# Patient Record
Sex: FEMALE | Race: WHITE | NOT HISPANIC OR LATINO | Employment: OTHER | URBAN - METROPOLITAN AREA
[De-identification: names, ages, dates, MRNs, and addresses within clinical notes are randomized per-mention and may not be internally consistent; named-entity substitution may affect disease eponyms.]

---

## 2017-01-12 ENCOUNTER — OFFICE VISIT (OUTPATIENT)
Dept: INTERNAL MEDICINE | Facility: CLINIC | Age: 77
End: 2017-01-12

## 2017-01-12 VITALS
BODY MASS INDEX: 32.51 KG/M2 | OXYGEN SATURATION: 96 % | TEMPERATURE: 97.9 F | HEART RATE: 69 BPM | HEIGHT: 61 IN | WEIGHT: 172.2 LBS | DIASTOLIC BLOOD PRESSURE: 78 MMHG | SYSTOLIC BLOOD PRESSURE: 128 MMHG

## 2017-01-12 DIAGNOSIS — R19.7 DIARRHEA, UNSPECIFIED TYPE: ICD-10-CM

## 2017-01-12 DIAGNOSIS — C44.300 SKIN CANCER OF FACE: Primary | ICD-10-CM

## 2017-01-12 DIAGNOSIS — M25.542 ARTHRALGIA OF BOTH HANDS: ICD-10-CM

## 2017-01-12 DIAGNOSIS — R20.8 BURNING SENSATION OF FEET: ICD-10-CM

## 2017-01-12 DIAGNOSIS — M25.541 ARTHRALGIA OF BOTH HANDS: ICD-10-CM

## 2017-01-12 DIAGNOSIS — I25.10 CARDIOVASCULAR DISEASE: ICD-10-CM

## 2017-01-12 DIAGNOSIS — R25.2 CRAMP OF BOTH LOWER EXTREMITIES: ICD-10-CM

## 2017-01-12 DIAGNOSIS — L65.9 HAIR LOSS: ICD-10-CM

## 2017-01-12 DIAGNOSIS — I10 ESSENTIAL HYPERTENSION: ICD-10-CM

## 2017-01-12 DIAGNOSIS — E78.5 HYPERLIPIDEMIA, UNSPECIFIED HYPERLIPIDEMIA TYPE: ICD-10-CM

## 2017-01-12 PROCEDURE — 99214 OFFICE O/P EST MOD 30 MIN: CPT | Performed by: FAMILY MEDICINE

## 2017-01-12 RX ORDER — CEPHALEXIN 500 MG/1
CAPSULE ORAL
Refills: 5 | COMMUNITY
Start: 2017-01-02 | End: 2018-01-03

## 2017-01-12 RX ORDER — PRAVASTATIN SODIUM 40 MG
1 TABLET ORAL DAILY
COMMUNITY
Start: 2016-11-01 | End: 2017-07-12 | Stop reason: SDUPTHER

## 2017-01-12 RX ORDER — METOPROLOL SUCCINATE 25 MG/1
25 TABLET, EXTENDED RELEASE ORAL EVERY MORNING
COMMUNITY
Start: 2016-11-09 | End: 2020-12-10 | Stop reason: SDUPTHER

## 2017-01-12 RX ORDER — CHLORAL HYDRATE 500 MG
1200 CAPSULE ORAL
Status: ON HOLD | COMMUNITY
End: 2018-03-07

## 2017-01-12 RX ORDER — NITROGLYCERIN 400 UG/1
SPRAY ORAL
COMMUNITY
Start: 2016-11-09 | End: 2018-01-03 | Stop reason: DRUGHIGH

## 2017-01-12 RX ORDER — ASPIRIN 81 MG/1
81 TABLET ORAL NIGHTLY
COMMUNITY

## 2017-01-12 RX ORDER — UBIDECARENONE 200 MG
200 CAPSULE ORAL EVERY MORNING
Status: ON HOLD | COMMUNITY
End: 2018-03-07

## 2017-01-12 RX ORDER — ACETAMINOPHEN 500 MG
500 TABLET ORAL EVERY 6 HOURS PRN
Status: ON HOLD | COMMUNITY
End: 2018-03-07

## 2017-01-12 NOTE — MR AVS SNAPSHOT
Chelsi Ferguson   1/12/2017 8:30 AM   Office Visit    Dept Phone:  [phone]   Encounter #:  [id number]    Provider:  Reina MAI MD   Department:  [facility] INTERNAL MEDICINE                Your Full Care Plan              Your Updated Medication List          This list is accurate as of: 1/12/17  9:33 AM.  Always use your most recent med list.                acetaminophen 500 MG tablet   Commonly known as:  TYLENOL       aspirin 81 MG EC tablet       Biotin 5000 MCG capsule       cephalexin 500 MG capsule   Commonly known as:  KEFLEX       Coenzyme Q10 200 MG capsule       estradiol 10 MCG tablet vaginal tablet   Commonly known as:  VAGIFEM   Insert 1 tablet into the vagina 2 (Two) Times a Week.       fish oil 1000 MG capsule capsule       metoprolol succinate XL 25 MG 24 hr tablet   Commonly known as:  TOPROL-XL       MULTIVITAL PO       nitroglycerin 0.4 MG/SPRAY spray   Commonly known as:  NITROLINGUAL       pravastatin 40 MG tablet   Commonly known as:  PRAVACHOL               We Performed the Following     Ambulatory Referral to Dermatology     C-reactive Protein     CBC & Differential     Celiac Disease Panel     Comprehensive Metabolic Panel     Folate     Lipid Panel     Magnesium     Nuclear Antigen Antibody, IFA     Rheumatoid Factor     Sedimentation Rate     T4, Free     TSH     Vitamin B1, Whole Blood     Vitamin B12       You Were Diagnosed With        Codes Comments    Skin cancer of face    -  Primary ICD-10-CM: C44.310  ICD-9-CM: 173.31     Cardiovascular disease     ICD-10-CM: I25.10  ICD-9-CM: 429.2     Hyperlipidemia, unspecified hyperlipidemia type     ICD-10-CM: E78.5  ICD-9-CM: 272.4     Essential hypertension     ICD-10-CM: I10  ICD-9-CM: 401.9     Hair loss     ICD-10-CM: L65.9  ICD-9-CM: 704.00     Arthralgia of both hands     ICD-10-CM: M25.541, M25.542  ICD-9-CM: 719.44     Diarrhea, unspecified type     ICD-10-CM: R19.7  ICD-9-CM: 787.91   "   Cramp of both lower extremities     ICD-10-CM: R25.2  ICD-9-CM: 729.82     Burning sensation of feet     ICD-10-CM: R20.8  ICD-9-CM: 782.0       Instructions     None    Patient Instructions History      Upcoming Appointments     Visit Type Date Time Department    FOLLOW UP 1/12/2017  8:30 AM [address]      MyChart Signup     Our records indicate that you have declined [facility] [employer] signup. If you would like to sign up for [employer], please email [email or web address] or call [phone] to obtain an activation code.             Other Info from Your Visit           Allergies     Aleve [Naproxen]      Celebrex [Celecoxib]      Ibuprofen      Indocin [Indomethacin]      Keflex [Cephalexin]      Lipitor [Atorvastatin]      Prevacid [Lansoprazole]      Prilosec [Omeprazole]      Statins      Zocor [Simvastatin]  Hives      Reason for Visit     Hyperlipidemia 6+ month follow up--discuss biotin      Vital Signs     Blood Pressure Pulse Temperature Height Weight Last Menstrual Period    128/78 69 97.9 °F (36.6 °C) 61\" (154.9 cm) 172 lb 3.2 oz (78.1 kg) (LMP Unknown)    Oxygen Saturation Body Mass Index Smoking Status             96% 32.54 kg/m2 Never Smoker         Problems and Diagnoses Noted     Cardiovascular disease    Diarrhea    High cholesterol or triglycerides    Skin cancer    -  Primary    High blood pressure        Hair loss        Arthralgia of both hands        Cramp of both lower extremities        Burning sensation of feet            "

## 2017-01-12 NOTE — PROGRESS NOTES
"Subjective   Chelsi Ferguson is a 76 y.o. female.     Chief Complaint   Patient presents with   • Hyperlipidemia     6+ month follow up--discuss biotin       Vitals:    01/12/17 0834   BP: 128/78   Pulse: 69   Temp: 97.9 °F (36.6 °C)   SpO2: 96%   Weight: 172 lb 3.2 oz (78.1 kg)   Height: 61\" (154.9 cm)       Hyperlipidemia   This is a chronic problem. The current episode started more than 1 year ago. Lipid results: pending. She has no history of chronic renal disease, diabetes, hypothyroidism, liver disease, obesity or nephrotic syndrome. Factors aggravating her hyperlipidemia include fatty foods. Associated symptoms include myalgias. Pertinent negatives include no chest pain, focal sensory loss, focal weakness, leg pain or shortness of breath. Current antihyperlipidemic treatment includes statins. The current treatment provides significant improvement of lipids. Compliance problems include adherence to diet.  Risk factors for coronary artery disease include dyslipidemia, hypertension, post-menopausal and family history.   Hypertension   This is a chronic problem. The current episode started more than 1 year ago. The problem has been gradually improving since onset. The problem is controlled. Pertinent negatives include no anxiety, blurred vision, chest pain, headaches, malaise/fatigue, neck pain, orthopnea, palpitations, peripheral edema, PND, shortness of breath or sweats. There are no associated agents to hypertension. Risk factors for coronary artery disease include dyslipidemia, family history and sedentary lifestyle. Past treatments include beta blockers. The current treatment provides significant improvement. Compliance problems include diet.  Hypertensive end-organ damage includes CAD/MI. There is no history of angina, kidney disease, CVA, heart failure, left ventricular hypertrophy, PVD or retinopathy. There is no history of chronic renal disease.      Pt's hair is thinning and has started biotin. Pt's " thumbnails are breaking off.   Pt's dentist told her that her teeth are resorbing and there is concern of autoimmune condition on 2 teeth.   Pt has pain in joints and now having wrist pain.  Pt is having leg cramps more frequently.  Pt's feet are burning.  Granddaughter has RA    Pt is a nonsmoker  The following portions of the patient's history were reviewed and updated as appropriate: allergies, current medications, past family history, past medical history, past social history, past surgical history and problem list.    Past Medical History   Diagnosis Date   • Ankle pain    • Chest discomfort    • Edema    • Gallstones    • GERD (gastroesophageal reflux disease)    • Glaucoma    • H/O complete eye exam 06/2013   • H/O mammogram 11/16/2015   • H/O non-ST elevation myocardial infarction (NSTEMI) 01/2005   • Hammer toe    • Heart attack 01/2005   • History of blood transfusion 01/2005   • History of cardiovascular stress test 12/02/2015     normal   • HTN (hypertension)    • Hyperlipidemia    • Kidney infection 1971   • Menopausal symptoms    • Onychia and paronychia of finger    • Osteopenia    • Other elevated white blood cell count    • Pap smear for cervical cancer screening 2010     Leandro   • Pneumonia    • Viral warts      Past Surgical History   Procedure Laterality Date   • Coronary stent placement Left 01/2005     drug eluting stent 1/2005 LAD   • Tonsillectomy  1952   • Bladder repair  2002   • Laparotomy oopherectomy Bilateral 2002   • Bladder repair  2003   • Uterine fibroid surgery  1978     emergency removal of fibroid from birth canal   • Dilatation and curettage  1971   • Replacement total knee bilateral Bilateral    • Frontalis suspension  2010   • Hysterectomy  1978   • Total knee arthroplasty  01/2005   • Other surgical history  2005     rectocele repair   • Bunionectomy Right 2010   • Other surgical history  2010     eyelid surgery   • Dilatation and curettage  1978   • Hammer toe repair Right  11/03/2010   • Colonoscopy  2008, 4/2014     [facility]   • Eye surgery  05/2016       Allergies   Allergen Reactions   • Aleve [Naproxen]    • Celebrex [Celecoxib]    • Ibuprofen    • Indocin [Indomethacin]    • Keflex [Cephalexin]    • Lipitor [Atorvastatin]    • Prevacid [Lansoprazole]    • Prilosec [Omeprazole]    • Statins    • Zocor [Simvastatin] Hives       Social History     Social History   • Marital status:      Spouse name: N/A   • Number of children: N/A   • Years of education: N/A     Occupational History   • Not on file.     Social History Main Topics   • Smoking status: Never Smoker   • Smokeless tobacco: Never Used   • Alcohol use No   • Drug use: No   • Sexual activity: Yes     Birth control/ protection: None     Other Topics Concern   • Not on file     Social History Narrative       Family History   Problem Relation Age of Onset   • Stroke Mother    • Heart failure Father      congestive   • Cancer Father      lip   • Breast cancer Sister      60's   • Breast cancer Daughter 40   • Breast cancer Other      NIECE 60's   • Breast cancer Other      NIECE 60's   • Ovarian cancer Neg Hx          Review of Systems   Constitutional: Negative for malaise/fatigue.   Eyes: Negative for blurred vision.   Respiratory: Negative for shortness of breath.    Cardiovascular: Negative for chest pain, palpitations, orthopnea and PND.   Musculoskeletal: Positive for myalgias. Negative for neck pain.   Neurological: Negative for focal weakness and headaches.       Objective   Physical Exam   Constitutional: She is oriented to person, place, and time. She appears well-developed.   HENT:   Head: Normocephalic.   Right Ear: Tympanic membrane, external ear and ear canal normal.   Left Ear: External ear normal. A foreign body is present.   Ears:    Nose: Nose normal.   Mouth/Throat: Oropharynx is clear and moist.   Eyes: Conjunctivae and EOM are normal. Pupils are equal, round, and reactive to light.   Neck: Trachea  normal and normal range of motion. Neck supple. Carotid bruit is not present. No thyroid mass and no thyromegaly present.   Cardiovascular: Normal rate and regular rhythm.    No murmur heard.  Pulses:       Dorsalis pedis pulses are 2+ on the right side, and 2+ on the left side.        Posterior tibial pulses are 2+ on the right side, and 2+ on the left side.   Pulmonary/Chest: Effort normal and breath sounds normal.   Abdominal: Soft. Bowel sounds are normal. There is no tenderness.   Musculoskeletal: Normal range of motion.        Hands:  Neurological: She is alert and oriented to person, place, and time.   Skin: Skin is warm and dry.   Psychiatric: She has a normal mood and affect. Her behavior is normal.   Nursing note and vitals reviewed.      Assessment/Plan   Chelsi was seen today for hyperlipidemia.    Diagnoses and all orders for this visit:    Skin cancer of face  -     Ambulatory Referral to Dermatology    Cardiovascular disease    Hyperlipidemia, unspecified hyperlipidemia type  -     Comprehensive Metabolic Panel  -     Lipid Panel    Essential hypertension  -     Comprehensive Metabolic Panel  -     CBC & Differential  -     TSH  -     Lipid Panel    Hair loss  -     Rheumatoid Factor  -     Nuclear Antigen Antibody, IFA  -     Celiac Disease Panel  -     Sedimentation Rate  -     C-reactive Protein    Arthralgia of both hands  -     Rheumatoid Factor  -     Celiac Disease Panel  -     Sedimentation Rate  -     C-reactive Protein    Diarrhea, unspecified type  -     Celiac Disease Panel    Cramp of both lower extremities    Burning sensation of feet  -     T4, Free  -     Vitamin B12  -     Folate  -     Magnesium  -     Vitamin B1, Whole Blood        Pt had flu shot and pneumonia vaccine           Current Outpatient Prescriptions:   •  acetaminophen (TYLENOL) 500 MG tablet, Take 500 mg by mouth Every 6 (Six) Hours As Needed for mild pain (1-3)., Disp: , Rfl:   •  aspirin 81 MG EC tablet, Take 81 mg by  mouth Every Night., Disp: , Rfl:   •  Biotin 5000 MCG capsule, Take  by mouth., Disp: , Rfl:   •  Coenzyme Q10 200 MG capsule, Take 200 mg by mouth Daily., Disp: , Rfl:   •  estradiol (VAGIFEM) 10 MCG tablet vaginal tablet, Insert 1 tablet into the vagina 2 (Two) Times a Week., Disp: 24 tablet, Rfl: 1  •  Multiple Vitamins-Minerals (MULTIVITAL PO), Take 1 tablet by mouth Daily., Disp: , Rfl:   •  Omega-3 Fatty Acids (FISH OIL) 1000 MG capsule capsule, Take 1,000 mg by mouth Daily With Breakfast., Disp: , Rfl:   •  cephalexin (KEFLEX) 500 MG capsule, Takes 4 tab before dental appointment, Disp: , Rfl: 5  •  metoprolol succinate XL (TOPROL-XL) 25 MG 24 hr tablet, Take 1 tablet by mouth Daily., Disp: , Rfl:   •  nitroglycerin (NITROLINGUAL) 0.4 MG/SPRAY spray, , Disp: , Rfl:   •  pravastatin (PRAVACHOL) 40 MG tablet, Take 1 tablet by mouth Daily., Disp: , Rfl:     Return in about 6 months (around 7/12/2017), or if symptoms worsen or fail to improve, for Recheck.

## 2017-01-16 LAB
ALBUMIN SERPL-MCNC: 4.2 G/DL (ref 3.2–4.8)
ALBUMIN/GLOB SERPL: 2 G/DL (ref 1.5–2.5)
ALP SERPL-CCNC: 76 U/L (ref 25–100)
ALT SERPL-CCNC: 19 U/L (ref 7–40)
ANA TITR SER IF: NEGATIVE {TITER}
AST SERPL-CCNC: 28 U/L (ref 0–33)
BASOPHILS # BLD AUTO: 0.03 10*3/MM3 (ref 0–0.2)
BASOPHILS NFR BLD AUTO: 0.4 % (ref 0–1)
BILIRUB SERPL-MCNC: 0.5 MG/DL (ref 0.3–1.2)
BUN SERPL-MCNC: 11 MG/DL (ref 9–23)
BUN/CREAT SERPL: 18.3 (ref 7–25)
CALCIUM SERPL-MCNC: 10.4 MG/DL (ref 8.7–10.4)
CHLORIDE SERPL-SCNC: 106 MMOL/L (ref 99–109)
CHOLEST SERPL-MCNC: 191 MG/DL (ref 0–200)
CO2 SERPL-SCNC: 32 MMOL/L (ref 20–31)
CREAT SERPL-MCNC: 0.6 MG/DL (ref 0.6–1.3)
CRP SERPL-MCNC: 2.8 MG/DL (ref 0–10)
EOSINOPHIL # BLD AUTO: 0.14 10*3/MM3 (ref 0.1–0.3)
EOSINOPHIL NFR BLD AUTO: 2 % (ref 0–3)
ERYTHROCYTE [DISTWIDTH] IN BLOOD BY AUTOMATED COUNT: 13.4 % (ref 11.3–14.5)
ERYTHROCYTE [SEDIMENTATION RATE] IN BLOOD BY WESTERGREN METHOD: 10 MM/HR (ref 0–30)
FOLATE SERPL-MCNC: >24 NG/ML (ref 3.2–20)
GLOBULIN SER CALC-MCNC: 2.1 GM/DL
GLUCOSE SERPL-MCNC: 89 MG/DL (ref 70–100)
HCT VFR BLD AUTO: 43.1 % (ref 34.5–44)
HDLC SERPL-MCNC: 48 MG/DL (ref 40–60)
HGB BLD-MCNC: 14 G/DL (ref 11.5–15.5)
IMM GRANULOCYTES # BLD: 0.02 10*3/MM3 (ref 0–0.03)
IMM GRANULOCYTES NFR BLD: 0.3 % (ref 0–0.6)
LDLC SERPL CALC-MCNC: 107 MG/DL (ref 0–100)
LYMPHOCYTES # BLD AUTO: 1.75 10*3/MM3 (ref 0.6–4.8)
LYMPHOCYTES NFR BLD AUTO: 24.6 % (ref 24–44)
MAGNESIUM SERPL-MCNC: 1.8 MG/DL (ref 1.3–2.7)
MCH RBC QN AUTO: 32.3 PG (ref 27–31)
MCHC RBC AUTO-ENTMCNC: 32.5 G/DL (ref 32–36)
MCV RBC AUTO: 99.3 FL (ref 80–99)
MONOCYTES # BLD AUTO: 0.65 10*3/MM3 (ref 0–1)
MONOCYTES NFR BLD AUTO: 9.1 % (ref 0–12)
NEUTROPHILS # BLD AUTO: 4.52 10*3/MM3 (ref 1.5–8.3)
NEUTROPHILS NFR BLD AUTO: 63.6 % (ref 41–71)
PLATELET # BLD AUTO: 294 10*3/MM3 (ref 150–450)
POTASSIUM SERPL-SCNC: 4.8 MMOL/L (ref 3.5–5.5)
PROT SERPL-MCNC: 6.3 G/DL (ref 5.7–8.2)
RBC # BLD AUTO: 4.34 10*6/MM3 (ref 3.89–5.14)
RHEUMATOID FACT SERPL-ACNC: <10 IU/ML (ref 0–13.9)
SODIUM SERPL-SCNC: 146 MMOL/L (ref 132–146)
T4 FREE SERPL-MCNC: 1.29 NG/DL (ref 0.89–1.76)
TRIGL SERPL-MCNC: 179 MG/DL (ref 0–150)
TSH SERPL DL<=0.005 MIU/L-ACNC: 1.81 MIU/ML (ref 0.35–5.35)
VIT B1 BLD-SCNC: 201.5 NMOL/L (ref 66.5–200)
VIT B12 SERPL-MCNC: 521 PG/ML (ref 211–911)
VLDLC SERPL CALC-MCNC: 35.8 MG/DL
WBC # BLD AUTO: 7.11 10*3/MM3 (ref 3.5–10.8)

## 2017-01-18 LAB
ENDOMYSIUM IGA SER QL: NEGATIVE
IGA SERPL-MCNC: 133 MG/DL (ref 64–422)
TTG IGA SER-ACNC: <2 U/ML (ref 0–3)
WRITTEN AUTHORIZATION: NORMAL

## 2017-02-25 ENCOUNTER — APPOINTMENT (OUTPATIENT)
Dept: CT IMAGING | Facility: HOSPITAL | Age: 77
End: 2017-02-25

## 2017-02-25 ENCOUNTER — HOSPITAL ENCOUNTER (EMERGENCY)
Facility: HOSPITAL | Age: 77
Discharge: HOME | End: 2017-02-25
Attending: EMERGENCY MEDICINE | Admitting: EMERGENCY MEDICINE

## 2017-02-25 VITALS
HEIGHT: 61 IN | OXYGEN SATURATION: 96 % | RESPIRATION RATE: 16 BRPM | TEMPERATURE: 97.6 F | DIASTOLIC BLOOD PRESSURE: 81 MMHG | BODY MASS INDEX: 32.1 KG/M2 | SYSTOLIC BLOOD PRESSURE: 155 MMHG | HEART RATE: 62 BPM | WEIGHT: 170 LBS
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
## 2017-02-25 DIAGNOSIS — S02.2XXA CLOSED FRACTURE OF NASAL BONE, INITIAL ENCOUNTER: ICD-10-CM

## 2017-02-25 DIAGNOSIS — S09.90XA MINOR CLOSED HEAD INJURY: ICD-10-CM

## 2017-02-25 PROCEDURE — 70486 CT MAXILLOFACIAL W/O DYE: CPT

## 2017-02-25 PROCEDURE — 70450 CT HEAD/BRAIN W/O DYE: CPT

## 2017-02-25 PROCEDURE — 99284 EMERGENCY DEPT VISIT MOD MDM: CPT

## 2017-02-25 RX ORDER — ACETAMINOPHEN 500 MG
1000 TABLET ORAL ONCE
Status: COMPLETED | OUTPATIENT
Start: 2017-02-25 | End: 2017-02-25

## 2017-02-25 RX ORDER — OXYMETAZOLINE HYDROCHLORIDE 0.05 G/100ML
2 SPRAY NASAL AS NEEDED
Status: DISCONTINUED | OUTPATIENT
Start: 2017-02-25 | End: 2017-02-25 | Stop reason: HOSPADM

## 2017-02-25 RX ORDER — OXYMETAZOLINE HYDROCHLORIDE 0.05 G/100ML
1 SPRAY NASAL ONCE
Status: COMPLETED | OUTPATIENT
Start: 2017-02-25 | End: 2017-02-25

## 2017-02-25 RX ORDER — HYDROCODONE BITARTRATE AND ACETAMINOPHEN 5; 325 MG/1; MG/1
1 TABLET ORAL EVERY 4 HOURS PRN
Qty: 12 TABLET | Refills: 0 | Status: SHIPPED | OUTPATIENT
Start: 2017-02-25 | End: 2017-07-12

## 2017-02-25 RX ADMIN — OXYMETAZOLINE HYDROCHLORIDE 2 SPRAY: 5 SPRAY NASAL at 16:04

## 2017-02-25 RX ADMIN — OXYMETAZOLINE HYDROCHLORIDE 1 SPRAY: 5 SPRAY NASAL at 14:18

## 2017-02-25 RX ADMIN — ACETAMINOPHEN 1000 MG: 500 TABLET, FILM COATED ORAL at 14:17

## 2017-02-28 ENCOUNTER — TRANSCRIBE ORDERS (OUTPATIENT)
Dept: LAB | Facility: HOSPITAL | Age: 77
End: 2017-02-28

## 2017-02-28 ENCOUNTER — LAB (OUTPATIENT)
Dept: LAB | Facility: HOSPITAL | Age: 77
End: 2017-02-28

## 2017-02-28 DIAGNOSIS — E07.0: Primary | ICD-10-CM

## 2017-02-28 DIAGNOSIS — E07.0: ICD-10-CM

## 2017-02-28 PROCEDURE — 36415 COLL VENOUS BLD VENIPUNCTURE: CPT

## 2017-02-28 PROCEDURE — 83970 ASSAY OF PARATHORMONE: CPT | Performed by: OTOLARYNGOLOGY

## 2017-03-01 LAB — PTH-INTACT SERPL-MCNC: 19 PG/ML (ref 15–65)

## 2017-07-12 ENCOUNTER — OFFICE VISIT (OUTPATIENT)
Dept: INTERNAL MEDICINE | Facility: CLINIC | Age: 77
End: 2017-07-12

## 2017-07-12 VITALS
BODY MASS INDEX: 32.36 KG/M2 | OXYGEN SATURATION: 96 % | DIASTOLIC BLOOD PRESSURE: 80 MMHG | HEIGHT: 61 IN | WEIGHT: 171.4 LBS | HEART RATE: 69 BPM | TEMPERATURE: 97.8 F | SYSTOLIC BLOOD PRESSURE: 130 MMHG

## 2017-07-12 DIAGNOSIS — E78.5 HYPERLIPIDEMIA, UNSPECIFIED HYPERLIPIDEMIA TYPE: Primary | ICD-10-CM

## 2017-07-12 LAB
ALBUMIN SERPL-MCNC: 4.4 G/DL (ref 3.2–4.8)
ALBUMIN/GLOB SERPL: 1.9 G/DL (ref 1.5–2.5)
ALP SERPL-CCNC: 69 U/L (ref 25–100)
ALT SERPL-CCNC: 14 U/L (ref 7–40)
AST SERPL-CCNC: 23 U/L (ref 0–33)
BILIRUB SERPL-MCNC: 0.7 MG/DL (ref 0.3–1.2)
BUN SERPL-MCNC: 16 MG/DL (ref 9–23)
BUN/CREAT SERPL: 26.7 (ref 7–25)
CALCIUM SERPL-MCNC: 10.1 MG/DL (ref 8.7–10.4)
CHLORIDE SERPL-SCNC: 104 MMOL/L (ref 99–109)
CHOLEST SERPL-MCNC: 196 MG/DL (ref 0–200)
CO2 SERPL-SCNC: 31 MMOL/L (ref 20–31)
CREAT SERPL-MCNC: 0.6 MG/DL (ref 0.6–1.3)
GLOBULIN SER CALC-MCNC: 2.3 GM/DL
GLUCOSE SERPL-MCNC: 90 MG/DL (ref 70–100)
HDLC SERPL-MCNC: 42 MG/DL (ref 40–60)
LDLC SERPL CALC-MCNC: 113 MG/DL (ref 0–100)
POTASSIUM SERPL-SCNC: 4.3 MMOL/L (ref 3.5–5.5)
PROT SERPL-MCNC: 6.7 G/DL (ref 5.7–8.2)
SODIUM SERPL-SCNC: 141 MMOL/L (ref 132–146)
TRIGL SERPL-MCNC: 206 MG/DL (ref 0–150)
VLDLC SERPL CALC-MCNC: 41.2 MG/DL

## 2017-07-12 PROCEDURE — 99213 OFFICE O/P EST LOW 20 MIN: CPT | Performed by: FAMILY MEDICINE

## 2017-07-12 RX ORDER — ESTRADIOL 10 UG/1
1 INSERT VAGINAL 2 TIMES WEEKLY
Qty: 24 TABLET | Refills: 1 | Status: SHIPPED | OUTPATIENT
Start: 2017-07-13 | End: 2018-03-05

## 2017-07-12 RX ORDER — PRAVASTATIN SODIUM 40 MG
40 TABLET ORAL DAILY
Qty: 90 TABLET | Refills: 1 | Status: SHIPPED | OUTPATIENT
Start: 2017-07-12 | End: 2018-01-18 | Stop reason: HOSPADM

## 2017-07-12 NOTE — PROGRESS NOTES
"Nancy Ferguson is a 76 y.o. female.     Chief Complaint   Patient presents with   • Hyperlipidemia     6 month follow up       Visit Vitals   • /80   • Pulse 69   • Temp 97.8 °F (36.6 °C)   • Ht 61\" (154.9 cm)   • Wt 171 lb 6.4 oz (77.7 kg)   • LMP  (LMP Unknown)   • SpO2 96%   • BMI 32.39 kg/m2       Hyperlipidemia   This is a chronic problem. The current episode started more than 1 year ago. The problem is controlled. Recent lipid tests were reviewed and are normal. She has no history of chronic renal disease, diabetes, hypothyroidism, liver disease, obesity or nephrotic syndrome. There are no known factors aggravating her hyperlipidemia. Pertinent negatives include no chest pain, focal sensory loss, focal weakness, leg pain, myalgias or shortness of breath. Current antihyperlipidemic treatment includes statins. The current treatment provides significant improvement of lipids. There are no compliance problems.  Risk factors for coronary artery disease include dyslipidemia and post-menopausal.      Pt fell at [employer] and and broke her nose. Pt caught her toe on rug. Pt was trying to catch her balance. Pt had surgery on her nose.    Pt has fallen in her yard, getting over balanced leaning forward.   Dizziness resolved with lowering of metoprolol by Cardiology  Chest pain resolved. CAD stable      The following portions of the patient's history were reviewed and updated as appropriate: allergies, current medications, past family history, past medical history, past social history, past surgical history and problem list.     Past Medical History:   Diagnosis Date   • Ankle pain    • Chest discomfort    • Edema    • Gallstones    • GERD (gastroesophageal reflux disease)    • Glaucoma    • H/O complete eye exam 06/2013   • H/O mammogram 11/16/2015   • H/O non-ST elevation myocardial infarction (NSTEMI) 01/2005   • Hammer toe    • Heart attack 01/2005   • History of blood transfusion 01/2005   • History of " cardiovascular stress test 12/02/2015    normal   • HTN (hypertension)    • Hyperlipidemia    • Kidney infection 1971   • Menopausal symptoms    • Onychia and paronychia of finger    • Osteopenia    • Other elevated white blood cell count    • Pap smear for cervical cancer screening 2010    Leandro   • Pneumonia    • Viral warts        Past Surgical History:   Procedure Laterality Date   • BLADDER REPAIR  2002   • BLADDER REPAIR  2003   • BUNIONECTOMY Right 2010   • COLONOSCOPY  2008, 4/2014    Juany   • CORONARY STENT PLACEMENT Left 01/2005    drug eluting stent 1/2005 LAD   • DILATATION AND CURETTAGE  1971   • DILATATION AND CURETTAGE  1978   • EYE SURGERY  05/2016   • FRONTALIS SUSPENSION  2010   • HAMMER TOE REPAIR Right 11/03/2010   • HYSTERECTOMY  1978   • LAPAROTOMY OOPHERECTOMY Bilateral 2002   • NOSE SURGERY  03/2017    repair 3 fractured areas. Dr Dunne   • OTHER SURGICAL HISTORY  2005    rectocele repair   • OTHER SURGICAL HISTORY  2010    eyelid surgery   • REPLACEMENT TOTAL KNEE BILATERAL Bilateral    • TONSILLECTOMY  1952   • TOTAL KNEE ARTHROPLASTY  01/2005   • UTERINE FIBROID SURGERY  1978    emergency removal of fibroid from birth canal       Social History     Social History   • Marital status:      Spouse name: N/A   • Number of children: N/A   • Years of education: N/A     Occupational History   • Not on file.     Social History Main Topics   • Smoking status: Never Smoker   • Smokeless tobacco: Never Used   • Alcohol use No   • Drug use: No   • Sexual activity: Yes     Birth control/ protection: None     Other Topics Concern   • Not on file     Social History Narrative       Family History   Problem Relation Age of Onset   • Stroke Mother    • Heart failure Father      congestive   • Cancer Father      lip   • Breast cancer Sister      60's   • Breast cancer Daughter 40   • Breast cancer Other      NIECE 60's   • Breast cancer Other      NIECE 60's   • Ovarian cancer Neg Hx         Allergies   Allergen Reactions   • Aleve [Naproxen]    • Celebrex [Celecoxib]    • Ibuprofen    • Indocin [Indomethacin]    • Keflex [Cephalexin]    • Lipitor [Atorvastatin]    • Prevacid [Lansoprazole]    • Prilosec [Omeprazole]    • Statins    • Zocor [Simvastatin] Hives         Review of Systems   Constitutional: Negative.  Negative for chills, diaphoresis, fatigue and fever.   HENT: Negative.  Negative for ear pain, nosebleeds, postnasal drip, rhinorrhea, sinus pressure, sneezing and sore throat.    Eyes: Negative.  Negative for redness and itching.   Respiratory: Negative.  Negative for cough, shortness of breath and wheezing.    Cardiovascular: Negative.  Negative for chest pain and palpitations.   Gastrointestinal: Positive for constipation and diarrhea. Negative for abdominal pain, nausea and vomiting.   Endocrine: Negative.  Negative for cold intolerance, heat intolerance, polydipsia, polyphagia and polyuria.   Genitourinary: Negative.  Negative for dysuria, frequency, hematuria and urgency.        Urine leak   Musculoskeletal: Negative.  Negative for arthralgias, back pain, myalgias and neck pain.        Left ankle aches and swells   Skin: Negative.  Negative for color change and rash.        Derm appointment this month   Allergic/Immunologic: Negative.  Negative for environmental allergies.   Neurological: Negative.  Negative for dizziness, focal weakness, syncope, light-headedness and headaches.   Hematological: Negative.  Negative for adenopathy. Does not bruise/bleed easily.   Psychiatric/Behavioral: Negative.  Negative for dysphoric mood. The patient is not nervous/anxious.        Objective   Physical Exam   Constitutional: She is oriented to person, place, and time. She appears well-developed.   HENT:   Head: Normocephalic.   Right Ear: External ear normal.   Left Ear: External ear normal.   Nose: Nose normal.   Eyes: Conjunctivae and EOM are normal. Pupils are equal, round, and reactive to  light.   Neck: Trachea normal and normal range of motion. Neck supple. Carotid bruit is not present. No thyroid mass and no thyromegaly present.   Cardiovascular: Normal rate and regular rhythm.    No murmur heard.  Pulmonary/Chest: Effort normal and breath sounds normal. No respiratory distress. She has no decreased breath sounds. She has no wheezes. She has no rhonchi. She has no rales.   Abdominal: Soft. Bowel sounds are normal. There is no tenderness.   Musculoskeletal: Normal range of motion.   Neurological: She is alert and oriented to person, place, and time.   Skin: Skin is warm and dry.   Psychiatric: She has a normal mood and affect. Her behavior is normal.   Nursing note and vitals reviewed.      Assessment/Plan   Chelsi was seen today for hyperlipidemia.    Diagnoses and all orders for this visit:    Hyperlipidemia, unspecified hyperlipidemia type  -     Comprehensive Metabolic Panel  -     Lipid Panel    Other orders  -     pravastatin (PRAVACHOL) 40 MG tablet; Take 1 tablet by mouth Daily.  -     estradiol (VAGIFEM) 10 MCG tablet vaginal tablet; Insert 1 tablet into the vagina 2 (Two) Times a Week.                   Current Outpatient Prescriptions:   •  acetaminophen (TYLENOL) 500 MG tablet, Take 500 mg by mouth Every 6 (Six) Hours As Needed for mild pain (1-3)., Disp: , Rfl:   •  aspirin 81 MG EC tablet, Take 81 mg by mouth Every Night., Disp: , Rfl:   •  Biotin 5000 MCG capsule, Take  by mouth., Disp: , Rfl:   •  cephalexin (KEFLEX) 500 MG capsule, Takes 4 tab before dental appointment, Disp: , Rfl: 5  •  Coenzyme Q10 200 MG capsule, Take 200 mg by mouth Daily., Disp: , Rfl:   •  [START ON 7/13/2017] estradiol (VAGIFEM) 10 MCG tablet vaginal tablet, Insert 1 tablet into the vagina 2 (Two) Times a Week., Disp: 24 tablet, Rfl: 1  •  metoprolol succinate XL (TOPROL-XL) 25 MG 24 hr tablet, Take 1 tablet by mouth Daily., Disp: , Rfl:   •  Multiple Vitamins-Minerals (MULTIVITAL PO), Take 1 tablet by mouth  Daily., Disp: , Rfl:   •  nitroglycerin (NITROLINGUAL) 0.4 MG/SPRAY spray, , Disp: , Rfl:   •  Omega-3 Fatty Acids (FISH OIL) 1000 MG capsule capsule, Take 1,000 mg by mouth Daily With Breakfast., Disp: , Rfl:   •  pravastatin (PRAVACHOL) 40 MG tablet, Take 1 tablet by mouth Daily., Disp: 90 tablet, Rfl: 1    Return in about 6 months (around 1/12/2018) for Recheck, pt want to do wellness in July.

## 2017-07-12 NOTE — PATIENT INSTRUCTIONS
Fall Prevention in the Home   Falls can cause injuries and can affect people from all age groups. There are many simple things that you can do to make your home safe and to help prevent falls.  WHAT CAN I DO ON THE OUTSIDE OF MY HOME?  · Regularly repair the edges of walkways and driveways and fix any cracks.  · Remove high doorway thresholds.  · Trim any shrubbery on the main path into your home.  · Use bright outdoor lighting.  · Clear walkways of debris and clutter, including tools and rocks.  · Regularly check that handrails are securely fastened and in good repair. Both sides of any steps should have handrails.  · Install guardrails along the edges of any raised decks or porches.  · Have leaves, snow, and ice cleared regularly.  · Use sand or salt on walkways during winter months.  · In the garage, clean up any spills right away, including grease or oil spills.  WHAT CAN I DO IN THE BATHROOM?  · Use night lights.  · Install grab bars by the toilet and in the tub and shower. Do not use towel bars as grab bars.  · Use non-skid mats or decals on the floor of the tub or shower.  · If you need to sit down while you are in the shower, use a plastic, non-slip stool.  · Keep the floor dry. Immediately clean up any water that spills on the floor.  · Remove soap buildup in the tub or shower on a regular basis.  · Attach bath mats securely with double-sided non-slip rug tape.  · Remove throw rugs and other tripping hazards from the floor.  WHAT CAN I DO IN THE BEDROOM?  · Use night lights.  · Make sure that a bedside light is easy to reach.  · Do not use oversized bedding that drapes onto the floor.  · Have a firm chair that has side arms to use for getting dressed.  · Remove throw rugs and other tripping hazards from the floor.  WHAT CAN I DO IN THE KITCHEN?   · Clean up any spills right away.  · Avoid walking on wet floors.  · Place frequently used items in easy-to-reach places.  · If you need to reach for something  above you, use a sturdy step stool that has a grab bar.  · Keep electrical cables out of the way.  · Do not use floor polish or wax that makes floors slippery. If you have to use wax, make sure that it is non-skid floor wax.  · Remove throw rugs and other tripping hazards from the floor.  WHAT CAN I DO IN THE STAIRWAYS?  · Do not leave any items on the stairs.  · Make sure that there are handrails on both sides of the stairs. Fix handrails that are broken or loose. Make sure that handrails are as long as the stairways.  · Check any carpeting to make sure that it is firmly attached to the stairs. Fix any carpet that is loose or worn.  · Avoid having throw rugs at the top or bottom of stairways, or secure the rugs with carpet tape to prevent them from moving.  · Make sure that you have a light switch at the top of the stairs and the bottom of the stairs. If you do not have them, have them installed.  WHAT ARE SOME OTHER FALL PREVENTION TIPS?  · Wear closed-toe shoes that fit well and support your feet. Wear shoes that have rubber soles or low heels.  · When you use a stepladder, make sure that it is completely opened and that the sides are firmly locked. Have someone hold the ladder while you are using it. Do not climb a closed stepladder.  · Add color or contrast paint or tape to grab bars and handrails in your home. Place contrasting color strips on the first and last steps.  · Use mobility aids as needed, such as canes, walkers, scooters, and crutches.  · Turn on lights if it is dark. Replace any light bulbs that burn out.  · Set up furniture so that there are clear paths. Keep the furniture in the same spot.  · Fix any uneven floor surfaces.  · Choose a carpet design that does not hide the edge of steps of a stairway.  · Be aware of any and all pets.  · Review your medicines with your healthcare provider. Some medicines can cause dizziness or changes in blood pressure, which increase your risk of falling.  Talk  with your health care provider about other ways that you can decrease your risk of falls. This may include working with a physical therapist or  to improve your strength, balance, and endurance.     This information is not intended to replace advice given to you by your health care provider. Make sure you discuss any questions you have with your health care provider.     Document Released: 12/08/2003 Document Revised: 04/10/2017 Document Reviewed: 01/22/2016  Elsevier Interactive Patient Education ©2017 [employer]

## 2017-07-13 NOTE — PROGRESS NOTES
Please call the patient regarding her abnormal result.copy to Cardiology, LDL and triglycerides are elevated. Is she following low animal fat, low sugar low alcohol diet? If so, need to increase pravastin in view of previous MI

## 2017-07-20 ENCOUNTER — TELEPHONE (OUTPATIENT)
Dept: INTERNAL MEDICINE | Facility: CLINIC | Age: 77
End: 2017-07-20

## 2017-07-20 NOTE — TELEPHONE ENCOUNTER
----- Message from Reina MAI MD sent at 7/13/2017  7:49 AM EDT -----  Please call the patient regarding her abnormal result.copy to Cardiology, LDL and triglycerides are elevated. Is she following low animal fat, low sugar low alcohol diet? If so, need to increase pravastin in view of previous MI

## 2017-10-05 ENCOUNTER — TRANSCRIBE ORDERS (OUTPATIENT)
Dept: ADMINISTRATIVE | Facility: HOSPITAL | Age: 77
End: 2017-10-05

## 2017-10-05 DIAGNOSIS — Z12.31 VISIT FOR SCREENING MAMMOGRAM: Primary | ICD-10-CM

## 2017-10-18 ENCOUNTER — CLINICAL SUPPORT (OUTPATIENT)
Dept: INTERNAL MEDICINE | Facility: CLINIC | Age: 77
End: 2017-10-18

## 2017-10-18 DIAGNOSIS — Z23 NEED FOR VACCINATION: Primary | ICD-10-CM

## 2017-10-18 PROCEDURE — G0008 ADMIN INFLUENZA VIRUS VAC: HCPCS | Performed by: FAMILY MEDICINE

## 2017-10-18 PROCEDURE — 90662 IIV NO PRSV INCREASED AG IM: CPT | Performed by: FAMILY MEDICINE

## 2017-11-20 ENCOUNTER — HOSPITAL ENCOUNTER (OUTPATIENT)
Dept: MAMMOGRAPHY | Facility: HOSPITAL | Age: 77
Discharge: HOME | End: 2017-11-20
Admitting: FAMILY MEDICINE

## 2017-11-20 ENCOUNTER — APPOINTMENT (OUTPATIENT)
Dept: CT IMAGING | Facility: HOSPITAL | Age: 77
End: 2017-11-20

## 2017-11-20 ENCOUNTER — HOSPITAL ENCOUNTER (EMERGENCY)
Facility: HOSPITAL | Age: 77
Discharge: HOME | End: 2017-11-20
Attending: EMERGENCY MEDICINE | Admitting: EMERGENCY MEDICINE

## 2017-11-20 VITALS
TEMPERATURE: 98.3 F | HEART RATE: 72 BPM | RESPIRATION RATE: 16 BRPM | HEIGHT: 61 IN | OXYGEN SATURATION: 98 % | SYSTOLIC BLOOD PRESSURE: 136 MMHG | DIASTOLIC BLOOD PRESSURE: 88 MMHG | WEIGHT: 169 LBS | BODY MASS INDEX: 31.91 KG/M2

## 2017-11-20 DIAGNOSIS — S09.90XA MINOR HEAD INJURY, INITIAL ENCOUNTER: Primary | ICD-10-CM

## 2017-11-20 DIAGNOSIS — S06.0X0A CONCUSSION WITHOUT LOSS OF CONSCIOUSNESS, INITIAL ENCOUNTER: ICD-10-CM

## 2017-11-20 DIAGNOSIS — Z12.31 VISIT FOR SCREENING MAMMOGRAM: ICD-10-CM

## 2017-11-20 PROCEDURE — 70450 CT HEAD/BRAIN W/O DYE: CPT

## 2017-11-20 PROCEDURE — G0202 SCR MAMMO BI INCL CAD: HCPCS

## 2017-11-20 PROCEDURE — 99283 EMERGENCY DEPT VISIT LOW MDM: CPT

## 2017-11-20 PROCEDURE — 77063 BREAST TOMOSYNTHESIS BI: CPT

## 2017-11-20 PROCEDURE — G0202 SCR MAMMO BI INCL CAD: HCPCS | Performed by: RADIOLOGY

## 2017-11-20 PROCEDURE — 77063 BREAST TOMOSYNTHESIS BI: CPT | Performed by: RADIOLOGY

## 2017-11-20 RX ORDER — ONDANSETRON 4 MG/1
4 TABLET, ORALLY DISINTEGRATING ORAL EVERY 4 HOURS
Qty: 15 TABLET | Refills: 0 | Status: SHIPPED | OUTPATIENT
Start: 2017-11-20 | End: 2018-01-03

## 2017-11-20 RX ORDER — ONDANSETRON 4 MG/1
4 TABLET, ORALLY DISINTEGRATING ORAL ONCE
Status: COMPLETED | OUTPATIENT
Start: 2017-11-20 | End: 2017-11-20

## 2017-11-20 RX ADMIN — ONDANSETRON 4 MG: 4 TABLET, ORALLY DISINTEGRATING ORAL at 17:40

## 2017-11-20 NOTE — ED PROVIDER NOTES
Subjective   HPI Comments: Chelsi Ferguson is a 77 y.o.female who presents to the ED with c/o a head injury. Just PTA, the lens on a light fixture fell and hit her on the top of the head. She developed nausea and a knot to the top of her head. Her  prompted her to be evaluated, so they went to a [facility]. At the [facility] they advised that she present to the ED for further treatment. At the ED she also c/o a mild headache. Her headache is improving. She denies LoC, vomiting or any other acute sx at this time.      Patient is a 77 y.o. female presenting with head injury.   History provided by:  Patient  Head Injury   Location:  L parietal, R parietal and frontal  Time since incident: Shortly PTA.  Mechanism of injury comment:  Light covering fell on her head  Pain details:     Severity:  Mild    Timing:  Constant    Progression:  Improving  Relieved by:  None tried  Worsened by:  Nothing  Ineffective treatments:  None tried  Associated symptoms: nausea    Associated symptoms: no blurred vision, no loss of consciousness, no neck pain and no vomiting        Review of Systems   Eyes: Negative for blurred vision.   Gastrointestinal: Positive for nausea. Negative for vomiting.   Musculoskeletal: Negative for back pain and neck pain.   Neurological: Negative for loss of consciousness.   All other systems reviewed and are negative.      Past Medical History:   Diagnosis Date   • Ankle pain    • Chest discomfort    • Edema    • Gallstones    • GERD (gastroesophageal reflux disease)    • Glaucoma    • H/O complete eye exam 06/2013   • H/O mammogram 11/16/2015   • H/O non-ST elevation myocardial infarction (NSTEMI) 01/2005   • Hammer toe    • Heart attack 01/2005   • History of blood transfusion 01/2005   • History of cardiovascular stress test 12/02/2015    normal   • HTN (hypertension)    • Hyperlipidemia    • Kidney infection 1971   • Menopausal symptoms    • Onychia and paronychia of finger    • Osteopenia    • Other elevated white  blood cell count    • Pap smear for cervical cancer screening 2010    [facility]   • Pneumonia    • Viral warts        Allergies   Allergen Reactions   • Aleve [Naproxen]    • Celebrex [Celecoxib]    • Ibuprofen    • Indocin [Indomethacin]    • Keflex [Cephalexin]    • Lipitor [Atorvastatin]    • Prevacid [Lansoprazole]    • Prilosec [Omeprazole]    • Statins    • Zocor [Simvastatin] Hives       Past Surgical History:   Procedure Laterality Date   • BLADDER REPAIR  2002   • BLADDER REPAIR  2003   • BUNIONECTOMY Right 2010   • COLONOSCOPY  2008, 4/2014    [facility]   • CORONARY STENT PLACEMENT Left 01/2005    drug eluting stent 1/2005 LAD   • DILATATION AND CURETTAGE  1971   • DILATATION AND CURETTAGE  1978   • EYE SURGERY  05/2016   • FRONTALIS SUSPENSION  2010   • HAMMER TOE REPAIR Right 11/03/2010   • HYSTERECTOMY  1978   • LAPAROTOMY OOPHERECTOMY Bilateral 2002   • NOSE SURGERY  03/2017    repair 3 fractured areas. Dr Dunne   • OTHER SURGICAL HISTORY  2005    rectocele repair   • OTHER SURGICAL HISTORY  2010    eyelid surgery   • REPLACEMENT TOTAL KNEE BILATERAL Bilateral    • TONSILLECTOMY  1952   • TOTAL KNEE ARTHROPLASTY  01/2005   • UTERINE FIBROID SURGERY  1978    emergency removal of fibroid from birth canal       Family History   Problem Relation Age of Onset   • Stroke Mother    • Heart failure Father      congestive   • Cancer Father      lip   • Breast cancer Sister      60's   • Breast cancer Daughter 40   • Breast cancer Other      NIECE 60's   • Breast cancer Other      NIECE 60's   • Ovarian cancer Neg Hx        Social History     Social History   • Marital status:      Spouse name: N/A   • Number of children: N/A   • Years of education: N/A     Social History Main Topics   • Smoking status: Never Smoker   • Smokeless tobacco: Never Used   • Alcohol use No   • Drug use: No   • Sexual activity: Yes     Birth control/ protection: None     Other Topics Concern   • None     Social History Narrative  "        Objective   Physical Exam   Constitutional: She is oriented to person, place, and time. She appears well-developed and well-nourished. No distress.   HENT:   Head: Normocephalic.   Mouth/Throat: No oropharyngeal exudate.   Small to moderately contused hematoma in the frontoparietal region. No bleeding. No hemotympanum.    Eyes: Conjunctivae are normal. No scleral icterus.   Neck: Normal range of motion. Neck supple. No JVD present.   Cardiovascular: Normal rate, regular rhythm and normal heart sounds.  Exam reveals no gallop and no friction rub.    No murmur heard.  Pulmonary/Chest: Effort normal and breath sounds normal. No respiratory distress. She has no wheezes. She has no rales.   Abdominal: She exhibits no distension.   Musculoskeletal: Normal range of motion. She exhibits no edema or tenderness.   No C/T/L spine TTP.   Neurological: She is alert and oriented to person, place, and time.   Skin: Skin is warm and dry. She is not diaphoretic.   Psychiatric: She has a normal mood and affect. Her behavior is normal.   Nursing note and vitals reviewed.      Procedures         ED Course  ED Course     No results found for this or any previous visit (from the past 24 hour(s)).  Note: In addition to lab results from this visit, the labs listed above may include labs taken at another facility or during a different encounter within the last 24 hours. Please correlate lab times with ED admission and discharge times for further clarification of the services performed during this visit.    CT Head Without Contrast    (Results Pending)     Vitals:    11/20/17 1622   BP: 142/69   BP Location: Right arm   Patient Position: Sitting   Pulse: 68   Resp: 16   Temp: 98.1 °F (36.7 °C)   TempSrc: Oral   SpO2: 98%   Weight: 169 lb (76.7 kg)   Height: 61\" (154.9 cm)     Medications - No data to display  ECG/EMG Results (last 24 hours)     ** No results found for the last 24 hours. **                    [facility]    Final diagnoses: "   Minor head injury, initial encounter   Concussion without loss of consciousness, initial encounter       Documentation assistance provided by pliy Mclaughlin.  Information recorded by the scribe was done at my direction and has been verified and validated by me.     Arik Mclaughlin  11/20/17 1715       Azeem Huston MD  11/21/17 [id number]

## 2017-11-20 NOTE — DISCHARGE INSTRUCTIONS
Take Zofran as needed for nausea, up to the prescribed amount.    Take Tylenol for pain.    If you develop acute or urgent symptoms, as discussed, return to the emergency room for evaluation.    Please review the medications you are supposed to be taking according to prior physician recommendations. I have not changed your home medications during this visit. If your discharge instructions indicate that I have changed your home medications, this is not the case, and you should disregard. If you have any questions about the medication you should be taking at home, please call your physician.

## 2018-01-03 ENCOUNTER — OFFICE VISIT (OUTPATIENT)
Dept: INTERNAL MEDICINE | Facility: CLINIC | Age: 78
End: 2018-01-03

## 2018-01-03 VITALS
BODY MASS INDEX: 32.55 KG/M2 | HEART RATE: 65 BPM | OXYGEN SATURATION: 97 % | TEMPERATURE: 97 F | SYSTOLIC BLOOD PRESSURE: 124 MMHG | HEIGHT: 60 IN | WEIGHT: 165.8 LBS | DIASTOLIC BLOOD PRESSURE: 70 MMHG

## 2018-01-03 DIAGNOSIS — H61.22 IMPACTED CERUMEN OF LEFT EAR: ICD-10-CM

## 2018-01-03 DIAGNOSIS — E78.5 HYPERLIPIDEMIA, UNSPECIFIED HYPERLIPIDEMIA TYPE: ICD-10-CM

## 2018-01-03 DIAGNOSIS — Z00.00 MEDICARE ANNUAL WELLNESS VISIT, SUBSEQUENT: Primary | ICD-10-CM

## 2018-01-03 DIAGNOSIS — Z78.0 MENOPAUSE: ICD-10-CM

## 2018-01-03 DIAGNOSIS — R82.90 ABNORMAL URINALYSIS: ICD-10-CM

## 2018-01-03 LAB
BILIRUB BLD-MCNC: NEGATIVE MG/DL
CLARITY, POC: CLEAR
COLOR UR: YELLOW
DEVELOPER EXPIRATION DATE: NORMAL
DEVELOPER LOT NUMBER: NORMAL
EXPIRATION DATE: NORMAL
FECAL OCCULT BLOOD SCREEN, POC: NEGATIVE
GLUCOSE UR STRIP-MCNC: NEGATIVE MG/DL
KETONES UR QL: NEGATIVE
LEUKOCYTE EST, POC: ABNORMAL
Lab: NORMAL
NEGATIVE CONTROL: NEGATIVE
NITRITE UR-MCNC: POSITIVE MG/ML
PH UR: 6 [PH] (ref 5–8)
POSITIVE CONTROL: POSITIVE
PROT UR STRIP-MCNC: NEGATIVE MG/DL
RBC # UR STRIP: ABNORMAL /UL
SP GR UR: 1.02 (ref 1–1.03)
UROBILINOGEN UR QL: NORMAL

## 2018-01-03 PROCEDURE — 81003 URINALYSIS AUTO W/O SCOPE: CPT | Performed by: FAMILY MEDICINE

## 2018-01-03 PROCEDURE — 69209 REMOVE IMPACTED EAR WAX UNI: CPT | Performed by: FAMILY MEDICINE

## 2018-01-03 PROCEDURE — 96160 PT-FOCUSED HLTH RISK ASSMT: CPT | Performed by: FAMILY MEDICINE

## 2018-01-03 PROCEDURE — 82270 OCCULT BLOOD FECES: CPT | Performed by: FAMILY MEDICINE

## 2018-01-03 PROCEDURE — G0439 PPPS, SUBSEQ VISIT: HCPCS | Performed by: FAMILY MEDICINE

## 2018-01-03 PROCEDURE — 99397 PER PM REEVAL EST PAT 65+ YR: CPT | Performed by: FAMILY MEDICINE

## 2018-01-03 RX ORDER — NITROGLYCERIN 0.4 MG/1
0.4 TABLET SUBLINGUAL
COMMUNITY
Start: 2017-12-11 | End: 2020-05-20 | Stop reason: SDUPTHER

## 2018-01-03 RX ORDER — CLOTRIMAZOLE 1 %
CREAM (GRAM) TOPICAL EVERY 12 HOURS SCHEDULED
Qty: 40 G | Refills: 0 | Status: SHIPPED | OUTPATIENT
Start: 2018-01-03 | End: 2018-02-07

## 2018-01-03 NOTE — PATIENT INSTRUCTIONS
Medicare Wellness  Personal Prevention Plan of Service     Date of Office Visit:  2018  Encounter Provider:  Reina Yarbrough MD  Place of Service:  [facility] INTERNAL MEDICINE  Patient Name: Chelsi Ferguson YOB: 1940    As part of the Medicare Wellness portion of your visit today, we are providing you with this personalized preventive plan of services (PPPS). This plan is based upon recommendations of the [place] Preventive Services Task Force (USPSTF) and the Advisory Committee on Immunization Practices (ACIP).    This lists the preventive care services that should be considered, and provides dates of when you are due. Items listed as completed are up-to-date and do not require any further intervention.    Health Maintenance   Topic Date Due   • DXA SCAN  2017   • LIPID PANEL  2018   • MEDICARE ANNUAL WELLNESS  2019   • MAMMOGRAM  2019   • TDAP/TD VACCINES (3 - Td) 2022   • INFLUENZA VACCINE  Completed   • PNEUMOCOCCAL VACCINES (65+ LOW/MEDIUM RISK)  Completed   • ZOSTER VACCINE  Completed       Orders Placed This Encounter   Procedures   • DEXA Bone Density Axial     Standing Status:   Future     Standing Expiration Date:   1/3/2019     Order Specific Question:   Reason for Exam:     Answer:   screening for osteoporosis in post menopausal female   • POCT urinalysis dipstick, automated       No Follow-up on file.      Exercising to Stay Healthy  Exercising regularly is important. It has many health benefits, such as:  · Improving your overall fitness, flexibility, and endurance.  · Increasing your bone density.  · Helping with weight control.  · Decreasing your body fat.  · Increasing your muscle strength.  · Reducing stress and tension.  · Improving your overall health.  In order to become healthy and stay healthy, it is recommended that you do moderate-intensity and vigorous-intensity exercise. You can tell that you are exercising at a moderate  intensity if you have a higher heart rate and faster breathing, but you are still able to hold a conversation. You can tell that you are exercising at a vigorous intensity if you are breathing much harder and faster and cannot hold a conversation while exercising.  HOW OFTEN SHOULD I EXERCISE?  Choose an activity that you enjoy and set realistic goals. Your health care provider can help you to make an activity plan that works for you. Exercise regularly as directed by your health care provider. This may include:   · Doing resistance training twice each week, such as:    Push-ups.    Sit-ups.    Lifting weights.    Using resistance bands.  · Doing a given intensity of exercise for a given amount of time. Choose from these options:    150 minutes of moderate-intensity exercise every week.    75 minutes of vigorous-intensity exercise every week.    A mix of moderate-intensity and vigorous-intensity exercise every week.  Children, pregnant women, people who are out of shape, people who are overweight, and older adults may need to consult a health care provider for individual recommendations. If you have any sort of medical condition, be sure to consult your health care provider before starting a new exercise program.   WHAT ARE SOME EXERCISE IDEAS?  Some moderate-intensity exercise ideas include:   · Walking at a rate of 1 mile in 15 minutes.  · Biking.  · Hiking.  · Golfing.  · Dancing.  Some vigorous-intensity exercise ideas include:   · Walking at a rate of at least 4.5 miles per hour.  · Jogging or running at a rate of 5 miles per hour.  · Biking at a rate of at least 10 miles per hour.  · Lap swimming.  · Roller-skating or in-line skating.  · Cross-country skiing.  · Vigorous competitive sports, such as football, basketball, and soccer.  · Jumping rope.  · Aerobic dancing.  WHAT ARE SOME EVERYDAY ACTIVITIES THAT CAN HELP ME TO GET EXERCISE?  · Yard work, such as:    Pushing a .    Raking and bagging  "leaves.  · Washing and waxing your car.  · Pushing a stroller.  · Shoveling snow.  · Gardening.  · Washing windows or floors.  HOW CAN I BE MORE ACTIVE IN MY DAY-TO-DAY ACTIVITIES?  · Use the stairs instead of the elevator.  · Take a walk during your lunch break.  · If you drive, park your car farther away from work or school.  · If you take public transportation, get off one stop early and walk the rest of the way.  · Make all of your phone calls while standing up and walking around.  · Get up, stretch, and walk around every 30 minutes throughout the day.  WHAT GUIDELINES SHOULD I FOLLOW WHILE EXERCISING?  · Do not exercise so much that you hurt yourself, feel dizzy, or get very short of breath.  · Consult your health care provider before starting a new exercise program.  · Wear comfortable clothes and shoes with good support.  · Drink plenty of water while you exercise to prevent dehydration or heat stroke. Body water is lost during exercise and must be replaced.  · Work out until you breathe faster and your heart beats faster.     This information is not intended to replace advice given to you by your health care provider. Make sure you discuss any questions you have with your health care provider.     Document Released: 01/20/2012 Document Revised: 01/08/2016 Document Reviewed: 05/21/2015  [employer] Interactive Patient Education ©2017 [employer]  DASH Eating Plan  DASH stands for \"Dietary Approaches to Stop Hypertension.\" The DASH eating plan is a healthy eating plan that has been shown to reduce high blood pressure (hypertension). Additional health benefits may include reducing the risk of type 2 diabetes mellitus, heart disease, and stroke. The DASH eating plan may also help with weight loss.  WHAT DO I NEED TO KNOW ABOUT THE DASH EATING PLAN?  For the DASH eating plan, you will follow these general guidelines:  · Choose foods with less than 150 milligrams of sodium per serving (as listed on the food " "label).  · Use salt-free seasonings or herbs instead of table salt or sea salt.  · Check with your health care provider or pharmacist before using salt substitutes.  · Eat lower-sodium products. These are often labeled as \"low-sodium\" or \"no salt added.\"  · Eat fresh foods. Avoid eating a lot of canned foods.  · Eat more vegetables, fruits, and low-fat dairy products.  · Choose whole grains. Look for the word \"whole\" as the first word in the ingredient list.  · Choose fish and skinless chicken or turkey more often than red meat. Limit fish, poultry, and meat to 6 oz (170 g) each day.  · Limit sweets, desserts, sugars, and sugary drinks.  · Choose heart-healthy fats.  · Eat more home-cooked food and less restaurant, buffet, and fast food.  · Limit fried foods.  · Do not dickens foods. Cook foods using methods such as baking, boiling, grilling, and broiling instead.  · When eating at a restaurant, ask that your food be prepared with less salt, or no salt if possible.  WHAT FOODS CAN I EAT?  Seek help from a dietitian for individual calorie needs.  Grains  Whole grain or whole wheat bread. Brown rice. Whole grain or whole wheat pasta. Quinoa, bulgur, and whole grain cereals. Low-sodium cereals. Corn or whole wheat flour tortillas. Whole grain cornbread. Whole grain crackers. Low-sodium crackers.  Vegetables  Fresh or frozen vegetables (raw, steamed, roasted, or grilled). Low-sodium or reduced-sodium tomato and vegetable juices. Low-sodium or reduced-sodium tomato sauce and paste. Low-sodium or reduced-sodium canned vegetables.   Fruits  All fresh, canned (in natural juice), or frozen fruits.  Meat and Other Protein Products  Ground beef (85% or leaner), grass-fed beef, or beef trimmed of fat. Skinless chicken or turkey. Ground chicken or turkey. Pork trimmed of fat. All fish and seafood. Eggs. Dried beans, peas, or lentils. Unsalted nuts and seeds. Unsalted canned beans.  Dairy  Low-fat dairy products, such as skim or 1% " milk, 2% or reduced-fat cheeses, low-fat ricotta or cottage cheese, or plain low-fat yogurt. Low-sodium or reduced-sodium cheeses.  Fats and Oils  Tub margarines without trans fats. Light or reduced-fat mayonnaise and salad dressings (reduced sodium). Avocado. Safflower, olive, or canola oils. Natural peanut or almond butter.  Other  Unsalted popcorn and pretzels.  The items listed above may not be a complete list of recommended foods or beverages. Contact your dietitian for more options.  WHAT FOODS ARE NOT RECOMMENDED?  Grains  White bread. White pasta. White rice. Refined cornbread. Bagels and croissants. Crackers that contain trans fat.  Vegetables  Creamed or fried vegetables. Vegetables in a cheese sauce. Regular canned vegetables. Regular canned tomato sauce and paste. Regular tomato and vegetable juices.  Fruits  Canned fruit in light or heavy syrup. Fruit juice.  Meat and Other Protein Products  Fatty cuts of meat. Ribs, chicken wings, nelson, sausage, bologna, salami, chitterlings, fatback, hot dogs, bratwurst, and packaged luncheon meats. Salted nuts and seeds. Canned beans with salt.  Dairy  Whole or 2% milk, cream, half-and-half, and cream cheese. Whole-fat or sweetened yogurt. Full-fat cheeses or blue cheese. Nondairy creamers and whipped toppings. Processed cheese, cheese spreads, or cheese curds.  Condiments  Onion and garlic salt, seasoned salt, table salt, and sea salt. Canned and packaged gravies. Worcestershire sauce. Tartar sauce. Barbecue sauce. Teriyaki sauce. Soy sauce, including reduced sodium. Steak sauce. Fish sauce. Oyster sauce. Cocktail sauce. Horseradish. Ketchup and mustard. Meat flavorings and tenderizers. Bouillon cubes. Hot sauce. Tabasco sauce. Marinades. Taco seasonings. Relishes.  Fats and Oils  Butter, stick margarine, lard, shortening, ghee, and nelson fat. Coconut, palm kernel, or palm oils. Regular salad dressings.  Other  Pickles and olives. Salted popcorn and pretzels.  The  items listed above may not be a complete list of foods and beverages to avoid. Contact your dietitian for more information.  WHERE CAN I FIND MORE INFORMATION?  [employer] [place]: www.nhlbi.nih.gov/health/health-topics/topics/dash/     This information is not intended to replace advice given to you by your health care provider. Make sure you discuss any questions you have with your health care provider.     Document Released: 12/06/2012 Document Revised: 04/10/2017 Document Reviewed: 10/22/2014  [employer] Interactive Patient Education ©2017 [employer]  Advance Directive  Advance directives are the legal documents that allow you to make choices about your health care and medical treatment if you cannot speak for yourself. Advance directives are a way for you to communicate your wishes to family, friends, and health care providers. The specified people can then convey your decisions about end-of-life care to avoid confusion if you should become unable to communicate.  Ideally, the process of discussing and writing advance directives should happen over time rather than making decisions all at once. Advance directives can be modified as your situation changes, and you can change your mind at any time, even after you have signed the advance directives. Each state has its own laws regarding advance directives.  You may want to check with your health care provider, , or state representative about the law in your state. Below are some examples of advance directives.  HEALTH CARE PROXY AND DURABLE POWER OF  FOR HEALTH CARE  A health care proxy is a person (agent) appointed to make medical decisions for you if you cannot. Generally, people choose someone they know well and trust to represent their preferences when they can no longer do so. You should be sure to ask this person for agreement to act as your agent. An agent may have to exercise judgment in the event of a medical decision  for which your wishes are not known.  A durable power of  for health care is a legal document that names your health care proxy. Depending on the laws in your state, after the document is written, it may also need to be:  · Signed.  · Notarized.  · Dated.  · Copied.  · Witnessed.  · Incorporated into your medical record.  You may also want to appoint someone to manage your financial affairs if you cannot. This is called a durable power of  for finances. It is a separate legal document from the durable power of  for health care. You may choose the same person or someone different from your health care proxy to act as your agent in financial matters.  LIVING WILL  A living will is a set of instructions documenting your wishes about medical care when you cannot care for yourself. It is used if you become:  · Terminally ill.  · Incapacitated.  · Unable to communicate.  · Unable to make decisions.  Items to consider in your living will include:  · The use or non-use of life-sustaining equipment, such as dialysis machines and breathing machines (ventilators).  · A do not resuscitate (DNR) order, which is the instruction not to use cardiopulmonary resuscitation (CPR) if breathing or heartbeat stops.  · Tube feeding.  · Withholding of food and fluids.  · Comfort (palliative) care when the goal becomes comfort rather than a cure.  · Organ and tissue donation.  A living will does not give instructions about distribution of your money and property if you should pass away. It is advisable to seek the expert advice of a  in drawing up a will regarding your possessions. Decisions about taxes, beneficiaries, and asset distribution will be legally binding. This process can relieve your family and friends of any burdens surrounding disputes or questions that may come up about the allocation of your assets.  DO NOT RESUSCITATE (DNR)  A do not resuscitate (DNR) order is a request to not have CPR in the  event that your heart stops beating or you stop breathing. Unless given other instructions, a health care provider will try to help any patient whose heart has stopped or who has stopped breathing.   This information is not intended to replace advice given to you by your health care provider. Make sure you discuss any questions you have with your health care provider.  Document Released: 03/26/2009 Document Revised: 04/10/2017 Document Reviewed: 05/07/2014  [employer] Interactive Patient Education © 2017 [employer]  Fall Prevention in the Home   Falls can cause injuries and can affect people from all age groups. There are many simple things that you can do to make your home safe and to help prevent falls.  WHAT CAN I DO ON THE OUTSIDE OF MY HOME?  · Regularly repair the edges of walkways and driveways and fix any cracks.  · Remove high doorway thresholds.  · Trim any shrubbery on the main path into your home.  · Use bright outdoor lighting.  · Clear walkways of debris and clutter, including tools and rocks.  · Regularly check that handrails are securely fastened and in good repair. Both sides of any steps should have handrails.  · Install guardrails along the edges of any raised decks or porches.  · Have leaves, snow, and ice cleared regularly.  · Use sand or salt on walkways during winter months.  · In the garage, clean up any spills right away, including grease or oil spills.  WHAT CAN I DO IN THE BATHROOM?  · Use night lights.  · Install grab bars by the toilet and in the tub and shower. Do not use towel bars as grab bars.  · Use non-skid mats or decals on the floor of the tub or shower.  · If you need to sit down while you are in the shower, use a plastic, non-slip stool.  · Keep the floor dry. Immediately clean up any water that spills on the floor.  · Remove soap buildup in the tub or shower on a regular basis.  · Attach bath mats securely with double-sided non-slip rug tape.  · Remove throw rugs and other  tripping hazards from the floor.  WHAT CAN I DO IN THE BEDROOM?  · Use night lights.  · Make sure that a bedside light is easy to reach.  · Do not use oversized bedding that drapes onto the floor.  · Have a firm chair that has side arms to use for getting dressed.  · Remove throw rugs and other tripping hazards from the floor.  WHAT CAN I DO IN THE KITCHEN?   · Clean up any spills right away.  · Avoid walking on wet floors.  · Place frequently used items in easy-to-reach places.  · If you need to reach for something above you, use a sturdy step stool that has a grab bar.  · Keep electrical cables out of the way.  · Do not use floor polish or wax that makes floors slippery. If you have to use wax, make sure that it is non-skid floor wax.  · Remove throw rugs and other tripping hazards from the floor.  WHAT CAN I DO IN THE STAIRWAYS?  · Do not leave any items on the stairs.  · Make sure that there are handrails on both sides of the stairs. Fix handrails that are broken or loose. Make sure that handrails are as long as the stairways.  · Check any carpeting to make sure that it is firmly attached to the stairs. Fix any carpet that is loose or worn.  · Avoid having throw rugs at the top or bottom of stairways, or secure the rugs with carpet tape to prevent them from moving.  · Make sure that you have a light switch at the top of the stairs and the bottom of the stairs. If you do not have them, have them installed.  WHAT ARE SOME OTHER FALL PREVENTION TIPS?  · Wear closed-toe shoes that fit well and support your feet. Wear shoes that have rubber soles or low heels.  · When you use a stepladder, make sure that it is completely opened and that the sides are firmly locked. Have someone hold the ladder while you are using it. Do not climb a closed stepladder.  · Add color or contrast paint or tape to grab bars and handrails in your home. Place contrasting color strips on the first and last steps.  · Use mobility aids as  needed, such as canes, walkers, scooters, and crutches.  · Turn on lights if it is dark. Replace any light bulbs that burn out.  · Set up furniture so that there are clear paths. Keep the furniture in the same spot.  · Fix any uneven floor surfaces.  · Choose a carpet design that does not hide the edge of steps of a stairway.  · Be aware of any and all pets.  · Review your medicines with your healthcare provider. Some medicines can cause dizziness or changes in blood pressure, which increase your risk of falling.  Talk with your health care provider about other ways that you can decrease your risk of falls. This may include working with a physical therapist or  to improve your strength, balance, and endurance.     This information is not intended to replace advice given to you by your health care provider. Make sure you discuss any questions you have with your health care provider.     Document Released: 12/08/2003 Document Revised: 04/10/2017 Document Reviewed: 01/22/2016  [employer] Interactive Patient Education ©2017 [employer]

## 2018-01-03 NOTE — PROGRESS NOTES
QUICK REFERENCE INFORMATION:  The ABCs of the Annual Wellness Visit    Subsequent Medicare Wellness Visit    HEALTH RISK ASSESSMENT    1940    Recent Hospitalizations:  No hospitalization(s) within the last year..    Went to ER when a light fixture fell on her head about 2 months ago at [facility].    Current Medical Providers:  Patient Care Team:  Reina MAI MD as PCP - General (Family Medicine)  Александр Dunne MD as Consulting Physician (Otolaryngology)  Yung Miles MD as Consulting Physician (Cardiology)  Gabby Rao MD as Consulting Physician (Dermatology)  Tylor Bonner MD as Consulting Physician (Ophthalmology)        Smoking Status:  History   Smoking Status   • Never Smoker   Smokeless Tobacco   • Never Used       Alcohol Consumption:  History   Alcohol Use No       Depression Screen:   PHQ-2/PHQ-9 Depression Screening 1/3/2018   Little interest or pleasure in doing things 0   Feeling down, depressed, or hopeless 0   Total Score 0       Health Habits and Functional and Cognitive Screening:  Functional & Cognitive Status 1/3/2018   Do you have difficulty preparing food and eating? No   Do you have difficulty bathing yourself, getting dressed or grooming yourself? No   Do you have difficulty using the toilet? No   Do you have difficulty moving around from place to place? No   Do you have trouble with steps or getting out of a bed or a chair? Yes   In the past year have you fallen or experienced a near fall? Yes   Current Diet Unhealthy Diet   Dental Exam Up to date   Eye Exam Up to date   Exercise (times per week) 1 times per week   Current Exercise Activities Include Walking   Do you need help using the phone?  No   Are you deaf or do you have serious difficulty hearing?  No   Do you need help with transportation? No   Do you need help shopping? No   Do you need help preparing meals?  No   Do you need help with housework?  No   Do you need help with laundry? No   Do you need  help taking your medications? No   Do you need help managing money? No   Have you felt unusual stress, anger or loneliness in the last month? Yes   Who do you live with? Spouse   If you need help, do you have trouble finding someone available to you? No   Have you been bothered in the last four weeks by sexual problems? No   Do you have difficulty concentrating, remembering or making decisions? No           Does the patient have evidence of cognitive impairment? No    Aspirin use counseling: Taking ASA appropriately as indicated      Recent Lab Results:  CMP:  Lab Results   Component Value Date    GLU 90 07/12/2017    BUN 16 07/12/2017    CREATININE 0.60 07/12/2017    EGFRIFNONA 97 07/12/2017    EGFRIFAFRI 117 07/12/2017    BCR 26.7 (H) 07/12/2017     07/12/2017    K 4.3 07/12/2017    CO2 31.0 07/12/2017    CALCIUM 10.1 07/12/2017    PROTENTOTREF 6.7 07/12/2017    ALBUMIN 4.40 07/12/2017    LABGLOBREF 2.3 07/12/2017    LABIL2 1.9 07/12/2017    BILITOT 0.7 07/12/2017    ALKPHOS 69 07/12/2017    AST 23 07/12/2017    ALT 14 07/12/2017     Lipid Panel:  Lab Results   Component Value Date    TRIG 206 (H) 07/12/2017    HDL 42 07/12/2017    VLDL 41.2 07/12/2017     HbA1c:       Visual Acuity:  No exam data present    Age-appropriate Screening Schedule:  Refer to the list below for future screening recommendations based on patient's age, sex and/or medical conditions. Orders for these recommended tests are listed in the plan section. The patient has been provided with a written plan.    Health Maintenance   Topic Date Due   • DXA SCAN  08/01/2017   • LIPID PANEL  07/12/2018   • MAMMOGRAM  11/20/2019   • TDAP/TD VACCINES (3 - Td) 02/08/2022   • INFLUENZA VACCINE  Completed   • PNEUMOCOCCAL VACCINES (65+ LOW/MEDIUM RISK)  Completed   • ZOSTER VACCINE  Completed        Subjective   History of Present Illness    Chelsi Ferguson is a 77 y.o. female who presents for an Subsequent Wellness Visit.    The following portions of the  patient's history were reviewed and updated as appropriate: allergies, current medications, past family history, past medical history, past social history, past surgical history and problem list.    Past Medical History:   Diagnosis Date   • Ankle pain    • Chest discomfort    • Edema    • Gallstones    • GERD (gastroesophageal reflux disease)    • Glaucoma     pt is currently off of meds and Dr Tanner does not think that she is glaucoma   • H/O complete eye exam 06/2013   • H/O mammogram 11/16/2015   • H/O non-ST elevation myocardial infarction (NSTEMI) 01/2005   • Hammer toe    • Heart attack 01/2005   • History of blood transfusion 01/2005   • History of cardiovascular stress test 12/02/2015    normal   • HTN (hypertension)    • Hyperlipidemia    • Kidney infection 1971   • Menopausal symptoms    • Onychia and paronychia of finger    • Osteopenia    • Other elevated white blood cell count    • Pap smear for cervical cancer screening 2010    Leandro   • Pneumonia    • Viral warts      Past Surgical History:   Procedure Laterality Date   • BLADDER REPAIR  2002   • BLADDER REPAIR  2003   • BUNIONECTOMY Right 2010   • COLONOSCOPY  2008, 4/2014    Juany   • CORONARY STENT PLACEMENT Left 01/2005    drug eluting stent 1/2005 LAD   • DILATATION AND CURETTAGE  1971   • DILATATION AND CURETTAGE  1978   • EYE SURGERY  05/2016   • FRONTALIS SUSPENSION  2010   • HAMMER TOE REPAIR Right 11/03/2010   • HYSTERECTOMY  1978   • LAPAROTOMY OOPHERECTOMY Bilateral 2002   • NOSE SURGERY  03/2017    repair 3 fractured areas. Dr Dunne   • OTHER SURGICAL HISTORY  2005    rectocele repair   • OTHER SURGICAL HISTORY  2010    eyelid surgery   • REPLACEMENT TOTAL KNEE BILATERAL Bilateral    • TONSILLECTOMY  1952   • TOTAL KNEE ARTHROPLASTY  01/2005   • UTERINE FIBROID SURGERY  1978    emergency removal of fibroid from birth canal     Allergies   Allergen Reactions   • Aleve [Naproxen]    • Celebrex [Celecoxib]    • Ibuprofen    • Indocin  [Indomethacin]    • Keflex [Cephalexin]    • Lipitor [Atorvastatin]    • Prevacid [Lansoprazole]    • Prilosec [Omeprazole]    • Statins    • Zocor [Simvastatin] Hives     Family History   Problem Relation Age of Onset   • Stroke Mother    • Heart failure Father      congestive   • Cancer Father      lip   • Breast cancer Sister      60's   • Breast cancer Daughter 40   • Breast cancer Other      NIECE 60's   • Breast cancer Other      NIECE 60's   • Ovarian cancer Neg Hx      Social History     Social History   • Marital status:      Spouse name: N/A   • Number of children: N/A   • Years of education: N/A     Occupational History   • Not on file.     Social History Main Topics   • Smoking status: Never Smoker   • Smokeless tobacco: Never Used   • Alcohol use No   • Drug use: No   • Sexual activity: Yes     Birth control/ protection: None     Other Topics Concern   • Not on file     Social History Narrative         Outpatient Medications Prior to Visit   Medication Sig Dispense Refill   • acetaminophen (TYLENOL) 500 MG tablet Take 500 mg by mouth Every 6 (Six) Hours As Needed for mild pain (1-3).     • aspirin 81 MG EC tablet Take 81 mg by mouth Every Night.     • Biotin 5000 MCG capsule Take  by mouth.     • Coenzyme Q10 200 MG capsule Take 200 mg by mouth Daily.     • estradiol (VAGIFEM) 10 MCG tablet vaginal tablet Insert 1 tablet into the vagina 2 (Two) Times a Week. 24 tablet 1   • metoprolol succinate XL (TOPROL-XL) 25 MG 24 hr tablet Take 1 tablet by mouth Daily.     • Multiple Vitamins-Minerals (MULTIVITAL PO) Take 1 tablet by mouth Daily.     • Omega-3 Fatty Acids (FISH OIL) 1000 MG capsule capsule Take 1,000 mg by mouth Daily With Breakfast.     • pravastatin (PRAVACHOL) 40 MG tablet Take 1 tablet by mouth Daily. 90 tablet 1   • cephalexin (KEFLEX) 500 MG capsule Takes 4 tab before dental appointment  5   • nitroglycerin (NITROLINGUAL) 0.4 MG/SPRAY spray      • ondansetron ODT (ZOFRAN-ODT) 4 MG  disintegrating tablet Take 1 tablet by mouth Every 4 (Four) Hours. 15 tablet 0     No facility-administered medications prior to visit.        Patient Active Problem List   Diagnosis   • Loss of weight   • Hyperlipidemia   • Edema   • Esophageal reflux   • Nausea with vomiting   • Diarrhea   • Incontinence of feces   • Cardiovascular disease   • Gallstone   • Menopausal symptom   • Cramp in limb   • Glaucoma   • Hammer toe   • Dry mouth   • Osteopenia       Advance Care Planning:  has an advance directive - a copy HAS NOT been provided. Have asked the patient to send this to us to add to record.    Identification of Risk Factors:  Risk factors include: weight , unhealthy diet, cardiovascular risk, inactivity, caretaker stress, incontinence, polypharmacy and  legally blind.    Review of Systems   Constitutional: Negative.  Negative for activity change, appetite change, chills, diaphoresis, fatigue, fever and unexpected weight change.   HENT: Negative.  Negative for congestion, dental problem, drooling, ear discharge, ear pain, facial swelling, hearing loss, mouth sores, nosebleeds, postnasal drip, rhinorrhea, sinus pain, sinus pressure, sneezing, sore throat, tinnitus, trouble swallowing and voice change.    Eyes: Negative.  Negative for photophobia, pain, discharge, redness, itching and visual disturbance.   Respiratory: Negative.  Negative for apnea, cough, choking, chest tightness, shortness of breath, wheezing and stridor.    Cardiovascular: Negative.  Negative for chest pain, palpitations and leg swelling.   Gastrointestinal: Negative.  Negative for abdominal distention, abdominal pain, anal bleeding, blood in stool, constipation, diarrhea, nausea, rectal pain and vomiting.        GERD-Tums works the best   Endocrine: Negative.  Negative for cold intolerance, heat intolerance, polydipsia, polyphagia and polyuria.   Genitourinary: Positive for enuresis. Negative for decreased urine volume, difficulty  urinating, dyspareunia, dysuria, flank pain, frequency, genital sores, hematuria, menstrual problem, pelvic pain, urgency, vaginal bleeding, vaginal discharge and vaginal pain.        Wears pad   Musculoskeletal: Positive for arthralgias and myalgias. Negative for back pain, gait problem, joint swelling, neck pain and neck stiffness.        Tylenol helps arthritis especially the left wrist   Skin: Negative.  Negative for color change, pallor, rash and wound.        Pt having regular checks of skin lesions and will have another lesion on scalp removed.    Allergic/Immunologic: Negative.  Negative for environmental allergies, food allergies and immunocompromised state.   Neurological: Positive for tremors. Negative for dizziness, seizures, syncope, facial asymmetry, speech difficulty, weakness, light-headedness, numbness and headaches.        Intention tremor worse left hand and head.    Hematological: Negative.  Negative for adenopathy. Does not bruise/bleed easily.   Psychiatric/Behavioral: Negative.  Negative for agitation, behavioral problems, confusion, decreased concentration, dysphoric mood, hallucinations, self-injury, sleep disturbance and suicidal ideas. The patient is not nervous/anxious and is not hyperactive.        Compared to one year ago, the patient feels her physical health is the same.  Compared to one year ago, the patient feels her mental health is the same.    Objective     Physical Exam   Constitutional: She is oriented to person, place, and time. She appears well-developed and well-nourished. No distress.   HENT:   Head: Normocephalic and atraumatic.   Right Ear: Tympanic membrane, external ear and ear canal normal.   Left Ear: External ear normal.   Ears:    Nose: Nose normal.   Mouth/Throat: Uvula is midline, oropharynx is clear and moist and mucous membranes are normal. Normal dentition. No oropharyngeal exudate, posterior oropharyngeal edema or posterior oropharyngeal erythema.   Eyes:  Conjunctivae and lids are normal. Pupils are equal, round, and reactive to light. Right eye exhibits no chemosis, no discharge, no exudate and no hordeolum. No foreign body present in the right eye. Left eye exhibits no chemosis, no discharge, no exudate and no hordeolum. No foreign body present in the left eye. Right conjunctiva is not injected. Right conjunctiva has no hemorrhage. Left conjunctiva is not injected. Left conjunctiva has no hemorrhage. No scleral icterus. Right eye exhibits normal extraocular motion and no nystagmus. Left eye exhibits normal extraocular motion.   Fundoscopic exam:       The right eye shows red reflex.        The left eye shows red reflex.       Neck: Trachea normal and normal range of motion. Neck supple. Carotid bruit is not present. No tracheal deviation present. No thyroid mass and no thyromegaly present.   Cardiovascular: Normal rate, regular rhythm, normal heart sounds and intact distal pulses.  Exam reveals no gallop and no friction rub.    No murmur heard.  Pulmonary/Chest: Effort normal and breath sounds normal. No respiratory distress. She has no decreased breath sounds. She has no wheezes. She has no rhonchi. She has no rales. Chest wall is not dull to percussion. She exhibits no tenderness. Right breast exhibits no inverted nipple, no mass, no nipple discharge, no skin change and no tenderness. Left breast exhibits no inverted nipple, no mass, no nipple discharge, no skin change and no tenderness.       Abdominal: Soft. Bowel sounds are normal. She exhibits no distension and no mass. There is no hepatosplenomegaly. There is no tenderness. There is no rebound and no guarding.   Genitourinary: Rectal exam shows external hemorrhoid. Rectal exam shows no internal hemorrhoid, no fissure, no mass, no tenderness, anal tone normal and guaiac negative stool. Pelvic exam was performed with patient supine.   Musculoskeletal: Normal range of motion. She exhibits no edema, tenderness  "or deformity.   Lymphadenopathy:        Head (right side): No submandibular adenopathy present.        Head (left side): No submandibular adenopathy present.     She has no cervical adenopathy.        Right cervical: No superficial cervical, no deep cervical and no posterior cervical adenopathy present.       Left cervical: No superficial cervical and no deep cervical adenopathy present.     She has no axillary adenopathy.        Right axillary: No pectoral and no lateral adenopathy present.        Left axillary: No pectoral and no lateral adenopathy present.       Right: No supraclavicular adenopathy present.        Left: No supraclavicular adenopathy present.   Neurological: She is alert and oriented to person, place, and time. She has normal strength and normal reflexes. No cranial nerve deficit or sensory deficit. Coordination normal.   Reflex Scores:       Bicep reflexes are 2+ on the right side and 2+ on the left side.       Brachioradialis reflexes are 2+ on the right side and 2+ on the left side.       Patellar reflexes are 2+ on the right side and 2+ on the left side.  Skin: Skin is warm and dry. No rash noted. She is not diaphoretic. No cyanosis. Nails show no clubbing.        Psychiatric: She has a normal mood and affect. Her speech is normal and behavior is normal. Judgment and thought content normal. Cognition and memory are normal.   Nursing note and vitals reviewed.        Vitals:    01/03/18 0819   BP: 124/70   BP Location: Left arm   Patient Position: Sitting   Cuff Size: Adult   Pulse: 65   Temp: 97 °F (36.1 °C)   SpO2: 97%   Weight: 75.2 kg (165 lb 12.8 oz)   Height: 152.2 cm (59.92\")   PainSc: 3  Comment: rash under breasts   PainLoc: Breast       Body mass index is 32.47 kg/(m^2).  Discussed the patient's BMI with her. BMI is above normal parameters. Follow-up plan includes:  educational material, exercise counseling, nutrition counseling and no follow-up required.    Assessment/Plan   Patient " Self-Management and Personalized Health Advice  The patient has been provided with information about: diet, exercise, fall prevention and designing advance directives and preventive services including:   · Advance directive, Bone densitometry screening, Colorectal cancer screening, fecal occult blood test, Exercise counseling provided, Fall Risk assessment done, Fall Risk plan of care done, Nutrition counseling provided, Urinary Incontinence assessment done.    Visit Diagnoses:    ICD-10-CM ICD-9-CM   1. Medicare annual wellness visit, subsequent Z00.00 V70.0   2. Menopause Z78.0 627.2   3. Hyperlipidemia, unspecified hyperlipidemia type E78.5 272.4   4. Abnormal urinalysis R82.90 791.9   5. Impacted cerumen of left ear H61.22 380.4       Orders Placed This Encounter   Procedures   • Ear Cerumen Removal     This order was created via procedure documentation   • Urine Culture - Urine, Urine, Clean Catch   • DEXA Bone Density Axial     Standing Status:   Future     Standing Expiration Date:   1/3/2019     Order Specific Question:   Reason for Exam:     Answer:   screening for osteoporosis in post menopausal female   • Comprehensive Metabolic Panel   • TSH   • Lipid Panel   • POCT urinalysis dipstick, automated   • POC Occult Blood Stool   • CBC & Differential     Order Specific Question:   Manual Differential     Answer:   No       Outpatient Encounter Prescriptions as of 1/3/2018   Medication Sig Dispense Refill   • acetaminophen (TYLENOL) 500 MG tablet Take 500 mg by mouth Every 6 (Six) Hours As Needed for mild pain (1-3).     • aspirin 81 MG EC tablet Take 81 mg by mouth Every Night.     • Biotin 5000 MCG capsule Take  by mouth.     • Coenzyme Q10 200 MG capsule Take 200 mg by mouth Daily.     • estradiol (VAGIFEM) 10 MCG tablet vaginal tablet Insert 1 tablet into the vagina 2 (Two) Times a Week. 24 tablet 1   • metoprolol succinate XL (TOPROL-XL) 25 MG 24 hr tablet Take 1 tablet by mouth Daily.     • Multiple  Vitamins-Minerals (MULTIVITAL PO) Take 1 tablet by mouth Daily.     • nitroglycerin (NITROSTAT) 0.4 MG SL tablet      • Omega-3 Fatty Acids (FISH OIL) 1000 MG capsule capsule Take 1,000 mg by mouth Daily With Breakfast.     • pravastatin (PRAVACHOL) 40 MG tablet Take 1 tablet by mouth Daily. 90 tablet 1   • clotrimazole (LOTRIMIN) 1 % cream Apply  topically Every 12 (Twelve) Hours. 40 g 0   • [DISCONTINUED] cephalexin (KEFLEX) 500 MG capsule Takes 4 tab before dental appointment  5   • [DISCONTINUED] nitroglycerin (NITROLINGUAL) 0.4 MG/SPRAY spray      • [DISCONTINUED] ondansetron ODT (ZOFRAN-ODT) 4 MG disintegrating tablet Take 1 tablet by mouth Every 4 (Four) Hours. 15 tablet 0     No facility-administered encounter medications on file as of 1/3/2018.        Reviewed use of high risk medication in the elderly: not applicable  Reviewed for potential of harmful drug interactions in the elderly: not applicable    Follow Up:  Return in about 6 months (around 7/3/2018), or if symptoms worsen or fail to improve, for Recheck.   Pt will call if she wants GYN referral to discuss biologic patch for incontinence    An After Visit Summary and PPPS with all of these plans were given to the patient.      Ear Cerumen Removal Instrumentation  Date/Time: 1/3/2018 9:31 AM  Performed by: ALEXANDRA BARFIELD  Authorized by: ALEXANDRA BARFIELD     Anesthesia:  Local Anesthetic: none  Location details: left ear  Procedure type: irrigation  Procedure type comments: and unsuccessful curette    Sedation:  Patient sedated: no  Patient tolerance: Patient tolerated the procedure well with no immediate complications            Recent Results (from the past 168 hour(s))   POCT urinalysis dipstick, automated    Collection Time: 01/03/18  9:08 AM   Result Value Ref Range    Color Yellow Yellow, Straw, Dark Yellow, Berna    Clarity, UA Clear Clear    Glucose, UA Negative Negative, 1000 mg/dL (3+) mg/dL    Bilirubin Negative Negative    Ketones, UA  Negative Negative    Specific Gravity  1.025 1.005 - 1.030    Blood, UA Small (A) Negative    pH, Urine 6.0 5.0 - 8.0    Protein, POC Negative Negative mg/dL    Urobilinogen, UA Normal Normal    Leukocytes Small (1+) (A) Negative    Nitrite, UA Positive (A) Negative   POC Occult Blood Stool    Collection Time: 01/03/18  5:45 PM   Result Value Ref Range    Fecal Occult Blood Negative Negative    Lot Number [id number]     Expiration Date 6-     DEVELOPER LOT NUMBER [id number]     DEVELOPER EXPIRATION DATE 6-     Positive Control Positive Positive    Negative Control Negative Negative

## 2018-01-06 LAB
ALBUMIN SERPL-MCNC: 4.3 G/DL (ref 3.2–4.8)
ALBUMIN/GLOB SERPL: 2 G/DL (ref 1.5–2.5)
ALP SERPL-CCNC: 71 U/L (ref 25–100)
ALT SERPL-CCNC: 14 U/L (ref 7–40)
AST SERPL-CCNC: 20 U/L (ref 0–33)
BACTERIA UR CULT: ABNORMAL
BACTERIA UR CULT: ABNORMAL
BASOPHILS # BLD AUTO: 0.04 10*3/MM3 (ref 0–0.2)
BASOPHILS NFR BLD AUTO: 0.5 % (ref 0–1)
BILIRUB SERPL-MCNC: 0.6 MG/DL (ref 0.3–1.2)
BUN SERPL-MCNC: 15 MG/DL (ref 9–23)
BUN/CREAT SERPL: 21.4 (ref 7–25)
CALCIUM SERPL-MCNC: 10.1 MG/DL (ref 8.7–10.4)
CHLORIDE SERPL-SCNC: 104 MMOL/L (ref 99–109)
CHOLEST SERPL-MCNC: 170 MG/DL (ref 0–200)
CO2 SERPL-SCNC: 29 MMOL/L (ref 20–31)
CREAT SERPL-MCNC: 0.7 MG/DL (ref 0.6–1.3)
EOSINOPHIL # BLD AUTO: 0.22 10*3/MM3 (ref 0–0.3)
EOSINOPHIL NFR BLD AUTO: 2.6 % (ref 0–3)
ERYTHROCYTE [DISTWIDTH] IN BLOOD BY AUTOMATED COUNT: 13.5 % (ref 11.3–14.5)
GLOBULIN SER CALC-MCNC: 2.2 GM/DL
GLUCOSE SERPL-MCNC: 91 MG/DL (ref 70–100)
HCT VFR BLD AUTO: 42.7 % (ref 34.5–44)
HDLC SERPL-MCNC: 43 MG/DL (ref 40–60)
HGB BLD-MCNC: 14 G/DL (ref 11.5–15.5)
IMM GRANULOCYTES # BLD: 0.02 10*3/MM3 (ref 0–0.03)
IMM GRANULOCYTES NFR BLD: 0.2 % (ref 0–0.6)
LDLC SERPL CALC-MCNC: 90 MG/DL (ref 0–100)
LYMPHOCYTES # BLD AUTO: 2 10*3/MM3 (ref 0.6–4.8)
LYMPHOCYTES NFR BLD AUTO: 24 % (ref 24–44)
MCH RBC QN AUTO: 32.1 PG (ref 27–31)
MCHC RBC AUTO-ENTMCNC: 32.8 G/DL (ref 32–36)
MCV RBC AUTO: 97.9 FL (ref 80–99)
MONOCYTES # BLD AUTO: 0.97 10*3/MM3 (ref 0–1)
MONOCYTES NFR BLD AUTO: 11.6 % (ref 0–12)
NEUTROPHILS # BLD AUTO: 5.09 10*3/MM3 (ref 1.5–8.3)
NEUTROPHILS NFR BLD AUTO: 61.1 % (ref 41–71)
OTHER ANTIBIOTIC SUSC ISLT: ABNORMAL
PLATELET # BLD AUTO: 310 10*3/MM3 (ref 150–450)
POTASSIUM SERPL-SCNC: 4.8 MMOL/L (ref 3.5–5.5)
PROT SERPL-MCNC: 6.5 G/DL (ref 5.7–8.2)
RBC # BLD AUTO: 4.36 10*6/MM3 (ref 3.89–5.14)
SODIUM SERPL-SCNC: 141 MMOL/L (ref 132–146)
TRIGL SERPL-MCNC: 187 MG/DL (ref 0–150)
TSH SERPL DL<=0.005 MIU/L-ACNC: 1.73 MIU/ML (ref 0.35–5.35)
VLDLC SERPL CALC-MCNC: 37.4 MG/DL
WBC # BLD AUTO: 8.34 10*3/MM3 (ref 3.5–10.8)

## 2018-01-07 ENCOUNTER — TELEPHONE (OUTPATIENT)
Dept: INTERNAL MEDICINE | Facility: CLINIC | Age: 78
End: 2018-01-07

## 2018-01-08 ENCOUNTER — TELEPHONE (OUTPATIENT)
Dept: INTERNAL MEDICINE | Facility: CLINIC | Age: 78
End: 2018-01-08

## 2018-01-08 NOTE — TELEPHONE ENCOUNTER
----- Message from Reina MAI MD sent at 1/7/2018  3:38 PM EST -----  Please call the patient regarding her abnormal result.pt has UTI with e coli that is sensitive to cipro, bactrim and macrobid and doxycyline. Which pharmacy? Does she have an antibiotic preference

## 2018-01-10 ENCOUNTER — TELEPHONE (OUTPATIENT)
Dept: INTERNAL MEDICINE | Facility: CLINIC | Age: 78
End: 2018-01-10

## 2018-01-10 RX ORDER — NITROFURANTOIN 25; 75 MG/1; MG/1
100 CAPSULE ORAL EVERY 12 HOURS SCHEDULED
Qty: 20 CAPSULE | Refills: 0 | Status: SHIPPED | OUTPATIENT
Start: 2018-01-10 | End: 2018-01-18 | Stop reason: HOSPADM

## 2018-01-12 ENCOUNTER — TELEPHONE (OUTPATIENT)
Dept: INTERNAL MEDICINE | Facility: CLINIC | Age: 78
End: 2018-01-12

## 2018-01-12 NOTE — TELEPHONE ENCOUNTER
Called Mrs Ferguson. She has taken 2 days of antibiotics and is feeling a little better. She reports that she had some chills and a fever, but has not had a thermometer to get a value. She feels it was better last night then it had been before starting the antibiotics. She is still tired. Advised patient to stay well hydrated, keep resting, and give the antibiotics some more time to keep working. Advised her to get a friend to bring her a thermometer to monitor her fever and if she feels worsening of symptoms to seek treatment this weekend.

## 2018-01-15 ENCOUNTER — HOSPITAL ENCOUNTER (INPATIENT)
Facility: HOSPITAL | Age: 78
LOS: 3 days | Discharge: HOME HEALTH | End: 2018-01-18
Attending: EMERGENCY MEDICINE | Admitting: HOSPITALIST

## 2018-01-15 ENCOUNTER — APPOINTMENT (OUTPATIENT)
Dept: GENERAL RADIOLOGY | Facility: HOSPITAL | Age: 78
End: 2018-01-15

## 2018-01-15 ENCOUNTER — APPOINTMENT (OUTPATIENT)
Dept: ULTRASOUND IMAGING | Facility: HOSPITAL | Age: 78
End: 2018-01-15

## 2018-01-15 ENCOUNTER — TELEPHONE (OUTPATIENT)
Dept: INTERNAL MEDICINE | Facility: CLINIC | Age: 78
End: 2018-01-15

## 2018-01-15 ENCOUNTER — APPOINTMENT (OUTPATIENT)
Dept: CT IMAGING | Facility: HOSPITAL | Age: 78
End: 2018-01-15

## 2018-01-15 DIAGNOSIS — R10.84 GENERALIZED ABDOMINAL PAIN: ICD-10-CM

## 2018-01-15 DIAGNOSIS — A41.51 SEPSIS DUE TO ESCHERICHIA COLI (HCC): ICD-10-CM

## 2018-01-15 DIAGNOSIS — Z74.09 IMPAIRED MOBILITY AND ADLS: ICD-10-CM

## 2018-01-15 DIAGNOSIS — N39.0 BACTERIAL UTI: Primary | ICD-10-CM

## 2018-01-15 DIAGNOSIS — Z78.9 IMPAIRED MOBILITY AND ADLS: ICD-10-CM

## 2018-01-15 DIAGNOSIS — A49.9 BACTERIAL UTI: Primary | ICD-10-CM

## 2018-01-15 DIAGNOSIS — A41.9 SEPSIS, DUE TO UNSPECIFIED ORGANISM: ICD-10-CM

## 2018-01-15 DIAGNOSIS — R79.89 ELEVATED LFTS: ICD-10-CM

## 2018-01-15 DIAGNOSIS — Z74.09 IMPAIRED FUNCTIONAL MOBILITY, BALANCE, GAIT, AND ENDURANCE: ICD-10-CM

## 2018-01-15 PROBLEM — R74.8 ELEVATED LIVER ENZYMES: Status: ACTIVE | Noted: 2018-01-15

## 2018-01-15 PROBLEM — E87.6 HYPOKALEMIA: Status: ACTIVE | Noted: 2018-01-15

## 2018-01-15 PROBLEM — K81.9 CHOLECYSTITIS: Status: ACTIVE | Noted: 2018-01-15

## 2018-01-15 LAB
ALBUMIN SERPL-MCNC: 3.9 G/DL (ref 3.2–4.8)
ALBUMIN/GLOB SERPL: 1.4 G/DL (ref 1.5–2.5)
ALP SERPL-CCNC: 287 U/L (ref 25–100)
ALT SERPL W P-5'-P-CCNC: 128 U/L (ref 7–40)
AMYLASE SERPL-CCNC: 20 U/L (ref 30–118)
ANION GAP SERPL CALCULATED.3IONS-SCNC: 10 MMOL/L (ref 3–11)
AST SERPL-CCNC: 124 U/L (ref 0–33)
BACTERIA UR QL AUTO: ABNORMAL /HPF
BASOPHILS # BLD AUTO: 0.01 10*3/MM3 (ref 0–0.2)
BASOPHILS NFR BLD AUTO: 0.1 % (ref 0–1)
BILIRUB SERPL-MCNC: 1.4 MG/DL (ref 0.3–1.2)
BILIRUB UR QL STRIP: ABNORMAL
BNP SERPL-MCNC: 46 PG/ML (ref 0–100)
BUN BLD-MCNC: 12 MG/DL (ref 9–23)
BUN/CREAT SERPL: 24 (ref 7–25)
CA-I SERPL ISE-MCNC: 1.24 MMOL/L (ref 1.12–1.32)
CALCIUM SPEC-SCNC: 9.8 MG/DL (ref 8.7–10.4)
CHLORIDE SERPL-SCNC: 96 MMOL/L (ref 99–109)
CK SERPL-CCNC: 24 U/L (ref 26–174)
CLARITY UR: ABNORMAL
CO2 SERPL-SCNC: 26 MMOL/L (ref 20–31)
COLOR UR: ABNORMAL
CREAT BLD-MCNC: 0.5 MG/DL (ref 0.6–1.3)
CRP SERPL-MCNC: 16.93 MG/DL (ref 0–1)
D-LACTATE SERPL-SCNC: 1.6 MMOL/L (ref 0.5–2)
DEPRECATED RDW RBC AUTO: 46.1 FL (ref 37–54)
EOSINOPHIL # BLD AUTO: 0.23 10*3/MM3 (ref 0–0.3)
EOSINOPHIL NFR BLD AUTO: 1.5 % (ref 0–3)
ERYTHROCYTE [DISTWIDTH] IN BLOOD BY AUTOMATED COUNT: 13.5 % (ref 11.3–14.5)
ERYTHROCYTE [SEDIMENTATION RATE] IN BLOOD: 49 MM/HR (ref 0–30)
FLUAV AG NPH QL: NEGATIVE
FLUAV SUBTYP SPEC NAA+PROBE: NOT DETECTED
FLUBV AG NPH QL IA: NEGATIVE
FLUBV RNA ISLT QL NAA+PROBE: NOT DETECTED
GFR SERPL CREATININE-BSD FRML MDRD: 119 ML/MIN/1.73
GLOBULIN UR ELPH-MCNC: 2.8 GM/DL
GLUCOSE BLD-MCNC: 119 MG/DL (ref 70–100)
GLUCOSE UR STRIP-MCNC: NEGATIVE MG/DL
HCT VFR BLD AUTO: 39.9 % (ref 34.5–44)
HGB BLD-MCNC: 13.7 G/DL (ref 11.5–15.5)
HGB UR QL STRIP.AUTO: ABNORMAL
HOLD SPECIMEN: NORMAL
HOLD SPECIMEN: NORMAL
HYALINE CASTS UR QL AUTO: ABNORMAL /LPF
IMM GRANULOCYTES # BLD: 0.11 10*3/MM3 (ref 0–0.03)
IMM GRANULOCYTES NFR BLD: 0.7 % (ref 0–0.6)
KETONES UR QL STRIP: ABNORMAL
LEUKOCYTE ESTERASE UR QL STRIP.AUTO: ABNORMAL
LIPASE SERPL-CCNC: 29 U/L (ref 6–51)
LYMPHOCYTES # BLD AUTO: 0.52 10*3/MM3 (ref 0.6–4.8)
LYMPHOCYTES NFR BLD AUTO: 3.3 % (ref 24–44)
MAGNESIUM SERPL-MCNC: 1.4 MG/DL (ref 1.3–2.7)
MCH RBC QN AUTO: 31.9 PG (ref 27–31)
MCHC RBC AUTO-ENTMCNC: 34.3 G/DL (ref 32–36)
MCV RBC AUTO: 92.8 FL (ref 80–99)
MONOCYTES # BLD AUTO: 0.64 10*3/MM3 (ref 0–1)
MONOCYTES NFR BLD AUTO: 4 % (ref 0–12)
NEUTROPHILS # BLD AUTO: 14.35 10*3/MM3 (ref 1.5–8.3)
NEUTROPHILS NFR BLD AUTO: 90.4 % (ref 41–71)
NITRITE UR QL STRIP: NEGATIVE
PH UR STRIP.AUTO: 6 [PH] (ref 5–8)
PLATELET # BLD AUTO: 193 10*3/MM3 (ref 150–450)
PMV BLD AUTO: 9.7 FL (ref 6–12)
POTASSIUM BLD-SCNC: 3.1 MMOL/L (ref 3.5–5.5)
PROCALCITONIN SERPL-MCNC: 2.42 NG/ML
PROT SERPL-MCNC: 6.7 G/DL (ref 5.7–8.2)
PROT UR QL STRIP: ABNORMAL
RBC # BLD AUTO: 4.3 10*6/MM3 (ref 3.89–5.14)
RBC # UR: ABNORMAL /HPF
REF LAB TEST METHOD: ABNORMAL
SODIUM BLD-SCNC: 132 MMOL/L (ref 132–146)
SP GR UR STRIP: 1.03 (ref 1–1.03)
SQUAMOUS #/AREA URNS HPF: ABNORMAL /HPF
TROPONIN I SERPL-MCNC: 0 NG/ML (ref 0–0.07)
UROBILINOGEN UR QL STRIP: ABNORMAL
WBC NRBC COR # BLD: 15.86 10*3/MM3 (ref 3.5–10.8)
WBC UR QL AUTO: ABNORMAL /HPF
WHOLE BLOOD HOLD SPECIMEN: NORMAL
WHOLE BLOOD HOLD SPECIMEN: NORMAL

## 2018-01-15 PROCEDURE — 83605 ASSAY OF LACTIC ACID: CPT | Performed by: EMERGENCY MEDICINE

## 2018-01-15 PROCEDURE — 82150 ASSAY OF AMYLASE: CPT | Performed by: NURSE PRACTITIONER

## 2018-01-15 PROCEDURE — 25010000002 ENOXAPARIN PER 10 MG: Performed by: NURSE PRACTITIONER

## 2018-01-15 PROCEDURE — 25010000002 PIPERACILLIN-TAZOBACTAM: Performed by: EMERGENCY MEDICINE

## 2018-01-15 PROCEDURE — 80053 COMPREHEN METABOLIC PANEL: CPT | Performed by: EMERGENCY MEDICINE

## 2018-01-15 PROCEDURE — 71045 X-RAY EXAM CHEST 1 VIEW: CPT

## 2018-01-15 PROCEDURE — 87040 BLOOD CULTURE FOR BACTERIA: CPT | Performed by: EMERGENCY MEDICINE

## 2018-01-15 PROCEDURE — 82550 ASSAY OF CK (CPK): CPT | Performed by: NURSE PRACTITIONER

## 2018-01-15 PROCEDURE — 87502 INFLUENZA DNA AMP PROBE: CPT | Performed by: NURSE PRACTITIONER

## 2018-01-15 PROCEDURE — 76705 ECHO EXAM OF ABDOMEN: CPT

## 2018-01-15 PROCEDURE — 82330 ASSAY OF CALCIUM: CPT | Performed by: NURSE PRACTITIONER

## 2018-01-15 PROCEDURE — 99223 1ST HOSP IP/OBS HIGH 75: CPT | Performed by: HOSPITALIST

## 2018-01-15 PROCEDURE — 84484 ASSAY OF TROPONIN QUANT: CPT

## 2018-01-15 PROCEDURE — 83690 ASSAY OF LIPASE: CPT | Performed by: NURSE PRACTITIONER

## 2018-01-15 PROCEDURE — 85025 COMPLETE CBC W/AUTO DIFF WBC: CPT | Performed by: EMERGENCY MEDICINE

## 2018-01-15 PROCEDURE — 84145 PROCALCITONIN (PCT): CPT | Performed by: EMERGENCY MEDICINE

## 2018-01-15 PROCEDURE — 85652 RBC SED RATE AUTOMATED: CPT | Performed by: NURSE PRACTITIONER

## 2018-01-15 PROCEDURE — 25010000002 MAGNESIUM SULFATE 2 GM/50ML SOLUTION: Performed by: NURSE PRACTITIONER

## 2018-01-15 PROCEDURE — 25010000002 VANCOMYCIN: Performed by: EMERGENCY MEDICINE

## 2018-01-15 PROCEDURE — 80074 ACUTE HEPATITIS PANEL: CPT | Performed by: NURSE PRACTITIONER

## 2018-01-15 PROCEDURE — 86140 C-REACTIVE PROTEIN: CPT | Performed by: NURSE PRACTITIONER

## 2018-01-15 PROCEDURE — 87804 INFLUENZA ASSAY W/OPTIC: CPT | Performed by: EMERGENCY MEDICINE

## 2018-01-15 PROCEDURE — 87086 URINE CULTURE/COLONY COUNT: CPT | Performed by: EMERGENCY MEDICINE

## 2018-01-15 PROCEDURE — 83880 ASSAY OF NATRIURETIC PEPTIDE: CPT | Performed by: NURSE PRACTITIONER

## 2018-01-15 PROCEDURE — 83735 ASSAY OF MAGNESIUM: CPT | Performed by: EMERGENCY MEDICINE

## 2018-01-15 PROCEDURE — 93005 ELECTROCARDIOGRAM TRACING: CPT

## 2018-01-15 PROCEDURE — 74176 CT ABD & PELVIS W/O CONTRAST: CPT

## 2018-01-15 PROCEDURE — 25010000002 ONDANSETRON PER 1 MG: Performed by: EMERGENCY MEDICINE

## 2018-01-15 PROCEDURE — 80307 DRUG TEST PRSMV CHEM ANLYZR: CPT | Performed by: NURSE PRACTITIONER

## 2018-01-15 PROCEDURE — 99284 EMERGENCY DEPT VISIT MOD MDM: CPT

## 2018-01-15 PROCEDURE — 81001 URINALYSIS AUTO W/SCOPE: CPT | Performed by: EMERGENCY MEDICINE

## 2018-01-15 RX ORDER — MAGNESIUM SULFATE HEPTAHYDRATE 40 MG/ML
4 INJECTION, SOLUTION INTRAVENOUS AS NEEDED
Status: DISCONTINUED | OUTPATIENT
Start: 2018-01-15 | End: 2018-01-18 | Stop reason: HOSPADM

## 2018-01-15 RX ORDER — POTASSIUM CHLORIDE 7.45 MG/ML
10 INJECTION INTRAVENOUS
Status: DISCONTINUED | OUTPATIENT
Start: 2018-01-15 | End: 2018-01-18 | Stop reason: HOSPADM

## 2018-01-15 RX ORDER — ONDANSETRON 4 MG/1
4 TABLET, FILM COATED ORAL EVERY 6 HOURS PRN
Status: DISCONTINUED | OUTPATIENT
Start: 2018-01-15 | End: 2018-01-18 | Stop reason: HOSPADM

## 2018-01-15 RX ORDER — L.ACID,PARA/B.BIFIDUM/S.THERM 8B CELL
1 CAPSULE ORAL DAILY
Status: DISCONTINUED | OUTPATIENT
Start: 2018-01-16 | End: 2018-01-18 | Stop reason: HOSPADM

## 2018-01-15 RX ORDER — SIMETHICONE 80 MG
80 TABLET,CHEWABLE ORAL 4 TIMES DAILY PRN
Status: DISCONTINUED | OUTPATIENT
Start: 2018-01-15 | End: 2018-01-18 | Stop reason: HOSPADM

## 2018-01-15 RX ORDER — PROMETHAZINE HYDROCHLORIDE 12.5 MG/1
12.5 SUPPOSITORY RECTAL EVERY 6 HOURS PRN
Status: DISCONTINUED | OUTPATIENT
Start: 2018-01-15 | End: 2018-01-18 | Stop reason: HOSPADM

## 2018-01-15 RX ORDER — MAGNESIUM SULFATE HEPTAHYDRATE 40 MG/ML
2 INJECTION, SOLUTION INTRAVENOUS AS NEEDED
Status: DISCONTINUED | OUTPATIENT
Start: 2018-01-15 | End: 2018-01-18 | Stop reason: HOSPADM

## 2018-01-15 RX ORDER — METOPROLOL SUCCINATE 25 MG/1
25 TABLET, EXTENDED RELEASE ORAL DAILY
Status: DISCONTINUED | OUTPATIENT
Start: 2018-01-16 | End: 2018-01-15

## 2018-01-15 RX ORDER — NYSTATIN 100000 [USP'U]/G
POWDER TOPICAL EVERY 12 HOURS SCHEDULED
Status: DISCONTINUED | OUTPATIENT
Start: 2018-01-15 | End: 2018-01-18 | Stop reason: HOSPADM

## 2018-01-15 RX ORDER — POTASSIUM CHLORIDE 750 MG/1
40 CAPSULE, EXTENDED RELEASE ORAL AS NEEDED
Status: DISCONTINUED | OUTPATIENT
Start: 2018-01-15 | End: 2018-01-18 | Stop reason: HOSPADM

## 2018-01-15 RX ORDER — PROMETHAZINE HYDROCHLORIDE 12.5 MG/1
12.5 TABLET ORAL EVERY 6 HOURS PRN
Status: DISCONTINUED | OUTPATIENT
Start: 2018-01-15 | End: 2018-01-18 | Stop reason: HOSPADM

## 2018-01-15 RX ORDER — ONDANSETRON 2 MG/ML
4 INJECTION INTRAMUSCULAR; INTRAVENOUS ONCE
Status: COMPLETED | OUTPATIENT
Start: 2018-01-15 | End: 2018-01-15

## 2018-01-15 RX ORDER — ACETAMINOPHEN 325 MG/1
650 TABLET ORAL EVERY 4 HOURS PRN
Status: DISCONTINUED | OUTPATIENT
Start: 2018-01-15 | End: 2018-01-18 | Stop reason: HOSPADM

## 2018-01-15 RX ORDER — SODIUM CHLORIDE 0.9 % (FLUSH) 0.9 %
1-10 SYRINGE (ML) INJECTION AS NEEDED
Status: DISCONTINUED | OUTPATIENT
Start: 2018-01-15 | End: 2018-01-18 | Stop reason: HOSPADM

## 2018-01-15 RX ORDER — SODIUM CHLORIDE 0.9 % (FLUSH) 0.9 %
10 SYRINGE (ML) INJECTION AS NEEDED
Status: DISCONTINUED | OUTPATIENT
Start: 2018-01-15 | End: 2018-01-18 | Stop reason: HOSPADM

## 2018-01-15 RX ORDER — MULTIPLE VITAMINS W/ MINERALS TAB 9MG-400MCG
1 TAB ORAL DAILY
Status: DISCONTINUED | OUTPATIENT
Start: 2018-01-16 | End: 2018-01-18 | Stop reason: HOSPADM

## 2018-01-15 RX ORDER — FAMOTIDINE 20 MG/1
20 TABLET, FILM COATED ORAL 2 TIMES DAILY
Status: DISCONTINUED | OUTPATIENT
Start: 2018-01-16 | End: 2018-01-18 | Stop reason: HOSPADM

## 2018-01-15 RX ORDER — ASPIRIN 81 MG/1
81 TABLET ORAL NIGHTLY
Status: DISCONTINUED | OUTPATIENT
Start: 2018-01-15 | End: 2018-01-15

## 2018-01-15 RX ORDER — VANCOMYCIN HYDROCHLORIDE 1 G/200ML
15 INJECTION, SOLUTION INTRAVENOUS EVERY 24 HOURS
Status: DISCONTINUED | OUTPATIENT
Start: 2018-01-16 | End: 2018-01-17

## 2018-01-15 RX ORDER — ESTRADIOL 0.1 MG/G
1 CREAM VAGINAL 2 TIMES WEEKLY
Status: DISCONTINUED | OUTPATIENT
Start: 2018-01-18 | End: 2018-01-18 | Stop reason: HOSPADM

## 2018-01-15 RX ORDER — SODIUM CHLORIDE 9 MG/ML
100 INJECTION, SOLUTION INTRAVENOUS CONTINUOUS
Status: DISCONTINUED | OUTPATIENT
Start: 2018-01-15 | End: 2018-01-17

## 2018-01-15 RX ORDER — FAMOTIDINE 20 MG/1
20 TABLET, FILM COATED ORAL 2 TIMES DAILY PRN
Status: DISCONTINUED | OUTPATIENT
Start: 2018-01-15 | End: 2018-01-15

## 2018-01-15 RX ORDER — POTASSIUM CHLORIDE 1.5 G/1.77G
40 POWDER, FOR SOLUTION ORAL AS NEEDED
Status: DISCONTINUED | OUTPATIENT
Start: 2018-01-15 | End: 2018-01-18 | Stop reason: HOSPADM

## 2018-01-15 RX ORDER — ONDANSETRON 2 MG/ML
4 INJECTION INTRAMUSCULAR; INTRAVENOUS EVERY 6 HOURS PRN
Status: DISCONTINUED | OUTPATIENT
Start: 2018-01-15 | End: 2018-01-18 | Stop reason: HOSPADM

## 2018-01-15 RX ORDER — PROMETHAZINE HYDROCHLORIDE 25 MG/ML
12.5 INJECTION, SOLUTION INTRAMUSCULAR; INTRAVENOUS EVERY 6 HOURS PRN
Status: DISCONTINUED | OUTPATIENT
Start: 2018-01-15 | End: 2018-01-18 | Stop reason: HOSPADM

## 2018-01-15 RX ORDER — ACETAMINOPHEN 650 MG/1
650 SUPPOSITORY RECTAL EVERY 4 HOURS PRN
Status: DISCONTINUED | OUTPATIENT
Start: 2018-01-15 | End: 2018-01-18 | Stop reason: HOSPADM

## 2018-01-15 RX ADMIN — ONDANSETRON 4 MG: 2 INJECTION INTRAMUSCULAR; INTRAVENOUS at 18:48

## 2018-01-15 RX ADMIN — ENOXAPARIN SODIUM 30 MG: 30 INJECTION SUBCUTANEOUS at 22:09

## 2018-01-15 RX ADMIN — ASPIRIN 81 MG: 81 TABLET, COATED ORAL at 22:10

## 2018-01-15 RX ADMIN — SODIUM CHLORIDE 1000 ML: 9 INJECTION, SOLUTION INTRAVENOUS at 18:47

## 2018-01-15 RX ADMIN — SODIUM CHLORIDE 1000 ML: 9 INJECTION, SOLUTION INTRAVENOUS at 23:30

## 2018-01-15 RX ADMIN — MAGNESIUM SULFATE HEPTAHYDRATE 2 G: 40 INJECTION, SOLUTION INTRAVENOUS at 23:50

## 2018-01-15 RX ADMIN — VANCOMYCIN HYDROCHLORIDE 1500 MG: 10 INJECTION, POWDER, LYOPHILIZED, FOR SOLUTION INTRAVENOUS at 21:13

## 2018-01-15 RX ADMIN — POTASSIUM CHLORIDE 40 MEQ: 750 CAPSULE, EXTENDED RELEASE ORAL at 22:06

## 2018-01-15 RX ADMIN — TAZOBACTAM SODIUM AND PIPERACILLIN SODIUM 4.5 G: .5; 4 INJECTION, POWDER, LYOPHILIZED, FOR SOLUTION INTRAVENOUS at 20:41

## 2018-01-15 NOTE — TELEPHONE ENCOUNTER
Patient's  called in regarding his wife's condition. He reports getting a little better day by day, but she is having an intermittent fever. She had been so bad that she was unable to get rest or get out of bed. Her fever is currently at 102. Recommended to patient that they get her in to urgent care for treatment asap as we have nothing open tonight.

## 2018-01-15 NOTE — ED PROVIDER NOTES
Subjective   HPI Comments: Chelsi Ferguson is a 77 y.o.female who presents to the ED with c/o fever. She states that she went to her PCP on 1/8/18 for a yearly physical and had a UA that was remarkable for a UTI. She started Marcobid 5 days ago when she developed fevers, nausea, diarrhea and burning urination. She states she had a fever of 102 onset a 1430 today with chills and nausea. She also c/o decrease appetite, generalized weakness, decreased urine output, difficulty ambulating but denies cough, cold, congestion, rhinorrhea or any other complaints at this time. She reports a h/o recurrent PNA, GERD, HTN, and HTL.       Patient is a 77 y.o. female presenting with fever.   History provided by:  Patient  Fever   Max temp prior to arrival:  102  Temp source:  Unable to specify  Severity:  Moderate  Onset quality:  Gradual  Duration:  5 days  Timing:  Constant  Progression:  Worsening  Chronicity:  New  Relieved by:  Nothing  Worsened by:  Nothing  Ineffective treatments:  None tried  Associated symptoms: chills, congestion, cough, diarrhea, dysuria, nausea and vomiting    Risk factors: recent sickness        Review of Systems   Constitutional: Positive for appetite change, chills and fever.   HENT: Positive for congestion.    Respiratory: Positive for cough.    Gastrointestinal: Positive for diarrhea, nausea and vomiting.   Genitourinary: Positive for decreased urine volume and dysuria.   Neurological: Positive for weakness.   All other systems reviewed and are negative.      Past Medical History:   Diagnosis Date   • Ankle pain    • Chest discomfort    • Cholecystitis 1/15/2018   • Edema    • Elevated liver enzymes 1/15/2018   • Gallstones    • GERD (gastroesophageal reflux disease)    • Glaucoma     pt is currently off of meds and Dr Tanner does not think that she is glaucoma   • H/O complete eye exam 06/2013   • H/O mammogram 11/16/2015   • H/O non-ST elevation myocardial infarction (NSTEMI) 01/2005   • Hammer toe    •  Heart attack 01/2005   • History of blood transfusion 01/2005   • History of cardiovascular stress test 12/02/2015    normal   • HTN (hypertension)    • Hyperlipidemia    • Hypokalemia 1/15/2018   • Kidney infection 1971   • Menopausal symptoms    • Onychia and paronychia of finger    • Osteopenia    • Other elevated white blood cell count    • Pap smear for cervical cancer screening 2010    [facility]   • Pneumonia    • Sepsis 1/15/2018   • UTI (urinary tract infection) 1/15/2018   • Viral warts        Allergies   Allergen Reactions   • Aleve [Naproxen]    • Celebrex [Celecoxib]    • Ibuprofen    • Indocin [Indomethacin]    • Keflex [Cephalexin]      Upset stomach, may have had some blood in stool but cannot remember the reason why she was put on it. Tolerates penicillins without problem.   • Lipitor [Atorvastatin]    • Prevacid [Lansoprazole]    • Prilosec [Omeprazole]    • Statins    • Zocor [Simvastatin] Hives       Past Surgical History:   Procedure Laterality Date   • BLADDER REPAIR  2002   • BLADDER REPAIR  2003   • BUNIONECTOMY Right 2010   • COLONOSCOPY  2008, 4/2014    [facility]   • CORONARY STENT PLACEMENT Left 01/2005    drug eluting stent 1/2005 LAD   • DILATATION AND CURETTAGE  1971   • DILATATION AND CURETTAGE  1978   • EYE SURGERY  05/2016   • FRONTALIS SUSPENSION  2010   • HAMMER TOE REPAIR Right 11/03/2010   • HYSTERECTOMY  1978   • LAPAROTOMY OOPHERECTOMY Bilateral 2002   • NOSE SURGERY  03/2017    repair 3 fractured areas. Dr Dunne   • OTHER SURGICAL HISTORY  2005    rectocele repair   • OTHER SURGICAL HISTORY  2010    eyelid surgery   • REPLACEMENT TOTAL KNEE BILATERAL Bilateral    • TONSILLECTOMY  1952   • TOTAL KNEE ARTHROPLASTY  01/2005   • UTERINE FIBROID SURGERY  1978    emergency removal of fibroid from birth canal       Family History   Problem Relation Age of Onset   • Stroke Mother    • Heart failure Father      congestive   • Cancer Father      lip   • Breast cancer Sister      60's   •  Breast cancer Daughter 40   • Breast cancer Other      NIECE 60's   • Breast cancer Other      NIECE 60's   • Ovarian cancer Neg Hx        Social History     Social History   • Marital status:      Spouse name: N/A   • Number of children: N/A   • Years of education: N/A     Social History Main Topics   • Smoking status: Never Smoker   • Smokeless tobacco: Never Used   • Alcohol use No   • Drug use: No   • Sexual activity: Yes     Birth control/ protection: None     Other Topics Concern   • None     Social History Narrative         Objective   Physical Exam   Constitutional: She is oriented to person, place, and time. She appears well-developed and well-nourished. No distress.   HENT:   Head: Normocephalic and atraumatic.   Right Ear: External ear normal.   Left Ear: External ear normal.   Nose: Nose normal.   Eyes: Conjunctivae are normal. No scleral icterus.   Neck: Normal range of motion. Neck supple.   Cardiovascular: Normal rate, regular rhythm and normal heart sounds.    No murmur heard.  Pulmonary/Chest: Effort normal and breath sounds normal. No respiratory distress.   Abdominal: Soft. Bowel sounds are normal. She exhibits no distension. There is tenderness.   TTP to the LLQ.    Musculoskeletal: Normal range of motion. She exhibits no edema or tenderness.   Neurological: She is alert and oriented to person, place, and time.   Skin: Skin is warm and dry. She is not diaphoretic.   Psychiatric: She has a normal mood and affect. Her behavior is normal.   Nursing note and vitals reviewed.      Procedures         ED Course  ED Course   Comment By Time   I spoke with Mrs. Ferguson and her family about findings.  CT scan is negative other than gallstones.  Her LFTs are newly elevated, we'll obtain ultrasound to further evaluate although it looks like her symptoms are from UTI.  She has artery been on antibiotics for 5 days and is only getting worse.  I will seek admission to the hospital.  I spoke with her about  her allergies and she tells me she does fine with penicillin. Roge Zuñiga MD 01/15 1945     Recent Results (from the past 24 hour(s))   Urinalysis With / Culture If Indicated - Urine, Clean Catch    Collection Time: 01/15/18  5:45 PM   Result Value Ref Range    Color, UA Orange (A) Yellow, Straw    Appearance, UA Cloudy (A) Clear    pH, UA 6.0 5.0 - 8.0    Specific Gravity, UA 1.026 1.001 - 1.030    Glucose, UA Negative Negative    Ketones, UA 15 mg/dL (1+) (A) Negative    Bilirubin, UA Small (1+) (A) Negative    Blood, UA Small (1+) (A) Negative    Protein, UA Trace (A) Negative    Leuk Esterase, UA Moderate (2+) (A) Negative    Nitrite, UA Negative Negative    Urobilinogen, UA 1.0 E.U./dL 0.2 - 1.0 E.U./dL   Urinalysis, Microscopic Only - Urine, Clean Catch    Collection Time: 01/15/18  5:45 PM   Result Value Ref Range    RBC, UA 0-2 None Seen, 0-2 /HPF    WBC, UA 6-12 (A) None Seen /HPF    Bacteria, UA 1+ (A) None Seen, Trace /HPF    Squamous Epithelial Cells, UA 3-6 (A) None Seen, 0-2 /HPF    Hyaline Casts, UA 0-6 0 - 6 /LPF    Methodology Manual Light Microscopy    Comprehensive Metabolic Panel    Collection Time: 01/15/18  5:49 PM   Result Value Ref Range    Glucose 119 (H) 70 - 100 mg/dL    BUN 12 9 - 23 mg/dL    Creatinine 0.50 (L) 0.60 - 1.30 mg/dL    Sodium 132 132 - 146 mmol/L    Potassium 3.1 (L) 3.5 - 5.5 mmol/L    Chloride 96 (L) 99 - 109 mmol/L    CO2 26.0 20.0 - 31.0 mmol/L    Calcium 9.8 8.7 - 10.4 mg/dL    Total Protein 6.7 5.7 - 8.2 g/dL    Albumin 3.90 3.20 - 4.80 g/dL    ALT (SGPT) 128 (H) 7 - 40 U/L    AST (SGOT) 124 (H) 0 - 33 U/L    Alkaline Phosphatase 287 (H) 25 - 100 U/L    Total Bilirubin 1.4 (H) 0.3 - 1.2 mg/dL    eGFR Non African Amer 119 >60 mL/min/1.73    Globulin 2.8 gm/dL    A/G Ratio 1.4 (L) 1.5 - 2.5 g/dL    BUN/Creatinine Ratio 24.0 7.0 - 25.0    Anion Gap 10.0 3.0 - 11.0 mmol/L   Magnesium    Collection Time: 01/15/18  5:49 PM   Result Value Ref Range    Magnesium 1.4 1.3 -  2.7 mg/dL   Light Blue Top    Collection Time: 01/15/18  5:49 PM   Result Value Ref Range    Extra Tube hold for add-on    Green Top (Gel)    Collection Time: 01/15/18  5:49 PM   Result Value Ref Range    Extra Tube Hold for add-ons.    Lavender Top    Collection Time: 01/15/18  5:49 PM   Result Value Ref Range    Extra Tube hold for add-on    Gold Top - SST    Collection Time: 01/15/18  5:49 PM   Result Value Ref Range    Extra Tube Hold for add-ons.    CBC Auto Differential    Collection Time: 01/15/18  5:49 PM   Result Value Ref Range    WBC 15.86 (H) 3.50 - 10.80 10*3/mm3    RBC 4.30 3.89 - 5.14 10*6/mm3    Hemoglobin 13.7 11.5 - 15.5 g/dL    Hematocrit 39.9 34.5 - 44.0 %    MCV 92.8 80.0 - 99.0 fL    MCH 31.9 (H) 27.0 - 31.0 pg    MCHC 34.3 32.0 - 36.0 g/dL    RDW 13.5 11.3 - 14.5 %    RDW-SD 46.1 37.0 - 54.0 fl    MPV 9.7 6.0 - 12.0 fL    Platelets 193 150 - 450 10*3/mm3    Neutrophil % 90.4 (H) 41.0 - 71.0 %    Lymphocyte % 3.3 (L) 24.0 - 44.0 %    Monocyte % 4.0 0.0 - 12.0 %    Eosinophil % 1.5 0.0 - 3.0 %    Basophil % 0.1 0.0 - 1.0 %    Immature Grans % 0.7 (H) 0.0 - 0.6 %    Neutrophils, Absolute 14.35 (H) 1.50 - 8.30 10*3/mm3    Lymphocytes, Absolute 0.52 (L) 0.60 - 4.80 10*3/mm3    Monocytes, Absolute 0.64 0.00 - 1.00 10*3/mm3    Eosinophils, Absolute 0.23 0.00 - 0.30 10*3/mm3    Basophils, Absolute 0.01 0.00 - 0.20 10*3/mm3    Immature Grans, Absolute 0.11 (H) 0.00 - 0.03 10*3/mm3   Lactic Acid, Plasma    Collection Time: 01/15/18  5:49 PM   Result Value Ref Range    Lactate 1.6 0.5 - 2.0 mmol/L   Procalcitonin    Collection Time: 01/15/18  5:49 PM   Result Value Ref Range    Procalcitonin 2.42 ng/mL   BNP    Collection Time: 01/15/18  5:49 PM   Result Value Ref Range    BNP 46.0 0.0 - 100.0 pg/mL   Sedimentation Rate    Collection Time: 01/15/18  5:49 PM   Result Value Ref Range    Sed Rate 49 (H) 0 - 30 mm/hr   C-reactive Protein    Collection Time: 01/15/18  5:49 PM   Result Value Ref Range     "C-Reactive Protein 16.93 (H) 0.00 - 1.00 mg/dL   POC Troponin, Rapid    Collection Time: 01/15/18  6:04 PM   Result Value Ref Range    Troponin I 0.00 0.00 - 0.07 ng/mL   Influenza Antigen, Rapid - Swab, Nasopharynx    Collection Time: 01/15/18  6:50 PM   Result Value Ref Range    Influenza A Ag, EIA Negative Negative    Influenza B Ag, EIA Negative Negative     Note: In addition to lab results from this visit, the labs listed above may include labs taken at another facility or during a different encounter within the last 24 hours. Please correlate lab times with ED admission and discharge times for further clarification of the services performed during this visit.    US Gallbladder   Final Result      1.  Gallbladder sludge, with gallbladder wall thickening, suggesting    cholecystitis.      2.  Borderline dilation of the extrahepatic common bile duct. No intrahepatic    biliary dilation.            THIS DOCUMENT HAS BEEN ELECTRONICALLY SIGNED BY KATYA HURT MD      CT Abdomen Pelvis Without Contrast   Final Result      1. No acute findings.      2. Non-acute findings are described above.      THIS DOCUMENT HAS BEEN ELECTRONICALLY SIGNED BY KATYA HURT MD      XR Chest 1 View    (Results Pending)     Vitals:    01/15/18 2038 01/15/18 2039 01/15/18 2045 01/15/18 2100   BP: 117/63   117/54   BP Location:    Right arm   Patient Position:    Lying   Pulse:  84  82   Resp:    18   Temp:   98.8 °F (37.1 °C) 98.4 °F (36.9 °C)   TempSrc:   Oral Oral   SpO2:  93%     Weight:    74.3 kg (163 lb 11.2 oz)   Height:    152.4 cm (60\")     Medications   sodium chloride 0.9 % flush 10 mL (not administered)   vancomycin 1500 mg/500 mL 0.9% NS IVPB ([facility]) (1,500 mg Intravenous New Bag 1/15/18 2113)   piperacillin-tazobactam (ZOSYN) 4.5 g/100 mL 0.9% NS IVPB (mbp) (not administered)   vancomycin (VANCOCIN) in iso-osmotic dextrose IVPB 1 g (premix) 200 mL (not administered)   Pharmacy to dose vancomycin (not administered)   aspirin " EC tablet 81 mg (not administered)   estradiol (ESTRACE) vaginal cream 1 g (not administered)   metoprolol succinate XL (TOPROL-XL) 24 hr tablet 25 mg (not administered)   multivitamin with minerals 1 tablet (not administered)   nystatin (MYCOSTATIN) powder (not administered)   sodium chloride 0.9 % flush 1-10 mL (not administered)   Pharmacy to Dose enoxaparin (LOVENOX) (not administered)   lactobacillus acidophilus (RISAQUAD) capsule 1 capsule (not administered)   potassium chloride (MICRO-K) CR capsule 40 mEq (not administered)     Or   potassium chloride (KLOR-CON) packet 40 mEq (not administered)     Or   potassium chloride 10 mEq in 100 mL IVPB (not administered)   Magnesium Sulfate 2 gram Bolus, followed by 8 gram infusion (total Mg dose 10 grams)- Mg less than or equal to 1mg/dL (not administered)     Or   Magnesium Sulfate 6 gram Infusion (2 gm x 3) -Mg 1.1 -1.5 mg/dL (not administered)     Or   magnesium sulfate 4 gram infusion- Mg 1.6-1.9 mg/dL (not administered)   melatonin sublingual tablet 5 mg (not administered)   bismuth subsalicylate (PEPTO BISMOL) 262 MG/15ML suspension 30 mL (not administered)   famotidine (PEPCID) tablet 20 mg (not administered)   simethicone (MYLICON) chewable tablet 80 mg (not administered)   ondansetron (ZOFRAN) tablet 4 mg (not administered)     Or   ondansetron (ZOFRAN) injection 4 mg (not administered)   promethazine (PHENERGAN) tablet 12.5 mg (not administered)     Or   promethazine (PHENERGAN) injection 12.5 mg (not administered)     Or   promethazine (PHENERGAN) suppository 12.5 mg (not administered)   acetaminophen (TYLENOL) suppository 650 mg (not administered)     Or   acetaminophen (TYLENOL) tablet 650 mg (not administered)   !vanc trough due prior to dose on 1-17 2100. Hold vanc until labs are back. Rx is following. (not administered)   enoxaparin (LOVENOX) syringe 30 mg (not administered)   sodium chloride 0.9 % bolus 1,000 mL (0 mL Intravenous Stopped 1/15/18  2040)   ondansetron (ZOFRAN) injection 4 mg (4 mg Intravenous Given 1/15/18 1848)   piperacillin-tazobactam (ZOSYN) 4.5 g/100 mL 0.9% NS IVPB (mbp) (0 g Intravenous Stopped 1/15/18 2115)     ECG/EMG Results (last 24 hours)     Procedure Component Value Units Date/Time    ECG 12 Lead [[id number]] Collected:  01/15/18 1746     Updated:  01/15/18 1831                          MDM  Number of Diagnoses or Management Options  Bacterial UTI: established and worsening  Elevated LFTs: new and requires workup  Generalized abdominal pain: new and requires workup  Sepsis, due to unspecified organism: new and requires workup     Amount and/or Complexity of Data Reviewed  Clinical lab tests: ordered and reviewed  Tests in the radiology section of CPT®: ordered and reviewed  Obtain history from someone other than the patient: yes  Discuss the patient with other providers: yes  Independent visualization of images, tracings, or specimens: yes    Patient Progress  Patient progress: improved      Final diagnoses:   Bacterial UTI   Sepsis, due to unspecified organism   Elevated LFTs   Generalized abdominal pain       Documentation assistance provided by pily Lord.  Information recorded by the pily was done at my direction and has been verified and validated by me.     Harinder Lord  01/15/18 1947       Roge Zuñiga MD  01/15/18 2093

## 2018-01-16 LAB
ALBUMIN SERPL-MCNC: 2.9 G/DL (ref 3.2–4.8)
ALBUMIN/GLOB SERPL: 1.6 G/DL (ref 1.5–2.5)
ALP SERPL-CCNC: 199 U/L (ref 25–100)
ALT SERPL W P-5'-P-CCNC: 84 U/L (ref 7–40)
ANION GAP SERPL CALCULATED.3IONS-SCNC: 5 MMOL/L (ref 3–11)
AST SERPL-CCNC: 57 U/L (ref 0–33)
BILIRUB SERPL-MCNC: 0.8 MG/DL (ref 0.3–1.2)
BUN BLD-MCNC: 10 MG/DL (ref 9–23)
BUN/CREAT SERPL: 20 (ref 7–25)
CALCIUM SPEC-SCNC: 7.7 MG/DL (ref 8.7–10.4)
CHLORIDE SERPL-SCNC: 104 MMOL/L (ref 99–109)
CO2 SERPL-SCNC: 26 MMOL/L (ref 20–31)
CREAT BLD-MCNC: 0.5 MG/DL (ref 0.6–1.3)
D-LACTATE SERPL-SCNC: 0.8 MMOL/L (ref 0.5–2)
DEPRECATED RDW RBC AUTO: 48.6 FL (ref 37–54)
ERYTHROCYTE [DISTWIDTH] IN BLOOD BY AUTOMATED COUNT: 14.1 % (ref 11.3–14.5)
ETHANOL BLD-MCNC: <10 MG/DL (ref 0–10)
GFR SERPL CREATININE-BSD FRML MDRD: 119 ML/MIN/1.73
GLOBULIN UR ELPH-MCNC: 1.8 GM/DL
GLUCOSE BLD-MCNC: 86 MG/DL (ref 70–100)
HAV IGM SERPL QL IA: NORMAL
HBA1C MFR BLD: 5.9 % (ref 4.8–5.6)
HBV CORE IGM SERPL QL IA: NORMAL
HBV SURFACE AG SERPL QL IA: NORMAL
HCT VFR BLD AUTO: 32.8 % (ref 34.5–44)
HCV AB SER DONR QL: NORMAL
HGB BLD-MCNC: 11.1 G/DL (ref 11.5–15.5)
MCH RBC QN AUTO: 31.9 PG (ref 27–31)
MCHC RBC AUTO-ENTMCNC: 33.8 G/DL (ref 32–36)
MCV RBC AUTO: 94.3 FL (ref 80–99)
PLATELET # BLD AUTO: 199 10*3/MM3 (ref 150–450)
PMV BLD AUTO: 10.3 FL (ref 6–12)
POTASSIUM BLD-SCNC: 4.4 MMOL/L (ref 3.5–5.5)
PROT SERPL-MCNC: 4.7 G/DL (ref 5.7–8.2)
RBC # BLD AUTO: 3.48 10*6/MM3 (ref 3.89–5.14)
SODIUM BLD-SCNC: 135 MMOL/L (ref 132–146)
TSH SERPL DL<=0.05 MIU/L-ACNC: 0.66 MIU/ML (ref 0.35–5.35)
WBC NRBC COR # BLD: 12.82 10*3/MM3 (ref 3.5–10.8)

## 2018-01-16 PROCEDURE — 97165 OT EVAL LOW COMPLEX 30 MIN: CPT

## 2018-01-16 PROCEDURE — 99232 SBSQ HOSP IP/OBS MODERATE 35: CPT | Performed by: INTERNAL MEDICINE

## 2018-01-16 PROCEDURE — 84443 ASSAY THYROID STIM HORMONE: CPT | Performed by: NURSE PRACTITIONER

## 2018-01-16 PROCEDURE — 25010000002 MAGNESIUM SULFATE 2 GM/50ML SOLUTION: Performed by: NURSE PRACTITIONER

## 2018-01-16 PROCEDURE — 97110 THERAPEUTIC EXERCISES: CPT

## 2018-01-16 PROCEDURE — 25010000002 ENOXAPARIN PER 10 MG: Performed by: INTERNAL MEDICINE

## 2018-01-16 PROCEDURE — 83036 HEMOGLOBIN GLYCOSYLATED A1C: CPT | Performed by: NURSE PRACTITIONER

## 2018-01-16 PROCEDURE — 25010000002 PIPERACILLIN-TAZOBACTAM: Performed by: NURSE PRACTITIONER

## 2018-01-16 PROCEDURE — 97530 THERAPEUTIC ACTIVITIES: CPT

## 2018-01-16 PROCEDURE — 25010000002 VANCOMYCIN PER 500 MG: Performed by: NURSE PRACTITIONER

## 2018-01-16 PROCEDURE — 97161 PT EVAL LOW COMPLEX 20 MIN: CPT

## 2018-01-16 PROCEDURE — 85027 COMPLETE CBC AUTOMATED: CPT | Performed by: NURSE PRACTITIONER

## 2018-01-16 PROCEDURE — 80053 COMPREHEN METABOLIC PANEL: CPT | Performed by: NURSE PRACTITIONER

## 2018-01-16 PROCEDURE — 83605 ASSAY OF LACTIC ACID: CPT | Performed by: NURSE PRACTITIONER

## 2018-01-16 RX ADMIN — TAZOBACTAM SODIUM AND PIPERACILLIN SODIUM 4.5 G: .5; 4 INJECTION, POWDER, LYOPHILIZED, FOR SOLUTION INTRAVENOUS at 19:15

## 2018-01-16 RX ADMIN — MAGNESIUM SULFATE HEPTAHYDRATE 2 G: 40 INJECTION, SOLUTION INTRAVENOUS at 02:06

## 2018-01-16 RX ADMIN — MAGNESIUM SULFATE HEPTAHYDRATE 2 G: 40 INJECTION, SOLUTION INTRAVENOUS at 04:55

## 2018-01-16 RX ADMIN — TAZOBACTAM SODIUM AND PIPERACILLIN SODIUM 4.5 G: .5; 4 INJECTION, POWDER, LYOPHILIZED, FOR SOLUTION INTRAVENOUS at 08:15

## 2018-01-16 RX ADMIN — POTASSIUM CHLORIDE 40 MEQ: 750 CAPSULE, EXTENDED RELEASE ORAL at 05:46

## 2018-01-16 RX ADMIN — VANCOMYCIN HYDROCHLORIDE 1000 MG: 1 INJECTION, SOLUTION INTRAVENOUS at 20:37

## 2018-01-16 RX ADMIN — MULTIPLE VITAMINS W/ MINERALS TAB 1 TABLET: TAB ORAL at 08:14

## 2018-01-16 RX ADMIN — SODIUM CHLORIDE 100 ML/HR: 900 INJECTION INTRAVENOUS at 05:29

## 2018-01-16 RX ADMIN — FAMOTIDINE 20 MG: 20 TABLET, FILM COATED ORAL at 20:37

## 2018-01-16 RX ADMIN — Medication 1 CAPSULE: at 08:14

## 2018-01-16 RX ADMIN — NYSTATIN: 100000 POWDER TOPICAL at 08:14

## 2018-01-16 RX ADMIN — FAMOTIDINE 20 MG: 20 TABLET, FILM COATED ORAL at 08:14

## 2018-01-16 RX ADMIN — ENOXAPARIN SODIUM 40 MG: 40 INJECTION SUBCUTANEOUS at 14:00

## 2018-01-16 RX ADMIN — MAGNESIUM SULFATE HEPTAHYDRATE 2 G: 40 INJECTION, SOLUTION INTRAVENOUS at 01:08

## 2018-01-16 RX ADMIN — SODIUM CHLORIDE 100 ML/HR: 900 INJECTION INTRAVENOUS at 08:14

## 2018-01-16 RX ADMIN — NYSTATIN: 100000 POWDER TOPICAL at 20:38

## 2018-01-16 RX ADMIN — POTASSIUM CHLORIDE 40 MEQ: 750 CAPSULE, EXTENDED RELEASE ORAL at 02:05

## 2018-01-16 NOTE — THERAPY EVALUATION
Acute Care - Physical Therapy Initial Evaluation  [facility]     Patient Name: Chelsi Ferguson  : 1940  MRN: [id number]  Today's Date: 2018   Onset of Illness/Injury or Date of Surgery Date: 01/15/18  Date of Referral to PT: 18  Referring Physician: LINA Uriarte      Admit Date: 1/15/2018     Visit Dx:    ICD-10-CM ICD-9-CM   1. Bacterial UTI N39.0 599.0    A49.9 041.9   2. Sepsis, due to unspecified organism A41.9 038.9     995.91   3. Elevated LFTs R79.89 790.6   4. Generalized abdominal pain R10.84 789.07   5. Impaired mobility and ADLs Z74.09 799.89   6. Impaired functional mobility, balance, gait, and endurance Z74.09 V49.89     Patient Active Problem List   Diagnosis   • Loss of weight   • Hyperlipidemia   • Edema   • GERD (gastroesophageal reflux disease)   • Nausea with vomiting   • Diarrhea   • Incontinence of feces   • HTN (hypertension)   • Cholelithiasis   • Menopausal symptom   • Cramp in limb   • Glaucoma   • Hammer toe   • Dry mouth   • Osteopenia   • Hypokalemia   • Elevated liver enzymes   • UTI (urinary tract infection)   • Sepsis   • Bacterial UTI   • Cholecystitis     Past Medical History:   Diagnosis Date   • Ankle pain    • Chest discomfort    • Cholecystitis 1/15/2018   • Edema    • Elevated liver enzymes 1/15/2018   • Gallstones    • GERD (gastroesophageal reflux disease)    • Glaucoma     pt is currently off of meds and Dr Tanner does not think that she is glaucoma   • H/O complete eye exam 2013   • H/O mammogram 2015   • H/O non-ST elevation myocardial infarction (NSTEMI) 2005   • Hammer toe    • Heart attack 2005   • History of blood transfusion 2005   • History of cardiovascular stress test 2015    normal   • HTN (hypertension)    • Hyperlipidemia    • Hypokalemia 1/15/2018   • Kidney infection 1971   • Menopausal symptoms    • Onychia and paronychia of finger    • Osteopenia    • Other elevated white blood cell count    • Pap smear for cervical  cancer screening 2010    [facility]   • Pneumonia    • Sepsis 1/15/2018   • UTI (urinary tract infection) 1/15/2018   • Viral warts      Past Surgical History:   Procedure Laterality Date   • BLADDER REPAIR  2002   • BLADDER REPAIR  2003   • BUNIONECTOMY Right 2010   • COLONOSCOPY  2008, 4/2014    [facility]   • CORONARY STENT PLACEMENT Left 01/2005    drug eluting stent 1/2005 LAD   • DILATATION AND CURETTAGE  1971   • DILATATION AND CURETTAGE  1978   • EYE SURGERY  05/2016   • FRONTALIS SUSPENSION  2010   • HAMMER TOE REPAIR Right 11/03/2010   • HYSTERECTOMY  1978   • LAPAROTOMY OOPHERECTOMY Bilateral 2002   • NOSE SURGERY  03/2017    repair 3 fractured areas. Dr Dunne   • OTHER SURGICAL HISTORY  2005    rectocele repair   • OTHER SURGICAL HISTORY  2010    eyelid surgery   • REPLACEMENT TOTAL KNEE BILATERAL Bilateral    • TONSILLECTOMY  1952   • TOTAL KNEE ARTHROPLASTY  01/2005   • UTERINE FIBROID SURGERY  1978    emergency removal of fibroid from birth canal          PT ASSESSMENT (last 72 hours)      PT Evaluation       01/16/18 1318 01/16/18 1049    Rehab Evaluation    Document Type  evaluation  -TB    Subjective Information  agree to therapy;complains of;weakness;fatigue  -TB    Patient Effort, Rehab Treatment  good  -TB    Symptoms Noted During/After Treatment  fatigue  -TB    General Information    Patient Profile Review  yes  -TB    Onset of Illness/Injury or Date of Surgery Date  01/15/18  -TB    Referring Physician  LINA Uriarte  -TB    General Observations  Pt rec'vd supine in bed, room air, IV, tele; no family present  -TB    Pertinent History Of Current Problem  Pt to ED with 5 day c/o of fever and evolving symptoms of chills, N/V/D; pt admitted with bacterial UTI  -TB    Precautions/Limitations  fall precautions  -TB    Prior Level of Function  independent:;all household mobility;community mobility;ADL's  -TB    Equipment Currently Used at Home grab bar;raised toilet  -AW grab bar;raised toilet  -TB     Plans/Goals Discussed With  patient;agreed upon  -TB    Risks Reviewed  patient:;LOB;increased discomfort;lines disloged  -TB    Benefits Reviewed  patient:;improve function;increase knowledge;increase strength  -TB    Barriers to Rehab  none identified  -TB    Living Environment    Lives With spouse  -AW spouse  -TB    Living Arrangements house  -AW house  -TB    Home Accessibility  stairs to enter home;stairs within home;bed and bath on same level  -TB    Type of Financial/Environmental Concern none  -AW     Transportation Available car;family or friend will provide  -AW     Living Environment Comment  3 story home; laundry in basement; walk-in shower  -TB    Vital Signs    Pre Systolic BP Rehab  --   RN cleared OT; vitals stable  -TB    O2 Delivery Post Treatment  room air  -TB    Pre Patient Position  Supine  -TB    Intra Patient Position  Standing  -TB    Post Patient Position  Supine  -TB    Pain Assessment    Pain Assessment  No/denies pain  -TB    Vision Assessment/Intervention    Visual Impairment  WFL  -TB    Cognitive Assessment/Intervention    Current Cognitive/Communication Assessment  functional  -TB    Orientation Status  oriented x 4  -TB    Follows Commands/Answers Questions  100% of the time  -TB    Personal Safety  WNL/WFL  -TB    Personal Safety Interventions  fall prevention program maintained  -TB    Short/Long Term Memory  intact short term memory;intact long term memory  -TB    ROM (Range of Motion)    General ROM  no range of motion deficits identified  -TB    MMT (Manual Muscle Testing)    General MMT Assessment  upper extremity strength deficits identified  -TB    General MMT Assessment Detail  B UE 4/5  -TB    Bed Mobility, Assessment/Treatment    Bed Mob, Supine to Sit, [place]  supervision required  -TB    Bed Mob, Sit to Supine, [place]  supervision required  -TB    Transfer Assessment/Treatment    Transfers, Sit-Stand [place]  contact guard assist  -TB    Transfers,  Stand-Sit [place]  contact guard assist  -TB    Toilet Transfer, [place]  contact guard assist  -TB    Toilet Transfer, Assistive Device  elevated toilet seat  -TB    Transfer, Safety Issues  step length decreased;balance decreased during turns  -TB    Transfer, Impairments  --   decreased occupational endurance  -TB    Balance Skills Training    Sitting-Level of Assistance  Close supervision  -TB    Sitting-Balance Support  Feet supported  -TB    Sitting-Balance Activities  --   ADL tasks  -TB    Standing-Level of Assistance  Contact guard  -TB    Static Standing Balance Support  Right upper extremity supported   gait belt  -TB    Standing-Balance Activities  Weight Shift R-L   ADL tasks; trunk ext/posture  -TB    Therapy Exercises    Bilateral Upper Extremity  AROM:;sitting;shoulder extension/flexion;shoulder abduction/adduction;shoulder horizontal abd/add;shoulder ER/IR;elbow flexion/extension;pronation/supination;hand pumps  -TB    Fine Motor Coordination Training    Opposition  Right:;Left:;intact  -TB    Sensory Assessment/Intervention    Light Touch  LUE;RUE  -TB    LUE Light Touch  WNL  -TB    RUE Light Touch  WNL  -TB    Positioning and Restraints    Pre-Treatment Position  in bed  -TB    Post Treatment Position  bed  -TB    In Bed  supine;call light within reach;encouraged to call for assist  -TB      01/16/18 1045 01/15/18 2100    Rehab Evaluation    Document Type evaluation  -JUSTINE     Subjective Information agree to therapy;complains of;weakness;fatigue  -JUSTINE     Patient Effort, Rehab Treatment good  -JUSTINE     Symptoms Noted During/After Treatment fatigue  -JUSTINE     General Information    Patient Profile Review yes  -JUSTINE     Onset of Illness/Injury or Date of Surgery Date 01/15/18  -JUSTINE     Referring Physician MD Garcia  -JUSTINE     Pertinent History Of Current Problem patient admitted with 5 day hx fever she had UTI and CT revealed gallstones. U/S suggestive of cholecystitis  -JUSTINE      Precautions/Limitations fall precautions  -JUSTINE     Prior Level of Function independent:;gait;transfer;bed mobility;ADL's  -JUSTINE     Equipment Currently Used at Home grab bar;raised toilet  -JUSTINE none  -    Plans/Goals Discussed With patient;agreed upon  -JUSTINE     Risks Reviewed patient:;LOB;increased discomfort  -JUSTINE     Benefits Reviewed patient:;increase independence;increase strength;decrease risk of DVT  -JUSTINE     Barriers to Rehab none identified  -JUSTINE     Living Environment    Lives With spouse  -JUSTINE spouse  -    Living Arrangements house  -JUSTINE house  -KH    Home Accessibility stairs to enter home  -JUSTINE no concerns  -KH    Stair Railings at Home  inside, present on left side;inside, present on right side  -    Type of Financial/Environmental Concern  none  -    Transportation Available  car  -    Clinical Impression    Date of Referral to PT 01/16/18  -JUSTINE     PT Diagnosis impaired bed mobility transfer and gait  -JUSTINE     Patient/Family Goals Statement patient to go home with family assist  -JUSTINE     Criteria for Skilled Therapeutic Interventions Met yes;treatment indicated  -JUSTINE     Rehab Potential good, to achieve stated therapy goals  -JUSTINE     Pain Assessment    Pain Assessment No/denies pain  -JUSTINE     Cognitive Assessment/Intervention    Current Cognitive/Communication Assessment functional  -JUSTINE     Orientation Status oriented x 4  -JUSTINE     Follows Commands/Answers Questions 100% of the time  -JUSTINE     Personal Safety WNL/WFL  -JUSTINE     Personal Safety Interventions gait belt;nonskid shoes/slippers when out of bed  -JUSTINE     ROM (Range of Motion)    General ROM no range of motion deficits identified  -JUSTINE     MMT (Manual Muscle Testing)    General MMT Assessment upper extremity strength deficits identified;lower extremity strength deficits identified   generalized weakness 4/5  -JUSTINE     Bed Mobility, Assessment/Treatment    Bed Mob, Supine to Sit, [place] supervision required  -JUSTINE     Bed Mob, Sit to Supine, [place]  supervision required  -JUSTINE     Transfer Assessment/Treatment    Transfers, Sit-Stand [place] contact guard assist  -JUSTINE     Transfers, Stand-Sit [place] contact guard assist  -JUSTINE     Toilet Transfer, [place] contact guard assist  -JUSTINE     Transfer, Safety Issues step length decreased  -JUSTINE     Gait Assessment/Treatment    Gait, [place] Level contact guard assist  -JUSTINE     Gait, Distance (Feet) 200  -JUSTINE     Gait, Gait Pattern Analysis swing-to gait  -JUSTINE     Gait, Gait Deviations step length decreased  -JUSTINE     Gait, Safety Issues step length decreased;balance decreased during turns  -JUSTINE     Motor Skills/Interventions    Additional Documentation Balance Skills Training (Group)  -JUSTINE     Balance Skills Training    Sitting-Level of Assistance Close supervision  -JUSTINE     Sitting-Balance Support Feet supported  -JUSTINE     Standing-Level of Assistance Contact guard  -JUSTINE     Static Standing Balance Support Right upper extremity supported  -JUSTINE     Gait Balance-Level of Assistance Contact guard  -JUSTINE     Gait Balance Support Right upper extremity supported  -JUSTINE     Gait Balance Activities scanning environment R/L  -JUSTINE     Therapy Exercises    Bilateral Lower Extremities AROM:;10 reps;sitting;ankle pumps/circles;hip flexion;LAQ  -JUSTINE     Positioning and Restraints    Pre-Treatment Position in bed  -JUSTINE     Post Treatment Position bed  -JUSTINE     In Bed notified nsg;supine;call light within reach;encouraged to call for assist  -JUSTINE       User Key  (r) = Recorded By, (t) = Taken By, (c) = Cosigned By    Initials Name Provider Type    MEGAN Campa, OT Occupational Therapist    JUSTINE Johnson, PT Physical Therapist    AW Jing Sagastume, RN Registered Nurse     Jazz Su, RN Registered Nurse          Physical Therapy Education     Title: PT OT SLP Therapies (Active)     Topic: Physical Therapy (Active)     Point: Mobility training (Active)    Learning Progress Summary    Learner Readiness Method  Response Comment Documented by Status   Patient Acceptance E NR   01/16/18 1429 Active               Point: Home exercise program (Active)    Learning Progress Summary    Learner Readiness Method Response Comment Documented by Status   Patient Acceptance E NR   01/16/18 1429 Active               Point: Body mechanics (Active)    Learning Progress Summary    Learner Readiness Method Response Comment Documented by Status   Patient Acceptance E NR   01/16/18 1429 Active               Point: Precautions (Active)    Learning Progress Summary    Learner Readiness Method Response Comment Documented by Status   Patient Acceptance E NR   01/16/18 1429 Active                      User Key     Initials Effective Dates Name Provider Type Discipline     06/19/15 -  Hillary Johnson, PT Physical Therapist PT                PT Recommendation and Plan  Anticipated Discharge Disposition: home  PT Frequency: daily, per priority policy  Plan of Care Review  Plan Of Care Reviewed With: patient  Outcome Summary/Follow up Plan: PT eval completed patient is able to ambulate 200 ft with generalized weakness, fatigue and balance loss. Recommend HH at D/C           IP PT Goals       01/16/18 1429          Bed Mobility PT LTG    Bed Mobility PT LTG, Date Established 01/16/18  -JUSTINE      Bed Mobility PT LTG, Time to Achieve 2 wks  -JUSTINE      Bed Mobility PT LTG, Activity Type supine to sit/sit to supine  -JUSTINE      Bed Mobility PT LTG, [place] Level independent  -JUSTINE      Bed Mobility PT LTG, Outcome goal ongoing  -JUSTINE      Transfer Training PT LTG    Transfer Training PT LTG, Date Established 01/16/18  -JUSTINE      Transfer Training PT LTG, Time to Achieve 2 wks  -JUSTINE      Transfer Training PT LTG, Activity Type sit to stand/stand to sit  -JUSTINE      Transfer Training PT LTG, [place] Level independent  -JUSTINE      Transfer Training PT LTG, Outcome goal ongoing  -JUSTINE      Gait Training PT LTG    Gait Training Goal PT LTG, Date Established  01/16/18  -JUSTINE      Gait Training Goal PT LTG, Time to Achieve 2 wks  -JUSTINE      Gait Training Goal PT LTG, [place] Level independent  -JUSTINE      Gait Training Goal PT LTG, Distance to Achieve 400  -JUSTINE      Gait Training Goal PT LTG, Outcome goal ongoing  -JUSTINE      Stair Training PT LTG    Stair Training Goal PT LTG, Date Established 01/16/18  -JUSTINE      Stair Training Goal PT LTG, Time to Achieve 2 wks  -JUSTINE      Stair Training Goal PT LTG, Number of Steps 10  -JUSTINE      Stair Training Goal PT LTG, [place] Level independent  -JUSTINE      Stair Training Goal PT LTG, Outcome goal ongoing  -JUSTINE        User Key  (r) = Recorded By, (t) = Taken By, (c) = Cosigned By    Initials Name Provider Type    JUSTINE Johnson, PT Physical Therapist                Outcome Measures       01/16/18 1049 01/16/18 1045       How much help from another person do you currently need...    Turning from your back to your side while in flat bed without using bedrails?  4  -JUSTINE     Moving from lying on back to sitting on the side of a flat bed without bedrails?  4  -JUSTINE     Moving to and from a bed to a chair (including a wheelchair)?  3  -JUSTINE     Standing up from a chair using your arms (e.g., wheelchair, bedside chair)?  3  -JUSTINE     Climbing 3-5 steps with a railing?  3  -JUSTINE     To walk in hospital room?  3  -JUSTINE     AM-PAC 6 Clicks Score  20  -JUSTINE     How much help from another is currently needed...    Putting on and taking off regular lower body clothing? 3  -TB      Bathing (including washing, rinsing, and drying) 3  -TB      Toileting (which includes using toilet bed pan or urinal) 3  -TB      Putting on and taking off regular upper body clothing 3  -TB      Taking care of personal grooming (such as brushing teeth) 3  -TB      Eating meals 4  -TB      Score 19  -TB      Functional Assessment    Outcome Measure Options AM-PAC 6 Clicks Daily Activity (OT)  -TB AM-PAC 6 Clicks Basic Mobility (PT)  -JUSTINE       User Key  (r) = Recorded By, (t) = Taken  By, (c) = Cosigned By    Initials Name Provider Type    TB Radha Campa, OT Occupational Therapist    JUSTINE Johnson PT Physical Therapist           Time Calculation:         PT Charges       01/16/18 1431          Time Calculation    Start Time 1045  -JUSTINE      PT Received On 01/16/18  -JUSTINE      PT Goal Re-Cert Due Date 01/26/18  -JUSTINE      Time Calculation- PT    Total Timed Code Minutes- PT 15 minute(s)  -JUSTINE        User Key  (r) = Recorded By, (t) = Taken By, (c) = Cosigned By    Initials Name Provider Type    JUSTINE Johnson, PT Physical Therapist          Therapy Charges for Today     Code Description Service Date Service Provider Modifiers Qty    15056422528 HC PT EVAL LOW COMPLEXITY 3 1/16/2018 Hillary Johnson, PT GP 1    16006334047 HC PT THER PROC EA 15 MIN 1/16/2018 Hillary Johnson, PT GP 1          PT G-Codes  Outcome Measure Options: AM-PAC 6 Clicks Daily Activity (OT)      Hillary Johnson PT  1/16/2018

## 2018-01-16 NOTE — CONSULTS
Chelsi Ferguson  1940  [id number]    Date of Consult: 1/16/2018    Admit Date:  1/15/2018      Requesting Provider: No ref. provider found  Evaluating Physician: Maurice Sanz MD    Chief Complaint: fever, dysuria, nausea    Reason for Consultation: UTI, ?cholecystitis    History of present illness:    Patient is a 77 y.o.  Yr old female with history of numerous comorbidities as outlined below including bladder surgery, specifics unclear and has been seen by her PCP approximately January 3, had abnormal urinalysis and oral antibiotic called in, described as Macrobid in H&P.  She did not get the message until she returned from out of town and started it approximately January 10.  She continued to feel bad with nausea/diarrhea and generalized weakness with persistent dysuria and dark urine.  She presented to the emergency room January 15 and has been found to have abnormal urinalysis, abnormal liver function tests and concern for cholecystitis by abdominal imaging.  She was started on empiric vancomycin/Zosyn.    Patient denies vomiting.  She has not had bowel movement since admission.  She is unable to quantify frequency or volume of previous loose stools.  No hematochezia melena or hematemesis.  Currently no abdominal pain.  She denies overt hematuria or pyuria and denies flank pain.  No headache photophobia or neck stiffness.  No shortness of breath cough or hemoptysis.  No skin rash.    No raw or undercooked food.  No unpasteurized milk or milk products.  No animal insect or arthropod exposure.  No tick bites.  No outdoor camping or hunting exposure.  No travel exposure.  No ill exposure.  No history of TB or TB exposure.   Denies a history of MRSA/VRE and no history of C. difficile or ESBL/KPC  organisms.    Past Medical History:   Diagnosis Date   • Ankle pain    • Chest discomfort    • Cholecystitis 1/15/2018   • Edema    • Elevated liver enzymes 1/15/2018   • Gallstones    • GERD (gastroesophageal reflux  disease)    • Glaucoma     pt is currently off of meds and Dr Tanner does not think that she is glaucoma   • H/O complete eye exam 06/2013   • H/O mammogram 11/16/2015   • H/O non-ST elevation myocardial infarction (NSTEMI) 01/2005   • Hammer toe    • Heart attack 01/2005   • History of blood transfusion 01/2005   • History of cardiovascular stress test 12/02/2015    normal   • HTN (hypertension)    • Hyperlipidemia    • Hypokalemia 1/15/2018   • Kidney infection 1971   • Menopausal symptoms    • Onychia and paronychia of finger    • Osteopenia    • Other elevated white blood cell count    • Pap smear for cervical cancer screening 2010    [facility]   • Pneumonia    • Sepsis 1/15/2018   • UTI (urinary tract infection) 1/15/2018   • Viral warts        Past Surgical History:   Procedure Laterality Date   • BLADDER REPAIR  2002   • BLADDER REPAIR  2003   • BUNIONECTOMY Right 2010   • COLONOSCOPY  2008, 4/2014    [facility]   • CORONARY STENT PLACEMENT Left 01/2005    drug eluting stent 1/2005 LAD   • DILATATION AND CURETTAGE  1971   • DILATATION AND CURETTAGE  1978   • EYE SURGERY  05/2016   • FRONTALIS SUSPENSION  2010   • HAMMER TOE REPAIR Right 11/03/2010   • HYSTERECTOMY  1978   • LAPAROTOMY OOPHERECTOMY Bilateral 2002   • NOSE SURGERY  03/2017    repair 3 fractured areas. Dr Dunne   • OTHER SURGICAL HISTORY  2005    rectocele repair   • OTHER SURGICAL HISTORY  2010    eyelid surgery   • REPLACEMENT TOTAL KNEE BILATERAL Bilateral    • TONSILLECTOMY  1952   • TOTAL KNEE ARTHROPLASTY  01/2005   • UTERINE FIBROID SURGERY  1978    emergency removal of fibroid from birth canal       Pediatric History   Patient Guardian Status   • Not on file.     Other Topics Concern   • Not on file     Social History Narrative       family history includes Breast cancer in her other, other, and sister; Breast cancer (age of onset: 40) in her daughter; Cancer in her father; Heart failure in her father; Stroke in her mother. There is no  history of Ovarian cancer.    Allergies   Allergen Reactions   • Aleve [Naproxen]    • Celebrex [Celecoxib]    • Ibuprofen    • Indocin [Indomethacin]    • Keflex [Cephalexin]      Upset stomach, may have had some blood in stool but cannot remember the reason why she was put on it. Tolerates penicillins without problem.   • Lipitor [Atorvastatin]    • Prevacid [Lansoprazole]    • Prilosec [Omeprazole]    • Statins    • Zocor [Simvastatin] Hives       Medication:  Current Facility-Administered Medications   Medication Dose Route Frequency Provider Last Rate Last Dose   • [START ON 1/17/2018] !vanc trough due prior to dose on 1-17 2100. Hold vanc until labs are back. Rx is following.   Does not apply Once Joan Zeng [facility]       • acetaminophen (TYLENOL) suppository 650 mg  650 mg Rectal Q4H PRN LINA Moreno        Or   • acetaminophen (TYLENOL) tablet 650 mg  650 mg Oral Q4H PRN LINA Moreno       • bismuth subsalicylate (PEPTO BISMOL) 262 MG/15ML suspension 30 mL  30 mL Oral Q6H PRN LINA Moreno       • [START ON 1/18/2018] estradiol (ESTRACE) vaginal cream 1 g  1 g Vaginal Once per day on Mon Thu LINA Moreno       • famotidine (PEPCID) tablet 20 mg  20 mg Oral BID LINA Moreno       • lactobacillus acidophilus (RISAQUAD) capsule 1 capsule  1 capsule Oral Daily LINA Moreno       • Magnesium Sulfate 2 gram Bolus, followed by 8 gram infusion (total Mg dose 10 grams)- Mg less than or equal to 1mg/dL  2 g Intravenous PRN LINA Moreno        Or   • Magnesium Sulfate 6 gram Infusion (2 gm x 3) -Mg 1.1 -1.5 mg/dL  2 g Intravenous PRN LINA Moreno 25 mL/hr at 01/16/18 0455 2 g at 01/16/18 0455    Or   • magnesium sulfate 4 gram infusion- Mg 1.6-1.9 mg/dL  4 g Intravenous PRN LINA Moreno       • melatonin sublingual tablet 5 mg  5 mg Sublingual Nightly PRN LINA Moreno       • multivitamin  with minerals 1 tablet  1 tablet Oral Daily LINA Moreno       • nystatin (MYCOSTATIN) powder   Topical Q12H LINA Moreno       • ondansetron (ZOFRAN) tablet 4 mg  4 mg Oral Q6H PRN LINA Moreno        Or   • ondansetron (ZOFRAN) injection 4 mg  4 mg Intravenous Q6H PRN LINA Moreno       • Pharmacy to dose vancomycin   Does not apply Continuous PRN LINA Moreno       • piperacillin-tazobactam (ZOSYN) 4.5 g/100 mL 0.9% NS IVPB (mbp)  4.5 g Intravenous Q8H LINA Moreno   Stopped at 01/16/18 0500   • potassium chloride (MICRO-K) CR capsule 40 mEq  40 mEq Oral PRN LINA Moreno   40 mEq at 01/16/18 0546    Or   • potassium chloride (KLOR-CON) packet 40 mEq  40 mEq Oral PRN LINA Moreno        Or   • potassium chloride 10 mEq in 100 mL IVPB  10 mEq Intravenous Q1H PRN LINA Moreno       • promethazine (PHENERGAN) tablet 12.5 mg  12.5 mg Oral Q6H PRN LINA Moreno        Or   • promethazine (PHENERGAN) injection 12.5 mg  12.5 mg Intravenous Q6H PRN LINA Moreno        Or   • promethazine (PHENERGAN) suppository 12.5 mg  12.5 mg Rectal Q6H PRN LINA Moreno       • simethicone (MYLICON) chewable tablet 80 mg  80 mg Oral 4x Daily PRN LINA Moreno       • sodium chloride 0.9 % flush 1-10 mL  1-10 mL Intravenous PRN LINA Moreno       • sodium chloride 0.9 % flush 10 mL  10 mL Intravenous PRN Roge Zuñiga MD       • sodium chloride 0.9 % infusion  100 mL/hr Intravenous Continuous LINA Moreno 100 mL/hr at 01/16/18 0529 100 mL/hr at 01/16/18 0529   • vancomycin (VANCOCIN) in iso-osmotic dextrose IVPB 1 g (premix) 200 mL  15 mg/kg Intravenous Q24H LINA Moreno           Antibiotics:  Anti-Infectives     Ordered     Dose/Rate Route Frequency Start Stop    01/15/18 2126  vancomycin (VANCOCIN) in iso-osmotic dextrose IVPB 1 g  (premix) 200 mL     Ordering Provider:  LINA Moreno    15 mg/kg × 74.3 kg  over 60 Minutes Intravenous Every 24 Hours 01/16/18 2100 01/26/18 2059    01/15/18 2126  piperacillin-tazobactam (ZOSYN) 4.5 g/100 mL 0.9% NS IVPB (mbp)     Ordering Provider:  LINA Moreno    4.5 g  over 4 Hours Intravenous Every 8 Hours 01/16/18 0200 01/26/18 0159    01/15/18 2126  Pharmacy to dose vancomycin     Comments:  Chelsi Ferguson  1F, [address]  By Dee MILLS   Ordering Provider:  LINA Moreno     Does not apply Continuous PRN 01/15/18 2126 01/25/18 2125    01/15/18 1954  vancomycin 1500 mg/500 mL 0.9% NS IVPB ([facility])     Ordering Provider:  Roge Zuñiga MD    20 mg/kg × 72.6 kg  over 90 Minutes Intravenous Once 01/15/18 1956 01/15/18 2243    01/15/18 1944  piperacillin-tazobactam (ZOSYN) 4.5 g/100 mL 0.9% NS IVPB (mbp)     Ordering Provider:  Roge Zuñiga MD    4.5 g Intravenous Once 01/15/18 1946 01/15/18 2115            Review of Systems    Constitutional-- Appetite diminished and generally fatigued  Heent-- No new vision, hearing or throat complaints.  No epistaxis or oral sores.  Denies odynophagia or dysphagia.  No flashers, floaters or eye pain. No odynophagia or dysphagia. No headache, photophobia or neck stiffness.  CV-- No chest pain, palpitation or syncope  Resp-- No SOB/cough/Hemoptysis  GI-   No hematochezia, melena, or hematemesis. Denies jaundice or chronic liver disease.  -- her mid and incontinence chronically.  She denies hesitancy  Lymph- no swollen lymph nodes in neck/axilla or groin.   Heme- No active bruising or bleeding; no Hx of DVT or PE.  MS-- no swelling or pain in the bones or joints of arms/legs.  No new back pain.  Neuro-- No acute focal weakness or numbness in the arms or legs.  No seizures.    Full 12 point review of systems reviewed and negative otherwise for acute complaints, except for above    Physical Exam:   Vital Signs   /51 (BP Location:  "Left arm, Patient Position: Lying)  Pulse 85  Temp 98.5 °F (36.9 °C) (Oral)   Resp 16  Ht 152.4 cm (60\")  Wt 74.3 kg (163 lb 11.2 oz)  LMP  (LMP Unknown)  SpO2 93%  BMI 31.97 kg/m2    GENERAL: Awake and alert, in no acute distress.  Elderly  HEENT: Normocephalic, atraumatic.  PERRL. EOMI. No conjunctival injection. No icterus. Oropharynx clear without evidence of thrush or exudate. No evidence of peridontal disease.    NECK: Supple without nuchal rigidity. No mass.  LYMPH: No cervical, axillary or inguinal lymphadenopathy.  HEART: RRR; No murmur, rubs, gallops.   LUNGS: Clear to auscultation bilaterally without wheezing, rales, rhonchi. Normal respiratory effort. Nonlabored. No dullness.  ABDOMEN: Soft, mild suprapubic tenderness, nondistended. Positive bowel sounds. No rebound or guarding. NO mass or HSM.  EXT:  No cyanosis, clubbing or edema. No cord.  : Genitalia generally unremarkable.  Without Jones catheter.  MSK: FROM without joint effusions noted arms/legs.    SKIN: Warm and dry without cutaneous eruptions on Inspection/palpation.    NEURO: Oriented to PPT. No focal deficits on motor/sensory exam at arms/legs.  PSYCHIATRIC: Normal insight and judgement. Cooperative with PE    No peripheral stigmata/phenomena of endocarditis    Laboratory Data      Results from last 7 days  Lab Units 01/15/18  1749   WBC 10*3/mm3 15.86*   HEMOGLOBIN g/dL 13.7   HEMATOCRIT % 39.9   PLATELETS 10*3/mm3 193       Results from last 7 days  Lab Units 01/15/18  1749   SODIUM mmol/L 132   POTASSIUM mmol/L 3.1*   CHLORIDE mmol/L 96*   CO2 mmol/L 26.0   BUN mg/dL 12   CREATININE mg/dL 0.50*   GLUCOSE mg/dL 119*   CALCIUM mg/dL 9.8       Results from last 7 days  Lab Units 01/15/18  1749   ALK PHOS U/L 287*   BILIRUBIN mg/dL 1.4*   ALT (SGPT) U/L 128*   AST (SGOT) U/L 124*       Results from last 7 days  Lab Units 01/15/18  1749   SED RATE mm/hr 49*       Results from last 7 days  Lab Units 01/15/18  1749   CRP mg/dL 16.93* "       Estimated Creatinine Clearance: 52.2 mL/min (by C-G formula based on Cr of 0.5).      Microbiology:      Radiology:  Imaging Results (last 72 hours)     Procedure Component Value Units Date/Time    XR Chest 1 View [[id number]] Updated:  01/15/18 1757    CT Abdomen Pelvis Without Contrast [[id number]] Collected:  01/15/18 1834     Updated:  01/15/18 1931    Narrative:       EXAM:  CT Abdomen and Pelvis Without Intravenous Contrast    CLINICAL HISTORY:  77 years old, female; Pain; Abdominal pain; Localized; Left lower quadrant   (llq); Prior surgery; Surgery type: Curtis, bladder mesh, rectocele; Additional   info: Llq pain fever, dysuria    TECHNIQUE:  Axial computed tomography images of the abdomen and pelvis without intravenous   contrast.  All CT scans at this facility use one or more dose reduction   techniques, viz.: automated exposure control; ma/kV adjustment per patient size   (including targeted exams where dose is matched to indication; i.e. head); or   iterative reconstruction technique.  Coronal reformatted images were created and reviewed.    COMPARISON:  No relevant prior studies available.    FINDINGS:  Lower thorax:  Minimal calcification of the mitral valve annulus.  Small-sized   hiatal hernia.    ABDOMEN:  Liver:  Normal.  Gallbladder and bile ducts:  Cholelithiasis, without CT evidence of acute   cholecystitis.  Pancreas:  Normal.  Spleen:  8mm hypoattenuating focus within the inferior aspect of the medial   spleen (series 3, image 26), likely simple cyst.  Adrenals:  Normal.  Kidneys and ureters:  Normal.  Stomach and bowel:  Extensive colonic diverticulosis.  Appendix:  Appendix is normal.    PELVIS:  Bladder:  Normal.  Reproductive:  Normal as visualized.    ABDOMEN and PELVIS:  Intraperitoneal space:  Normal.  No free air.  No significant fluid collection.  Bones/joints:  Degenerative changes of the hips and sacroiliac joints.    Multilevel thoraco-lumbar spine degenerative changes, with  levoscoliosis of the   lumbar spine.  No acute fracture.  No dislocation.  Soft tissues:  Small fat containing umbilical hernia.  Vasculature:  Atherosclerotic disease of the thoracoabdominal aorta.    Phleboliths within the pelvis.  No abdominal aortic aneurysm.  Lymph nodes:  Normal.  Other findings:  Spot calcifications, compatible with prior granulomas disease.      Impression:         1. No acute findings.    2. Non-acute findings are described above.    THIS DOCUMENT HAS BEEN ELECTRONICALLY SIGNED BY KATYA HURT MD    US Gallbladder [[id number]] Collected:  01/15/18 1937     Updated:  01/15/18 2044    Narrative:       EXAM:  US Abdomen Limited, Right Upper Quadrant    CLINICAL HISTORY:  77 years old, female; Sepsis, unspecified organism; Bacterial infection,   unspecified; Urinary tract infection, site not specified; Generalized abdominal   pain; Other specified abnormal findings of blood chemistry; Abnormal lab test;   Elevated liver enzymes; Additional info: Elevated lft's    TECHNIQUE:  Real-time ultrasound of the right upper quadrant with image documentation.    COMPARISON:  CT ABDOMEN PELVIS WO CONTRAST 2018-01-15 18:57    FINDINGS:  Liver:  No intrahepatic biliary dilation.  No intrahepatic bile duct dilation.  Gallbladder:  Gallbladder wall is thickened, measuring 5 mm.  No   pericholecystic fluid.  Small amount of echogenic, non-shadowing material   within the inferior aspect of the gallbladder, likely sludge.  Common bile duct:  Common bile duct measures 6 cm in diameter.  Pancreas:  Normal as visualized.  Right kidney:  Right kidney is normal in appearance and measures 11.7 cm in   length.  No stones.  No hydronephrosis.      Impression:         1.  Gallbladder sludge, with gallbladder wall thickening, suggesting   cholecystitis.    2.  Borderline dilation of the extrahepatic common bile duct. No intrahepatic   biliary dilation.        THIS DOCUMENT HAS BEEN ELECTRONICALLY SIGNED BY KATYA HURT MD             Impression:   --Acute complicated UTI.  Escherichia coli in urine from January 3.  Persistent symptoms despite outpatient oral antibiotics with description of Macrobid in H&P.  Patient cannot recall specific antimicrobial.  Repeat urine culture pending.  CT scan abdomen does not suggest any hydronephrosis or other anatomic urinary disturbance.  She does have prior history bladder surgery per her.  Specifics unclear.  If frequent recurrent UTIs, she would likely benefit from urology consultation for further functional/anatomic workup.    --Acute nausea/abnormal liver function tests, with cholestasis by numbers and abnormal right upper quadrant ultrasound suggesting cholecystitis.  Surgery is following to help define timing/option and threshold for cholecystectomy.    --Acute diarrhea.  Patient reports this has improved and no bowel movement since admission.  Stool studies ordered.    PLAN: Thank you for asking us to see Chelsi Ferguson, I recommend the following:    --IV vancomycin/Zosyn    --I discussed potential risks and benefits of the prescribed antibiotics that include, but are not limited to, solid organ toxicity, neuro/landon/vestibular toxicity, renal toxicity, CDiff, cytopenias, hypersensitivity, etc.. Patient/Family voice understanding and agree to proceed.    --Check/review labs cultures and scans    --Highly complex set of issues with high risk for further serious morbidity and other serious sequela    --Discussed with microbiology    --Partial history per nursing staff    --Surgery following as above       Maurice Sanz MD  1/16/2018

## 2018-01-16 NOTE — CONSULTS
Pharmacy Consult-Vancomycin Dosing  Chelsi Ferguson is a  77 y.o. female receiving vancomycin therapy.     Indication: sepsis  Consulting Provider: Dee MILLS  ID Consult: am 1-16    Goal Trough: 15-20    Current Antimicrobial Therapy  Anti-Infectives       Ordered     Dose/Rate Route Frequency Start Stop      01/15/18 2126  vancomycin (VANCOCIN) in iso-osmotic dextrose IVPB 1 g (premix) 200 mL     Ordering Provider:  LINA Moreno    15 mg/kg × 74.3 kg  over 60 Minutes Intravenous Every 24 Hours 01/16/18 2100 01/26/18 2059    01/15/18 2126  piperacillin-tazobactam (ZOSYN) 4.5 g/100 mL 0.9% NS IVPB (mbp)     Ordering Provider:  LINA Moreno    4.5 g  over 4 Hours Intravenous Every 8 Hours 01/16/18 0200 01/26/18 0159    01/15/18 2126  Pharmacy to dose vancomycin     Comments:  Chelsi Ferguson  1F, [address]  By Dee MILLS   Ordering Provider:  LINA Moreno     Does not apply Continuous PRN 01/15/18 2126 01/25/18 2125    01/15/18 1954  vancomycin 1500 mg/500 mL 0.9% NS IVPB ([facility])     Ordering Provider:  Roge Zuñiga MD    20 mg/kg × 72.6 kg  over 90 Minutes Intravenous Once 01/15/18 1956      01/15/18 1944  piperacillin-tazobactam (ZOSYN) 4.5 g/100 mL 0.9% NS IVPB (mbp)     Ordering Provider:  Roge Zuñiga MD    4.5 g Intravenous Once 01/15/18 1946 01/15/18 2115            Allergies  Allergies as of 01/15/2018 - Azeem as Reviewed 01/15/2018   Allergen Reaction Noted   • Aleve [naproxen]  12/20/2016   • Celebrex [celecoxib]  12/20/2016   • Ibuprofen  12/20/2016   • Indocin [indomethacin]  12/20/2016   • Keflex [cephalexin]  12/20/2016   • Lipitor [atorvastatin]  12/20/2016   • Prevacid [lansoprazole]  12/20/2016   • Prilosec [omeprazole]  12/20/2016   • Statins  12/20/2016   • Zocor [simvastatin] Hives 10/19/2016     Labs    Results from last 7 days     Lab Units 01/15/18  1749   BUN mg/dL 12   CREATININE mg/dL 0.50*     Results from last 7 days     Lab Units  "01/15/18  1749   WBC 10*3/mm3 15.86*   Evaluation of Dosing     Last Dose Received in the ED= 1500 mg at 21:13    Ht - 152.4 cm (60\")  Wt - 74.3 kg (163 lb 11.2 oz)    Estimated Creatinine Clearance: 52.2 mL/min (by C-G formula based on Cr of 0.5).    Intake & Output (last 3 days)         01/13 0701 - 01/14 0700 01/14 0701 - 01/15 0700 01/15 0701 - 01/16 0700      IV Piggyback   1000    Total Intake(mL/kg)   1000 (13.5)    Net     +1000               Microbiology and Radiology  Microbiology Results (last 10 days)       Procedure Component Value - Date/Time      Influenza Antigen, Rapid - Swab, Nasopharynx [[id number]]  (Normal) Collected:  01/15/18 1850    Lab Status:  Final result Specimen:  Swab from Nasopharynx Updated:  01/15/18 1915     Influenza A Ag, EIA Negative     Influenza B Ag, EIA Negative            Assessment/Plan:  Continue vanc at 15 mg/kg (1000 mg) q24h.  Vanc trough due prior to dose on 1-17.  Pharmacy will continue to follow.    Joan Zeng RPH  1/15/2018  21:40    "

## 2018-01-16 NOTE — PROGRESS NOTES
"Adult Nutrition  Assessment/PES    Patient Name:  Chelsi Ferguson  YOB: 1940  MRN: [id number]  Admit Date:  1/15/2018    Assessment Date:  1/16/2018          Reason for Assessment       01/16/18 1534    Reason for Assessment    Reason For Assessment/Visit identified at risk by screening criteria   Based on reported weight and intake status pt meets criteria for nutrition risk. Will defer pt to RD to follow up per protocol.    Identified At Risk By Screening Criteria MST SCORE 2+    Time Spent (min) 20              Nutrition/Diet History       01/16/18 1535    Nutrition/Diet History    Reported/Observed By Patient    Appetite Improved    Other Pt reported losing about 4 lbs over the past week while feeling sick, reported eating less than 5% of typical intake, was nauseated with no vomiting, was unable to eat. Pt reported appetite to have improved greatly while in the hospital.             Anthropometrics       01/16/18 1538    Anthropometrics    Height 152.4 cm (60\")    Weight 74.3 kg (163 lb 11.2 oz)    Ideal Body Weight (IBW)    Ideal Body Weight (IBW), Female 46.26    % Ideal Body Weight 160.85    Usual Body Weight (UBW)    Usual Body Weight 75.8 kg (167 lb)    % Usual Body Weight 98.02    Weight Loss 1.814 kg (4 lb)    Weight Loss Time Frame 1 week    Body Mass Index (BMI)    BMI (kg/m2) 32.04     Problem/Interventions        Nutrition Intervention       01/16/18 1538    Nutrition Intervention    RD/Tech Action Advise alternate selection;Advise available snack;Interview for preference;Menu provided;Encourage intake;Follow Tx progress;Care plan reviewd              Education/Evaluation       01/16/18 1538    Monitor/Evaluation    Monitor Per protocol;PO intake   Defer pt to RD        Electronically signed by:  Alyce Zaidi  01/16/18 3:39 PM  "

## 2018-01-16 NOTE — H&P
"    [facility] Medicine Services  HISTORY AND PHYSICAL    Patient Name: Chelsi Ferguson  : 1940  MRN: [id number]  Primary Care Physician: Reina Yarbrough MD   Cardiology: Dr. Miles      Subjective   Subjective     Chief Complaint:  Fever    HPI:  Chelsi Ferguson is a 77 y.o. female who presented to [facility] ED 1/15/18 evening for fever. Onset 5 days ago 1/10 evening. Gradual. Constant. Moderate. T 102. Reports she saw her PCP 1/3/18 for routine yearly visit, a routine UA was sent out and when pt returned home from vacation she heard the message about UTI and abx at pharmacy and started macrobid 1/10. She felt fine and went out with friends that day, and that night began to feel ill. Associated with chills, decreased PO, weakness/immobility, nausea, diarrhea, reduced UOP, and burning urination. She notes dark urine/discoloration since started the abx (note macrobid).    Mrs. Ferguson notes chronic GI complaints. She states a known hx of cholelithiasis. She states she frequently has indigestion/heartburn/GERD. CT revealed stones, u/s suggests cholecystitis.    Chelsi Ferguson is being admitted to [facility] for further evaluation and treatment of UTI, sepsis, and cholecystitis.  ============  PLEASANT 78 YO F WITH HISTORY OF GERD, INITIALLY SEEN AT [facility] FOR FEVER, TEMP 102 AT [facility], .2 HERE IN THE ED. PT REPORTED THAT SHE HAD A ROUTINE YEARLY PHYSICAL EXAM ON 1/3 AND WAS DIAGNOSED WITH UTI. SHE WAS OUT OF TOWN AND DID NOT START ABX UNTIL 4 DAYS AFTER DIAGNOSIS. AT THAT TIME SHE WAS ASYMPTOMATIC. SHE REPORTS CHRONIC URINARY INCONTINENCE/FREQUENCY, BUT DID NOT HAVE PAIN/BURNING WITH URINATION UNTIL NOW.     IN THE ED, SHE WAS ALSO FOUND TO HAVE ELEVATED LFT, BUT SHE DENIES ANY SYMPTOMS. SHE DOES REPORT THAT SHE HAS \"HEART BURN\" AFTER SHE EATS. SHE HAS HISTORY OF CHOLELITHIASIS PER DAUGHTER. US + SIGNS OF ACUTE CHOLECYSTITIS.       Review of Systems   Constitutional: Positive for activity change, appetite change, " chills, fatigue and fever. Negative for diaphoresis.        T 102.   HENT: Negative for ear pain, sinus pain, sinus pressure and sore throat.         Chronic difficulty taking pills but does okay, no dysphagia complaints.   Respiratory: Positive for cough. Negative for shortness of breath and wheezing.         Frothy sputum clear-white, foamy.   Cardiovascular: Positive for leg swelling. Negative for palpitations.        States frequent chest pain chronically but cardiologist has deemed non-cardiac, saw him last 11/2017, likely GI, not worse than usual today. Chronic BLE edema in ankles. States no recent med changes other than abx, has been on cholesterol meds for years.   Gastrointestinal: Positive for abdominal pain and nausea. Negative for blood in stool, constipation and vomiting.        Acute loose BMs multiple times a day for the last few days. Lower abdominal pain but also some generalized.   Genitourinary: Positive for decreased urine volume and dysuria. Negative for difficulty urinating, flank pain and pelvic pain.        Denies odor to urine. States bright red color since started macrobid. LMP age 38.   Musculoskeletal: Positive for arthralgias.        On tylenol PRN.   Skin: Negative for wound.        States redness/itchy to bilateral under breasts, has only done one dose of the topical lotrimin.   Allergic/Immunologic: Negative for immunocompromised state.   Neurological: Positive for dizziness and weakness. Negative for syncope.        Increasing dizziness and newly using a walker the last few days because of it.   Hematological: Negative for adenopathy.   Psychiatric/Behavioral:        States no chronic confusion or memory loss, but the last few days getting disoriented, and dreams to hallucinations and using things wrong. Loopy.        Otherwise 10-system ROS reviewed and is negative except as mentioned in the HPI.    Personal History     Past Medical History:   Diagnosis Date   • Ankle pain    •  Chest discomfort    • Cholecystitis 1/15/2018   • Edema    • Elevated liver enzymes 1/15/2018   • Gallstones    • GERD (gastroesophageal reflux disease)    • Glaucoma     pt is currently off of meds and Dr Tanner does not think that she is glaucoma   • H/O complete eye exam 06/2013   • H/O mammogram 11/16/2015   • H/O non-ST elevation myocardial infarction (NSTEMI) 01/2005   • Hammer toe    • Heart attack 01/2005   • History of blood transfusion 01/2005   • History of cardiovascular stress test 12/02/2015    normal   • HTN (hypertension)    • Hyperlipidemia    • Hypokalemia 1/15/2018   • Kidney infection 1971   • Menopausal symptoms    • Onychia and paronychia of finger    • Osteopenia    • Other elevated white blood cell count    • Pap smear for cervical cancer screening 2010    [facility]   • Pneumonia    • Sepsis 1/15/2018   • UTI (urinary tract infection) 1/15/2018   • Viral warts        Past Surgical History:   Procedure Laterality Date   • BLADDER REPAIR  2002   • BLADDER REPAIR  2003   • BUNIONECTOMY Right 2010   • COLONOSCOPY  2008, 4/2014    [facility]   • CORONARY STENT PLACEMENT Left 01/2005    drug eluting stent 1/2005 LAD   • DILATATION AND CURETTAGE  1971   • DILATATION AND CURETTAGE  1978   • EYE SURGERY  05/2016   • FRONTALIS SUSPENSION  2010   • HAMMER TOE REPAIR Right 11/03/2010   • HYSTERECTOMY  1978   • LAPAROTOMY OOPHERECTOMY Bilateral 2002   • NOSE SURGERY  03/2017    repair 3 fractured areas. Dr Dunne   • OTHER SURGICAL HISTORY  2005    rectocele repair   • OTHER SURGICAL HISTORY  2010    eyelid surgery   • REPLACEMENT TOTAL KNEE BILATERAL Bilateral    • TONSILLECTOMY  1952   • TOTAL KNEE ARTHROPLASTY  01/2005   • UTERINE FIBROID SURGERY  1978    emergency removal of fibroid from birth canal       Family History: family history includes Breast cancer in her other, other, and sister; Breast cancer (age of onset: 40) in her daughter; Cancer in her father; Heart failure in her father; Stroke in her  mother. There is no history of Ovarian cancer.     Social History:  reports that she has never smoked. She has never used smokeless tobacco. She reports that she does not drink alcohol or use illicit drugs.  Social History     Social History Narrative       Medications:  Prescriptions Prior to Admission   Medication Sig Dispense Refill Last Dose   • acetaminophen (TYLENOL) 500 MG tablet Take 500 mg by mouth Every 6 (Six) Hours As Needed for mild pain (1-3).   Taking   • aspirin 81 MG EC tablet Take 81 mg by mouth Every Night.   Taking   • Biotin 5000 MCG capsule Take  by mouth.   Taking   • clotrimazole (LOTRIMIN) 1 % cream Apply  topically Every 12 (Twelve) Hours. 40 g 0    • Coenzyme Q10 200 MG capsule Take 200 mg by mouth Daily.   Taking   • estradiol (VAGIFEM) 10 MCG tablet vaginal tablet Insert 1 tablet into the vagina 2 (Two) Times a Week. 24 tablet 1 Taking   • metoprolol succinate XL (TOPROL-XL) 25 MG 24 hr tablet Take 1 tablet by mouth Daily.   Taking   • Multiple Vitamins-Minerals (MULTIVITAL PO) Take 1 tablet by mouth Daily.   Taking   • nitrofurantoin, macrocrystal-monohydrate, (MACROBID) 100 MG capsule Take 1 capsule by mouth Every 12 (Twelve) Hours. 20 capsule 0    • nitroglycerin (NITROSTAT) 0.4 MG SL tablet    Taking   • Omega-3 Fatty Acids (FISH OIL) 1000 MG capsule capsule Take 1,000 mg by mouth Daily With Breakfast.   Taking   • pravastatin (PRAVACHOL) 40 MG tablet Take 1 tablet by mouth Daily. 90 tablet 1 Taking       Allergies   Allergen Reactions   • Aleve [Naproxen]    • Celebrex [Celecoxib]    • Ibuprofen    • Indocin [Indomethacin]    • Keflex [Cephalexin]      Upset stomach, may have had some blood in stool but cannot remember the reason why she was put on it. Tolerates penicillins without problem.   • Lipitor [Atorvastatin]    • Prevacid [Lansoprazole]    • Prilosec [Omeprazole]    • Statins    • Zocor [Simvastatin] Hives       Objective   Objective     Vital Signs:   Temp:  [98.4 °F (36.9  °C)-100.2 °F (37.9 °C)] 98.4 °F (36.9 °C)  Heart Rate:  [] 82  Resp:  [18-20] 18  BP: (114-124)/(54-75) 117/54        Physical Exam   Constitutional: She is oriented to person, place, and time. No distress.   Spouse ([place]), daughter in law, and granddaughter are present, attentive, and involved with pt and visit.   HENT:   Head: Normocephalic and atraumatic.   Mouth/Throat: No oropharyngeal exudate.   MM pink/dry.   Eyes: Conjunctivae and EOM are normal. Pupils are equal, round, and reactive to light. Right eye exhibits no discharge. Left eye exhibits no discharge. No scleral icterus.   Neck: No JVD present. No tracheal deviation present.   Cardiovascular: Normal rate, regular rhythm, normal heart sounds and intact distal pulses.    No murmur heard.  Pulmonary/Chest: Effort normal and breath sounds normal. No respiratory distress. She has no wheezes. She has no rales.   Diminished but clear.   Abdominal: Soft. Bowel sounds are decreased. There is no hepatosplenomegaly. There is generalized tenderness and tenderness in the right lower quadrant, epigastric area and left lower quadrant. There is no rigidity, no guarding, no CVA tenderness and negative Patrick's sign.   Genitourinary:   Genitourinary Comments: Bladder non-tender, non-distended.   Musculoskeletal: She exhibits no edema or deformity.   Able to independently reposition in bed.   Neurological: She is alert and oriented to person, place, and time. No cranial nerve deficit. Coordination normal.   Skin: Skin is warm and dry. No rash noted. She is not diaphoretic. No erythema. No pallor.   Psychiatric: She has a normal mood and affect. Her behavior is normal. Judgment and thought content normal.   Calm, relaxed, cooperative, engages, pleasant.   ==============  NAD  MM DRY  RRR  CTAB  ABD SOFT, NT, ND, +BS, NO GUARDING, NEG PATRICK'S SIGN  NO LE EDEMA  NO FACIAL ASYMMETRY, SPEECH CLEAR       Results Reviewed:  I have personally reviewed current lab,  radiology, and data and agree.    Lab Results (last 24 hours)     Procedure Component Value Units Date/Time    Urinalysis With / Culture If Indicated - Urine, Clean Catch [[id number]]  (Abnormal) Collected:  01/15/18 1745    Specimen:  Urine from Urine, Clean Catch Updated:  01/15/18 1823     Color, UA Orange (A)     Appearance, UA Cloudy (A)     pH, UA 6.0     Specific Gravity, UA 1.026     Glucose, UA Negative     Ketones, UA 15 mg/dL (1+) (A)     Bilirubin, UA Small (1+) (A)     Blood, UA Small (1+) (A)     Protein, UA Trace (A)     Leuk Esterase, UA Moderate (2+) (A)     Nitrite, UA Negative     Urobilinogen, UA 1.0 E.U./dL    Urinalysis, Microscopic Only - Urine, Clean Catch [[id number]]  (Abnormal) Collected:  01/15/18 1745    Specimen:  Urine from Urine, Clean Catch Updated:  01/15/18 1823     RBC, UA 0-2 /HPF      WBC, UA 6-12 (A) /HPF      Bacteria, UA 1+ (A) /HPF      Squamous Epithelial Cells, UA 3-6 (A) /HPF      Hyaline Casts, UA 0-6 /LPF      Methodology Manual Light Microscopy    Urine Culture - Urine, Urine, Clean Catch [[id number]] Collected:  01/15/18 1745    Specimen:  Urine from Urine, Clean Catch Updated:  01/15/18 1756    CBC & Differential [[id number]] Collected:  01/15/18 1749    Specimen:  Blood Updated:  01/15/18 1817    Narrative:       The following orders were created for panel order CBC & Differential.  Procedure                               Abnormality         Status                     ---------                               -----------         ------                     CBC Auto Differential[[id number]]        Abnormal            Final result                 Please view results for these tests on the individual orders.    Comprehensive Metabolic Panel [[id number]]  (Abnormal) Collected:  01/15/18 1749    Specimen:  Blood Updated:  01/15/18 1821     Glucose 119 (H) mg/dL      BUN 12 mg/dL      Creatinine 0.50 (L) mg/dL      Sodium 132 mmol/L      Potassium 3.1 (L) mmol/L      Chloride 96  (L) mmol/L      CO2 26.0 mmol/L      Calcium 9.8 mg/dL      Total Protein 6.7 g/dL      Albumin 3.90 g/dL      ALT (SGPT) 128 (H) U/L      AST (SGOT) 124 (H) U/L      Alkaline Phosphatase 287 (H) U/L      Total Bilirubin 1.4 (H) mg/dL      eGFR Non African Amer 119 mL/min/1.73      Globulin 2.8 gm/dL      A/G Ratio 1.4 (L) g/dL      BUN/Creatinine Ratio 24.0     Anion Gap 10.0 mmol/L     Narrative:       [employer] Guidelines    Stage     Description        GFR  1         Normal or High     90+  2         Mild decrease      60-89  3         Moderate decrease  30-59  4         Severe decrease    15-29  5         Kidney failure     <15    Magnesium [[id number]]  (Normal) Collected:  01/15/18 1749    Specimen:  Blood Updated:  01/15/18 1821     Magnesium 1.4 mg/dL     CBC Auto Differential [[id number]]  (Abnormal) Collected:  01/15/18 1749    Specimen:  Blood Updated:  01/15/18 1817     WBC 15.86 (H) 10*3/mm3      RBC 4.30 10*6/mm3      Hemoglobin 13.7 g/dL      Hematocrit 39.9 %      MCV 92.8 fL      MCH 31.9 (H) pg      MCHC 34.3 g/dL      RDW 13.5 %      RDW-SD 46.1 fl      MPV 9.7 fL      Platelets 193 10*3/mm3      Neutrophil % 90.4 (H) %      Lymphocyte % 3.3 (L) %      Monocyte % 4.0 %      Eosinophil % 1.5 %      Basophil % 0.1 %      Immature Grans % 0.7 (H) %      Neutrophils, Absolute 14.35 (H) 10*3/mm3      Lymphocytes, Absolute 0.52 (L) 10*3/mm3      Monocytes, Absolute 0.64 10*3/mm3      Eosinophils, Absolute 0.23 10*3/mm3      Basophils, Absolute 0.01 10*3/mm3      Immature Grans, Absolute 0.11 (H) 10*3/mm3     Lactic Acid, Plasma [[id number]]  (Normal) Collected:  01/15/18 1749    Specimen:  Blood Updated:  01/15/18 1813     Lactate 1.6 mmol/L       Falsely depressed results may occur on samples drawn from patients receiving N-Acetylcysteine (NAC) or Metamizole.       Procalcitonin [[id number]] Collected:  01/15/18 1749    Specimen:  Blood Updated:  01/15/18 1921     Procalcitonin 2.42 ng/mL      Narrative:       As a Marker for Sepsis (Non-Neonates):   1. <0.5 ng/mL represents a low risk of severe sepsis and/or septic shock.  2. >2 ng/mL represents a high risk of severe sepsis and/or septic shock.    As a Marker for Lower Respiratory Tract Infections that require antibiotic therapy:    PCT on Admission     Antibiotic Therapy       6-12 Hrs later  > 0.5                Strongly Recommended             >0.25 - <0.5         Recommended  0.1 - 0.25           Discouraged              Remeasure/reassess PCT  <0.1                 Strongly Discouraged     Remeasure/reassess PCT                     PCT values of < 0.5 ng/mL do not exclude an infection, because localized infections (without systemic signs) may be associated with such low concentrations, or a systemic infection in its initial stages (< 6 hours). Furthermore, increased PCT can occur without infection. PCT concentrations between 0.5 and 2.0 ng/mL should be interpreted taking into account the patient's history. It is recommended to retest PCT within 6-24 hours if any concentrations < 2 ng/mL are obtained.    BNP [[id number]]  (Normal) Collected:  01/15/18 1749    Specimen:  Blood Updated:  01/15/18 2052     BNP 46.0 pg/mL     Sedimentation Rate [[id number]]  (Abnormal) Collected:  01/15/18 1749    Specimen:  Blood Updated:  01/15/18 2017     Sed Rate 49 (H) mm/hr     C-reactive Protein [[id number]]  (Abnormal) Collected:  01/15/18 1749    Specimen:  Blood Updated:  01/15/18 2035     C-Reactive Protein 16.93 (H) mg/dL     CK [[id number]]  (Abnormal) Collected:  01/15/18 1749    Specimen:  Blood Updated:  01/15/18 2146     Creatine Kinase 24 (L) U/L     Lipase [[id number]]  (Normal) Collected:  01/15/18 1749    Specimen:  Blood Updated:  01/15/18 2146     Lipase 29 U/L     Amylase [[id number]]  (Abnormal) Collected:  01/15/18 1749    Specimen:  Blood Updated:  01/15/18 2146     Amylase 20 (L) U/L     POC Troponin, Rapid [[id number]]  (Normal) Collected:   01/15/18 1804    Specimen:  Blood Updated:  01/15/18 1820     Troponin I 0.00 ng/mL       Serial Number: [id number]  [id number]       Influenza Antigen, Rapid - Swab, Nasopharynx [[id number]]  (Normal) Collected:  01/15/18 1850    Specimen:  Swab from Nasopharynx Updated:  01/15/18 1915     Influenza A Ag, EIA Negative     Influenza B Ag, EIA Negative    Blood Culture - Blood, [[id number]] Collected:  01/15/18 1915    Specimen:  Blood from Arm, Left Updated:  01/15/18 2043    Blood Culture - Blood, [[id number]] Collected:  01/15/18 1915    Specimen:  Blood from Arm, Right Updated:  01/15/18 2043    Influenza A & B, RT PCR - Swab, Nasopharynx [[id number]]  (Normal) Collected:  01/15/18 2041    Specimen:  Swab from Nasopharynx Updated:  01/15/18 2223     Influenza A PCR Not Detected     Influenza B PCR Not Detected    Calcium, Ionized [[id number]]  (Normal) Collected:  01/15/18 2236    Specimen:  Blood Updated:  01/15/18 2315     Ionized Calcium 1.24 mmol/L           Brief Urine Lab Results  (Last result in the past 365 days)      Color   Clarity   Blood   Leuk Est   Nitrite   Protein   CREAT   Urine HCG        01/15/18 1745 Orange(A) Cloudy(A) Small (1+)(A) Moderate (2+)(A) Negative Trace(A)             BNP   Date Value Ref Range Status   01/15/2018 46.0 0.0 - 100.0 pg/mL Final     No results found for: PHART  Imaging Results (last 24 hours)     Procedure Component Value Units Date/Time    XR Chest 1 View [[id number]] Updated:  01/15/18 1757    CT Abdomen Pelvis Without Contrast [[id number]] Collected:  01/15/18 1834     Updated:  01/15/18 1931    Narrative:       EXAM:  CT Abdomen and Pelvis Without Intravenous Contrast    CLINICAL HISTORY:  77 years old, female; Pain; Abdominal pain; Localized; Left lower quadrant   (llq); Prior surgery; Surgery type: Curtis, bladder mesh, rectocele; Additional   info: Llq pain fever, dysuria    TECHNIQUE:  Axial computed tomography images of the abdomen and pelvis without intravenous    contrast.  All CT scans at this facility use one or more dose reduction   techniques, viz.: automated exposure control; ma/kV adjustment per patient size   (including targeted exams where dose is matched to indication; i.e. head); or   iterative reconstruction technique.  Coronal reformatted images were created and reviewed.    COMPARISON:  No relevant prior studies available.    FINDINGS:  Lower thorax:  Minimal calcification of the mitral valve annulus.  Small-sized   hiatal hernia.    ABDOMEN:  Liver:  Normal.  Gallbladder and bile ducts:  Cholelithiasis, without CT evidence of acute   cholecystitis.  Pancreas:  Normal.  Spleen:  8mm hypoattenuating focus within the inferior aspect of the medial   spleen (series 3, image 26), likely simple cyst.  Adrenals:  Normal.  Kidneys and ureters:  Normal.  Stomach and bowel:  Extensive colonic diverticulosis.  Appendix:  Appendix is normal.    PELVIS:  Bladder:  Normal.  Reproductive:  Normal as visualized.    ABDOMEN and PELVIS:  Intraperitoneal space:  Normal.  No free air.  No significant fluid collection.  Bones/joints:  Degenerative changes of the hips and sacroiliac joints.    Multilevel thoraco-lumbar spine degenerative changes, with levoscoliosis of the   lumbar spine.  No acute fracture.  No dislocation.  Soft tissues:  Small fat containing umbilical hernia.  Vasculature:  Atherosclerotic disease of the thoracoabdominal aorta.    Phleboliths within the pelvis.  No abdominal aortic aneurysm.  Lymph nodes:  Normal.  Other findings:  Spot calcifications, compatible with prior granulomas disease.      Impression:         1. No acute findings.    2. Non-acute findings are described above.    THIS DOCUMENT HAS BEEN ELECTRONICALLY SIGNED BY KATYA HURT MD     Gallbladder [[id number]] Collected:  01/15/18 1937     Updated:  01/15/18 2044    Narrative:       EXAM:  US Abdomen Limited, Right Upper Quadrant    CLINICAL HISTORY:  77 years old, female; Sepsis, unspecified  organism; Bacterial infection,   unspecified; Urinary tract infection, site not specified; Generalized abdominal   pain; Other specified abnormal findings of blood chemistry; Abnormal lab test;   Elevated liver enzymes; Additional info: Elevated lft's    TECHNIQUE:  Real-time ultrasound of the right upper quadrant with image documentation.    COMPARISON:  CT ABDOMEN PELVIS WO CONTRAST 2018-01-15 18:57    FINDINGS:  Liver:  No intrahepatic biliary dilation.  No intrahepatic bile duct dilation.  Gallbladder:  Gallbladder wall is thickened, measuring 5 mm.  No   pericholecystic fluid.  Small amount of echogenic, non-shadowing material   within the inferior aspect of the gallbladder, likely sludge.  Common bile duct:  Common bile duct measures 6 cm in diameter.  Pancreas:  Normal as visualized.  Right kidney:  Right kidney is normal in appearance and measures 11.7 cm in   length.  No stones.  No hydronephrosis.      Impression:         1.  Gallbladder sludge, with gallbladder wall thickening, suggesting   cholecystitis.    2.  Borderline dilation of the extrahepatic common bile duct. No intrahepatic   biliary dilation.        THIS DOCUMENT HAS BEEN ELECTRONICALLY SIGNED BY KATYA HURT MD             Assessment/Plan   Assessment / Plan     Hospital Problem List     * (Principal)UTI (urinary tract infection)    Hyperlipidemia (Chronic)    GERD (gastroesophageal reflux disease) (Chronic)    HTN (hypertension) (Chronic)    Cholelithiasis (Chronic)    Hypokalemia    Elevated liver enzymes    Sepsis    Bacterial UTI    Cholecystitis            Assessment & Plan:  SEPSIS FROM UTI/ACUTE SUHA  PANSENSITIVE E COLI UTI  ACUTE CHOLECYSTITIS  DEHYDRATION  - ABX AS BELOW  - IVF AND ABX  - SURGERY CONSULTATION IN AM    1. Urinary tract infection / sepsis:   - Keflex allergy was upset stomach, and unclear if had a stool issue before or after it or why she was ever put on the abx, tolerates PCN. Zosyn + vanc for now.  - The UA c C&S  "from outpatient e coli, susceptible, renal function okay too. Today was 5th day of macrobid.  - Consult ID - procalcitonin 1.42, CRP 17, ESR 49, lactic is okay, WBC 15.86 and increased neutrophils, T up to 102, tachycardia improving. Borderline hypoxia 90% RA on exam, will repeat influenza as PCR.    2. Cholecystitis / cholelithiasis / elevated liver enzymes:  - Hx stones and sludge. CT reviewed. U/s today revealing sludge, with gallbladder wall thickening, suggesting cholecystitis. Lipase not up today.  - Consult general surgery.  - She is really tender everywhere but her ABD pain complains is LLQ and not to this area. Bladder non-tender and has UTI, no flank pain or look of pyelo. Will check c diff r/t multiple days of multiple loose-liquid BMs.    3. Gastroesophageal reflux disease:  - Not sure what her PPI \"allergy\" is, will schedule pepcid.  - Should f/u outpatient for the frequent GERD/indigestion if further issue beyond gallbladder.    4. Hypokalemia:  - Replace.    5. Hypertension / hyperlipidemia:  - Issue with other statins but does pravstatin at home, we do not stock and will just leave off, as well as omega 3 while here.  - Will leave off BB for now, don't think she had been taking. BP not low or high for now, monitor.    DVT prophylaxis: Teds/scuds, 1x pharmacological prophylaxis and then hold so surgery can eval for cholecystitis, re-eval and order further after I will just leave off chronic aspirin for now too).    CODE STATUS:  Full Code    Admission Status:  I believe this patient meets INPATIENT status due to the need for care which can only be reasonably provided in an hospital setting such as aggressive/expedited ancillary services and/or consultation services, the necessity for IV medications, close physician monitoring and/or the possible need for procedures.  In such, I feel patient’s risk for adverse outcomes and need for care warrant INPATIENT evaluation and predict the patient’s care " encounter to likely last beyond 2 midnights.    Dee Uriarte, APRN   01/15/18   11:15 PM

## 2018-01-16 NOTE — PROGRESS NOTES
Pharmacy Consult-Vancomycin Dosing  Chelsi Ferguson is a  77 y.o. female receiving vancomycin therapy.     Indication: acute complicated UTI  Consulting Provider: Dee MILLS  ID Consult: yes    Goal Trough: 10-15 mcg/mL    Current Antimicrobial Therapy  Anti-Infectives       Ordered     Dose/Rate Route Frequency Start Stop      01/15/18 2126  vancomycin (VANCOCIN) in iso-osmotic dextrose IVPB 1 g (premix) 200 mL     Ordering Provider:  LINA Moreno    15 mg/kg × 74.3 kg  over 60 Minutes Intravenous Every 24 Hours 01/16/18 2100 01/26/18 2059    01/15/18 2126  piperacillin-tazobactam (ZOSYN) 4.5 g/100 mL 0.9% NS IVPB (mbp)     Ordering Provider:  LINA Moreno    4.5 g  over 4 Hours Intravenous Every 8 Hours 01/16/18 0200 01/26/18 0159    01/15/18 2126  Pharmacy to dose vancomycin     Comments:  Chelsi Ferguson  1F, [address]  By Dee MILLS   Ordering Provider:  LINA Moreno     Does not apply Continuous PRN 01/15/18 2126 01/25/18 2125    01/15/18 1954  vancomycin 1500 mg/500 mL 0.9% NS IVPB ([facility])     Ordering Provider:  Roge Zuñiga MD    20 mg/kg × 72.6 kg  over 90 Minutes Intravenous Once 01/15/18 1956 01/15/18 2243    01/15/18 1944  piperacillin-tazobactam (ZOSYN) 4.5 g/100 mL 0.9% NS IVPB (mbp)     Ordering Provider:  Roge Zuñiga MD    4.5 g Intravenous Once 01/15/18 1946 01/15/18 2115            Allergies  Allergies as of 01/15/2018 - Azeem as Reviewed 01/15/2018   Allergen Reaction Noted   • Aleve [naproxen]  12/20/2016   • Celebrex [celecoxib]  12/20/2016   • Ibuprofen  12/20/2016   • Indocin [indomethacin]  12/20/2016   • Keflex [cephalexin]  12/20/2016   • Lipitor [atorvastatin]  12/20/2016   • Prevacid [lansoprazole]  12/20/2016   • Prilosec [omeprazole]  12/20/2016   • Statins  12/20/2016   • Zocor [simvastatin] Hives 10/19/2016     Labs  Results from last 7 days   Lab Units 01/16/18  0722 01/15/18  1749   BUN mg/dL 10 12   CREATININE mg/dL 0.50* 0.50*  "  Results from last 7 days   Lab Units 01/16/18  0722 01/15/18  1749   WBC 10*3/mm3 12.82* 15.86*   Evaluation of Dosing     Last Dose Received in the ED= 1500 mg at 21:13    Ht - 152.4 cm (60\")  Wt - 74.3 kg (163 lb 11.2 oz)    Estimated Creatinine Clearance: 52.2 mL/min (by C-G formula based on Cr of 0.5).    Intake & Output (last 3 days)         01/13 0701 - 01/14 0700 01/14 0701 - 01/15 0700 01/15 0701 - 01/16 0700 01/16 0701 - 01/17 0700      P.O.    240    I.V. (mL/kg)   824 (11.1)     IV Piggyback   2350     Total Intake(mL/kg)   3174 (42.7) 240 (3.2)    Urine (mL/kg/hr)   500 300 (1.1)    Total Output     500 300    Net     +2674 -60                Microbiology and Radiology  Microbiology Results (last 10 days)       Procedure Component Value - Date/Time      Influenza A & B, RT PCR - Swab, Nasopharynx [[id number]]  (Normal) Collected:  01/15/18 2041    Lab Status:  Final result Specimen:  Swab from Nasopharynx Updated:  01/15/18 2223     Influenza A PCR Not Detected     Influenza B PCR Not Detected    Blood Culture - Blood, [[id number]]  (Normal) Collected:  01/15/18 1915    Lab Status:  Preliminary result Specimen:  Blood from Arm, Left Updated:  01/16/18 0846     Blood Culture No growth at less than 24 hours    Blood Culture - Blood, [[id number]]  (Normal) Collected:  01/15/18 1915    Lab Status:  Preliminary result Specimen:  Blood from Arm, Right Updated:  01/16/18 0846     Blood Culture No growth at less than 24 hours    Influenza Antigen, Rapid - Swab, Nasopharynx [[id number]]  (Normal) Collected:  01/15/18 1850    Lab Status:  Final result Specimen:  Swab from Nasopharynx Updated:  01/15/18 1915     Influenza A Ag, EIA Negative     Influenza B Ag, EIA Negative    Urine Culture - Urine, Urine, Clean Catch [[id number]]  (Normal) Collected:  01/15/18 1745    Lab Status:  Preliminary result Specimen:  Urine from Urine, Clean Catch Updated:  01/16/18 0721     Urine Culture No growth      "       Assessment/Plan:  1. Per ID consult on 1/16, vancomycin given for acute complicated UTI  2. Loading dose of 1500 mg IV x 1 given 1/15 at 2113  3. Scr 0.5, stable. WBC 12.82, trending down. Net I/O +2.7L, discussed with RN today. Patient given fluids and UOP now improving.    4. Given clinical status, continue vancomycin at 1000 mg IV q24h  5. Trough ordered on 1/17 at 2000, 1 hour prior to third dose  6. Pharmacy will continue to monitor renal function, cultures, and clinical status and adjust regimen as necessary.    Thank you,  Claudia Light, PharmD Candidate 2018

## 2018-01-16 NOTE — THERAPY EVALUATION
Acute Care - Occupational Therapy Initial Evaluation  [facility]     Patient Name: Chelsi Ferguson  : 1940  MRN: [id number]  Today's Date: 2018  Onset of Illness/Injury or Date of Surgery Date: 01/15/18  Date of Referral to OT: 01/15/18  Referring Physician: LINA Uriarte    Admit Date: 1/15/2018       ICD-10-CM ICD-9-CM   1. Bacterial UTI N39.0 599.0    A49.9 041.9   2. Sepsis, due to unspecified organism A41.9 038.9     995.91   3. Elevated LFTs R79.89 790.6   4. Generalized abdominal pain R10.84 789.07   5. Impaired mobility and ADLs Z74.09 799.89     Patient Active Problem List   Diagnosis   • Loss of weight   • Hyperlipidemia   • Edema   • GERD (gastroesophageal reflux disease)   • Nausea with vomiting   • Diarrhea   • Incontinence of feces   • HTN (hypertension)   • Cholelithiasis   • Menopausal symptom   • Cramp in limb   • Glaucoma   • Hammer toe   • Dry mouth   • Osteopenia   • Hypokalemia   • Elevated liver enzymes   • UTI (urinary tract infection)   • Sepsis   • Bacterial UTI   • Cholecystitis     Past Medical History:   Diagnosis Date   • Ankle pain    • Chest discomfort    • Cholecystitis 1/15/2018   • Edema    • Elevated liver enzymes 1/15/2018   • Gallstones    • GERD (gastroesophageal reflux disease)    • Glaucoma     pt is currently off of meds and Dr Tanner does not think that she is glaucoma   • H/O complete eye exam 2013   • H/O mammogram 2015   • H/O non-ST elevation myocardial infarction (NSTEMI) 2005   • Hammer toe    • Heart attack 2005   • History of blood transfusion 2005   • History of cardiovascular stress test 2015    normal   • HTN (hypertension)    • Hyperlipidemia    • Hypokalemia 1/15/2018   • Kidney infection 1971   • Menopausal symptoms    • Onychia and paronychia of finger    • Osteopenia    • Other elevated white blood cell count    • Pap smear for cervical cancer screening     [place]   • Pneumonia    • Sepsis 1/15/2018   • UTI (urinary tract  infection) 1/15/2018   • Viral warts      Past Surgical History:   Procedure Laterality Date   • BLADDER REPAIR  2002   • BLADDER REPAIR  2003   • BUNIONECTOMY Right 2010   • COLONOSCOPY  2008, 4/2014    [facility]   • CORONARY STENT PLACEMENT Left 01/2005    drug eluting stent 1/2005 LAD   • DILATATION AND CURETTAGE  1971   • DILATATION AND CURETTAGE  1978   • EYE SURGERY  05/2016   • FRONTALIS SUSPENSION  2010   • HAMMER TOE REPAIR Right 11/03/2010   • HYSTERECTOMY  1978   • LAPAROTOMY OOPHERECTOMY Bilateral 2002   • NOSE SURGERY  03/2017    repair 3 fractured areas. Dr Dunne   • OTHER SURGICAL HISTORY  2005    rectocele repair   • OTHER SURGICAL HISTORY  2010    eyelid surgery   • REPLACEMENT TOTAL KNEE BILATERAL Bilateral    • TONSILLECTOMY  1952   • TOTAL KNEE ARTHROPLASTY  01/2005   • UTERINE FIBROID SURGERY  1978    emergency removal of fibroid from birth canal          OT ASSESSMENT FLOWSHEET (last 72 hours)      OT Evaluation       01/16/18 1049 01/15/18 2100             Rehab Evaluation    Document Type evaluation  -TB        Subjective Information agree to therapy;complains of;weakness;fatigue  -TB        Patient Effort, Rehab Treatment good  -TB        Symptoms Noted During/After Treatment fatigue  -TB        General Information    Patient Profile Review yes  -TB        Onset of Illness/Injury or Date of Surgery Date 01/15/18  -TB        Referring Physician LINA Uriarte  -TB        General Observations Pt rec'vd supine in bed, room air, IV, tele; no family present  -TB        Pertinent History Of Current Problem Pt to ED with 5 day c/o of fever and evolving symptoms of chills, N/V/D; pt admitted with bacterial UTI  -TB        Precautions/Limitations fall precautions  -TB        Prior Level of Function independent:;all household mobility;community mobility;ADL's  -TB        Equipment Currently Used at Home grab bar;raised toilet  -TB none  -KH       Plans/Goals Discussed With patient;agreed upon  -TB         Risks Reviewed patient:;LOB;increased discomfort;lines disloged  -TB        Benefits Reviewed patient:;improve function;increase knowledge;increase strength  -TB        Barriers to Rehab none identified  -TB        Living Environment    Lives With spouse  -TB spouse  -KH       Living Arrangements house  -TB house  -KH       Home Accessibility stairs to enter home;stairs within home;bed and bath on same level  -TB no concerns  -KH       Stair Railings at Home  inside, present on left side;inside, present on right side  -KH       Type of Financial/Environmental Concern  none  -KH       Transportation Available  car  -KH       Living Environment Comment 3 story home; laundry in basement; walk-in shower  -TB        Clinical Impression    Date of Referral to OT 01/15/18  -TB        OT Diagnosis Impaired mobility and ADL  -TB        Patient/Family Goals Statement to regain strength and energy  -TB        Criteria for Skilled Therapeutic Interventions Met yes;treatment indicated  -TB        Rehab Potential good, to achieve stated therapy goals  -TB        Therapy Frequency daily  -TB        Anticipated Equipment Needs At Discharge --   None  -TB        Anticipated Discharge Disposition home with home health  -TB        Functional Level Prior    Ambulation  0-->independent  -KH       Transferring  0-->independent  -KH       Toileting  0-->independent  -KH       Bathing  0-->independent  -KH       Dressing  0-->independent  -KH       Eating  0-->independent  -KH       Communication  0-->understands/communicates without difficulty  -KH       Swallowing  0-->swallows foods/liquids without difficulty  -KH       Prior Functional Level Comment Independent   -TB INDEPENDENT  -KH       Vital Signs    Pre Systolic BP Rehab --   RN cleared OT; vitals stable  -TB        O2 Delivery Post Treatment room air  -TB        Pre Patient Position Supine  -TB        Intra Patient Position Standing  -TB        Post Patient Position Supine   -TB        Pain Assessment    Pain Assessment No/denies pain  -TB        Vision Assessment/Intervention    Visual Impairment WFL  -TB        Cognitive Assessment/Intervention    Current Cognitive/Communication Assessment functional  -TB        Orientation Status oriented x 4  -TB        Follows Commands/Answers Questions 100% of the time  -TB        Personal Safety WNL/WFL  -TB        Personal Safety Interventions fall prevention program maintained  -TB        Short/Long Term Memory intact short term memory;intact long term memory  -TB        ROM (Range of Motion)    General ROM no range of motion deficits identified  -TB        MMT (Manual Muscle Testing)    General MMT Assessment upper extremity strength deficits identified  -TB        General MMT Assessment Detail B UE 4/5  -TB        Bed Mobility, Assessment/Treatment    Bed Mob, Supine to Sit, [place] supervision required  -TB        Bed Mob, Sit to Supine, [place] supervision required  -TB        Transfer Assessment/Treatment    Transfers, Sit-Stand [place] contact guard assist  -TB        Transfers, Stand-Sit [place] contact guard assist  -TB        Toilet Transfer, [place] contact guard assist  -TB        Toilet Transfer, Assistive Device elevated toilet seat  -TB        Transfer, Safety Issues step length decreased;balance decreased during turns  -TB        Transfer, Impairments --   decreased occupational endurance  -TB        Functional Mobility    Functional Mobility- Ind. Level contact guard assist  -TB        Functional Mobility- Device --   gait belt and HHA  -TB        Functional Mobility- Safety Issues step length decreased;balance decreased during turns  -TB        Functional Mobility- Comment LOB with self correction x1  -TB        Upper Body Dressing Assessment/Training    UB Dressing Assess/Train, Clothing Type donning:;hospital gown  -TB        UB Dressing Assess/Train, Position sitting  -TB        UB Dressing  Assess/Train, [place] set up required  -TB        UB Dressing Assess/Train, Comment assist for lines  -TB        Toileting Assessment/Training    Toileting Assess/Train, Assistive Device raised toilet seat;grab bars  -TB        Toileting Assess/Train, Position sitting;standing  -TB        Toileting Assess/Train, Indepen Level contact guard assist  -TB        Toileting Assess/Train, Impairments strength decreased  -TB        Toileting Assess/Train, Comment assist to steady during clothing mgmt  -TB        Grooming Assessment/Training    Grooming Assess/Train, Position standing  -TB        Grooming Assess/Train, Comment to wash hands  -TB        Balance Skills Training    Sitting-Level of Assistance Close supervision  -TB        Sitting-Balance Support Feet supported  -TB        Sitting-Balance Activities --   ADL tasks  -TB        Standing-Level of Assistance Contact guard  -TB        Static Standing Balance Support Right upper extremity supported   gait belt  -TB        Standing-Balance Activities Weight Shift R-L   ADL tasks; trunk ext/posture  -TB        Therapy Exercises    Bilateral Upper Extremity AROM:;sitting;shoulder extension/flexion;shoulder abduction/adduction;shoulder horizontal abd/add;shoulder ER/IR;elbow flexion/extension;pronation/supination;hand pumps  -TB        Fine Motor Coordination Training    Opposition Right:;Left:;intact  -TB        Sensory Assessment/Intervention    Light Touch LUE;RUE  -TB        LUE Light Touch WNL  -TB        RUE Light Touch WNL  -TB        Positioning and Restraints    Pre-Treatment Position in bed  -TB        Post Treatment Position bed  -TB        In Bed supine;call light within reach;encouraged to call for assist  -TB          User Key  (r) = Recorded By, (t) = Taken By, (c) = Cosigned By    Initials Name Effective Dates    TB Radha Campa, OT 06/22/15 -      Jazz Su, RN 02/06/17 -            Occupational Therapy Education     Title: PT OT  SLP Therapies (Done)     Topic: Occupational Therapy (Done)     Point: ADL training (Done)    Description: Instruct learner(s) on proper safety adaptation and remediation techniques during self care or transfers.   Instruct in proper use of assistive devices.    Learning Progress Summary    Learner Readiness Method Response Comment Documented by Status   Patient Acceptance E,D VU,NR Education initiated for benefits of OOB activity/therapy, role of OT and HEP  01/16/18 1148 Done               Point: Home exercise program (Done)    Description: Instruct learner(s) on appropriate technique for monitoring, assisting and/or progressing therapeutic exercises/activities.    Learning Progress Summary    Learner Readiness Method Response Comment Documented by Status   Patient Acceptance E,D VU,NR Education initiated for benefits of OOB activity/therapy, role of OT and HEP  01/16/18 1148 Done                      User Key     Initials Effective Dates Name Provider Type Discipline     06/22/15 -  Radha Campa, OT Occupational Therapist OT                  OT Recommendation and Plan  Anticipated Equipment Needs At Discharge:  (None)  Anticipated Discharge Disposition: home with home health  Therapy Frequency: daily  Plan of Care Review  Plan Of Care Reviewed With: patient  Outcome Summary/Follow up Plan: OT IE completed. Pt presents with generalized weakness and decreased occupational endurance limiting ADL participation. SBA for bed mobility; CGA for all other mobility. OT to follow. Recommend HH at d/c for best outcome.          OT Goals       01/16/18 1149          Transfer Training OT LTG    Transfer Training OT LTG, Time to Achieve by discharge  -TB      Transfer Training OT LTG, Activity Type all transfers  -TB      Transfer Training OT LTG, [place] Level supervision required  -TB      Patient Education OT LTG    Patient Education OT LTG, Time to Achieve by discharge  -TB      Patient Education OT LTG,  Education Type written program;HEP;home safety  -TB      Patient Education OT LTG, Education Understanding demonstrates adequately;verbalizes understanding  -TB      Grooming OT LTG    Grooming Goal OT LTG, Time to Achieve by discharge  -TB      Grooming Goal OT LTG, [place] Level supervision required  -TB      Grooming Goal OT LTG, Position standing  -TB      Grooming Goal OT LTG, Additional Goal Pt able to stand and complete morning grooming routine  -TB      Functional Mobility OT LTG    Functional Mobility Goal OT LTG, Time to Achieve by discharge  -TB      Functional Mobility Goal OT LTG, [place] supervision  -TB      Functional Mobility Goal OT LTG, Distance to Achieve to the bathroom  -TB      Functional Mobility Goal OT LTG, Additional Goal AD TBD - did not use AD prior  -TB        User Key  (r) = Recorded By, (t) = Taken By, (c) = Cosigned By    Initials Name Provider Type    TB Radha Campa OT Occupational Therapist                Outcome Measures       01/16/18 1049          How much help from another is currently needed...    Putting on and taking off regular lower body clothing? 3  -TB      Bathing (including washing, rinsing, and drying) 3  -TB      Toileting (which includes using toilet bed pan or urinal) 3  -TB      Putting on and taking off regular upper body clothing 3  -TB      Taking care of personal grooming (such as brushing teeth) 3  -TB      Eating meals 4  -TB      Score 19  -TB      Functional Assessment    Outcome Measure Options AM-PAC 6 Clicks Daily Activity (OT)  -TB        User Key  (r) = Recorded By, (t) = Taken By, (c) = Cosigned By    Initials Name Provider Type    TB Radha Campa OT Occupational Therapist          Time Calculation:   OT Start Time: 1049    Therapy Charges for Today     Code Description Service Date Service Provider Modifiers Qty    43352163294  OT EVAL LOW COMPLEXITY 2 1/16/2018 Radha Campa OT GO 1    [id number]   OT THERAPEUTIC ACT EA 15 MIN 1/16/2018 Radha Campa OT GO 1               Radha Campa OT  1/16/2018

## 2018-01-16 NOTE — CONSULTS
Patient Name:  Chelsi Ferguson  YOB: 1940  [id number]       Patient Care Team:  Reina MAI MD as PCP - General (Family Medicine)  Александр Dunne MD as Consulting Physician (Otolaryngology)  Yung Miles MD as Consulting Physician (Cardiology)  Gabby Rao MD as Consulting Physician (Dermatology)  Tylor Bonner MD as Consulting Physician (Ophthalmology)      General Surgery Consult Note     Date of Consultation: 01/16/18    Consulting Physician - Rosalia Garcia,     Reason for Consult - Fever, gallstones    Subjective     I have been asked to see  Chelsi Ferguson , a 77 y.o. female in consultation for fever and gallstones.  Ms. Ferguson reports essentially a five-day history of fever chills and malaise.  She states that she was seen by her primary care physician last week and subsequent urine culture was positive for urinary tract infection.  She was started on by mouth antibiotics last Wednesday, but reports decreasing energy, malaise, fevers and chills.  She reports some poor by mouth intake denies specific abdominal pain.  She also reports some looser bowel movements.  She reports some associated dysuria and mild hematuria as well.  She had some nausea but no vomiting when no abdominal pain by report.  She presented the ER with these complaints and subsequent evaluation was concerning for a urinary tract infection.  She is admitted to the hospital and started on IV antibiotics.  Imaging at that time revealed evidence of gallstones which are chronic.  Ultrasound was concerning for potential cholecystitis versus gallbladder wall thickening.  For her part, the patient denies really abdominal pain during these events.  We've been asked to see her for possible surgical management.    She has a known history of gallstones and in fact has seen a surgeon before for possible elective removal.  Her symptoms primarily consist of reflux associated symptoms.      Allergy:   Allergies    Allergen Reactions   • Aleve [Naproxen]    • Celebrex [Celecoxib]    • Ibuprofen    • Indocin [Indomethacin]    • Keflex [Cephalexin]      Upset stomach, may have had some blood in stool but cannot remember the reason why she was put on it. Tolerates penicillins without problem.   • Lipitor [Atorvastatin]    • Prevacid [Lansoprazole]    • Prilosec [Omeprazole]    • Statins    • Zocor [Simvastatin] Hives       Medications:    [START ON 1/17/2018] Pharmacy Consult  Does not apply Once   [START ON 1/18/2018] estradiol 1 g Vaginal Once per day on Mon Thu   famotidine 20 mg Oral BID   lactobacillus acidophilus 1 capsule Oral Daily   multivitamin with minerals 1 tablet Oral Daily   nystatin  Topical Q12H   piperacillin-tazobactam 4.5 g Intravenous Q8H   vancomycin 15 mg/kg Intravenous Q24H       Pharmacy to dose vancomycin     sodium chloride 100 mL/hr Last Rate: 100 mL/hr (01/16/18 0594)     No current facility-administered medications on file prior to encounter.      Current Outpatient Prescriptions on File Prior to Encounter   Medication Sig   • acetaminophen (TYLENOL) 500 MG tablet Take 500 mg by mouth Every 6 (Six) Hours As Needed for mild pain (1-3).   • aspirin 81 MG EC tablet Take 81 mg by mouth Every Night.   • Biotin 5000 MCG capsule Take  by mouth.   • clotrimazole (LOTRIMIN) 1 % cream Apply  topically Every 12 (Twelve) Hours.   • Coenzyme Q10 200 MG capsule Take 200 mg by mouth Daily.   • estradiol (VAGIFEM) 10 MCG tablet vaginal tablet Insert 1 tablet into the vagina 2 (Two) Times a Week.   • metoprolol succinate XL (TOPROL-XL) 25 MG 24 hr tablet Take 1 tablet by mouth Daily.   • Multiple Vitamins-Minerals (MULTIVITAL PO) Take 1 tablet by mouth Daily.   • nitrofurantoin, macrocrystal-monohydrate, (MACROBID) 100 MG capsule Take 1 capsule by mouth Every 12 (Twelve) Hours.   • nitroglycerin (NITROSTAT) 0.4 MG SL tablet    • Omega-3 Fatty Acids (FISH OIL) 1000 MG capsule capsule Take 1,000 mg by mouth Daily  "With Breakfast.   • pravastatin (PRAVACHOL) 40 MG tablet Take 1 tablet by mouth Daily.       PMHx:   Patient Active Problem List   Diagnosis   • Loss of weight   • Hyperlipidemia   • Edema   • GERD (gastroesophageal reflux disease)   • Nausea with vomiting   • Diarrhea   • Incontinence of feces   • HTN (hypertension)   • Cholelithiasis   • Menopausal symptom   • Cramp in limb   • Glaucoma   • Hammer toe   • Dry mouth   • Osteopenia   • Hypokalemia   • Elevated liver enzymes   • UTI (urinary tract infection)   • Sepsis   • Bacterial UTI   • Cholecystitis       Past Surgical History:  1. Tonsillectomy  2. Rectocele repair  3. Cystocele repair  4. YANN  5. Bunionectomy  6. Oopherectomy  7. B TKR    Family History: Noncontributory     Social History: Pt lives in [place] .    Tobacco use: None     EtOH use : None    Illicit drug use: None      Review of Systems        Constitutional: (+) fevers, chills and malaise   Eyes: Denies visual changes    Cardiovascular: Denies chest pain, palpitations   Pulmonary: Denies cough or shortness of breath   Abdominal/ GI: See HPI    Genitourinary: (+) dysuria and hematuria   Musculoskeletal: (+) Myalgias   Psychiatric: No recent mood changes   Neurologic: No paresthesias or loss of function          Objective     Physical Exam:      Vital Signs  /47 (BP Location: Left arm, Patient Position: Lying)  Pulse 65  Temp 98.6 °F (37 °C) (Oral)   Resp 18  Ht 152.4 cm (60\")  Wt 74.3 kg (163 lb 11.2 oz)  LMP  (LMP Unknown)  SpO2 93%  BMI 31.97 kg/m2    Intake/Output Summary (Last 24 hours) at 01/16/18 0715  Last data filed at 01/16/18 0500   Gross per 24 hour   Intake             3174 ml   Output              500 ml   Net             2674 ml         Physical Exam:    Head: Normocephalic, atraumatic.   Eyes: Pupils equal, round, react to light and accomodation.   Mouth: Oral mucosa without lesions,   Neck: No masses, lymphadenopathy or carotid bruits bilaterally   CV: Rhythm  and " rate regular , no  murmurs, rubs or gallops  Lungs: Clear  to auscultation bilaterally   Abdomen: Bowel sounds positive  , soft, nontender  Groin : No obvious hernias bilaterally   Extremities:  No cyanosis, clubbing or edema bilaterally   Lymphatics: No abnormal lymphadenopathy appreciated   Neurologic: No gross deficits       Results Review: I have personally reviewed all of the lab and imaging results available at this time.  CT scan of the abdomen and pelvis was personally reviewed by me as well as the final dictated and edited report.  This demonstrates a gallbladder with some gallstones without clear gallbladder wall thickening or cholecystic fluid or stranding around the gallbladder.  The remainder the GI tract is unremarkable.    Ultrasound was personally reviewed by me as well.  There is some questionable minimal gallbladder wall thickening with gallstones seen.  There is no pericholecystic fluid, borderline common bile duct dilatation, likely age related.       Assessment/Plan     Assessment and Plan:     Cholelithiasis (Chronic) - I suspect she does not have acute cholecystitis but rather just has chronic cholelithiasis.  I suspect her LFT elevations likely due to dehydration and her urinary tract infection, which is likely the  here.  The minimal common bile duct dilatation is most likely related to age alone.  I've discussed the possibility of cholecystectomy during this admission, but the patient wishes to wait until she is recovered from her urinary tract infection.  I think this is certainly reasonable and we'll plan a follow-up with her in approximately 4 weeks after discharge to use gradual an elective cholecystectomy in the future.  Obviously if she worsens during this admission we could perform earlier cholecystectomy, however I do not think this is warranted.  I'll continue to follow this patient with you.       Hospital Problem List     * (Principal)UTI (urinary tract infection) - likely  the cause of her current symptoms and I agree with antibiotic choice.      Hyperlipidemia (Chronic)        GERD (gastroesophageal reflux disease) (Chronic)    HTN (hypertension) (Chronic)            Hypokalemia    Elevated liver enzymes    Sepsis    Bacterial UTI                      I discussed the patients findings and my recommendations with the patient and/or family, as well as the primary team     Harinder Mason MD  01/16/18  7:23 AM        Please note that portions of this note were completed with a voice recognition program. Efforts were made to edit the dictations, but occasionally words are mistranscribed.

## 2018-01-16 NOTE — PLAN OF CARE
Problem: Patient Care Overview (Adult)  Goal: Plan of Care Review  Outcome: Ongoing (interventions implemented as appropriate)   01/16/18 1429   Coping/Psychosocial Response Interventions   Plan Of Care Reviewed With patient   Outcome Evaluation   Outcome Summary/Follow up Plan PT eval completed patient is able to ambulate 200 ft with generalized weakness, fatigue and balance loss. Recommend HH at D/C        Problem: Inpatient Physical Therapy  Goal: Bed Mobility Goal LTG- PT  Outcome: Ongoing (interventions implemented as appropriate)   01/16/18 1429   Bed Mobility PT LTG   Bed Mobility PT LTG, Date Established 01/16/18   Bed Mobility PT LTG, Time to Achieve 2 wks   Bed Mobility PT LTG, Activity Type supine to sit/sit to supine   Bed Mobility PT LTG, [place] Level independent   Bed Mobility PT LTG, Outcome goal ongoing     Goal: Transfer Training Goal 1 LTG- PT  Outcome: Ongoing (interventions implemented as appropriate)   01/16/18 1429   Transfer Training PT LTG   Transfer Training PT LTG, Date Established 01/16/18   Transfer Training PT LTG, Time to Achieve 2 wks   Transfer Training PT LTG, Activity Type sit to stand/stand to sit   Transfer Training PT LTG, [place] Level independent   Transfer Training PT LTG, Outcome goal ongoing     Goal: Gait Training Goal LTG- PT  Outcome: Ongoing (interventions implemented as appropriate)   01/16/18 1429   Gait Training PT LTG   Gait Training Goal PT LTG, Date Established 01/16/18   Gait Training Goal PT LTG, Time to Achieve 2 wks   Gait Training Goal PT LTG, [place] Level independent   Gait Training Goal PT LTG, Distance to Achieve 400   Gait Training Goal PT LTG, Outcome goal ongoing     Goal: Stair Training Goal LTG- PT  Outcome: Ongoing (interventions implemented as appropriate)   01/16/18 1429   Stair Training PT LTG   Stair Training Goal PT LTG, Date Established 01/16/18   Stair Training Goal PT LTG, Time to Achieve 2 wks   Stair Training Goal PT LTG,  Number of Steps 10   Stair Training Goal PT LTG, [place] Level independent   Stair Training Goal PT LTG, Outcome goal ongoing

## 2018-01-16 NOTE — PLAN OF CARE
Problem: Fall Risk (Adult)  Goal: Identify Related Risk Factors and Signs and Symptoms  Outcome: Ongoing (interventions implemented as appropriate)   01/15/18 4108   Fall Risk   Fall Risk: Related Risk Factors gait/mobility problems   Fall Risk: Signs and Symptoms presence of risk factors

## 2018-01-16 NOTE — PLAN OF CARE
Problem: Patient Care Overview (Adult)  Goal: Discharge Needs Assessment   01/15/18 3501   Discharge Needs Assessment   Concerns To Be Addressed no discharge needs identified   Readmission Within The Last 30 Days no previous admission in last 30 days   Current Health   Anticipated Changes Related to Illness none

## 2018-01-16 NOTE — PLAN OF CARE
Problem: Patient Care Overview (Adult)  Goal: Plan of Care Review  Outcome: Ongoing (interventions implemented as appropriate)   01/16/18 1149   Coping/Psychosocial Response Interventions   Plan Of Care Reviewed With patient   Outcome Evaluation   Outcome Summary/Follow up Plan OT IE completed. Pt presents with generalized weakness and decreased occupational endurance limiting ADL participation. SBA for bed mobility; CGA for all other mobility. OT to follow. Recommend HH at d/c for best outcome.       Problem: Inpatient Occupational Therapy  Goal: Transfer Training Goal 1 LTG- OT  Outcome: Ongoing (interventions implemented as appropriate)   01/16/18 1149   Transfer Training OT LTG   Transfer Training OT LTG, Time to Achieve by discharge   Transfer Training OT LTG, Activity Type all transfers   Transfer Training OT LTG, [place] Level supervision required     Goal: Patient Education Goal LTG- OT  Outcome: Ongoing (interventions implemented as appropriate)   01/16/18 1149   Patient Education OT LTG   Patient Education OT LTG, Time to Achieve by discharge   Patient Education OT LTG, Education Type written program;HEP;home safety   Patient Education OT LTG, Education Understanding demonstrates adequately;verbalizes understanding     Goal: Grooming Goal LTG- OT  Outcome: Ongoing (interventions implemented as appropriate)   01/16/18 1149   Grooming OT LTG   Grooming Goal OT LTG, Time to Achieve by discharge   Grooming Goal OT LTG, [place] Level supervision required   Grooming Goal OT LTG, Position standing   Grooming Goal OT LTG, Additional Goal Pt able to stand and complete morning grooming routine     Goal: Functional Mobility Goal LTG- OT  Outcome: Ongoing (interventions implemented as appropriate)   01/16/18 1149   Functional Mobility OT LTG   Functional Mobility Goal OT LTG, Time to Achieve by discharge   Functional Mobility Goal OT LTG, [place] Level supervision   Functional Mobility Goal OT LTG,  Distance to Achieve to the bathroom   Functional Mobility Goal OT LTG, Additional Goal AD TBD - did not use AD prior

## 2018-01-16 NOTE — PROGRESS NOTES
Discharge Planning Assessment  [facility]     Patient Name: Chelsi Ferguson  MRN: [id number]  Today's Date: 1/16/2018    Admit Date: 1/15/2018          Discharge Needs Assessment       01/16/18 1318    Living Environment    Lives With spouse    Living Arrangements house    Type of Financial/Environmental Concern none    Transportation Available car;family or friend will provide    Living Environment    Provides Primary Care For no one    Primary Care Provided By spouse/significant other    Quality Of Family Relationships supportive    Discharge Needs Assessment    Concerns To Be Addressed no discharge needs identified;denies needs/concerns at this time    Readmission Within The Last 30 Days no previous admission in last 30 days    Anticipated Changes Related to Illness none    Equipment Currently Used at Home grab bar;raised toilet    Equipment Needed After Discharge none    Discharge Disposition home or self-care            Discharge Plan       01/16/18 1319    Case Management/Social Work Plan    Plan Home at discharge     Patient/Family In Agreement With Plan yes    Additional Comments Patient lives in [facility] with her spouse - She is independent with her ADL's and has no current needs for DME or home health - her goal is to return home when medically ready - [facility] -  [phone]         Discharge Placement     No information found                Demographic Summary       01/16/18 1318    Referral Information    Admission Type inpatient    Arrived From admitted as an inpatient    Referral Source admission list    Reason For Consult discharge planning    Record Reviewed history and physical;medical record    Contact Information    Permission Granted to Share Information With     Primary Care Physician Information    Name Reina Yarbrough             Functional Status       01/16/18 1318    Functional Status Current    Current Functional Level Comment Please see nursing notes     Functional Status  Prior    Ambulation 0-->independent    Transferring 0-->independent    Toileting 0-->independent    Bathing 0-->independent    Dressing 0-->independent    Eating 0-->independent    Communication 0-->understands/communicates without difficulty    Swallowing 0-->swallows foods/liquids without difficulty    IADL    Medications independent    Meal Preparation independent    Housekeeping independent    Laundry independent    Shopping independent    Oral Care independent    Activity Tolerance    Current Activity Limitations none    Usual Activity Tolerance good    Current Activity Tolerance moderate    Cognitive/Perceptual/Developmental    Current Mental Status/Cognitive Functioning no deficits noted    Employment/Financial    Employment/Finance Comments [employer] Medicare replacement             Psychosocial     None            Abuse/Neglect     None            Legal     None            Substance Abuse     None            Patient Forms     None          Jing Sagastume RN

## 2018-01-16 NOTE — PLAN OF CARE
Problem: Patient Care Overview (Adult)  Goal: Plan of Care Review  Outcome: Ongoing (interventions implemented as appropriate)   01/15/18 3445   Coping/Psychosocial Response Interventions   Plan Of Care Reviewed With patient   Patient Care Overview   Progress progress toward functional goals as expected

## 2018-01-16 NOTE — PROGRESS NOTES
[facility] Medicine Services  PROGRESS NOTE    Patient Name: Chelsi Ferguson  : 1940  MRN: [id number]    Date of Admission: 1/15/2018  Length of Stay: 1  Primary Care Physician: Reina Yarbrough MD    Subjective   Subjective     CC:  F/u abdominal pain, UTI    HPI:  Patient feels much better this morning, tolerating solid intake. Still having some mild dysuria, otherwise no flank pain, nausea, vomiting.    Review of Systems  Gen- No fevers, chills  CV- No chest pain, palpitations  Resp- No cough, dyspnea  GI- No N/V/D, abd pain    Otherwise ROS is negative except as mentioned in the HPI.    Objective   Objective     Vital Signs:   Temp:  [97.7 °F (36.5 °C)-100.2 °F (37.9 °C)] 98.5 °F (36.9 °C)  Heart Rate:  [] 85  Resp:  [16-20] 16  BP: (111-124)/(47-75) 113/51        Physical Exam:  Constitutional: No acute distress, awake, alert  HENT: NCAT, mucous membranes moist  Respiratory: Clear to auscultation bilaterally, respiratory effort normal   Cardiovascular: RRR, no murmurs, rubs, or gallops, palpable pedal pulses bilaterally  Gastrointestinal: Positive bowel sounds, soft, nontender, nondistended  Musculoskeletal: No bilateral ankle edema  Psychiatric: Appropriate affect, cooperative  Neurologic: Oriented x 3, strength symmetric in all extremities, Cranial Nerves grossly intact to confrontation, speech clear  Skin: No rashes    Results Reviewed:  I have personally reviewed current lab, radiology, and data and agree.      Results from last 7 days  Lab Units 18  0722 01/15/18  1749   WBC 10*3/mm3 12.82* 15.86*   HEMOGLOBIN g/dL 11.1* 13.7   HEMATOCRIT % 32.8* 39.9   PLATELETS 10*3/mm3 199 193       Results from last 7 days  Lab Units 18  0722 01/15/18  1749   SODIUM mmol/L 135 132   POTASSIUM mmol/L 4.4 3.1*   CHLORIDE mmol/L 104 96*   CO2 mmol/L 26.0 26.0   BUN mg/dL 10 12   CREATININE mg/dL 0.50* 0.50*   GLUCOSE mg/dL 86 119*   CALCIUM mg/dL 7.7* 9.8   ALT (SGPT) U/L  84* 128*   AST (SGOT) U/L 57* 124*     BNP   Date Value Ref Range Status   01/15/2018 46.0 0.0 - 100.0 pg/mL Final     No results found for: PHART    Microbiology Results Abnormal     Procedure Component Value - Date/Time    Blood Culture - Blood, [[id number]]  (Normal) Collected:  01/15/18 1915    Lab Status:  Preliminary result Specimen:  Blood from Arm, Left Updated:  01/16/18 0846     Blood Culture No growth at less than 24 hours    Blood Culture - Blood, [[id number]]  (Normal) Collected:  01/15/18 1915    Lab Status:  Preliminary result Specimen:  Blood from Arm, Right Updated:  01/16/18 0846     Blood Culture No growth at less than 24 hours    Urine Culture - Urine, Urine, Clean Catch [[id number]]  (Normal) Collected:  01/15/18 1745    Lab Status:  Preliminary result Specimen:  Urine from Urine, Clean Catch Updated:  01/16/18 0721     Urine Culture No growth    Influenza A & B, RT PCR - Swab, Nasopharynx [[id number]]  (Normal) Collected:  01/15/18 2041    Lab Status:  Final result Specimen:  Swab from Nasopharynx Updated:  01/15/18 2223     Influenza A PCR Not Detected     Influenza B PCR Not Detected    Influenza Antigen, Rapid - Swab, Nasopharynx [[id number]]  (Normal) Collected:  01/15/18 1850    Lab Status:  Final result Specimen:  Swab from Nasopharynx Updated:  01/15/18 1915     Influenza A Ag, EIA Negative     Influenza B Ag, EIA Negative          Imaging Results (last 24 hours)     Procedure Component Value Units Date/Time    CT Abdomen Pelvis Without Contrast [[id number]] Collected:  01/15/18 1834     Updated:  01/15/18 1931    Narrative:       EXAM:  CT Abdomen and Pelvis Without Intravenous Contrast    CLINICAL HISTORY:  77 years old, female; Pain; Abdominal pain; Localized; Left lower quadrant   (llq); Prior surgery; Surgery type: Curtis, bladder mesh, rectocele; Additional   info: Llq pain fever, dysuria    TECHNIQUE:  Axial computed tomography images of the abdomen and pelvis without intravenous    contrast.  All CT scans at this facility use one or more dose reduction   techniques, viz.: automated exposure control; ma/kV adjustment per patient size   (including targeted exams where dose is matched to indication; i.e. head); or   iterative reconstruction technique.  Coronal reformatted images were created and reviewed.    COMPARISON:  No relevant prior studies available.    FINDINGS:  Lower thorax:  Minimal calcification of the mitral valve annulus.  Small-sized   hiatal hernia.    ABDOMEN:  Liver:  Normal.  Gallbladder and bile ducts:  Cholelithiasis, without CT evidence of acute   cholecystitis.  Pancreas:  Normal.  Spleen:  8mm hypoattenuating focus within the inferior aspect of the medial   spleen (series 3, image 26), likely simple cyst.  Adrenals:  Normal.  Kidneys and ureters:  Normal.  Stomach and bowel:  Extensive colonic diverticulosis.  Appendix:  Appendix is normal.    PELVIS:  Bladder:  Normal.  Reproductive:  Normal as visualized.    ABDOMEN and PELVIS:  Intraperitoneal space:  Normal.  No free air.  No significant fluid collection.  Bones/joints:  Degenerative changes of the hips and sacroiliac joints.    Multilevel thoraco-lumbar spine degenerative changes, with levoscoliosis of the   lumbar spine.  No acute fracture.  No dislocation.  Soft tissues:  Small fat containing umbilical hernia.  Vasculature:  Atherosclerotic disease of the thoracoabdominal aorta.    Phleboliths within the pelvis.  No abdominal aortic aneurysm.  Lymph nodes:  Normal.  Other findings:  Spot calcifications, compatible with prior granulomas disease.      Impression:         1. No acute findings.    2. Non-acute findings are described above.    THIS DOCUMENT HAS BEEN ELECTRONICALLY SIGNED BY KATYA HURT MD     Gallbladder [[id number]] Collected:  01/15/18 1937     Updated:  01/15/18 2044    Narrative:       EXAM:  US Abdomen Limited, Right Upper Quadrant    CLINICAL HISTORY:  77 years old, female; Sepsis, unspecified  organism; Bacterial infection,   unspecified; Urinary tract infection, site not specified; Generalized abdominal   pain; Other specified abnormal findings of blood chemistry; Abnormal lab test;   Elevated liver enzymes; Additional info: Elevated lft's    TECHNIQUE:  Real-time ultrasound of the right upper quadrant with image documentation.    COMPARISON:  CT ABDOMEN PELVIS WO CONTRAST 2018-01-15 18:57    FINDINGS:  Liver:  No intrahepatic biliary dilation.  No intrahepatic bile duct dilation.  Gallbladder:  Gallbladder wall is thickened, measuring 5 mm.  No   pericholecystic fluid.  Small amount of echogenic, non-shadowing material   within the inferior aspect of the gallbladder, likely sludge.  Common bile duct:  Common bile duct measures 6 cm in diameter.  Pancreas:  Normal as visualized.  Right kidney:  Right kidney is normal in appearance and measures 11.7 cm in   length.  No stones.  No hydronephrosis.      Impression:         1.  Gallbladder sludge, with gallbladder wall thickening, suggesting   cholecystitis.    2.  Borderline dilation of the extrahepatic common bile duct. No intrahepatic   biliary dilation.        THIS DOCUMENT HAS BEEN ELECTRONICALLY SIGNED BY KATYA HURT MD    XR Chest 1 View [[id number]] Collected:  01/16/18 0843     Updated:  01/16/18 0844    Narrative:       EXAMINATION: XR CHEST 1 VW- 01/15/2018     INDICATION: Weak/Dizzy/AMS triage protocol      COMPARISON: NONE     FINDINGS: Portable chest reveals cardiac silhouette to borderline  enlarged. Degenerative changes seen within the spine. Pulmonary  vascularity is within normal limits. No pleural effusion or  pneumothorax. Vascular calcifications seen within the thoracic aorta.             Impression:       No acute cardiopulmonary disease. Chronic changes seen  within the lung fields bilaterally.     D:  01/15/2018  E:  01/16/2018                   I have reviewed the medications.    Assessment/Plan   Assessment / Plan     Hospital  Problem List     * (Principal)UTI (urinary tract infection)    Hyperlipidemia (Chronic)    GERD (gastroesophageal reflux disease) (Chronic)    HTN (hypertension) (Chronic)    Cholelithiasis (Chronic)    Hypokalemia    Elevated liver enzymes    Sepsis    Bacterial UTI    Cholecystitis             Brief Hospital Course to date:  Chelsi Ferguson is a 77 y.o. female with hx of GERD, HTN who presented with fever, abdominal pain, increasing weakness and UTI.      Assessment & Plan:  1. Urinary tract infection / sepsis:   - Cx positive for E.coli  - WBC improved, afebrile  - continue Vanc/Zosyn per ID     2. Cholelithiasis w/elevated liver enzymes:  - Hx stones and sludge.  - d/w Dr. Mason, fell this is likely chronic in nature and not acute cholecystitis, patient will likely need to have gallbladder removed, but not planning to do it this acute setting    3. Gastroesophageal reflux disease:  - pepcid     4. Hypokalemia:  - Replace.     5. Hypertension / hyperlipidemia:  - Issue with other statins but does pravstatin at home, we do not stock   - normotensive, add back home BP meds as able     DVT prophylaxis: lovenox     CODE STATUS:  Full Code    Disposition: I expect the patient to be discharged 1-2 days    Rosalia Garcia DO  01/16/18  11:17 AM

## 2018-01-17 LAB
ALBUMIN SERPL-MCNC: 3.2 G/DL (ref 3.2–4.8)
ALBUMIN/GLOB SERPL: 1.3 G/DL (ref 1.5–2.5)
ALP SERPL-CCNC: 183 U/L (ref 25–100)
ALT SERPL W P-5'-P-CCNC: 66 U/L (ref 7–40)
ANION GAP SERPL CALCULATED.3IONS-SCNC: 7 MMOL/L (ref 3–11)
AST SERPL-CCNC: 35 U/L (ref 0–33)
BACTERIA SPEC AEROBE CULT: NORMAL
BACTERIA SPEC AEROBE CULT: NORMAL
BASOPHILS # BLD AUTO: 0.01 10*3/MM3 (ref 0–0.2)
BASOPHILS NFR BLD AUTO: 0.1 % (ref 0–1)
BILIRUB SERPL-MCNC: 0.7 MG/DL (ref 0.3–1.2)
BUN BLD-MCNC: 6 MG/DL (ref 9–23)
BUN/CREAT SERPL: 12 (ref 7–25)
CALCIUM SPEC-SCNC: 8.8 MG/DL (ref 8.7–10.4)
CHLORIDE SERPL-SCNC: 106 MMOL/L (ref 99–109)
CO2 SERPL-SCNC: 24 MMOL/L (ref 20–31)
CREAT BLD-MCNC: 0.5 MG/DL (ref 0.6–1.3)
DEPRECATED RDW RBC AUTO: 50.4 FL (ref 37–54)
EOSINOPHIL # BLD AUTO: 0.43 10*3/MM3 (ref 0–0.3)
EOSINOPHIL NFR BLD AUTO: 5.3 % (ref 0–3)
ERYTHROCYTE [DISTWIDTH] IN BLOOD BY AUTOMATED COUNT: 14.4 % (ref 11.3–14.5)
GFR SERPL CREATININE-BSD FRML MDRD: 119 ML/MIN/1.73
GLOBULIN UR ELPH-MCNC: 2.4 GM/DL
GLUCOSE BLD-MCNC: 90 MG/DL (ref 70–100)
HCT VFR BLD AUTO: 34.9 % (ref 34.5–44)
HGB BLD-MCNC: 11.5 G/DL (ref 11.5–15.5)
IMM GRANULOCYTES # BLD: 0.04 10*3/MM3 (ref 0–0.03)
IMM GRANULOCYTES NFR BLD: 0.5 % (ref 0–0.6)
LYMPHOCYTES # BLD AUTO: 1.81 10*3/MM3 (ref 0.6–4.8)
LYMPHOCYTES NFR BLD AUTO: 22.1 % (ref 24–44)
MCH RBC QN AUTO: 31.3 PG (ref 27–31)
MCHC RBC AUTO-ENTMCNC: 33 G/DL (ref 32–36)
MCV RBC AUTO: 95.1 FL (ref 80–99)
MONOCYTES # BLD AUTO: 0.91 10*3/MM3 (ref 0–1)
MONOCYTES NFR BLD AUTO: 11.1 % (ref 0–12)
NEUTROPHILS # BLD AUTO: 4.99 10*3/MM3 (ref 1.5–8.3)
NEUTROPHILS NFR BLD AUTO: 60.9 % (ref 41–71)
PLATELET # BLD AUTO: 250 10*3/MM3 (ref 150–450)
PMV BLD AUTO: 10.1 FL (ref 6–12)
POTASSIUM BLD-SCNC: 4.4 MMOL/L (ref 3.5–5.5)
PROT SERPL-MCNC: 5.6 G/DL (ref 5.7–8.2)
RBC # BLD AUTO: 3.67 10*6/MM3 (ref 3.89–5.14)
SODIUM BLD-SCNC: 137 MMOL/L (ref 132–146)
WBC NRBC COR # BLD: 8.19 10*3/MM3 (ref 3.5–10.8)

## 2018-01-17 PROCEDURE — 97110 THERAPEUTIC EXERCISES: CPT

## 2018-01-17 PROCEDURE — 85025 COMPLETE CBC W/AUTO DIFF WBC: CPT | Performed by: INTERNAL MEDICINE

## 2018-01-17 PROCEDURE — 97116 GAIT TRAINING THERAPY: CPT

## 2018-01-17 PROCEDURE — 25010000002 PIPERACILLIN-TAZOBACTAM: Performed by: NURSE PRACTITIONER

## 2018-01-17 PROCEDURE — 99232 SBSQ HOSP IP/OBS MODERATE 35: CPT | Performed by: NURSE PRACTITIONER

## 2018-01-17 PROCEDURE — 25010000002 ENOXAPARIN PER 10 MG: Performed by: INTERNAL MEDICINE

## 2018-01-17 PROCEDURE — 80053 COMPREHEN METABOLIC PANEL: CPT | Performed by: INTERNAL MEDICINE

## 2018-01-17 RX ADMIN — TAZOBACTAM SODIUM AND PIPERACILLIN SODIUM 4.5 G: .5; 4 INJECTION, POWDER, LYOPHILIZED, FOR SOLUTION INTRAVENOUS at 18:12

## 2018-01-17 RX ADMIN — NYSTATIN: 100000 POWDER TOPICAL at 20:15

## 2018-01-17 RX ADMIN — Medication 1 CAPSULE: at 08:04

## 2018-01-17 RX ADMIN — NYSTATIN: 100000 POWDER TOPICAL at 08:03

## 2018-01-17 RX ADMIN — TAZOBACTAM SODIUM AND PIPERACILLIN SODIUM 4.5 G: .5; 4 INJECTION, POWDER, LYOPHILIZED, FOR SOLUTION INTRAVENOUS at 02:30

## 2018-01-17 RX ADMIN — FAMOTIDINE 20 MG: 20 TABLET, FILM COATED ORAL at 20:15

## 2018-01-17 RX ADMIN — FAMOTIDINE 20 MG: 20 TABLET, FILM COATED ORAL at 08:03

## 2018-01-17 RX ADMIN — TAZOBACTAM SODIUM AND PIPERACILLIN SODIUM 4.5 G: .5; 4 INJECTION, POWDER, LYOPHILIZED, FOR SOLUTION INTRAVENOUS at 10:59

## 2018-01-17 RX ADMIN — MULTIPLE VITAMINS W/ MINERALS TAB 1 TABLET: TAB ORAL at 08:03

## 2018-01-17 RX ADMIN — ENOXAPARIN SODIUM 40 MG: 40 INJECTION SUBCUTANEOUS at 14:02

## 2018-01-17 RX ADMIN — ACETAMINOPHEN 650 MG: 325 TABLET, FILM COATED ORAL at 20:14

## 2018-01-17 NOTE — PROGRESS NOTES
[facility] Progress Note        Antibiotics:  Anti-Infectives     Ordered     Dose/Rate Route Frequency Start Stop    01/15/18 2126  piperacillin-tazobactam (ZOSYN) 4.5 g/100 mL 0.9% NS IVPB (mbp)     Ordering Provider:  LINA Moreno    4.5 g  over 4 Hours Intravenous Every 8 Hours 01/16/18 0200 01/26/18 0159    01/15/18 2126  Pharmacy to dose vancomycin     Comments:  Chelsi Ferguson  2F, [address]   Ordering Provider:  LINA Moreno     Does not apply Continuous PRN 01/15/18 2126 01/25/18 2125    01/15/18 1954  vancomycin 1500 mg/500 mL 0.9% NS IVPB ([facility])     Ordering Provider:  Roge Zuñiga MD    20 mg/kg × 72.6 kg  over 90 Minutes Intravenous Once 01/15/18 1956 01/15/18 2243    01/15/18 1944  piperacillin-tazobactam (ZOSYN) 4.5 g/100 mL 0.9% NS IVPB (mbp)     Ordering Provider:  Roge Zuñiga MD    4.5 g Intravenous Once 01/15/18 1946 01/15/18 2115          CC: UTI, cholecystitis     HPI:  Patient is a 77 y.o.  Yr old female with history of numerous comorbidities as outlined below including bladder surgery, specifics unclear and has been seen by her PCP approximately January 3, had abnormal urinalysis and oral antibiotic called in, described as Macrobid in H&P.  She did not get the message until she returned from out of town and started it approximately January 10.  She continued to feel bad with nausea/diarrhea and generalized weakness with persistent dysuria and dark urine.  She presented to the emergency room January 15 and has been found to have abnormal urinalysis, abnormal liver function tests and concern for cholecystitis by abdominal imaging.  She was started on empiric vancomycin/Zosyn.     1/17/18 feeling generally better;  Patient denies vomiting.  No diarrhea today.  No hematochezia melena or hematemesis.  Currently no abdominal pain.  She denies overt hematuria or pyuria and denies flank pain.  No headache photophobia or neck stiffness.  No shortness of  "breath cough or hemoptysis.  No skin rash.    ROS:      1/17/18 No f/c/s. No n/v/d. No rash. No new ADR to Abx.     PE:   /63 (BP Location: Left arm, Patient Position: Lying)  Pulse 70  Temp 97 °F (36.1 °C) (Oral)   Resp 16  Ht 152.4 cm (60\")  Wt 74.3 kg (163 lb 11.2 oz)  LMP  (LMP Unknown)  SpO2 93%  BMI 31.97 kg/m2    GENERAL: Awake and alert, in no acute distress.   HEENT:  No conjunctival injection. No icterus. Oropharynx clear without evidence of thrush or exudate.     HEART: RRR; No murmur, rubs, gallops.   LUNGS: Clear to auscultation bilaterally without wheezing, rales, rhonchi. Normal respiratory effort. Nonlabored. No dullness.  ABDOMEN: Soft, mild tenderness, nondistended. Positive bowel sounds. No rebound or guarding. NO mass or HSM.  EXT:  No cyanosis, clubbing or edema. No cord.  : Genitalia generally unremarkable.  Without Jones catheter.  SKIN: Warm and dry without cutaneous eruptions on Inspection/palpation.      Laboratory Data      Results from last 7 days  Lab Units 01/17/18  0642 01/16/18  0722 01/15/18  1749   WBC 10*3/mm3 8.19 12.82* 15.86*   HEMOGLOBIN g/dL 11.5 11.1* 13.7   HEMATOCRIT % 34.9 32.8* 39.9   PLATELETS 10*3/mm3 250 199 193       Results from last 7 days  Lab Units 01/17/18  0642   SODIUM mmol/L 137   POTASSIUM mmol/L 4.4   CHLORIDE mmol/L 106   CO2 mmol/L 24.0   BUN mg/dL 6*   CREATININE mg/dL 0.50*   GLUCOSE mg/dL 90   CALCIUM mg/dL 8.8       Results from last 7 days  Lab Units 01/17/18  0642   ALK PHOS U/L 183*   BILIRUBIN mg/dL 0.7   ALT (SGPT) U/L 66*   AST (SGOT) U/L 35*       Results from last 7 days  Lab Units 01/15/18  1749   SED RATE mm/hr 49*       Results from last 7 days  Lab Units 01/15/18  1749   CRP mg/dL 16.93*       Estimated Creatinine Clearance: 52.2 mL/min (by C-G formula based on Cr of 0.5).      Microbiology:      Radiology:  Imaging Results (last 72 hours)     Procedure Component Value Units Date/Time    CT Abdomen Pelvis Without Contrast " [[id number]] Collected:  01/15/18 1834     Updated:  01/15/18 1931    Narrative:       EXAM:  CT Abdomen and Pelvis Without Intravenous Contrast    CLINICAL HISTORY:  77 years old, female; Pain; Abdominal pain; Localized; Left lower quadrant   (llq); Prior surgery; Surgery type: Curtis, bladder mesh, rectocele; Additional   info: Llq pain fever, dysuria    TECHNIQUE:  Axial computed tomography images of the abdomen and pelvis without intravenous   contrast.  All CT scans at this facility use one or more dose reduction   techniques, viz.: automated exposure control; ma/kV adjustment per patient size   (including targeted exams where dose is matched to indication; i.e. head); or   iterative reconstruction technique.  Coronal reformatted images were created and reviewed.    COMPARISON:  No relevant prior studies available.    FINDINGS:  Lower thorax:  Minimal calcification of the mitral valve annulus.  Small-sized   hiatal hernia.    ABDOMEN:  Liver:  Normal.  Gallbladder and bile ducts:  Cholelithiasis, without CT evidence of acute   cholecystitis.  Pancreas:  Normal.  Spleen:  8mm hypoattenuating focus within the inferior aspect of the medial   spleen (series 3, image 26), likely simple cyst.  Adrenals:  Normal.  Kidneys and ureters:  Normal.  Stomach and bowel:  Extensive colonic diverticulosis.  Appendix:  Appendix is normal.    PELVIS:  Bladder:  Normal.  Reproductive:  Normal as visualized.    ABDOMEN and PELVIS:  Intraperitoneal space:  Normal.  No free air.  No significant fluid collection.  Bones/joints:  Degenerative changes of the hips and sacroiliac joints.    Multilevel thoraco-lumbar spine degenerative changes, with levoscoliosis of the   lumbar spine.  No acute fracture.  No dislocation.  Soft tissues:  Small fat containing umbilical hernia.  Vasculature:  Atherosclerotic disease of the thoracoabdominal aorta.    Phleboliths within the pelvis.  No abdominal aortic aneurysm.  Lymph nodes:  Normal.  Other  findings:  Spot calcifications, compatible with prior granulomas disease.      Impression:         1. No acute findings.    2. Non-acute findings are described above.    THIS DOCUMENT HAS BEEN ELECTRONICALLY SIGNED BY KATYA HURT MD    US Gallbladder [[id number]] Collected:  01/15/18 1937     Updated:  01/15/18 2044    Narrative:       EXAM:  US Abdomen Limited, Right Upper Quadrant    CLINICAL HISTORY:  77 years old, female; Sepsis, unspecified organism; Bacterial infection,   unspecified; Urinary tract infection, site not specified; Generalized abdominal   pain; Other specified abnormal findings of blood chemistry; Abnormal lab test;   Elevated liver enzymes; Additional info: Elevated lft's    TECHNIQUE:  Real-time ultrasound of the right upper quadrant with image documentation.    COMPARISON:  CT ABDOMEN PELVIS WO CONTRAST 2018-01-15 18:57    FINDINGS:  Liver:  No intrahepatic biliary dilation.  No intrahepatic bile duct dilation.  Gallbladder:  Gallbladder wall is thickened, measuring 5 mm.  No   pericholecystic fluid.  Small amount of echogenic, non-shadowing material   within the inferior aspect of the gallbladder, likely sludge.  Common bile duct:  Common bile duct measures 6 cm in diameter.  Pancreas:  Normal as visualized.  Right kidney:  Right kidney is normal in appearance and measures 11.7 cm in   length.  No stones.  No hydronephrosis.      Impression:         1.  Gallbladder sludge, with gallbladder wall thickening, suggesting   cholecystitis.    2.  Borderline dilation of the extrahepatic common bile duct. No intrahepatic   biliary dilation.        THIS DOCUMENT HAS BEEN ELECTRONICALLY SIGNED BY KATYA HURT MD    XR Chest 1 View [[id number]] Collected:  01/16/18 0843     Updated:  01/16/18 0844    Narrative:       EXAMINATION: XR CHEST 1 VW- 01/15/2018     INDICATION: Weak/Dizzy/AMS triage protocol      COMPARISON: NONE     FINDINGS: Portable chest reveals cardiac silhouette to borderline  enlarged.  Degenerative changes seen within the spine. Pulmonary  vascularity is within normal limits. No pleural effusion or  pneumothorax. Vascular calcifications seen within the thoracic aorta.             Impression:       No acute cardiopulmonary disease. Chronic changes seen  within the lung fields bilaterally.     D:  01/15/2018  E:  01/16/2018                  Impression:   --Acute complicated UTI.  Escherichia coli in urine from January 3.  Persistent symptoms despite outpatient oral antibiotics with description of Macrobid in H&P.  Patient cannot recall specific antimicrobial.  Repeat urine culture pending.  CT scan abdomen does not suggest any hydronephrosis or other anatomic urinary disturbance.  She does have prior history bladder surgery per her.  Specifics unclear.  If frequent recurrent UTIs, she would likely benefit from urology consultation for further functional/anatomic workup.     --Acute nausea/abnormal liver function tests, with cholestasis by numbers and abnormal right upper quadrant ultrasound suggesting cholecystitis.  Surgery is following to help define timing/option and threshold for cholecystectomy.     --Acute diarrhea.  Patient reports this has improved and no bowel movement since admission.  Stool studies ordered.    PLAN:   --IV Zosyn;  Vancomycin stopped as no MDR GPC in culture;  You can consider add back if clinical decline after stopping it     --I discussed potential risks and benefits of the prescribed antibiotics that include, but are not limited to, solid organ toxicity, neuro/landon/vestibular toxicity, renal toxicity, CDiff, cytopenias, hypersensitivity, etc.. Patient/Family voice understanding and agree to proceed.     --Check/review labs cultures and scans     --Highly complex set of issues with high risk for further serious morbidity and other serious sequela     --Discussed with microbiology     --Partial history per nursing staff     --Surgery following as above    Maurice REYNA  MD Yvon  1/17/2018

## 2018-01-17 NOTE — PROGRESS NOTES
"Patient Name:  Chelsi Ferguson  YOB: 1940  [id number]    Surgery Progress Note    Date of visit: 1/17/2018    Subjective   Subjective: Feeling much better. Tolerating PO, no pain, malaise improving.         Objective     Objective:     /63 (BP Location: Left arm, Patient Position: Lying)  Pulse 70  Temp 97 °F (36.1 °C) (Oral)   Resp 16  Ht 152.4 cm (60\")  Wt 74.3 kg (163 lb 11.2 oz)  LMP  (LMP Unknown)  SpO2 93%  BMI 31.97 kg/m2    Intake/Output Summary (Last 24 hours) at 01/17/18 0753  Last data filed at 01/16/18 1100   Gross per 24 hour   Intake              240 ml   Output              300 ml   Net              -60 ml       CV:  Rhythm  regular and rate regular   L:  Clear  to auscultation bilaterally   Abd:  Bowel sounds positive , soft, nontender  Ext:  No cyanosis, clubbing, edema    Labs that are back at this time have been reviewed.        Assessment/Plan     Assessment/ Plan:     Cholelithiasis (Chronic) - Doing well, no plans for operation on this admission. OK to go home from my standpoint once OK with Hospitalists, RTC with me in 4 weeks to discuss elective CCY.       Hospital Problem List     * (Principal)UTI (urinary tract infection)    Hyperlipidemia (Chronic)        GERD (gastroesophageal reflux disease) (Chronic)    HTN (hypertension) (Chronic)            Hypokalemia    Elevated liver enzymes    Sepsis    Bacterial UTI    Cholecystitis              Harinder Mason MD  1/17/2018  7:53 AM      "

## 2018-01-17 NOTE — PLAN OF CARE
Problem: Patient Care Overview (Adult)  Goal: Plan of Care Review  Outcome: Ongoing (interventions implemented as appropriate)   01/17/18 1027   Outcome Evaluation   Outcome Summary/Follow up Plan Pt ambulated 250 ft with r wx and stand by assist, no balance loss. Recommend supplemental mobility with floor staff       Problem: Inpatient Physical Therapy  Goal: Bed Mobility Goal LTG- PT  Outcome: Ongoing (interventions implemented as appropriate)   01/17/18 1027   Bed Mobility PT LTG   Bed Mobility PT LTG, Outcome goal ongoing     Goal: Transfer Training Goal 1 LTG- PT  Outcome: Ongoing (interventions implemented as appropriate)   01/17/18 1027   Transfer Training PT LTG   Transfer Training PT LTG, Outcome goal ongoing     Goal: Gait Training Goal LTG- PT  Outcome: Ongoing (interventions implemented as appropriate)   01/17/18 1027   Gait Training PT LTG   Gait Training Goal PT LTG, Outcome goal ongoing     Goal: Stair Training Goal LTG- PT  Outcome: Ongoing (interventions implemented as appropriate)   01/17/18 1027   Stair Training PT LTG   Stair Training Goal PT LTG, Outcome goal ongoing

## 2018-01-17 NOTE — PROGRESS NOTES
[facility] Medicine Services  PROGRESS NOTE    Patient Name: Chelsi Ferguson  : 1940  MRN: [id number]    Date of Admission: 1/15/2018  Length of Stay: 2  Primary Care Physician: Reina Yarbrough MD    Subjective   Subjective     CC:  F/u abdominal pain, UTI    HPI:  Feeling better. No further vomiting and eating well. Denies pain/dysuria. No fever or chills. Still weak and unable to take walk yesterday afternoon. Discussed HH vs SNF rehab and patient states would be better to do HH at discharge    Review of Systems  Gen- No fevers, chills  CV- No chest pain, palpitations  Resp- No cough, dyspnea  GI- No N/V/D, abd pain    Otherwise ROS is negative except as mentioned in the HPI.    Objective   Objective     Vital Signs:   Temp:  [97 °F (36.1 °C)-98.4 °F (36.9 °C)] 97 °F (36.1 °C)  Heart Rate:  [68-72] 70  Resp:  [16-18] 16  BP: (118-130)/(56-82) 130/63        Physical Exam:  Constitutional: No acute distress, awake, alert  HENT: NCAT, mucous membranes moist  Respiratory: Clear to auscultation bilaterally, respiratory effort normal   Cardiovascular: RRR, no murmurs, rubs, or gallops, palpable pedal pulses bilaterally  Gastrointestinal: Positive bowel sounds, soft, nontender, nondistended  Musculoskeletal: No bilateral ankle edema  Psychiatric: Appropriate affect, cooperative  Neurologic: Oriented x 3, strength symmetric in all extremities, Cranial Nerves grossly intact to confrontation, speech clear  Skin: No rashes    Results Reviewed:  I have personally reviewed current lab, radiology, and data and agree.      Results from last 7 days  Lab Units 18  0642 18  0722 01/15/18  1749   WBC 10*3/mm3 8.19 12.82* 15.86*   HEMOGLOBIN g/dL 11.5 11.1* 13.7   HEMATOCRIT % 34.9 32.8* 39.9   PLATELETS 10*3/mm3 250 199 193       Results from last 7 days  Lab Units 18  0642 18  0722 01/15/18  1749   SODIUM mmol/L 137 135 132   POTASSIUM mmol/L 4.4 4.4 3.1*   CHLORIDE mmol/L 106  104 96*   CO2 mmol/L 24.0 26.0 26.0   BUN mg/dL 6* 10 12   CREATININE mg/dL 0.50* 0.50* 0.50*   GLUCOSE mg/dL 90 86 119*   CALCIUM mg/dL 8.8 7.7* 9.8   ALT (SGPT) U/L 66* 84* 128*   AST (SGOT) U/L 35* 57* 124*     BNP   Date Value Ref Range Status   01/15/2018 46.0 0.0 - 100.0 pg/mL Final     No results found for: PHART    Microbiology Results Abnormal     Procedure Component Value - Date/Time    Urine Culture - Urine, Urine, Clean Catch [[id number]] Collected:  01/15/18 1745    Lab Status:  Final result Specimen:  Urine from Urine, Clean Catch Updated:  01/17/18 0900     Urine Culture --      20,000-30,000 CFU/mL Normal Urogenital Jonna    Blood Culture - Blood, [[id number]]  (Normal) Collected:  01/15/18 1915    Lab Status:  Preliminary result Specimen:  Blood from Arm, Left Updated:  01/16/18 2046     Blood Culture No growth at 24 hours    Blood Culture - Blood, [[id number]]  (Normal) Collected:  01/15/18 1915    Lab Status:  Preliminary result Specimen:  Blood from Arm, Right Updated:  01/16/18 2046     Blood Culture No growth at 24 hours    Influenza A & B, RT PCR - Swab, Nasopharynx [[id number]]  (Normal) Collected:  01/15/18 2041    Lab Status:  Final result Specimen:  Swab from Nasopharynx Updated:  01/15/18 2223     Influenza A PCR Not Detected     Influenza B PCR Not Detected    Influenza Antigen, Rapid - Swab, Nasopharynx [[id number]]  (Normal) Collected:  01/15/18 1850    Lab Status:  Final result Specimen:  Swab from Nasopharynx Updated:  01/15/18 1915     Influenza A Ag, EIA Negative     Influenza B Ag, EIA Negative          Imaging Results (last 24 hours)     Procedure Component Value Units Date/Time    XR Chest 1 View [[id number]] Collected:  01/16/18 0843     Updated:  01/17/18 0917    Narrative:       EXAMINATION: XR CHEST 1 VW- 01/15/2018     INDICATION: Weak/Dizzy/AMS triage protocol      COMPARISON: NONE     FINDINGS: Portable chest reveals cardiac silhouette to borderline  enlarged. Degenerative  changes seen within the spine. Pulmonary  vascularity is within normal limits. No pleural effusion or  pneumothorax. Vascular calcifications seen within the thoracic aorta.             Impression:       No acute cardiopulmonary disease. Chronic changes seen  within the lung fields bilaterally.     D:  01/15/2018  E:  01/16/2018     This report was finalized on 1/17/2018 9:15 AM by Dr. Asuncion Young MD.                I have reviewed the medications.    Assessment/Plan   Assessment / Plan     Hospital Problem List     * (Principal)UTI (urinary tract infection)    Hyperlipidemia (Chronic)    GERD (gastroesophageal reflux disease) (Chronic)    HTN (hypertension) (Chronic)    Cholelithiasis (Chronic)    Hypokalemia    Elevated liver enzymes    Sepsis    Bacterial UTI    Cholecystitis             Brief Hospital Course to date:  Chelsi Ferguson is a 77 y.o. female with hx of GERD, HTN who presented with fever, abdominal pain, increasing weakness and UTI.      Assessment & Plan:  1. Urinary tract infection / sepsis:   - Cx positive for E.coli, repeat NGT  - WBC improved, afebrile  - continue Zosyn per ID- Vanc dc'd     2. Cholelithiasis w/elevated liver enzymes:  - Hx stones and sludge.  - d/w Dr. Mason, fell this is likely chronic in nature and not acute cholecystitis, patient will likely need to have gallbladder removed, but not planning to do it this acute setting  - follow up with Dr. Mason in 4 weeks to scheduled lap CCY    3. Gastroesophageal reflux disease:  - pepcid     4. Hypokalemia:  - Replace.     5. Hypertension / hyperlipidemia:  - Issue with other statins but does pravstatin at home, we do not stock   - normotensive, add back home BP meds as able     DVT prophylaxis: lovenox     CODE STATUS:  Full Code    Disposition: I expect the patient to be discharged home tomorrow with     Sabra Rowe, APRN  01/17/18  9:19 AM

## 2018-01-17 NOTE — PROGRESS NOTES
Nutrition Services    Patient Name:  Chelsi Ferguson  YOB: 1940  MRN: [id number]  Admit Date:  1/15/2018    Reviewed pt anthropometrics and intake.  Pt does not meet criteria for malnutrition at this time.  Will follow per protocol.     Electronically signed by:  Ivett Esposito  01/17/18 2:06 PM

## 2018-01-17 NOTE — THERAPY TREATMENT NOTE
Acute Care - Physical Therapy Treatment Note  [facility]     Patient Name: Chelsi Ferguson  : 1940  MRN: [id number]  Today's Date: 2018  Onset of Illness/Injury or Date of Surgery Date: 01/15/18  Date of Referral to PT: 18  Referring Physician: LINA Uriarte    Admit Date: 1/15/2018    Visit Dx:    ICD-10-CM ICD-9-CM   1. Bacterial UTI N39.0 599.0    A49.9 041.9   2. Sepsis, due to unspecified organism A41.9 038.9     995.91   3. Elevated LFTs R79.89 790.6   4. Generalized abdominal pain R10.84 789.07   5. Impaired mobility and ADLs Z74.09 799.89   6. Impaired functional mobility, balance, gait, and endurance Z74.09 V49.89   7. Sepsis due to Escherichia coli A41.51 038.42     995.91     Patient Active Problem List   Diagnosis   • Loss of weight   • Hyperlipidemia   • Edema   • GERD (gastroesophageal reflux disease)   • Nausea with vomiting   • Diarrhea   • Incontinence of feces   • HTN (hypertension)   • Cholelithiasis   • Menopausal symptom   • Cramp in limb   • Glaucoma   • Hammer toe   • Dry mouth   • Osteopenia   • Hypokalemia   • Elevated liver enzymes   • UTI (urinary tract infection)   • Sepsis   • Bacterial UTI   • Cholecystitis               Adult Rehabilitation Note       18 0955          Rehab Assessment/Intervention    Discipline physical therapist  -KM      Document Type therapy note (daily note)  -KM      Subjective Information agree to therapy  -KM      Patient Effort, Rehab Treatment good  -KM      Precautions/Limitations fall precautions  -KM      Recorded by [KM] Gail Koch, PT      Pain Assessment    Pain Assessment No/denies pain  -KM      Recorded by [KM] Gail Koch, PT      Cognitive Assessment/Intervention    Current Cognitive/Communication Assessment functional  -KM      Orientation Status oriented x 4  -KM      Follows Commands/Answers Questions able to follow multi-step instructions;100% of the time  -KM      Personal Safety WNL/WFL  -KM       Personal Safety Interventions fall prevention program maintained  -KM      Recorded by [KM] Gail Koch PT      Bed Mobility, Assessment/Treatment    Bed Mobility, Comment --   uic  -KM      Recorded by [KM] Gail Koch PT      Transfer Assessment/Treatment    Transfers, Sit-Stand [place] stand by assist  -KM      Transfers, Stand-Sit [place] stand by assist  -KM      Transfers, Sit-Stand-Sit, Assist Device rolling walker  -KM      Recorded by [KM] Gail Koch PT      Gait Assessment/Treatment    Gait, [place] Level stand by assist  -KM      Gait, Assistive Device rolling walker  -KM      Gait, Distance (Feet) 250  -KM      Recorded by [KM] Gail Koch PT      Balance Skills Training    Sitting-Level of Assistance Independent  -KM      Sitting-Balance Support Feet supported  -KM      Standing-Level of Assistance Close supervision  -KM      Static Standing Balance Support assistive device  -KM      Gait Balance-Level of Assistance Close supervision  -KM      Gait Balance Support assistive device  -KM      Gait Balance Activities scanning environment R/L  -KM      Recorded by [KM] Gail Koch PT      Therapy Exercises    Bilateral Lower Extremities AROM:;10 reps;sitting;ankle pumps/circles;hip flexion;LAQ  -KM      Recorded by [KM] Gail Koch PT      Positioning and Restraints    Pre-Treatment Position sitting in chair/recliner  -KM      Post Treatment Position chair  -KM      In Bed sitting;call light within reach;encouraged to call for assist;exit alarm on  -KM      Recorded by [KM] Gail Koch PT        User Key  (r) = Recorded By, (t) = Taken By, (c) = Cosigned By    Initials Name Effective Dates    LUCINDA Koch PT 06/19/15 -                 IP PT Goals       01/17/18 1027 01/16/18 1429       Bed Mobility PT LTG    Bed Mobility PT LTG, Date Established  01/16/18  -JUSTINE     Bed Mobility PT LTG, Time to Achieve  2  wks  -JUSTINE     Bed Mobility PT LTG, Activity Type  supine to sit/sit to supine  -JUSTINE     Bed Mobility PT LTG, [place] Level  independent  -JUSTINE     Bed Mobility PT LTG, Outcome goal ongoing  -KM goal ongoing  -JUSTINE     Transfer Training PT LTG    Transfer Training PT LTG, Date Established  01/16/18  -JUSTINE     Transfer Training PT LTG, Time to Achieve  2 wks  -JUSTINE     Transfer Training PT LTG, Activity Type  sit to stand/stand to sit  -JUSTINE     Transfer Training PT LTG, [place] Level  independent  -JUSTINE     Transfer Training PT LTG, Outcome goal ongoing  -KM goal ongoing  -JUSTINE     Gait Training PT LTG    Gait Training Goal PT LTG, Date Established  01/16/18  -JUSTINE     Gait Training Goal PT LTG, Time to Achieve  2 wks  -JSUTINE     Gait Training Goal PT LTG, [place] Level  independent  -JUSTINE     Gait Training Goal PT LTG, Distance to Achieve  400  -JUSTINE     Gait Training Goal PT LTG, Outcome goal ongoing  -KM goal ongoing  -JUSTINE     Stair Training PT LTG    Stair Training Goal PT LTG, Date Established  01/16/18  -JUSTINE     Stair Training Goal PT LTG, Time to Achieve  2 wks  -JUSTINE     Stair Training Goal PT LTG, Number of Steps  10  -JUSTINE     Stair Training Goal PT LTG, [place] Level  independent  -JUSTINE     Stair Training Goal PT LTG, Outcome goal ongoing  -KM goal ongoing  -JUSTINE       User Key  (r) = Recorded By, (t) = Taken By, (c) = Cosigned By    Initials Name Provider Type    JUSTINE Johnson, PT Physical Therapist    LUCINDA Koch, PT Physical Therapist          Physical Therapy Education     Title: PT OT SLP Therapies (Done)     Topic: Physical Therapy (Done)     Point: Mobility training (Done)    Learning Progress Summary    Learner Readiness Method Response Comment Documented by Status   Patient Acceptance E VU  KM 01/17/18 1029 Done    Acceptance E NR  JUSTINE 01/16/18 1425 Active               Point: Home exercise program (Done)    Learning Progress Summary    Learner Readiness Method Response Comment Documented by  Status   Patient Acceptance E [facility] 01/17/18 1029 Done    Acceptance E NR  JUSTINE 01/16/18 1429 Active               Point: Body mechanics (Done)    Learning Progress Summary    Learner Readiness Method Response Comment Documented by Status   Patient Acceptance E [facility] 01/17/18 1029 Done    Acceptance E NR  JUSTINE 01/16/18 1429 Active               Point: Precautions (Done)    Learning Progress Summary    Learner Readiness Method Response Comment Documented by Status   Patient Acceptance E [facility] 01/17/18 1029 Done    Acceptance E NR  JUSTINE 01/16/18 1429 Active                      User Key     Initials Effective Dates Name Provider Type Discipline     06/19/15 -  Hillary Johnson, PT Physical Therapist PT     06/19/15 -  Gail Koch, PT Physical Therapist PT                    PT Recommendation and Plan  Anticipated Discharge Disposition: home  PT Frequency: daily, per priority policy  Plan of Care Review  Outcome Summary/Follow up Plan: Pt ambulated 250 ft with r wx and stand by assist, no balance loss. Recommend supplemental mobility with floor staff          Outcome Measures       01/17/18 0955 01/16/18 1049 01/16/18 1045    How much help from another person do you currently need...    Turning from your back to your side while in flat bed without using bedrails? 4  -KM  4  -JUSTINE    Moving from lying on back to sitting on the side of a flat bed without bedrails? 4  -KM  4  -JUSTINE    Moving to and from a bed to a chair (including a wheelchair)? 3  -KM  3  -JUSTINE    Standing up from a chair using your arms (e.g., wheelchair, bedside chair)? 4  -KM  3  -JUSTINE    Climbing 3-5 steps with a railing? 3  -KM  3  -JUSTINE    To walk in hospital room? 3  -KM  3  -JUSTINE    AM-PAC 6 Clicks Score 21  -KM  20  -JUSTINE    How much help from another is currently needed...    Putting on and taking off regular lower body clothing?  3  -TB     Bathing (including washing, rinsing, and drying)  3  -TB     Toileting (which includes using toilet bed  pan or urinal)  3  -TB     Putting on and taking off regular upper body clothing  3  -TB     Taking care of personal grooming (such as brushing teeth)  3  -TB     Eating meals  4  -TB     Score  19  -TB     Functional Assessment    Outcome Measure Options AM-PAC 6 Clicks Basic Mobility (PT)  -KM AM-PAC 6 Clicks Daily Activity (OT)  -TB AM-PAC 6 Clicks Basic Mobility (PT)  -JUSTINE      User Key  (r) = Recorded By, (t) = Taken By, (c) = Cosigned By    Initials Name Provider Type    TB Radha Campa, OT Occupational Therapist    JUSTINE Hillary Johnson, PT Physical Therapist    KM Gail Koch, PT Physical Therapist           Time Calculation:         PT Charges       01/17/18 1026          Time Calculation    Start Time 0955  -KM      PT Received On 01/17/18  -KM      PT Goal Re-Cert Due Date 01/26/18  -KM      Time Calculation- PT    Total Timed Code Minutes- PT 24 minute(s)  -KM        User Key  (r) = Recorded By, (t) = Taken By, (c) = Cosigned By    Initials Name Provider Type    LUCINDA Koch, PT Physical Therapist          Therapy Charges for Today     Code Description Service Date Service Provider Modifiers Qty    63408840820 HC GAIT TRAINING EA 15 MIN 1/17/2018 Gail Koch, PT GP 1    35779768476 HC PT THER PROC EA 15 MIN 1/17/2018 Gail Koch, PT GP 1          PT G-Codes  Outcome Measure Options: AM-PAC 6 Clicks Basic Mobility (PT)    Gail Koch, PT  1/17/2018

## 2018-01-17 NOTE — PLAN OF CARE
Problem: Patient Care Overview (Adult)  Goal: Plan of Care Review  Outcome: Ongoing (interventions implemented as appropriate)   01/17/18 0455   Coping/Psychosocial Response Interventions   Plan Of Care Reviewed With patient   Patient Care Overview   Progress progress toward functional goals as expected       Problem: Fall Risk (Adult)  Goal: Identify Related Risk Factors and Signs and Symptoms  Outcome: Ongoing (interventions implemented as appropriate)   01/17/18 0455   Fall Risk   Fall Risk: Related Risk Factors bladder function altered;gait/mobility problems;confusion/agitation   Fall Risk: Signs and Symptoms presence of risk factors       Problem: Older Inpatient, Acutely Ill (Adult)  Goal: Signs and Symptoms of Listed Potential Problems Will be Absent or Manageable (Older Inpatient, Acutely Ill)  Outcome: Ongoing (interventions implemented as appropriate)   01/17/18 0455   Older Adult Inpatient, Acutely Ill   Problems Assessed (Acutely Ill Older Adult) all   Problems Present (Acutely Ill Older Adult) situational response

## 2018-01-18 VITALS
HEIGHT: 60 IN | HEART RATE: 68 BPM | RESPIRATION RATE: 18 BRPM | DIASTOLIC BLOOD PRESSURE: 62 MMHG | BODY MASS INDEX: 32.14 KG/M2 | TEMPERATURE: 98.3 F | OXYGEN SATURATION: 93 % | SYSTOLIC BLOOD PRESSURE: 138 MMHG | WEIGHT: 163.7 LBS

## 2018-01-18 PROBLEM — E87.6 HYPOKALEMIA: Status: RESOLVED | Noted: 2018-01-15 | Resolved: 2018-01-18

## 2018-01-18 PROBLEM — A41.9 SEPSIS: Status: RESOLVED | Noted: 2018-01-15 | Resolved: 2018-01-18

## 2018-01-18 PROCEDURE — 99239 HOSP IP/OBS DSCHRG MGMT >30: CPT | Performed by: INTERNAL MEDICINE

## 2018-01-18 PROCEDURE — 25010000002 PIPERACILLIN-TAZOBACTAM: Performed by: NURSE PRACTITIONER

## 2018-01-18 RX ORDER — AMOXICILLIN AND CLAVULANATE POTASSIUM 875; 125 MG/1; MG/1
1 TABLET, FILM COATED ORAL EVERY 12 HOURS SCHEDULED
Qty: 14 TABLET | Refills: 0 | Status: SHIPPED | OUTPATIENT
Start: 2018-01-18 | End: 2018-01-25

## 2018-01-18 RX ADMIN — TAZOBACTAM SODIUM AND PIPERACILLIN SODIUM 4.5 G: .5; 4 INJECTION, POWDER, LYOPHILIZED, FOR SOLUTION INTRAVENOUS at 11:07

## 2018-01-18 RX ADMIN — TAZOBACTAM SODIUM AND PIPERACILLIN SODIUM 4.5 G: .5; 4 INJECTION, POWDER, LYOPHILIZED, FOR SOLUTION INTRAVENOUS at 01:08

## 2018-01-18 RX ADMIN — Medication 1 CAPSULE: at 08:53

## 2018-01-18 RX ADMIN — MULTIPLE VITAMINS W/ MINERALS TAB 1 TABLET: TAB ORAL at 08:53

## 2018-01-18 RX ADMIN — FAMOTIDINE 20 MG: 20 TABLET, FILM COATED ORAL at 08:53

## 2018-01-18 RX ADMIN — ACETAMINOPHEN 650 MG: 325 TABLET, FILM COATED ORAL at 05:25

## 2018-01-18 RX ADMIN — NYSTATIN: 100000 POWDER TOPICAL at 08:54

## 2018-01-18 NOTE — PROGRESS NOTES
[facility] Progress Note        Antibiotics:  Anti-Infectives     Ordered     Dose/Rate Route Frequency Start Stop    01/15/18 2126  piperacillin-tazobactam (ZOSYN) 4.5 g/100 mL 0.9% NS IVPB ([facility]p)     Ordering Provider:  LINA Moreno    4.5 g  over 4 Hours Intravenous Every 8 Hours 01/16/18 0200 01/26/18 0159    01/15/18 1954  vancomycin 1500 mg/500 mL 0.9% NS IVPB ([facility])     Ordering Provider:  Roge Zuñiga MD    20 mg/kg × 72.6 kg  over 90 Minutes Intravenous Once 01/15/18 1956 01/15/18 2243    01/15/18 1944  piperacillin-tazobactam (ZOSYN) 4.5 g/100 mL 0.9% NS IVPB ([facility]p)     Ordering Provider:  Roge Zuñiga MD    4.5 g Intravenous Once 01/15/18 1946 01/15/18 2115          CC: UTI, cholecystitis     HPI:  Patient is a 77 y.o.  Yr old female with history of numerous comorbidities as outlined below including bladder surgery, specifics unclear and has been seen by her PCP approximately January 3, had abnormal urinalysis and oral antibiotic called in, described as Macrobid in H&P.  She did not get the message until she returned from out of town and started it approximately January 10.  She continued to feel bad with nausea/diarrhea and generalized weakness with persistent dysuria and dark urine.  She presented to the emergency room January 15 and has been found to have abnormal urinalysis, abnormal liver function tests and concern for cholecystitis by abdominal imaging.  She was started on empiric vancomycin/Zosyn.     1/18/18 feeling generally better;  Patient denies vomiting.  No diarrhea today.  No hematochezia melena or hematemesis.  Currently no abdominal pain.  She denies overt hematuria or pyuria and denies flank pain.  No headache photophobia or neck stiffness.  No shortness of breath cough or hemoptysis.  No skin rash.    ROS:      1/18/18 No f/c/s. No n/v/d. No rash. No new ADR to Abx.     PE:   /74 (BP Location: Right arm, Patient Position: Sitting)  Pulse 67  Temp 98.2 °F (36.8 °C)  "(Oral)   Resp 18  Ht 152.4 cm (60\")  Wt 74.3 kg (163 lb 11.2 oz)  LMP  (LMP Unknown)  SpO2 93%  BMI 31.97 kg/m2    GENERAL: Awake and alert, in no acute distress.   HEENT:  No conjunctival injection. No icterus. Oropharynx clear without evidence of thrush or exudate.     HEART: RRR; No murmur, rubs, gallops.   LUNGS: Clear to auscultation bilaterally without wheezing, rales, rhonchi. Normal respiratory effort. Nonlabored. No dullness.  ABDOMEN: Soft, mild tenderness, nondistended. Positive bowel sounds. No rebound or guarding. NO mass or HSM.  EXT:  No cyanosis, clubbing or edema. No cord.  : Genitalia generally unremarkable.  Without Jones catheter.  SKIN: Warm and dry without cutaneous eruptions on Inspection/palpation.      Laboratory Data      Results from last 7 days  Lab Units 01/17/18  0642 01/16/18  0722 01/15/18  1749   WBC 10*3/mm3 8.19 12.82* 15.86*   HEMOGLOBIN g/dL 11.5 11.1* 13.7   HEMATOCRIT % 34.9 32.8* 39.9   PLATELETS 10*3/mm3 250 199 193       Results from last 7 days  Lab Units 01/17/18  0642   SODIUM mmol/L 137   POTASSIUM mmol/L 4.4   CHLORIDE mmol/L 106   CO2 mmol/L 24.0   BUN mg/dL 6*   CREATININE mg/dL 0.50*   GLUCOSE mg/dL 90   CALCIUM mg/dL 8.8       Results from last 7 days  Lab Units 01/17/18  0642   ALK PHOS U/L 183*   BILIRUBIN mg/dL 0.7   ALT (SGPT) U/L 66*   AST (SGOT) U/L 35*       Results from last 7 days  Lab Units 01/15/18  1749   SED RATE mm/hr 49*       Results from last 7 days  Lab Units 01/15/18  1749   CRP mg/dL 16.93*       Estimated Creatinine Clearance: 52.2 mL/min (by C-G formula based on Cr of 0.5).      Microbiology:      Radiology:  Imaging Results (last 72 hours)     Procedure Component Value Units Date/Time    CT Abdomen Pelvis Without Contrast [[id number]] Collected:  01/15/18 1834     Updated:  01/15/18 1931    Narrative:       EXAM:  CT Abdomen and Pelvis Without Intravenous Contrast    CLINICAL HISTORY:  77 years old, female; Pain; Abdominal pain; " Localized; Left lower quadrant   (llq); Prior surgery; Surgery type: Curtis, bladder mesh, rectocele; Additional   info: Llq pain fever, dysuria    TECHNIQUE:  Axial computed tomography images of the abdomen and pelvis without intravenous   contrast.  All CT scans at this facility use one or more dose reduction   techniques, viz.: automated exposure control; ma/kV adjustment per patient size   (including targeted exams where dose is matched to indication; i.e. head); or   iterative reconstruction technique.  Coronal reformatted images were created and reviewed.    COMPARISON:  No relevant prior studies available.    FINDINGS:  Lower thorax:  Minimal calcification of the mitral valve annulus.  Small-sized   hiatal hernia.    ABDOMEN:  Liver:  Normal.  Gallbladder and bile ducts:  Cholelithiasis, without CT evidence of acute   cholecystitis.  Pancreas:  Normal.  Spleen:  8mm hypoattenuating focus within the inferior aspect of the medial   spleen (series 3, image 26), likely simple cyst.  Adrenals:  Normal.  Kidneys and ureters:  Normal.  Stomach and bowel:  Extensive colonic diverticulosis.  Appendix:  Appendix is normal.    PELVIS:  Bladder:  Normal.  Reproductive:  Normal as visualized.    ABDOMEN and PELVIS:  Intraperitoneal space:  Normal.  No free air.  No significant fluid collection.  Bones/joints:  Degenerative changes of the hips and sacroiliac joints.    Multilevel thoraco-lumbar spine degenerative changes, with levoscoliosis of the   lumbar spine.  No acute fracture.  No dislocation.  Soft tissues:  Small fat containing umbilical hernia.  Vasculature:  Atherosclerotic disease of the thoracoabdominal aorta.    Phleboliths within the pelvis.  No abdominal aortic aneurysm.  Lymph nodes:  Normal.  Other findings:  Spot calcifications, compatible with prior granulomas disease.      Impression:         1. No acute findings.    2. Non-acute findings are described above.    THIS DOCUMENT HAS BEEN ELECTRONICALLY SIGNED  BY KATYA HURT MD    US Gallbladder [[id number]] Collected:  01/15/18 1937     Updated:  01/15/18 2044    Narrative:       EXAM:  US Abdomen Limited, Right Upper Quadrant    CLINICAL HISTORY:  77 years old, female; Sepsis, unspecified organism; Bacterial infection,   unspecified; Urinary tract infection, site not specified; Generalized abdominal   pain; Other specified abnormal findings of blood chemistry; Abnormal lab test;   Elevated liver enzymes; Additional info: Elevated lft's    TECHNIQUE:  Real-time ultrasound of the right upper quadrant with image documentation.    COMPARISON:  CT ABDOMEN PELVIS WO CONTRAST 2018-01-15 18:57    FINDINGS:  Liver:  No intrahepatic biliary dilation.  No intrahepatic bile duct dilation.  Gallbladder:  Gallbladder wall is thickened, measuring 5 mm.  No   pericholecystic fluid.  Small amount of echogenic, non-shadowing material   within the inferior aspect of the gallbladder, likely sludge.  Common bile duct:  Common bile duct measures 6 cm in diameter.  Pancreas:  Normal as visualized.  Right kidney:  Right kidney is normal in appearance and measures 11.7 cm in   length.  No stones.  No hydronephrosis.      Impression:         1.  Gallbladder sludge, with gallbladder wall thickening, suggesting   cholecystitis.    2.  Borderline dilation of the extrahepatic common bile duct. No intrahepatic   biliary dilation.        THIS DOCUMENT HAS BEEN ELECTRONICALLY SIGNED BY KATYA HURT MD    XR Chest 1 View [[id number]] Collected:  01/16/18 0843     Updated:  01/16/18 0844    Narrative:       EXAMINATION: XR CHEST 1 VW- 01/15/2018     INDICATION: Weak/Dizzy/AMS triage protocol      COMPARISON: NONE     FINDINGS: Portable chest reveals cardiac silhouette to borderline  enlarged. Degenerative changes seen within the spine. Pulmonary  vascularity is within normal limits. No pleural effusion or  pneumothorax. Vascular calcifications seen within the thoracic aorta.             Impression:        No acute cardiopulmonary disease. Chronic changes seen  within the lung fields bilaterally.     D:  01/15/2018  E:  01/16/2018                  Impression:   --Acute complicated UTI.  Escherichia coli in urine from January 3.  Persistent symptoms despite outpatient oral antibiotics with description of Macrobid in H&P.  Patient cannot recall specific antimicrobial.  Repeat urine culture pending.  CT scan abdomen does not suggest any hydronephrosis or other anatomic urinary disturbance.  She does have prior history bladder surgery per her.  Specifics unclear.  If frequent recurrent UTIs, she would likely benefit from urology consultation for further functional/anatomic workup.     --Acute nausea/abnormal liver function tests, with cholestasis by numbers and abnormal right upper quadrant ultrasound suggesting cholecystitis.  Surgery is following to help define timing/option and threshold for cholecystectomy.     --Acute diarrhea.  Patient reports this has improved and no bowel movement since admission.  Stool studies ordered.    PLAN:   --IV Zosyn here likely with transition to oral Augmentin at discharge     --I discussed potential risks and benefits of the prescribed antibiotics that include, but are not limited to, solid organ toxicity, neuro/landon/vestibular toxicity, renal toxicity, CDiff, cytopenias, hypersensitivity, etc.. Patient/Family voice understanding and agree to proceed.     --Check/review labs cultures and scans     --Highly complex set of issues with high risk for further serious morbidity and other serious sequela     --Discussed with microbiology     --Partial history per nursing staff     --Surgery following as above    --Discussed with Dr. Thorne; likely discharge with close follow-up in our office, January 19    Maurice Sanz MD  1/18/2018

## 2018-01-18 NOTE — PROGRESS NOTES
"Patient Name:  Chelsi Ferguson  YOB: 1940  [id number]    Surgery Progress Note    Date of visit: 1/18/2018    Subjective   Subjective: Feeling better. Wants to go home.         Objective     Objective:     /53 (BP Location: Left arm, Patient Position: Lying)  Pulse 64  Temp 97.8 °F (36.6 °C) (Oral)   Resp 18  Ht 152.4 cm (60\")  Wt 74.3 kg (163 lb 11.2 oz)  LMP  (LMP Unknown)  SpO2 93%  BMI 31.97 kg/m2    Intake/Output Summary (Last 24 hours) at 01/18/18 0740  Last data filed at 01/17/18 1812   Gross per 24 hour   Intake              440 ml   Output                0 ml   Net              440 ml       CV:  Rhythm  regular and rate regular   L:  Clear  to auscultation bilaterally   Abd:  Bowel sounds positive , soft, nontender  Ext:  No cyanosis, clubbing, edema    Labs that are back at this time have been reviewed.        Assessment/Plan     Assessment/ Plan:    Cholelithiasis (Chronic)- OK to go home from my standpoint. RTC in 2 weeks to discuss elective Lap CCY.     Hospital Problem List     * (Principal)UTI (urinary tract infection)    Hyperlipidemia (Chronic)        GERD (gastroesophageal reflux disease) (Chronic)    HTN (hypertension) (Chronic)            Hypokalemia    Elevated liver enzymes    Sepsis    Bacterial UTI                  Harinder Mason MD  1/18/2018  7:40 AM      "

## 2018-01-18 NOTE — DISCHARGE SUMMARY
[facility] Medicine Services  DISCHARGE SUMMARY    Patient Name: Chelsi Ferguson  : 1940  MRN: [id number]    Date of Admission: 1/15/2018  Date of Discharge: 2018  Primary Care Physician: Reina Yarbrough MD    Consults     Date and Time Order Name Status Description    1/15/2018 2132 Inpatient Consult to General Surgery Completed     1/15/2018 2127 Inpatient Consult to Infectious Diseases Completed         Hospital Course     Presenting Problem:   Bacterial UTI [N39.0, A49.9]    Active Hospital Problems (** Indicates Principal Problem)    Diagnosis Date Noted   • **UTI (urinary tract infection) [N39.0] 01/15/2018   • Elevated liver enzymes [R74.8] 01/15/2018   • Bacterial UTI [N39.0, A49.9] 01/15/2018   • Cholecystitis [K81.9] 01/15/2018   • Cholelithiasis [K80.20] 2016   • GERD (gastroesophageal reflux disease) [K21.9] 2016   • Hyperlipidemia [E78.5] 2016   • HTN (hypertension) [I10] 2016      Resolved Hospital Problems    Diagnosis Date Noted Date Resolved   • Hypokalemia [E87.6] 01/15/2018 2018   • Sepsis [A41.9] 01/15/2018 2018          Hospital Course:  Chelsi Ferguson is a 77 y.o. female who presented to [facility] ED 1/15/18 evening for fever. Onset 5 days ago 1/10 evening. Gradual. Constant. Moderate. T 102. Reports she saw her PCP 1/3/18 for routine yearly visit, a routine UA was sent out and when pt returned home from vacation she heard the message about UTI and abx at pharmacy and started macrobid 1/10. She felt fine and went out with friends that day, and that night began to feel ill. Associated with chills, decreased PO, weakness/immobility, nausea, diarrhea, reduced UOP, and burning urination. She notes dark urine/discoloration since started the abx (note macrobid).     Mrs. Ferguson notes chronic GI complaints. She states a known hx of cholelithiasis. She states she frequently has indigestion/heartburn/GERD. CT revealed stones, u/s suggests  cholecystitis.     Chelsi Ferguson is being admitted to [facility] for further evaluation and treatment of UTI, sepsis, and cholecystitis.    Cholecystitis  - patient evaluated by General Surgery, who felt patient has chronic cholelithiasis rather than cholecystitis, with LFT elevations due to volume depletion. However, elective CCY may still be an option. The patient will follow up in surgery clinic in 2 weeks to further discuss once treatment for UTI is complete.    Acute complicated UTI, E coli  - will complete course of antibiotics with augmentin for an additional seven days as an outpatient  - probiotics daily recommended    Elevated LFTs  - mild but persistent elevation. Likely due to underlying illnesses above. Will hold statin at time of discharge, resumption per PCP.    Discharge disposition: home with home health. Patient says her daughter in law can help as well.         Day of Discharge     HPI:     Feels stronger today, but not back to baseline. Has been up and walking to the bathroom unassisted. Feels as if she can manage at home.    Review of Systems   Constitutional: Positive for fatigue.   Respiratory: Negative.    Cardiovascular: Negative.    Psychiatric/Behavioral: Negative.          Otherwise ROS is negative except as mentioned in the HPI.    Vital Signs:   Temp:  [97.8 °F (36.6 °C)-98.2 °F (36.8 °C)] 98.2 °F (36.8 °C)  Heart Rate:  [64-75] 67  Resp:  [16-18] 18  BP: (119-135)/(53-74) 135/74     Physical Exam:  Constitutional -no acute distress, non toxic, up in chair  HEENT-NCAT, mucous membranes moist  CV-RRR, S1 S2 normal, no m/r/g  Resp-CTAB, no wheezes, rhonchi or rales  Abd-soft, non-tender, non-distended, normo active bowel sounds  Ext-No lower extremity cyanosis, clubbing or edema bilaterally  Neuro-alert, speech clear, moves all extremities   Psych-normal affect   Skin- No rash on exposed UE or LE bilaterally      Pertinent  and/or Most Recent Results       Results from last 7 days  Lab Units  01/17/18  0642 01/16/18  0722 01/15/18  1749   WBC 10*3/mm3 8.19 12.82* 15.86*   HEMOGLOBIN g/dL 11.5 11.1* 13.7   HEMATOCRIT % 34.9 32.8* 39.9   PLATELETS 10*3/mm3 250 199 193   SODIUM mmol/L 137 135 132   POTASSIUM mmol/L 4.4 4.4 3.1*   CHLORIDE mmol/L 106 104 96*   CO2 mmol/L 24.0 26.0 26.0   BUN mg/dL 6* 10 12   CREATININE mg/dL 0.50* 0.50* 0.50*   GLUCOSE mg/dL 90 86 119*   CALCIUM mg/dL 8.8 7.7* 9.8       Results from last 7 days  Lab Units 01/17/18  0642 01/16/18  0722 01/15/18  1749   BILIRUBIN mg/dL 0.7 0.8 1.4*   ALK PHOS U/L 183* 199* 287*   ALT (SGPT) U/L 66* 84* 128*   AST (SGOT) U/L 35* 57* 124*           Invalid input(s): TG, LDLREALC    Results from last 7 days  Lab Units 01/16/18  0722 01/15/18  1749   TSH mIU/mL 0.656  --    HEMOGLOBIN A1C % 5.90*  --    BNP pg/mL  --  46.0     Brief Urine Lab Results  (Last result in the past 365 days)      Color   Clarity   Blood   Leuk Est   Nitrite   Protein   CREAT   Urine HCG        01/15/18 1745 Orange(A) Cloudy(A) Small (1+)(A) Moderate (2+)(A) Negative Trace(A)               Microbiology Results Abnormal     Procedure Component Value - Date/Time    Blood Culture - Blood, [[id number]]  (Normal) Collected:  01/15/18 1915    Lab Status:  Preliminary result Specimen:  Blood from Arm, Left Updated:  01/17/18 2046     Blood Culture No growth at 2 days    Blood Culture - Blood, [[id number]]  (Normal) Collected:  01/15/18 1915    Lab Status:  Preliminary result Specimen:  Blood from Arm, Right Updated:  01/17/18 2046     Blood Culture No growth at 2 days    Urine Culture - Urine, Urine, Clean Catch [[id number]] Collected:  01/15/18 1745    Lab Status:  Final result Specimen:  Urine from Urine, Clean Catch Updated:  01/17/18 0900     Urine Culture --      20,000-30,000 CFU/mL Normal Urogenital Jonna    Influenza A & B, RT PCR - Swab, Nasopharynx [[id number]]  (Normal) Collected:  01/15/18 2041    Lab Status:  Final result Specimen:  Swab from Nasopharynx Updated:   01/15/18 2223     Influenza A PCR Not Detected     Influenza B PCR Not Detected    Influenza Antigen, Rapid - Swab, Nasopharynx [[id number]]  (Normal) Collected:  01/15/18 1850    Lab Status:  Final result Specimen:  Swab from Nasopharynx Updated:  01/15/18 1915     Influenza A Ag, EIA Negative     Influenza B Ag, EIA Negative          Imaging Results (all)     Procedure Component Value Units Date/Time    CT Abdomen Pelvis Without Contrast [[id number]] Collected:  01/15/18 1834     Updated:  01/15/18 1931    Narrative:       EXAM:  CT Abdomen and Pelvis Without Intravenous Contrast    CLINICAL HISTORY:  77 years old, female; Pain; Abdominal pain; Localized; Left lower quadrant   (llq); Prior surgery; Surgery type: Curtis, bladder mesh, rectocele; Additional   info: Llq pain fever, dysuria    TECHNIQUE:  Axial computed tomography images of the abdomen and pelvis without intravenous   contrast.  All CT scans at this facility use one or more dose reduction   techniques, viz.: automated exposure control; ma/kV adjustment per patient size   (including targeted exams where dose is matched to indication; i.e. head); or   iterative reconstruction technique.  Coronal reformatted images were created and reviewed.    COMPARISON:  No relevant prior studies available.    FINDINGS:  Lower thorax:  Minimal calcification of the mitral valve annulus.  Small-sized   hiatal hernia.    ABDOMEN:  Liver:  Normal.  Gallbladder and bile ducts:  Cholelithiasis, without CT evidence of acute   cholecystitis.  Pancreas:  Normal.  Spleen:  8mm hypoattenuating focus within the inferior aspect of the medial   spleen (series 3, image 26), likely simple cyst.  Adrenals:  Normal.  Kidneys and ureters:  Normal.  Stomach and bowel:  Extensive colonic diverticulosis.  Appendix:  Appendix is normal.    PELVIS:  Bladder:  Normal.  Reproductive:  Normal as visualized.    ABDOMEN and PELVIS:  Intraperitoneal space:  Normal.  No free air.  No significant fluid  collection.  Bones/joints:  Degenerative changes of the hips and sacroiliac joints.    Multilevel thoraco-lumbar spine degenerative changes, with levoscoliosis of the   lumbar spine.  No acute fracture.  No dislocation.  Soft tissues:  Small fat containing umbilical hernia.  Vasculature:  Atherosclerotic disease of the thoracoabdominal aorta.    Phleboliths within the pelvis.  No abdominal aortic aneurysm.  Lymph nodes:  Normal.  Other findings:  Spot calcifications, compatible with prior granulomas disease.      Impression:         1. No acute findings.    2. Non-acute findings are described above.    THIS DOCUMENT HAS BEEN ELECTRONICALLY SIGNED BY KATYA HURT MD    US Gallbladder [[id number]] Collected:  01/15/18 1937     Updated:  01/15/18 2044    Narrative:       EXAM:  US Abdomen Limited, Right Upper Quadrant    CLINICAL HISTORY:  77 years old, female; Sepsis, unspecified organism; Bacterial infection,   unspecified; Urinary tract infection, site not specified; Generalized abdominal   pain; Other specified abnormal findings of blood chemistry; Abnormal lab test;   Elevated liver enzymes; Additional info: Elevated lft's    TECHNIQUE:  Real-time ultrasound of the right upper quadrant with image documentation.    COMPARISON:  CT ABDOMEN PELVIS WO CONTRAST 2018-01-15 18:57    FINDINGS:  Liver:  No intrahepatic biliary dilation.  No intrahepatic bile duct dilation.  Gallbladder:  Gallbladder wall is thickened, measuring 5 mm.  No   pericholecystic fluid.  Small amount of echogenic, non-shadowing material   within the inferior aspect of the gallbladder, likely sludge.  Common bile duct:  Common bile duct measures 6 cm in diameter.  Pancreas:  Normal as visualized.  Right kidney:  Right kidney is normal in appearance and measures 11.7 cm in   length.  No stones.  No hydronephrosis.      Impression:         1.  Gallbladder sludge, with gallbladder wall thickening, suggesting   cholecystitis.    2.  Borderline dilation  of the extrahepatic common bile duct. No intrahepatic   biliary dilation.        THIS DOCUMENT HAS BEEN ELECTRONICALLY SIGNED BY KATYA HURT MD    XR Chest 1 View [[id number]] Collected:  01/16/18 0843     Updated:  01/17/18 0917    Narrative:       EXAMINATION: XR CHEST 1 VW- 01/15/2018     INDICATION: Weak/Dizzy/AMS triage protocol      COMPARISON: NONE     FINDINGS: Portable chest reveals cardiac silhouette to borderline  enlarged. Degenerative changes seen within the spine. Pulmonary  vascularity is within normal limits. No pleural effusion or  pneumothorax. Vascular calcifications seen within the thoracic aorta.             Impression:       No acute cardiopulmonary disease. Chronic changes seen  within the lung fields bilaterally.     D:  01/15/2018  E:  01/16/2018     This report was finalized on 1/17/2018 9:15 AM by Dr. Asuncion Young MD.                   Order Current Status    Blood Culture - Blood, Preliminary result    Blood Culture - Blood, Preliminary result        Discharge Details      Chelsi Ferguson   Home Medication Instructions CARLY:[id number]    Printed on:01/18/18 1237   Medication Information                      acetaminophen (TYLENOL) 500 MG tablet  Take 500 mg by mouth Every 6 (Six) Hours As Needed for mild pain (1-3).             amoxicillin-clavulanate (AUGMENTIN) 875-125 MG per tablet  Take 1 tablet by mouth Every 12 (Twelve) Hours for 7 days.             aspirin 81 MG EC tablet  Take 81 mg by mouth Every Night.             Biotin 5000 MCG capsule  Take  by mouth.             clotrimazole (LOTRIMIN) 1 % cream  Apply  topically Every 12 (Twelve) Hours.             Coenzyme Q10 200 MG capsule  Take 200 mg by mouth Daily.             estradiol (VAGIFEM) 10 MCG tablet vaginal tablet  Insert 1 tablet into the vagina 2 (Two) Times a Week.             metoprolol succinate XL (TOPROL-XL) 25 MG 24 hr tablet  Take 1 tablet by mouth Daily.             Multiple Vitamins-Minerals (MULTIVITAL  PO)  Take 1 tablet by mouth Daily.             nitroglycerin (NITROSTAT) 0.4 MG SL tablet               Omega-3 Fatty Acids (FISH OIL) 1000 MG capsule capsule  Take 1,000 mg by mouth Daily With Breakfast.                   Discharge Disposition:  Home or Self Care    Discharge Diet:      Discharge Activity:       Special Instructions:      Future Appointments  Date Time Provider Department Center   2/9/2018 9:00 AM [facility] DEXA 1 BH [facility] DX 60 [facility]   7/11/2018 9:30 AM Alexandra MAI MD [facility] None       Additional Instructions for the Follow-ups that You Need to Schedule     Discharge Follow-up with PCP    As directed    Follow Up Details:  follow up PCP Zenon within one week           Discharge Follow-up with Specialty: Dr. Mason; 2 Weeks    As directed    Specialty:  Dr. Mason    Follow Up:  2 Weeks           Discharge Follow-up with Specialty: followup General Surgery Dr Mason in 2 weeks    As directed    Specialty:  followup General Surgery Dr Mason in 2 weeks           Referral to Home Health    As directed    Face to Face Visit Date:  1/17/2018    Follow-up Provider for Plan of Care?:  I treated the patient in an acute care facility and will not continue treatment after discharge.    Follow-up Provider:  ALEXANDRA BARFIELD [[id number]]    Reason/Clinical Findings:  weakness/ recurrent UTI/ n/v    Describe mobility limitations that make leaving home difficult:  weakness from n/v dehydration/ abdominal pain    Nursing/Therapeutic Services Requested:  Physical Therapy Occupational Therapy    PT orders:  Therapeutic exercise Strengthening Transfer training Home safety assessment    Occupational orders:  Activities of daily living Energy conservation Strengthening Home safety assessment    Frequency:  1 Week 1                     Time Spent on Discharge:  35 minutes    Arvin Thorne MD  01/18/18  12:37 PM

## 2018-01-18 NOTE — PROGRESS NOTES
Continued Stay Note  RUBEN Farmer     Patient Name: Chelsi Ferguson  MRN: [id number]  Today's Date: 1/18/2018    Admit Date: 1/15/2018          Discharge Plan       01/18/18 1132    Case Management/Social Work Plan    Plan Home at discharge    Patient/Family In Agreement With Plan yes    Additional Comments Patient was able to ambulate 250ft with PT and they do not have any added recommendations for home PT - Patient's goal remains to return home when medically ready - CM following -  [phone]               Discharge Codes     None            Jing Sagastume RN

## 2018-01-18 NOTE — PLAN OF CARE
Problem: Patient Care Overview (Adult)  Goal: Plan of Care Review  Outcome: Ongoing (interventions implemented as appropriate)   01/18/18 0883   Coping/Psychosocial Response Interventions   Plan Of Care Reviewed With patient   Patient Care Overview   Progress progress toward functional goals as expected   Outcome Evaluation   Outcome Summary/Follow up Plan Pt has slept well throughout the night. No c/o pain. Hopeful for discharge tomorrow.

## 2018-01-19 ENCOUNTER — TRANSITIONAL CARE MANAGEMENT TELEPHONE ENCOUNTER (OUTPATIENT)
Dept: INTERNAL MEDICINE | Facility: CLINIC | Age: 78
End: 2018-01-19

## 2018-01-19 ENCOUNTER — TRANSCRIBE ORDERS (OUTPATIENT)
Dept: LAB | Facility: HOSPITAL | Age: 78
End: 2018-01-19

## 2018-01-19 ENCOUNTER — APPOINTMENT (OUTPATIENT)
Dept: LAB | Facility: HOSPITAL | Age: 78
End: 2018-01-19

## 2018-01-19 ENCOUNTER — LAB REQUISITION (OUTPATIENT)
Dept: LAB | Facility: HOSPITAL | Age: 78
End: 2018-01-19

## 2018-01-19 DIAGNOSIS — Z00.00 ROUTINE GENERAL MEDICAL EXAMINATION AT A HEALTH CARE FACILITY: ICD-10-CM

## 2018-01-19 LAB
ALBUMIN SERPL-MCNC: 3.9 G/DL (ref 3.2–4.8)
ALBUMIN/GLOB SERPL: 1.7 G/DL (ref 1.5–2.5)
ALP SERPL-CCNC: 176 U/L (ref 25–100)
ALT SERPL W P-5'-P-CCNC: 56 U/L (ref 7–40)
ANION GAP SERPL CALCULATED.3IONS-SCNC: 8 MMOL/L (ref 3–11)
AST SERPL-CCNC: 36 U/L (ref 0–33)
BASOPHILS # BLD AUTO: 0.02 10*3/MM3 (ref 0–0.2)
BASOPHILS NFR BLD AUTO: 0.2 % (ref 0–1)
BILIRUB SERPL-MCNC: 0.6 MG/DL (ref 0.3–1.2)
BUN BLD-MCNC: 9 MG/DL (ref 9–23)
BUN/CREAT SERPL: 15 (ref 7–25)
CALCIUM SPEC-SCNC: 10 MG/DL (ref 8.7–10.4)
CHLORIDE SERPL-SCNC: 101 MMOL/L (ref 99–109)
CO2 SERPL-SCNC: 31 MMOL/L (ref 20–31)
CREAT BLD-MCNC: 0.6 MG/DL (ref 0.6–1.3)
DEPRECATED RDW RBC AUTO: 50.7 FL (ref 37–54)
EOSINOPHIL # BLD AUTO: 0.25 10*3/MM3 (ref 0–0.3)
EOSINOPHIL NFR BLD AUTO: 2.6 % (ref 0–3)
ERYTHROCYTE [DISTWIDTH] IN BLOOD BY AUTOMATED COUNT: 14.4 % (ref 11.3–14.5)
GFR SERPL CREATININE-BSD FRML MDRD: 97 ML/MIN/1.73
GLOBULIN UR ELPH-MCNC: 2.3 GM/DL
GLUCOSE BLD-MCNC: 110 MG/DL (ref 70–100)
HCT VFR BLD AUTO: 39.4 % (ref 34.5–44)
HGB BLD-MCNC: 12.9 G/DL (ref 11.5–15.5)
IMM GRANULOCYTES # BLD: 0.09 10*3/MM3 (ref 0–0.03)
IMM GRANULOCYTES NFR BLD: 0.9 % (ref 0–0.6)
LYMPHOCYTES # BLD AUTO: 2.28 10*3/MM3 (ref 0.6–4.8)
LYMPHOCYTES NFR BLD AUTO: 23.8 % (ref 24–44)
MCH RBC QN AUTO: 31.5 PG (ref 27–31)
MCHC RBC AUTO-ENTMCNC: 32.7 G/DL (ref 32–36)
MCV RBC AUTO: 96.3 FL (ref 80–99)
MONOCYTES # BLD AUTO: 1.22 10*3/MM3 (ref 0–1)
MONOCYTES NFR BLD AUTO: 12.8 % (ref 0–12)
NEUTROPHILS # BLD AUTO: 5.7 10*3/MM3 (ref 1.5–8.3)
NEUTROPHILS NFR BLD AUTO: 59.7 % (ref 41–71)
PLATELET # BLD AUTO: 396 10*3/MM3 (ref 150–450)
PMV BLD AUTO: 9.7 FL (ref 6–12)
POTASSIUM BLD-SCNC: 4.7 MMOL/L (ref 3.5–5.5)
PROT SERPL-MCNC: 6.2 G/DL (ref 5.7–8.2)
RBC # BLD AUTO: 4.09 10*6/MM3 (ref 3.89–5.14)
SODIUM BLD-SCNC: 140 MMOL/L (ref 132–146)
WBC NRBC COR # BLD: 9.56 10*3/MM3 (ref 3.5–10.8)

## 2018-01-19 PROCEDURE — 85025 COMPLETE CBC W/AUTO DIFF WBC: CPT | Performed by: INTERNAL MEDICINE

## 2018-01-19 PROCEDURE — 36415 COLL VENOUS BLD VENIPUNCTURE: CPT | Performed by: INTERNAL MEDICINE

## 2018-01-19 PROCEDURE — 80053 COMPREHEN METABOLIC PANEL: CPT | Performed by: INTERNAL MEDICINE

## 2018-01-20 LAB
BACTERIA SPEC AEROBE CULT: NORMAL
BACTERIA SPEC AEROBE CULT: NORMAL

## 2018-01-22 ENCOUNTER — TELEPHONE (OUTPATIENT)
Dept: INTERNAL MEDICINE | Facility: CLINIC | Age: 78
End: 2018-01-22

## 2018-01-25 ENCOUNTER — OFFICE VISIT (OUTPATIENT)
Dept: INTERNAL MEDICINE | Facility: CLINIC | Age: 78
End: 2018-01-25

## 2018-01-25 VITALS
TEMPERATURE: 97.7 F | WEIGHT: 161.6 LBS | OXYGEN SATURATION: 97 % | SYSTOLIC BLOOD PRESSURE: 122 MMHG | DIASTOLIC BLOOD PRESSURE: 68 MMHG | HEART RATE: 67 BPM | BODY MASS INDEX: 31.56 KG/M2

## 2018-01-25 DIAGNOSIS — Z86.19 H/O SEPSIS: ICD-10-CM

## 2018-01-25 DIAGNOSIS — K80.20 GALLSTONES: Primary | ICD-10-CM

## 2018-01-25 DIAGNOSIS — N39.0 BACTERIAL UTI: ICD-10-CM

## 2018-01-25 DIAGNOSIS — A49.9 BACTERIAL UTI: ICD-10-CM

## 2018-01-25 LAB
BILIRUB BLD-MCNC: NEGATIVE MG/DL
CLARITY, POC: CLEAR
COLOR UR: ABNORMAL
GLUCOSE UR STRIP-MCNC: NEGATIVE MG/DL
KETONES UR QL: NEGATIVE
LEUKOCYTE EST, POC: NEGATIVE
NITRITE UR-MCNC: NEGATIVE MG/ML
PH UR: 6 [PH] (ref 5–8)
PROT UR STRIP-MCNC: NEGATIVE MG/DL
RBC # UR STRIP: NEGATIVE /UL
SP GR UR: 1.02 (ref 1–1.03)
UROBILINOGEN UR QL: NORMAL

## 2018-01-25 PROCEDURE — 81003 URINALYSIS AUTO W/O SCOPE: CPT | Performed by: FAMILY MEDICINE

## 2018-01-25 PROCEDURE — 99496 TRANSJ CARE MGMT HIGH F2F 7D: CPT | Performed by: FAMILY MEDICINE

## 2018-01-27 LAB
ALBUMIN SERPL-MCNC: 4 G/DL (ref 3.2–4.8)
ALBUMIN/GLOB SERPL: 1.7 G/DL (ref 1.5–2.5)
ALP SERPL-CCNC: 120 U/L (ref 25–100)
ALT SERPL-CCNC: 19 U/L (ref 7–40)
AST SERPL-CCNC: 24 U/L (ref 0–33)
BACTERIA UR CULT: NO GROWTH
BACTERIA UR CULT: NORMAL
BASOPHILS # BLD AUTO: 0.04 10*3/MM3 (ref 0–0.2)
BASOPHILS NFR BLD AUTO: 0.5 % (ref 0–1)
BILIRUB SERPL-MCNC: 0.5 MG/DL (ref 0.3–1.2)
BUN SERPL-MCNC: 11 MG/DL (ref 9–23)
BUN/CREAT SERPL: 18.3 (ref 7–25)
CALCIUM SERPL-MCNC: 9.9 MG/DL (ref 8.7–10.4)
CHLORIDE SERPL-SCNC: 103 MMOL/L (ref 99–109)
CO2 SERPL-SCNC: 28 MMOL/L (ref 20–31)
CREAT SERPL-MCNC: 0.6 MG/DL (ref 0.6–1.3)
EOSINOPHIL # BLD AUTO: 0.13 10*3/MM3 (ref 0–0.3)
EOSINOPHIL NFR BLD AUTO: 1.8 % (ref 0–3)
ERYTHROCYTE [DISTWIDTH] IN BLOOD BY AUTOMATED COUNT: 14.3 % (ref 11.3–14.5)
GFR SERPLBLD CREATININE-BSD FMLA CKD-EPI: 117 ML/MIN/1.73
GFR SERPLBLD CREATININE-BSD FMLA CKD-EPI: 97 ML/MIN/1.73
GLOBULIN SER CALC-MCNC: 2.3 GM/DL
GLUCOSE SERPL-MCNC: 87 MG/DL (ref 70–100)
HCT VFR BLD AUTO: 41.8 % (ref 34.5–44)
HGB BLD-MCNC: 13.4 G/DL (ref 11.5–15.5)
IMM GRANULOCYTES # BLD: 0.01 10*3/MM3 (ref 0–0.03)
IMM GRANULOCYTES NFR BLD: 0.1 % (ref 0–0.6)
LYMPHOCYTES # BLD AUTO: 2.15 10*3/MM3 (ref 0.6–4.8)
LYMPHOCYTES NFR BLD AUTO: 29.4 % (ref 24–44)
MCH RBC QN AUTO: 31.6 PG (ref 27–31)
MCHC RBC AUTO-ENTMCNC: 32.1 G/DL (ref 32–36)
MCV RBC AUTO: 98.6 FL (ref 80–99)
MONOCYTES # BLD AUTO: 0.77 10*3/MM3 (ref 0–1)
MONOCYTES NFR BLD AUTO: 10.5 % (ref 0–12)
NEUTROPHILS # BLD AUTO: 4.22 10*3/MM3 (ref 1.5–8.3)
NEUTROPHILS NFR BLD AUTO: 57.7 % (ref 41–71)
NRBC BLD AUTO-RTO: 0 /100 WBC (ref 0–0)
PLATELET # BLD AUTO: 462 10*3/MM3 (ref 150–450)
POTASSIUM SERPL-SCNC: 4.8 MMOL/L (ref 3.5–5.5)
PROT SERPL-MCNC: 6.3 G/DL (ref 5.7–8.2)
RBC # BLD AUTO: 4.24 10*6/MM3 (ref 3.89–5.14)
SODIUM SERPL-SCNC: 139 MMOL/L (ref 132–146)
WBC # BLD AUTO: 7.32 10*3/MM3 (ref 3.5–10.8)

## 2018-02-07 ENCOUNTER — OFFICE VISIT (OUTPATIENT)
Dept: INTERNAL MEDICINE | Facility: CLINIC | Age: 78
End: 2018-02-07

## 2018-02-07 VITALS
OXYGEN SATURATION: 97 % | DIASTOLIC BLOOD PRESSURE: 66 MMHG | SYSTOLIC BLOOD PRESSURE: 110 MMHG | HEART RATE: 68 BPM | BODY MASS INDEX: 31.64 KG/M2 | TEMPERATURE: 97.1 F | WEIGHT: 162 LBS

## 2018-02-07 DIAGNOSIS — R82.90 ABNORMAL URINE: Primary | ICD-10-CM

## 2018-02-07 DIAGNOSIS — N30.00 ACUTE CYSTITIS WITHOUT HEMATURIA: ICD-10-CM

## 2018-02-07 LAB
BILIRUB BLD-MCNC: NEGATIVE MG/DL
CLARITY, POC: CLEAR
COLOR UR: ABNORMAL
GLUCOSE UR STRIP-MCNC: NEGATIVE MG/DL
KETONES UR QL: NEGATIVE
LEUKOCYTE EST, POC: ABNORMAL
NITRITE UR-MCNC: POSITIVE MG/ML
PH UR: 7 [PH] (ref 5–8)
PROT UR STRIP-MCNC: NEGATIVE MG/DL
RBC # UR STRIP: NEGATIVE /UL
SP GR UR: 1.01 (ref 1–1.03)
UROBILINOGEN UR QL: NORMAL

## 2018-02-07 PROCEDURE — 81003 URINALYSIS AUTO W/O SCOPE: CPT | Performed by: FAMILY MEDICINE

## 2018-02-07 PROCEDURE — 99213 OFFICE O/P EST LOW 20 MIN: CPT | Performed by: FAMILY MEDICINE

## 2018-02-07 RX ORDER — SULFAMETHOXAZOLE AND TRIMETHOPRIM 800; 160 MG/1; MG/1
1 TABLET ORAL 2 TIMES DAILY
Qty: 20 TABLET | Refills: 0 | Status: SHIPPED | OUTPATIENT
Start: 2018-02-07 | End: 2018-02-21

## 2018-02-07 NOTE — PROGRESS NOTES
Subjective   Chelsi Ferguson is a 77 y.o. female.     Chief Complaint   Patient presents with   • Abdominal Pain     some pain when urinating   • ear wax concern     pt would like to make sure she isn't going to build up wax again       Visit Vitals   • /66   • Pulse 68   • Temp 97.1 °F (36.2 °C)   • Wt 73.5 kg (162 lb)   • LMP  (LMP Unknown)   • SpO2 97%   • BMI 31.64 kg/m2       Abdominal Pain   This is a new problem. The current episode started yesterday. The onset quality is undetermined. The problem occurs intermittently. The problem has been waxing and waning. The pain is located in the suprapubic region. The pain is at a severity of 3/10. The quality of the pain is aching. The abdominal pain radiates to the LLQ. Associated symptoms include dysuria and frequency. Pertinent negatives include no anorexia, arthralgias, belching, constipation, diarrhea, fever, flatus, headaches, hematochezia, hematuria, melena, myalgias, nausea, vomiting or weight loss. Nothing aggravates the pain. The pain is relieved by nothing. She has tried nothing for the symptoms. The treatment provided no relief. Her past medical history is significant for gallstones. There is no history of abdominal surgery, colon cancer, Crohn's disease, GERD, irritable bowel syndrome, pancreatitis, PUD or ulcerative colitis.      Pt has seen Dr Mason who agrees that the GB needs to come out soon. Pt is scheduled for March 7th.   Pt is feeling better and stronger since going to PT.  Pt is no longer using a walker.   The following portions of the patient's history were reviewed and updated as appropriate: allergies, current medications, past family history, past medical history, past social history, past surgical history and problem list.    Past Medical History:   Diagnosis Date   • Ankle pain    • Chest discomfort    • Cholecystitis 1/15/2018   • Edema    • Elevated liver enzymes 1/15/2018   • Gallstones    • GERD (gastroesophageal reflux disease)    •  Glaucoma     pt is currently off of meds and Dr Tanner does not think that she is glaucoma   • H/O complete eye exam 06/2013   • H/O mammogram 11/16/2015   • H/O non-ST elevation myocardial infarction (NSTEMI) 01/2005   • Hammer toe    • Heart attack 01/2005   • History of blood transfusion 01/2005   • History of cardiovascular stress test 12/02/2015    normal   • HTN (hypertension)    • Hyperlipidemia    • Hypokalemia 1/15/2018   • Kidney infection 1971   • Menopausal symptoms    • Onychia and paronychia of finger    • Osteopenia    • Other elevated white blood cell count    • Pap smear for cervical cancer screening 2010    [facility]   • Pneumonia    • Sepsis 1/15/2018   • UTI (urinary tract infection) 1/15/2018   • Viral warts      Past Surgical History:   Procedure Laterality Date   • BLADDER REPAIR  2002   • BLADDER REPAIR  2003   • BUNIONECTOMY Right 2010   • COLONOSCOPY  2008, 4/2014    [facility]   • CORONARY STENT PLACEMENT Left 01/2005    drug eluting stent 1/2005 LAD   • DILATATION AND CURETTAGE  1971   • DILATATION AND CURETTAGE  1978   • EYE SURGERY  05/2016   • FRONTALIS SUSPENSION  2010   • HAMMER TOE REPAIR Right 11/03/2010   • HYSTERECTOMY  1978   • LAPAROTOMY OOPHERECTOMY Bilateral 2002   • NOSE SURGERY  03/2017    repair 3 fractured areas. Dr Dunne   • OTHER SURGICAL HISTORY  2005    rectocele repair   • OTHER SURGICAL HISTORY  2010    eyelid surgery   • REPLACEMENT TOTAL KNEE BILATERAL Bilateral    • TONSILLECTOMY  1952   • TOTAL KNEE ARTHROPLASTY  01/2005   • UTERINE FIBROID SURGERY  1978    emergency removal of fibroid from birth canal     Allergies   Allergen Reactions   • Aleve [Naproxen] Hives   • Indocin [Indomethacin] Hallucinations   • Celebrex [Celecoxib] Diarrhea   • Ibuprofen GI Intolerance   • Keflex [Cephalexin]      Upset stomach, may have had some blood in stool but cannot remember the reason why she was put on it. Tolerates penicillins without problem.   • Lipitor [Atorvastatin] Other  (See Comments)     Patient does not recall   • Prevacid [Lansoprazole] Nausea And Vomiting   • Prilosec [Omeprazole] Nausea And Vomiting   • Statins Hives   • Zocor [Simvastatin] Hives       Family History   Problem Relation Age of Onset   • Stroke Mother    • Heart failure Father      congestive   • Cancer Father      lip   • Breast cancer Sister      60's   • Breast cancer Daughter 40   • Breast cancer Other      NIECE 60's   • Breast cancer Other      NIECE 60's   • Ovarian cancer Neg Hx        Social History     Social History   • Marital status:      Spouse name: N/A   • Number of children: N/A   • Years of education: N/A     Occupational History   • Not on file.     Social History Main Topics   • Smoking status: Never Smoker   • Smokeless tobacco: Never Used   • Alcohol use No   • Drug use: No   • Sexual activity: Yes     Birth control/ protection: None     Other Topics Concern   • Not on file     Social History Narrative       Review of Systems   Constitutional: Negative.  Negative for chills, diaphoresis, fatigue, fever and weight loss.   HENT: Positive for hearing loss. Negative for ear pain, nosebleeds, postnasal drip, rhinorrhea, sinus pressure, sneezing and sore throat.    Eyes: Negative.  Negative for redness and itching.   Respiratory: Negative.  Negative for cough, shortness of breath and wheezing.    Cardiovascular: Negative.  Negative for chest pain and palpitations.   Gastrointestinal: Positive for abdominal pain. Negative for anorexia, constipation, diarrhea, flatus, hematochezia, melena, nausea and vomiting.   Endocrine: Negative.  Negative for cold intolerance and heat intolerance.   Genitourinary: Positive for dysuria, frequency and urgency. Negative for hematuria.   Musculoskeletal: Negative.  Negative for arthralgias, back pain, myalgias and neck pain.   Skin: Negative.  Negative for color change and rash.   Allergic/Immunologic: Negative.  Negative for environmental allergies.    Neurological: Negative.  Negative for dizziness, syncope, light-headedness and headaches.   Hematological: Negative.  Negative for adenopathy. Does not bruise/bleed easily.   Psychiatric/Behavioral: Negative.  Negative for dysphoric mood. The patient is not nervous/anxious.        Objective   Physical Exam   Constitutional: She is oriented to person, place, and time. She appears well-developed.   HENT:   Head: Normocephalic.   Right Ear: Tympanic membrane, external ear and ear canal normal.   Left Ear: Tympanic membrane, external ear and ear canal normal.   Nose: Nose normal.   Mouth/Throat: Uvula is midline, oropharynx is clear and moist and mucous membranes are normal. No oropharyngeal exudate or posterior oropharyngeal edema.   Eyes: Conjunctivae and EOM are normal. Pupils are equal, round, and reactive to light.   Neck: Normal range of motion. Neck supple.   Cardiovascular: Normal rate and regular rhythm.    No murmur heard.  Pulmonary/Chest: Effort normal and breath sounds normal. She has no decreased breath sounds. She has no wheezes. She has no rhonchi. She has no rales.   Abdominal: Soft. Bowel sounds are normal. There is no hepatosplenomegaly. There is tenderness in the left lower quadrant. There is no rigidity, no rebound, no guarding and no CVA tenderness.       Musculoskeletal: Normal range of motion.   Neurological: She is alert and oriented to person, place, and time.   Skin: Skin is warm and dry.   Psychiatric: She has a normal mood and affect. Her behavior is normal.   Nursing note and vitals reviewed.  pt appears much stronger than visit 2 weeks ago.      Assessment/Plan   Chelsi was seen today for abdominal pain and ear wax concern.    Diagnoses and all orders for this visit:    Abnormal urine  -     POCT urinalysis dipstick, automated  -     Urine Culture - Urine, Urine, Clean Catch    Acute cystitis without hematuria    Other orders  -     sulfamethoxazole-trimethoprim (BACTRIM DS,SEPTRA DS)  800-160 MG per tablet; Take 1 tablet by mouth 2 (Two) Times a Day.    ok to drive short distances if weather ok    If pain is worse in pelvic/LLQ -or not improving in a few days, need to be seen.           Current Outpatient Prescriptions:   •  acetaminophen (TYLENOL) 500 MG tablet, Take 500 mg by mouth Every 6 (Six) Hours As Needed for mild pain (1-3)., Disp: , Rfl:   •  aspirin 81 MG EC tablet, Take 81 mg by mouth Every Night., Disp: , Rfl:   •  Biotin 5000 MCG capsule, Take  by mouth., Disp: , Rfl:   •  Coenzyme Q10 200 MG capsule, Take 200 mg by mouth Daily., Disp: , Rfl:   •  estradiol (VAGIFEM) 10 MCG tablet vaginal tablet, Insert 1 tablet into the vagina 2 (Two) Times a Week., Disp: 24 tablet, Rfl: 1  •  Famotidine (PEPCID PO), Take 1 tablet by mouth Daily., Disp: , Rfl:   •  metoprolol succinate XL (TOPROL-XL) 25 MG 24 hr tablet, Take 1 tablet by mouth Daily., Disp: , Rfl:   •  Multiple Vitamins-Minerals (MULTIVITAL PO), Take 1 tablet by mouth Daily., Disp: , Rfl:   •  nitroglycerin (NITROSTAT) 0.4 MG SL tablet, , Disp: , Rfl:   •  Omega-3 Fatty Acids (FISH OIL) 1000 MG capsule capsule, Take 1,000 mg by mouth Daily With Breakfast., Disp: , Rfl:   •  Probiotic Product (PROBIOTIC DAILY PO), Take  by mouth., Disp: , Rfl:   •  sulfamethoxazole-trimethoprim (BACTRIM DS,SEPTRA DS) 800-160 MG per tablet, Take 1 tablet by mouth 2 (Two) Times a Day., Disp: 20 tablet, Rfl: 0    Return in about 2 weeks (around 2/21/2018) for Recheck.    Recent Results (from the past 504 hour(s))   Comprehensive Metabolic Panel    Collection Time: 01/19/18 10:13 AM   Result Value Ref Range    Glucose 110 (H) 70 - 100 mg/dL    BUN 9 9 - 23 mg/dL    Creatinine 0.60 0.60 - 1.30 mg/dL    Sodium 140 132 - 146 mmol/L    Potassium 4.7 3.5 - 5.5 mmol/L    Chloride 101 99 - 109 mmol/L    CO2 31.0 20.0 - 31.0 mmol/L    Calcium 10.0 8.7 - 10.4 mg/dL    Total Protein 6.2 5.7 - 8.2 g/dL    Albumin 3.90 3.20 - 4.80 g/dL    ALT (SGPT) 56 (H) 7 - 40  U/L    AST (SGOT) 36 (H) 0 - 33 U/L    Alkaline Phosphatase 176 (H) 25 - 100 U/L    Total Bilirubin 0.6 0.3 - 1.2 mg/dL    eGFR Non African Amer 97 >60 mL/min/1.73    Globulin 2.3 gm/dL    A/G Ratio 1.7 1.5 - 2.5 g/dL    BUN/Creatinine Ratio 15.0 7.0 - 25.0    Anion Gap 8.0 3.0 - 11.0 mmol/L   CBC Auto Differential    Collection Time: 01/19/18 10:13 AM   Result Value Ref Range    WBC 9.56 3.50 - 10.80 10*3/mm3    RBC 4.09 3.89 - 5.14 10*6/mm3    Hemoglobin 12.9 11.5 - 15.5 g/dL    Hematocrit 39.4 34.5 - 44.0 %    MCV 96.3 80.0 - 99.0 fL    MCH 31.5 (H) 27.0 - 31.0 pg    MCHC 32.7 32.0 - 36.0 g/dL    RDW 14.4 11.3 - 14.5 %    RDW-SD 50.7 37.0 - 54.0 fl    MPV 9.7 6.0 - 12.0 fL    Platelets 396 150 - 450 10*3/mm3    Neutrophil % 59.7 41.0 - 71.0 %    Lymphocyte % 23.8 (L) 24.0 - 44.0 %    Monocyte % 12.8 (H) 0.0 - 12.0 %    Eosinophil % 2.6 0.0 - 3.0 %    Basophil % 0.2 0.0 - 1.0 %    Immature Grans % 0.9 (H) 0.0 - 0.6 %    Neutrophils, Absolute 5.70 1.50 - 8.30 10*3/mm3    Lymphocytes, Absolute 2.28 0.60 - 4.80 10*3/mm3    Monocytes, Absolute 1.22 (H) 0.00 - 1.00 10*3/mm3    Eosinophils, Absolute 0.25 0.00 - 0.30 10*3/mm3    Basophils, Absolute 0.02 0.00 - 0.20 10*3/mm3    Immature Grans, Absolute 0.09 (H) 0.00 - 0.03 10*3/mm3   CBC & Differential    Collection Time: 01/25/18  9:50 AM   Result Value Ref Range    WBC 7.32 3.50 - 10.80 10*3/mm3    RBC 4.24 3.89 - 5.14 10*6/mm3    Hemoglobin 13.4 11.5 - 15.5 g/dL    Hematocrit 41.8 34.5 - 44.0 %    MCV 98.6 80.0 - 99.0 fL    MCH 31.6 (H) 27.0 - 31.0 pg    MCHC 32.1 32.0 - 36.0 g/dL    RDW 14.3 11.3 - 14.5 %    Platelets 462 (H) 150 - 450 10*3/mm3    Neutrophil Rel % 57.7 41.0 - 71.0 %    Lymphocyte Rel % 29.4 24.0 - 44.0 %    Monocyte Rel % 10.5 0.0 - 12.0 %    Eosinophil Rel % 1.8 0.0 - 3.0 %    Basophil Rel % 0.5 0.0 - 1.0 %    Neutrophils Absolute 4.22 1.50 - 8.30 10*3/mm3    Lymphocytes Absolute 2.15 0.60 - 4.80 10*3/mm3    Monocytes Absolute 0.77 0.00 - 1.00  10*3/mm3    Eosinophils Absolute 0.13 0.00 - 0.30 10*3/mm3    Basophils Absolute 0.04 0.00 - 0.20 10*3/mm3    Immature Granulocyte Rel % 0.1 0.0 - 0.6 %    Immature Grans Absolute 0.01 0.00 - 0.03 10*3/mm3    nRBC 0.0 0.0 - 0.0 /100 WBC   Comprehensive Metabolic Panel    Collection Time: 01/25/18  9:50 AM   Result Value Ref Range    Glucose 87 70 - 100 mg/dL    BUN 11 9 - 23 mg/dL    Creatinine 0.60 0.60 - 1.30 mg/dL    eGFR Non African Am 97 >60 mL/min/1.73    eGFR African Am 117 >60 mL/min/1.73    BUN/Creatinine Ratio 18.3 7.0 - 25.0    Sodium 139 132 - 146 mmol/L    Potassium 4.8 3.5 - 5.5 mmol/L    Chloride 103 99 - 109 mmol/L    Total CO2 28.0 20.0 - 31.0 mmol/L    Calcium 9.9 8.7 - 10.4 mg/dL    Total Protein 6.3 5.7 - 8.2 g/dL    Albumin 4.00 3.20 - 4.80 g/dL    Globulin 2.3 gm/dL    A/G Ratio 1.7 1.5 - 2.5 g/dL    Total Bilirubin 0.5 0.3 - 1.2 mg/dL    Alkaline Phosphatase 120 (H) 25 - 100 U/L    AST (SGOT) 24 0 - 33 U/L    ALT (SGPT) 19 7 - 40 U/L   Urine Culture - Urine, Urine, Clean Catch    Collection Time: 01/25/18  9:50 AM   Result Value Ref Range    Urine Culture Final report     Result 1 No growth    POC Urinalysis Dipstick, Automated    Collection Time: 01/25/18 10:50 AM   Result Value Ref Range    Color Orange (A) Yellow, Straw, Dark Yellow, Berna    Clarity, UA Clear Clear    Glucose, UA Negative Negative, 1000 mg/dL (3+) mg/dL    Bilirubin Negative Negative    Ketones, UA Negative Negative    Specific Gravity  1.025 1.005 - 1.030    Blood, UA Negative Negative    pH, Urine 6.0 5.0 - 8.0    Protein, POC Negative Negative mg/dL    Urobilinogen, UA Normal Normal    Leukocytes Negative Negative    Nitrite, UA Negative Negative   POCT urinalysis dipstick, automated    Collection Time: 02/07/18  9:12 AM   Result Value Ref Range    Color Straw Yellow, Straw, Dark Yellow, Berna    Clarity, UA Clear Clear    Glucose, UA Negative Negative, 1000 mg/dL (3+) mg/dL    Bilirubin Negative Negative    Ketones, UA  Negative Negative    Specific Gravity  1.015 1.005 - 1.030    Blood, UA Negative Negative    pH, Urine 7.0 5.0 - 8.0    Protein, POC Negative Negative mg/dL    Urobilinogen, UA Normal Normal    Leukocytes Small (1+) (A) Negative    Nitrite, UA Positive (A) Negative

## 2018-02-09 ENCOUNTER — HOSPITAL ENCOUNTER (OUTPATIENT)
Dept: BONE DENSITY | Facility: HOSPITAL | Age: 78
Discharge: HOME | End: 2018-02-09
Admitting: FAMILY MEDICINE

## 2018-02-09 ENCOUNTER — TELEPHONE (OUTPATIENT)
Dept: INTERNAL MEDICINE | Facility: CLINIC | Age: 78
End: 2018-02-09

## 2018-02-09 DIAGNOSIS — Z78.0 MENOPAUSE: ICD-10-CM

## 2018-02-09 LAB
BACTERIA UR CULT: ABNORMAL
BACTERIA UR CULT: ABNORMAL
OTHER ANTIBIOTIC SUSC ISLT: ABNORMAL

## 2018-02-09 PROCEDURE — 77080 DXA BONE DENSITY AXIAL: CPT

## 2018-02-09 NOTE — TELEPHONE ENCOUNTER
Pt notified that she has E coli uti and the bactrim should work. Pt will follow up in 2 weeks or sooner as needed

## 2018-02-19 ENCOUNTER — OUTSIDE FACILITY SERVICE (OUTPATIENT)
Dept: HOSPITALIST | Facility: HOSPITAL | Age: 78
End: 2018-02-19

## 2018-02-19 PROCEDURE — G0180 MD CERTIFICATION HHA PATIENT: HCPCS | Performed by: INTERNAL MEDICINE

## 2018-02-21 ENCOUNTER — OFFICE VISIT (OUTPATIENT)
Dept: INTERNAL MEDICINE | Facility: CLINIC | Age: 78
End: 2018-02-21

## 2018-02-21 VITALS
BODY MASS INDEX: 31.4 KG/M2 | HEART RATE: 63 BPM | TEMPERATURE: 97.6 F | DIASTOLIC BLOOD PRESSURE: 62 MMHG | SYSTOLIC BLOOD PRESSURE: 110 MMHG | OXYGEN SATURATION: 98 % | WEIGHT: 160.8 LBS

## 2018-02-21 DIAGNOSIS — R53.83 OTHER FATIGUE: Primary | ICD-10-CM

## 2018-02-21 DIAGNOSIS — J02.9 PHARYNGITIS, UNSPECIFIED ETIOLOGY: ICD-10-CM

## 2018-02-21 DIAGNOSIS — R10.9 FLANK PAIN: ICD-10-CM

## 2018-02-21 LAB
BILIRUB BLD-MCNC: NEGATIVE MG/DL
CLARITY, POC: CLEAR
COLOR UR: YELLOW
EXPIRATION DATE: NORMAL
GLUCOSE UR STRIP-MCNC: NEGATIVE MG/DL
INTERNAL CONTROL: NORMAL
KETONES UR QL: NEGATIVE
LEUKOCYTE EST, POC: ABNORMAL
Lab: NORMAL
NITRITE UR-MCNC: NEGATIVE MG/ML
PH UR: 6 [PH] (ref 5–8)
PROT UR STRIP-MCNC: NEGATIVE MG/DL
RBC # UR STRIP: NEGATIVE /UL
S PYO AG THROAT QL: NEGATIVE
SP GR UR: 1.01 (ref 1–1.03)
UROBILINOGEN UR QL: NORMAL

## 2018-02-21 PROCEDURE — 81003 URINALYSIS AUTO W/O SCOPE: CPT | Performed by: FAMILY MEDICINE

## 2018-02-21 PROCEDURE — 87880 STREP A ASSAY W/OPTIC: CPT | Performed by: FAMILY MEDICINE

## 2018-02-21 PROCEDURE — 99214 OFFICE O/P EST MOD 30 MIN: CPT | Performed by: FAMILY MEDICINE

## 2018-02-21 NOTE — PROGRESS NOTES
Subjective   Chelsi Ferguson is a 77 y.o. female.     Chief Complaint   Patient presents with   • Fatigue     2 week follow up; pt still very tired       Visit Vitals   • /62   • Pulse 63   • Temp 97.6 °F (36.4 °C)   • Wt 72.9 kg (160 lb 12.8 oz)   • LMP  (LMP Unknown)   • SpO2 98%   • BMI 31.4 kg/m2       Fatigue   This is a new problem. The current episode started more than 1 month ago. The problem occurs constantly. The problem has been unchanged. Associated symptoms include abdominal pain, arthralgias, fatigue, myalgias, a sore throat and urinary symptoms. Pertinent negatives include no anorexia, chest pain, chills, congestion, coughing, diaphoresis, fever, headaches, joint swelling, nausea, neck pain, numbness, rash, swollen glands, vertigo, visual change, vomiting or weakness. Nothing aggravates the symptoms. She has tried nothing for the symptoms. The treatment provided no relief.   URI    This is a new problem. Associated symptoms include abdominal pain, joint pain, joint swelling, sneezing and a sore throat. Pertinent negatives include no chest pain, congestion, coughing, diarrhea, dysuria, ear pain, headaches, nausea, neck pain, plugged ear sensation, rash, rhinorrhea, sinus pain, swollen glands, vomiting or wheezing. She has tried nothing for the symptoms. The treatment provided no relief.   Flank Pain   This is a recurrent problem. The current episode started yesterday. The problem has been waxing and waning since onset. Pain location: left CVA area. The pain does not radiate. The pain is at a severity of 7/10. The pain is moderate. The pain is worse during the day. The symptoms are aggravated by standing. Associated symptoms include abdominal pain and weight loss. Pertinent negatives include no bladder incontinence, bowel incontinence, chest pain, dysuria, fever, headaches, leg pain, numbness, paresis, paresthesias, pelvic pain, perianal numbness, tingling or weakness. Risk factors include  menopause. She has tried nothing for the symptoms. The treatment provided no relief.      Pt started driving last week. Pt went to a store yesterday at [employer]. Pt was exhausted after that trip but felt better on the way back and had another stop and went home.  Pt is doing her home exercises.   Pt finished her antibiotics 4 days ago.    The following portions of the patient's history were reviewed and updated as appropriate: allergies, current medications, past family history, past medical history, past social history, past surgical history and problem list.    Past Medical History:   Diagnosis Date   • Ankle pain    • Chest discomfort    • Cholecystitis 1/15/2018   • Edema    • Elevated liver enzymes 1/15/2018   • Gallstones    • GERD (gastroesophageal reflux disease)    • Glaucoma     pt is currently off of meds and Dr Tanner does not think that she is glaucoma   • H/O complete eye exam 06/2013   • H/O mammogram 11/16/2015   • H/O non-ST elevation myocardial infarction (NSTEMI) 01/2005   • Hammer toe    • Heart attack 01/2005   • History of blood transfusion 01/2005   • History of cardiovascular stress test 12/02/2015    normal   • HTN (hypertension)    • Hyperlipidemia    • Hypokalemia 1/15/2018   • Kidney infection 1971   • Menopausal symptoms    • Onychia and paronychia of finger    • Osteopenia    • Other elevated white blood cell count    • Pap smear for cervical cancer screening 2010    [facility]   • Pneumonia    • Sepsis 1/15/2018   • UTI (urinary tract infection) 1/15/2018   • Viral warts        Past Surgical History:   Procedure Laterality Date   • BLADDER REPAIR  2002   • BLADDER REPAIR  2003   • BUNIONECTOMY Right 2010   • COLONOSCOPY  2008, 4/2014    [facility]   • CORONARY STENT PLACEMENT Left 01/2005    drug eluting stent 1/2005 LAD   • DILATATION AND CURETTAGE  1971   • DILATATION AND CURETTAGE  1978   • EYE SURGERY  05/2016   • FRONTALIS SUSPENSION  2010   • HAMMER TOE REPAIR Right 11/03/2010   •  HYSTERECTOMY  1978   • LAPAROTOMY OOPHERECTOMY Bilateral 2002   • NOSE SURGERY  03/2017    repair 3 fractured areas. Dr Dunne   • OTHER SURGICAL HISTORY  2005    rectocele repair   • OTHER SURGICAL HISTORY  2010    eyelid surgery   • REPLACEMENT TOTAL KNEE BILATERAL Bilateral    • TONSILLECTOMY  1952   • TOTAL KNEE ARTHROPLASTY  01/2005   • UTERINE FIBROID SURGERY  1978    emergency removal of fibroid from birth canal       Allergies   Allergen Reactions   • Aleve [Naproxen] Hives   • Indocin [Indomethacin] Hallucinations   • Celebrex [Celecoxib] Diarrhea   • Ibuprofen GI Intolerance   • Keflex [Cephalexin]      Upset stomach, may have had some blood in stool but cannot remember the reason why she was put on it. Tolerates penicillins without problem.   • Lipitor [Atorvastatin] Other (See Comments)     Patient does not recall   • Prevacid [Lansoprazole] Nausea And Vomiting   • Prilosec [Omeprazole] Nausea And Vomiting   • Statins Hives   • Zocor [Simvastatin] Hives       Family History   Problem Relation Age of Onset   • Stroke Mother    • Heart failure Father      congestive   • Cancer Father      lip   • Breast cancer Sister      60's   • Breast cancer Daughter 40   • Breast cancer Other      NIECE 60's   • Breast cancer Other      NIECE 60's   • Ovarian cancer Neg Hx      Social History     Social History   • Marital status:      Spouse name: N/A   • Number of children: N/A   • Years of education: N/A     Occupational History   • Not on file.     Social History Main Topics   • Smoking status: Never Smoker   • Smokeless tobacco: Never Used   • Alcohol use No   • Drug use: No   • Sexual activity: Yes     Birth control/ protection: None     Other Topics Concern   • Not on file     Social History Narrative       Review of Systems   Constitutional: Positive for fatigue and weight loss. Negative for chills, diaphoresis and fever.   HENT: Positive for sneezing and sore throat. Negative for congestion, ear pain,  mouth sores, nosebleeds, postnasal drip, rhinorrhea, sinus pain and sinus pressure.    Eyes: Negative.  Negative for redness and itching.   Respiratory: Positive for shortness of breath. Negative for cough and wheezing.    Cardiovascular: Negative.  Negative for chest pain, palpitations and leg swelling.   Gastrointestinal: Positive for abdominal pain. Negative for anorexia, bowel incontinence, constipation, diarrhea, nausea and vomiting.        Intermittent LLQ pain   Endocrine: Negative.  Negative for cold intolerance and heat intolerance.   Genitourinary: Positive for flank pain and frequency. Negative for bladder incontinence, dysuria, hematuria, pelvic pain and urgency.   Musculoskeletal: Positive for arthralgias, back pain, joint pain and myalgias. Negative for joint swelling and neck pain.        Left CVA area   Skin: Negative.  Negative for color change and rash.   Allergic/Immunologic: Negative.  Negative for environmental allergies.   Neurological: Negative.  Negative for dizziness, vertigo, tingling, syncope, weakness, light-headedness, numbness, headaches and paresthesias.   Hematological: Negative.  Negative for adenopathy. Does not bruise/bleed easily.   Psychiatric/Behavioral: Negative.  Negative for dysphoric mood. The patient is not nervous/anxious.        Objective   Physical Exam   Constitutional: She is oriented to person, place, and time. She appears well-developed.   HENT:   Head: Normocephalic.   Right Ear: Tympanic membrane, external ear and ear canal normal.   Left Ear: Tympanic membrane, external ear and ear canal normal.   Nose: Nose normal.   Mouth/Throat: Uvula is midline and oropharynx is clear and moist. No oropharyngeal exudate, posterior oropharyngeal edema or posterior oropharyngeal erythema.   Eyes: Conjunctivae and EOM are normal. Pupils are equal, round, and reactive to light.   Neck: Trachea normal and normal range of motion. Neck supple. Carotid bruit is not present. No thyroid  mass and no thyromegaly present.   Cardiovascular: Normal rate and regular rhythm.    No murmur heard.  Pulmonary/Chest: Effort normal and breath sounds normal. No respiratory distress. She has no decreased breath sounds. She has no wheezes. She has no rhonchi. She has no rales.   Abdominal: Soft. Bowel sounds are normal. There is tenderness in the left lower quadrant. There is CVA tenderness. There is no rigidity, no rebound and no guarding.       Musculoskeletal: Normal range of motion.   Neurological: She is alert and oriented to person, place, and time.   Skin: Skin is warm and dry.   Psychiatric: She has a normal mood and affect. Her behavior is normal.   Nursing note and vitals reviewed.      Assessment/Plan   Chelsi was seen today for fatigue.    Diagnoses and all orders for this visit:    Other fatigue    Pharyngitis, unspecified etiology  -     POC Rapid Strep A    Flank pain  -     POC Urinalysis Dipstick, Automated  -     Urine Culture - Urine, Urine, Clean Catch                   Current Outpatient Prescriptions:   •  acetaminophen (TYLENOL) 500 MG tablet, Take 500 mg by mouth Every 6 (Six) Hours As Needed for mild pain (1-3)., Disp: , Rfl:   •  aspirin 81 MG EC tablet, Take 81 mg by mouth Every Night., Disp: , Rfl:   •  Biotin 5000 MCG capsule, Take  by mouth., Disp: , Rfl:   •  Coenzyme Q10 200 MG capsule, Take 200 mg by mouth Daily., Disp: , Rfl:   •  estradiol (VAGIFEM) 10 MCG tablet vaginal tablet, Insert 1 tablet into the vagina 2 (Two) Times a Week., Disp: 24 tablet, Rfl: 1  •  Famotidine (PEPCID PO), Take 1 tablet by mouth Daily., Disp: , Rfl:   •  metoprolol succinate XL (TOPROL-XL) 25 MG 24 hr tablet, Take 1 tablet by mouth Daily., Disp: , Rfl:   •  Multiple Vitamins-Minerals (MULTIVITAL PO), Take 1 tablet by mouth Daily., Disp: , Rfl:   •  nitroglycerin (NITROSTAT) 0.4 MG SL tablet, , Disp: , Rfl:   •  Omega-3 Fatty Acids (FISH OIL) 1000 MG capsule capsule, Take 1,000 mg by mouth Daily With  Breakfast., Disp: , Rfl:   •  Probiotic Product (PROBIOTIC DAILY PO), Take  by mouth., Disp: , Rfl:     Return in about 4 weeks (around 3/21/2018), or if symptoms worsen or fail to improve, for Recheck.    Recent Results (from the past 168 hour(s))   POC Rapid Strep A    Collection Time: 02/21/18  9:14 AM   Result Value Ref Range    Rapid Strep A Screen Negative Negative, VALID, INVALID, Not Performed    Internal Control Passed Passed    Lot Number [id number]     Expiration Date 3/31/19    POC Urinalysis Dipstick, Automated    Collection Time: 02/21/18 10:36 AM   Result Value Ref Range    Color Yellow Yellow, Straw, Dark Yellow, Berna    Clarity, UA Clear Clear    Glucose, UA Negative Negative, 1000 mg/dL (3+) mg/dL    Bilirubin Negative Negative    Ketones, UA Negative Negative    Specific Gravity  1.015 1.005 - 1.030    Blood, UA Negative Negative    pH, Urine 6.0 5.0 - 8.0    Protein, POC Negative Negative mg/dL    Urobilinogen, UA Normal Normal    Leukocytes Moderate (2+) (A) Negative    Nitrite, UA Negative Negative

## 2018-02-23 ENCOUNTER — TELEPHONE (OUTPATIENT)
Dept: INTERNAL MEDICINE | Facility: CLINIC | Age: 78
End: 2018-02-23

## 2018-02-23 NOTE — TELEPHONE ENCOUNTER
Mrs Ferguson called. She is not feeling well. She vomitted bile last night and has had some diarrhea. I encouraged her to go to Urgent care to be seen, especially if she vomits anymore. She believes that she is having liver trouble which caused the vomitting. She would like us to call in an antibiotic to [facility] on [address] for her UTI. I told her I dont see results of a culture back yet. We can leave a message on her answering machine if she doesn't .

## 2018-02-23 NOTE — TELEPHONE ENCOUNTER
Spoke with Dr Yarbrough who has advised patient be seen at either Urgent care or ER. Called Mrs Ferguson and advised her to be seen. She will go to urgent care this evening.

## 2018-02-24 ENCOUNTER — HOSPITAL ENCOUNTER (EMERGENCY)
Facility: HOSPITAL | Age: 78
Discharge: HOME | End: 2018-02-24
Attending: EMERGENCY MEDICINE | Admitting: EMERGENCY MEDICINE

## 2018-02-24 ENCOUNTER — APPOINTMENT (OUTPATIENT)
Dept: CT IMAGING | Facility: HOSPITAL | Age: 78
End: 2018-02-24

## 2018-02-24 ENCOUNTER — APPOINTMENT (OUTPATIENT)
Dept: GENERAL RADIOLOGY | Facility: HOSPITAL | Age: 78
End: 2018-02-24

## 2018-02-24 VITALS
BODY MASS INDEX: 31.41 KG/M2 | DIASTOLIC BLOOD PRESSURE: 49 MMHG | HEART RATE: 99 BPM | TEMPERATURE: 99.2 F | SYSTOLIC BLOOD PRESSURE: 107 MMHG | HEIGHT: 60 IN | WEIGHT: 160 LBS | OXYGEN SATURATION: 92 % | RESPIRATION RATE: 18 BRPM

## 2018-02-24 DIAGNOSIS — N39.0 URINARY TRACT INFECTION WITHOUT HEMATURIA, SITE UNSPECIFIED: ICD-10-CM

## 2018-02-24 DIAGNOSIS — K80.20 CALCULUS OF GALLBLADDER WITHOUT CHOLECYSTITIS WITHOUT OBSTRUCTION: ICD-10-CM

## 2018-02-24 DIAGNOSIS — B34.9 ACUTE VIRAL SYNDROME: ICD-10-CM

## 2018-02-24 DIAGNOSIS — R11.2 NAUSEA AND VOMITING, INTRACTABILITY OF VOMITING NOT SPECIFIED, UNSPECIFIED VOMITING TYPE: Primary | ICD-10-CM

## 2018-02-24 LAB
ALBUMIN SERPL-MCNC: 4 G/DL (ref 3.2–4.8)
ALBUMIN/GLOB SERPL: 1.7 G/DL (ref 1.5–2.5)
ALP SERPL-CCNC: 94 U/L (ref 25–100)
ALT SERPL W P-5'-P-CCNC: 24 U/L (ref 7–40)
ANION GAP SERPL CALCULATED.3IONS-SCNC: 5 MMOL/L (ref 3–11)
AST SERPL-CCNC: 51 U/L (ref 0–33)
BACTERIA UR CULT: ABNORMAL
BACTERIA UR CULT: ABNORMAL
BACTERIA UR QL AUTO: ABNORMAL /HPF
BASOPHILS # BLD AUTO: 0 10*3/MM3 (ref 0–0.2)
BASOPHILS NFR BLD AUTO: 0 % (ref 0–1)
BILIRUB SERPL-MCNC: 0.9 MG/DL (ref 0.3–1.2)
BILIRUB UR QL STRIP: NEGATIVE
BUN BLD-MCNC: 13 MG/DL (ref 9–23)
BUN/CREAT SERPL: 21.7 (ref 7–25)
CALCIUM SPEC-SCNC: 9.7 MG/DL (ref 8.7–10.4)
CHLORIDE SERPL-SCNC: 107 MMOL/L (ref 99–109)
CLARITY UR: CLEAR
CO2 SERPL-SCNC: 26 MMOL/L (ref 20–31)
COLOR UR: YELLOW
CREAT BLD-MCNC: 0.6 MG/DL (ref 0.6–1.3)
DEPRECATED RDW RBC AUTO: 49.8 FL (ref 37–54)
EOSINOPHIL # BLD AUTO: 0 10*3/MM3 (ref 0–0.3)
EOSINOPHIL NFR BLD AUTO: 0 % (ref 0–3)
ERYTHROCYTE [DISTWIDTH] IN BLOOD BY AUTOMATED COUNT: 13.9 % (ref 11.3–14.5)
FLUAV AG NPH QL: NEGATIVE
FLUBV AG NPH QL IA: NEGATIVE
GFR SERPL CREATININE-BSD FRML MDRD: 97 ML/MIN/1.73
GLOBULIN UR ELPH-MCNC: 2.4 GM/DL
GLUCOSE BLD-MCNC: 95 MG/DL (ref 70–100)
GLUCOSE UR STRIP-MCNC: NEGATIVE MG/DL
HCT VFR BLD AUTO: 40.7 % (ref 34.5–44)
HGB BLD-MCNC: 13.2 G/DL (ref 11.5–15.5)
HGB UR QL STRIP.AUTO: NEGATIVE
HYALINE CASTS UR QL AUTO: ABNORMAL /LPF
IMM GRANULOCYTES # BLD: 0.03 10*3/MM3 (ref 0–0.03)
IMM GRANULOCYTES NFR BLD: 0.8 % (ref 0–0.6)
KETONES UR QL STRIP: NEGATIVE
LEUKOCYTE ESTERASE UR QL STRIP.AUTO: ABNORMAL
LIPASE SERPL-CCNC: 31 U/L (ref 6–51)
LYMPHOCYTES # BLD AUTO: 0.2 10*3/MM3 (ref 0.6–4.8)
LYMPHOCYTES NFR BLD AUTO: 5 % (ref 24–44)
MCH RBC QN AUTO: 31.4 PG (ref 27–31)
MCHC RBC AUTO-ENTMCNC: 32.4 G/DL (ref 32–36)
MCV RBC AUTO: 96.9 FL (ref 80–99)
MONOCYTES # BLD AUTO: 0.01 10*3/MM3 (ref 0–1)
MONOCYTES NFR BLD AUTO: 0.3 % (ref 0–12)
NEUTROPHILS # BLD AUTO: 3.75 10*3/MM3 (ref 1.5–8.3)
NEUTROPHILS NFR BLD AUTO: 93.9 % (ref 41–71)
NITRITE UR QL STRIP: NEGATIVE
OTHER ANTIBIOTIC SUSC ISLT: ABNORMAL
PH UR STRIP.AUTO: <=5 [PH] (ref 5–8)
PLATELET # BLD AUTO: 172 10*3/MM3 (ref 150–450)
PMV BLD AUTO: 9.6 FL (ref 6–12)
POTASSIUM BLD-SCNC: 3.6 MMOL/L (ref 3.5–5.5)
PROT SERPL-MCNC: 6.4 G/DL (ref 5.7–8.2)
PROT UR QL STRIP: NEGATIVE
RBC # BLD AUTO: 4.2 10*6/MM3 (ref 3.89–5.14)
RBC # UR: ABNORMAL /HPF
REF LAB TEST METHOD: ABNORMAL
SODIUM BLD-SCNC: 138 MMOL/L (ref 132–146)
SP GR UR STRIP: 1.01 (ref 1–1.03)
SQUAMOUS #/AREA URNS HPF: ABNORMAL /HPF
TROPONIN I SERPL-MCNC: 0 NG/ML (ref 0–0.07)
UROBILINOGEN UR QL STRIP: ABNORMAL
WBC NRBC COR # BLD: 3.99 10*3/MM3 (ref 3.5–10.8)
WBC UR QL AUTO: ABNORMAL /HPF

## 2018-02-24 PROCEDURE — 83690 ASSAY OF LIPASE: CPT | Performed by: PHYSICIAN ASSISTANT

## 2018-02-24 PROCEDURE — 84484 ASSAY OF TROPONIN QUANT: CPT

## 2018-02-24 PROCEDURE — 74177 CT ABD & PELVIS W/CONTRAST: CPT

## 2018-02-24 PROCEDURE — 81001 URINALYSIS AUTO W/SCOPE: CPT | Performed by: PHYSICIAN ASSISTANT

## 2018-02-24 PROCEDURE — 25010000002 ONDANSETRON PER 1 MG

## 2018-02-24 PROCEDURE — 93005 ELECTROCARDIOGRAM TRACING: CPT | Performed by: PHYSICIAN ASSISTANT

## 2018-02-24 PROCEDURE — 0 IOPAMIDOL 61 % SOLUTION: Performed by: EMERGENCY MEDICINE

## 2018-02-24 PROCEDURE — 80053 COMPREHEN METABOLIC PANEL: CPT | Performed by: PHYSICIAN ASSISTANT

## 2018-02-24 PROCEDURE — 71046 X-RAY EXAM CHEST 2 VIEWS: CPT

## 2018-02-24 PROCEDURE — 87804 INFLUENZA ASSAY W/OPTIC: CPT | Performed by: PHYSICIAN ASSISTANT

## 2018-02-24 PROCEDURE — 96374 THER/PROPH/DIAG INJ IV PUSH: CPT

## 2018-02-24 PROCEDURE — 85025 COMPLETE CBC W/AUTO DIFF WBC: CPT | Performed by: PHYSICIAN ASSISTANT

## 2018-02-24 PROCEDURE — 99283 EMERGENCY DEPT VISIT LOW MDM: CPT

## 2018-02-24 RX ORDER — ONDANSETRON 2 MG/ML
4 INJECTION INTRAMUSCULAR; INTRAVENOUS ONCE
Status: COMPLETED | OUTPATIENT
Start: 2018-02-24 | End: 2018-02-24

## 2018-02-24 RX ORDER — ONDANSETRON 4 MG/1
4 TABLET, FILM COATED ORAL ONCE
Status: DISCONTINUED | OUTPATIENT
Start: 2018-02-24 | End: 2018-02-24

## 2018-02-24 RX ORDER — ACETAMINOPHEN 500 MG
1000 TABLET ORAL ONCE
Status: COMPLETED | OUTPATIENT
Start: 2018-02-24 | End: 2018-02-24

## 2018-02-24 RX ORDER — ONDANSETRON 2 MG/ML
INJECTION INTRAMUSCULAR; INTRAVENOUS
Status: COMPLETED
Start: 2018-02-24 | End: 2018-02-24

## 2018-02-24 RX ADMIN — ONDANSETRON HYDROCHLORIDE 4 MG: 2 INJECTION, SOLUTION INTRAMUSCULAR; INTRAVENOUS at 05:10

## 2018-02-24 RX ADMIN — IOPAMIDOL 99 ML: 612 INJECTION, SOLUTION INTRAVENOUS at 04:20

## 2018-02-24 RX ADMIN — ACETAMINOPHEN 1000 MG: 500 TABLET ORAL at 01:41

## 2018-02-24 RX ADMIN — ONDANSETRON 4 MG: 2 INJECTION INTRAMUSCULAR; INTRAVENOUS at 05:10

## 2018-02-24 NOTE — ED PROVIDER NOTES
Subjective   HPI Comments: Chelsi Ferguson is a 77 y.o.female who presents to the ED with c/o flu symptoms. Yesterday morning the patient woke up around 0400 and had an episode of diarrhea and vomiting. Throughout the day she developed chills and diffuse myalgias. After continued symptoms she presented to a [facility] and was diagnosed with a UTI. She was prescribed Macrobid and Zofran. Her symptoms have gradually worsened, and she has taken Tylenol to treat her fever. She is unsure of the last time that she took Tylenol. History of recent UTI. She denies shortness of breath, chest pain or any other acute symptoms at this time.      Patient is a 77 y.o. female presenting with flu symptoms.   History provided by:  Patient  Flu Symptoms   Presenting symptoms: diarrhea, fever, myalgias, nausea and vomiting    Presenting symptoms: no shortness of breath    Onset quality:  Gradual  Duration:  1 day  Progression:  Worsening  Chronicity:  New  Relieved by:  Nothing  Worsened by:  Nothing  Ineffective treatments: Tylenol, 1x day of Macrobid.  Associated symptoms: chills      Hospitalized mid January for UTI sepsis and evaluation of cholecystitis.  She has been treated with multiple antibiotics, was seen at urgent care yesterday for nausea and vomiting, no chills fever or myalgias yesterday, though symptoms have started today.    She was prescribed Macrobid and Zofran, but states has not taken today due to nausea and vomiting.    She has a known history of cholelithiasis, is scheduled for cholecystectomy with Dr. Mason March 7.    Review of Systems   Constitutional: Positive for chills and fever. Negative for diaphoresis.   Respiratory: Negative for shortness of breath.    Cardiovascular: Negative for chest pain.   Gastrointestinal: Positive for diarrhea, nausea and vomiting. Negative for abdominal pain.   Musculoskeletal: Positive for myalgias.   All other systems reviewed and are negative.      Past Medical History:   Diagnosis  Date   • Ankle pain    • Chest discomfort    • Cholecystitis 1/15/2018   • Edema    • Elevated liver enzymes 1/15/2018   • Gallstones    • GERD (gastroesophageal reflux disease)    • Glaucoma     pt is currently off of meds and Dr Tanner does not think that she is glaucoma   • H/O complete eye exam 06/2013   • H/O mammogram 11/16/2015   • H/O non-ST elevation myocardial infarction (NSTEMI) 01/2005   • Hammer toe    • Heart attack 01/2005   • History of blood transfusion 01/2005   • History of cardiovascular stress test 12/02/2015    normal   • HTN (hypertension)    • Hyperlipidemia    • Hypokalemia 1/15/2018   • Kidney infection 1971   • Menopausal symptoms    • Onychia and paronychia of finger    • Osteopenia    • Other elevated white blood cell count    • Pap smear for cervical cancer screening 2010    [facility]   • Pneumonia    • Sepsis 1/15/2018   • UTI (urinary tract infection) 1/15/2018   • Viral warts        Allergies   Allergen Reactions   • Aleve [Naproxen] Hives   • Indocin [Indomethacin] Hallucinations   • Celebrex [Celecoxib] Diarrhea   • Ibuprofen GI Intolerance   • Keflex [Cephalexin]      Upset stomach, may have had some blood in stool but cannot remember the reason why she was put on it. Tolerates penicillins without problem.   • Lipitor [Atorvastatin] Other (See Comments)     Patient does not recall   • Prevacid [Lansoprazole] Nausea And Vomiting   • Prilosec [Omeprazole] Nausea And Vomiting   • Statins Hives   • Zocor [Simvastatin] Hives       Past Surgical History:   Procedure Laterality Date   • BLADDER REPAIR  2002   • BLADDER REPAIR  2003   • BUNIONECTOMY Right 2010   • COLONOSCOPY  2008, 4/2014    [facility]   • CORONARY STENT PLACEMENT Left 01/2005    drug eluting stent 1/2005 LAD   • DILATATION AND CURETTAGE  1971   • DILATATION AND CURETTAGE  1978   • EYE SURGERY  05/2016   • FRONTALIS SUSPENSION  2010   • HAMMER TOE REPAIR Right 11/03/2010   • HYSTERECTOMY  1978   • LAPAROTOMY OOPHERECTOMY  Bilateral 2002   • NOSE SURGERY  03/2017    repair 3 fractured areas. Dr Dunne   • OTHER SURGICAL HISTORY  2005    rectocele repair   • OTHER SURGICAL HISTORY  2010    eyelid surgery   • REPLACEMENT TOTAL KNEE BILATERAL Bilateral    • TONSILLECTOMY  1952   • TOTAL KNEE ARTHROPLASTY  01/2005   • UTERINE FIBROID SURGERY  1978    emergency removal of fibroid from birth canal       Family History   Problem Relation Age of Onset   • Stroke Mother    • Heart failure Father      congestive   • Cancer Father      lip   • Breast cancer Sister      60's   • Breast cancer Daughter 40   • Breast cancer Other      NIECE 60's   • Breast cancer Other      NIECE 60's   • Ovarian cancer Neg Hx        Social History     Social History   • Marital status:      Spouse name: N/A   • Number of children: N/A   • Years of education: N/A     Social History Main Topics   • Smoking status: Never Smoker   • Smokeless tobacco: Never Used   • Alcohol use No   • Drug use: No   • Sexual activity: Yes     Birth control/ protection: None     Other Topics Concern   • None     Social History Narrative         Objective   Physical Exam   Constitutional: She is oriented to person, place, and time. She appears well-developed and well-nourished. No distress.   HENT:   Head: Normocephalic and atraumatic.   Mouth/Throat: No oropharyngeal exudate.   Eyes: Conjunctivae are normal. No scleral icterus.   Neck: Normal range of motion. Neck supple. No JVD present.   Cardiovascular: Normal rate, regular rhythm and normal heart sounds.  Exam reveals no gallop and no friction rub.    No murmur heard.  Pulmonary/Chest: Effort normal and breath sounds normal. No respiratory distress. She has no wheezes. She has no rales.   Abdominal: Soft. Bowel sounds are normal. She exhibits no distension. There is tenderness. There is no rebound and no guarding.   Mild suprapubic and lower abdominal TTP. No CVAT.   Musculoskeletal: Normal range of motion. She exhibits no  edema.   Neurological: She is alert and oriented to person, place, and time.   Skin: Skin is warm and dry. She is not diaphoretic.   Psychiatric: She has a normal mood and affect. Her behavior is normal.   Nursing note and vitals reviewed.      Procedures         ED Course  ED Course   Comment By Time   IMPRESSION:    Findings suspicious for bronchitis without definite evidence of pneumonia.    Recommend clinical correlation for active pulmonary infection. Rubén Jain PA-C 02/24 030   Patient remained stable throughout course of stay in the ER, labs reviewed.  Reviewed with Dr. Dodge.  She remained nauseous complaining of chest and back pain, myalgias radiating down into her hips.  Serial troponins and EKGs are negative for acute changes.  Influenza screens negative.  She has 6-12 white blood cells per high-power field with 3-6 epithelial cells.  She is scheduled for a urological procedure on March 7 due to chronic UTI which she is since early January.  Will discharge to home with close observation symptomatic care and recheck on Monday, either with primary care provider or here.  Continue Macrobid as previously prescribed.  Alternate Tylenol and ibuprofen every 3 hours to control body aches and fever.  Advised to return to emergency department immediately if any change or worsening of symptoms.  The patient and family verbalized understanding and are agreeable to plan. Rubén Jain PA-C 02/24 3842   IMPRESSION:    Hydropic distention of the gallbladder with cholelithiasis. Recommend   correlation with liver function tests and sonography may be considered for   further evaluation if clinical suspicion for cholecystitis Rubén Jain PA-C 02/24 0505     Recent Results (from the past 24 hour(s))   POC Troponin, Rapid    Collection Time: 02/24/18  1:37 AM   Result Value Ref Range    Troponin I 0.00 0.00 - 0.07 ng/mL   Comprehensive Metabolic Panel    Collection Time: 02/24/18  1:39 AM   Result  Value Ref Range    Glucose 95 70 - 100 mg/dL    BUN 13 9 - 23 mg/dL    Creatinine 0.60 0.60 - 1.30 mg/dL    Sodium 138 132 - 146 mmol/L    Potassium 3.6 3.5 - 5.5 mmol/L    Chloride 107 99 - 109 mmol/L    CO2 26.0 20.0 - 31.0 mmol/L    Calcium 9.7 8.7 - 10.4 mg/dL    Total Protein 6.4 5.7 - 8.2 g/dL    Albumin 4.00 3.20 - 4.80 g/dL    ALT (SGPT) 24 7 - 40 U/L    AST (SGOT) 51 (H) 0 - 33 U/L    Alkaline Phosphatase 94 25 - 100 U/L    Total Bilirubin 0.9 0.3 - 1.2 mg/dL    eGFR Non African Amer 97 >60 mL/min/1.73    Globulin 2.4 gm/dL    A/G Ratio 1.7 1.5 - 2.5 g/dL    BUN/Creatinine Ratio 21.7 7.0 - 25.0    Anion Gap 5.0 3.0 - 11.0 mmol/L   Lipase    Collection Time: 02/24/18  1:39 AM   Result Value Ref Range    Lipase 31 6 - 51 U/L   CBC Auto Differential    Collection Time: 02/24/18  1:39 AM   Result Value Ref Range    WBC 3.99 3.50 - 10.80 10*3/mm3    RBC 4.20 3.89 - 5.14 10*6/mm3    Hemoglobin 13.2 11.5 - 15.5 g/dL    Hematocrit 40.7 34.5 - 44.0 %    MCV 96.9 80.0 - 99.0 fL    MCH 31.4 (H) 27.0 - 31.0 pg    MCHC 32.4 32.0 - 36.0 g/dL    RDW 13.9 11.3 - 14.5 %    RDW-SD 49.8 37.0 - 54.0 fl    MPV 9.6 6.0 - 12.0 fL    Platelets 172 150 - 450 10*3/mm3    Neutrophil % 93.9 (H) 41.0 - 71.0 %    Lymphocyte % 5.0 (L) 24.0 - 44.0 %    Monocyte % 0.3 0.0 - 12.0 %    Eosinophil % 0.0 0.0 - 3.0 %    Basophil % 0.0 0.0 - 1.0 %    Immature Grans % 0.8 (H) 0.0 - 0.6 %    Neutrophils, Absolute 3.75 1.50 - 8.30 10*3/mm3    Lymphocytes, Absolute 0.20 (L) 0.60 - 4.80 10*3/mm3    Monocytes, Absolute 0.01 0.00 - 1.00 10*3/mm3    Eosinophils, Absolute 0.00 0.00 - 0.30 10*3/mm3    Basophils, Absolute 0.00 0.00 - 0.20 10*3/mm3    Immature Grans, Absolute 0.03 0.00 - 0.03 10*3/mm3   Influenza Antigen, Rapid - Swab, Nasopharynx    Collection Time: 02/24/18  1:41 AM   Result Value Ref Range    Influenza A Ag, EIA Negative Negative    Influenza B Ag, EIA Negative Negative   Urinalysis With / Microscopic If Indicated - Urine, Clean Catch     Collection Time: 02/24/18  2:29 AM   Result Value Ref Range    Color, UA Yellow Yellow, Straw    Appearance, UA Clear Clear    pH, UA <=5.0 5.0 - 8.0    Specific Gravity, UA 1.009 1.001 - 1.030    Glucose, UA Negative Negative    Ketones, UA Negative Negative    Bilirubin, UA Negative Negative    Blood, UA Negative Negative    Protein, UA Negative Negative    Leuk Esterase, UA Moderate (2+) (A) Negative    Nitrite, UA Negative Negative    Urobilinogen, UA 0.2 E.U./dL 0.2 - 1.0 E.U./dL   Urinalysis, Microscopic Only - Urine, Clean Catch    Collection Time: 02/24/18  2:29 AM   Result Value Ref Range    RBC, UA 0-2 None Seen, 0-2 /HPF    WBC, UA 6-12 (A) None Seen, 0-2 /HPF    Bacteria, UA None Seen None Seen, Trace /HPF    Squamous Epithelial Cells, UA 3-6 (A) None Seen, 0-2 /HPF    Hyaline Casts, UA None Seen 0 - 6 /LPF    Methodology Automated Microscopy      Note: In addition to lab results from this visit, the labs listed above may include labs taken at another facility or during a different encounter within the last 24 hours. Please correlate lab times with ED admission and discharge times for further clarification of the services performed during this visit.    CT Abdomen Pelvis With Contrast   ED Interpretation     Hydropic distention of the gallbladder with cholelithiasis. Recommend    correlation with liver function tests and sonography may be considered for    further evaluation if clinical suspicion for cholecystitis.         Numerous non-emergent findings as described without any acute abdominopelvic    abnormality.          THIS DOCUMENT HAS BEEN ELECTRONICALLY SIGNED BY DEEJAY FIELD MD      Final Result     Hydropic distention of the gallbladder with cholelithiasis. Recommend    correlation with liver function tests and sonography may be considered for    further evaluation if clinical suspicion for cholecystitis.         Numerous non-emergent findings as described without any acute abdominopelvic   "  abnormality.          THIS DOCUMENT HAS BEEN ELECTRONICALLY SIGNED BY DEEJAY FIELD MD      XR Chest 2 View   ED Interpretation     Findings suspicious for bronchitis without definite evidence of pneumonia.     Recommend clinical correlation for active pulmonary infection.         THIS DOCUMENT HAS BEEN ELECTRONICALLY SIGNED BY DEEJAY FIELD MD      Final Result     Findings suspicious for bronchitis without definite evidence of pneumonia.     Recommend clinical correlation for active pulmonary infection.         THIS DOCUMENT HAS BEEN ELECTRONICALLY SIGNED BY DEEJAY FIELD MD      NM HIDA scan with pharmacological intervention    (Results Pending)     Vitals:    02/24/18 0032 02/24/18 0041 02/24/18 0516   BP: 109/56  107/49   BP Location: Left arm     Patient Position: Sitting     Pulse: 103  99   Resp: 18  18   Temp: 98.2 °F (36.8 °C) 99.8 °F (37.7 °C) 99.2 °F (37.3 °C)   TempSrc: Oral Oral    SpO2: 96%  92%   Weight: 72.6 kg (160 lb)     Height: 152.4 cm (60\")       Medications   acetaminophen (TYLENOL) tablet 1,000 mg (1,000 mg Oral Given 2/24/18 0141)   iopamidol (ISOVUE-300) 61 % injection 100 mL (99 mL Intravenous Given 2/24/18 0420)   ondansetron (ZOFRAN) injection 4 mg (4 mg Intravenous Given 2/24/18 0510)     ECG/EMG Results (last 24 hours)     ** No results found for the last 24 hours. **                    MDM    Final diagnoses:   Nausea and vomiting, intractability of vomiting not specified, unspecified vomiting type   Acute viral syndrome   Urinary tract infection without hematuria, site unspecified   Calculus of gallbladder without cholecystitis without obstruction       Documentation assistance provided by pily Mclaughlin.  Information recorded by the pily was done at my direction and has been verified and validated by me.     Arik Mclaughlin  02/24/18 0053       Rubén Jain PA-C  02/24/18 0420       Rubén Jain PA-C  02/24/18 0500       Rubén Jain, " GUY  02/24/18 0514       Rubén Jain PA-C  02/24/18 0523

## 2018-02-24 NOTE — DISCHARGE INSTRUCTIONS
Alternate Tylenol and Advil every 3 hours to control fever and body aches.  Continue Macrobid and Zofran as previously prescribed.   Follow-up with Dr. Yarbrough for recheck Monday.  Call Dr. Mason's office Monday morning to advise of continuing nausea and vomiting.  Return to emergency department immediately in the interim if any change or worsening of symptoms.

## 2018-02-26 ENCOUNTER — TELEPHONE (OUTPATIENT)
Dept: INTERNAL MEDICINE | Facility: CLINIC | Age: 78
End: 2018-02-26

## 2018-02-26 NOTE — TELEPHONE ENCOUNTER
Patient went to UC - then patient had a return of fever and chills. She has had intermittent fever. ED informed her that the antibiotic should be continued and should help her UTI. Patient had not contacted surgeon's office yet. Advised her to keep surgeon informed as it may affect care.

## 2018-02-27 ENCOUNTER — OFFICE VISIT (OUTPATIENT)
Dept: INTERNAL MEDICINE | Facility: CLINIC | Age: 78
End: 2018-02-27

## 2018-02-27 VITALS
WEIGHT: 161 LBS | TEMPERATURE: 98.9 F | OXYGEN SATURATION: 99 % | SYSTOLIC BLOOD PRESSURE: 108 MMHG | DIASTOLIC BLOOD PRESSURE: 60 MMHG | BODY MASS INDEX: 31.61 KG/M2 | HEIGHT: 60 IN | HEART RATE: 79 BPM

## 2018-02-27 DIAGNOSIS — N39.0 URINARY TRACT INFECTION WITHOUT HEMATURIA, SITE UNSPECIFIED: Primary | ICD-10-CM

## 2018-02-27 PROCEDURE — 99213 OFFICE O/P EST LOW 20 MIN: CPT | Performed by: NURSE PRACTITIONER

## 2018-02-27 RX ORDER — CIPROFLOXACIN 500 MG/1
500 TABLET, FILM COATED ORAL 2 TIMES DAILY
Qty: 10 TABLET | Refills: 0 | Status: SHIPPED | OUTPATIENT
Start: 2018-02-27 | End: 2018-03-05

## 2018-02-27 NOTE — PROGRESS NOTES
Subjective   Chelsi Ferguson is a 77 y.o. female.   Chief Complaint   Patient presents with   • Fever     usually gets fever about 5:00.   4-5 days   • Neck Pain     neck pain worse since Friday.         Fever    This is a new problem. The current episode started 1 to 4 weeks ago. The problem occurs daily. The problem has been gradually improving. The maximum temperature noted was 101 to 101.9 F. Associated symptoms include abdominal pain. Pertinent negatives include no chest pain, congestion, coughing, diarrhea, ear pain, headaches, muscle aches, nausea, rash, sleepiness, sore throat, urinary pain, vomiting or wheezing. She has tried acetaminophen for the symptoms.   Risk factors: recent sickness    Risk factors: no contaminated food, no contaminated water, no immunosuppression, no occupational exposure, no recent travel and no sick contacts    Neck Pain    This is a recurrent problem. The current episode started 1 to 4 weeks ago. The problem occurs intermittently. The problem has been waxing and waning. The pain is associated with nothing. The pain is present in the midline and occipital region. The quality of the pain is described as aching. The pain is mild. The pain is same all the time. Associated symptoms include a fever. Pertinent negatives include no chest pain, headaches, pain with swallowing, syncope, trouble swallowing, visual change or weakness. She has tried acetaminophen for the symptoms. The treatment provided mild relief.      She has been seen multiple times in the last 2 months by UC, ER,  and Dr. Yarbrough for nausea, vomiting, fatigue . She has known gallbladder disease and is set to have cholecystectomy on 3/7/18.  She also was noted to have a enterococcus faecalis  UTI on 2/21 and is currently being treated with macrobid but does not feel as though she is improving. . She had urinary urgency and frequency but describes this as her baseline. She denies dysuria. No further nausea/vomiting.      The  following portions of the patient's history were reviewed and updated as appropriate: allergies, current medications, past family history, past medical history, past social history, past surgical history and problem list.    Review of Systems   Constitutional: Positive for fever.   HENT: Negative for congestion, ear pain, sore throat and trouble swallowing.    Respiratory: Negative for cough and wheezing.    Cardiovascular: Negative for chest pain and syncope.   Gastrointestinal: Positive for abdominal pain. Negative for diarrhea, nausea and vomiting.   Genitourinary: Negative for dysuria.   Musculoskeletal: Positive for neck pain.   Skin: Negative for rash.   Neurological: Negative for weakness and headaches.       Objective   Physical Exam   Constitutional: She is oriented to person, place, and time. She appears well-developed.   HENT:   Head: Normocephalic.   Right Ear: External ear normal.   Left Ear: External ear normal.   Nose: Nose normal.   Mouth/Throat: Oropharynx is clear and moist.   Eyes: Conjunctivae and EOM are normal. Pupils are equal, round, and reactive to light.   Neck: Trachea normal, normal range of motion and full passive range of motion without pain. Neck supple. Muscular tenderness present. No spinous process tenderness present. Carotid bruit is not present. No rigidity. No edema, no erythema and normal range of motion present. No Brudzinski's sign and no Kernig's sign noted. No thyroid mass present.   Cardiovascular: Normal rate and regular rhythm.    No murmur heard.  Pulmonary/Chest: Effort normal and breath sounds normal. No respiratory distress.   Abdominal: Soft. Normal appearance and bowel sounds are normal. She exhibits no abdominal bruit. There is generalized tenderness (mild). There is no rigidity, no rebound and no CVA tenderness.   Musculoskeletal: Normal range of motion.   Neurological: She is alert and oriented to person, place, and time.   Skin: Skin is warm and dry.    Psychiatric: She has a normal mood and affect. Her behavior is normal.   Nursing note and vitals reviewed.    Vitals:    02/27/18 1557   BP: 108/60   Pulse: 79   Temp: 98.9 °F (37.2 °C)   SpO2: 99%         Assessment/Plan      Chelsi was seen today for fever and neck pain.    Diagnoses and all orders for this visit:    Urinary tract infection without hematuria, site unspecified  -     ciprofloxacin (CIPRO) 500 MG tablet; Take 1 tablet by mouth 2 (Two) Times a Day.        Hospital labs and recent notes reviewed.   Stop macrobid  Start cipro  May continue to use tylenol PRN  Return for Next scheduled follow up.  Plan of care discussed with pt. They verbalized understanding and agreement.

## 2018-02-27 NOTE — TELEPHONE ENCOUNTER
Notified patient. She states that she is feeling terrible. She has pain in her neck that is bringing her to tears. She has run a fever since Saturday as well. She can still move her neck, but it hurts to twist. She does not want to go back to the urgent care or ER. Made an appt for this afternoon with Shell Carranza.

## 2018-03-02 ENCOUNTER — OFFICE VISIT (OUTPATIENT)
Dept: INTERNAL MEDICINE | Facility: CLINIC | Age: 78
End: 2018-03-02

## 2018-03-02 VITALS
HEART RATE: 73 BPM | DIASTOLIC BLOOD PRESSURE: 64 MMHG | BODY MASS INDEX: 31.44 KG/M2 | TEMPERATURE: 98.3 F | OXYGEN SATURATION: 98 % | WEIGHT: 161 LBS | SYSTOLIC BLOOD PRESSURE: 116 MMHG

## 2018-03-02 DIAGNOSIS — N30.00 ACUTE CYSTITIS WITHOUT HEMATURIA: Primary | ICD-10-CM

## 2018-03-02 LAB
BILIRUB BLD-MCNC: NEGATIVE MG/DL
CLARITY, POC: CLEAR
COLOR UR: YELLOW
GLUCOSE UR STRIP-MCNC: NEGATIVE MG/DL
KETONES UR QL: NEGATIVE
LEUKOCYTE EST, POC: NEGATIVE
NITRITE UR-MCNC: NEGATIVE MG/ML
PH UR: 6 [PH] (ref 5–8)
PROT UR STRIP-MCNC: NEGATIVE MG/DL
RBC # UR STRIP: NEGATIVE /UL
SP GR UR: 1.01 (ref 1–1.03)
UROBILINOGEN UR QL: NORMAL

## 2018-03-02 PROCEDURE — 99213 OFFICE O/P EST LOW 20 MIN: CPT | Performed by: FAMILY MEDICINE

## 2018-03-02 PROCEDURE — 81003 URINALYSIS AUTO W/O SCOPE: CPT | Performed by: FAMILY MEDICINE

## 2018-03-02 NOTE — PROGRESS NOTES
Subjective   Chelsi Ferguson is a 77 y.o. female.     Chief Complaint   Patient presents with   • Urinary Tract Infection     pt is starting to feel better       Visit Vitals   • /64   • Pulse 73   • Temp 98.3 °F (36.8 °C)   • Wt 73 kg (161 lb)   • LMP  (LMP Unknown)   • SpO2 98%   • BMI 31.44 kg/m2       Cystitis   This is a recurrent problem. The problem has been resolved. Associated symptoms include anorexia, a change in bowel habit, chills, a fever, myalgias and nausea. Pertinent negatives include no abdominal pain, arthralgias, chest pain, congestion, coughing, diaphoresis, fatigue, headaches, joint swelling, neck pain, numbness, rash, sore throat, swollen glands, urinary symptoms, vertigo or vomiting. Associated symptoms comments: Low grade fever during day.  Pt has been taking tylenol on regular basis this past week. . Nothing aggravates the symptoms. Treatments tried: antibiotic. The treatment provided significant relief.      Pt has surgery with GB removal Wednesday 3/7/18 and Dr Mason asked her to recheck for UTI.  Pt has not had BM for past week.   Pt was in ER 2/24/18, EKG was NL.  Pt is currently on Cipro    The following portions of the patient's history were reviewed and updated as appropriate: allergies, current medications, past family history, past medical history, past social history, past surgical history and problem list.    Past Medical History:   Diagnosis Date   • Ankle pain    • Chest discomfort    • Cholecystitis 1/15/2018   • Edema    • Elevated liver enzymes 1/15/2018   • Gallstones    • GERD (gastroesophageal reflux disease)    • Glaucoma     pt is currently off of meds and Dr Tanner does not think that she is glaucoma   • H/O complete eye exam 06/2013   • H/O mammogram 11/16/2015   • H/O non-ST elevation myocardial infarction (NSTEMI) 01/2005   • Hammer toe    • Heart attack 01/2005   • History of blood transfusion 01/2005   • History of cardiovascular stress test 12/02/2015    normal    • HTN (hypertension)    • Hyperlipidemia    • Hypokalemia 1/15/2018   • Kidney infection 1971   • Menopausal symptoms    • Onychia and paronychia of finger    • Osteopenia    • Other elevated white blood cell count    • Pap smear for cervical cancer screening 2010    [facility]   • Pneumonia    • Sepsis 1/15/2018   • UTI (urinary tract infection) 1/15/2018   • Viral warts        Past Surgical History:   Procedure Laterality Date   • BLADDER REPAIR  2002   • BLADDER REPAIR  2003   • BUNIONECTOMY Right 2010   • COLONOSCOPY  2008, 4/2014    [facility]   • CORONARY STENT PLACEMENT Left 01/2005    drug eluting stent 1/2005 LAD   • DILATATION AND CURETTAGE  1971   • DILATATION AND CURETTAGE  1978   • EYE SURGERY  05/2016   • FRONTALIS SUSPENSION  2010   • HAMMER TOE REPAIR Right 11/03/2010   • HYSTERECTOMY  1978   • LAPAROTOMY OOPHERECTOMY Bilateral 2002   • NOSE SURGERY  03/2017    repair 3 fractured areas. Dr Dunne   • OTHER SURGICAL HISTORY  2005    rectocele repair   • OTHER SURGICAL HISTORY  2010    eyelid surgery   • REPLACEMENT TOTAL KNEE BILATERAL Bilateral    • TONSILLECTOMY  1952   • TOTAL KNEE ARTHROPLASTY  01/2005   • UTERINE FIBROID SURGERY  1978    emergency removal of fibroid from birth canal       Allergies   Allergen Reactions   • Aleve [Naproxen] Hives   • Indocin [Indomethacin] Hallucinations   • Celebrex [Celecoxib] Diarrhea   • Ibuprofen GI Intolerance   • Keflex [Cephalexin]      Upset stomach, may have had some blood in stool but cannot remember the reason why she was put on it. Tolerates penicillins without problem.   • Lipitor [Atorvastatin] Other (See Comments)     Patient does not recall   • Prevacid [Lansoprazole] Nausea And Vomiting   • Prilosec [Omeprazole] Nausea And Vomiting   • Statins Hives   • Zocor [Simvastatin] Hives       Family History   Problem Relation Age of Onset   • Stroke Mother    • Heart failure Father      congestive   • Cancer Father      lip   • Breast cancer Sister       60's   • Breast cancer Daughter 40   • Breast cancer Other      NIECE 60's   • Breast cancer Other      NIECE 60's   • Ovarian cancer Neg Hx      Social History     Social History   • Marital status:      Spouse name: N/A   • Number of children: N/A   • Years of education: N/A     Occupational History   • Not on file.     Social History Main Topics   • Smoking status: Never Smoker   • Smokeless tobacco: Never Used   • Alcohol use No   • Drug use: No   • Sexual activity: Yes     Birth control/ protection: None     Other Topics Concern   • Not on file     Social History Narrative       Review of Systems   Constitutional: Positive for chills and fever. Negative for diaphoresis and fatigue.   HENT: Negative.  Negative for congestion, ear pain, nosebleeds, postnasal drip, rhinorrhea, sinus pressure, sneezing and sore throat.    Eyes: Negative.  Negative for redness and itching.   Respiratory: Negative.  Negative for cough, shortness of breath and wheezing.    Cardiovascular: Negative.  Negative for chest pain and palpitations.   Gastrointestinal: Positive for anorexia, change in bowel habit, constipation and nausea. Negative for abdominal pain, diarrhea and vomiting.   Endocrine: Negative.  Negative for cold intolerance and heat intolerance.   Genitourinary: Positive for frequency and urgency. Negative for dysuria and hematuria.   Musculoskeletal: Positive for myalgias. Negative for arthralgias, back pain, joint swelling and neck pain.        Neck pain is better   Skin: Negative.  Negative for color change and rash.   Allergic/Immunologic: Negative.  Negative for environmental allergies.   Neurological: Negative.  Negative for dizziness, vertigo, syncope, light-headedness, numbness and headaches.   Hematological: Negative.  Negative for adenopathy. Does not bruise/bleed easily.   Psychiatric/Behavioral: Negative.  Negative for dysphoric mood. The patient is not nervous/anxious.        Objective   Physical Exam    Constitutional: She is oriented to person, place, and time. She appears well-developed.   HENT:   Head: Normocephalic.   Right Ear: External ear normal.   Left Ear: External ear normal.   Nose: Nose normal.   Mouth/Throat: Oropharynx is clear and moist.   Eyes: Conjunctivae and EOM are normal. Pupils are equal, round, and reactive to light.   Neck: Normal range of motion. Neck supple.   Cardiovascular: Normal rate and regular rhythm.    No murmur heard.  Pulmonary/Chest: Effort normal and breath sounds normal. No respiratory distress. She has no decreased breath sounds. She has no wheezes. She has no rhonchi. She has no rales.   Abdominal: Soft. Bowel sounds are normal. There is tenderness in the suprapubic area. There is CVA tenderness.   Musculoskeletal: Normal range of motion.   Neurological: She is alert and oriented to person, place, and time.   Skin: Skin is warm and dry.   Psychiatric: She has a normal mood and affect. Her behavior is normal.   Nursing note and vitals reviewed.      Assessment/Plan   Chelsi was seen today for urinary tract infection.    Diagnoses and all orders for this visit:    Acute cystitis without hematuria  -     POC Urinalysis Dipstick, Automated  -     Urine Culture - Urine, Urine, Clean Catch        Try holding tylenol           Current Outpatient Prescriptions:   •  acetaminophen (TYLENOL) 500 MG tablet, Take 500 mg by mouth Every 6 (Six) Hours As Needed for mild pain (1-3)., Disp: , Rfl:   •  aspirin 81 MG EC tablet, Take 81 mg by mouth Every Night., Disp: , Rfl:   •  Biotin 5000 MCG capsule, Take  by mouth., Disp: , Rfl:   •  ciprofloxacin (CIPRO) 500 MG tablet, Take 1 tablet by mouth 2 (Two) Times a Day., Disp: 10 tablet, Rfl: 0  •  Coenzyme Q10 200 MG capsule, Take 200 mg by mouth Daily., Disp: , Rfl:   •  estradiol (VAGIFEM) 10 MCG tablet vaginal tablet, Insert 1 tablet into the vagina 2 (Two) Times a Week., Disp: 24 tablet, Rfl: 1  •  Famotidine (PEPCID PO), Take 1 tablet by  mouth Daily., Disp: , Rfl:   •  metoprolol succinate XL (TOPROL-XL) 25 MG 24 hr tablet, Take 1 tablet by mouth Daily., Disp: , Rfl:   •  Multiple Vitamins-Minerals (MULTIVITAL PO), Take 1 tablet by mouth Daily., Disp: , Rfl:   •  nitroglycerin (NITROSTAT) 0.4 MG SL tablet, , Disp: , Rfl:   •  Omega-3 Fatty Acids (FISH OIL) 1000 MG capsule capsule, Take 1,000 mg by mouth Daily With Breakfast., Disp: , Rfl:   •  Probiotic Product (PROBIOTIC DAILY PO), Take  by mouth., Disp: , Rfl:     Return if symptoms worsen or fail to improve, for Recheck post GB surgery.    Recent Results (from the past 168 hour(s))   POC Troponin, Rapid    Collection Time: 02/24/18  1:37 AM   Result Value Ref Range    Troponin I 0.00 0.00 - 0.07 ng/mL   Comprehensive Metabolic Panel    Collection Time: 02/24/18  1:39 AM   Result Value Ref Range    Glucose 95 70 - 100 mg/dL    BUN 13 9 - 23 mg/dL    Creatinine 0.60 0.60 - 1.30 mg/dL    Sodium 138 132 - 146 mmol/L    Potassium 3.6 3.5 - 5.5 mmol/L    Chloride 107 99 - 109 mmol/L    CO2 26.0 20.0 - 31.0 mmol/L    Calcium 9.7 8.7 - 10.4 mg/dL    Total Protein 6.4 5.7 - 8.2 g/dL    Albumin 4.00 3.20 - 4.80 g/dL    ALT (SGPT) 24 7 - 40 U/L    AST (SGOT) 51 (H) 0 - 33 U/L    Alkaline Phosphatase 94 25 - 100 U/L    Total Bilirubin 0.9 0.3 - 1.2 mg/dL    eGFR Non African Amer 97 >60 mL/min/1.73    Globulin 2.4 gm/dL    A/G Ratio 1.7 1.5 - 2.5 g/dL    BUN/Creatinine Ratio 21.7 7.0 - 25.0    Anion Gap 5.0 3.0 - 11.0 mmol/L   Lipase    Collection Time: 02/24/18  1:39 AM   Result Value Ref Range    Lipase 31 6 - 51 U/L   CBC Auto Differential    Collection Time: 02/24/18  1:39 AM   Result Value Ref Range    WBC 3.99 3.50 - 10.80 10*3/mm3    RBC 4.20 3.89 - 5.14 10*6/mm3    Hemoglobin 13.2 11.5 - 15.5 g/dL    Hematocrit 40.7 34.5 - 44.0 %    MCV 96.9 80.0 - 99.0 fL    MCH 31.4 (H) 27.0 - 31.0 pg    MCHC 32.4 32.0 - 36.0 g/dL    RDW 13.9 11.3 - 14.5 %    RDW-SD 49.8 37.0 - 54.0 fl    MPV 9.6 6.0 - 12.0 fL     Platelets 172 150 - 450 10*3/mm3    Neutrophil % 93.9 (H) 41.0 - 71.0 %    Lymphocyte % 5.0 (L) 24.0 - 44.0 %    Monocyte % 0.3 0.0 - 12.0 %    Eosinophil % 0.0 0.0 - 3.0 %    Basophil % 0.0 0.0 - 1.0 %    Immature Grans % 0.8 (H) 0.0 - 0.6 %    Neutrophils, Absolute 3.75 1.50 - 8.30 10*3/mm3    Lymphocytes, Absolute 0.20 (L) 0.60 - 4.80 10*3/mm3    Monocytes, Absolute 0.01 0.00 - 1.00 10*3/mm3    Eosinophils, Absolute 0.00 0.00 - 0.30 10*3/mm3    Basophils, Absolute 0.00 0.00 - 0.20 10*3/mm3    Immature Grans, Absolute 0.03 0.00 - 0.03 10*3/mm3   Influenza Antigen, Rapid - Swab, Nasopharynx    Collection Time: 02/24/18  1:41 AM   Result Value Ref Range    Influenza A Ag, EIA Negative Negative    Influenza B Ag, EIA Negative Negative   Urinalysis With / Microscopic If Indicated - Urine, Clean Catch    Collection Time: 02/24/18  2:29 AM   Result Value Ref Range    Color, UA Yellow Yellow, Straw    Appearance, UA Clear Clear    pH, UA <=5.0 5.0 - 8.0    Specific Gravity, UA 1.009 1.001 - 1.030    Glucose, UA Negative Negative    Ketones, UA Negative Negative    Bilirubin, UA Negative Negative    Blood, UA Negative Negative    Protein, UA Negative Negative    Leuk Esterase, UA Moderate (2+) (A) Negative    Nitrite, UA Negative Negative    Urobilinogen, UA 0.2 E.U./dL 0.2 - 1.0 E.U./dL   Urinalysis, Microscopic Only - Urine, Clean Catch    Collection Time: 02/24/18  2:29 AM   Result Value Ref Range    RBC, UA 0-2 None Seen, 0-2 /HPF    WBC, UA 6-12 (A) None Seen, 0-2 /HPF    Bacteria, UA None Seen None Seen, Trace /HPF    Squamous Epithelial Cells, UA 3-6 (A) None Seen, 0-2 /HPF    Hyaline Casts, UA None Seen 0 - 6 /LPF    Methodology Automated Microscopy    POC Urinalysis Dipstick, Automated    Collection Time: 03/02/18  9:48 AM   Result Value Ref Range    Color Yellow Yellow, Straw, Dark Yellow, Berna    Clarity, UA Clear Clear    Glucose, UA Negative Negative, 1000 mg/dL (3+) mg/dL    Bilirubin Negative Negative     Ketones, UA Negative Negative    Specific Gravity  1.010 1.005 - 1.030    Blood, UA Negative Negative    pH, Urine 6.0 5.0 - 8.0    Protein, POC Negative Negative mg/dL    Urobilinogen, UA Normal Normal    Leukocytes Negative Negative    Nitrite, UA Negative Negative

## 2018-03-04 LAB
BACTERIA UR CULT: NORMAL
BACTERIA UR CULT: NORMAL

## 2018-03-05 ENCOUNTER — APPOINTMENT (OUTPATIENT)
Dept: PREADMISSION TESTING | Facility: HOSPITAL | Age: 78
End: 2018-03-05

## 2018-03-05 VITALS — BODY MASS INDEX: 30.34 KG/M2 | HEIGHT: 61 IN | WEIGHT: 160.72 LBS

## 2018-03-05 LAB
ALBUMIN SERPL-MCNC: 3.8 G/DL (ref 3.2–4.8)
ALBUMIN/GLOB SERPL: 1.6 G/DL (ref 1.5–2.5)
ALP SERPL-CCNC: 101 U/L (ref 25–100)
ALT SERPL W P-5'-P-CCNC: 27 U/L (ref 7–40)
ANION GAP SERPL CALCULATED.3IONS-SCNC: 6 MMOL/L (ref 3–11)
AST SERPL-CCNC: 22 U/L (ref 0–33)
BILIRUB SERPL-MCNC: 0.6 MG/DL (ref 0.3–1.2)
BUN BLD-MCNC: 6 MG/DL (ref 9–23)
BUN/CREAT SERPL: 12 (ref 7–25)
CALCIUM SPEC-SCNC: 9.3 MG/DL (ref 8.7–10.4)
CHLORIDE SERPL-SCNC: 107 MMOL/L (ref 99–109)
CO2 SERPL-SCNC: 29 MMOL/L (ref 20–31)
CREAT BLD-MCNC: 0.5 MG/DL (ref 0.6–1.3)
DEPRECATED RDW RBC AUTO: 50.5 FL (ref 37–54)
ERYTHROCYTE [DISTWIDTH] IN BLOOD BY AUTOMATED COUNT: 14.3 % (ref 11.3–14.5)
GFR SERPL CREATININE-BSD FRML MDRD: 119 ML/MIN/1.73
GLOBULIN UR ELPH-MCNC: 2.4 GM/DL
GLUCOSE BLD-MCNC: 101 MG/DL (ref 70–100)
HCT VFR BLD AUTO: 39.4 % (ref 34.5–44)
HGB BLD-MCNC: 12.8 G/DL (ref 11.5–15.5)
MCH RBC QN AUTO: 31.4 PG (ref 27–31)
MCHC RBC AUTO-ENTMCNC: 32.5 G/DL (ref 32–36)
MCV RBC AUTO: 96.6 FL (ref 80–99)
PLATELET # BLD AUTO: 432 10*3/MM3 (ref 150–450)
PMV BLD AUTO: 8.8 FL (ref 6–12)
POTASSIUM BLD-SCNC: 4.5 MMOL/L (ref 3.5–5.5)
PROT SERPL-MCNC: 6.2 G/DL (ref 5.7–8.2)
RBC # BLD AUTO: 4.08 10*6/MM3 (ref 3.89–5.14)
SODIUM BLD-SCNC: 142 MMOL/L (ref 132–146)
WBC NRBC COR # BLD: 6.37 10*3/MM3 (ref 3.5–10.8)

## 2018-03-05 PROCEDURE — 80053 COMPREHEN METABOLIC PANEL: CPT | Performed by: SURGERY

## 2018-03-05 PROCEDURE — 36415 COLL VENOUS BLD VENIPUNCTURE: CPT

## 2018-03-05 PROCEDURE — 85027 COMPLETE CBC AUTOMATED: CPT | Performed by: SURGERY

## 2018-03-05 RX ORDER — MELATONIN
1000 EVERY MORNING
Status: ON HOLD | COMMUNITY
End: 2018-03-07

## 2018-03-05 RX ORDER — ESTRADIOL 10 UG/1
1 INSERT VAGINAL 2 TIMES WEEKLY
Status: ON HOLD | COMMUNITY
End: 2018-03-07

## 2018-03-05 NOTE — PAT
Patient to apply Chlorhexadine wipes  to surgical area (as instructed) the night before procedure and the AM of procedure. Wipes provided.    MEASURED FOR TEDS/SCDS IN PAT    CALF MEASUREMENT      16in  LENGTH MEASUREMENT   17in    EKG in chart from 02/2018.    Eduardo, in Dr. Mason's office, notified of pt's UTI history and hospitalization in 01/2018.  Informed that UA in chart from 03/02/2018 and pt. Finished Cipro on 03/04/2018.

## 2018-03-06 ENCOUNTER — ANESTHESIA EVENT (OUTPATIENT)
Dept: PERIOP | Facility: HOSPITAL | Age: 78
End: 2018-03-06

## 2018-03-07 ENCOUNTER — ANESTHESIA (OUTPATIENT)
Dept: PERIOP | Facility: HOSPITAL | Age: 78
End: 2018-03-07

## 2018-03-07 ENCOUNTER — HOSPITAL ENCOUNTER (OUTPATIENT)
Facility: HOSPITAL | Age: 78
Discharge: HOME | End: 2018-03-08
Attending: SURGERY | Admitting: SURGERY

## 2018-03-07 DIAGNOSIS — K80.20 CHOLELITHIASIS: ICD-10-CM

## 2018-03-07 PROCEDURE — 25010000002 FENTANYL CITRATE (PF) 100 MCG/2ML SOLUTION: Performed by: NURSE ANESTHETIST, CERTIFIED REGISTERED

## 2018-03-07 PROCEDURE — A9270 NON-COVERED ITEM OR SERVICE: HCPCS | Performed by: SURGERY

## 2018-03-07 PROCEDURE — 63710000001 LACTOBACILLUS ACIDOPHILUS CAPSULE: Performed by: SURGERY

## 2018-03-07 PROCEDURE — 94799 UNLISTED PULMONARY SVC/PX: CPT

## 2018-03-07 PROCEDURE — 25010000003 CEFAZOLIN IN DEXTROSE 2-4 GM/100ML-% SOLUTION: Performed by: SURGERY

## 2018-03-07 PROCEDURE — 63710000001 ASPIRIN 81 MG TABLET DELAYED-RELEASE: Performed by: SURGERY

## 2018-03-07 PROCEDURE — 63710000001 DOCUSATE SODIUM 100 MG CAPSULE: Performed by: SURGERY

## 2018-03-07 PROCEDURE — 25010000002 MORPHINE SULFATE (PF) 2 MG/ML SOLUTION: Performed by: SURGERY

## 2018-03-07 PROCEDURE — 25010000002 PROPOFOL 10 MG/ML EMULSION: Performed by: NURSE ANESTHETIST, CERTIFIED REGISTERED

## 2018-03-07 PROCEDURE — 25010000002 DEXAMETHASONE PER 1 MG: Performed by: NURSE ANESTHETIST, CERTIFIED REGISTERED

## 2018-03-07 PROCEDURE — 25010000002 HYDRALAZINE PER 20 MG: Performed by: NURSE ANESTHETIST, CERTIFIED REGISTERED

## 2018-03-07 PROCEDURE — 63710000001 ACETAMINOPHEN 500 MG TABLET: Performed by: SURGERY

## 2018-03-07 PROCEDURE — G0378 HOSPITAL OBSERVATION PER HR: HCPCS

## 2018-03-07 PROCEDURE — 63710000001 OXYCODONE-ACETAMINOPHEN 5-325 MG TABLET: Performed by: SURGERY

## 2018-03-07 PROCEDURE — 0 IOPAMIDOL 61 % SOLUTION: Performed by: SURGERY

## 2018-03-07 PROCEDURE — 88304 TISSUE EXAM BY PATHOLOGIST: CPT | Performed by: SURGERY

## 2018-03-07 PROCEDURE — 25010000002 ONDANSETRON PER 1 MG: Performed by: NURSE ANESTHETIST, CERTIFIED REGISTERED

## 2018-03-07 PROCEDURE — 25010000002 NEOSTIGMINE PER 0.5 MG: Performed by: NURSE ANESTHETIST, CERTIFIED REGISTERED

## 2018-03-07 PROCEDURE — 25010000002 HYDROMORPHONE PER 4 MG: Performed by: NURSE ANESTHETIST, CERTIFIED REGISTERED

## 2018-03-07 RX ORDER — METOPROLOL SUCCINATE 25 MG/1
25 TABLET, EXTENDED RELEASE ORAL DAILY
Status: DISCONTINUED | OUTPATIENT
Start: 2018-03-08 | End: 2018-03-08 | Stop reason: HOSPADM

## 2018-03-07 RX ORDER — HEPARIN SODIUM 5000 [USP'U]/ML
5000 INJECTION, SOLUTION INTRAVENOUS; SUBCUTANEOUS EVERY 8 HOURS SCHEDULED
Status: DISCONTINUED | OUTPATIENT
Start: 2018-03-08 | End: 2018-03-08 | Stop reason: HOSPADM

## 2018-03-07 RX ORDER — PROMETHAZINE HYDROCHLORIDE 25 MG/1
25 SUPPOSITORY RECTAL ONCE AS NEEDED
Status: DISCONTINUED | OUTPATIENT
Start: 2018-03-07 | End: 2018-03-07 | Stop reason: HOSPADM

## 2018-03-07 RX ORDER — LIDOCAINE HYDROCHLORIDE 10 MG/ML
0.5 INJECTION, SOLUTION EPIDURAL; INFILTRATION; INTRACAUDAL; PERINEURAL ONCE AS NEEDED
Status: COMPLETED | OUTPATIENT
Start: 2018-03-07 | End: 2018-03-07

## 2018-03-07 RX ORDER — GLYCOPYRROLATE 0.2 MG/ML
INJECTION INTRAMUSCULAR; INTRAVENOUS AS NEEDED
Status: DISCONTINUED | OUTPATIENT
Start: 2018-03-07 | End: 2018-03-07 | Stop reason: SURG

## 2018-03-07 RX ORDER — NITROGLYCERIN 0.4 MG/1
0.4 TABLET SUBLINGUAL
Status: DISCONTINUED | OUTPATIENT
Start: 2018-03-07 | End: 2018-03-08 | Stop reason: HOSPADM

## 2018-03-07 RX ORDER — SODIUM CHLORIDE 0.9 % (FLUSH) 0.9 %
1-10 SYRINGE (ML) INJECTION AS NEEDED
Status: DISCONTINUED | OUTPATIENT
Start: 2018-03-07 | End: 2018-03-07 | Stop reason: HOSPADM

## 2018-03-07 RX ORDER — DEXAMETHASONE SODIUM PHOSPHATE 4 MG/ML
INJECTION, SOLUTION INTRA-ARTICULAR; INTRALESIONAL; INTRAMUSCULAR; INTRAVENOUS; SOFT TISSUE AS NEEDED
Status: DISCONTINUED | OUTPATIENT
Start: 2018-03-07 | End: 2018-03-07 | Stop reason: SURG

## 2018-03-07 RX ORDER — HYDRALAZINE HYDROCHLORIDE 20 MG/ML
INJECTION INTRAMUSCULAR; INTRAVENOUS AS NEEDED
Status: DISCONTINUED | OUTPATIENT
Start: 2018-03-07 | End: 2018-03-07 | Stop reason: SURG

## 2018-03-07 RX ORDER — ASPIRIN 81 MG/1
81 TABLET ORAL NIGHTLY
Status: DISCONTINUED | OUTPATIENT
Start: 2018-03-07 | End: 2018-03-08 | Stop reason: HOSPADM

## 2018-03-07 RX ORDER — ONDANSETRON 2 MG/ML
INJECTION INTRAMUSCULAR; INTRAVENOUS AS NEEDED
Status: DISCONTINUED | OUTPATIENT
Start: 2018-03-07 | End: 2018-03-07 | Stop reason: SURG

## 2018-03-07 RX ORDER — SODIUM CHLORIDE, SODIUM LACTATE, POTASSIUM CHLORIDE, CALCIUM CHLORIDE 600; 310; 30; 20 MG/100ML; MG/100ML; MG/100ML; MG/100ML
9 INJECTION, SOLUTION INTRAVENOUS CONTINUOUS
Status: DISCONTINUED | OUTPATIENT
Start: 2018-03-07 | End: 2018-03-07

## 2018-03-07 RX ORDER — DOCUSATE SODIUM 100 MG/1
100 CAPSULE, LIQUID FILLED ORAL 2 TIMES DAILY
Status: DISCONTINUED | OUTPATIENT
Start: 2018-03-07 | End: 2018-03-08 | Stop reason: HOSPADM

## 2018-03-07 RX ORDER — FENTANYL CITRATE 50 UG/ML
INJECTION, SOLUTION INTRAMUSCULAR; INTRAVENOUS AS NEEDED
Status: DISCONTINUED | OUTPATIENT
Start: 2018-03-07 | End: 2018-03-07 | Stop reason: SURG

## 2018-03-07 RX ORDER — MORPHINE SULFATE 2 MG/ML
2 INJECTION, SOLUTION INTRAMUSCULAR; INTRAVENOUS
Status: DISCONTINUED | OUTPATIENT
Start: 2018-03-07 | End: 2018-03-08 | Stop reason: HOSPADM

## 2018-03-07 RX ORDER — PROMETHAZINE HYDROCHLORIDE 25 MG/1
25 TABLET ORAL ONCE AS NEEDED
Status: DISCONTINUED | OUTPATIENT
Start: 2018-03-07 | End: 2018-03-07 | Stop reason: HOSPADM

## 2018-03-07 RX ORDER — PROPOFOL 10 MG/ML
VIAL (ML) INTRAVENOUS AS NEEDED
Status: DISCONTINUED | OUTPATIENT
Start: 2018-03-07 | End: 2018-03-07 | Stop reason: SURG

## 2018-03-07 RX ORDER — ONDANSETRON 4 MG/1
4 TABLET, FILM COATED ORAL EVERY 6 HOURS PRN
Status: DISCONTINUED | OUTPATIENT
Start: 2018-03-07 | End: 2018-03-08 | Stop reason: HOSPADM

## 2018-03-07 RX ORDER — FAMOTIDINE 20 MG/1
20 TABLET, FILM COATED ORAL ONCE
Status: COMPLETED | OUTPATIENT
Start: 2018-03-07 | End: 2018-03-07

## 2018-03-07 RX ORDER — LABETALOL HYDROCHLORIDE 5 MG/ML
5 INJECTION, SOLUTION INTRAVENOUS
Status: DISCONTINUED | OUTPATIENT
Start: 2018-03-07 | End: 2018-03-07 | Stop reason: HOSPADM

## 2018-03-07 RX ORDER — FENTANYL CITRATE 50 UG/ML
50 INJECTION, SOLUTION INTRAMUSCULAR; INTRAVENOUS
Status: DISCONTINUED | OUTPATIENT
Start: 2018-03-07 | End: 2018-03-07 | Stop reason: HOSPADM

## 2018-03-07 RX ORDER — SODIUM CHLORIDE, SODIUM LACTATE, POTASSIUM CHLORIDE, CALCIUM CHLORIDE 600; 310; 30; 20 MG/100ML; MG/100ML; MG/100ML; MG/100ML
75 INJECTION, SOLUTION INTRAVENOUS CONTINUOUS
Status: DISCONTINUED | OUTPATIENT
Start: 2018-03-07 | End: 2018-03-08

## 2018-03-07 RX ORDER — HYDROMORPHONE HYDROCHLORIDE 1 MG/ML
0.5 INJECTION, SOLUTION INTRAMUSCULAR; INTRAVENOUS; SUBCUTANEOUS
Status: DISCONTINUED | OUTPATIENT
Start: 2018-03-07 | End: 2018-03-07 | Stop reason: HOSPADM

## 2018-03-07 RX ORDER — MEPERIDINE HYDROCHLORIDE 25 MG/ML
12.5 INJECTION INTRAMUSCULAR; INTRAVENOUS; SUBCUTANEOUS
Status: DISCONTINUED | OUTPATIENT
Start: 2018-03-07 | End: 2018-03-07 | Stop reason: HOSPADM

## 2018-03-07 RX ORDER — NALOXONE HCL 0.4 MG/ML
0.4 VIAL (ML) INJECTION
Status: DISCONTINUED | OUTPATIENT
Start: 2018-03-07 | End: 2018-03-08 | Stop reason: HOSPADM

## 2018-03-07 RX ORDER — BUPIVACAINE HYDROCHLORIDE 2.5 MG/ML
INJECTION, SOLUTION EPIDURAL; INFILTRATION; INTRACAUDAL AS NEEDED
Status: DISCONTINUED | OUTPATIENT
Start: 2018-03-07 | End: 2018-03-07 | Stop reason: HOSPADM

## 2018-03-07 RX ORDER — PROMETHAZINE HYDROCHLORIDE 25 MG/ML
6.25 INJECTION, SOLUTION INTRAMUSCULAR; INTRAVENOUS ONCE AS NEEDED
Status: DISCONTINUED | OUTPATIENT
Start: 2018-03-07 | End: 2018-03-07 | Stop reason: HOSPADM

## 2018-03-07 RX ORDER — L.ACID,PARA/B.BIFIDUM/S.THERM 8B CELL
1 CAPSULE ORAL DAILY
Status: DISCONTINUED | OUTPATIENT
Start: 2018-03-07 | End: 2018-03-08 | Stop reason: HOSPADM

## 2018-03-07 RX ORDER — CEFAZOLIN SODIUM 2 G/100ML
2 INJECTION, SOLUTION INTRAVENOUS ONCE
Status: COMPLETED | OUTPATIENT
Start: 2018-03-07 | End: 2018-03-07

## 2018-03-07 RX ORDER — LIDOCAINE HYDROCHLORIDE 10 MG/ML
INJECTION, SOLUTION INFILTRATION; PERINEURAL AS NEEDED
Status: DISCONTINUED | OUTPATIENT
Start: 2018-03-07 | End: 2018-03-07 | Stop reason: SURG

## 2018-03-07 RX ORDER — SODIUM CHLORIDE 9 MG/ML
INJECTION, SOLUTION INTRAVENOUS AS NEEDED
Status: DISCONTINUED | OUTPATIENT
Start: 2018-03-07 | End: 2018-03-07 | Stop reason: HOSPADM

## 2018-03-07 RX ORDER — ACETAMINOPHEN 500 MG
500 TABLET ORAL EVERY 6 HOURS
Status: DISCONTINUED | OUTPATIENT
Start: 2018-03-07 | End: 2018-03-08 | Stop reason: HOSPADM

## 2018-03-07 RX ORDER — IPRATROPIUM BROMIDE AND ALBUTEROL SULFATE 2.5; .5 MG/3ML; MG/3ML
3 SOLUTION RESPIRATORY (INHALATION) ONCE AS NEEDED
Status: DISCONTINUED | OUTPATIENT
Start: 2018-03-07 | End: 2018-03-07 | Stop reason: HOSPADM

## 2018-03-07 RX ORDER — ONDANSETRON 2 MG/ML
4 INJECTION INTRAMUSCULAR; INTRAVENOUS EVERY 6 HOURS PRN
Status: DISCONTINUED | OUTPATIENT
Start: 2018-03-07 | End: 2018-03-08 | Stop reason: HOSPADM

## 2018-03-07 RX ORDER — ATRACURIUM BESYLATE 10 MG/ML
INJECTION, SOLUTION INTRAVENOUS AS NEEDED
Status: DISCONTINUED | OUTPATIENT
Start: 2018-03-07 | End: 2018-03-07 | Stop reason: SURG

## 2018-03-07 RX ORDER — OXYCODONE HYDROCHLORIDE AND ACETAMINOPHEN 5; 325 MG/1; MG/1
2 TABLET ORAL EVERY 4 HOURS PRN
Status: DISCONTINUED | OUTPATIENT
Start: 2018-03-07 | End: 2018-03-08 | Stop reason: HOSPADM

## 2018-03-07 RX ADMIN — FENTANYL CITRATE 50 MCG: 50 INJECTION, SOLUTION INTRAMUSCULAR; INTRAVENOUS at 13:45

## 2018-03-07 RX ADMIN — HYDROMORPHONE HYDROCHLORIDE 0.5 MG: 10 INJECTION INTRAMUSCULAR; INTRAVENOUS; SUBCUTANEOUS at 14:19

## 2018-03-07 RX ADMIN — FAMOTIDINE 20 MG: 20 TABLET, FILM COATED ORAL at 11:20

## 2018-03-07 RX ADMIN — HYDRALAZINE HYDROCHLORIDE 2.5 MG: 20 INJECTION INTRAMUSCULAR; INTRAVENOUS at 13:02

## 2018-03-07 RX ADMIN — SODIUM CHLORIDE, POTASSIUM CHLORIDE, SODIUM LACTATE AND CALCIUM CHLORIDE 75 ML/HR: 600; 310; 30; 20 INJECTION, SOLUTION INTRAVENOUS at 15:35

## 2018-03-07 RX ADMIN — ATRACURIUM BESYLATE 35 MG: 10 INJECTION, SOLUTION INTRAVENOUS at 12:29

## 2018-03-07 RX ADMIN — OXYCODONE AND ACETAMINOPHEN 2 TABLET: 5; 325 TABLET ORAL at 20:18

## 2018-03-07 RX ADMIN — Medication 3 MG: at 13:41

## 2018-03-07 RX ADMIN — LIDOCAINE HYDROCHLORIDE 50 MG: 10 INJECTION, SOLUTION INFILTRATION; PERINEURAL at 12:29

## 2018-03-07 RX ADMIN — CEFAZOLIN SODIUM 2 G: 2 INJECTION, SOLUTION INTRAVENOUS at 12:25

## 2018-03-07 RX ADMIN — DOCUSATE SODIUM 100 MG: 100 CAPSULE, LIQUID FILLED ORAL at 20:18

## 2018-03-07 RX ADMIN — ASPIRIN 81 MG: 81 TABLET, COATED ORAL at 20:18

## 2018-03-07 RX ADMIN — GLYCOPYRROLATE 0.4 MG: 0.2 INJECTION, SOLUTION INTRAMUSCULAR; INTRAVENOUS at 13:41

## 2018-03-07 RX ADMIN — MORPHINE SULFATE 2 MG: 2 INJECTION, SOLUTION INTRAMUSCULAR; INTRAVENOUS at 16:20

## 2018-03-07 RX ADMIN — SODIUM CHLORIDE, POTASSIUM CHLORIDE, SODIUM LACTATE AND CALCIUM CHLORIDE 9 ML/HR: 600; 310; 30; 20 INJECTION, SOLUTION INTRAVENOUS at 11:16

## 2018-03-07 RX ADMIN — Medication 1 CAPSULE: at 16:19

## 2018-03-07 RX ADMIN — PROPOFOL 100 MG: 10 INJECTION, EMULSION INTRAVENOUS at 12:29

## 2018-03-07 RX ADMIN — DEXAMETHASONE SODIUM PHOSPHATE 8 MG: 4 INJECTION, SOLUTION INTRAMUSCULAR; INTRAVENOUS at 12:33

## 2018-03-07 RX ADMIN — FENTANYL CITRATE 50 MCG: 50 INJECTION, SOLUTION INTRAMUSCULAR; INTRAVENOUS at 12:29

## 2018-03-07 RX ADMIN — FENTANYL CITRATE 50 MCG: 50 INJECTION INTRAMUSCULAR; INTRAVENOUS at 14:38

## 2018-03-07 RX ADMIN — ONDANSETRON 4 MG: 2 INJECTION INTRAMUSCULAR; INTRAVENOUS at 13:32

## 2018-03-07 RX ADMIN — LIDOCAINE HYDROCHLORIDE 0.5 ML: 10 INJECTION, SOLUTION INFILTRATION; PERINEURAL at 11:16

## 2018-03-07 RX ADMIN — FENTANYL CITRATE 50 MCG: 50 INJECTION, SOLUTION INTRAMUSCULAR; INTRAVENOUS at 12:54

## 2018-03-07 RX ADMIN — ACETAMINOPHEN 500 MG: 500 TABLET ORAL at 16:19

## 2018-03-07 RX ADMIN — HYDROMORPHONE HYDROCHLORIDE 0.5 MG: 10 INJECTION INTRAMUSCULAR; INTRAVENOUS; SUBCUTANEOUS at 14:10

## 2018-03-07 NOTE — BRIEF OP NOTE
CHOLECYSTECTOMY LAPAROSCOPIC INTRAOPERATIVE CHOLANGIOGRAM  Progress Note    Chelsi Ferguson  3/7/2018    Pre-op Diagnosis:   Symptomatic cholelithiasis       Post-Op Diagnosis Codes:   Same    Procedure/CPT® Codes:      Procedure(s):  CHOLECYSTECTOMY LAPAROSCOPIC INTRAOPERATIVE CHOLANGIOGRAM    Surgeon(s):  MD Rodolfo Garcia MD    Anesthesia: General    Staff:   Circulator: Regina Hitchcock RN  Radiology Technologist: Claudia Mao, RT  Scrub Person: Joe Edmond  Nursing Assistant: Sindy Gauthier    Estimated Blood Loss: minimal    Urine Voided: * No values recorded between 3/7/2018 12:25 PM and 3/7/2018  1:43 PM *    Specimens:                  ID Type Source Tests Collected by Time Destination   A : gallbladder Tissue Gallbladder TISSUE PATHOLOGY EXAM Harinder Mason MD 3/7/2018 1307          Drains:           Findings: IOC (-)    Complications: None      Harinder Mason MD     Date: 3/7/2018  Time: 1:43 PM

## 2018-03-07 NOTE — PLAN OF CARE
Problem: Patient Care Overview (Adult)  Goal: Plan of Care Review  Outcome: Ongoing (interventions implemented as appropriate)   03/07/18 1555   Coping/Psychosocial Response Interventions   Plan Of Care Reviewed With patient   Patient Care Overview   Progress improving       Problem: Perioperative Period (Adult)  Goal: Signs and Symptoms of Listed Potential Problems Will be Absent or Manageable (Perioperative Period)  Outcome: Ongoing (interventions implemented as appropriate)   03/07/18 1555   Perioperative Period   Problems Assessed (Perioperative Period) all   Problems Present (Perioperative Period) pain

## 2018-03-07 NOTE — ANESTHESIA PROCEDURE NOTES
Airway  Urgency: elective    Airway not difficult    General Information and Staff    Patient location during procedure: OR  CRNA: JHON RUELAS    Indications and Patient Condition  Indications for airway management: airway protection    Preoxygenated: yes  MILS not maintained throughout  Mask difficulty assessment: 2 - vent by mask + OA or adjuvant +/- NMBA    Final Airway Details  Final airway type: endotracheal airway      Successful airway: ETT  Cuffed: yes   Successful intubation technique: direct laryngoscopy  Endotracheal tube insertion site: oral  Blade: Mira  Blade size: #3  ETT size: 7.0 mm  Cormack-Lehane Classification: grade III - view of epiglottis only  Placement verified by: chest auscultation and capnometry   Cuff volume (mL): 6  Measured from: lips  ETT to lips (cm): 20  Number of attempts at approach: 1    Additional Comments  Patient history, labs, physical exam, anesthetic plan reviewed with MDA and patient in preop.  Pt transported to room via RN. All ASA monitors applied, preoxygenated x 3 min/ ETO2 of >85, IV patent, smooth induction, atraumatic intubation with Mac 3 (grade III view, small mouth opening) and eschmann, + ETCO2, negative epigastric sounds, breath sound equal bilaterally with symmetric chest rise and fall, tube secured, all VSS, cspine neutrality maintained throughout induction, intubation and postitioning, all ppp, arms <90.

## 2018-03-07 NOTE — ANESTHESIA PREPROCEDURE EVALUATION
Anesthesia Evaluation                  Airway   Mallampati: II  Dental      Pulmonary    Cardiovascular     (+) hypertension, past MI ,       Neuro/Psych  GI/Hepatic/Renal/Endo      Musculoskeletal     Abdominal    Substance History      OB/GYN          Other                        Anesthesia Plan    ASA 3     general     intravenous induction   Anesthetic plan and risks discussed with patient.    Plan discussed with CRNA.

## 2018-03-07 NOTE — H&P
Pre-Op H&P  Chelsi Ferguson  [id number]  1940    Chief complaint: Gallstones    HPI:    Patient is a 77 y.o.female presents with gallstones and here today for laparoscopic cholecystectomy, intraop cholagniogram     Review of Systems:  General ROS: negative for chills, fever or skin lesions;  No changes since last office visit  Cardiovascular ROS: no chest pain or dyspnea on exertion.  +cardiac clearance  Respiratory ROS: no cough, shortness of breath, or wheezing  :  Since discharge 1/18/18 for E. Coli UTI treated by Dr. Sanz with IV Zosyn/oral Augmentin therapy, she reports approximately 3-4 UTI's treated by PCP with oral antibiotics.  No current dysuria/frequency.  U/A 3/2/18 negative.  Per Dr. Sanz's 1/18/18 note would benefit from urology consult with recurrent UTI's.  Discussed with pt/family.    Allergies:   Allergies   Allergen Reactions   • Aleve [Naproxen] Hives   • Indocin [Indomethacin] Hallucinations   • Lipitor [Atorvastatin] Hives   • Statins Hives   • Zocor [Simvastatin] Hives   • Celebrex [Celecoxib] Diarrhea   • Keflex [Cephalexin] GI Intolerance     Upset stomach, may have had some blood in stool but cannot remember the reason why she was put on it. Tolerates penicillins without problem.   • Ibuprofen GI Intolerance     Heartburn.   • Prevacid [Lansoprazole] Nausea And Vomiting   • Prilosec [Omeprazole] Nausea And Vomiting       Home Meds:    No current facility-administered medications on file prior to encounter.      Current Outpatient Prescriptions on File Prior to Encounter   Medication Sig Dispense Refill   • acetaminophen (TYLENOL) 500 MG tablet Take 500 mg by mouth Every 6 (Six) Hours As Needed for mild pain (1-3).     • aspirin 81 MG EC tablet Take 81 mg by mouth Every Night.     • Biotin 5000 MCG capsule Take 1 tablet by mouth Every Morning.     • Coenzyme Q10 200 MG capsule Take 200 mg by mouth Every Morning.     • Famotidine (PEPCID PO) Take 1 tablet by mouth Daily.     •  metoprolol succinate XL (TOPROL-XL) 25 MG 24 hr tablet Take 25 mg by mouth Every Morning.     • Multiple Vitamins-Minerals (MULTIVITAL PO) Take 1 tablet by mouth Daily.     • Omega-3 Fatty Acids (FISH OIL) 1000 MG capsule capsule Take 1,200 mg by mouth Daily With Breakfast.     • Probiotic Product (PROBIOTIC DAILY PO) Take 1 tablet by mouth Daily.     • nitroglycerin (NITROSTAT) 0.4 MG SL tablet Place 0.4 mg under the tongue Every 5 (Five) Minutes As Needed.         PMH:   Past Medical History:   Diagnosis Date   • Ankle pain    • Arthritis    • Cancer     Skin   • Chest discomfort    • Cholecystitis 1/15/2018   • Coronary artery disease    • Edema    • Elevated liver enzymes 1/15/2018   • Gallstones    • GERD (gastroesophageal reflux disease)    • Glaucoma     pt is currently off of meds and Dr Tanner does not think that she is glaucoma   • H/O complete eye exam 06/2013   • H/O mammogram 11/16/2015   • H/O non-ST elevation myocardial infarction (NSTEMI) 01/2005   • Hammer toe    • Heart attack 01/2005   • History of blood transfusion 01/2005   • History of cardiovascular stress test 12/02/2015    normal   • HTN (hypertension)    • Hyperlipidemia    • Hypokalemia 1/15/2018   • Kidney infection 1971   • Menopausal symptoms    • Onychia and paronychia of finger    • Osteopenia    • Other elevated white blood cell count    • Pap smear for cervical cancer screening 2010 [facility]   • Pneumonia    • Sepsis 1/15/2018   • UTI (urinary tract infection) 1/15/2018   • Viral warts    • Wears glasses      PSH:    Past Surgical History:   Procedure Laterality Date   • BLADDER REPAIR  2002   • BLADDER REPAIR  2003   • BUNIONECTOMY Right 2010   • COLONOSCOPY  2008, 4/2014    [facility]   • CORONARY STENT PLACEMENT Left 01/2005    drug eluting stent 1/2005 LAD   • DILATATION AND CURETTAGE  1971   • DILATATION AND CURETTAGE  1978   • EYE SURGERY Bilateral 05/2016    Cataract   • FRONTALIS SUSPENSION  2010   • HAMMER TOE REPAIR Right  "11/03/2010   • HYSTERECTOMY  1978   • LAPAROTOMY OOPHERECTOMY Bilateral 2002   • NOSE SURGERY  03/2017    repair 3 fractured areas. Dr Dunne   • OTHER SURGICAL HISTORY  2005    rectocele repair   • OTHER SURGICAL HISTORY  2010    eyelid surgery   • REPLACEMENT TOTAL KNEE BILATERAL Bilateral    • SKIN BIOPSY     • TONSILLECTOMY  1952   • TOTAL KNEE ARTHROPLASTY  01/2005   • UTERINE FIBROID SURGERY  1978    emergency removal of fibroid from birth canal       Immunization History:  Influenza: yes 2017  Pneumococcal: yes < 5 years  Tetanus: unknown    Social History:   Tobacco:   History   Smoking Status   • Never Smoker   Smokeless Tobacco   • Never Used      Alcohol:     History   Alcohol Use No       Vitals:           /68 (BP Location: Right arm, Patient Position: Lying)  Pulse 69  Temp 98.3 °F (36.8 °C) (Temporal Artery )   Resp 18  Ht 152.4 cm (60\")  Wt 71.7 kg (158 lb)  LMP  (LMP Unknown)  SpO2 96%  BMI 30.86 kg/m2    Physical Exam:  General Appearance:    Alert, cooperative, no distress, appears stated age   Head:    Normocephalic, without obvious abnormality, atraumatic   Lungs:     Clear to auscultation bilaterally, respirations unlabored    Heart:   Regular rate and rhythm, S1 and S2 normal, no murmur, rub    or gallop    Abdomen:    Soft, non-tender.  +bowel sounds   Breast Exam:    deferred   Genitalia:    deferred   Extremities:   Extremities normal, atraumatic, no cyanosis or edema   Skin:   Skin color, texture, turgor normal, no rashes or lesions   Neurologic:   Grossly intact   Results Review  I reviewed the patient's new clinical results.    Cancer Staging (if applicable)  Cancer Patient: __ yes _x_no __unknown; If yes, clinical stage T:__ N:__M:__, stage group or __N/A    Impression/Plan:  Cholelithiasis - Laparoscopic cholecystectomy, intraop cholagniogram     Bessy Biggs, APRN   3/7/2018   12:06 PM  "

## 2018-03-07 NOTE — PROGRESS NOTES
"Patient Name:  Chelsi Ferguson  YOB: 1940  [id number]    Surgery Post - Operative Note    Date of visit: 3/7/2018    Subjective   Subjective: Sore, but otherwise stable       Objective     Objective:    /61  Pulse 82  Temp 98.3 °F (36.8 °C)  Resp 16  Ht 152.4 cm (60\")  Wt 71.7 kg (158 lb)  LMP  (LMP Unknown)  SpO2 96%  BMI 30.86 kg/m2    CV:  Rate  regular and rhythm  regular  L:  Clear  to auscultation bilaterally   ABD:  Soft, appropriately tender. Dressings  clean, dry and intact   EXT:  No cyanosis, clubbing or edema         Assessment/Plan     Assessment/ Plan: Doing well after Lap CCY. Continue Pulmonary toilet    Hospital Problem List     Cholelithiasis (Chronic)              Harinder Mason MD  3/7/2018  3:22 PM      "

## 2018-03-07 NOTE — OP NOTE
Operative Report    Patient Name:  Chelsi Ferguson  YOB: 1940  [id number]  3/7/2018      PREOPERATIVE DIAGNOSIS: Symptomatic cholelithiasis      POSTOPERATIVE DIAGNOSIS: Same        PROCEDURE PERFORMED:     Laparoscopic cholecystectomy with intra-operative cholangiography        SURGEON: Harinder Mason MD      ASSISTANT: Sukhdev Hernandez MD       SPECIMENS: Gallbladder and contents        ANESTHESIA: General.        FINDINGS:     1) Gallbladder in standard positioning     2) Intra-operative cholangiogram demonstrated excellent filling of the cystic, common, right and left hepatic ducts with good flow of contrast into the duodenum without retained stone or filling defect        INDICATIONS:      The patient is a 77 y.o. female with a history of abdominal pain, concerning for symptomatic cholelithiasis. Pre-operative imaging including CT scan confirmed the diagnosis. The risks and benefits of Laparoscopic cholecystectomy with cholangiography were discussed with the patient and their family and they agree to proceed.        DESCRIPTION OF PROCEDURE:     After obtaining informed consent, the patient was taken to the operating room and placed in the supine position. After appropriate DVT and antibiotic prophylaxis, general anesthesia was induced. The abdomen was prepped and draped in standard sterile fashion, and after infiltrating the skin with local anesthetic, a transverse 12mm skin incision was made inferior to the umbilicus . Blunt dissection was carried down to the base of the umbilicus, which was grasped with a Kocher clamp and elevated anteriorly. A vertical midline incision was made in the fascia, and blunt dissection was carried down into the peritoneal cavity. A stay suture of 0 Vicryl was then placed in figure-of-eight fashion around the defect, and a blunt trocar advanced without difficulty into the abdominal cavity.  The abdomen was insufflated with carbon dioxide gas to a pressure of 15 mm  "Hg, and a laparoscope advanced through the trocar and the abdominal contents were inspected. There was no evidence of bowel, bladder, or visceral entry with entrance of the trocar . At this point, after infiltrating the skin with local anesthetic, a standard laparoscopic cholecystectomy trocar placement schema was followed.     The gallbladder was grasped and elevated superiorly. Using meticulous blunt dissection , the cystic duct and cystic artery were bluntly dissected free of other structures and clearly identified using the \"Critical View\" technique. The cystic artery was then clipped twice proximally and once distally, and divided between the clips.     The cystic duct was then clipped at its junction with the infundibulum of the gallbladder, and transected across 50% of its circumference. A cholangiogram catheter was then placed within the duct, and on-table cholangiography under fluoroscopy was obtained. There was excellent filling of the cystic, common, right and left hepatic ducts with good flow of contrast into the duodenum without retained stone or filling defect  . The cholangiogram catheter was then removed, and the cystic duct was ligated using a 2-0 silk suture tied laparoscopically,  reinforced with hemoclips, and divided.     The gallbladder was then dissected free of the gallbladder fossa using a combination of electrocautery and blunt dissection . There was a small posterior branch of the artery that was clipped and divided . The gallbladder was then placed in an Endocatch bag, and removed from the inferiormost trocar site. It was inspected on the back table, correlated with intra-operative findings, and passed off as specimen.     The right upper quadrant was then inspected. The cystic duct and cystic artery stumps were intact without bleeding or biliary leak. The right upper quadrant was irrigated with saline until clear. The abdomen was deflated and reinsufflated to make sure pneumoperitoneum " was not tamponading any bleeding and there was none.  The abdomen was again irrigated with saline until clear, and all trocars removed under direct and laparoscopic visualization. The fascia at the inferiormost incision  was closed using the previously placed 0 Vicryl suture. The wounds were irrigated with normal saline, and closed in each area using absorbable subcuticular suture. The incisions were dressed in standard sterile fashion and covered with dry dressings. The patient recovered from anesthesia, was extubated in the operating room, and transferred to the PACU in stable condition.  All sponge and needle counts were correct times two at the completion of the procedure. There were no immediate complications.       Harinder Mason MD  3/7/2018  1:44 PM

## 2018-03-07 NOTE — ANESTHESIA POSTPROCEDURE EVALUATION
Patient: Chelsi Ferguson    Procedure Summary     Date Anesthesia Start Anesthesia Stop Room / Location    03/07/18 1225  [facility] / [facility]       Procedure Diagnosis Surgeon Provider    CHOLECYSTECTOMY LAPAROSCOPIC INTRAOPERATIVE CHOLANGIOGRAM (N/A Abdomen) No diagnosis on file. MD Lionel Garcia MD          Anesthesia Type: general  Last vitals  /64   Temp 98.4   Pulse 91   Resp 15   SpO2 96     Post Anesthesia Care and Evaluation    Patient location during evaluation: PACU  Patient participation: complete - patient participated  Level of consciousness: awake and alert  Pain score: 0  Pain management: adequate  Airway patency: patent  Anesthetic complications: No anesthetic complications  PONV Status: none  Cardiovascular status: hemodynamically stable and acceptable  Respiratory status: nonlabored ventilation, acceptable and nasal cannula  Hydration status: acceptable

## 2018-03-08 VITALS
SYSTOLIC BLOOD PRESSURE: 123 MMHG | OXYGEN SATURATION: 96 % | TEMPERATURE: 97.9 F | HEART RATE: 66 BPM | BODY MASS INDEX: 32.47 KG/M2 | RESPIRATION RATE: 16 BRPM | HEIGHT: 60 IN | WEIGHT: 165.38 LBS | DIASTOLIC BLOOD PRESSURE: 52 MMHG

## 2018-03-08 LAB
ALBUMIN SERPL-MCNC: 3.5 G/DL (ref 3.2–4.8)
ALBUMIN/GLOB SERPL: 1.5 G/DL (ref 1.5–2.5)
ALP SERPL-CCNC: 76 U/L (ref 25–100)
ALT SERPL W P-5'-P-CCNC: 21 U/L (ref 7–40)
ANION GAP SERPL CALCULATED.3IONS-SCNC: 6 MMOL/L (ref 3–11)
AST SERPL-CCNC: 38 U/L (ref 0–33)
BASOPHILS # BLD AUTO: 0.01 10*3/MM3 (ref 0–0.2)
BASOPHILS NFR BLD AUTO: 0 % (ref 0–1)
BILIRUB SERPL-MCNC: 0.8 MG/DL (ref 0.3–1.2)
BUN BLD-MCNC: 9 MG/DL (ref 9–23)
BUN/CREAT SERPL: 18 (ref 7–25)
CALCIUM SPEC-SCNC: 9.1 MG/DL (ref 8.7–10.4)
CHLORIDE SERPL-SCNC: 102 MMOL/L (ref 99–109)
CO2 SERPL-SCNC: 28 MMOL/L (ref 20–31)
CREAT BLD-MCNC: 0.5 MG/DL (ref 0.6–1.3)
DEPRECATED RDW RBC AUTO: 53 FL (ref 37–54)
EOSINOPHIL # BLD AUTO: 0 10*3/MM3 (ref 0–0.3)
EOSINOPHIL NFR BLD AUTO: 0 % (ref 0–3)
ERYTHROCYTE [DISTWIDTH] IN BLOOD BY AUTOMATED COUNT: 14.9 % (ref 11.3–14.5)
GFR SERPL CREATININE-BSD FRML MDRD: 119 ML/MIN/1.73
GLOBULIN UR ELPH-MCNC: 2.3 GM/DL
GLUCOSE BLD-MCNC: 121 MG/DL (ref 70–100)
HCT VFR BLD AUTO: 38.2 % (ref 34.5–44)
HGB BLD-MCNC: 12.2 G/DL (ref 11.5–15.5)
IMM GRANULOCYTES # BLD: 0.12 10*3/MM3 (ref 0–0.03)
IMM GRANULOCYTES NFR BLD: 0.5 % (ref 0–0.6)
LYMPHOCYTES # BLD AUTO: 1.78 10*3/MM3 (ref 0.6–4.8)
LYMPHOCYTES NFR BLD AUTO: 7.2 % (ref 24–44)
MCH RBC QN AUTO: 31.1 PG (ref 27–31)
MCHC RBC AUTO-ENTMCNC: 31.9 G/DL (ref 32–36)
MCV RBC AUTO: 97.4 FL (ref 80–99)
MONOCYTES # BLD AUTO: 1.35 10*3/MM3 (ref 0–1)
MONOCYTES NFR BLD AUTO: 5.4 % (ref 0–12)
NEUTROPHILS # BLD AUTO: 21.58 10*3/MM3 (ref 1.5–8.3)
NEUTROPHILS NFR BLD AUTO: 86.9 % (ref 41–71)
PLATELET # BLD AUTO: 449 10*3/MM3 (ref 150–450)
PMV BLD AUTO: 9.1 FL (ref 6–12)
POTASSIUM BLD-SCNC: 4.4 MMOL/L (ref 3.5–5.5)
PROT SERPL-MCNC: 5.8 G/DL (ref 5.7–8.2)
RBC # BLD AUTO: 3.92 10*6/MM3 (ref 3.89–5.14)
SODIUM BLD-SCNC: 136 MMOL/L (ref 132–146)
WBC NRBC COR # BLD: 24.84 10*3/MM3 (ref 3.5–10.8)

## 2018-03-08 PROCEDURE — A9270 NON-COVERED ITEM OR SERVICE: HCPCS | Performed by: SURGERY

## 2018-03-08 PROCEDURE — 63710000001 LACTOBACILLUS ACIDOPHILUS CAPSULE: Performed by: SURGERY

## 2018-03-08 PROCEDURE — 94799 UNLISTED PULMONARY SVC/PX: CPT

## 2018-03-08 PROCEDURE — 63710000001 DOCUSATE SODIUM 100 MG CAPSULE: Performed by: SURGERY

## 2018-03-08 PROCEDURE — 63710000001 OXYCODONE-ACETAMINOPHEN 5-325 MG TABLET: Performed by: SURGERY

## 2018-03-08 PROCEDURE — 63710000001 METOPROLOL SUCCINATE XL 25 MG TABLET SUSTAINED-RELEASE 24 HOUR: Performed by: SURGERY

## 2018-03-08 PROCEDURE — 85025 COMPLETE CBC W/AUTO DIFF WBC: CPT | Performed by: SURGERY

## 2018-03-08 PROCEDURE — G0378 HOSPITAL OBSERVATION PER HR: HCPCS

## 2018-03-08 PROCEDURE — 80053 COMPREHEN METABOLIC PANEL: CPT | Performed by: SURGERY

## 2018-03-08 PROCEDURE — 25010000002 HEPARIN (PORCINE) PER 1000 UNITS: Performed by: SURGERY

## 2018-03-08 RX ORDER — PSEUDOEPHEDRINE HCL 30 MG
1 TABLET ORAL 2 TIMES DAILY
Qty: 20 CAPSULE | Refills: 0 | Status: SHIPPED | OUTPATIENT
Start: 2018-03-08 | End: 2019-01-09

## 2018-03-08 RX ORDER — OXYCODONE HYDROCHLORIDE AND ACETAMINOPHEN 5; 325 MG/1; MG/1
2 TABLET ORAL EVERY 4 HOURS PRN
Qty: 31 TABLET | Refills: 0 | Status: SHIPPED | OUTPATIENT
Start: 2018-03-08 | End: 2018-03-17

## 2018-03-08 RX ADMIN — OXYCODONE AND ACETAMINOPHEN 2 TABLET: 5; 325 TABLET ORAL at 05:03

## 2018-03-08 RX ADMIN — OXYCODONE AND ACETAMINOPHEN 2 TABLET: 5; 325 TABLET ORAL at 11:02

## 2018-03-08 RX ADMIN — DOCUSATE SODIUM 100 MG: 100 CAPSULE, LIQUID FILLED ORAL at 08:31

## 2018-03-08 RX ADMIN — METOPROLOL SUCCINATE 25 MG: 25 TABLET, EXTENDED RELEASE ORAL at 08:31

## 2018-03-08 RX ADMIN — SODIUM CHLORIDE, POTASSIUM CHLORIDE, SODIUM LACTATE AND CALCIUM CHLORIDE 75 ML/HR: 600; 310; 30; 20 INJECTION, SOLUTION INTRAVENOUS at 04:45

## 2018-03-08 RX ADMIN — HEPARIN SODIUM 5000 UNITS: 5000 INJECTION, SOLUTION INTRAVENOUS; SUBCUTANEOUS at 05:03

## 2018-03-08 RX ADMIN — Medication 1 CAPSULE: at 08:31

## 2018-03-08 NOTE — PROGRESS NOTES
Discharge Planning Assessment  [facility]     Patient Name: Chelsi Ferguson  MRN: [id number]  Today's Date: 3/8/2018    Admit Date: 3/7/2018          Discharge Needs Assessment       03/08/18 1201    Living Environment    Lives With spouse    Living Arrangements house    Home Accessibility stairs to enter home;bed and bath on same level    Number of Stairs to Enter Home 2    Number of Stairs Within Home 12    Stair Railings at Home inside, present on right side    Type of Financial/Environmental Concern none    Transportation Available car;family or friend will provide    Living Environment    Provides Primary Care For no one    Primary Care Provided By spouse/significant other    Quality Of Family Relationships supportive;involved    Able to Return to Prior Living Arrangements yes    Discharge Needs Assessment    Concerns To Be Addressed no discharge needs identified;denies needs/concerns at this time    Readmission Within The Last 30 Days no previous admission in last 30 days    Anticipated Changes Related to Illness none    Equipment Currently Used at Home grab bar;raised toilet;walker, rolling    Equipment Needed After Discharge none    Discharge Disposition home or self-care            Discharge Plan       03/08/18 1202    Case Management/Social Work Plan    Plan Home at discharge    Patient/Family In Agreement With Plan yes    Additional Comments Spoke with patient at bedside. She lives with her spouse in [facility]. She has a walker at home that she uses as needed. She remains mostly independent with her ADL's and denies any need for DME or home health and has no concerns regarding discharge. Goal is home with spouse when medically ready - [facility] -  [phone]         Discharge Placement     No information found        Expected Discharge Date and Time     Expected Discharge Date Expected Discharge Time    Mar 8, 2018               Demographic Summary       03/08/18 8588    Referral Information     Admission Type observation    Arrived From still a patient    Referral Source admission list    Reason For Consult discharge planning    Record Reviewed history and physical;medical record    Contact Information    Permission Granted to Share Information With     Primary Care Physician Information    Name Reina Yarbrough            Functional Status       03/08/18 1200    Functional Status Current    Current Functional Level Comment Please see nursing notes     Functional Status Prior    Ambulation 0-->independent    Transferring 0-->independent    Toileting 0-->independent    Bathing 0-->independent    Dressing 0-->independent    Eating 0-->independent    Communication 0-->understands/communicates without difficulty    Swallowing 0-->swallows foods/liquids without difficulty    IADL    Medications independent    Meal Preparation independent    Housekeeping assistive person    Laundry assistive person    Shopping assistive person    Oral Care independent    Activity Tolerance    Current Activity Limitations none    Usual Activity Tolerance good    Current Activity Tolerance moderate    Cognitive/Perceptual/Developmental    Current Mental Status/Cognitive Functioning no deficits noted    Employment/Financial    Employment/Finance Comments [employer] medicare Replacement             Psychosocial     None            Abuse/Neglect     None            Legal     None            Substance Abuse     None            Patient Forms     None          Jnig Sagastume RN

## 2018-03-08 NOTE — PROGRESS NOTES
"Patient Name:  Chelsi Ferguson  YOB: 1940  [id number]    Surgery Progress Note    Date of visit: 3/8/2018    Subjective   Subjective: Feels better this morning. Pain control excellent.          Objective     Objective:     /52 (BP Location: Left arm, Patient Position: Lying)  Pulse 66  Temp 97.9 °F (36.6 °C) (Oral)   Resp 16  Ht 152.4 cm (60\")  Wt 75 kg (165 lb 6.4 oz)  LMP  (LMP Unknown)  SpO2 96%  BMI 32.3 kg/m2    Intake/Output Summary (Last 24 hours) at 03/08/18 0732  Last data filed at 03/08/18 0600   Gross per 24 hour   Intake             1998 ml   Output              865 ml   Net             1133 ml       CV:  Rhythm  regular and rate regular   L:  Clear  to auscultation bilaterally   Abd:  Bowel sounds positive , soft, dressings c/d/i  Ext:  No cyanosis, clubbing, edema    Labs that are back at this time have been reviewed. WBC 24K, likely reactive. LFT's normal.       Assessment/Plan     Assessment/ Plan:    Hospital Problem List     Cholelithiasis (Chronic) - Doing well after Lap CCY. Likely D/C home later today. Ambulate, HLIV.              Harinder Mason MD  3/8/2018  7:32 AM    Now feeling better. Wants to go home.    D/C home. RTC 4 weeks. No lifting > 30 lbs until RTC. May remove dressings in 24 hours, may shower at that time.    Harinder Mason MD  11:15 AM    "

## 2018-03-08 NOTE — CONSULTS
Adult Nutrition  Assessment/PES    Patient Name:  Chelsi Ferguson  YOB: 1940  MRN: [id number]  Admit Date:  3/7/2018    Assessment Date:  3/8/2018    Comments:            Reason for Assessment       03/08/18 1225    Reason for Assessment    Reason For Assessment/Visit identified at risk by screening criteria;nurse/nurse practitioner consult    Identified At Risk By Screening Criteria MST SCORE 2+;unintentional loss of 10 lbs or more in the past 2 mos    Time Spent (min) 30    Gastrointestinal GERD/Reflux    Other diagnosis Gallstones, s/p lap cholecystectomy with intra-op cholangiography. PMHx: CAD, edema, HTN, PNA ,UTI              Nutrition/Diet History       03/08/18 1227    Nutrition/Diet History    Reported/Observed By Patient    Appetite Improved   Pt states appetite PTA was decreased x2 months due to recurrent UTI's and antibiotic treatment.     Reported GI Symptoms GI Distress;Reflux    Other Pt states her GERD is a persistent problem for her, she has recently changed medications to Protonix and a probiotic per her MD.             Anthropometrics       03/08/18 1230    Anthropometrics    Weight 75 kg (165 lb 6 oz)    RD Documented Weight on Admission 75 kg (165 lb 6 oz)   Bed weight on admission.    Usual Body Weight (UBW)    Usual Body Weight 77.1 kg (170 lb)   Reported by pt.     % Usual Body Weight 97.28    Weight Loss 2.268 kg (5 lb)    % Weight Loss  3 %    Weight Loss Time Frame 2 months            Labs/Tests/Procedures/Meds       03/08/18 1231    Labs/Tests/Procedures/Meds    Labs/Tests Review Reviewed                Nutrition Prescription Ordered       03/08/18 1231    Nutrition Prescription PO    Current PO Diet Regular            Evaluation of Received Nutrient/Fluid Intake       03/08/18 1231    PO Evaluation    Number of Days PO Intake Evaluated Insufficient Data            Problem/Interventions:        Problem 1       03/08/18 1231    Nutrition Diagnoses Problem 1    Problem 1  Inadequate Intake/Infusion    Inadequate Intake Type Oral    Etiology (related to) Other (comment)   clinical condition    Signs/Symptoms (evidenced by) Report of Mnimal PO Intake                    Intervention Goal       03/08/18 1232    Intervention Goal    General Nutrition support treatment    PO Tolerate PO;Establish PO            Nutrition Intervention       03/08/18 1232    Nutrition Intervention    RD/Tech Action Encourage intake;Advise alternate selection;Follow Tx progress;Care plan reviewd;Interview for preference              Education/Evaluation       03/08/18 1232    Monitor/Evaluation    Monitor Per protocol;PO intake        Electronically signed by:  Ivett Esposito  03/08/18 12:32 PM

## 2018-03-09 ENCOUNTER — TRANSITIONAL CARE MANAGEMENT TELEPHONE ENCOUNTER (OUTPATIENT)
Dept: INTERNAL MEDICINE | Facility: CLINIC | Age: 78
End: 2018-03-09

## 2018-03-09 LAB
CYTO UR: NORMAL
LAB AP CASE REPORT: NORMAL
LAB AP CLINICAL INFORMATION: NORMAL
Lab: NORMAL
PATH REPORT.FINAL DX SPEC: NORMAL
PATH REPORT.GROSS SPEC: NORMAL

## 2018-03-12 NOTE — OUTREACH NOTE
LINDA call completed.  Please refer to TCM call flowsheet for call documentation.    *Please see pt msg in routing comments.*

## 2018-03-21 ENCOUNTER — OFFICE VISIT (OUTPATIENT)
Dept: INTERNAL MEDICINE | Facility: CLINIC | Age: 78
End: 2018-03-21

## 2018-03-21 VITALS
SYSTOLIC BLOOD PRESSURE: 120 MMHG | HEART RATE: 64 BPM | OXYGEN SATURATION: 97 % | WEIGHT: 155.8 LBS | BODY MASS INDEX: 30.43 KG/M2 | DIASTOLIC BLOOD PRESSURE: 62 MMHG | TEMPERATURE: 96.7 F

## 2018-03-21 DIAGNOSIS — Z09 HOSPITAL DISCHARGE FOLLOW-UP: ICD-10-CM

## 2018-03-21 DIAGNOSIS — D72.829 LEUKOCYTOSIS, UNSPECIFIED TYPE: ICD-10-CM

## 2018-03-21 DIAGNOSIS — E78.5 HYPERLIPIDEMIA, UNSPECIFIED HYPERLIPIDEMIA TYPE: Primary | Chronic | ICD-10-CM

## 2018-03-21 LAB
ALBUMIN SERPL-MCNC: 3.9 G/DL (ref 3.2–4.8)
ALBUMIN/GLOB SERPL: 1.7 G/DL (ref 1.5–2.5)
ALP SERPL-CCNC: 89 U/L (ref 25–100)
ALT SERPL-CCNC: 11 U/L (ref 7–40)
AST SERPL-CCNC: 20 U/L (ref 0–33)
BASOPHILS # BLD AUTO: 0.03 10*3/MM3 (ref 0–0.2)
BASOPHILS NFR BLD AUTO: 0.4 % (ref 0–1)
BILIRUB SERPL-MCNC: 0.4 MG/DL (ref 0.3–1.2)
BUN SERPL-MCNC: 11 MG/DL (ref 9–23)
BUN/CREAT SERPL: 18.3 (ref 7–25)
CALCIUM SERPL-MCNC: 9.7 MG/DL (ref 8.7–10.4)
CHLORIDE SERPL-SCNC: 104 MMOL/L (ref 99–109)
CHOLEST SERPL-MCNC: 207 MG/DL (ref 0–200)
CO2 SERPL-SCNC: 30 MMOL/L (ref 20–31)
CREAT SERPL-MCNC: 0.6 MG/DL (ref 0.6–1.3)
EOSINOPHIL # BLD AUTO: 0.24 10*3/MM3 (ref 0–0.3)
EOSINOPHIL NFR BLD AUTO: 3.5 % (ref 0–3)
ERYTHROCYTE [DISTWIDTH] IN BLOOD BY AUTOMATED COUNT: 14.6 % (ref 11.3–14.5)
GFR SERPLBLD CREATININE-BSD FMLA CKD-EPI: 117 ML/MIN/1.73
GFR SERPLBLD CREATININE-BSD FMLA CKD-EPI: 97 ML/MIN/1.73
GLOBULIN SER CALC-MCNC: 2.3 GM/DL
GLUCOSE SERPL-MCNC: 94 MG/DL (ref 70–100)
HCT VFR BLD AUTO: 39.9 % (ref 34.5–44)
HDLC SERPL-MCNC: 41 MG/DL (ref 40–60)
HGB BLD-MCNC: 12.9 G/DL (ref 11.5–15.5)
IMM GRANULOCYTES # BLD: 0.02 10*3/MM3 (ref 0–0.03)
IMM GRANULOCYTES NFR BLD: 0.3 % (ref 0–0.6)
LDLC SERPL CALC-MCNC: 122 MG/DL (ref 0–100)
LYMPHOCYTES # BLD AUTO: 2.16 10*3/MM3 (ref 0.6–4.8)
LYMPHOCYTES NFR BLD AUTO: 31.6 % (ref 24–44)
MCH RBC QN AUTO: 31.5 PG (ref 27–31)
MCHC RBC AUTO-ENTMCNC: 32.3 G/DL (ref 32–36)
MCV RBC AUTO: 97.3 FL (ref 80–99)
MONOCYTES # BLD AUTO: 0.68 10*3/MM3 (ref 0–1)
MONOCYTES NFR BLD AUTO: 10 % (ref 0–12)
NEUTROPHILS # BLD AUTO: 3.7 10*3/MM3 (ref 1.5–8.3)
NEUTROPHILS NFR BLD AUTO: 54.2 % (ref 41–71)
PLATELET # BLD AUTO: 439 10*3/MM3 (ref 150–450)
POTASSIUM SERPL-SCNC: 4.4 MMOL/L (ref 3.5–5.5)
PROT SERPL-MCNC: 6.2 G/DL (ref 5.7–8.2)
RBC # BLD AUTO: 4.1 10*6/MM3 (ref 3.89–5.14)
SODIUM SERPL-SCNC: 139 MMOL/L (ref 132–146)
TRIGL SERPL-MCNC: 219 MG/DL (ref 0–150)
VLDLC SERPL CALC-MCNC: 43.8 MG/DL
WBC # BLD AUTO: 6.83 10*3/MM3 (ref 3.5–10.8)

## 2018-03-21 PROCEDURE — 99214 OFFICE O/P EST MOD 30 MIN: CPT | Performed by: FAMILY MEDICINE

## 2018-03-21 NOTE — PROGRESS NOTES
Transitional Care Follow Up Visit  Subjective     Chelsi WATT Marty is a 77 y.o. female who presents for a transitional care management visit.    Within 48 business hours after discharge our office contacted her via telephone to coordinate her care and needs.      I reviewed and discussed the details of that call along with the discharge summary, hospital problems, inpatient lab results, inpatient diagnostic studies, and consultation reports with Chelsi.     Current outpatient and discharge medications have been reconciled for the patient.    Date of TCM Phone Call 1/19/2018 3/12/2018   [facility]   Date of Admission 1/15/2018 3/7/2018   Date of Discharge 1/18/2018 3/8/2018   Discharge Disposition Home or Self Care Home or Self Care     Risk for Readmission (LACE) Score: 5    History of Present Illness   Course During Hospital Stay:  Pt was admitted for a laproscopic GB removal on 3/7/17 and was discharged the next day.  Pt tool percocet for only a few days as she had memory problems, and woke up one night in the shower and did not know how she got there.  Pt stopped the percocet.     Pt is only taking tylenol.  Pt is off the pravastatin, but is not sure why. The medication was stopped this past admission.   Pt has been doing well. Pt was kept overnight because of age.  Pt was able to go to [place] with Granddaughter driving to see great granddaughter's 1st birthday.   Pt started to drive yesterday.      Pt saw Dr Alonzo U[facility] yesterday who did a urinalysis that pt reports was ok.    Pt has lost weight and is not very hungry.   Pt had elevated white count post surgery.  The following portions of the patient's history were reviewed and updated as appropriate: allergies, current medications, past family history, past medical history, past social history, past surgical history and problem list.     Past Medical History:   Diagnosis Date   • Ankle pain    • Arthritis    • Cancer      Skin   • Chest discomfort    • Cholecystitis 1/15/2018   • Coronary artery disease    • Edema    • Elevated liver enzymes 1/15/2018   • Gallstones    • GERD (gastroesophageal reflux disease)    • Glaucoma     pt is currently off of meds and Dr Tanner does not think that she is glaucoma   • H/O complete eye exam 06/2013   • H/O mammogram 11/16/2015   • H/O non-ST elevation myocardial infarction (NSTEMI) 01/2005   • Hammer toe    • Heart attack 01/2005   • History of blood transfusion 01/2005   • History of cardiovascular stress test 12/02/2015    normal   • HTN (hypertension)    • Hyperlipidemia    • Hypokalemia 1/15/2018   • Kidney infection 1971   • Menopausal symptoms    • Onychia and paronychia of finger    • Osteopenia    • Other elevated white blood cell count    • Pap smear for cervical cancer screening 2010 [facility]   • Pneumonia    • Sepsis 1/15/2018   • UTI (urinary tract infection) 1/15/2018   • Viral warts    • Wears glasses      Past Surgical History:   Procedure Laterality Date   • BLADDER REPAIR  2002   • BLADDER REPAIR  2003   • BUNIONECTOMY Right 2010   • CHOLECYSTECTOMY WITH INTRAOPERATIVE CHOLANGIOGRAM N/A 3/7/2018    Procedure: CHOLECYSTECTOMY LAPAROSCOPIC INTRAOPERATIVE CHOLANGIOGRAM;  Surgeon: Harinder Mason MD;  Location: [facility];  Service:    • COLONOSCOPY  2008, 4/2014    [facility]   • CORONARY STENT PLACEMENT Left 01/2005    drug eluting stent 1/2005 LAD   • DILATATION AND CURETTAGE  1971   • DILATATION AND CURETTAGE  1978   • EYE SURGERY Bilateral 05/2016    Cataract   • FRONTALIS SUSPENSION  2010   • HAMMER TOE REPAIR Right 11/03/2010   • HYSTERECTOMY  1978   • LAPAROTOMY OOPHERECTOMY Bilateral 2002   • NOSE SURGERY  03/2017    repair 3 fractured areas. Dr Dunne   • OTHER SURGICAL HISTORY  2005    rectocele repair   • OTHER SURGICAL HISTORY  2010    eyelid surgery   • REPLACEMENT TOTAL KNEE BILATERAL Bilateral    • SKIN BIOPSY     • TONSILLECTOMY  1952   • TOTAL KNEE ARTHROPLASTY   01/2005   • UTERINE FIBROID SURGERY  1978    emergency removal of fibroid from birth canal     Allergies   Allergen Reactions   • Aleve [Naproxen] Hives   • Indocin [Indomethacin] Hallucinations   • Lipitor [Atorvastatin] Hives   • Statins Hives   • Zocor [Simvastatin] Hives   • Celebrex [Celecoxib] Diarrhea   • Keflex [Cephalexin] GI Intolerance     Upset stomach, may have had some blood in stool but cannot remember the reason why she was put on it. Tolerates penicillins without problem.   • Ibuprofen GI Intolerance     Heartburn.   • Prevacid [Lansoprazole] Nausea And Vomiting   • Prilosec [Omeprazole] Nausea And Vomiting     Family History   Problem Relation Age of Onset   • Stroke Mother    • Heart failure Father      congestive   • Cancer Father      lip   • Breast cancer Sister      60's   • Breast cancer Daughter 40   • Breast cancer Other      NIECE 60's   • Breast cancer Other      NIECE 60's   • Ovarian cancer Neg Hx        Social History     Social History   • Marital status:      Spouse name: N/A   • Number of children: N/A   • Years of education: N/A     Occupational History   • Not on file.     Social History Main Topics   • Smoking status: Never Smoker   • Smokeless tobacco: Never Used   • Alcohol use No   • Drug use: No   • Sexual activity: Yes     Birth control/ protection: None     Other Topics Concern   • Not on file     Social History Narrative   • No narrative on file         Review of Systems   Constitutional: Positive for activity change, appetite change and unexpected weight change. Negative for chills, diaphoresis, fatigue and fever.        Weight loss   HENT: Positive for sore throat (that she attributes to the ET tube). Negative for ear pain, nosebleeds, postnasal drip, rhinorrhea, sinus pressure and sneezing.    Eyes: Negative.  Negative for redness and itching.   Respiratory: Negative.  Negative for cough, shortness of breath and wheezing.    Cardiovascular: Negative.  Negative  for chest pain and palpitations.   Gastrointestinal: Positive for abdominal pain and diarrhea (better now, is just soft). Negative for constipation, nausea and vomiting.        Epigastric pain, just sore.   Endocrine: Negative.  Negative for cold intolerance and heat intolerance.   Genitourinary: Positive for frequency and urgency. Negative for dysuria and hematuria.   Musculoskeletal: Positive for arthralgias. Negative for back pain and neck pain.   Skin: Negative.  Negative for color change and rash.   Allergic/Immunologic: Negative.  Negative for environmental allergies.   Neurological: Positive for tremors (level is now back to normal). Negative for dizziness, syncope, light-headedness and headaches.   Hematological: Negative.  Negative for adenopathy. Does not bruise/bleed easily.   Psychiatric/Behavioral: Negative.  Negative for dysphoric mood. The patient is not nervous/anxious.        Objective   Physical Exam   Constitutional: She is oriented to person, place, and time. She appears well-developed.   HENT:   Head: Normocephalic.   Right Ear: External ear normal.   Left Ear: External ear normal.   Nose: Nose normal.   Mouth/Throat: Oropharynx is clear and moist.   Eyes: Conjunctivae and EOM are normal. Pupils are equal, round, and reactive to light.   Neck: Normal range of motion. Neck supple. Carotid bruit is not present. No thyroid mass and no thyromegaly present.   Cardiovascular: Normal rate and regular rhythm.    No murmur heard.  Pulmonary/Chest: Effort normal and breath sounds normal. No respiratory distress. She has no decreased breath sounds. She has no wheezes. She has no rhonchi. She has no rales. She exhibits no tenderness.   Abdominal: Soft. Bowel sounds are normal. She exhibits no mass. There is no tenderness. There is no rebound and no guarding.   Musculoskeletal: Normal range of motion.   Neurological: She is alert and oriented to person, place, and time.   Skin: Skin is warm and dry.         Psychiatric: She has a normal mood and affect. Her behavior is normal.   Nursing note and vitals reviewed.       Vitals:    03/21/18 0923   BP: 120/62   Pulse: 64   Temp: 96.7 °F (35.9 °C)   SpO2: 97%   Weight: 70.7 kg (155 lb 12.8 oz)       Assessment/Plan   Chelsi was seen today for hospital follow up.    Diagnoses and all orders for this visit:    Hyperlipidemia, unspecified hyperlipidemia type  -     Comprehensive Metabolic Panel  -     Lipid Panel    Leukocytosis, unspecified type  -     CBC & Differential    Hospital discharge follow-up        If liver enzymes ok, then will restart pravastatin 40mg per day  If pt is doing very well can change the 8 wk f/u to lab only and recheck in July           Current Outpatient Prescriptions:   •  aspirin 81 MG EC tablet, Take 81 mg by mouth Every Night., Disp: , Rfl:   •  docusate sodium 100 MG capsule, Take 1 capsule by mouth 2 (Two) Times a Day., Disp: 20 capsule, Rfl: 0  •  metoprolol succinate XL (TOPROL-XL) 25 MG 24 hr tablet, Take 25 mg by mouth Every Morning., Disp: , Rfl:   •  nitroglycerin (NITROSTAT) 0.4 MG SL tablet, Place 0.4 mg under the tongue Every 5 (Five) Minutes As Needed., Disp: , Rfl:   •  Probiotic Product (PROBIOTIC DAILY PO), Take 1 tablet by mouth Daily., Disp: , Rfl:      Return in about 8 weeks (around 5/16/2018), or if symptoms worsen or fail to improve, for Recheck.

## 2018-03-23 ENCOUNTER — TELEPHONE (OUTPATIENT)
Dept: INTERNAL MEDICINE | Facility: CLINIC | Age: 78
End: 2018-03-23

## 2018-03-23 DIAGNOSIS — R74.8 ELEVATED LIVER ENZYMES: Primary | ICD-10-CM

## 2018-03-23 DIAGNOSIS — E78.5 HYPERLIPIDEMIA, UNSPECIFIED HYPERLIPIDEMIA TYPE: Chronic | ICD-10-CM

## 2018-03-23 NOTE — TELEPHONE ENCOUNTER
----- Message from Reina MAI MD sent at 3/22/2018  5:20 PM EDT -----  Please call the patient regarding her abnormal result. LDL cholesterol is high, at 122 and should be less than 70. If following the low animal fat diet, we should add statins, which statin has she had in past?

## 2018-03-23 NOTE — TELEPHONE ENCOUNTER
Restart pravastatin 40 mg and check lipids and liver enzymes 4-6 weeks orders in computer.  Call if any problems

## 2018-03-23 NOTE — TELEPHONE ENCOUNTER
Mrs Ferguson called back for her results. She does have plenty of prevastatin at home that she can restart if needed. She had been off because her liver enzymes were bad.

## 2018-03-27 NOTE — TELEPHONE ENCOUNTER
She states Gaston had called her yesterday and was not sure why.  She had already gotten her lab results.

## 2018-04-04 ENCOUNTER — TELEPHONE (OUTPATIENT)
Dept: INTERNAL MEDICINE | Facility: CLINIC | Age: 78
End: 2018-04-04

## 2018-04-04 NOTE — TELEPHONE ENCOUNTER
----- Message from eRina MAI MD sent at 3/22/2018  5:20 PM EDT -----  Please call the patient regarding her abnormal result. LDL cholesterol is high, at 122 and should be less than 70. If following the low animal fat diet, we should add statins, which statin has she had in past?

## 2018-04-11 ENCOUNTER — OFFICE VISIT (OUTPATIENT)
Dept: INTERNAL MEDICINE | Facility: CLINIC | Age: 78
End: 2018-04-11

## 2018-04-11 VITALS
HEART RATE: 67 BPM | HEIGHT: 60 IN | TEMPERATURE: 97.7 F | DIASTOLIC BLOOD PRESSURE: 60 MMHG | SYSTOLIC BLOOD PRESSURE: 122 MMHG | BODY MASS INDEX: 30.15 KG/M2 | WEIGHT: 153.6 LBS | OXYGEN SATURATION: 98 %

## 2018-04-11 DIAGNOSIS — R39.9 UTI SYMPTOMS: Primary | ICD-10-CM

## 2018-04-11 DIAGNOSIS — R82.90 ABNORMAL URINALYSIS: ICD-10-CM

## 2018-04-11 LAB
BILIRUB BLD-MCNC: NEGATIVE MG/DL
CLARITY, POC: ABNORMAL
COLOR UR: YELLOW
GLUCOSE UR STRIP-MCNC: NEGATIVE MG/DL
KETONES UR QL: NEGATIVE
LEUKOCYTE EST, POC: ABNORMAL
NITRITE UR-MCNC: NEGATIVE MG/ML
PH UR: 7.5 [PH] (ref 5–8)
PROT UR STRIP-MCNC: NEGATIVE MG/DL
RBC # UR STRIP: ABNORMAL /UL
SP GR UR: 1.01 (ref 1–1.03)
UROBILINOGEN UR QL: NORMAL

## 2018-04-11 PROCEDURE — 99213 OFFICE O/P EST LOW 20 MIN: CPT | Performed by: NURSE PRACTITIONER

## 2018-04-11 PROCEDURE — 81003 URINALYSIS AUTO W/O SCOPE: CPT | Performed by: NURSE PRACTITIONER

## 2018-04-11 RX ORDER — NITROFURANTOIN 25; 75 MG/1; MG/1
100 CAPSULE ORAL 2 TIMES DAILY
Qty: 10 CAPSULE | Refills: 0 | Status: SHIPPED | OUTPATIENT
Start: 2018-04-11 | End: 2018-04-16 | Stop reason: SDUPTHER

## 2018-04-11 NOTE — PROGRESS NOTES
Subjective   Chelsi Ferguson is a 77 y.o. female.     Urinary Tract Infection    This is a new problem. The current episode started yesterday. The problem occurs every urination. The problem has been unchanged. The quality of the pain is described as burning (sharp lower abd pain ). The pain is mild. There has been no fever. Associated symptoms include frequency and urgency. Pertinent negatives include no chills, discharge, flank pain, hematuria, hesitancy, nausea, possible pregnancy, sweats or vomiting. She has tried nothing for the symptoms. The treatment provided no relief. Her past medical history is significant for recurrent UTIs. There is no history of catheterization or a single kidney.   Urinary Frequency    This is a new problem. The current episode started yesterday. The problem occurs every urination. The problem has been unchanged. Associated symptoms include frequency and urgency. Pertinent negatives include no chills, discharge, flank pain, hematuria, hesitancy, nausea, possible pregnancy, sweats or vomiting. Her past medical history is significant for recurrent UTIs. There is no history of catheterization or a single kidney.        The following portions of the patient's history were reviewed and updated as appropriate: allergies, current medications, past family history, past medical history, past social history, past surgical history and problem list.    Review of Systems   Constitutional: Negative for chills and fever.   Gastrointestinal: Positive for abdominal pain. Negative for nausea and vomiting.   Genitourinary: Positive for dysuria, frequency and urgency. Negative for difficulty urinating, flank pain, hematuria and hesitancy.   Neurological: Negative for dizziness and weakness.   Psychiatric/Behavioral: Negative for confusion.       Objective   Physical Exam   Constitutional: She is oriented to person, place, and time. She appears well-developed and well-nourished.   Cardiovascular: Normal rate  and regular rhythm.    Pulmonary/Chest: Effort normal. No respiratory distress.   Abdominal: Soft. Bowel sounds are normal. She exhibits no distension and no mass. There is no tenderness. There is no rigidity, no rebound, no guarding and no CVA tenderness. No hernia.   Neurological: She is alert and oriented to person, place, and time.   Skin: Skin is warm and dry.   Psychiatric: She has a normal mood and affect. Her behavior is normal.   Nursing note and vitals reviewed.      Assessment/Plan      Chelsi was seen today for urinary tract infection, urinary frequency and urinary urgency.    Diagnoses and all orders for this visit:    UTI symptoms  -     POC Urinalysis Dipstick, Automated  -     nitrofurantoin, macrocrystal-monohydrate, (MACROBID) 100 MG capsule; Take 1 capsule by mouth 2 (Two) Times a Day.    Abnormal urinalysis  -     Urine Culture - Urine, Urine, Clean Catch      Brief Urine Lab Results  (Last result in the past 365 days)      Color   Clarity   Blood   Leuk Est   Nitrite   Protein   CREAT   Urine HCG        04/11/18 1239 Yellow Cloudy(A) Trace  Comment:  10 Aurelio/uL(A) Large (3+)  Comment:  500 Natalie/uL(A) Negative Negative           Culture sent  Increase fluids  Macrobid as directed   Return if symptoms worsen or fail to improve.  Plan of care discussed with pt. They verbalized understanding and agreement.

## 2018-04-14 LAB
BACTERIA UR CULT: ABNORMAL
OTHER ANTIBIOTIC SUSC ISLT: ABNORMAL

## 2018-04-16 ENCOUNTER — TELEPHONE (OUTPATIENT)
Dept: INTERNAL MEDICINE | Facility: CLINIC | Age: 78
End: 2018-04-16

## 2018-04-16 DIAGNOSIS — R39.9 UTI SYMPTOMS: ICD-10-CM

## 2018-04-16 RX ORDER — NITROFURANTOIN 25; 75 MG/1; MG/1
100 CAPSULE ORAL 2 TIMES DAILY
Qty: 10 CAPSULE | Refills: 0 | Status: SHIPPED | OUTPATIENT
Start: 2018-04-16 | End: 2018-04-17 | Stop reason: ALTCHOICE

## 2018-04-16 NOTE — TELEPHONE ENCOUNTER
Patients  request refill on her last antibiotic that we gave her for her UTI. They think it was called Macrobid ???? Not sure   (0) swallows foods/liquids without difficulty

## 2018-04-16 NOTE — TELEPHONE ENCOUNTER
----- Message from LINA Dutton sent at 4/14/2018  6:52 PM EDT -----  Please let pt know  That urine culture was positive. The macrobid I prescribed covers both bacteria that grew.

## 2018-04-17 ENCOUNTER — LAB REQUISITION (OUTPATIENT)
Dept: LAB | Facility: HOSPITAL | Age: 78
End: 2018-04-17

## 2018-04-17 ENCOUNTER — OFFICE VISIT (OUTPATIENT)
Dept: INTERNAL MEDICINE | Facility: CLINIC | Age: 78
End: 2018-04-17

## 2018-04-17 VITALS
TEMPERATURE: 97.5 F | DIASTOLIC BLOOD PRESSURE: 68 MMHG | SYSTOLIC BLOOD PRESSURE: 124 MMHG | HEART RATE: 91 BPM | OXYGEN SATURATION: 99 %

## 2018-04-17 DIAGNOSIS — R82.90 ABNORMAL URINALYSIS: ICD-10-CM

## 2018-04-17 DIAGNOSIS — R50.81 FEVER IN OTHER DISEASES: ICD-10-CM

## 2018-04-17 DIAGNOSIS — R30.0 DYSURIA: ICD-10-CM

## 2018-04-17 DIAGNOSIS — M62.838 MUSCLE SPASMS OF NECK: ICD-10-CM

## 2018-04-17 DIAGNOSIS — N39.0 ACUTE UTI: Primary | ICD-10-CM

## 2018-04-17 DIAGNOSIS — Z00.00 ROUTINE GENERAL MEDICAL EXAMINATION AT A HEALTH CARE FACILITY: ICD-10-CM

## 2018-04-17 LAB
ANION GAP SERPL CALCULATED.3IONS-SCNC: 11 MMOL/L (ref 3–11)
BASOPHILS # BLD AUTO: 0.03 10*3/MM3 (ref 0–0.2)
BASOPHILS NFR BLD AUTO: 0.1 % (ref 0–1)
BILIRUB BLD-MCNC: NEGATIVE MG/DL
BUN BLD-MCNC: 13 MG/DL (ref 9–23)
BUN/CREAT SERPL: 26 (ref 7–25)
CALCIUM SPEC-SCNC: 9.8 MG/DL (ref 8.7–10.4)
CHLORIDE SERPL-SCNC: 94 MMOL/L (ref 99–109)
CLARITY, POC: CLEAR
CO2 SERPL-SCNC: 29 MMOL/L (ref 20–31)
COLOR UR: ABNORMAL
CREAT BLD-MCNC: 0.5 MG/DL (ref 0.6–1.3)
DEPRECATED RDW RBC AUTO: 52.1 FL (ref 37–54)
EOSINOPHIL # BLD AUTO: 0.5 10*3/MM3 (ref 0–0.3)
EOSINOPHIL NFR BLD AUTO: 2.5 % (ref 0–3)
ERYTHROCYTE [DISTWIDTH] IN BLOOD BY AUTOMATED COUNT: 15 % (ref 11.3–14.5)
GFR SERPL CREATININE-BSD FRML MDRD: 119 ML/MIN/1.73
GLUCOSE BLD-MCNC: 100 MG/DL (ref 70–100)
GLUCOSE UR STRIP-MCNC: NEGATIVE MG/DL
HCT VFR BLD AUTO: 41.8 % (ref 34.5–44)
HGB BLD-MCNC: 14 G/DL (ref 11.5–15.5)
IMM GRANULOCYTES # BLD: 0.12 10*3/MM3 (ref 0–0.03)
IMM GRANULOCYTES NFR BLD: 0.6 % (ref 0–0.6)
KETONES UR QL: NEGATIVE
LEUKOCYTE EST, POC: ABNORMAL
LYMPHOCYTES # BLD AUTO: 1.41 10*3/MM3 (ref 0.6–4.8)
LYMPHOCYTES NFR BLD AUTO: 6.9 % (ref 24–44)
MCH RBC QN AUTO: 31.7 PG (ref 27–31)
MCHC RBC AUTO-ENTMCNC: 33.5 G/DL (ref 32–36)
MCV RBC AUTO: 94.6 FL (ref 80–99)
MONOCYTES # BLD AUTO: 0.65 10*3/MM3 (ref 0–1)
MONOCYTES NFR BLD AUTO: 3.2 % (ref 0–12)
NEUTROPHILS # BLD AUTO: 17.73 10*3/MM3 (ref 1.5–8.3)
NEUTROPHILS NFR BLD AUTO: 87.3 % (ref 41–71)
NITRITE UR-MCNC: NEGATIVE MG/ML
PH UR: 6 [PH] (ref 5–8)
PLATELET # BLD AUTO: 356 10*3/MM3 (ref 150–450)
PMV BLD AUTO: 10.3 FL (ref 6–12)
POTASSIUM BLD-SCNC: 3.8 MMOL/L (ref 3.5–5.5)
PROT UR STRIP-MCNC: ABNORMAL MG/DL
RBC # BLD AUTO: 4.42 10*6/MM3 (ref 3.89–5.14)
RBC # UR STRIP: NEGATIVE /UL
SODIUM BLD-SCNC: 134 MMOL/L (ref 132–146)
SP GR UR: 1.02 (ref 1–1.03)
UROBILINOGEN UR QL: NORMAL
WBC NRBC COR # BLD: 20.32 10*3/MM3 (ref 3.5–10.8)

## 2018-04-17 PROCEDURE — 99213 OFFICE O/P EST LOW 20 MIN: CPT | Performed by: NURSE PRACTITIONER

## 2018-04-17 PROCEDURE — 87040 BLOOD CULTURE FOR BACTERIA: CPT | Performed by: NURSE PRACTITIONER

## 2018-04-17 PROCEDURE — 81003 URINALYSIS AUTO W/O SCOPE: CPT | Performed by: NURSE PRACTITIONER

## 2018-04-17 PROCEDURE — 80048 BASIC METABOLIC PNL TOTAL CA: CPT | Performed by: NURSE PRACTITIONER

## 2018-04-17 PROCEDURE — 85025 COMPLETE CBC W/AUTO DIFF WBC: CPT | Performed by: NURSE PRACTITIONER

## 2018-04-17 RX ORDER — TIZANIDINE 2 MG/1
2 TABLET ORAL EVERY 8 HOURS PRN
Qty: 30 TABLET | Refills: 0 | Status: SHIPPED | OUTPATIENT
Start: 2018-04-17 | End: 2018-04-25

## 2018-04-17 RX ORDER — CIPROFLOXACIN 500 MG/1
500 TABLET, FILM COATED ORAL 2 TIMES DAILY
Qty: 14 TABLET | Refills: 0 | Status: SHIPPED | OUTPATIENT
Start: 2018-04-17 | End: 2018-05-24

## 2018-04-17 NOTE — PROGRESS NOTES
Subjective   Chelsi Ferguson is a 77 y.o. female.     Urinary Tract Infection    This is a recurrent problem. The current episode started 1 to 4 weeks ago. The problem occurs every urination. The problem has been unchanged. The quality of the pain is described as burning. The pain is mild. The maximum temperature recorded prior to her arrival was 100 - 100.9 F. The fever has been present for 1 - 2 days. She is not sexually active. There is no history of pyelonephritis. Associated symptoms include chills and hematuria. Pertinent negatives include no discharge, flank pain, frequency, hesitancy, nausea, possible pregnancy, sweats, urgency or vomiting. She has tried antibiotics (macrobid) for the symptoms. The treatment provided mild relief. Her past medical history is significant for recurrent UTIs.   Neck Pain    This is a new problem. The current episode started in the past 7 days. The problem occurs constantly. The problem has been unchanged. The pain is associated with a sleep position. The pain is present in the occipital region. The quality of the pain is described as aching. The pain is mild. The symptoms are aggravated by twisting and bending. The pain is same all the time. Stiffness is present all day and at night. Associated symptoms include a fever. Pertinent negatives include no chest pain, headaches, leg pain, numbness, pain with swallowing, paresis, photophobia, syncope, tingling, trouble swallowing, visual change, weakness or weight loss. She has tried acetaminophen for the symptoms. The treatment provided moderate relief.      Was last seen on 4/11/18 and urine culture was positive for Enterobacter cloacae complex and Enterococcus faecalis, both of which were susceptible to macrobid. She completed a 5 day course of the macrobid. She called our office yesterday and Dr. Yarbrough sent in another round of the macrobid. She has taken 1 dose of this.     Has an appt with urology , Dr. Alonzo this Friday. Last saw  "him on 3/20/18 for recurrent UTI's. He has recommended that she have a catheterization/dilation with next suspected UTI but she has refused unless she is \"put to sleep.\"    The following portions of the patient's history were reviewed and updated as appropriate: allergies, current medications, past family history, past medical history, past social history, past surgical history and problem list.    Review of Systems   Constitutional: Positive for chills and fever. Negative for appetite change, diaphoresis and weight loss.   HENT: Negative for trouble swallowing.    Eyes: Negative for photophobia.   Cardiovascular: Negative for chest pain and syncope.   Gastrointestinal: Negative for abdominal pain, nausea and vomiting.   Genitourinary: Positive for dysuria and hematuria. Negative for difficulty urinating, flank pain, frequency, hesitancy and urgency.   Musculoskeletal: Positive for arthralgias, neck pain and neck stiffness.   Neurological: Negative for dizziness, tingling, weakness, numbness and headaches.   Psychiatric/Behavioral: Negative for confusion.       Objective   Physical Exam   Constitutional: She is oriented to person, place, and time. She appears well-developed and well-nourished.  Non-toxic appearance. She appears ill. No distress.   HENT:   Head: Normocephalic and atraumatic.   Neck: Muscular tenderness present. No spinous process tenderness present. No no neck rigidity. Decreased range of motion present. No edema and no erythema present. No Brudzinski's sign noted.   Cardiovascular: Normal rate and regular rhythm.    Pulmonary/Chest: Effort normal. No respiratory distress.   Abdominal: Soft. Bowel sounds are normal. She exhibits no distension and no mass. There is no tenderness. There is no rigidity, no rebound, no guarding and no CVA tenderness. No hernia.   Neurological: She is alert and oriented to person, place, and time.   Skin: Skin is warm and dry.   Psychiatric: She has a normal mood and " affect. Her behavior is normal.   Nursing note and vitals reviewed.      Assessment/Plan      Chelsi was seen today for urinary tract infection.    Diagnoses and all orders for this visit:    Acute UTI    Abnormal urinalysis  -     Urine Culture - Urine, Urine, Clean Catch  -     ciprofloxacin (CIPRO) 500 MG tablet; Take 1 tablet by mouth 2 (Two) Times a Day.    Fever in other diseases  -     Urine Culture - Urine, Urine, Clean Catch  -     ciprofloxacin (CIPRO) 500 MG tablet; Take 1 tablet by mouth 2 (Two) Times a Day.  -     CBC & Differential  -     Basic Metabolic Panel  -     Blood Culture - Blood,  -     Blood Culture - Blood,    Dysuria  -     POCT urinalysis dipstick, automated  -     Urine Culture - Urine, Urine, Clean Catch  -     ciprofloxacin (CIPRO) 500 MG tablet; Take 1 tablet by mouth 2 (Two) Times a Day.  -     CBC & Differential  -     Basic Metabolic Panel  -     Blood Culture - Blood,  -     Blood Culture - Blood,    Muscle spasms of neck  -     tiZANidine (ZANAFLEX) 2 MG tablet; Take 1 tablet by mouth Every 8 (Eight) Hours As Needed for Muscle Spasms.      Brief Urine Lab Results  (Last result in the past 365 days)      Color   Clarity   Blood   Leuk Est   Nitrite   Protein   CREAT   Urine HCG        04/17/18 1308 Dark Yellow Clear Negative Moderate (2+)  Comment:  125 Natalie/uL(A) Negative --  Comment:  0.15 g/L(A)             Urine culture sent  Stop macrobid  Start cipro  Labs sent  Tizanidine PRN, low dose- AE discussed  Tylenol OTC as needed-max dose discussed  To ER with worsening of sx's  Return in about 1 week (around 4/24/2018) for with Dr. Yarbrough if available.   FU with Dr. Alonzo in 3 days  Plan of care discussed with pt. They verbalized understanding and agreement.

## 2018-04-19 LAB
BACTERIA UR CULT: NORMAL
BACTERIA UR CULT: NORMAL

## 2018-04-22 LAB
BACTERIA SPEC AEROBE CULT: NORMAL
BACTERIA SPEC AEROBE CULT: NORMAL

## 2018-04-23 ENCOUNTER — TELEPHONE (OUTPATIENT)
Dept: INTERNAL MEDICINE | Facility: CLINIC | Age: 78
End: 2018-04-23

## 2018-04-23 NOTE — TELEPHONE ENCOUNTER
----- Message from LINA Dutton sent at 4/19/2018  9:07 AM EDT -----  Please let her know the urine culture was ok. No abnormal bacteria grew, but she was already on antibiotics at that time so have her complete the cipro and see urology as planned.

## 2018-04-23 NOTE — TELEPHONE ENCOUNTER
----- Message from LINA Dutton sent at 4/19/2018  8:12 AM EDT -----  Please let her know that her labs okay.  They do demonstrate an elevation in the white blood cell which is consistent with infection.  There is no growth on the blood cultures at this time.  Is she feeling a little better? and please make sure she does follow up with urology as planned tomorrow

## 2018-04-24 LAB
BASOPHILS # BLD AUTO: 0.03 10E3/MM3 (ref 0–0.2)
BASOPHILS NFR BLD AUTO: 0.1 % (ref 0–1)
BUN SERPL-MCNC: 13 MG/DL (ref 9–23)
BUN/CREAT SERPL: 26 (ref 7–25)
CALCIUM SERPL-MCNC: 9.8 MG/DL (ref 8.7–10.4)
CHLORIDE SERPL-SCNC: 94 MMOL/L (ref 99–109)
CO2 SERPL-SCNC: 29 MMOL/L (ref 20–31)
CREAT SERPL-MCNC: 0.5 MG/DL (ref 0.6–1.3)
EOSINOPHIL # BLD AUTO: 0.5 10E3/MM3 (ref 0.1–0.3)
EOSINOPHIL NFR BLD AUTO: 2.5 % (ref 0–3)
ERYTHROCYTE [DISTWIDTH] IN BLOOD BY AUTOMATED COUNT: 15 % (ref 11.3–14.5)
GFR SERPLBLD CREATININE-BSD FMLA CKD-EPI: 119 ML/MIN/1.732
GLUCOSE SERPL-MCNC: 100 MG/DL (ref 70–100)
HCT VFR BLD AUTO: 41.8 % (ref 34.5–44)
HGB BLD-MCNC: 14 G/DL (ref 11.5–15.5)
IMM GRANULOCYTES # BLD: 0.12 10E3/MM3 (ref 0–0.03)
IMM GRANULOCYTES NFR BLD: 0.6 % (ref 0–0.6)
LYMPHOCYTES # BLD AUTO: 1.41 10E3/MM3 (ref 0.6–4.8)
LYMPHOCYTES NFR BLD AUTO: 6.9 % (ref 24–44)
MCH RBC QN AUTO: 31.7 PG (ref 27–31)
MCHC RBC AUTO-ENTMCNC: 33.5 G/DL (ref 32–36)
MCV RBC AUTO: 94.6 FL (ref 80–99)
MONOCYTES # BLD AUTO: 0.65 10E3/MM3 (ref 0–1)
MONOCYTES NFR BLD AUTO: 3.2 % (ref 0–12)
NEUTROPHILS # BLD AUTO: 17.73 10E3/MM3 (ref 1.5–8.3)
NEUTROPHILS NFR BLD AUTO: 87.3 % (ref 41–71)
PLATELET # BLD AUTO: 356 10E3/MM3 (ref 150–450)
POTASSIUM SERPL-SCNC: 3.8 MMOL/L (ref 3.5–5.5)
RBC # BLD AUTO: 4.42 10E6/MM3 (ref 3.89–5.14)
SODIUM SERPL-SCNC: 134 MMOL/L (ref 132–146)
WBC # BLD AUTO: 20.32 10E3/MM3 (ref 3.5–10.8)

## 2018-04-24 NOTE — TELEPHONE ENCOUNTER
MRS. BLUM CALLED BACK AND READ HER INFORMATION YOU HAD CALLED HER ABOUT. PATIENT ALSO INQUIRED ON THE PRE-AUTHORIZATION FOR HER MUSCLE RELAXER.

## 2018-04-25 DIAGNOSIS — M62.838 MUSCLE SPASMS OF NECK: Primary | ICD-10-CM

## 2018-04-25 RX ORDER — METHOCARBAMOL 500 MG/1
500 TABLET, FILM COATED ORAL 3 TIMES DAILY PRN
Qty: 15 TABLET | Refills: 0 | Status: SHIPPED | OUTPATIENT
Start: 2018-04-25 | End: 2018-05-18 | Stop reason: SDUPTHER

## 2018-04-25 NOTE — TELEPHONE ENCOUNTER
I sent in methocarbamol for her to use for a few days. Advise caution due to potential side effects of drowsiness/dizziness/etc/...

## 2018-04-25 NOTE — TELEPHONE ENCOUNTER
I have faxed recent results to Dr. Alonzo's office. She said that she is seeing him again this Friday. Still feeling very tried. She said if she can not get answers from Dr. Alonzo she was going to go back to see Infectious Disease.     Tizanidine is not covered by her Insurance. Is there another muscle relaxer you could send in for her?

## 2018-05-08 RX ORDER — ESTRADIOL 10 UG/1
TABLET VAGINAL
Qty: 24 TABLET | Refills: 1 | Status: SHIPPED | OUTPATIENT
Start: 2018-05-08 | End: 2019-01-11 | Stop reason: SDUPTHER

## 2018-05-18 ENCOUNTER — TELEPHONE (OUTPATIENT)
Dept: INTERNAL MEDICINE | Facility: CLINIC | Age: 78
End: 2018-05-18

## 2018-05-18 DIAGNOSIS — M62.838 MUSCLE SPASMS OF NECK: ICD-10-CM

## 2018-05-18 RX ORDER — METHOCARBAMOL 500 MG/1
500 TABLET, FILM COATED ORAL 3 TIMES DAILY PRN
Qty: 15 TABLET | Refills: 0 | Status: SHIPPED | OUTPATIENT
Start: 2018-05-18 | End: 2018-06-01 | Stop reason: SDUPTHER

## 2018-05-24 ENCOUNTER — OFFICE VISIT (OUTPATIENT)
Dept: INTERNAL MEDICINE | Facility: CLINIC | Age: 78
End: 2018-05-24

## 2018-05-24 VITALS
HEIGHT: 60 IN | WEIGHT: 148 LBS | BODY MASS INDEX: 29.06 KG/M2 | SYSTOLIC BLOOD PRESSURE: 138 MMHG | DIASTOLIC BLOOD PRESSURE: 70 MMHG | HEART RATE: 63 BPM | TEMPERATURE: 97.8 F | OXYGEN SATURATION: 98 %

## 2018-05-24 DIAGNOSIS — R74.8 ELEVATED LIVER ENZYMES: ICD-10-CM

## 2018-05-24 DIAGNOSIS — M54.2 NECK PAIN: ICD-10-CM

## 2018-05-24 DIAGNOSIS — E78.5 HYPERLIPIDEMIA, UNSPECIFIED HYPERLIPIDEMIA TYPE: Primary | Chronic | ICD-10-CM

## 2018-05-24 DIAGNOSIS — D72.829 LEUKOCYTOSIS, UNSPECIFIED TYPE: ICD-10-CM

## 2018-05-24 PROCEDURE — 99214 OFFICE O/P EST MOD 30 MIN: CPT | Performed by: FAMILY MEDICINE

## 2018-05-24 RX ORDER — PRAVASTATIN SODIUM 40 MG
TABLET ORAL
COMMUNITY
End: 2018-07-11 | Stop reason: SDUPTHER

## 2018-05-24 NOTE — PROGRESS NOTES
"Subjective   Chelsi Ferguson is a 77 y.o. female.     Chief Complaint   Patient presents with   • 8 week follow up     hyperlipidemia       Visit Vitals  /70   Pulse 63   Temp 97.8 °F (36.6 °C) (Temporal Artery )   Ht 152.4 cm (60\")   Wt 67.1 kg (148 lb)   LMP  (LMP Unknown) Comment: Last Mammogram 2017   SpO2 98%   BMI 28.90 kg/m²         Neck Pain    The current episode started more than 1 month ago. The problem occurs constantly. The problem has been gradually improving. The pain is associated with an unknown (hx of fixture falling on head) factor. The pain is present in the right side and left side. The pain is at a severity of 3/10. The pain is mild. The symptoms are aggravated by twisting. The pain is same all the time. Associated symptoms include headaches and weight loss (recent illness and had no appetite). Pertinent negatives include no chest pain, fever, leg pain, numbness, pain with swallowing, paresis, photophobia, syncope, tingling, trouble swallowing, visual change or weakness. She has tried muscle relaxants for the symptoms. The treatment provided moderate relief.   Hyperlipidemia   This is a chronic problem. The current episode started more than 1 year ago. Condition status: pending. She has no history of chronic renal disease, diabetes, hypothyroidism, liver disease, obesity or nephrotic syndrome. Associated symptoms include myalgias (neck). Pertinent negatives include no chest pain, focal sensory loss, focal weakness, leg pain or shortness of breath. Current antihyperlipidemic treatment includes statins. Improvement on treatment: pending. Compliance problems include adherence to exercise.  Risk factors for coronary artery disease include post-menopausal, dyslipidemia, hypertension and family history.        Pt has neck pain that she has been using muscle relaxer. Neck is better.   The following portions of the patient's history were reviewed and updated as appropriate: allergies, current " medications, past family history, past medical history, past social history, past surgical history and problem list.    Past Medical History:   Diagnosis Date   • Ankle pain    • Arthritis    • Cancer     Skin   • Chest discomfort    • Cholecystitis 1/15/2018   • Coronary artery disease    • Edema    • Elevated liver enzymes 1/15/2018   • Gallstones    • GERD (gastroesophageal reflux disease)    • Glaucoma     pt is currently off of meds and Dr Ciro does not think that she is glaucoma   • H/O complete eye exam 06/2013   • H/O mammogram 11/16/2015   • H/O non-ST elevation myocardial infarction (NSTEMI) 01/2005   • Hammer toe    • Heart attack 01/2005   • History of blood transfusion 01/2005   • History of cardiovascular stress test 12/02/2015    normal   • HTN (hypertension)    • Hyperlipidemia    • Hypokalemia 1/15/2018   • Kidney infection 1971   • Menopausal symptoms    • Onychia and paronychia of finger    • Osteopenia    • Other elevated white blood cell count    • Pap smear for cervical cancer screening 2010    [facility]   • Pneumonia    • Sepsis 1/15/2018   • UTI (urinary tract infection) 1/15/2018   • Viral warts    • Wears glasses       Past Surgical History:   Procedure Laterality Date   • BLADDER REPAIR  2002   • BLADDER REPAIR  2003   • BUNIONECTOMY Right 2010   • CHOLECYSTECTOMY WITH INTRAOPERATIVE CHOLANGIOGRAM N/A 3/7/2018    Procedure: CHOLECYSTECTOMY LAPAROSCOPIC INTRAOPERATIVE CHOLANGIOGRAM;  Surgeon: Harinder Mason MD;  [facility];  Service:    • COLONOSCOPY  2008, 4/2014    [facility]   • CORONARY STENT PLACEMENT Left 01/2005    drug eluting stent 1/2005 LAD   • DILATATION AND CURETTAGE  1971   • DILATATION AND CURETTAGE  1978   • EYE SURGERY Bilateral 05/2016    Cataract   • FRONTALIS SUSPENSION  2010   • HAMMER TOE REPAIR Right 11/03/2010   • HYSTERECTOMY  1978   • LAPAROTOMY OOPHERECTOMY Bilateral 2002   • NOSE SURGERY  03/2017    repair 3 fractured areas. Dr Dunne   • OTHER SURGICAL  HISTORY  2005    rectocele repair   • OTHER SURGICAL HISTORY  2010    eyelid surgery   • REPLACEMENT TOTAL KNEE BILATERAL Bilateral    • SKIN BIOPSY     • TONSILLECTOMY  1952   • TOTAL KNEE ARTHROPLASTY  01/2005   • UTERINE FIBROID SURGERY  1978    emergency removal of fibroid from birth canal      Family History   Problem Relation Age of Onset   • Stroke Mother    • Heart failure Father         congestive   • Cancer Father         lip   • Breast cancer Sister         60's   • Breast cancer Daughter 40   • Breast cancer Other         NIECE 60's   • Breast cancer Other         NIECE 60's   • Ovarian cancer Neg Hx       Social History     Social History   • Marital status:      Spouse name: N/A   • Number of children: N/A   • Years of education: N/A     Occupational History   • Not on file.     Social History Main Topics   • Smoking status: Never Smoker   • Smokeless tobacco: Never Used   • Alcohol use No   • Drug use: No   • Sexual activity: Yes     Birth control/ protection: None     Other Topics Concern   • Not on file     Social History Narrative   • No narrative on file        Review of Systems   Constitutional: Positive for weight loss (recent illness and had no appetite). Negative for chills, diaphoresis, fatigue and fever.   HENT: Negative for ear pain, nosebleeds, postnasal drip, rhinorrhea, sinus pressure, sneezing, sore throat and trouble swallowing.    Eyes: Negative.  Negative for photophobia, redness and itching.   Respiratory: Negative.  Negative for cough, shortness of breath and wheezing.    Cardiovascular: Negative.  Negative for chest pain, palpitations and syncope.   Gastrointestinal: Negative.  Negative for abdominal pain, constipation, diarrhea, nausea and vomiting.   Endocrine: Negative.  Negative for cold intolerance and heat intolerance.   Genitourinary: Negative.  Negative for dysuria, frequency, hematuria and urgency.   Musculoskeletal: Positive for myalgias (neck), neck pain and neck  stiffness. Negative for arthralgias and back pain.   Skin: Negative.  Negative for color change and rash.   Allergic/Immunologic: Negative.  Negative for environmental allergies.   Neurological: Positive for headaches. Negative for dizziness, tingling, focal weakness, syncope, weakness, light-headedness and numbness.   Hematological: Negative.  Negative for adenopathy. Does not bruise/bleed easily.   Psychiatric/Behavioral: Negative.  Negative for dysphoric mood. The patient is not nervous/anxious.        Objective   Physical Exam   Constitutional: She is oriented to person, place, and time. She appears well-developed.   HENT:   Head: Normocephalic.   Right Ear: External ear normal.   Left Ear: External ear normal.   Nose: Nose normal.   Eyes: Conjunctivae, EOM and lids are normal. Pupils are equal, round, and reactive to light.   Neck: Trachea normal. Neck supple. Muscular tenderness present. No spinous process tenderness present. Carotid bruit is not present. Decreased range of motion present. No thyroid mass and no thyromegaly present.   Cardiovascular: Normal rate and regular rhythm.    No murmur heard.  Pulmonary/Chest: Effort normal and breath sounds normal. No respiratory distress. She has no decreased breath sounds. She has no wheezes. She has no rhonchi. She has no rales. She exhibits no tenderness.   Abdominal: Soft. Bowel sounds are normal. There is no tenderness.   Neurological: She is alert and oriented to person, place, and time.   Skin: Skin is warm and dry.   Psychiatric: She has a normal mood and affect. Her behavior is normal.   Nursing note and vitals reviewed.      Assessment/Plan   Chelsi was seen today for 8 week follow up.    Diagnoses and all orders for this visit:    Hyperlipidemia, unspecified hyperlipidemia type  -     Lipid Panel  -     Hepatic Function Panel    Neck pain  -     Ambulatory Referral to Physical Therapy Evaluate and treat    Elevated liver enzymes  -     Hepatic Function  Panel                   Current Outpatient Prescriptions:   •  aspirin 81 MG EC tablet, Take 81 mg by mouth Every Night., Disp: , Rfl:   •  Cholecalciferol (VITAMIN D3 PO), Vitamin D3  Daily, Disp: , Rfl:   •  Co-Enzyme Q-10 100 MG capsule, Co Q-10  QD, Disp: , Rfl:   •  docusate sodium 100 MG capsule, Take 1 capsule by mouth 2 (Two) Times a Day., Disp: 20 capsule, Rfl: 0  •  methocarbamol (ROBAXIN) 500 MG tablet, Take 1 tablet by mouth 3 (Three) Times a Day As Needed for Muscle Spasms. Use cautiously, may cause drowsiness, Disp: 15 tablet, Rfl: 0  •  metoprolol succinate XL (TOPROL-XL) 25 MG 24 hr tablet, Take 25 mg by mouth Every Morning., Disp: , Rfl:   •  nitroglycerin (NITROSTAT) 0.4 MG SL tablet, Place 0.4 mg under the tongue Every 5 (Five) Minutes As Needed., Disp: , Rfl:   •  Omega-3 Fatty Acids (FISH OIL PO), Fish Oil 300 mg-1,000 mg capsule,delayed release  Daily, Disp: , Rfl:   •  pravastatin (PRAVACHOL) 40 MG tablet, pravastatin 40 mg tablet  Take one tablet each day at bedtime., Disp: , Rfl:   •  Probiotic Product (PROBIOTIC DAILY PO), Take 1 tablet by mouth Daily., Disp: , Rfl:   •  YUVAFEM 10 MCG tablet vaginal tablet, INSERT 1 TABLET INTO THE VAGINA 2 (TWO) TIMES A WEEK., Disp: 24 tablet, Rfl: 1    Return in about 3 months (around 8/24/2018), or if symptoms worsen or fail to improve, for Recheck.

## 2018-05-25 LAB
ALBUMIN SERPL-MCNC: 4.2 G/DL (ref 3.2–4.8)
ALP SERPL-CCNC: 75 U/L (ref 25–100)
ALT SERPL-CCNC: 14 U/L (ref 7–40)
AST SERPL-CCNC: 22 U/L (ref 0–33)
BASOPHILS # BLD AUTO: 0.03 10*3/MM3 (ref 0–0.2)
BASOPHILS NFR BLD AUTO: 0.5 % (ref 0–1)
BILIRUB DIRECT SERPL-MCNC: 0.2 MG/DL (ref 0–0.2)
BILIRUB SERPL-MCNC: 0.7 MG/DL (ref 0.3–1.2)
CHOLEST SERPL-MCNC: 168 MG/DL (ref 0–200)
EOSINOPHIL # BLD AUTO: 0.1 10*3/MM3 (ref 0–0.3)
EOSINOPHIL NFR BLD AUTO: 1.7 % (ref 0–3)
ERYTHROCYTE [DISTWIDTH] IN BLOOD BY AUTOMATED COUNT: 14.8 % (ref 11.3–14.5)
HCT VFR BLD AUTO: 42 % (ref 34.5–44)
HDLC SERPL-MCNC: 44 MG/DL (ref 40–60)
HGB BLD-MCNC: 13.5 G/DL (ref 11.5–15.5)
IMM GRANULOCYTES # BLD: 0.01 10*3/MM3 (ref 0–0.03)
IMM GRANULOCYTES NFR BLD: 0.2 % (ref 0–0.6)
LDLC SERPL CALC-MCNC: 97 MG/DL (ref 0–100)
LYMPHOCYTES # BLD AUTO: 2.23 10*3/MM3 (ref 0.6–4.8)
LYMPHOCYTES NFR BLD AUTO: 37.4 % (ref 24–44)
MCH RBC QN AUTO: 31.3 PG (ref 27–31)
MCHC RBC AUTO-ENTMCNC: 32.1 G/DL (ref 32–36)
MCV RBC AUTO: 97.2 FL (ref 80–99)
MONOCYTES # BLD AUTO: 0.56 10*3/MM3 (ref 0–1)
MONOCYTES NFR BLD AUTO: 9.4 % (ref 0–12)
NEUTROPHILS # BLD AUTO: 3.03 10*3/MM3 (ref 1.5–8.3)
NEUTROPHILS NFR BLD AUTO: 50.8 % (ref 41–71)
PLATELET # BLD AUTO: 289 10*3/MM3 (ref 150–450)
PROT SERPL-MCNC: 6.3 G/DL (ref 5.7–8.2)
RBC # BLD AUTO: 4.32 10*6/MM3 (ref 3.89–5.14)
TRIGL SERPL-MCNC: 136 MG/DL (ref 0–150)
VLDLC SERPL CALC-MCNC: 27.2 MG/DL
WBC # BLD AUTO: 5.96 10*3/MM3 (ref 3.5–10.8)

## 2018-06-01 DIAGNOSIS — M62.838 MUSCLE SPASMS OF NECK: ICD-10-CM

## 2018-06-01 RX ORDER — METHOCARBAMOL 500 MG/1
500 TABLET, FILM COATED ORAL 3 TIMES DAILY PRN
Qty: 15 TABLET | Refills: 0 | Status: SHIPPED | OUTPATIENT
Start: 2018-06-01 | End: 2018-07-11

## 2018-07-11 ENCOUNTER — OFFICE VISIT (OUTPATIENT)
Dept: INTERNAL MEDICINE | Facility: CLINIC | Age: 78
End: 2018-07-11

## 2018-07-11 VITALS
SYSTOLIC BLOOD PRESSURE: 128 MMHG | DIASTOLIC BLOOD PRESSURE: 72 MMHG | TEMPERATURE: 97.3 F | BODY MASS INDEX: 29.29 KG/M2 | HEART RATE: 64 BPM | OXYGEN SATURATION: 97 % | WEIGHT: 149.2 LBS | HEIGHT: 60 IN

## 2018-07-11 DIAGNOSIS — G50.0 TRIGEMINAL NEURALGIA OF LEFT SIDE OF FACE: ICD-10-CM

## 2018-07-11 DIAGNOSIS — E78.00 PURE HYPERCHOLESTEROLEMIA: Primary | Chronic | ICD-10-CM

## 2018-07-11 PROCEDURE — 99214 OFFICE O/P EST MOD 30 MIN: CPT | Performed by: FAMILY MEDICINE

## 2018-07-11 RX ORDER — PRAVASTATIN SODIUM 40 MG
40 TABLET ORAL NIGHTLY
Qty: 90 TABLET | Refills: 1 | Status: SHIPPED | OUTPATIENT
Start: 2018-07-11 | End: 2018-07-12 | Stop reason: DRUGHIGH

## 2018-07-11 NOTE — PATIENT INSTRUCTIONS
Trigeminal Neuralgia  Trigeminal neuralgia is a nerve disorder that causes attacks of severe facial pain. The attacks last from a few seconds to several minutes. They can happen for days, weeks, or months and then go away for months or years. Trigeminal neuralgia is also called tic douloureux.  What are the causes?  This condition is caused by damage to a nerve in the face that is called the trigeminal nerve. An attack can be triggered by:  · Talking.  · Chewing.  · Putting on makeup.  · Washing your face.  · Shaving your face.  · Brushing your teeth.  · Touching your face.    What increases the risk?  This condition is more likely to develop in:  · Women.  · People who are 50 years of age or older.    What are the signs or symptoms?  The main symptom of this condition is pain in the jaw, lips, eyes, nose, scalp, forehead, and face. The pain may be intense, stabbing, electric, or shock-like.  How is this diagnosed?  This condition is diagnosed with a physical exam. A CT scan or MRI may be done to rule out other conditions that can cause facial pain.  How is this treated?  This condition may be treated with:  · Avoiding the things that trigger your attacks.  · Pain medicine.  · Surgery. This may be done in severe cases if other medical treatment does not provide relief.    Follow these instructions at home:  · Take over-the-counter and prescription medicines only as told by your health care provider.  · If you wish to get pregnant, talk with your health care provider before you start trying to get pregnant.  · Avoid the things that trigger your attacks. It may help to:  ? Chew on the unaffected side of your mouth.  ? Avoid touching your face.  ? Avoid blasts of hot or cold air.  Contact a health care provider if:  · Your pain medicine is not helping.  · You develop new, unexplained symptoms, such as:  ? Double vision.  ? Facial weakness.  ? Changes in hearing or balance.  · You become pregnant.  Get help right away  if:  · Your pain is unbearable, and your pain medicine does not help.  This information is not intended to replace advice given to you by your health care provider. Make sure you discuss any questions you have with your health care provider.  Document Released: 12/15/2001 Document Revised: 08/20/2017 Document Reviewed: 04/11/2016  [email or web address] Interactive Patient Education © 2018 [employer]

## 2018-07-11 NOTE — PROGRESS NOTES
"Subjective   Chelsi Ferguson is a 77 y.o. female.     Chief Complaint   Patient presents with   • Follow-up   • Hyperlipidemia       Visit Vitals  /72   Pulse 64   Temp 97.3 °F (36.3 °C) (Temporal Artery )   Ht 152.4 cm (60\")   Wt 67.7 kg (149 lb 3.2 oz)   LMP  (LMP Unknown) Comment: Last Mammogram 2017   SpO2 97%   BMI 29.14 kg/m²         Hyperlipidemia   This is a chronic problem. The current episode started more than 1 year ago. The problem is controlled. Recent lipid tests were reviewed and are normal. She has no history of chronic renal disease, diabetes, hypothyroidism, liver disease, obesity or nephrotic syndrome. Associated symptoms include leg pain and myalgias. Pertinent negatives include no chest pain, focal sensory loss, focal weakness or shortness of breath. (No change in muscle aches since starting pravastatin.) Current antihyperlipidemic treatment includes statins. Improvement on treatment: pending. There are no compliance problems.  Risk factors for coronary artery disease include dyslipidemia, family history and post-menopausal.   Earache    There is pain in the left ear. This is a chronic problem. The current episode started more than 1 month ago (2-3 months). Episode frequency: intermittently. The problem has been unchanged. There has been no fever. The pain is at a severity of 8/10. Associated symptoms include headaches (x 2 months with neck pain), neck pain and rhinorrhea. Pertinent negatives include no abdominal pain, coughing, diarrhea, ear discharge, hearing loss, rash, sore throat or vomiting. She has tried nothing for the symptoms. The treatment provided no relief. There is no history of a chronic ear infection, hearing loss or a tympanostomy tube.      Neck and head better with PT.  Pt has appt with Dr Mason to remove bumps from head.   Pt denies any sepsis.   Pt has been having lancing ear pain that is very occasional.     The following portions of the patient's history were reviewed " and updated as appropriate: allergies, current medications, past family history, past medical history, past social history, past surgical history and problem list.    Past Medical History:   Diagnosis Date   • Ankle pain    • Arthritis    • Cancer (CMS/HCC)     Skin   • Chest discomfort    • Cholecystitis 1/15/2018   • Coronary artery disease    • Edema    • Elevated liver enzymes 1/15/2018   • Gallstones    • GERD (gastroesophageal reflux disease)    • Glaucoma     pt is currently off of meds and Dr Tanner does not think that she is glaucoma   • H/O complete eye exam 06/2013   • H/O mammogram 11/16/2015   • H/O non-ST elevation myocardial infarction (NSTEMI) 01/2005   • Hammer toe    • Heart attack 01/2005   • History of blood transfusion 01/2005   • History of cardiovascular stress test 12/02/2015    normal   • HTN (hypertension)    • Hyperlipidemia    • Hypokalemia 1/15/2018   • Kidney infection 1971   • Menopausal symptoms    • Onychia and paronychia of finger    • Osteopenia    • Other elevated white blood cell count    • Pap smear for cervical cancer screening 2010    [facility]   • Pneumonia    • Sepsis (CMS/HCC) 1/15/2018   • UTI (urinary tract infection) 1/15/2018   • Viral warts    • Wears glasses       Past Surgical History:   Procedure Laterality Date   • BLADDER REPAIR  2002   • BLADDER REPAIR  2003   • BUNIONECTOMY Right 2010   • CHOLECYSTECTOMY WITH INTRAOPERATIVE CHOLANGIOGRAM N/A 3/7/2018    Procedure: CHOLECYSTECTOMY LAPAROSCOPIC INTRAOPERATIVE CHOLANGIOGRAM;  Surgeon: Harinder Mason MD;  Location: [facility];  Service:    • COLONOSCOPY  2008, 4/2014    [facility]   • CORONARY STENT PLACEMENT Left 01/2005    drug eluting stent 1/2005 LAD   • DILATATION AND CURETTAGE  1971   • DILATATION AND CURETTAGE  1978   • EYE SURGERY Bilateral 05/2016    Cataract   • FRONTALIS SUSPENSION  2010   • HAMMER TOE REPAIR Right 11/03/2010   • HYSTERECTOMY  1978   • LAPAROTOMY OOPHERECTOMY Bilateral 2002   • NOSE SURGERY   03/2017    repair 3 fractured areas. Dr Dunne   • OTHER SURGICAL HISTORY  2005    rectocele repair   • OTHER SURGICAL HISTORY  2010    eyelid surgery   • REPLACEMENT TOTAL KNEE BILATERAL Bilateral    • SKIN BIOPSY     • TONSILLECTOMY  1952   • TOTAL KNEE ARTHROPLASTY  01/2005   • UTERINE FIBROID SURGERY  1978    emergency removal of fibroid from birth canal      Family History   Problem Relation Age of Onset   • Stroke Mother    • Heart failure Father         congestive   • Cancer Father         lip   • Breast cancer Sister         60's   • Breast cancer Daughter 40   • Breast cancer Other         NIECE 60's   • Breast cancer Other         NIECE 60's   • Ovarian cancer Neg Hx       Social History     Social History   • Marital status:      Spouse name: N/A   • Number of children: N/A   • Years of education: N/A     Occupational History   • Not on file.     Social History Main Topics   • Smoking status: Never Smoker   • Smokeless tobacco: Never Used   • Alcohol use No   • Drug use: No   • Sexual activity: Yes     Birth control/ protection: None     Other Topics Concern   • Not on file     Social History Narrative   • No narrative on file        Review of Systems   Constitutional: Negative.  Negative for chills, diaphoresis, fatigue and fever.   HENT: Positive for ear pain (left) and rhinorrhea. Negative for ear discharge, hearing loss, nosebleeds, postnasal drip, sinus pressure, sneezing and sore throat.    Eyes: Negative.  Negative for redness and itching.   Respiratory: Negative.  Negative for cough, shortness of breath and wheezing.    Cardiovascular: Negative.  Negative for chest pain and palpitations.   Gastrointestinal: Negative.  Negative for abdominal pain, constipation, diarrhea, nausea and vomiting.   Endocrine: Negative.  Negative for cold intolerance and heat intolerance.   Genitourinary: Positive for frequency. Negative for dysuria, hematuria and urgency.   Musculoskeletal: Positive for  myalgias, neck pain and neck stiffness. Negative for arthralgias and back pain.   Skin: Negative.  Negative for color change and rash.        Bumps on scalp   Allergic/Immunologic: Positive for environmental allergies.   Neurological: Positive for headaches (x 2 months with neck pain). Negative for dizziness, focal weakness, syncope and light-headedness.   Hematological: Negative.  Negative for adenopathy. Does not bruise/bleed easily.   Psychiatric/Behavioral: Negative.  Negative for dysphoric mood. The patient is not nervous/anxious.        Objective   Physical Exam   Constitutional: She is oriented to person, place, and time. She appears well-developed.       HENT:   Head: Normocephalic.   Right Ear: External ear normal.   Left Ear: External ear normal.   Nose: Nose normal.   Eyes: Conjunctivae, EOM and lids are normal. Pupils are equal, round, and reactive to light.   Neck: Trachea normal and normal range of motion. Neck supple. Carotid bruit is not present. No thyroid mass and no thyromegaly present.   Cardiovascular: Normal rate and regular rhythm.    No murmur heard.  Pulmonary/Chest: Effort normal and breath sounds normal. No respiratory distress. She has no decreased breath sounds. She has no wheezes. She has no rhonchi. She has no rales. She exhibits no tenderness.   Abdominal: Soft. Bowel sounds are normal. There is no tenderness.   Musculoskeletal: Normal range of motion.   Neurological: She is alert and oriented to person, place, and time. She displays tremor (fine head tremor). No cranial nerve deficit.   Skin: Skin is warm and dry.   Psychiatric: She has a normal mood and affect. Her behavior is normal.   Nursing note and vitals reviewed.      Assessment/Plan   Chelsi was seen today for follow-up and hyperlipidemia.    Diagnoses and all orders for this visit:    Pure hypercholesterolemia  -     pravastatin (PRAVACHOL) 40 MG tablet; Take 1 tablet by mouth Every Night.  -     Comprehensive Metabolic  Panel  -     Lipid Panel    Trigeminal neuralgia of left side of face  -     Ambulatory Referral to Neurology        Pt decline medication since pain improving on PT  Handout on trigeminal neuralgia           Current Outpatient Prescriptions:   •  aspirin 81 MG EC tablet, Take 81 mg by mouth Every Night., Disp: , Rfl:   •  Cholecalciferol (VITAMIN D3 PO), Vitamin D3  Daily, Disp: , Rfl:   •  Co-Enzyme Q-10 100 MG capsule, Co Q-10  QD, Disp: , Rfl:   •  docusate sodium 100 MG capsule, Take 1 capsule by mouth 2 (Two) Times a Day., Disp: 20 capsule, Rfl: 0  •  metoprolol succinate XL (TOPROL-XL) 25 MG 24 hr tablet, Take 25 mg by mouth Every Morning., Disp: , Rfl:   •  nitroglycerin (NITROSTAT) 0.4 MG SL tablet, Place 0.4 mg under the tongue Every 5 (Five) Minutes As Needed., Disp: , Rfl:   •  Omega-3 Fatty Acids (FISH OIL PO), Fish Oil 300 mg-1,000 mg capsule,delayed release  Daily, Disp: , Rfl:   •  pravastatin (PRAVACHOL) 40 MG tablet, Take 1 tablet by mouth Every Night., Disp: 90 tablet, Rfl: 1  •  Probiotic Product (PROBIOTIC DAILY PO), Take 1 tablet by mouth Daily., Disp: , Rfl:   •  YUVAFEM 10 MCG tablet vaginal tablet, INSERT 1 TABLET INTO THE VAGINA 2 (TWO) TIMES A WEEK., Disp: 24 tablet, Rfl: 1    Return in about 3 months (around 10/11/2018).

## 2018-07-12 ENCOUNTER — TELEPHONE (OUTPATIENT)
Dept: INTERNAL MEDICINE | Facility: CLINIC | Age: 78
End: 2018-07-12

## 2018-07-12 LAB
ALBUMIN SERPL-MCNC: 4.2 G/DL (ref 3.2–4.8)
ALBUMIN/GLOB SERPL: 2 G/DL (ref 1.5–2.5)
ALP SERPL-CCNC: 80 U/L (ref 25–100)
ALT SERPL-CCNC: 12 U/L (ref 7–40)
AST SERPL-CCNC: 21 U/L (ref 0–33)
BILIRUB SERPL-MCNC: 0.5 MG/DL (ref 0.3–1.2)
BUN SERPL-MCNC: 13 MG/DL (ref 9–23)
BUN/CREAT SERPL: 20.6 (ref 7–25)
CALCIUM SERPL-MCNC: 9.5 MG/DL (ref 8.7–10.4)
CHLORIDE SERPL-SCNC: 103 MMOL/L (ref 99–109)
CHOLEST SERPL-MCNC: 163 MG/DL (ref 0–200)
CO2 SERPL-SCNC: 27 MMOL/L (ref 20–31)
CREAT SERPL-MCNC: 0.63 MG/DL (ref 0.6–1.3)
GLOBULIN SER CALC-MCNC: 2.1 GM/DL
GLUCOSE SERPL-MCNC: 86 MG/DL (ref 70–100)
HDLC SERPL-MCNC: 48 MG/DL (ref 40–60)
LDLC SERPL CALC-MCNC: 91 MG/DL (ref 0–100)
POTASSIUM SERPL-SCNC: 4.7 MMOL/L (ref 3.5–5.5)
PROT SERPL-MCNC: 6.3 G/DL (ref 5.7–8.2)
SODIUM SERPL-SCNC: 140 MMOL/L (ref 132–146)
TRIGL SERPL-MCNC: 121 MG/DL (ref 0–150)
VLDLC SERPL CALC-MCNC: 24.2 MG/DL

## 2018-07-12 RX ORDER — PRAVASTATIN SODIUM 80 MG/1
80 TABLET ORAL DAILY
Qty: 90 TABLET | Refills: 1 | Status: SHIPPED | OUTPATIENT
Start: 2018-07-12 | End: 2019-07-10

## 2018-07-12 NOTE — TELEPHONE ENCOUNTER
----- Message from Reina MAI MD sent at 7/12/2018 10:42 AM EDT -----  Please call the patient regarding her abnormal result. LDL is improved, but with hx of MI need the LDL to be lower if possible. Please increase pravastatin to 80 mg per day-new scrip sent, but she can take 2 of current meds nightly

## 2018-08-14 ENCOUNTER — APPOINTMENT (OUTPATIENT)
Dept: PREADMISSION TESTING | Facility: HOSPITAL | Age: 78
End: 2018-08-14

## 2018-08-14 LAB
ANION GAP SERPL CALCULATED.3IONS-SCNC: 6 MMOL/L (ref 3–11)
BUN BLD-MCNC: 12 MG/DL (ref 9–23)
BUN/CREAT SERPL: 19 (ref 7–25)
CALCIUM SPEC-SCNC: 9.6 MG/DL (ref 8.7–10.4)
CHLORIDE SERPL-SCNC: 107 MMOL/L (ref 99–109)
CO2 SERPL-SCNC: 29 MMOL/L (ref 20–31)
CREAT BLD-MCNC: 0.63 MG/DL (ref 0.6–1.3)
DEPRECATED RDW RBC AUTO: 47.8 FL (ref 37–54)
ERYTHROCYTE [DISTWIDTH] IN BLOOD BY AUTOMATED COUNT: 13.4 % (ref 11.3–14.5)
GFR SERPL CREATININE-BSD FRML MDRD: 91 ML/MIN/1.73
GLUCOSE BLD-MCNC: 89 MG/DL (ref 70–100)
HCT VFR BLD AUTO: 41.3 % (ref 34.5–44)
HGB BLD-MCNC: 13.4 G/DL (ref 11.5–15.5)
MCH RBC QN AUTO: 31.8 PG (ref 27–31)
MCHC RBC AUTO-ENTMCNC: 32.4 G/DL (ref 32–36)
MCV RBC AUTO: 97.9 FL (ref 80–99)
PLATELET # BLD AUTO: 244 10*3/MM3 (ref 150–450)
PMV BLD AUTO: 8.9 FL (ref 6–12)
POTASSIUM BLD-SCNC: 4.7 MMOL/L (ref 3.5–5.5)
RBC # BLD AUTO: 4.22 10*6/MM3 (ref 3.89–5.14)
SODIUM BLD-SCNC: 142 MMOL/L (ref 132–146)
WBC NRBC COR # BLD: 6.58 10*3/MM3 (ref 3.5–10.8)

## 2018-08-14 PROCEDURE — 85027 COMPLETE CBC AUTOMATED: CPT | Performed by: SURGERY

## 2018-08-14 PROCEDURE — 93010 ELECTROCARDIOGRAM REPORT: CPT | Performed by: INTERNAL MEDICINE

## 2018-08-14 PROCEDURE — 93005 ELECTROCARDIOGRAM TRACING: CPT

## 2018-08-14 PROCEDURE — 36415 COLL VENOUS BLD VENIPUNCTURE: CPT

## 2018-08-14 PROCEDURE — 80048 BASIC METABOLIC PNL TOTAL CA: CPT | Performed by: SURGERY

## 2018-08-14 NOTE — PAT
The following information and instructions were given:    NPO after MN except sips of water with routine prescribed medication (except blood thinner, diabetes, or weight reducing medication) unless otherwise instructed by your physician.  Do not eat, drink, smoke or chew gum after MN the night before surgery. This also includes no mints.    EXCEPTION: Orthopedic [facility]AS patients Patient instructed to drink 20 ounces (or until full) of Gatorade or 20 ounces of G2 (if diabetic) and complete 3 hours before your surgery start time. (NO RED Gatorade or G2)    Patient verbalized understanding.      DO NOT shave for two days before your procedure.  Do not wear makeup.      DO NOT wear fingernail polish (gel/regular) and/or acrylic/artificial nails on the day of surgery.   If a patient had recent manicure and would rather not remove polish or artificial nails, then the minimum requirement is that the polish/artificial nails must be removed from the middle finger on each hand.      If patient was having surgery on an upper extremity, then the patient was instructed that fingernail polish/artificial fingernails must be removed for surgery.  NO EXCEPTIONS.      If patient was having surgery on a lower extremity, then the patient was instructed that toenail polish on both extremities must be removed for surgery.  NO EXCEPTIONS.    Remove all jewelry (advised to go to jeweler if unable to remove).  Jewelry especially rings can no longer be taped for surgery.    Leave anything you consider valuable at home.      Bring the following with you (if applicable)   -picture ID and insurance cards   -Co-pay/deductible required by insurance   -Medications in the original bottles (not a list) including all over-the-counter meds     Education booklet, brochure, and/or given to patient.    Patient must have a  for transportation home after procedure.  It must be an   adult that will take responsibility for care for 24 hours after  surgery.    ekg cleared per juanjose

## 2018-08-20 ENCOUNTER — LAB REQUISITION (OUTPATIENT)
Dept: LAB | Facility: HOSPITAL | Age: 78
End: 2018-08-20

## 2018-08-20 DIAGNOSIS — L72.11 PILAR CYST: ICD-10-CM

## 2018-08-20 PROCEDURE — 88304 TISSUE EXAM BY PATHOLOGIST: CPT | Performed by: SURGERY

## 2018-08-22 LAB
CYTO UR: NORMAL
LAB AP CASE REPORT: NORMAL
LAB AP CLINICAL INFORMATION: NORMAL
PATH REPORT.FINAL DX SPEC: NORMAL
PATH REPORT.GROSS SPEC: NORMAL

## 2018-10-04 LAB — BACTERIA BLD CULT: NORMAL

## 2018-10-10 ENCOUNTER — TRANSCRIBE ORDERS (OUTPATIENT)
Dept: ADMINISTRATIVE | Facility: HOSPITAL | Age: 78
End: 2018-10-10

## 2018-10-10 DIAGNOSIS — Z12.31 VISIT FOR SCREENING MAMMOGRAM: Primary | ICD-10-CM

## 2018-10-18 ENCOUNTER — LAB REQUISITION (OUTPATIENT)
Dept: INTERNAL MEDICINE | Facility: CLINIC | Age: 78
End: 2018-10-18

## 2018-10-18 ENCOUNTER — OFFICE VISIT (OUTPATIENT)
Dept: INTERNAL MEDICINE | Facility: CLINIC | Age: 78
End: 2018-10-18

## 2018-10-18 VITALS
DIASTOLIC BLOOD PRESSURE: 64 MMHG | HEIGHT: 60 IN | SYSTOLIC BLOOD PRESSURE: 128 MMHG | WEIGHT: 150 LBS | OXYGEN SATURATION: 98 % | HEART RATE: 60 BPM | BODY MASS INDEX: 29.45 KG/M2 | TEMPERATURE: 98.6 F

## 2018-10-18 DIAGNOSIS — E78.00 PURE HYPERCHOLESTEROLEMIA: Primary | Chronic | ICD-10-CM

## 2018-10-18 DIAGNOSIS — Z00.00 ROUTINE GENERAL MEDICAL EXAMINATION AT A HEALTH CARE FACILITY: ICD-10-CM

## 2018-10-18 DIAGNOSIS — E78.00 PURE HYPERCHOLESTEROLEMIA: ICD-10-CM

## 2018-10-18 DIAGNOSIS — Z23 NEED FOR INFLUENZA VACCINATION: ICD-10-CM

## 2018-10-18 LAB
ALBUMIN SERPL-MCNC: 4.41 G/DL (ref 3.2–4.8)
ALBUMIN/GLOB SERPL: 2.5 G/DL (ref 1.5–2.5)
ALP SERPL-CCNC: 72 U/L (ref 25–100)
ALT SERPL W P-5'-P-CCNC: 14 U/L (ref 7–40)
ANION GAP SERPL CALCULATED.3IONS-SCNC: 4 MMOL/L (ref 3–11)
ARTICHOKE IGE QN: 100 MG/DL (ref 0–130)
AST SERPL-CCNC: 22 U/L (ref 0–33)
BILIRUB SERPL-MCNC: 0.7 MG/DL (ref 0.3–1.2)
BUN BLD-MCNC: 13 MG/DL (ref 9–23)
BUN/CREAT SERPL: 22 (ref 7–25)
CALCIUM SPEC-SCNC: 9.8 MG/DL (ref 8.7–10.4)
CHLORIDE SERPL-SCNC: 104 MMOL/L (ref 99–109)
CHOLEST SERPL-MCNC: 168 MG/DL (ref 0–200)
CO2 SERPL-SCNC: 32 MMOL/L (ref 20–31)
CREAT BLD-MCNC: 0.59 MG/DL (ref 0.6–1.3)
GFR SERPL CREATININE-BSD FRML MDRD: 98 ML/MIN/1.73
GLOBULIN UR ELPH-MCNC: 1.8 GM/DL
GLUCOSE BLD-MCNC: 85 MG/DL (ref 70–100)
HDLC SERPL-MCNC: 48 MG/DL (ref 40–60)
POTASSIUM BLD-SCNC: 4.7 MMOL/L (ref 3.5–5.5)
PROT SERPL-MCNC: 6.2 G/DL (ref 5.7–8.2)
SODIUM BLD-SCNC: 140 MMOL/L (ref 132–146)
TRIGL SERPL-MCNC: 172 MG/DL (ref 0–150)

## 2018-10-18 PROCEDURE — G0008 ADMIN INFLUENZA VIRUS VAC: HCPCS | Performed by: FAMILY MEDICINE

## 2018-10-18 PROCEDURE — 80061 LIPID PANEL: CPT | Performed by: FAMILY MEDICINE

## 2018-10-18 PROCEDURE — 90662 IIV NO PRSV INCREASED AG IM: CPT | Performed by: FAMILY MEDICINE

## 2018-10-18 PROCEDURE — 80053 COMPREHEN METABOLIC PANEL: CPT | Performed by: FAMILY MEDICINE

## 2018-10-18 PROCEDURE — 99213 OFFICE O/P EST LOW 20 MIN: CPT | Performed by: FAMILY MEDICINE

## 2018-10-18 RX ORDER — LATANOPROST 50 UG/ML
SOLUTION/ DROPS OPHTHALMIC
COMMUNITY
Start: 2018-08-27 | End: 2019-01-09

## 2018-10-18 NOTE — PROGRESS NOTES
"Subjective   Chelsi Ferguson is a 78 y.o. female.     Chief Complaint   Patient presents with   • Hyperlipidemia     she has not increased the pravastatin, she believes it was giving her bad leg cramps   • Immunizations     would like a flu shot and discuss hep A       Visit Vitals  /64 (BP Location: Left arm)   Pulse 60   Temp 98.6 °F (37 °C) (Temporal Artery )   Ht 152.4 cm (60\")   Wt 68 kg (150 lb)   LMP  (LMP Unknown) Comment: Last Mammogram 2017   SpO2 98%   BMI 29.29 kg/m²         Hyperlipidemia   This is a chronic problem. The current episode started more than 1 year ago. The problem is uncontrolled. Recent lipid tests were reviewed and are normal. She has no history of chronic renal disease, diabetes, hypothyroidism, liver disease or nephrotic syndrome. Factors aggravating her hyperlipidemia include thiazides. Pertinent negatives include no chest pain, focal sensory loss, focal weakness, leg pain, myalgias or shortness of breath. Current antihyperlipidemic treatment includes statins. The current treatment provides significant improvement of lipids. There are no compliance problems.  Risk factors for coronary artery disease include dyslipidemia, post-menopausal and family history.        The following portions of the patient's history were reviewed and updated as appropriate: allergies, current medications, past family history, past medical history, past social history, past surgical history and problem list.    Past Medical History:   Diagnosis Date   • Ankle pain    • Arthritis    • Cancer (CMS/HCC)     Skin   • Chest discomfort    • Cholecystitis 1/15/2018   • Coronary artery disease    • Edema    • Elevated liver enzymes 1/15/2018   • Gallstones    • GERD (gastroesophageal reflux disease)    • Glaucoma     pt is currently off of meds and Dr Tanner does not think that she is glaucoma   • H/O complete eye exam 06/2013   • H/O mammogram 11/16/2015   • H/O non-ST elevation myocardial infarction (NSTEMI) " 01/2005   • Hammer toe    • Heart attack (CMS/HCC) 01/2005   • History of blood transfusion 01/2005   • History of cardiovascular stress test 12/02/2015    normal   • HTN (hypertension)    • Hyperlipidemia    • Hypokalemia 1/15/2018   • Kidney infection 1971   • Menopausal symptoms    • Onychia and paronychia of finger    • Osteopenia    • Other elevated white blood cell count    • Pap smear for cervical cancer screening 2010    [facility]   • Pneumonia    • Sepsis (CMS/HCC) 1/15/2018   • UTI (urinary tract infection) 1/15/2018   • Viral warts    • Wears glasses       Past Surgical History:   Procedure Laterality Date   • BLADDER REPAIR  2002   • BLADDER REPAIR  2003   • BUNIONECTOMY Right 2010   • CHOLECYSTECTOMY WITH INTRAOPERATIVE CHOLANGIOGRAM N/A 3/7/2018    Procedure: CHOLECYSTECTOMY LAPAROSCOPIC INTRAOPERATIVE CHOLANGIOGRAM;  Surgeon: Harinder Mason MD;  Location: [facility];  Service:    • COLONOSCOPY  2008, 4/2014    [facility]   • CORONARY STENT PLACEMENT Left 01/2005    drug eluting stent 1/2005 LAD   • DILATATION AND CURETTAGE  1971   • DILATATION AND CURETTAGE  1978   • EYE CAPSULOTOMY WITH LASER Bilateral 2018   • EYE SURGERY Bilateral 05/2016    Cataract   • FRONTALIS SUSPENSION  2010   • HAMMER TOE REPAIR Right 11/03/2010   • HEAD & NECK SKIN LESION EXCISIONAL BIOPSY  08/20/2018    benign scalp cyst   • HYSTERECTOMY  1978   • LAPAROTOMY OOPHERECTOMY Bilateral 2002   • NOSE SURGERY  03/2017    repair 3 fractured areas. Dr Dunne   • OTHER SURGICAL HISTORY  2005    rectocele repair   • OTHER SURGICAL HISTORY  2010    eyelid surgery   • REPLACEMENT TOTAL KNEE BILATERAL Bilateral    • SKIN BIOPSY     • TONSILLECTOMY  1952   • TOTAL KNEE ARTHROPLASTY  01/2005   • UTERINE FIBROID SURGERY  1978    emergency removal of fibroid from birth canal      Family History   Problem Relation Age of Onset   • Stroke Mother    • Heart failure Father         congestive   • Cancer Father         lip   • Breast cancer Sister          60's   • Breast cancer Daughter 40   • Breast cancer Other         NIECE 60's   • Breast cancer Other         NIECE 60's   • Ovarian cancer Neg Hx       Social History     Social History   • Marital status:      Spouse name: N/A   • Number of children: N/A   • Years of education: N/A     Occupational History   • Not on file.     Social History Main Topics   • Smoking status: Never Smoker   • Smokeless tobacco: Never Used   • Alcohol use No   • Drug use: No   • Sexual activity: Yes     Birth control/ protection: None     Other Topics Concern   • Not on file     Social History Narrative   • No narrative on file        Review of Systems   Constitutional: Negative.  Negative for chills, diaphoresis, fatigue and fever.   HENT: Negative.  Negative for ear pain, nosebleeds, postnasal drip, rhinorrhea, sinus pressure, sneezing and sore throat.    Eyes: Negative.  Negative for redness and itching.   Respiratory: Negative.  Negative for cough, shortness of breath and wheezing.    Cardiovascular: Negative.  Negative for chest pain and palpitations.   Gastrointestinal: Negative.  Negative for abdominal pain, constipation, diarrhea, nausea and vomiting.   Endocrine: Negative.  Negative for cold intolerance and heat intolerance.   Genitourinary: Positive for frequency and urgency. Negative for dysuria and hematuria.   Musculoskeletal: Negative.  Negative for arthralgias, back pain, myalgias and neck pain.   Skin: Negative.  Negative for color change and rash.   Allergic/Immunologic: Negative.  Negative for environmental allergies.   Neurological: Negative.  Negative for dizziness, focal weakness, syncope, light-headedness and headaches.   Hematological: Negative.  Negative for adenopathy. Does not bruise/bleed easily.   Psychiatric/Behavioral: Negative.  Negative for dysphoric mood. The patient is not nervous/anxious.        Objective   Physical Exam   Constitutional: She is oriented to person, place, and time. She  appears well-developed.   HENT:   Head: Normocephalic.   Right Ear: External ear normal.   Left Ear: External ear normal.   Nose: Nose normal.   Eyes: Pupils are equal, round, and reactive to light. Conjunctivae, EOM and lids are normal.   Neck: Trachea normal and normal range of motion. Neck supple. Carotid bruit is not present. No thyroid mass and no thyromegaly present.   Cardiovascular: Normal rate and regular rhythm.    No murmur heard.  Pulmonary/Chest: Effort normal and breath sounds normal. No respiratory distress. She has no decreased breath sounds. She has no wheezes. She has no rhonchi. She has no rales. She exhibits no tenderness.   Abdominal: Soft. Bowel sounds are normal. There is no tenderness.   Musculoskeletal: Normal range of motion.   Neurological: She is alert and oriented to person, place, and time.   Skin: Skin is warm and dry.   Psychiatric: She has a normal mood and affect. Her behavior is normal.   Nursing note and vitals reviewed.      Assessment/Plan   Chelsi was seen today for hyperlipidemia and immunizations.    Diagnoses and all orders for this visit:    Pure hypercholesterolemia  -     Comprehensive Metabolic Panel  -     Lipid Panel    Need for influenza vaccination  -     Flu Vaccine High Dose PF 65YR+ ([id number])        Discussed Shingrix vaccine with pt,  that is currently available at the pharmacy.   Pt did not increase pravastatin because muscle pain at high doses. Pt is taking 40 mg dose.   Pt will discuss the dosage with Dr Miles           Current Outpatient Prescriptions:   •  aspirin 81 MG EC tablet, Take 81 mg by mouth Every Night., Disp: , Rfl:   •  Cholecalciferol (VITAMIN D3 PO), Vitamin D3  Daily, Disp: , Rfl:   •  Co-Enzyme Q-10 100 MG capsule, Co Q-10  QD, Disp: , Rfl:   •  docusate sodium 100 MG capsule, Take 1 capsule by mouth 2 (Two) Times a Day., Disp: 20 capsule, Rfl: 0  •  latanoprost (XALATAN) 0.005 % ophthalmic solution, , Disp: , Rfl:   •  metoprolol succinate  XL (TOPROL-XL) 25 MG 24 hr tablet, Take 25 mg by mouth Every Morning., Disp: , Rfl:   •  nitroglycerin (NITROSTAT) 0.4 MG SL tablet, Place 0.4 mg under the tongue Every 5 (Five) Minutes As Needed., Disp: , Rfl:   •  Omega-3 Fatty Acids (FISH OIL PO), Fish Oil 300 mg-1,000 mg capsule,delayed release  Daily, Disp: , Rfl:   •  pravastatin (PRAVACHOL) 80 MG tablet, Take 1 tablet by mouth Daily., Disp: 90 tablet, Rfl: 1  •  Probiotic Product (PROBIOTIC DAILY PO), Take 1 tablet by mouth Daily., Disp: , Rfl:   •  YUVAFEM 10 MCG tablet vaginal tablet, INSERT 1 TABLET INTO THE VAGINA 2 (TWO) TIMES A WEEK., Disp: 24 tablet, Rfl: 1    Return in about 3 months (around 1/18/2019), or if symptoms worsen or fail to improve, for Recheck, Medicare Wellness.

## 2018-11-28 ENCOUNTER — HOSPITAL ENCOUNTER (OUTPATIENT)
Dept: MAMMOGRAPHY | Facility: HOSPITAL | Age: 78
Discharge: HOME | End: 2018-11-28
Admitting: FAMILY MEDICINE

## 2018-11-28 DIAGNOSIS — Z12.31 VISIT FOR SCREENING MAMMOGRAM: ICD-10-CM

## 2018-11-28 PROCEDURE — 77063 BREAST TOMOSYNTHESIS BI: CPT

## 2018-11-28 PROCEDURE — 77067 SCR MAMMO BI INCL CAD: CPT

## 2018-11-28 PROCEDURE — 77063 BREAST TOMOSYNTHESIS BI: CPT | Performed by: RADIOLOGY

## 2018-11-28 PROCEDURE — 77067 SCR MAMMO BI INCL CAD: CPT | Performed by: RADIOLOGY

## 2018-11-29 ENCOUNTER — OFFICE VISIT (OUTPATIENT)
Dept: NEUROLOGY | Facility: CLINIC | Age: 78
End: 2018-11-29

## 2018-11-29 VITALS
WEIGHT: 150 LBS | HEART RATE: 66 BPM | DIASTOLIC BLOOD PRESSURE: 74 MMHG | HEIGHT: 60 IN | BODY MASS INDEX: 29.45 KG/M2 | SYSTOLIC BLOOD PRESSURE: 126 MMHG | OXYGEN SATURATION: 98 %

## 2018-11-29 DIAGNOSIS — M54.2 NECK PAIN: Primary | ICD-10-CM

## 2018-11-29 PROCEDURE — 99204 OFFICE O/P NEW MOD 45 MIN: CPT | Performed by: PSYCHIATRY & NEUROLOGY

## 2018-11-29 NOTE — PROGRESS NOTES
Subjective:    CC: Chelsi Ferguson is seen today in consultation at the request of Reina MAI MD for Trigemenal Neuralgia       HPI:  78-year-old female with a history of hypertension, hyperlipidemia, arthritis, CAD, GERD presents with neck pain.  As per patient she was bedridden and frequently hospitalized for several UTIs and cholecystitis between January to June of this year.  Then in May of this year she started having a severe neck spasm on the left as well as pain that radiated from the neck to the side of her face and up into her scalp.  She underwent physical therapy for 4-6 weeks at the time that helped bring down the pain from 8 to about 3/10 in severity.  She was also found to have 2 cysts on her scalp and underwent removal for those which helped relieve her headaches.  Currently she has mild neck pain on the left that worsens while turning her neck to the right (like when she is backing her car up ) but denies having any facial pain or headaches.  She does have mild scalp numbness in the area where the cysts were removed.  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  She was asked to undergo surgery but refused.    The following portions of the patient's history were reviewed today and updated as of 11/29/2018  : allergies, current medications, past family history, past medical history, past social history, past surgical history and problem list  These document will be scanned to patient's chart.      Current Outpatient Medications:   •  aspirin 81 MG EC tablet, Take 81 mg by mouth Every Night., Disp: , Rfl:   •  Cholecalciferol (VITAMIN D3 PO), Vitamin D3  Daily, Disp: , Rfl:   •  Co-Enzyme Q-10 100 MG capsule, Co Q-10  QD, Disp: , Rfl:   •  docusate sodium 100 MG capsule, Take 1 capsule by mouth 2 (Two) Times a Day., Disp:  20 capsule, Rfl: 0  •  latanoprost (XALATAN) 0.005 % ophthalmic solution, , Disp: , Rfl:   •  metoprolol succinate XL (TOPROL-XL) 25 MG 24 hr tablet, Take 25 mg by mouth Every Morning., Disp: , Rfl:   •  Mirabegron ER (MYRBETRIQ) 50 MG tablet sustained-release 24 hour 24 hr tablet, Myrbetriq 50 mg tablet,extended release  Take 1 tablet every day by oral route for 28 days., Disp: , Rfl:   •  nitroglycerin (NITROSTAT) 0.4 MG SL tablet, Place 0.4 mg under the tongue Every 5 (Five) Minutes As Needed., Disp: , Rfl:   •  Omega-3 Fatty Acids (FISH OIL PO), Fish Oil 300 mg-1,000 mg capsule,delayed release  Daily, Disp: , Rfl:   •  pravastatin (PRAVACHOL) 80 MG tablet, Take 1 tablet by mouth Daily., Disp: 90 tablet, Rfl: 1  •  Probiotic Product (PROBIOTIC DAILY PO), Take 1 tablet by mouth Daily., Disp: , Rfl:   •  YUVAFEM 10 MCG tablet vaginal tablet, INSERT 1 TABLET INTO THE VAGINA 2 (TWO) TIMES A WEEK., Disp: 24 tablet, Rfl: 1   Past Medical History:   Diagnosis Date   • Ankle pain    • Arthritis    • Cancer (CMS/HCC)     Skin   • Chest discomfort    • Cholecystitis 1/15/2018   • Coronary artery disease    • Edema    • Elevated liver enzymes 1/15/2018   • Gallstones    • GERD (gastroesophageal reflux disease)    • Glaucoma     pt is currently off of meds and Dr Tanner does not think that she is glaucoma   • H/O complete eye exam 06/2013   • H/O mammogram 11/16/2015   • H/O non-ST elevation myocardial infarction (NSTEMI) 01/2005   • Hammer toe    • Heart attack (CMS/HCC) 01/2005   • History of blood transfusion 01/2005   • History of cardiovascular stress test 12/02/2015    normal   • HTN (hypertension)    • Hyperlipidemia    • Hypokalemia 1/15/2018   • Kidney infection 1971   • Menopausal symptoms    • Onychia and paronychia of finger    • Osteopenia    • Other elevated white blood cell count    • Pap smear for cervical cancer screening 2010    [facility]   • Pneumonia    • Sepsis (CMS/HCC) 1/15/2018   • UTI (urinary tract  infection) 1/15/2018   • Viral warts    • Wears glasses       Past Surgical History:   Procedure Laterality Date   • BLADDER REPAIR  2002   • BLADDER REPAIR  2003   • BUNIONECTOMY Right 2010   • COLONOSCOPY  2008, 4/2014    [facility]   • CORONARY STENT PLACEMENT Left 01/2005    drug eluting stent 1/2005 LAD   • DILATATION AND CURETTAGE  1971   • DILATATION AND CURETTAGE  1978   • EYE CAPSULOTOMY WITH LASER Bilateral 2018   • EYE SURGERY Bilateral 05/2016    Cataract   • FRONTALIS SUSPENSION  2010   • HAMMER TOE REPAIR Right 11/03/2010   • HEAD & NECK SKIN LESION EXCISIONAL BIOPSY  08/20/2018    benign scalp cyst   • HYSTERECTOMY  1978   • LAPAROTOMY OOPHERECTOMY Bilateral 2002   • NOSE SURGERY  03/2017    repair 3 fractured areas. Dr Dunne   • OTHER SURGICAL HISTORY  2005    rectocele repair   • OTHER SURGICAL HISTORY  2010    eyelid surgery   • REPLACEMENT TOTAL KNEE BILATERAL Bilateral    • SKIN BIOPSY     • TONSILLECTOMY  1952   • TOTAL KNEE ARTHROPLASTY  01/2005   • UTERINE FIBROID SURGERY  1978    emergency removal of fibroid from birth canal      Family History   Problem Relation Age of Onset   • Stroke Mother    • Heart failure Father         congestive   • Cancer Father         lip   • Breast cancer Sister         60's   • Breast cancer Daughter 40   • Breast cancer Other         NIECE 60's   • Breast cancer Other         NIECE 60's   • Ovarian cancer Neg Hx       Social History     Socioeconomic History   • Marital status:      Spouse name: Not on file   • Number of children: Not on file   • Years of education: Not on file   • Highest education level: Not on file   Social Needs   • Financial resource strain: Not on file   • Food insecurity - worry: Not on file   • Food insecurity - inability: Not on file   • Transportation needs - medical: Not on file   • Transportation needs - non-medical: Not on file   Occupational History   • Not on file   Tobacco Use   • Smoking status: Never Smoker   •  "Smokeless tobacco: Never Used   Substance and Sexual Activity   • Alcohol use: No   • Drug use: No   • Sexual activity: Yes     Birth control/protection: None   Other Topics Concern   • Not on file   Social History Narrative   • Not on file     Review of Systems   Endocrine: Positive for heat intolerance.   Musculoskeletal: Positive for arthralgias, back pain, joint swelling, neck pain and neck stiffness.   Neurological: Positive for tremors, numbness and headache.   All other systems reviewed and are negative.      Objective:    /74 (BP Location: Right arm, Patient Position: Sitting, Cuff Size: Adult)   Pulse 66   Ht 152.4 cm (60\")   Wt 68 kg (150 lb)   LMP  (LMP Unknown) Comment: Last Mammogram 2017  SpO2 98%   BMI 29.29 kg/m²     Neurology Exam:    General apperance: NAD.     Mental status: Alert, awake and oriented to time place and person.    Recent and Remote memory: Intact.    Attention span and Concentration: Normal.     Language and Speech: Intact- No dysarthria.    Fluency, Naming , Repitition and Comprehension:  Intact    Cranial Nerves:   CN II: Visual fields are full. Intact. Fundi - Normal, No papillederma, Pupils - COLE  CN III, IV and VI: Extraocular movements are intact. Normal saccades.   CN V: Facial sensation is intact.   CN VII: Muscles of facial expression reveal no asymmetry. Intact.   CN VIII: Hearing is intact. Whispered voice intact.   CN IX and X: Palate elevates symmetrically. Intact  CN XI: Shoulder shrug is intact.   CN XII: Tongue is midline without evidence of atrophy or fasciculation.     Ophthalmoscopic exam of optic disc-normal    Motor:-Head tremor noted, Reduced neck mobility to the right.  Patient has a neck spasm in her left paracervical muscles  Right UE muscle strength 5/5. Normal tone.     Left UE muscle strength 5/5. Normal tone.      Right LE muscle strength5/5. Normal tone.     Left LE muscle strength 5/5. Normal tone.      Sensory: Normal light touch, " vibration and pinprick sensation bilaterally.    DTRs: 2+ bilaterally in upper and lower extremities.    Babinski: Negative bilaterally.    Co-ordination: Normal finger-to-nose, heel to shin B/L.    Rhomberg: Negative.    Gait: Normal.    Cardiovascular: Regular rate and rhythm without murmur, gallop or rub.    Assessment and Plan:  1. Neck pain  Patient currently denies any facial or scalp pain/headaches.  The episodes that she had before could also be cervical neuralgia rather than trigeminal neuralgia.  I wanted to prescribe her a short course of muscle relaxants which she feels that would make her sleepy hence I have asked her to do hot fermentation to the affected area and then apply an over-the-counter analgesic such as Bengue.  I will also get a CT C-spine as she is very claustrophobic and does not want to get an MRI.  If I do see diffuse degenerative changes then I may refer her for physical therapy    - CT Cervical Spine Without Contrast; Future       Return in about 3 months (around 2/28/2019).     I spent over 45 minutes with the patient face to face out of which over 50% (25 minutes) was spent in management, instructions and education regarding her neck pain .     Marianela Flores MD

## 2018-12-06 ENCOUNTER — HOSPITAL ENCOUNTER (OUTPATIENT)
Dept: CT IMAGING | Facility: HOSPITAL | Age: 78
Discharge: HOME | End: 2018-12-06
Attending: PSYCHIATRY & NEUROLOGY | Admitting: PSYCHIATRY & NEUROLOGY

## 2018-12-06 DIAGNOSIS — M54.2 NECK PAIN: ICD-10-CM

## 2018-12-06 PROCEDURE — 72125 CT NECK SPINE W/O DYE: CPT

## 2018-12-07 DIAGNOSIS — M54.2 NECK PAIN: Primary | ICD-10-CM

## 2018-12-10 ENCOUNTER — TELEPHONE (OUTPATIENT)
Dept: NEUROLOGY | Facility: CLINIC | Age: 78
End: 2018-12-10

## 2018-12-10 NOTE — TELEPHONE ENCOUNTER
----- Message from Marianela Flores MD sent at 12/7/2018  1:45 PM EST -----  Can you please let the patient notes that her CT of the cervical spine shows diffuse degenerative changes.  I have ordered a physical therapy referral as it will help with her neck pain

## 2018-12-10 NOTE — TELEPHONE ENCOUNTER
Advised patient of results she advised that she wished to go to [facility] for PT.  Advised patient I will forward referral to them

## 2019-01-09 ENCOUNTER — OFFICE VISIT (OUTPATIENT)
Dept: INTERNAL MEDICINE | Facility: CLINIC | Age: 79
End: 2019-01-09

## 2019-01-09 VITALS
TEMPERATURE: 98 F | DIASTOLIC BLOOD PRESSURE: 60 MMHG | WEIGHT: 151.2 LBS | OXYGEN SATURATION: 100 % | HEIGHT: 60 IN | HEART RATE: 69 BPM | BODY MASS INDEX: 29.68 KG/M2 | SYSTOLIC BLOOD PRESSURE: 122 MMHG

## 2019-01-09 DIAGNOSIS — Z12.11 SCREENING FOR COLON CANCER: ICD-10-CM

## 2019-01-09 DIAGNOSIS — Z00.00 MEDICARE ANNUAL WELLNESS VISIT, SUBSEQUENT: Primary | ICD-10-CM

## 2019-01-09 PROCEDURE — G0439 PPPS, SUBSEQ VISIT: HCPCS | Performed by: FAMILY MEDICINE

## 2019-01-09 PROCEDURE — 96160 PT-FOCUSED HLTH RISK ASSMT: CPT | Performed by: FAMILY MEDICINE

## 2019-01-09 PROCEDURE — 99397 PER PM REEVAL EST PAT 65+ YR: CPT | Performed by: FAMILY MEDICINE

## 2019-01-09 RX ORDER — UBIDECARENONE 100 MG
100 CAPSULE ORAL DAILY
COMMUNITY

## 2019-01-09 RX ORDER — LATANOPROST 50 UG/ML
1 SOLUTION/ DROPS OPHTHALMIC NIGHTLY
COMMUNITY

## 2019-01-09 NOTE — PROGRESS NOTES
QUICK REFERENCE INFORMATION:  The ABCs of the Annual Wellness Visit    Subsequent Medicare Wellness Visit    HEALTH RISK ASSESSMENT    1940    Recent Hospitalizations:  Recently treated at the following:  [facility].    In January and March 2018    Current Medical Providers:  Patient Care Team:  Reina Yarbrough MD as PCP - General (Family Medicine)  Александр Dunne MD as Consulting Physician (Otolaryngology)  Yung Miles MD as Consulting Physician (Cardiology)  Gabby Rao MD as Consulting Physician (Dermatology)  Tylor Bonner MD as Consulting Physician (Ophthalmology)  Harinder Mason MD as Consulting Physician (General Surgery)  Han Alonzo MD as Consulting Physician (Urology)  Marianela Flores MD as Consulting Physician (Neurology)        Smoking Status:  Social History     Tobacco Use   Smoking Status Never Smoker   Smokeless Tobacco Never Used       Alcohol Consumption:  Social History     Substance and Sexual Activity   Alcohol Use No       Depression Screen:   PHQ-2/PHQ-9 Depression Screening 1/9/2019   Little interest or pleasure in doing things 0   Feeling down, depressed, or hopeless 0   Total Score 0       Health Habits and Functional and Cognitive Screening:  Functional & Cognitive Status 1/9/2019   Do you have difficulty preparing food and eating? No   Do you have difficulty bathing yourself, getting dressed or grooming yourself? No   Do you have difficulty using the toilet? No   Do you have difficulty moving around from place to place? No   Do you have trouble with steps or getting out of a bed or a chair? No   In the past year have you fallen or experienced a near fall? Yes   Current Diet Well Balanced Diet   Dental Exam Up to date   Eye Exam Up to date   Exercise (times per week) 2 times per week   Current Exercise Activities Include Walking   Do you need help using the phone?  No   Are you deaf or do you have serious difficulty hearing?   No   Do you need help with transportation? No   Do you need help shopping? No   Do you need help preparing meals?  No   Do you need help with housework?  No   Do you need help with laundry? No   Do you need help taking your medications? No   Do you need help managing money? No   Do you ever drive or ride in a car without wearing a seat belt? No   Have you felt unusual stress, anger or loneliness in the last month? No   Who do you live with? Spouse   If you need help, do you have trouble finding someone available to you? No   Have you been bothered in the last four weeks by sexual problems? No   Do you have difficulty concentrating, remembering or making decisions? -           Does the patient have evidence of cognitive impairment? No    Aspirin use counseling: Taking ASA appropriately as indicated      Recent Lab Results:  CMP:  Lab Results   Component Value Date    GLU 86 07/11/2018    BUN 13 10/18/2018    CREATININE 0.59 (L) 10/18/2018    EGFRIFNONA 98 10/18/2018    EGFRIFAFRI 111 07/11/2018    BCR 22.0 10/18/2018     10/18/2018    K 4.7 10/18/2018    CO2 32.0 (H) 10/18/2018    CALCIUM 9.8 10/18/2018    PROTENTOTREF 6.3 07/11/2018    ALBUMIN 4.41 10/18/2018    LABGLOBREF 2.1 07/11/2018    LABIL2 2.0 07/11/2018    BILITOT 0.7 10/18/2018    ALKPHOS 72 10/18/2018    AST 22 10/18/2018    ALT 14 10/18/2018     Lipid Panel:  Lab Results   Component Value Date    CHOL 168 10/18/2018    TRIG 172 (H) 10/18/2018    HDL 48 10/18/2018    VLDL 24.2 07/11/2018     HbA1c:  Lab Results   Component Value Date    HGBA1C 5.90 (H) 01/16/2018       Visual Acuity:  No exam data present    Age-appropriate Screening Schedule:  Refer to the list below for future screening recommendations based on patient's age, sex and/or medical conditions. Orders for these recommended tests are listed in the plan section. The patient has been provided with a written plan.    Health Maintenance   Topic Date Due   • ZOSTER VACCINE (2 of 3)  06/26/2007   • LIPID PANEL  10/18/2019   • DXA SCAN  02/09/2020   • MAMMOGRAM  11/28/2020   • TDAP/TD VACCINES (3 - Td) 02/08/2022   • INFLUENZA VACCINE  Completed   • PNEUMOCOCCAL VACCINES (65+ LOW/MEDIUM RISK)  Completed        Subjective   History of Present Illness    Chelsi Ferguson is a 78 y.o. female who presents for an Subsequent Wellness Visit.    The following portions of the patient's history were reviewed and updated as appropriate: allergies, current medications, past family history, past medical history, past social history, past surgical history and problem list.    Past Medical History:   Diagnosis Date   • Ankle pain    • Arthritis    • Cancer (CMS/HCC)     Skin   • Chest discomfort    • Cholecystitis 1/15/2018   • Coronary artery disease    • Edema    • Elevated liver enzymes 1/15/2018   • Gallstones    • GERD (gastroesophageal reflux disease)    • Glaucoma     pt is currently off of meds and Dr Tanner does not think that she is glaucoma   • H/O complete eye exam 06/2013   • H/O mammogram 11/16/2015   • H/O non-ST elevation myocardial infarction (NSTEMI) 01/2005   • Hammer toe    • Heart attack (CMS/HCC) 01/2005   • History of blood transfusion 01/2005   • History of cardiovascular stress test 12/02/2015    normal   • HTN (hypertension)    • Hyperlipidemia    • Hypokalemia 1/15/2018   • Kidney infection 1971   • Menopausal symptoms    • Onychia and paronychia of finger    • Osteopenia    • Other elevated white blood cell count    • Pap smear for cervical cancer screening 2010 [facility]   • Pneumonia    • Sepsis (CMS/HCC) 1/15/2018   • UTI (urinary tract infection) 1/15/2018   • Viral warts    • Wears glasses      Past Surgical History:   Procedure Laterality Date   • BLADDER REPAIR  2002   • BLADDER REPAIR  2003   • BUNIONECTOMY Right 2010   • CHOLECYSTECTOMY WITH INTRAOPERATIVE CHOLANGIOGRAM N/A 3/7/2018    Procedure: CHOLECYSTECTOMY LAPAROSCOPIC INTRAOPERATIVE CHOLANGIOGRAM;  Surgeon: Harinder Mason,  MD;  Location: [facility] OR;  Service:    • COLONOSCOPY  2008, 4/2014    [facility]   • CORONARY STENT PLACEMENT Left 01/2005    drug eluting stent 1/2005 LAD   • DILATATION AND CURETTAGE  1971   • DILATATION AND CURETTAGE  1978   • EYE CAPSULOTOMY WITH LASER Bilateral 2018   • EYE SURGERY Bilateral 05/2016    Cataract   • FRONTALIS SUSPENSION  2010   • HAMMER TOE REPAIR Right 11/03/2010   • HEAD & NECK SKIN LESION EXCISIONAL BIOPSY  08/20/2018    benign scalp cyst   • HYSTERECTOMY  1978   • LAPAROTOMY OOPHERECTOMY Bilateral 2002   • NOSE SURGERY  03/2017    repair 3 fractured areas. Dr Dunne   • OTHER SURGICAL HISTORY  2005    rectocele repair   • OTHER SURGICAL HISTORY  2010    eyelid surgery   • REPLACEMENT TOTAL KNEE BILATERAL Bilateral    • SKIN BIOPSY     • TONSILLECTOMY  1952   • TOTAL KNEE ARTHROPLASTY  01/2005   • UTERINE FIBROID SURGERY  1978    emergency removal of fibroid from birth canal     Allergies   Allergen Reactions   • Aleve [Naproxen] Hives   • Indocin [Indomethacin] Hallucinations   • Lipitor [Atorvastatin] Hives   • Statins Hives   • Zocor [Simvastatin] Hives   • Celebrex [Celecoxib] Diarrhea   • Keflex [Cephalexin] GI Intolerance     Upset stomach, may have had some blood in stool but cannot remember the reason why she was put on it. Tolerates penicillins without problem.   • Ibuprofen GI Intolerance     Heartburn.   • Prevacid [Lansoprazole] Nausea And Vomiting   • Prilosec [Omeprazole] Nausea And Vomiting     Family History   Problem Relation Age of Onset   • Stroke Mother    • Heart failure Father         congestive   • Cancer Father         lip   • Breast cancer Sister         60's   • Breast cancer Daughter 40   • Breast cancer Other         NIECE 60's   • Breast cancer Other         NIECE 60's   • Ovarian cancer Neg Hx      Social History     Socioeconomic History   • Marital status:      Spouse name: Not on file   • Number of children: Not on file   • Years of education: Not on  file   • Highest education level: Not on file   Social Needs   • Financial resource strain: Not on file   • Food insecurity - worry: Not on file   • Food insecurity - inability: Not on file   • Transportation needs - medical: Not on file   • Transportation needs - non-medical: Not on file   Occupational History   • Not on file   Tobacco Use   • Smoking status: Never Smoker   • Smokeless tobacco: Never Used   Substance and Sexual Activity   • Alcohol use: No   • Drug use: No   • Sexual activity: Yes     Birth control/protection: None   Other Topics Concern   • Not on file   Social History Narrative   • Not on file       Outpatient Medications Prior to Visit   Medication Sig Dispense Refill   • aspirin 81 MG EC tablet Take 81 mg by mouth Every Night.     • Cholecalciferol (VITAMIN D3 PO) Vitamin D3   Daily     • coenzyme Q10 100 MG capsule Take 100 mg by mouth Daily.     • latanoprost (XALATAN) 0.005 % ophthalmic solution 1 drop Every Night.     • metoprolol succinate XL (TOPROL-XL) 25 MG 24 hr tablet Take 25 mg by mouth Every Morning.     • Mirabegron ER (MYRBETRIQ) 50 MG tablet sustained-release 24 hour 24 hr tablet Myrbetriq 50 mg tablet,extended release   Take 1 tablet every day by oral route for 28 days.     • nitroglycerin (NITROSTAT) 0.4 MG SL tablet Place 0.4 mg under the tongue Every 5 (Five) Minutes As Needed.     • Omega-3 Fatty Acids (FISH OIL PO) Fish Oil 300 mg-1,000 mg capsule,delayed release   Daily     • pravastatin (PRAVACHOL) 80 MG tablet Take 1 tablet by mouth Daily. 90 tablet 1   • Probiotic Product (PROBIOTIC DAILY PO) Take 1 tablet by mouth Daily.     • YUVAFEM 10 MCG tablet vaginal tablet INSERT 1 TABLET INTO THE VAGINA 2 (TWO) TIMES A WEEK. 24 tablet 1   • Co-Enzyme Q-10 100 MG capsule Co Q-10   QD     • docusate sodium 100 MG capsule Take 1 capsule by mouth 2 (Two) Times a Day. 20 capsule 0   • latanoprost (XALATAN) 0.005 % ophthalmic solution        No facility-administered medications prior  to visit.        Patient Active Problem List   Diagnosis   • Loss of weight   • Hyperlipidemia   • Edema   • GERD (gastroesophageal reflux disease)   • Nausea with vomiting   • Diarrhea   • Incontinence of feces   • HTN (hypertension)   • Cholelithiasis   • Menopausal symptom   • Cramp in limb   • Glaucoma   • Hammer toe   • Dry mouth   • Osteopenia   • Elevated liver enzymes   • UTI (urinary tract infection)   • Bacterial UTI   • Cholecystitis   • H/O non-ST elevation myocardial infarction (NSTEMI)   • Neck pain       Advance Care Planning:  has an advance directive - a copy has been provided and is in file    Identification of Risk Factors:  Risk factors include: weight , cardiovascular risk, chronic pain, caretaker stress, incontinence and polypharmacy.    Review of Systems   Constitutional: Negative.  Negative for activity change, appetite change, chills, diaphoresis, fatigue, fever and unexpected weight change.   HENT: Positive for rhinorrhea (recent URI-resolving). Negative for congestion, dental problem, drooling, ear discharge, ear pain, facial swelling, hearing loss, mouth sores, nosebleeds, postnasal drip, sinus pressure, sinus pain, sneezing, sore throat, tinnitus, trouble swallowing and voice change.    Eyes: Negative.  Negative for photophobia, pain, discharge, redness, itching and visual disturbance.   Respiratory: Positive for cough (resolving recent URI). Negative for apnea, choking, chest tightness, shortness of breath, wheezing and stridor.    Cardiovascular: Negative.  Negative for chest pain, palpitations and leg swelling.   Gastrointestinal: Negative.  Negative for abdominal distention, abdominal pain, anal bleeding, blood in stool, constipation, diarrhea, nausea, rectal pain and vomiting.   Endocrine: Negative.  Negative for cold intolerance, heat intolerance, polydipsia, polyphagia and polyuria.   Genitourinary: Positive for frequency. Negative for decreased urine volume, difficulty urinating,  dyspareunia, dysuria, enuresis, flank pain, genital sores, hematuria, menstrual problem, pelvic pain, urgency, vaginal bleeding, vaginal discharge and vaginal pain.        Pt states that her cystocele is coming back per Urology   Musculoskeletal: Positive for arthralgias, neck pain and neck stiffness. Negative for back pain, gait problem, joint swelling and myalgias.   Skin: Negative.  Negative for color change, pallor, rash and wound.   Allergic/Immunologic: Positive for environmental allergies. Negative for food allergies and immunocompromised state.   Neurological: Positive for tremors (head and left hand). Negative for dizziness, seizures, syncope, facial asymmetry, speech difficulty, weakness, light-headedness, numbness and headaches.   Hematological: Negative.  Negative for adenopathy. Does not bruise/bleed easily.   Psychiatric/Behavioral: Negative.  Negative for agitation, behavioral problems, confusion, decreased concentration, dysphoric mood, hallucinations, self-injury, sleep disturbance and suicidal ideas. The patient is not nervous/anxious and is not hyperactive.        Compared to one year ago, the patient feels her physical health is better.  Compared to one year ago, the patient feels her mental health is the same.    Objective     Physical Exam   Constitutional: She is oriented to person, place, and time. She appears well-developed and well-nourished. No distress.   HENT:   Head: Normocephalic and atraumatic.   Right Ear: Tympanic membrane, external ear and ear canal normal.   Left Ear: Tympanic membrane, external ear and ear canal normal.   Ears:    Nose: Nose normal.   Mouth/Throat: Uvula is midline, oropharynx is clear and moist and mucous membranes are normal. Mucous membranes are not pale, not dry and not cyanotic. No oropharyngeal exudate, posterior oropharyngeal edema or posterior oropharyngeal erythema.   Eyes: Conjunctivae, EOM and lids are normal. Pupils are equal, round, and reactive to  light. Right eye exhibits no chemosis, no discharge, no exudate and no hordeolum. No foreign body present in the right eye. Left eye exhibits no chemosis, no discharge, no exudate and no hordeolum. No foreign body present in the left eye. Right conjunctiva is not injected. Right conjunctiva has no hemorrhage. Left conjunctiva is not injected. Left conjunctiva has no hemorrhage. No scleral icterus. Right eye exhibits normal extraocular motion and no nystagmus. Left eye exhibits normal extraocular motion and no nystagmus.   Neck: Trachea normal and normal range of motion. Neck supple. No tracheal deviation present. No thyroid mass and no thyromegaly present.   Cardiovascular: Normal rate, regular rhythm, normal heart sounds and intact distal pulses. Exam reveals no gallop and no friction rub.   No murmur heard.  Pulses:       Dorsalis pedis pulses are 2+ on the right side, and 2+ on the left side.        Posterior tibial pulses are 2+ on the right side, and 2+ on the left side.   Pulmonary/Chest: Effort normal and breath sounds normal. No respiratory distress. She has no decreased breath sounds. She has no wheezes. She has no rhonchi. She has no rales. Chest wall is not dull to percussion. She exhibits no tenderness.   Abdominal: Soft. Bowel sounds are normal. She exhibits no distension and no mass. There is no hepatosplenomegaly. There is no tenderness. There is no rebound and no guarding.   Musculoskeletal: Normal range of motion. She exhibits no edema, tenderness or deformity.   Lymphadenopathy:        Head (right side): No submandibular adenopathy present.        Head (left side): No submandibular adenopathy present.     She has no cervical adenopathy.        Right cervical: No superficial cervical, no deep cervical and no posterior cervical adenopathy present.       Left cervical: No superficial cervical, no deep cervical and no posterior cervical adenopathy present.     She has no axillary adenopathy.         "Right axillary: No pectoral and no lateral adenopathy present.        Left axillary: No pectoral and no lateral adenopathy present.       Right: No inguinal and no supraclavicular adenopathy present.        Left: No inguinal and no supraclavicular adenopathy present.   Neurological: She is alert and oriented to person, place, and time. She has normal strength and normal reflexes. No cranial nerve deficit or sensory deficit. Coordination normal.   Reflex Scores:       Bicep reflexes are 2+ on the right side and 2+ on the left side.       Brachioradialis reflexes are 2+ on the right side and 2+ on the left side.       Patellar reflexes are 2+ on the right side and 2+ on the left side.  Skin: Skin is warm and dry. No rash noted. She is not diaphoretic. No cyanosis. Nails show no clubbing.   Psychiatric: She has a normal mood and affect. Her speech is normal and behavior is normal. Judgment and thought content normal. Cognition and memory are normal.   Nursing note and vitals reviewed.      Vitals:    01/09/19 0906   BP: 122/60   Pulse: 69   Temp: 98 °F (36.7 °C)   SpO2: 100%   Weight: 68.6 kg (151 lb 3.2 oz)   Height: 152.4 cm (60\")   PainSc:   8   PainLoc: Wrist  Comment: bilateral wrist and neck. The pain fluctuates.       Patient's Body mass index is 29.53 kg/m². BMI is above normal parameters. Recommendations include: educational material and exercise counseling.      Assessment/Plan   Patient Self-Management and Personalized Health Advice  The patient has been provided with information about: diet, exercise, fall prevention and designing advance directives and preventive services including:   · Advance directive, Colorectal cancer screening, fecal occult blood test, Exercise counseling provided, Fall Risk assessment done, Fall Risk plan of care done, Urinary Incontinence assessment done, discussed hepatitis A and shingrix.  Pt had UA earlier this week at Urologist.   Visit Diagnoses:    ICD-10-CM ICD-9-CM   1. " Medicare annual wellness visit, subsequent Z00.00 V70.0   2. Screening for colon cancer Z12.11 V76.51       Orders Placed This Encounter   Procedures   • POC Occult Blood X 3, Stool     Standing Status:   Future     Standing Expiration Date:   1/9/2020       Outpatient Encounter Medications as of 1/9/2019   Medication Sig Dispense Refill   • aspirin 81 MG EC tablet Take 81 mg by mouth Every Night.     • Cholecalciferol (VITAMIN D3 PO) Vitamin D3   Daily     • coenzyme Q10 100 MG capsule Take 100 mg by mouth Daily.     • latanoprost (XALATAN) 0.005 % ophthalmic solution 1 drop Every Night.     • metoprolol succinate XL (TOPROL-XL) 25 MG 24 hr tablet Take 25 mg by mouth Every Morning.     • Mirabegron ER (MYRBETRIQ) 50 MG tablet sustained-release 24 hour 24 hr tablet Myrbetriq 50 mg tablet,extended release   Take 1 tablet every day by oral route for 28 days.     • nitroglycerin (NITROSTAT) 0.4 MG SL tablet Place 0.4 mg under the tongue Every 5 (Five) Minutes As Needed.     • Omega-3 Fatty Acids (FISH OIL PO) Fish Oil 300 mg-1,000 mg capsule,delayed release   Daily     • pravastatin (PRAVACHOL) 80 MG tablet Take 1 tablet by mouth Daily. 90 tablet 1   • Probiotic Product (PROBIOTIC DAILY PO) Take 1 tablet by mouth Daily.     • YUVAFEM 10 MCG tablet vaginal tablet INSERT 1 TABLET INTO THE VAGINA 2 (TWO) TIMES A WEEK. 24 tablet 1   • [DISCONTINUED] Co-Enzyme Q-10 100 MG capsule Co Q-10   QD     • [DISCONTINUED] docusate sodium 100 MG capsule Take 1 capsule by mouth 2 (Two) Times a Day. 20 capsule 0   • [DISCONTINUED] latanoprost (XALATAN) 0.005 % ophthalmic solution        No facility-administered encounter medications on file as of 1/9/2019.        Reviewed use of high risk medication in the elderly: not applicable  Reviewed for potential of harmful drug interactions in the elderly: not applicable    Follow Up:  Return in about 6 months (around 7/9/2019), or if symptoms worsen or fail to improve, for Recheck.     An  After Visit Summary and PPPS with all of these plans were given to the patient.

## 2019-01-09 NOTE — PATIENT INSTRUCTIONS
Advance Directive  Advance directives are legal documents that let you make choices ahead of time about your health care and medical treatment in case you become unable to communicate for yourself. Advance directives are a way for you to communicate your wishes to family, friends, and health care providers. This can help convey your decisions about end-of-life care if you become unable to communicate.  Discussing and writing advance directives should happen over time rather than all at once. Advance directives can be changed depending on your situation and what you want, even after you have signed the advance directives.  If you do not have an advance directive, some states assign family decision makers to act on your behalf based on how closely you are related to them. Each state has its own laws regarding advance directives. You may want to check with your health care provider, , or state representative about the laws in your state. There are different types of advance directives, such as:  · Medical power of .  · Living will.  · Do not resuscitate (DNR) or do not attempt resuscitation (DNAR) order.    Health care proxy and medical power of   A health care proxy, also called a health care agent, is a person who is appointed to make medical decisions for you in cases in which you are unable to make the decisions yourself. Generally, people choose someone they know well and trust to represent their preferences. Make sure to ask this person for an agreement to act as your proxy. A proxy may have to exercise judgment in the event of a medical decision for which your wishes are not known.  A medical power of  is a legal document that names your health care proxy. Depending on the laws in your state, after the document is written, it may also need to be:  · Signed.  · Notarized.  · Dated.  · Copied.  · Witnessed.  · Incorporated into your medical record.    You may also want to appoint  someone to manage your financial affairs in a situation in which you are unable to do so. This is called a durable power of  for finances. It is a separate legal document from the durable power of  for health care. You may choose the same person or someone different from your health care proxy to act as your agent in financial matters.  If you do not appoint a proxy, or if there is a concern that the proxy is not acting in your best interests, a court-appointed guardian may be designated to act on your behalf.  Living will  A living will is a set of instructions documenting your wishes about medical care when you cannot express them yourself. Health care providers should keep a copy of your living will in your medical record. You may want to give a copy to family members or friends. To alert caregivers in case of an emergency, you can place a card in your wallet to let them know that you have a living will and where they can find it. A living will is used if you become:  · Terminally ill.  · Incapacitated.  · Unable to communicate or make decisions.    Items to consider in your living will include:  · The use or non-use of life-sustaining equipment, such as dialysis machines and breathing machines (ventilators).  · A DNR or DNAR order, which is the instruction not to use cardiopulmonary resuscitation (CPR) if breathing or heartbeat stops.  · The use or non-use of tube feeding.  · Withholding of food and fluids.  · Comfort (palliative) care when the goal becomes comfort rather than a cure.  · Organ and tissue donation.    A living will does not give instructions for distributing your money and property if you should pass away. It is recommended that you seek the advice of a  when writing a will. Decisions about taxes, beneficiaries, and asset distribution will be legally binding. This process can relieve your family and friends of any concerns surrounding disputes or questions that may come up  about the distribution of your assets.  DNR or DNAR  A DNR or DNAR order is a request not to have CPR in the event that your heart stops beating or you stop breathing. If a DNR or DNAR order has not been made and shared, a health care provider will try to help any patient whose heart has stopped or who has stopped breathing. If you plan to have surgery, talk with your health care provider about how your DNR or DNAR order will be followed if problems occur.  Summary  · Advance directives are the legal documents that allow you to make choices ahead of time about your health care and medical treatment in case you become unable to communicate for yourself.  · The process of discussing and writing advance directives should happen over time. You can change the advance directives, even after you have signed them.  · Advance directives include DNR or DNAR orders, living duke, and designating an agent as your medical power of .  This information is not intended to replace advice given to you by your health care provider. Make sure you discuss any questions you have with your health care provider.  Document Released: 03/26/2009 Document Revised: 11/06/2017 Document Reviewed: 11/06/2017  [employer] Interactive Patient Education © 2017 [employer]  Exercising to Stay Healthy  Exercising regularly is important. It has many health benefits, such as:  · Improving your overall fitness, flexibility, and endurance.  · Increasing your bone density.  · Helping with weight control.  · Decreasing your body fat.  · Increasing your muscle strength.  · Reducing stress and tension.  · Improving your overall health.    In order to become healthy and stay healthy, it is recommended that you do moderate-intensity and vigorous-intensity exercise. You can tell that you are exercising at a moderate intensity if you have a higher heart rate and faster breathing, but you are still able to hold a conversation. You can tell that you are  exercising at a vigorous intensity if you are breathing much harder and faster and cannot hold a conversation while exercising.  How often should I exercise?  Choose an activity that you enjoy and set realistic goals. Your health care provider can help you to make an activity plan that works for you. Exercise regularly as directed by your health care provider. This may include:  · Doing resistance training twice each week, such as:  ? Push-ups.  ? Sit-ups.  ? Lifting weights.  ? Using resistance bands.  · Doing a given intensity of exercise for a given amount of time. Choose from these options:  ? 150 minutes of moderate-intensity exercise every week.  ? 75 minutes of vigorous-intensity exercise every week.  ? A mix of moderate-intensity and vigorous-intensity exercise every week.    Children, pregnant women, people who are out of shape, people who are overweight, and older adults may need to consult a health care provider for individual recommendations. If you have any sort of medical condition, be sure to consult your health care provider before starting a new exercise program.  What are some exercise ideas?  Some moderate-intensity exercise ideas include:  · Walking at a rate of 1 mile in 15 minutes.  · Biking.  · Hiking.  · Golfing.  · Dancing.    Some vigorous-intensity exercise ideas include:  · Walking at a rate of at least 4.5 miles per hour.  · Jogging or running at a rate of 5 miles per hour.  · Biking at a rate of at least 10 miles per hour.  · Lap swimming.  · Roller-skating or in-line skating.  · Cross-country skiing.  · Vigorous competitive sports, such as football, basketball, and soccer.  · Jumping rope.  · Aerobic dancing.    What are some everyday activities that can help me to get exercise?  · Yard work, such as:  ? Pushing a .  ? Raking and bagging leaves.  · Washing and waxing your car.  · Pushing a stroller.  · Shoveling snow.  · Gardening.  · Washing windows or floors.  How can I be  more active in my day-to-day activities?  · Use the stairs instead of the elevator.  · Take a walk during your lunch break.  · If you drive, park your car farther away from work or school.  · If you take public transportation, get off one stop early and walk the rest of the way.  · Make all of your phone calls while standing up and walking around.  · Get up, stretch, and walk around every 30 minutes throughout the day.  What guidelines should I follow while exercising?  · Do not exercise so much that you hurt yourself, feel dizzy, or get very short of breath.  · Consult your health care provider before starting a new exercise program.  · Wear comfortable clothes and shoes with good support.  · Drink plenty of water while you exercise to prevent dehydration or heat stroke. Body water is lost during exercise and must be replaced.  · Work out until you breathe faster and your heart beats faster.  This information is not intended to replace advice given to you by your health care provider. Make sure you discuss any questions you have with your health care provider.  Document Released: 01/20/2012 Document Revised: 05/25/2017 Document Reviewed: 05/21/2015  [employer] Interactive Patient Education © 2018 [employer]  Calorie Counting for Weight Loss  Calories are units of energy. Your body needs a certain amount of calories from food to keep you going throughout the day. When you eat more calories than your body needs, your body stores the extra calories as fat. When you eat fewer calories than your body needs, your body burns fat to get the energy it needs.  Calorie counting means keeping track of how many calories you eat and drink each day. Calorie counting can be helpful if you need to lose weight. If you make sure to eat fewer calories than your body needs, you should lose weight. Ask your health care provider what a healthy weight is for you.  For calorie counting to work, you will need to eat the right number of  calories in a day in order to lose a healthy amount of weight per week. A dietitian can help you determine how many calories you need in a day and will give you suggestions on how to reach your calorie goal.  · A healthy amount of weight to lose per week is usually 1-2 lb (0.5-0.9 kg). This usually means that your daily calorie intake should be reduced by 500-750 calories.  · Eating 1,200 - 1,500 calories per day can help most women lose weight.  · Eating 1,500 - 1,800 calories per day can help most men lose weight.    What do I need to know about calorie counting?  In order to meet your daily calorie goal, you will need to:  · Find out how many calories are in each food you would like to eat. Try to do this before you eat.  · Decide how much of the food you plan to eat.  · Write down what you ate and how many calories it had. Doing this is called keeping a food log.    To successfully lose weight, it is important to balance calorie counting with a healthy lifestyle that includes regular activity. Aim for 150 minutes of moderate exercise (such as walking) or 75 minutes of vigorous exercise (such as running) each week.  Where do I find calorie information?    The number of calories in a food can be found on a Nutrition Facts label. If a food does not have a Nutrition Facts label, try to look up the calories online or ask your dietitian for help.  Remember that calories are listed per serving. If you choose to have more than one serving of a food, you will have to multiply the calories per serving by the amount of servings you plan to eat. For example, the label on a package of bread might say that a serving size is 1 slice and that there are 90 calories in a serving. If you eat 1 slice, you will have eaten 90 calories. If you eat 2 slices, you will have eaten 180 calories.  How do I keep a food log?  Immediately after each meal, record the following information in your food log:  · What you ate. Don't forget to  "include toppings, sauces, and other extras on the food.  · How much you ate. This can be measured in cups, ounces, or number of items.  · How many calories each food and drink had.  · The total number of calories in the meal.    Keep your food log near you, such as in a small notebook in your pocket, or use a mobile sid or website. Some programs will calculate calories for you and show you how many calories you have left for the day to meet your goal.  What are some calorie counting tips?  · Use your calories on foods and drinks that will fill you up and not leave you hungry:  ? Some examples of foods that fill you up are nuts and nut butters, vegetables, lean proteins, and high-fiber foods like whole grains. High-fiber foods are foods with more than 5 g fiber per serving.  ? Drinks such as sodas, specialty coffee drinks, alcohol, and juices have a lot of calories, yet do not fill you up.  · Eat nutritious foods and avoid empty calories. Empty calories are calories you get from foods or beverages that do not have many vitamins or protein, such as candy, sweets, and soda. It is better to have a nutritious high-calorie food (such as an avocado) than a food with few nutrients (such as a bag of chips).  · Know how many calories are in the foods you eat most often. This will help you calculate calorie counts faster.  · Pay attention to calories in drinks. Low-calorie drinks include water and unsweetened drinks.  · Pay attention to nutrition labels for \"low fat\" or \"fat free\" foods. These foods sometimes have the same amount of calories or more calories than the full fat versions. They also often have added sugar, starch, or salt, to make up for flavor that was removed with the fat.  · Find a way of tracking calories that works for you. Get creative. Try different apps or programs if writing down calories does not work for you.  What are some portion control tips?  · Know how many calories are in a serving. This will help " you know how many servings of a certain food you can have.  · Use a measuring cup to measure serving sizes. You could also try weighing out portions on a kitchen scale. With time, you will be able to estimate serving sizes for some foods.  · Take some time to put servings of different foods on your favorite plates, bowls, and cups so you know what a serving looks like.  · Try not to eat straight from a bag or box. Doing this can lead to overeating. Put the amount you would like to eat in a cup or on a plate to make sure you are eating the right portion.  · Use smaller plates, glasses, and bowls to prevent overeating.  · Try not to multitask (for example, watch TV or use your computer) while eating. If it is time to eat, sit down at a table and enjoy your food. This will help you to know when you are full. It will also help you to be aware of what you are eating and how much you are eating.  What are tips for following this plan?  Reading food labels  · Check the calorie count compared to the serving size. The serving size may be smaller than what you are used to eating.  · Check the source of the calories. Make sure the food you are eating is high in vitamins and protein and low in saturated and trans fats.  Shopping  · Read nutrition labels while you shop. This will help you make healthy decisions before you decide to purchase your food.  · Make a grocery list and stick to it.  Cooking  · Try to cook your favorite foods in a healthier way. For example, try baking instead of frying.  · Use low-fat dairy products.  Meal planning  · Use more fruits and vegetables. Half of your plate should be fruits and vegetables.  · Include lean proteins like poultry and fish.  How do I count calories when eating out?  · Ask for smaller portion sizes.  · Consider sharing an entree and sides instead of getting your own entree.  · If you get your own entree, eat only half. Ask for a box at the beginning of your meal and put the rest  of your entree in it so you are not tempted to eat it.  · If calories are listed on the menu, choose the lower calorie options.  · Choose dishes that include vegetables, fruits, whole grains, low-fat dairy products, and lean protein.  · Choose items that are boiled, broiled, grilled, or steamed. Stay away from items that are buttered, battered, fried, or served with cream sauce. Items labeled “crispy” are usually fried, unless stated otherwise.  · Choose water, low-fat milk, unsweetened iced tea, or other drinks without added sugar. If you want an alcoholic beverage, choose a lower calorie option such as a glass of wine or light beer.  · Ask for dressings, sauces, and syrups on the side. These are usually high in calories, so you should limit the amount you eat.  · If you want a salad, choose a garden salad and ask for grilled meats. Avoid extra toppings like nelson, cheese, or fried items. Ask for the dressing on the side, or ask for olive oil and vinegar or lemon to use as dressing.  · Estimate how many servings of a food you are given. For example, a serving of cooked rice is ½ cup or about the size of half a baseball. Knowing serving sizes will help you be aware of how much food you are eating at restaurants. The list below tells you how big or small some common portion sizes are based on everyday objects:  ? 1 oz--4 stacked dice.  ? 3 oz--1 deck of cards.  ? 1 tsp--1 die.  ? 1 Tbsp--½ a ping-pong ball.  ? 2 Tbsp--1 ping-pong ball.  ? ½ cup--½ baseball.  ? 1 cup--1 baseball.  Summary  · Calorie counting means keeping track of how many calories you eat and drink each day. If you eat fewer calories than your body needs, you should lose weight.  · A healthy amount of weight to lose per week is usually 1-2 lb (0.5-0.9 kg). This usually means reducing your daily calorie intake by 500-750 calories.  · The number of calories in a food can be found on a Nutrition Facts label. If a food does not have a Nutrition Facts  label, try to look up the calories online or ask your dietitian for help.  · Use your calories on foods and drinks that will fill you up, and not on foods and drinks that will leave you hungry.  · Use smaller plates, glasses, and bowls to prevent overeating.  This information is not intended to replace advice given to you by your health care provider. Make sure you discuss any questions you have with your health care provider.  Document Released: 12/18/2006 Document Revised: 11/17/2017 Document Reviewed: 11/17/2017  [employer] Interactive Patient Education © 2018 [employer]  Fall Prevention in the Home  Falls can cause injuries and can affect people from all age groups. There are many simple things that you can do to make your home safe and to help prevent falls.  What can I do on the outside of my home?  · Regularly repair the edges of walkways and driveways and fix any cracks.  · Remove high doorway thresholds.  · Trim any shrubbery on the main path into your home.  · Use bright outdoor lighting.  · Clear walkways of debris and clutter, including tools and rocks.  · Regularly check that handrails are securely fastened and in good repair. Both sides of any steps should have handrails.  · Install guardrails along the edges of any raised decks or porches.  · Have leaves, snow, and ice cleared regularly.  · Use sand or salt on walkways during winter months.  · In the garage, clean up any spills right away, including grease or oil spills.  What can I do in the bathroom?  · Use night lights.  · Install grab bars by the toilet and in the tub and shower. Do not use towel bars as grab bars.  · Use non-skid mats or decals on the floor of the tub or shower.  · If you need to sit down while you are in the shower, use a plastic, non-slip stool.  · Keep the floor dry. Immediately clean up any water that spills on the floor.  · Remove soap buildup in the tub or shower on a regular basis.  · Attach bath mats securely with  double-sided non-slip rug tape.  · Remove throw rugs and other tripping hazards from the floor.  What can I do in the bedroom?  · Use night lights.  · Make sure that a bedside light is easy to reach.  · Do not use oversized bedding that drapes onto the floor.  · Have a firm chair that has side arms to use for getting dressed.  · Remove throw rugs and other tripping hazards from the floor.  What can I do in the kitchen?  · Clean up any spills right away.  · Avoid walking on wet floors.  · Place frequently used items in easy-to-reach places.  · If you need to reach for something above you, use a sturdy step stool that has a grab bar.  · Keep electrical cables out of the way.  · Do not use floor polish or wax that makes floors slippery. If you have to use wax, make sure that it is non-skid floor wax.  · Remove throw rugs and other tripping hazards from the floor.  What can I do in the stairways?  · Do not leave any items on the stairs.  · Make sure that there are handrails on both sides of the stairs. Fix handrails that are broken or loose. Make sure that handrails are as long as the stairways.  · Check any carpeting to make sure that it is firmly attached to the stairs. Fix any carpet that is loose or worn.  · Avoid having throw rugs at the top or bottom of stairways, or secure the rugs with carpet tape to prevent them from moving.  · Make sure that you have a light switch at the top of the stairs and the bottom of the stairs. If you do not have them, have them installed.  What are some other fall prevention tips?  · Wear closed-toe shoes that fit well and support your feet. Wear shoes that have rubber soles or low heels.  · When you use a stepladder, make sure that it is completely opened and that the sides are firmly locked. Have someone hold the ladder while you are using it. Do not climb a closed stepladder.  · Add color or contrast paint or tape to grab bars and handrails in your home. Place contrasting color  strips on the first and last steps.  · Use mobility aids as needed, such as canes, walkers, scooters, and crutches.  · Turn on lights if it is dark. Replace any light bulbs that burn out.  · Set up furniture so that there are clear paths. Keep the furniture in the same spot.  · Fix any uneven floor surfaces.  · Choose a carpet design that does not hide the edge of steps of a stairway.  · Be aware of any and all pets.  · Review your medicines with your healthcare provider. Some medicines can cause dizziness or changes in blood pressure, which increase your risk of falling.  Talk with your health care provider about other ways that you can decrease your risk of falls. This may include working with a physical therapist or  to improve your strength, balance, and endurance.  This information is not intended to replace advice given to you by your health care provider. Make sure you discuss any questions you have with your health care provider.  Document Released: 2003 Document Revised: 2017 Document Reviewed: 2016  [employer] Interactive Patient Education © 2018 [employer]    Medicare Wellness  Personal Prevention Plan of Service     Date of Office Visit:  2019  Encounter Provider:  Reina Yarbrough MD  Place of Service:  [facility]  Patient Name: Chelsi Ferguson  :  1940    As part of the Medicare Wellness portion of your visit today, we are providing you with this personalized preventive plan of services (PPPS). This plan is based upon recommendations of the [place] Preventive Services Task Force (USPSTF) and the Advisory Committee on Immunization Practices (ACIP).    This lists the preventive care services that should be considered, and provides dates of when you are due. Items listed as completed are up-to-date and do not require any further intervention.    Health Maintenance   Topic Date Due   • HEPATITIS A VACCINE ADULT (1 of 2)  07/14/1958   • ZOSTER VACCINE (2 of 3) 06/26/2007   • LIPID PANEL  10/18/2019   • MEDICARE ANNUAL WELLNESS  01/09/2020   • DXA SCAN  02/09/2020   • MAMMOGRAM  11/28/2020   • TDAP/TD VACCINES (3 - Td) 02/08/2022   • INFLUENZA VACCINE  Completed   • PNEUMOCOCCAL VACCINES (65+ LOW/MEDIUM RISK)  Completed       Orders Placed This Encounter   Procedures   • POC Occult Blood X 3, Stool     Standing Status:   Future     Standing Expiration Date:   1/9/2020       Return in about 6 months (around 7/9/2019), or if symptoms worsen or fail to improve, for Recheck.

## 2019-01-11 RX ORDER — ESTRADIOL 10 UG/1
INSERT VAGINAL
Qty: 24 TABLET | Refills: 2 | Status: SHIPPED | OUTPATIENT
Start: 2019-01-11 | End: 2019-04-03 | Stop reason: SDUPTHER

## 2019-01-21 ENCOUNTER — TELEPHONE (OUTPATIENT)
Dept: INTERNAL MEDICINE | Facility: CLINIC | Age: 79
End: 2019-01-21

## 2019-01-21 NOTE — TELEPHONE ENCOUNTER
Patient needs her estradiol (VAGIFEM) 10 MCG tablet vaginal tablet pre authorized to a 3 tier medication she can not afford 298.00

## 2019-03-04 ENCOUNTER — LAB (OUTPATIENT)
Dept: INTERNAL MEDICINE | Facility: CLINIC | Age: 79
End: 2019-03-04

## 2019-03-04 DIAGNOSIS — Z12.11 SCREENING FOR COLON CANCER: ICD-10-CM

## 2019-03-04 LAB
EXPIRATION DATE 2: NORMAL
EXPIRATION DATE 3: NORMAL
EXPIRATION DATE: NORMAL
GASTROCULT GAST QL: NEGATIVE
HEMOCCULT SP2 STL QL: NEGATIVE
HEMOCCULT SP3 STL QL: NEGATIVE
Lab: NORMAL

## 2019-03-04 PROCEDURE — 82274 ASSAY TEST FOR BLOOD FECAL: CPT | Performed by: FAMILY MEDICINE

## 2019-03-22 ENCOUNTER — OFFICE VISIT (OUTPATIENT)
Dept: NEUROLOGY | Facility: CLINIC | Age: 79
End: 2019-03-22

## 2019-03-22 VITALS
SYSTOLIC BLOOD PRESSURE: 124 MMHG | WEIGHT: 151 LBS | HEIGHT: 60 IN | DIASTOLIC BLOOD PRESSURE: 74 MMHG | OXYGEN SATURATION: 97 % | HEART RATE: 74 BPM | BODY MASS INDEX: 29.64 KG/M2

## 2019-03-22 DIAGNOSIS — G25.0 ESSENTIAL TREMOR: ICD-10-CM

## 2019-03-22 DIAGNOSIS — M54.2 NECK PAIN: Primary | ICD-10-CM

## 2019-03-22 PROCEDURE — 99214 OFFICE O/P EST MOD 30 MIN: CPT | Performed by: PSYCHIATRY & NEUROLOGY

## 2019-03-22 NOTE — PROGRESS NOTES
Subjective:    CC: Chelsi Ferguson is seen today in consultation  for Neck Pain       HPI:  Current visit-since her last visit she had a CT of her cervical spine that showed diffuse degenerative changes right more than left.  She did go for physical therapy which has helped.  She still gets occasional neck pain and stiffness but does not want to take muscle relaxants.  Today she also complains of a head tremor as well as a tremor in her left hand  that occurs more while doing tasks or holding objects in her hand.  Anxiety also seems to make the tremors worse.  She denies any family history of tremors.    Initial [age]wdwn[id number]-old female with a history of hypertension, hyperlipidemia, arthritis, CAD, GERD presents with neck pain.  As per patient she was bedridden and frequently hospitalized for several UTIs and cholecystitis between January to June of this year.  Then in May of this year she started having a severe neck spasm on the left as well as pain that radiated from the neck to the side of her face and up into her scalp.  She underwent physical therapy for 4-6 weeks at the time that helped bring down the pain from 8 to about 3/10 in severity.  She was also found to have 2 cysts on her scalp and underwent removal for those which helped relieve her headaches.  Currently she has mild neck pain on the left that worsens while turning her neck to the right (like when she is backing her car up ) but denies having any facial pain or headaches.  She does have mild scalp numbness in the area where the cysts were removed.  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  She was asked to undergo surgery but refused.    The following portions of the patient's history were reviewed today and updated as of 03/22/2019  : allergies, current medications, past  family history, past medical history, past social history, past surgical history and problem list  These document will be scanned to patient's chart.      Current Outpatient Medications:   •  aspirin 81 MG EC tablet, Take 81 mg by mouth Every Night., Disp: , Rfl:   •  Cholecalciferol (VITAMIN D3 PO), Vitamin D3  Daily, Disp: , Rfl:   •  coenzyme Q10 100 MG capsule, Take 100 mg by mouth Daily., Disp: , Rfl:   •  estradiol (VAGIFEM) 10 MCG tablet vaginal tablet, INSERT 1 TABLET INTO THE VAGINA 2 (TWO) TIMES A WEEK., Disp: 24 tablet, Rfl: 2  •  latanoprost (XALATAN) 0.005 % ophthalmic solution, 1 drop Every Night., Disp: , Rfl:   •  metoprolol succinate XL (TOPROL-XL) 25 MG 24 hr tablet, Take 25 mg by mouth Every Morning., Disp: , Rfl:   •  Mirabegron ER (MYRBETRIQ) 50 MG tablet sustained-release 24 hour 24 hr tablet, Myrbetriq 50 mg tablet,extended release  Take 1 tablet every day by oral route for 28 days., Disp: , Rfl:   •  nitroglycerin (NITROSTAT) 0.4 MG SL tablet, Place 0.4 mg under the tongue Every 5 (Five) Minutes As Needed., Disp: , Rfl:   •  Omega-3 Fatty Acids (FISH OIL PO), Fish Oil 300 mg-1,000 mg capsule,delayed release  Daily, Disp: , Rfl:   •  pravastatin (PRAVACHOL) 80 MG tablet, Take 1 tablet by mouth Daily., Disp: 90 tablet, Rfl: 1  •  Probiotic Product (PROBIOTIC DAILY PO), Take 1 tablet by mouth Daily., Disp: , Rfl:    Past Medical History:   Diagnosis Date   • Ankle pain    • Arthritis    • Cancer (CMS/HCC)     Skin   • Chest discomfort    • Cholecystitis 1/15/2018   • Coronary artery disease    • Edema    • Elevated liver enzymes 1/15/2018   • Gallstones    • GERD (gastroesophageal reflux disease)    • Glaucoma     pt is currently off of meds and Dr Tanner does not think that she is glaucoma   • H/O complete eye exam 06/2013   • H/O mammogram 11/16/2015   • H/O non-ST elevation myocardial infarction (NSTEMI) 01/2005   • Hammer toe    • Heart attack (CMS/HCC) 01/2005   • History of blood transfusion  01/2005   • History of cardiovascular stress test 12/02/2015    normal   • HTN (hypertension)    • Hyperlipidemia    • Hypokalemia 1/15/2018   • Kidney infection 1971   • Menopausal symptoms    • Onychia and paronychia of finger    • Osteopenia    • Other elevated white blood cell count    • Pap smear for cervical cancer screening 2010    [facility]   • Pneumonia    • Sepsis (CMS/HCC) 1/15/2018   • UTI (urinary tract infection) 1/15/2018   • Viral warts    • Wears glasses       Past Surgical History:   Procedure Laterality Date   • BLADDER REPAIR  2002   • BLADDER REPAIR  2003   • BUNIONECTOMY Right 2010   • CHOLECYSTECTOMY WITH INTRAOPERATIVE CHOLANGIOGRAM N/A 3/7/2018    Procedure: CHOLECYSTECTOMY LAPAROSCOPIC INTRAOPERATIVE CHOLANGIOGRAM;  Surgeon: Harinder Mason MD;  Location: [facility];  Service:    • COLONOSCOPY  2008, 4/2014    [facility]   • CORONARY STENT PLACEMENT Left 01/2005    drug eluting stent 1/2005 LAD   • DILATATION AND CURETTAGE  1971   • DILATATION AND CURETTAGE  1978   • EYE CAPSULOTOMY WITH LASER Bilateral 2018   • EYE SURGERY Bilateral 05/2016    Cataract   • FRONTALIS SUSPENSION  2010   • HAMMER TOE REPAIR Right 11/03/2010   • HEAD & NECK SKIN LESION EXCISIONAL BIOPSY  08/20/2018    benign scalp cyst   • HYSTERECTOMY  1978   • LAPAROTOMY OOPHERECTOMY Bilateral 2002   • NOSE SURGERY  03/2017    repair 3 fractured areas. Dr Dunne   • OTHER SURGICAL HISTORY  2005    rectocele repair   • OTHER SURGICAL HISTORY  2010    eyelid surgery   • REPLACEMENT TOTAL KNEE BILATERAL Bilateral    • SKIN BIOPSY     • TONSILLECTOMY  1952   • TOTAL KNEE ARTHROPLASTY  01/2005   • UTERINE FIBROID SURGERY  1978    emergency removal of fibroid from birth canal      Family History   Problem Relation Age of Onset   • Stroke Mother    • Heart failure Father         congestive   • Cancer Father         lip   • Breast cancer Sister         60's   • Breast cancer Daughter 40   • Breast cancer Other         NIECE 60's   •  "Breast cancer Other         NIECE 60's   • Ovarian cancer Neg Hx       Social History     Socioeconomic History   • Marital status:      Spouse name: Not on file   • Number of children: Not on file   • Years of education: Not on file   • Highest education level: Not on file   Tobacco Use   • Smoking status: Never Smoker   • Smokeless tobacco: Never Used   Substance and Sexual Activity   • Alcohol use: No   • Drug use: No   • Sexual activity: Yes     Birth control/protection: None     Review of Systems   Endocrine: Positive for cold intolerance.   Musculoskeletal: Positive for arthralgias, back pain, gait problem, neck pain and neck stiffness.   Neurological: Positive for tremors and weakness.   All other systems reviewed and are negative.      Objective:    /74 (BP Location: Right arm, Patient Position: Sitting, Cuff Size: Adult)   Pulse 74   Ht 152.4 cm (60\")   Wt 68.5 kg (151 lb)   LMP  (LMP Unknown) Comment: Last Mammogram 2017  SpO2 97%   BMI 29.49 kg/m²     Neurology Exam:    General apperance: NAD.  Reduced cervical range of motion in all directions but improved since last visit    Mental status: Alert, awake and oriented to time place and person.    Recent and Remote memory: Intact.    Attention span and Concentration: Normal.     Language and Speech: Intact- No dysarthria.    Fluency, Naming , Repitition and Comprehension:  Intact    Cranial Nerves:   CN II: Visual fields are full. Intact. Fundi - Normal, No papillederma, Pupils - COLE  CN III, IV and VI: Extraocular movements are intact. Normal saccades.   CN V: Facial sensation is intact.   CN VII: Muscles of facial expression reveal no asymmetry. Intact.   CN VIII: Hearing is intact. Whispered voice intact.   CN IX and X: Palate elevates symmetrically. Intact  CN XI: Shoulder shrug is intact.   CN XII: Tongue is midline without evidence of atrophy or fasciculation.     Ophthalmoscopic exam of optic disc-normal    Motor:-Head tremor " noted as well as a postural and kinetic tremor in the left hand.  She did have a tremor while drawing a Delarosa's wheel, no micrographia noted  Right UE muscle strength 5/5. Normal tone.     Left UE muscle strength 5/5. Normal tone.      Right LE muscle strength5/5. Normal tone.     Left LE muscle strength 5/5. Normal tone.      Sensory: Normal light touch, vibration and pinprick sensation bilaterally.    DTRs: 2+ bilaterally in upper and lower extremities.    Babinski: Negative bilaterally.    Co-ordination: Normal finger-to-nose, heel to shin B/L.    Rhomberg: Negative.    Gait: Normal.    Cardiovascular: Regular rate and rhythm without murmur, gallop or rub.    Assessment and Plan:  1. Neck pain  Patient does have diffuse degenerative changes in her cervical spine.  Is currently undergoing PT.  She does not want to take any muscle relaxants at this time.  Will let me know if she needs a referral for PT again in the future    2. Essential tremor  I discussed starting her on medications however she does not find the tremor too bothersome and does not want to start any medications as of now.       Return in about 3 months (around 6/22/2019).     I spent over 25   minutes with the patient face to face out of which over 50% (12 minutes) was spent in management, instructions and education regarding her neck pain and tremors    Marianela Flores MD

## 2019-03-26 ENCOUNTER — TELEPHONE (OUTPATIENT)
Dept: INTERNAL MEDICINE | Facility: CLINIC | Age: 79
End: 2019-03-26

## 2019-03-26 NOTE — TELEPHONE ENCOUNTER
Patient needs you to call [employer] and have her estradiol (VAGIFEM) 10 MCG tablet vaginal tablet pre-authorized with her insurance. The pharmacy called and asked that we do this.

## 2019-04-03 ENCOUNTER — TELEPHONE (OUTPATIENT)
Dept: INTERNAL MEDICINE | Facility: CLINIC | Age: 79
End: 2019-04-03

## 2019-04-03 RX ORDER — ESTRADIOL 10 UG/1
1 INSERT VAGINAL 2 TIMES WEEKLY
Qty: 8 TABLET | Refills: 2 | Status: SHIPPED | OUTPATIENT
Start: 2019-04-04 | End: 2019-07-10 | Stop reason: SDUPTHER

## 2019-04-03 NOTE — TELEPHONE ENCOUNTER
Patient is wondering if we received paperwork changing her medication from a tier 4.  Shes requesting a call at [phone]

## 2019-04-11 ENCOUNTER — OFFICE VISIT (OUTPATIENT)
Dept: INTERNAL MEDICINE | Facility: CLINIC | Age: 79
End: 2019-04-11

## 2019-04-11 ENCOUNTER — HOSPITAL ENCOUNTER (OUTPATIENT)
Dept: GENERAL RADIOLOGY | Facility: HOSPITAL | Age: 79
Discharge: HOME | End: 2019-04-11
Admitting: FAMILY MEDICINE

## 2019-04-11 VITALS
HEIGHT: 60 IN | BODY MASS INDEX: 30.39 KG/M2 | SYSTOLIC BLOOD PRESSURE: 150 MMHG | TEMPERATURE: 100.2 F | DIASTOLIC BLOOD PRESSURE: 68 MMHG | WEIGHT: 154.8 LBS | OXYGEN SATURATION: 97 % | HEART RATE: 69 BPM

## 2019-04-11 DIAGNOSIS — R05.9 COUGH: ICD-10-CM

## 2019-04-11 DIAGNOSIS — J02.9 SORE THROAT: Primary | ICD-10-CM

## 2019-04-11 DIAGNOSIS — R68.89 FLU-LIKE SYMPTOMS: ICD-10-CM

## 2019-04-11 LAB
EXPIRATION DATE: NORMAL
EXPIRATION DATE: NORMAL
FLUAV AG NPH QL: NEGATIVE
FLUBV AG NPH QL: NEGATIVE
INTERNAL CONTROL: NORMAL
INTERNAL CONTROL: NORMAL
Lab: NORMAL
Lab: NORMAL
S PYO AG THROAT QL: NEGATIVE

## 2019-04-11 PROCEDURE — 87804 INFLUENZA ASSAY W/OPTIC: CPT | Performed by: FAMILY MEDICINE

## 2019-04-11 PROCEDURE — 99213 OFFICE O/P EST LOW 20 MIN: CPT | Performed by: FAMILY MEDICINE

## 2019-04-11 PROCEDURE — 87880 STREP A ASSAY W/OPTIC: CPT | Performed by: FAMILY MEDICINE

## 2019-04-11 PROCEDURE — 71046 X-RAY EXAM CHEST 2 VIEWS: CPT

## 2019-04-11 RX ORDER — DEXTROMETHORPHAN HYDROBROMIDE AND PROMETHAZINE HYDROCHLORIDE 15; 6.25 MG/5ML; MG/5ML
5 SYRUP ORAL 4 TIMES DAILY PRN
Qty: 180 ML | Refills: 0 | Status: SHIPPED | OUTPATIENT
Start: 2019-04-11 | End: 2019-07-03

## 2019-04-11 RX ORDER — AMOXICILLIN AND CLAVULANATE POTASSIUM 875; 125 MG/1; MG/1
1 TABLET, FILM COATED ORAL 2 TIMES DAILY
Qty: 20 TABLET | Refills: 0 | Status: SHIPPED | OUTPATIENT
Start: 2019-04-11 | End: 2019-07-03

## 2019-04-11 NOTE — PROGRESS NOTES
"Subjective   Chelsi Ferguson is a 78 y.o. female.     Chief Complaint   Patient presents with   • Cough     Started tuesday and really hit her on the next day.    • URI   • Generalized Body Aches   • Fever   • Sore Throat       Visit Vitals  /68   Pulse 69   Temp 100.2 °F (37.9 °C)   Ht 152.4 cm (60\")   Wt 70.2 kg (154 lb 12.8 oz)   LMP  (LMP Unknown) Comment: Last Mammogram 2017   SpO2 97%   BMI 30.23 kg/m²         Cough   This is a new problem. The current episode started yesterday (2 days). The problem has been unchanged. The cough is productive of sputum. Associated symptoms include chills, ear pain (itchy and dry on left), a fever, heartburn, myalgias, nasal congestion, postnasal drip, rhinorrhea, a sore throat, shortness of breath, sweats and weight loss. Pertinent negatives include no chest pain, ear congestion, eye redness, headaches, hemoptysis, rash or wheezing. Risk factors for lung disease include travel (pt went [place],  had cold and bronchitis). She has tried OTC cough suppressant for the symptoms. The treatment provided no relief. There is no history of asthma, bronchiectasis, bronchitis, COPD, emphysema, environmental allergies or pneumonia.   URI    This is a new problem. The current episode started yesterday. The problem has been unchanged. The maximum temperature recorded prior to her arrival was 101 - 101.9 F. Associated symptoms include congestion, coughing, diarrhea (3 nights ago), ear pain (itchy and dry on left), rhinorrhea and a sore throat. Pertinent negatives include no abdominal pain, chest pain, dysuria, headaches, joint pain, joint swelling, nausea, neck pain, plugged ear sensation, rash, sinus pain, sneezing, swollen glands, vomiting or wheezing. She has tried acetaminophen for the symptoms. The treatment provided mild relief.   Sore Throat    This is a new problem. The current episode started yesterday (with cough). The problem has been unchanged. Neither side of throat is " experiencing more pain than the other. The maximum temperature recorded prior to her arrival was 101 - 101.9 F. The pain is mild. Associated symptoms include congestion, coughing, diarrhea (3 nights ago), ear pain (itchy and dry on left) and shortness of breath. Pertinent negatives include no abdominal pain, drooling, ear discharge, headaches, hoarse voice, plugged ear sensation, neck pain, stridor, swollen glands, trouble swallowing or vomiting. She has had no exposure to strep or mono. She has tried acetaminophen for the symptoms. The treatment provided mild relief.        Fever at home 101.87 and 101.2.    The following portions of the patient's history were reviewed and updated as appropriate: allergies, current medications, past family history, past medical history, past social history, past surgical history and problem list.    Past Medical History:   Diagnosis Date   • Ankle pain    • Arthritis    • Cancer (CMS/HCC)     Skin   • Chest discomfort    • Cholecystitis 1/15/2018   • Coronary artery disease    • Edema    • Elevated liver enzymes 1/15/2018   • Gallstones    • GERD (gastroesophageal reflux disease)    • Glaucoma     pt is currently off of meds and Dr Tanner does not think that she is glaucoma   • H/O complete eye exam 06/2013   • H/O mammogram 11/16/2015   • H/O non-ST elevation myocardial infarction (NSTEMI) 01/2005   • Hammer toe    • Heart attack (CMS/HCC) 01/2005   • History of blood transfusion 01/2005   • History of cardiovascular stress test 12/02/2015    normal   • HTN (hypertension)    • Hyperlipidemia    • Hypokalemia 1/15/2018   • Kidney infection 1971   • Menopausal symptoms    • Onychia and paronychia of finger    • Osteopenia    • Other elevated white blood cell count    • Pap smear for cervical cancer screening 2010    Leandro   • Pneumonia    • Sepsis (CMS/HCC) 1/15/2018   • UTI (urinary tract infection) 1/15/2018   • Viral warts    • Wears glasses       Past Surgical History:   Procedure  Laterality Date   • BLADDER REPAIR  2002   • BLADDER REPAIR  2003   • BUNIONECTOMY Right 2010   • CHOLECYSTECTOMY WITH INTRAOPERATIVE CHOLANGIOGRAM N/A 3/7/2018    Procedure: CHOLECYSTECTOMY LAPAROSCOPIC INTRAOPERATIVE CHOLANGIOGRAM;  Surgeon: Harinder Mason MD;  Location: [facility];  Service:    • COLONOSCOPY  2008, 4/2014    Juany   • CORONARY STENT PLACEMENT Left 01/2005    drug eluting stent 1/2005 LAD   • DILATATION AND CURETTAGE  1971   • DILATATION AND CURETTAGE  1978   • EYE CAPSULOTOMY WITH LASER Bilateral 2018   • EYE SURGERY Bilateral 05/2016    Cataract   • FRONTALIS SUSPENSION  2010   • HAMMER TOE REPAIR Right 11/03/2010   • HEAD & NECK SKIN LESION EXCISIONAL BIOPSY  08/20/2018    benign scalp cyst   • HYSTERECTOMY  1978   • LAPAROTOMY OOPHERECTOMY Bilateral 2002   • NOSE SURGERY  03/2017    repair 3 fractured areas. Dr Dunne   • OTHER SURGICAL HISTORY  2005    rectocele repair   • OTHER SURGICAL HISTORY  2010    eyelid surgery   • REPLACEMENT TOTAL KNEE BILATERAL Bilateral    • SKIN BIOPSY     • TONSILLECTOMY  1952   • TOTAL KNEE ARTHROPLASTY  01/2005   • UTERINE FIBROID SURGERY  1978    emergency removal of fibroid from birth canal      Family History   Problem Relation Age of Onset   • Stroke Mother    • Heart failure Father         congestive   • Cancer Father         lip   • Breast cancer Sister         60's   • Breast cancer Daughter 40   • Breast cancer Other         NIECE 60's   • Breast cancer Other         NIECE 60's   • Ovarian cancer Neg Hx       Social History     Socioeconomic History   • Marital status:      Spouse name: Not on file   • Number of children: Not on file   • Years of education: Not on file   • Highest education level: Not on file   Tobacco Use   • Smoking status: Never Smoker   • Smokeless tobacco: Never Used   Substance and Sexual Activity   • Alcohol use: No   • Drug use: No   • Sexual activity: Yes     Birth control/protection: None      Allergies    Allergen Reactions   • Aleve [Naproxen] Hives   • Indocin [Indomethacin] Hallucinations   • Lipitor [Atorvastatin] Hives   • Statins Hives   • Zocor [Simvastatin] Hives   • Celebrex [Celecoxib] Diarrhea   • Keflex [Cephalexin] GI Intolerance     Upset stomach, may have had some blood in stool but cannot remember the reason why she was put on it. Tolerates penicillins without problem.   • Ibuprofen GI Intolerance     Heartburn.   • Prevacid [Lansoprazole] Nausea And Vomiting   • Prilosec [Omeprazole] Nausea And Vomiting       Review of Systems   Constitutional: Positive for chills, diaphoresis, fatigue, fever and weight loss.   HENT: Positive for congestion, ear pain (itchy and dry on left), postnasal drip, rhinorrhea, sinus pressure and sore throat. Negative for drooling, ear discharge, hoarse voice, nosebleeds, sinus pain, sneezing and trouble swallowing.    Eyes: Positive for itching. Negative for redness.   Respiratory: Positive for cough and shortness of breath. Negative for hemoptysis, wheezing and stridor.    Cardiovascular: Negative.  Negative for chest pain and palpitations.   Gastrointestinal: Positive for diarrhea (3 nights ago) and heartburn. Negative for abdominal pain, constipation, nausea and vomiting.   Endocrine: Negative.  Negative for cold intolerance and heat intolerance.   Genitourinary: Negative.  Negative for dysuria, frequency, hematuria and urgency.   Musculoskeletal: Positive for myalgias. Negative for arthralgias, back pain, joint pain and neck pain.   Skin: Negative.  Negative for color change and rash.   Allergic/Immunologic: Negative.  Negative for environmental allergies.   Neurological: Positive for tremors (essential tremors). Negative for dizziness, syncope, light-headedness and headaches.   Hematological: Negative.  Negative for adenopathy. Does not bruise/bleed easily.   Psychiatric/Behavioral: Negative.  Negative for dysphoric mood. The patient is not nervous/anxious.         Objective   Physical Exam   Constitutional: She is oriented to person, place, and time. She appears well-developed.   HENT:   Head: Normocephalic.   Right Ear: Tympanic membrane, external ear and ear canal normal.   Left Ear: Tympanic membrane, external ear and ear canal normal.   Nose: Rhinorrhea present. Right sinus exhibits no maxillary sinus tenderness and no frontal sinus tenderness. Left sinus exhibits no maxillary sinus tenderness and no frontal sinus tenderness.   Mouth/Throat: Uvula is midline and mucous membranes are normal. Posterior oropharyngeal erythema present. No oropharyngeal exudate or posterior oropharyngeal edema.   Eyes: Conjunctivae, EOM and lids are normal. Pupils are equal, round, and reactive to light.   Neck: Trachea normal and normal range of motion. Neck supple. Carotid bruit is not present. No thyroid mass and no thyromegaly present.   Cardiovascular: Normal rate and regular rhythm.   No murmur heard.  Pulmonary/Chest: Effort normal. No respiratory distress. She has no decreased breath sounds. She has no wheezes. She has no rhonchi. She has rales (? rales in right base). She exhibits no tenderness.   Abdominal: Soft. Bowel sounds are normal. There is no tenderness.   Musculoskeletal: Normal range of motion.   Neurological: She is alert and oriented to person, place, and time.   Skin: Skin is warm and dry.   Psychiatric: She has a normal mood and affect. Her behavior is normal.   Nursing note and vitals reviewed.      Assessment/Plan   Chelsi was seen today for cough, uri, generalized body aches, fever and sore throat.    Diagnoses and all orders for this visit:    Sore throat  -     POCT rapid strep A    Flu-like symptoms  -     POCT Influenza A/B    Cough  -     XR Chest PA & Lateral  -     promethazine-dextromethorphan (PROMETHAZINE-DM) 6.25-15 MG/5ML syrup; Take 5 mL by mouth 4 (Four) Times a Day As Needed for Cough.    Other orders  -     amoxicillin-clavulanate (AUGMENTIN)   MG per tablet; Take 1 tablet by mouth 2 (Two) Times a Day.      Need to R/O pneumonia    To ER if worse. If she can't keep fluids down to ER           Current Outpatient Medications:   •  amoxicillin-clavulanate (AUGMENTIN) 875-125 MG per tablet, Take 1 tablet by mouth 2 (Two) Times a Day., Disp: 20 tablet, Rfl: 0  •  aspirin 81 MG EC tablet, Take 81 mg by mouth Every Night., Disp: , Rfl:   •  Cholecalciferol (VITAMIN D3 PO), Vitamin D3  Daily, Disp: , Rfl:   •  coenzyme Q10 100 MG capsule, Take 100 mg by mouth Daily., Disp: , Rfl:   •  estradiol (VAGIFEM) 10 MCG tablet vaginal tablet, Insert 1 tablet into the vagina 2 (Two) Times a Week., Disp: 8 tablet, Rfl: 2  •  latanoprost (XALATAN) 0.005 % ophthalmic solution, 1 drop Every Night., Disp: , Rfl:   •  metoprolol succinate XL (TOPROL-XL) 25 MG 24 hr tablet, Take 25 mg by mouth Every Morning., Disp: , Rfl:   •  Mirabegron ER (MYRBETRIQ) 50 MG tablet sustained-release 24 hour 24 hr tablet, Myrbetriq 50 mg tablet,extended release  Take 1 tablet every day by oral route for 28 days., Disp: , Rfl:   •  nitroglycerin (NITROSTAT) 0.4 MG SL tablet, Place 0.4 mg under the tongue Every 5 (Five) Minutes As Needed., Disp: , Rfl:   •  Omega-3 Fatty Acids (FISH OIL PO), Fish Oil 300 mg-1,000 mg capsule,delayed release  Daily, Disp: , Rfl:   •  pravastatin (PRAVACHOL) 80 MG tablet, Take 1 tablet by mouth Daily., Disp: 90 tablet, Rfl: 1  •  Probiotic Product (PROBIOTIC DAILY PO), Take 1 tablet by mouth Daily., Disp: , Rfl:   •  promethazine-dextromethorphan (PROMETHAZINE-DM) 6.25-15 MG/5ML syrup, Take 5 mL by mouth 4 (Four) Times a Day As Needed for Cough., Disp: 180 mL, Rfl: 0    Return in about 5 days (around 4/16/2019), or if symptoms worsen or fail to improve, for Recheck.    Recent Results (from the past 168 hour(s))   POCT rapid strep A    Collection Time: 04/11/19  2:32 PM   Result Value Ref Range    Rapid Strep A Screen Negative Negative, VALID, INVALID, Not Performed     Internal Control Passed Passed    Lot Number [id number]     Expiration Date 6/30/20    POCT Influenza A/B    Collection Time: 04/11/19  2:35 PM   Result Value Ref Range    Rapid Influenza A Ag Negative Negative    Rapid Influenza B Ag Negative Negative    Internal Control Passed Passed    Lot Number [id number]     Expiration Date 9/21/21

## 2019-04-12 ENCOUNTER — TELEPHONE (OUTPATIENT)
Dept: INTERNAL MEDICINE | Facility: CLINIC | Age: 79
End: 2019-04-12

## 2019-04-15 ENCOUNTER — OFFICE VISIT (OUTPATIENT)
Dept: INTERNAL MEDICINE | Facility: CLINIC | Age: 79
End: 2019-04-15

## 2019-04-15 VITALS
TEMPERATURE: 97.9 F | WEIGHT: 150.2 LBS | DIASTOLIC BLOOD PRESSURE: 62 MMHG | HEART RATE: 81 BPM | SYSTOLIC BLOOD PRESSURE: 108 MMHG | HEIGHT: 60 IN | OXYGEN SATURATION: 98 % | BODY MASS INDEX: 29.49 KG/M2

## 2019-04-15 DIAGNOSIS — J40 BRONCHITIS: Primary | ICD-10-CM

## 2019-04-15 PROCEDURE — 99213 OFFICE O/P EST LOW 20 MIN: CPT | Performed by: FAMILY MEDICINE

## 2019-04-15 NOTE — PROGRESS NOTES
"Subjective   Chelsi Ferguson is a 78 y.o. female.     Chief Complaint   Patient presents with   • Cough     f/u   • Bronchitis       Visit Vitals  /62 (Cuff Size: Adult)   Pulse 81   Temp 97.9 °F (36.6 °C)   Ht 152.4 cm (60\")   Wt 68.1 kg (150 lb 3.2 oz)   LMP  (LMP Unknown) Comment: Last Mammogram 2017   SpO2 98%   BMI 29.33 kg/m²         Cough   This is a new problem. The current episode started 1 to 4 weeks ago. The problem has been rapidly improving. The problem occurs every few hours. The cough is non-productive. Associated symptoms include a fever (improved), nasal congestion, postnasal drip, rhinorrhea, a sore throat (with cough), shortness of breath and wheezing (improved). Pertinent negatives include no chest pain, chills, ear congestion, ear pain, eye redness, headaches, heartburn, hemoptysis, myalgias, rash, sweats or weight loss. Nothing aggravates the symptoms. Risk factors for lung disease include animal exposure. She has tried prescription cough suppressant (antibiotic) for the symptoms. Her past medical history is significant for bronchitis and pneumonia. There is no history of environmental allergies.   Bronchitis   Associated symptoms include abdominal pain, coughing, fatigue, a fever (improved) and a sore throat (with cough). Pertinent negatives include no arthralgias, chest pain, chills, diaphoresis, headaches, myalgias, nausea, neck pain, rash or vomiting.      Pt had a good night after her phone call. Pt has been sleeping through the night with cough medicine. Pt had 100* fever Saturday and no fever yesterday or today. Pt has pain left upper quadrant since this weekend, worse this morning.    Pt states pain is worse with cough.     The following portions of the patient's history were reviewed and updated as appropriate: allergies, current medications, past family history, past medical history, past social history, past surgical history and problem list.    Past Medical History:   Diagnosis " Date   • Ankle pain    • Arthritis    • Cancer (CMS/HCC)     Skin   • Chest discomfort    • Cholecystitis 1/15/2018   • Coronary artery disease    • Edema    • Elevated liver enzymes 1/15/2018   • Gallstones    • GERD (gastroesophageal reflux disease)    • Glaucoma     pt is currently off of meds and Dr Tanner does not think that she is glaucoma   • H/O complete eye exam 06/2013   • H/O mammogram 11/16/2015   • H/O non-ST elevation myocardial infarction (NSTEMI) 01/2005   • Hammer toe    • Heart attack (CMS/HCC) 01/2005   • History of blood transfusion 01/2005   • History of cardiovascular stress test 12/02/2015    normal   • HTN (hypertension)    • Hyperlipidemia    • Hypokalemia 1/15/2018   • Kidney infection 1971   • Menopausal symptoms    • Onychia and paronychia of finger    • Osteopenia    • Other elevated white blood cell count    • Pap smear for cervical cancer screening 2010 [facility]   • Pneumonia    • Sepsis (CMS/HCC) 1/15/2018   • UTI (urinary tract infection) 1/15/2018   • Viral warts    • Wears glasses       Past Surgical History:   Procedure Laterality Date   • BLADDER REPAIR  2002   • BLADDER REPAIR  2003   • BUNIONECTOMY Right 2010   • CHOLECYSTECTOMY WITH INTRAOPERATIVE CHOLANGIOGRAM N/A 3/7/2018    Procedure: CHOLECYSTECTOMY LAPAROSCOPIC INTRAOPERATIVE CHOLANGIOGRAM;  Surgeon: Harinder Mason MD;  Location: [facility];  Service:    • COLONOSCOPY  2008, 4/2014    [facility]   • CORONARY STENT PLACEMENT Left 01/2005    drug eluting stent 1/2005 LAD   • DILATATION AND CURETTAGE  1971   • DILATATION AND CURETTAGE  1978   • EYE CAPSULOTOMY WITH LASER Bilateral 2018   • EYE SURGERY Bilateral 05/2016    Cataract   • FRONTALIS SUSPENSION  2010   • HAMMER TOE REPAIR Right 11/03/2010   • HEAD & NECK SKIN LESION EXCISIONAL BIOPSY  08/20/2018    benign scalp cyst   • HYSTERECTOMY  1978   • LAPAROTOMY OOPHERECTOMY Bilateral 2002   • NOSE SURGERY  03/2017    repair 3 fractured areas. Dr Dunne   • OTHER SURGICAL  HISTORY  2005    rectocele repair   • OTHER SURGICAL HISTORY  2010    eyelid surgery   • REPLACEMENT TOTAL KNEE BILATERAL Bilateral    • SKIN BIOPSY     • TONSILLECTOMY  1952   • TOTAL KNEE ARTHROPLASTY  01/2005   • UTERINE FIBROID SURGERY  1978    emergency removal of fibroid from birth canal      Family History   Problem Relation Age of Onset   • Stroke Mother    • Heart failure Father         congestive   • Cancer Father         lip   • Breast cancer Sister         60's   • Breast cancer Daughter 40   • Breast cancer Other         NIECE 60's   • Breast cancer Other         NIECE 60's   • Ovarian cancer Neg Hx       Social History     Socioeconomic History   • Marital status:      Spouse name: Not on file   • Number of children: Not on file   • Years of education: Not on file   • Highest education level: Not on file   Tobacco Use   • Smoking status: Never Smoker   • Smokeless tobacco: Never Used   Substance and Sexual Activity   • Alcohol use: No   • Drug use: No   • Sexual activity: Yes     Birth control/protection: None      Allergies   Allergen Reactions   • Aleve [Naproxen] Hives   • Indocin [Indomethacin] Hallucinations   • Lipitor [Atorvastatin] Hives   • Statins Hives   • Zocor [Simvastatin] Hives   • Celebrex [Celecoxib] Diarrhea   • Keflex [Cephalexin] GI Intolerance     Upset stomach, may have had some blood in stool but cannot remember the reason why she was put on it. Tolerates penicillins without problem.   • Ibuprofen GI Intolerance     Heartburn.   • Prevacid [Lansoprazole] Nausea And Vomiting   • Prilosec [Omeprazole] Nausea And Vomiting       Review of Systems   Constitutional: Positive for fatigue and fever (improved). Negative for chills, diaphoresis and weight loss.   HENT: Positive for postnasal drip, rhinorrhea and sore throat (with cough). Negative for ear pain, nosebleeds, sinus pressure and sneezing.    Eyes: Negative.  Negative for redness and itching.   Respiratory: Positive for  cough, shortness of breath and wheezing (improved). Negative for hemoptysis.    Cardiovascular: Negative.  Negative for chest pain, palpitations and leg swelling.   Gastrointestinal: Positive for abdominal pain. Negative for constipation, diarrhea, heartburn, nausea and vomiting.   Endocrine: Negative.  Negative for cold intolerance and heat intolerance.   Genitourinary: Negative.  Negative for dysuria, frequency, hematuria and urgency.   Musculoskeletal: Negative.  Negative for arthralgias, back pain, myalgias and neck pain.   Skin: Negative.  Negative for color change and rash.   Allergic/Immunologic: Negative.  Negative for environmental allergies.   Neurological: Negative.  Negative for dizziness, syncope, light-headedness and headaches.   Hematological: Negative.  Negative for adenopathy. Does not bruise/bleed easily.   Psychiatric/Behavioral: Negative.  Negative for dysphoric mood. The patient is not nervous/anxious.        Objective   Physical Exam   Constitutional: She is oriented to person, place, and time. She appears well-developed.   HENT:   Head: Normocephalic.   Right Ear: Tympanic membrane, external ear and ear canal normal.   Left Ear: Tympanic membrane, external ear and ear canal normal.   Nose: Rhinorrhea present.   Mouth/Throat: Uvula is midline, oropharynx is clear and moist and mucous membranes are normal. No oropharyngeal exudate, posterior oropharyngeal edema or posterior oropharyngeal erythema.   Eyes: Conjunctivae, EOM and lids are normal. Pupils are equal, round, and reactive to light.   Neck: Trachea normal and normal range of motion. Neck supple. Carotid bruit is not present. No thyroid mass and no thyromegaly present.   Cardiovascular: Normal rate and regular rhythm.   No murmur heard.  Pulmonary/Chest: Effort normal and breath sounds normal. No respiratory distress. She has no decreased breath sounds. She has no wheezes. She has no rhonchi. She has no rales. She exhibits tenderness.        Abdominal: Soft. Bowel sounds are normal. There is no tenderness.   Musculoskeletal: Normal range of motion.   Neurological: She is alert and oriented to person, place, and time.   Skin: Skin is warm and dry.   Psychiatric: She has a normal mood and affect. Her behavior is normal.   Nursing note and vitals reviewed.      Assessment/Plan   Chelsi was seen today for cough and bronchitis.    Diagnoses and all orders for this visit:    Bronchitis      Discussed Shingrix vaccine with pt,  that is currently available at the pharmacy.     Continue the augmentin.   Recheck if not well by the end of week.          Current Outpatient Medications:   •  amoxicillin-clavulanate (AUGMENTIN) 875-125 MG per tablet, Take 1 tablet by mouth 2 (Two) Times a Day., Disp: 20 tablet, Rfl: 0  •  aspirin 81 MG EC tablet, Take 81 mg by mouth Every Night., Disp: , Rfl:   •  Cholecalciferol (VITAMIN D3 PO), Vitamin D3  Daily, Disp: , Rfl:   •  coenzyme Q10 100 MG capsule, Take 100 mg by mouth Daily., Disp: , Rfl:   •  estradiol (VAGIFEM) 10 MCG tablet vaginal tablet, Insert 1 tablet into the vagina 2 (Two) Times a Week., Disp: 8 tablet, Rfl: 2  •  latanoprost (XALATAN) 0.005 % ophthalmic solution, 1 drop Every Night., Disp: , Rfl:   •  metoprolol succinate XL (TOPROL-XL) 25 MG 24 hr tablet, Take 25 mg by mouth Every Morning., Disp: , Rfl:   •  Mirabegron ER (MYRBETRIQ) 50 MG tablet sustained-release 24 hour 24 hr tablet, Myrbetriq 50 mg tablet,extended release  Take 1 tablet every day by oral route for 28 days., Disp: , Rfl:   •  nitroglycerin (NITROSTAT) 0.4 MG SL tablet, Place 0.4 mg under the tongue Every 5 (Five) Minutes As Needed., Disp: , Rfl:   •  Omega-3 Fatty Acids (FISH OIL PO), Fish Oil 300 mg-1,000 mg capsule,delayed release  Daily, Disp: , Rfl:   •  pravastatin (PRAVACHOL) 80 MG tablet, Take 1 tablet by mouth Daily., Disp: 90 tablet, Rfl: 1  •  Probiotic Product (PROBIOTIC DAILY PO), Take 1 tablet by mouth Daily., Disp: , Rfl:    •  promethazine-dextromethorphan (PROMETHAZINE-DM) 6.25-15 MG/5ML syrup, Take 5 mL by mouth 4 (Four) Times a Day As Needed for Cough., Disp: 180 mL, Rfl: 0    Return in about 3 months (around 7/10/2019) for Recheck, Next scheduled follow up.    EXAMINATION: XR CHEST, PA AND LATERAL-04/11/2019:      INDICATION: Cough, fever, question of rales in right base; [id number].      COMPARISON: Chest x-ray 02/24/2018.     FINDINGS: Cardiac size within normal limits. Pulmonary vascularity  within normal limits.  Chronic changes without focal opacification or  consolidation. No pneumothorax or significant effusion. Degenerative  changes of the spine including scoliotic curvature of thoracolumbar  spine.         IMPRESSION:  Chronic changes without acute cardiopulmonary process.     D:  04/11/2019  E:  04/11/2019     This report was finalized on 4/12/2019 4:26 PM by Dr. Yash Soares.

## 2019-07-03 ENCOUNTER — LAB REQUISITION (OUTPATIENT)
Dept: LAB | Facility: HOSPITAL | Age: 79
End: 2019-07-03

## 2019-07-03 ENCOUNTER — OFFICE VISIT (OUTPATIENT)
Dept: NEUROLOGY | Facility: CLINIC | Age: 79
End: 2019-07-03

## 2019-07-03 VITALS
DIASTOLIC BLOOD PRESSURE: 72 MMHG | OXYGEN SATURATION: 98 % | HEART RATE: 76 BPM | SYSTOLIC BLOOD PRESSURE: 118 MMHG | HEIGHT: 60 IN | BODY MASS INDEX: 29.45 KG/M2 | WEIGHT: 150 LBS

## 2019-07-03 DIAGNOSIS — G60.9 IDIOPATHIC PERIPHERAL NEUROPATHY: ICD-10-CM

## 2019-07-03 DIAGNOSIS — G25.0 ESSENTIAL TREMOR: ICD-10-CM

## 2019-07-03 DIAGNOSIS — Z00.00 ROUTINE GENERAL MEDICAL EXAMINATION AT A HEALTH CARE FACILITY: ICD-10-CM

## 2019-07-03 DIAGNOSIS — M54.2 NECK PAIN: Primary | ICD-10-CM

## 2019-07-03 PROCEDURE — 99213 OFFICE O/P EST LOW 20 MIN: CPT | Performed by: PSYCHIATRY & NEUROLOGY

## 2019-07-03 PROCEDURE — 36415 COLL VENOUS BLD VENIPUNCTURE: CPT | Performed by: PSYCHIATRY & NEUROLOGY

## 2019-07-03 NOTE — PROGRESS NOTES
Subjective:    CC: Chelsi Ferguson is seen today   for Neck Pain       HPI:  Current visit- since her last visit her neck pain has completely resolved and she has finished her physical therapy.  The tremors in her head and left hand still persist however they have not become worse and she still does not want to start taking any medications.  Recently she has also started to notice tingling, numbness and occasional sharp pain in the bottoms of both feet.  At times she also feels off balance.  Denies having diabetes or heavy alcohol intake.      Last visit-since her last visit she had a CT of her cervical spine that showed diffuse degenerative changes right more than left.  She did go for physical therapy which has helped.  She still gets occasional neck pain and stiffness but does not want to take muscle relaxants.  Today she also complains of a head tremor as well as a tremor in her left hand  that occurs more while doing tasks or holding objects in her hand.  Anxiety also seems to make the tremors worse.  She denies any family history of tremors.    Initial [id number] female with a history of hypertension, hyperlipidemia, arthritis, CAD, GERD presents with neck pain.  As per patient she was bedridden and frequently hospitalized for several UTIs and cholecystitis between January to June of this year.  Then in May of this year she started having a severe neck spasm on the left as well as pain that radiated from the neck to the side of her face and up into her scalp.  She underwent physical therapy for 4-6 weeks at the time that helped bring down the pain from 8 to about 3/10 in severity.  She was also found to have 2 cysts on her scalp and underwent removal for those which helped relieve her headaches.  Currently she has mild neck pain on the left that worsens while turning her neck to the right (like when she is backing her car up ) but denies having any facial pain or headaches.  She does have mild scalp  numbness in the area where the cysts were removed.  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  She was asked to undergo surgery but refused.    The following portions of the patient's history were reviewed today and updated as of 07/03/2019  : allergies, current medications, past family history, past medical history, past social history, past surgical history and problem list  These document will be scanned to patient's chart.      Current Outpatient Medications:   •  aspirin 81 MG EC tablet, Take 81 mg by mouth Every Night., Disp: , Rfl:   •  Cholecalciferol (VITAMIN D3 PO), Vitamin D3  Daily, Disp: , Rfl:   •  coenzyme Q10 100 MG capsule, Take 100 mg by mouth Daily., Disp: , Rfl:   •  estradiol (VAGIFEM) 10 MCG tablet vaginal tablet, Insert 1 tablet into the vagina 2 (Two) Times a Week., Disp: 8 tablet, Rfl: 2  •  latanoprost (XALATAN) 0.005 % ophthalmic solution, 1 drop Every Night., Disp: , Rfl:   •  metoprolol succinate XL (TOPROL-XL) 25 MG 24 hr tablet, Take 25 mg by mouth Every Morning., Disp: , Rfl:   •  Mirabegron ER (MYRBETRIQ) 50 MG tablet sustained-release 24 hour 24 hr tablet, Myrbetriq 50 mg tablet,extended release  Take 1 tablet every day by oral route for 28 days., Disp: , Rfl:   •  nitroglycerin (NITROSTAT) 0.4 MG SL tablet, Place 0.4 mg under the tongue Every 5 (Five) Minutes As Needed., Disp: , Rfl:   •  Omega-3 Fatty Acids (FISH OIL PO), Fish Oil 300 mg-1,000 mg capsule,delayed release  Daily, Disp: , Rfl:   •  pravastatin (PRAVACHOL) 80 MG tablet, Take 1 tablet by mouth Daily., Disp: 90 tablet, Rfl: 1  •  Probiotic Product (PROBIOTIC DAILY PO), Take 1 tablet by mouth Daily., Disp: , Rfl:    Past Medical History:   Diagnosis Date   • Ankle pain    • Arthritis    • Cancer (CMS/HCC)     Skin   • Chest discomfort    •  Cholecystitis 1/15/2018   • Coronary artery disease    • Edema    • Elevated liver enzymes 1/15/2018   • Gallstones    • GERD (gastroesophageal reflux disease)    • Glaucoma     pt is currently off of meds and Dr Tanner does not think that she is glaucoma   • H/O complete eye exam 06/2013   • H/O mammogram 11/16/2015   • H/O non-ST elevation myocardial infarction (NSTEMI) 01/2005   • Hammer toe    • Heart attack (CMS/HCC) 01/2005   • History of blood transfusion 01/2005   • History of cardiovascular stress test 12/02/2015    normal   • HTN (hypertension)    • Hyperlipidemia    • Hypokalemia 1/15/2018   • Kidney infection 1971   • Menopausal symptoms    • Onychia and paronychia of finger    • Osteopenia    • Other elevated white blood cell count    • Pap smear for cervical cancer screening 2010 [facility]   • Pneumonia    • Sepsis (CMS/HCC) 1/15/2018   • UTI (urinary tract infection) 1/15/2018   • Viral warts    • Wears glasses       Past Surgical History:   Procedure Laterality Date   • BLADDER REPAIR  2002   • BLADDER REPAIR  2003   • BUNIONECTOMY Right 2010   • CHOLECYSTECTOMY WITH INTRAOPERATIVE CHOLANGIOGRAM N/A 3/7/2018    Procedure: CHOLECYSTECTOMY LAPAROSCOPIC INTRAOPERATIVE CHOLANGIOGRAM;  Surgeon: Harinder Mason MD;  Location: [facility];  Service:    • COLONOSCOPY  2008, 4/2014    [facility]   • CORONARY STENT PLACEMENT Left 01/2005    drug eluting stent 1/2005 LAD   • DILATATION AND CURETTAGE  1971   • DILATATION AND CURETTAGE  1978   • EYE CAPSULOTOMY WITH LASER Bilateral 2018   • EYE SURGERY Bilateral 05/2016    Cataract   • FRONTALIS SUSPENSION  2010   • HAMMER TOE REPAIR Right 11/03/2010   • HEAD & NECK SKIN LESION EXCISIONAL BIOPSY  08/20/2018    benign scalp cyst   • HYSTERECTOMY  1978   • LAPAROTOMY OOPHERECTOMY Bilateral 2002   • NOSE SURGERY  03/2017    repair 3 fractured areas. Dr Dunne   • OTHER SURGICAL HISTORY  2005    rectocele repair   • OTHER SURGICAL HISTORY  2010    eyelid surgery   •  "REPLACEMENT TOTAL KNEE BILATERAL Bilateral    • SKIN BIOPSY     • TONSILLECTOMY  1952   • TOTAL KNEE ARTHROPLASTY  01/2005   • UTERINE FIBROID SURGERY  1978    emergency removal of fibroid from birth canal      Family History   Problem Relation Age of Onset   • Stroke Mother    • Heart failure Father         congestive   • Cancer Father         lip   • Breast cancer Sister         60's   • Breast cancer Daughter 40   • Breast cancer Other         NIECE 60's   • Breast cancer Other         NIECE 60's   • Ovarian cancer Neg Hx       Social History     Socioeconomic History   • Marital status:      Spouse name: Not on file   • Number of children: Not on file   • Years of education: Not on file   • Highest education level: Not on file   Tobacco Use   • Smoking status: Never Smoker   • Smokeless tobacco: Never Used   Substance and Sexual Activity   • Alcohol use: No   • Drug use: No   • Sexual activity: Yes     Birth control/protection: None     Review of Systems   Musculoskeletal: Positive for arthralgias and gait problem.   Neurological: Positive for tremors, weakness and numbness (Feet tingling).   All other systems reviewed and are negative.      Objective:    /72   Pulse 76   Ht 152.4 cm (60\")   Wt 68 kg (150 lb)   LMP  (LMP Unknown) Comment: Last Mammogram 2017  SpO2 98%   BMI 29.29 kg/m²     Neurology Exam:    General apperance: NAD.     Mental status: Alert, awake and oriented to time place and person.    Recent and Remote memory: Intact.    Attention span and Concentration: Normal.     Language and Speech: Intact- No dysarthria.    Fluency, Naming , Repitition and Comprehension:  Intact    Cranial Nerves:   CN II: Visual fields are full. Intact. Fundi - Normal, No papillederma, Pupils - COLE  CN III, IV and VI: Extraocular movements are intact. Normal saccades.   CN V: Facial sensation is intact.   CN VII: Muscles of facial expression reveal no asymmetry. Intact.   CN VIII: Hearing is intact. " Whispered voice intact.   CN IX and X: Palate elevates symmetrically. Intact  CN XI: Shoulder shrug is intact.   CN XII: Tongue is midline without evidence of atrophy or fasciculation.     Ophthalmoscopic exam of optic disc-normal    Motor:  Right UE muscle strength 5/5. Normal tone.     Left UE muscle strength 5/5. Normal tone.      Right LE muscle strength5/5. Normal tone.     Left LE muscle strength 5/5. Normal tone.      Sensory: Decreased to pinprick in both feet till above ankles.  Also markedly reduced vibration in lower extremities    DTRs: 2+ bilaterally in upper and lower extremities.    Babinski: Negative bilaterally.    Co-ordination: Normal finger-to-nose, heel to shin B/L.    Rhomberg: Negative.    Gait: Normal.    Cardiovascular: Regular rate and rhythm without murmur, gallop or rub.    Assessment and Plan:  1. Essential tremor  Still does not want to start treatment for the tremors    2. Neck pain  Has completely resolved with PT    3. Idiopathic peripheral neuropathy  I will get a neuropathy panel.  Will defer starting her on medications yet    - WILFREDO by IFA, Reflex 9-biomarkers profile; Future  - Hemoglobin A1c; Future  - Protein Elec + Interp, Serum; Future  - TSH+Free T4; Future  - Vitamin B12 & Folate; Future  - Vitamin B6; Future       Return in about 3 months (around 10/3/2019).       Marianela Flores MD

## 2019-07-07 LAB
ALBUMIN SERPL ELPH-MCNC: 3.8 G/DL (ref 2.9–4.4)
ALBUMIN/GLOB SERPL: 1.4 {RATIO} (ref 0.7–1.7)
ALPHA1 GLOB SERPL ELPH-MCNC: 0.2 G/DL (ref 0–0.4)
ALPHA2 GLOB SERPL ELPH-MCNC: 0.8 G/DL (ref 0.4–1)
ANA TITR SER IF: NEGATIVE {TITER}
B-GLOBULIN SERPL ELPH-MCNC: 1 G/DL (ref 0.7–1.3)
FOLATE SERPL-MCNC: >20 NG/ML (ref 4.78–24.2)
GAMMA GLOB SERPL ELPH-MCNC: 0.7 G/DL (ref 0.4–1.8)
GLOBULIN SER CALC-MCNC: 2.7 G/DL (ref 2.2–3.9)
HBA1C MFR BLD: 5.6 % (ref 4.8–5.6)
LABORATORY COMMENT REPORT: NORMAL
LABORATORY COMMENT REPORT: NORMAL
M PROTEIN SERPL ELPH-MCNC: NORMAL G/DL
PROT PATTERN SERPL ELPH-IMP: NORMAL
PROT SERPL-MCNC: 6.5 G/DL (ref 6–8.5)
T4 FREE SERPL-MCNC: 1.49 NG/DL (ref 0.82–1.77)
TSH SERPL DL<=0.005 MIU/L-ACNC: 1.42 UIU/ML (ref 0.45–4.5)
VIT B12 SERPL-MCNC: 494 PG/ML (ref 211–946)
VIT B6 SERPL-MCNC: 23.9 UG/L (ref 2–32.8)

## 2019-07-10 ENCOUNTER — OFFICE VISIT (OUTPATIENT)
Dept: INTERNAL MEDICINE | Facility: CLINIC | Age: 79
End: 2019-07-10

## 2019-07-10 VITALS
HEART RATE: 61 BPM | TEMPERATURE: 98.4 F | WEIGHT: 154.4 LBS | BODY MASS INDEX: 30.31 KG/M2 | SYSTOLIC BLOOD PRESSURE: 116 MMHG | DIASTOLIC BLOOD PRESSURE: 60 MMHG | HEIGHT: 60 IN | OXYGEN SATURATION: 100 %

## 2019-07-10 DIAGNOSIS — E78.00 PURE HYPERCHOLESTEROLEMIA: Primary | Chronic | ICD-10-CM

## 2019-07-10 DIAGNOSIS — I10 ESSENTIAL HYPERTENSION: Chronic | ICD-10-CM

## 2019-07-10 DIAGNOSIS — Z91.81 AT RISK FOR FALLING: ICD-10-CM

## 2019-07-10 DIAGNOSIS — K21.9 GASTROESOPHAGEAL REFLUX DISEASE, ESOPHAGITIS PRESENCE NOT SPECIFIED: Chronic | ICD-10-CM

## 2019-07-10 DIAGNOSIS — N95.1 MENOPAUSAL SYMPTOM: ICD-10-CM

## 2019-07-10 DIAGNOSIS — G25.0 ESSENTIAL TREMOR: ICD-10-CM

## 2019-07-10 PROCEDURE — 99214 OFFICE O/P EST MOD 30 MIN: CPT | Performed by: FAMILY MEDICINE

## 2019-07-10 RX ORDER — ESTRADIOL 10 UG/1
1 INSERT VAGINAL 2 TIMES WEEKLY
Qty: 8 TABLET | Refills: 2 | Status: SHIPPED | OUTPATIENT
Start: 2019-07-11 | End: 2019-10-30 | Stop reason: SDUPTHER

## 2019-07-10 RX ORDER — PRAVASTATIN SODIUM 80 MG/1
40 TABLET ORAL DAILY
Qty: 90 TABLET | Refills: 1 | Status: SHIPPED | OUTPATIENT
Start: 2019-07-10 | End: 2020-01-07 | Stop reason: DRUGHIGH

## 2019-07-10 RX ORDER — FAMOTIDINE 20 MG/1
20 TABLET, FILM COATED ORAL 2 TIMES DAILY
COMMUNITY
End: 2023-01-13

## 2019-07-10 RX ORDER — FAMOTIDINE 20 MG/1
20 TABLET, FILM COATED ORAL 2 TIMES DAILY
COMMUNITY
End: 2019-10-31

## 2019-07-10 NOTE — PROGRESS NOTES
"Subjective   Chelsi Ferguson is a 78 y.o. female.     Chief Complaint   Patient presents with   • Hyperlipidemia     f/u   • Hypertension   • Heartburn       Visit Vitals  /60 (Patient Position: Sitting, Cuff Size: Adult)   Pulse 61   Temp 98.4 °F (36.9 °C)   Ht 152.4 cm (60\")   Wt 70 kg (154 lb 6.4 oz)   LMP  (LMP Unknown) Comment: Last Mammogram 2017   SpO2 100%   BMI 30.15 kg/m²         Hypertension   This is a chronic problem. The current episode started more than 1 year ago. The problem is unchanged. The problem is controlled. Pertinent negatives include no anxiety, blurred vision, chest pain, headaches, malaise/fatigue, neck pain, orthopnea, palpitations, peripheral edema, PND, shortness of breath or sweats. There are no associated agents to hypertension. Risk factors for coronary artery disease include dyslipidemia, family history and post-menopausal state. Current antihypertension treatment includes beta blockers. The current treatment provides significant improvement. Hypertensive end-organ damage includes CAD/MI. There is no history of angina, kidney disease, CVA, heart failure, left ventricular hypertrophy, PVD or retinopathy. There is no history of chronic renal disease, sleep apnea or a thyroid problem.   Hyperlipidemia   This is a chronic problem. The current episode started more than 1 year ago. The problem is controlled. Recent lipid tests were reviewed and are normal. She has no history of chronic renal disease, diabetes, hypothyroidism, liver disease, obesity or nephrotic syndrome. There are no known factors aggravating her hyperlipidemia. Pertinent negatives include no chest pain, focal sensory loss, focal weakness, leg pain, myalgias or shortness of breath. Current antihyperlipidemic treatment includes statins. The current treatment provides significant improvement of lipids. There are no compliance problems.  Risk factors for coronary artery disease include dyslipidemia, hypertension and " post-menopausal.   Heartburn   She complains of heartburn. She reports no abdominal pain, no belching, no chest pain, no choking, no coughing, no dysphagia, no early satiety, no globus sensation, no hoarse voice, no nausea, no sore throat, no stridor, no tooth decay, no water brash or no wheezing. This is a recurrent problem. The problem occurs rarely. The problem has been waxing and waning. The heartburn is located in the substernum. The heartburn is of moderate intensity. The heartburn does not wake her from sleep. The heartburn does not limit her activity. The heartburn doesn't change with position. The symptoms are aggravated by lying down. Pertinent negatives include no anemia, fatigue, melena, muscle weakness, orthopnea or weight loss. She has tried a histamine-2 antagonist for the symptoms. The treatment provided significant relief. Past invasive treatments do not include gastroplasty, gastroplication or reflux surgery.      Pt has hx of numbness in her feet and Dr Flores checked her sugar. Pt states that she neck pain is much better.     Dr Miles decreased her pravastatin to 40 mg because of muscle pain.  Pt has appt with Dr Lagos next month.     Pt has had problems with tripping and falling. Pt has grab bars in the bathroom.   The following portions of the patient's history were reviewed and updated as appropriate: allergies, current medications, past family history, past medical history, past social history, past surgical history and problem list.    Past Medical History:   Diagnosis Date   • Ankle pain    • Arthritis    • Cancer (CMS/HCC)     Skin   • Chest discomfort    • Cholecystitis 1/15/2018   • Coronary artery disease    • Edema    • Elevated liver enzymes 1/15/2018   • Gallstones    • GERD (gastroesophageal reflux disease)    • Glaucoma     pt is currently off of meds and Dr Tanner does not think that she is glaucoma   • H/O complete eye exam 06/2013   • H/O mammogram 11/16/2015   • H/O non-ST  elevation myocardial infarction (NSTEMI) 01/2005   • Hammer toe    • Heart attack (CMS/HCC) 01/2005   • History of blood transfusion 01/2005   • History of cardiovascular stress test 12/02/2015    normal   • HTN (hypertension)    • Hyperlipidemia    • Hypokalemia 1/15/2018   • Kidney infection 1971   • Menopausal symptoms    • Onychia and paronychia of finger    • Osteopenia    • Other elevated white blood cell count    • Pap smear for cervical cancer screening 2010    [facility]   • Pneumonia    • Sepsis (CMS/HCC) 1/15/2018   • UTI (urinary tract infection) 1/15/2018   • Viral warts    • Wears glasses       Past Surgical History:   Procedure Laterality Date   • BLADDER REPAIR  2002   • BLADDER REPAIR  2003   • BUNIONECTOMY Right 2010   • CHOLECYSTECTOMY WITH INTRAOPERATIVE CHOLANGIOGRAM N/A 3/7/2018    Procedure: CHOLECYSTECTOMY LAPAROSCOPIC INTRAOPERATIVE CHOLANGIOGRAM;  Surgeon: Harinder Mason MD;  Location: [facility];  Service:    • COLONOSCOPY  2008, 4/2014    [facility]   • CORONARY STENT PLACEMENT Left 01/2005    drug eluting stent 1/2005 LAD   • DILATATION AND CURETTAGE  1971   • DILATATION AND CURETTAGE  1978   • EYE CAPSULOTOMY WITH LASER Bilateral 2018   • EYE SURGERY Bilateral 05/2016    Cataract   • FRONTALIS SUSPENSION  2010   • HAMMER TOE REPAIR Right 11/03/2010   • HEAD & NECK SKIN LESION EXCISIONAL BIOPSY  08/20/2018    benign scalp cyst   • HYSTERECTOMY  1978   • LAPAROTOMY OOPHERECTOMY Bilateral 2002   • NOSE SURGERY  03/2017    repair 3 fractured areas. Dr Dunne   • OTHER SURGICAL HISTORY  2005    rectocele repair   • OTHER SURGICAL HISTORY  2010    eyelid surgery   • REPLACEMENT TOTAL KNEE BILATERAL Bilateral    • SKIN BIOPSY     • TONSILLECTOMY  1952   • TOTAL KNEE ARTHROPLASTY  01/2005   • UTERINE FIBROID SURGERY  1978    emergency removal of fibroid from birth canal      Family History   Problem Relation Age of Onset   • Stroke Mother    • Heart failure Father         congestive   • Cancer  Father         lip   • Breast cancer Sister         60's   • Breast cancer Daughter 40   • Breast cancer Other         NIECE 60's   • Breast cancer Other         NIECE 60's   • Ovarian cancer Neg Hx       Social History     Socioeconomic History   • Marital status:      Spouse name: Not on file   • Number of children: Not on file   • Years of education: Not on file   • Highest education level: Not on file   Tobacco Use   • Smoking status: Never Smoker   • Smokeless tobacco: Never Used   Substance and Sexual Activity   • Alcohol use: No   • Drug use: No   • Sexual activity: Yes     Birth control/protection: None      Allergies   Allergen Reactions   • Aleve [Naproxen] Hives   • Indocin [Indomethacin] Hallucinations   • Lipitor [Atorvastatin] Hives   • Statins Hives   • Zocor [Simvastatin] Hives   • Celebrex [Celecoxib] Diarrhea   • Keflex [Cephalexin] GI Intolerance     Upset stomach, may have had some blood in stool but cannot remember the reason why she was put on it. Tolerates penicillins without problem.   • Ibuprofen GI Intolerance     Heartburn.   • Prevacid [Lansoprazole] Nausea And Vomiting   • Prilosec [Omeprazole] Nausea And Vomiting       Review of Systems   Constitutional: Negative.  Negative for chills, diaphoresis, fatigue, fever, malaise/fatigue and weight loss.   HENT: Negative.  Negative for ear pain, hoarse voice, nosebleeds, postnasal drip, rhinorrhea, sinus pressure, sneezing and sore throat.    Eyes: Negative.  Negative for blurred vision, redness and itching.   Respiratory: Negative.  Negative for cough, choking, shortness of breath and wheezing.    Cardiovascular: Negative.  Negative for chest pain, palpitations, orthopnea and PND.   Gastrointestinal: Positive for heartburn. Negative for abdominal pain, constipation, diarrhea, dysphagia, melena, nausea and vomiting.        GERD, improved but still there   Endocrine: Negative.  Negative for cold intolerance and heat intolerance.    Genitourinary: Negative.  Negative for dysuria, frequency, hematuria and urgency.   Musculoskeletal: Positive for arthralgias. Negative for back pain, myalgias, muscle weakness and neck pain.   Skin: Negative.  Negative for color change and rash.   Allergic/Immunologic: Positive for environmental allergies.   Neurological: Positive for tremors. Negative for dizziness, focal weakness, syncope, light-headedness and headaches.   Hematological: Negative.  Negative for adenopathy. Does not bruise/bleed easily.   Psychiatric/Behavioral: Negative.  Negative for dysphoric mood. The patient is not nervous/anxious.        Objective   Physical Exam   Constitutional: She is oriented to person, place, and time. She appears well-developed.   HENT:   Head: Normocephalic.   Right Ear: Tympanic membrane, external ear and ear canal normal.   Left Ear: Tympanic membrane, external ear and ear canal normal.   Nose: Nose normal.   Mouth/Throat: Uvula is midline, oropharynx is clear and moist and mucous membranes are normal. No oropharyngeal exudate, posterior oropharyngeal edema or posterior oropharyngeal erythema.   Eyes: Conjunctivae, EOM and lids are normal. Pupils are equal, round, and reactive to light.   Neck: Trachea normal and normal range of motion. Neck supple. Carotid bruit is not present. No thyroid mass and no thyromegaly present.   Cardiovascular: Normal rate and regular rhythm.   No murmur heard.  Pulmonary/Chest: Effort normal and breath sounds normal. No respiratory distress. She has no decreased breath sounds. She has no wheezes. She has no rhonchi. She has no rales. She exhibits no tenderness.   Abdominal: Soft. Bowel sounds are normal. There is no tenderness.   Musculoskeletal: Normal range of motion.   Neurological: She is alert and oriented to person, place, and time. She displays tremor (fine head tremor).   Skin: Skin is warm and dry.   Psychiatric: She has a normal mood and affect. Her behavior is normal.    Nursing note and vitals reviewed.      Assessment/Plan   Chelsi was seen today for hyperlipidemia, hypertension and heartburn.    Diagnoses and all orders for this visit:    Pure hypercholesterolemia  -     pravastatin (PRAVACHOL) 80 MG tablet; Take 0.5 tablets by mouth Daily.  -     Comprehensive Metabolic Panel  -     Lipid Panel    Essential hypertension  -     Comprehensive Metabolic Panel  -     Lipid Panel    Gastroesophageal reflux disease, esophagitis presence not specified    Essential tremor    Menopausal symptom  -     estradiol (VAGIFEM) 10 MCG tablet vaginal tablet; Insert 1 tablet into the vagina 2 (Two) Times a Week.    At risk for falling        Pt will be seeing Dr Ginger burris to check lipids, CAD and BP           Current Outpatient Medications:   •  aspirin 81 MG EC tablet, Take 81 mg by mouth Every Night., Disp: , Rfl:   •  Cholecalciferol (VITAMIN D3 PO), Vitamin D3  Daily, Disp: , Rfl:   •  coenzyme Q10 100 MG capsule, Take 100 mg by mouth Daily., Disp: , Rfl:   •  [START ON 7/11/2019] estradiol (VAGIFEM) 10 MCG tablet vaginal tablet, Insert 1 tablet into the vagina 2 (Two) Times a Week., Disp: 8 tablet, Rfl: 2  •  famotidine (PEPCID) 20 MG tablet, Take 20 mg by mouth 2 (Two) Times a Day., Disp: , Rfl:   •  famotidine (PEPCID) 20 MG tablet, Take 20 mg by mouth 2 (Two) Times a Day., Disp: , Rfl:   •  latanoprost (XALATAN) 0.005 % ophthalmic solution, 1 drop Every Night., Disp: , Rfl:   •  metoprolol succinate XL (TOPROL-XL) 25 MG 24 hr tablet, Take 25 mg by mouth Every Morning., Disp: , Rfl:   •  Mirabegron ER (MYRBETRIQ) 50 MG tablet sustained-release 24 hour 24 hr tablet, Myrbetriq 50 mg tablet,extended release  Take 1 tablet every day by oral route for 28 days., Disp: , Rfl:   •  nitroglycerin (NITROSTAT) 0.4 MG SL tablet, Place 0.4 mg under the tongue Every 5 (Five) Minutes As Needed., Disp: , Rfl:   •  Omega-3 Fatty Acids (FISH OIL PO), Fish Oil 300 mg-1,000 mg capsule,delayed release  Daily,  Disp: , Rfl:   •  pravastatin (PRAVACHOL) 80 MG tablet, Take 0.5 tablets by mouth Daily., Disp: 90 tablet, Rfl: 1  •  Probiotic Product (PROBIOTIC DAILY PO), Take 1 tablet by mouth Daily., Disp: , Rfl:     Return in about 6 months (around 1/10/2020), or if symptoms worsen or fail to improve, for Recheck.    Recent Results (from the past 336 hour(s))   Vitamin B6    Collection Time: 07/03/19 10:55 AM   Result Value Ref Range    Vitamin B6 23.9 2.0 - 32.8 ug/L   Vitamin B12 & Folate    Collection Time: 07/03/19 10:55 AM   Result Value Ref Range    Vitamin B-12 494 211 - 946 pg/mL    Folate >20.00 4.78 - 24.20 ng/mL   TSH+Free T4    Collection Time: 07/03/19 10:55 AM   Result Value Ref Range    TSH 1.420 0.450 - 4.500 uIU/mL    Free T4 1.49 0.82 - 1.77 ng/dL   Protein Elec + Interp, Serum    Collection Time: 07/03/19 10:55 AM   Result Value Ref Range    Total Protein 6.5 6.0 - 8.5 g/dL    Albumin 3.8 2.9 - 4.4 g/dL    Alpha-1-Globulin 0.2 0.0 - 0.4 g/dL    Alpha-2-Globulin 0.8 0.4 - 1.0 g/dL    Beta Globulin 1.0 0.7 - 1.3 g/dL    Gamma Globulin 0.7 0.4 - 1.8 g/dL    M-Kamar Not Observed Not Observed g/dL    Globulin 2.7 2.2 - 3.9 g/dL    A/G Ratio 1.4 0.7 - 1.7    Please note Comment     SPE Interpretation Comment    Hemoglobin A1c    Collection Time: 07/03/19 10:55 AM   Result Value Ref Range    Hemoglobin A1C 5.60 4.80 - 5.60 %   WILFREDO by IFA, Reflex 9-biomarkers profile    Collection Time: 07/03/19 10:55 AM   Result Value Ref Range    WILFREDO Negative     Please note Comment

## 2019-07-11 LAB
ALBUMIN SERPL-MCNC: 4.3 G/DL (ref 3.5–5.2)
ALBUMIN/GLOB SERPL: 2.2 G/DL
ALP SERPL-CCNC: 84 U/L (ref 39–117)
ALT SERPL-CCNC: 7 U/L (ref 1–33)
AST SERPL-CCNC: 15 U/L (ref 1–32)
BILIRUB SERPL-MCNC: 0.5 MG/DL (ref 0.2–1.2)
BUN SERPL-MCNC: 11 MG/DL (ref 8–23)
BUN/CREAT SERPL: 17.7 (ref 7–25)
CALCIUM SERPL-MCNC: 9.5 MG/DL (ref 8.6–10.5)
CHLORIDE SERPL-SCNC: 104 MMOL/L (ref 98–107)
CHOLEST SERPL-MCNC: 154 MG/DL (ref 0–200)
CO2 SERPL-SCNC: 26.4 MMOL/L (ref 22–29)
CREAT SERPL-MCNC: 0.62 MG/DL (ref 0.57–1)
GLOBULIN SER CALC-MCNC: 2 GM/DL
GLUCOSE SERPL-MCNC: 85 MG/DL (ref 65–99)
HDLC SERPL-MCNC: 50 MG/DL (ref 40–60)
LDLC SERPL CALC-MCNC: 81 MG/DL (ref 0–100)
POTASSIUM SERPL-SCNC: 5.1 MMOL/L (ref 3.5–5.2)
PROT SERPL-MCNC: 6.3 G/DL (ref 6–8.5)
SODIUM SERPL-SCNC: 141 MMOL/L (ref 136–145)
TRIGL SERPL-MCNC: 115 MG/DL (ref 0–150)
VLDLC SERPL CALC-MCNC: 23 MG/DL

## 2019-07-12 ENCOUNTER — TELEPHONE (OUTPATIENT)
Dept: INTERNAL MEDICINE | Facility: CLINIC | Age: 79
End: 2019-07-12

## 2019-10-03 ENCOUNTER — OFFICE VISIT (OUTPATIENT)
Dept: NEUROLOGY | Facility: CLINIC | Age: 79
End: 2019-10-03

## 2019-10-03 VITALS
WEIGHT: 154 LBS | OXYGEN SATURATION: 96 % | HEIGHT: 60 IN | BODY MASS INDEX: 30.23 KG/M2 | DIASTOLIC BLOOD PRESSURE: 64 MMHG | SYSTOLIC BLOOD PRESSURE: 108 MMHG | HEART RATE: 62 BPM

## 2019-10-03 DIAGNOSIS — G25.0 ESSENTIAL TREMOR: Primary | ICD-10-CM

## 2019-10-03 DIAGNOSIS — G60.9 IDIOPATHIC PERIPHERAL NEUROPATHY: ICD-10-CM

## 2019-10-03 PROCEDURE — 99213 OFFICE O/P EST LOW 20 MIN: CPT | Performed by: PSYCHIATRY & NEUROLOGY

## 2019-10-03 NOTE — PROGRESS NOTES
Subjective:    CC: Chelsi Ferguson is seen today  for tremor and neuropathy     HPI:  Current visit- since the last visit patient's head and left hand tremor has remained the same.  She only notices it occasionally when she is at [employer] and sitting still otherwise it does not bother her.  The numbness in her feet has also remained the same.  She denies having any severe pain.  Does have slight balance problems due to the numbness.  Her neuropathy panel was within normal limits.    Last visit-since her last visit her neck pain has completely resolved and she has finished her physical therapy.  The tremors in her head and left hand still persist however they have not become worse and she still does not want to start taking any medications.  Recently she has also started to notice tingling, numbness and occasional sharp pain in the bottoms of both feet.  At times she also feels off balance.  Denies having diabetes or heavy alcohol intake.      Last visit-since her last visit she had a CT of her cervical spine that showed diffuse degenerative changes right more than left.  She did go for physical therapy which has helped.  She still gets occasional neck pain and stiffness but does not want to take muscle relaxants.  Today she also complains of a head tremor as well as a tremor in her left hand  that occurs more while doing tasks or holding objects in her hand.  Anxiety also seems to make the tremors worse.  She denies any family history of tremors.    Initial [id number]-old female with a history of hypertension, hyperlipidemia, arthritis, CAD, GERD presents with neck pain.  As per patient she was bedridden and frequently hospitalized for several UTIs and cholecystitis between January to June of this year.  Then in May of this year she started having a severe neck spasm on the left as well as pain that radiated from the neck to the side of her face and up into her scalp.  She underwent physical therapy for 4-6 weeks at  the time that helped bring down the pain from 8 to about 3/10 in severity.  She was also found to have 2 cysts on her scalp and underwent removal for those which helped relieve her headaches.  Currently she has mild neck pain on the left that worsens while turning her neck to the right (like when she is backing her car up ) but denies having any facial pain or headaches.  She does have mild scalp numbness in the area where the cysts were removed.  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  She was asked to undergo surgery but refused.    The following portions of the patient's history were reviewed today and updated as of 10/03/2019  : allergies, current medications, past family history, past medical history, past social history, past surgical history and problem list  These document will be scanned to patient's chart.      Current Outpatient Medications:   •  aspirin 81 MG EC tablet, Take 81 mg by mouth Every Night., Disp: , Rfl:   •  Cholecalciferol (VITAMIN D3 PO), Vitamin D3  Daily, Disp: , Rfl:   •  coenzyme Q10 100 MG capsule, Take 100 mg by mouth Daily., Disp: , Rfl:   •  estradiol (VAGIFEM) 10 MCG tablet vaginal tablet, Insert 1 tablet into the vagina 2 (Two) Times a Week., Disp: 8 tablet, Rfl: 2  •  famotidine (PEPCID) 20 MG tablet, Take 20 mg by mouth 2 (Two) Times a Day., Disp: , Rfl:   •  famotidine (PEPCID) 20 MG tablet, Take 20 mg by mouth 2 (Two) Times a Day., Disp: , Rfl:   •  latanoprost (XALATAN) 0.005 % ophthalmic solution, 1 drop Every Night., Disp: , Rfl:   •  metoprolol succinate XL (TOPROL-XL) 25 MG 24 hr tablet, Take 25 mg by mouth Every Morning., Disp: , Rfl:   •  nitroglycerin (NITROSTAT) 0.4 MG SL tablet, Place 0.4 mg under the tongue Every 5 (Five) Minutes As Needed., Disp: , Rfl:   •  Omega-3 Fatty Acids (FISH  OIL PO), Fish Oil 300 mg-1,000 mg capsule,delayed release  Daily, Disp: , Rfl:   •  pravastatin (PRAVACHOL) 80 MG tablet, Take 0.5 tablets by mouth Daily., Disp: 90 tablet, Rfl: 1  •  Probiotic Product (PROBIOTIC DAILY PO), Take 1 tablet by mouth Daily., Disp: , Rfl:    Past Medical History:   Diagnosis Date   • Ankle pain    • Arthritis    • Cancer (CMS/HCC)     Skin   • Chest discomfort    • Cholecystitis 1/15/2018   • Coronary artery disease    • Edema    • Elevated liver enzymes 1/15/2018   • Gallstones    • GERD (gastroesophageal reflux disease)    • Glaucoma     pt is currently off of meds and Dr Tanner does not think that she is glaucoma   • H/O complete eye exam 06/2013   • H/O mammogram 11/16/2015   • H/O non-ST elevation myocardial infarction (NSTEMI) 01/2005   • Hammer toe    • Heart attack (CMS/HCC) 01/2005   • History of blood transfusion 01/2005   • History of cardiovascular stress test 12/02/2015    normal   • HTN (hypertension)    • Hyperlipidemia    • Hypokalemia 1/15/2018   • Kidney infection 1971   • Menopausal symptoms    • Onychia and paronychia of finger    • Osteopenia    • Other elevated white blood cell count    • Pap smear for cervical cancer screening 2010 [place]   • Pneumonia    • Sepsis (CMS/HCC) 1/15/2018   • UTI (urinary tract infection) 1/15/2018   • Viral warts    • Wears glasses       Past Surgical History:   Procedure Laterality Date   • BLADDER REPAIR  2002   • BLADDER REPAIR  2003   • BUNIONECTOMY Right 2010   • CHOLECYSTECTOMY WITH INTRAOPERATIVE CHOLANGIOGRAM N/A 3/7/2018    Procedure: CHOLECYSTECTOMY LAPAROSCOPIC INTRAOPERATIVE CHOLANGIOGRAM;  Surgeon: Harinder Mason MD;  Location: [facility];  Service:    • COLONOSCOPY  2008, 4/2014    [facility]   • CORONARY STENT PLACEMENT Left 01/2005    drug eluting stent 1/2005 LAD   • DILATATION AND CURETTAGE  1971   • DILATATION AND CURETTAGE  1978   • EYE CAPSULOTOMY WITH LASER Bilateral 2018   • EYE SURGERY Bilateral 05/2016     "Cataract   • FRONTALIS SUSPENSION  2010   • HAMMER TOE REPAIR Right 11/03/2010   • HEAD & NECK SKIN LESION EXCISIONAL BIOPSY  08/20/2018    benign scalp cyst   • HYSTERECTOMY  1978   • LAPAROTOMY OOPHERECTOMY Bilateral 2002   • NOSE SURGERY  03/2017    repair 3 fractured areas. Dr Dunne   • OTHER SURGICAL HISTORY  2005    rectocele repair   • OTHER SURGICAL HISTORY  2010    eyelid surgery   • REPLACEMENT TOTAL KNEE BILATERAL Bilateral    • SKIN BIOPSY     • TONSILLECTOMY  1952   • TOTAL KNEE ARTHROPLASTY  01/2005   • UTERINE FIBROID SURGERY  1978    emergency removal of fibroid from birth canal      Family History   Problem Relation Age of Onset   • Stroke Mother    • Heart failure Father         congestive   • Cancer Father         lip   • Breast cancer Sister         60's   • Breast cancer Daughter 40   • Breast cancer Other         NIECE 60's   • Breast cancer Other         NIECE 60's   • Ovarian cancer Neg Hx       Social History     Socioeconomic History   • Marital status:      Spouse name: Not on file   • Number of children: Not on file   • Years of education: Not on file   • Highest education level: Not on file   Tobacco Use   • Smoking status: Never Smoker   • Smokeless tobacco: Never Used   Substance and Sexual Activity   • Alcohol use: No   • Drug use: No   • Sexual activity: Yes     Birth control/protection: None     Review of Systems   Neurological: Positive for numbness.   All other systems reviewed and are negative.      Objective:    /64   Pulse 62   Ht 152.4 cm (60\")   Wt 69.9 kg (154 lb)   LMP  (LMP Unknown) Comment: Last Mammogram 2017  SpO2 96%   BMI 30.08 kg/m²     Neurology Exam:    General apperance: NAD.     Mental status: Alert, awake and oriented to time place and person.    Recent and Remote memory: Intact.    Attention span and Concentration: Normal.     Language and Speech: Intact- No dysarthria.    Fluency, Naming , Repitition and Comprehension:  Intact    Cranial " Nerves:   CN II: Visual fields are full. Intact. Fundi - Normal, No papillederma, Pupils - COLE  CN III, IV and VI: Extraocular movements are intact. Normal saccades.   CN V: Facial sensation is intact.   CN VII: Muscles of facial expression reveal no asymmetry. Intact.   CN VIII: Hearing is intact. Whispered voice intact.   CN IX and X: Palate elevates symmetrically. Intact  CN XI: Shoulder shrug is intact.   CN XII: Tongue is midline without evidence of atrophy or fasciculation.     Ophthalmoscopic exam of optic disc-normal    Motor:-Mild head tremor noted.  No hand tremors seen today  Right UE muscle strength 5/5. Normal tone.     Left UE muscle strength 5/5. Normal tone.      Right LE muscle strength5/5. Normal tone.     Left LE muscle strength 5/5. Normal tone.      Sensory: Reduced to pinprick in both feet till above ankles.    DTRs: 2+ bilaterally in upper and lower extremities.    Babinski: Negative bilaterally.    Co-ordination: Normal finger-to-nose, heel to shin B/L.    Rhomberg: Negative.    Gait: Normal.    Cardiovascular: Regular rate and rhythm without murmur, gallop or rub.    Assessment and Plan:  1. Essential tremor  Patient could have an essential tremor versus a dystonic tremor of the head  I discussed various treatment options however patient still does not want to start treatment until the tremor gets worse    2. Idiopathic peripheral neuropathy  Neuropathy panel was normal  Does not want to be on even low doses of medications       Return in about 6 months (around 4/3/2020).         Marianela Flores MD

## 2019-10-09 ENCOUNTER — FLU SHOT (OUTPATIENT)
Dept: INTERNAL MEDICINE | Facility: CLINIC | Age: 79
End: 2019-10-09

## 2019-10-09 DIAGNOSIS — Z23 IMMUNIZATION, PNEUMOCOCCUS AND INFLUENZA: ICD-10-CM

## 2019-10-09 PROCEDURE — 90653 IIV ADJUVANT VACCINE IM: CPT | Performed by: FAMILY MEDICINE

## 2019-10-09 PROCEDURE — G0008 ADMIN INFLUENZA VIRUS VAC: HCPCS | Performed by: FAMILY MEDICINE

## 2019-10-17 ENCOUNTER — TRANSCRIBE ORDERS (OUTPATIENT)
Dept: ADMINISTRATIVE | Facility: HOSPITAL | Age: 79
End: 2019-10-17

## 2019-10-17 DIAGNOSIS — Z12.31 VISIT FOR SCREENING MAMMOGRAM: Primary | ICD-10-CM

## 2019-10-30 DIAGNOSIS — N95.1 MENOPAUSAL SYMPTOM: ICD-10-CM

## 2019-10-31 ENCOUNTER — OFFICE VISIT (OUTPATIENT)
Dept: INTERNAL MEDICINE | Facility: CLINIC | Age: 79
End: 2019-10-31

## 2019-10-31 VITALS
DIASTOLIC BLOOD PRESSURE: 62 MMHG | OXYGEN SATURATION: 98 % | TEMPERATURE: 96.9 F | HEIGHT: 60 IN | SYSTOLIC BLOOD PRESSURE: 138 MMHG | HEART RATE: 62 BPM | WEIGHT: 154.8 LBS | BODY MASS INDEX: 30.39 KG/M2

## 2019-10-31 DIAGNOSIS — R10.32 LEFT LOWER QUADRANT ABDOMINAL PAIN: Primary | ICD-10-CM

## 2019-10-31 PROCEDURE — 99213 OFFICE O/P EST LOW 20 MIN: CPT | Performed by: FAMILY MEDICINE

## 2019-10-31 PROCEDURE — 81003 URINALYSIS AUTO W/O SCOPE: CPT | Performed by: FAMILY MEDICINE

## 2019-10-31 RX ORDER — ESTRADIOL 10 UG/1
TABLET VAGINAL
Qty: 8 TABLET | Refills: 2 | Status: SHIPPED | OUTPATIENT
Start: 2019-10-31 | End: 2020-02-17

## 2019-10-31 RX ORDER — METRONIDAZOLE 500 MG/1
TABLET ORAL
COMMUNITY
Start: 2019-10-28 | End: 2019-11-14

## 2019-10-31 RX ORDER — CIPROFLOXACIN 500 MG/1
TABLET, FILM COATED ORAL
COMMUNITY
Start: 2019-10-28 | End: 2019-11-14

## 2019-10-31 NOTE — PROGRESS NOTES
"Subjective   Chelsi Ferguson is a 79 y.o. female.     Chief Complaint   Patient presents with   • Abdominal Pain     left lower abdomen pain. She was seen by Dr. Alonzo and urine tested which showed no infection, was given antibiotics as a precaustionary, this has been going on since 4 days ago       Visit Vitals  /62 (BP Location: Left arm, Cuff Size: Adult)   Pulse 62   Temp 96.9 °F (36.1 °C)   Ht 152.4 cm (60\")   Wt 70.2 kg (154 lb 12.8 oz)   LMP  (LMP Unknown) Comment: Last Mammogram 2017   SpO2 98%   BMI 30.23 kg/m²         Abdominal Pain   This is a new problem. The current episode started in the past 7 days. The onset quality is undetermined. The problem occurs constantly. The problem has been rapidly improving. The pain is located in the LLQ. Pain scale: pain initially 8 on Sunday and now 4. The quality of the pain is aching and a sensation of fullness. The abdominal pain does not radiate. Associated symptoms include constipation (last BMI Monday), dysuria (few days) and nausea (improving). Pertinent negatives include no anorexia, arthralgias, belching, diarrhea, fever, flatus, frequency, headaches, hematochezia, hematuria, melena, myalgias, vomiting or weight loss. The pain is aggravated by being still, certain positions and palpation (sitting in one position, bending, lifting dog). The pain is relieved by certain positions (supine on stomach). She has tried antibiotics for the symptoms. The treatment provided moderate relief. Her past medical history is significant for abdominal surgery and gallstones (removed). There is no history of colon cancer, Crohn's disease, GERD, irritable bowel syndrome, pancreatitis, PUD or ulcerative colitis.      Pt was seen by Dr Alonzo on Monday who gave her cipro and metronidazole. Pt is better, but pt had LLQ tenderness. Pt states that pain is improving.   Pt has had fever and chills on Sunday. Pt had a 99.5 and 100.5 temp. Pt did not check her temp on Sunday.   Pt reports " that she had a clean urine.     Pt reports that she had diverticulosis on her colonoscopy.   The following portions of the patient's history were reviewed and updated as appropriate: allergies, current medications, past family history, past medical history, past social history, past surgical history and problem list.    Past Medical History:   Diagnosis Date   • Ankle pain    • Arthritis    • Cancer (CMS/HCC)     Skin   • Chest discomfort    • Cholecystitis 1/15/2018   • Coronary artery disease    • Edema    • Elevated liver enzymes 1/15/2018   • Gallstones    • GERD (gastroesophageal reflux disease)    • Glaucoma     pt is currently off of meds and Dr Tanner does not think that she is glaucoma   • H/O complete eye exam 06/2013   • H/O mammogram 11/16/2015   • H/O non-ST elevation myocardial infarction (NSTEMI) 01/2005   • Hammer toe    • Heart attack (CMS/HCC) 01/2005   • History of blood transfusion 01/2005   • History of cardiovascular stress test 12/02/2015    normal   • HTN (hypertension)    • Hyperlipidemia    • Hypokalemia 1/15/2018   • Kidney infection 1971   • Menopausal symptoms    • Onychia and paronychia of finger    • Osteopenia    • Other elevated white blood cell count    • Pap smear for cervical cancer screening 2010 [place]   • Pneumonia    • Sepsis (CMS/HCC) 1/15/2018   • UTI (urinary tract infection) 1/15/2018   • Viral warts    • Wears glasses       Past Surgical History:   Procedure Laterality Date   • BLADDER REPAIR  2002   • BLADDER REPAIR  2003   • BUNIONECTOMY Right 2010   • CHOLECYSTECTOMY WITH INTRAOPERATIVE CHOLANGIOGRAM N/A 3/7/2018    Procedure: CHOLECYSTECTOMY LAPAROSCOPIC INTRAOPERATIVE CHOLANGIOGRAM;  Surgeon: Harinder Mason MD;  Location: [facility];  Service:    • COLONOSCOPY  2008, 4/2014    [facility]   • CORONARY STENT PLACEMENT Left 01/2005    drug eluting stent 1/2005 LAD   • DILATATION AND CURETTAGE  1971   • DILATATION AND CURETTAGE  1978   • EYE CAPSULOTOMY WITH LASER  Bilateral 2018   • EYE SURGERY Bilateral 05/2016    Cataract   • FRONTALIS SUSPENSION  2010   • HAMMER TOE REPAIR Right 11/03/2010   • HEAD & NECK SKIN LESION EXCISIONAL BIOPSY  08/20/2018    benign scalp cyst   • HYSTERECTOMY  1978   • LAPAROTOMY OOPHERECTOMY Bilateral 2002   • NOSE SURGERY  03/2017    repair 3 fractured areas. Dr Dunne   • OTHER SURGICAL HISTORY  2005    rectocele repair   • OTHER SURGICAL HISTORY  2010    eyelid surgery   • REPLACEMENT TOTAL KNEE BILATERAL Bilateral    • SKIN BIOPSY     • TONSILLECTOMY  1952   • TOTAL KNEE ARTHROPLASTY  01/2005   • UTERINE FIBROID SURGERY  1978    emergency removal of fibroid from birth canal      Family History   Problem Relation Age of Onset   • Stroke Mother    • Heart failure Father         congestive   • Cancer Father         lip   • Breast cancer Sister         60's   • Breast cancer Daughter 40   • Breast cancer Other         NIECE 60's   • Breast cancer Other         NIECE 60's   • Ovarian cancer Neg Hx       Social History     Socioeconomic History   • Marital status:      Spouse name: Not on file   • Number of children: Not on file   • Years of education: Not on file   • Highest education level: Not on file   Tobacco Use   • Smoking status: Never Smoker   • Smokeless tobacco: Never Used   Substance and Sexual Activity   • Alcohol use: No   • Drug use: No   • Sexual activity: Yes     Birth control/protection: None      Allergies   Allergen Reactions   • Aleve [Naproxen] Hives   • Indocin [Indomethacin] Hallucinations   • Lipitor [Atorvastatin] Hives   • Statins Hives   • Zocor [Simvastatin] Hives   • Celebrex [Celecoxib] Diarrhea   • Keflex [Cephalexin] GI Intolerance     Upset stomach, may have had some blood in stool but cannot remember the reason why she was put on it. Tolerates penicillins without problem.   • Ibuprofen GI Intolerance     Heartburn.   • Prevacid [Lansoprazole] Nausea And Vomiting   • Prilosec [Omeprazole] Nausea And  Vomiting       Review of Systems   Constitutional: Negative.  Negative for chills, diaphoresis, fatigue, fever and weight loss.   HENT: Negative.  Negative for ear pain, nosebleeds, postnasal drip, rhinorrhea, sinus pressure, sneezing and sore throat.    Eyes: Negative.  Negative for redness and itching.   Respiratory: Negative.  Negative for cough, shortness of breath and wheezing.    Cardiovascular: Negative.  Negative for chest pain and palpitations.   Gastrointestinal: Positive for abdominal pain, constipation (last BMI Monday) and nausea (improving). Negative for anorexia, diarrhea, flatus, hematochezia, melena and vomiting.   Endocrine: Negative.  Negative for cold intolerance and heat intolerance.   Genitourinary: Positive for dysuria (few days). Negative for frequency, hematuria and urgency.   Musculoskeletal: Negative.  Negative for arthralgias, back pain, myalgias and neck pain.   Skin: Negative.  Negative for color change and rash.   Allergic/Immunologic: Negative.  Negative for environmental allergies.   Neurological: Positive for dizziness and light-headedness. Negative for syncope and headaches.   Hematological: Negative.  Negative for adenopathy. Does not bruise/bleed easily.   Psychiatric/Behavioral: Negative.  Negative for dysphoric mood. The patient is not nervous/anxious.        Objective   Physical Exam   Constitutional: She is oriented to person, place, and time. She appears well-developed.   HENT:   Head: Normocephalic.   Right Ear: External ear normal.   Left Ear: External ear normal.   Nose: Nose normal.   Eyes: Conjunctivae, EOM and lids are normal. Pupils are equal, round, and reactive to light.   Neck: Trachea normal and normal range of motion. Neck supple. Carotid bruit is not present. No thyroid mass and no thyromegaly present.   Cardiovascular: Normal rate and regular rhythm.   No murmur heard.  Pulmonary/Chest: Effort normal and breath sounds normal. No respiratory distress. She has no  decreased breath sounds. She has no wheezes. She has no rhonchi. She has no rales. She exhibits no tenderness.   Abdominal: Soft. Bowel sounds are normal. She exhibits no mass. There is tenderness in the left lower quadrant. There is no rigidity, no rebound and no guarding.   Musculoskeletal: Normal range of motion.   Neurological: She is alert and oriented to person, place, and time.   Skin: Skin is warm and dry.   Psychiatric: She has a normal mood and affect. Her behavior is normal.   Nursing note and vitals reviewed.      Assessment/Plan   Chelsi was seen today for abdominal pain.    Diagnoses and all orders for this visit:    Left lower quadrant abdominal pain  -     POCT urinalysis dipstick, automated  -     CBC & Differential  -     Basic Metabolic Panel  -     CT Abdomen Pelvis With & Without Contrast; Future      Discussed Shingrix vaccine with pt,  that is currently available at the pharmacy.     Finish antibiotics.   If getting worse to ER.            Current Outpatient Medications:   •  aspirin 81 MG EC tablet, Take 81 mg by mouth Every Night., Disp: , Rfl:   •  Cholecalciferol (VITAMIN D3 PO), Vitamin D3  Daily, Disp: , Rfl:   •  ciprofloxacin (CIPRO) 500 MG tablet, , Disp: , Rfl:   •  coenzyme Q10 100 MG capsule, Take 100 mg by mouth Daily., Disp: , Rfl:   •  estradiol (VAGIFEM) 10 MCG tablet vaginal tablet, Insert 1 tablet into the vagina 2 (Two) Times a Week., Disp: 8 tablet, Rfl: 2  •  latanoprost (XALATAN) 0.005 % ophthalmic solution, 1 drop Every Night., Disp: , Rfl:   •  metoprolol succinate XL (TOPROL-XL) 25 MG 24 hr tablet, Take 25 mg by mouth Every Morning., Disp: , Rfl:   •  metroNIDAZOLE (FLAGYL) 500 MG tablet, , Disp: , Rfl:   •  nitroglycerin (NITROSTAT) 0.4 MG SL tablet, Place 0.4 mg under the tongue Every 5 (Five) Minutes As Needed., Disp: , Rfl:   •  Omega-3 Fatty Acids (FISH OIL PO), Fish Oil 300 mg-1,000 mg capsule,delayed release  Daily, Disp: , Rfl:   •  pravastatin (PRAVACHOL) 80 MG  tablet, Take 0.5 tablets by mouth Daily., Disp: 90 tablet, Rfl: 1  •  Probiotic Product (PROBIOTIC DAILY PO), Take 1 tablet by mouth Daily., Disp: , Rfl:   •  famotidine (PEPCID) 20 MG tablet, Take 20 mg by mouth 2 (Two) Times a Day., Disp: , Rfl:     Return in about 1 week (around 11/7/2019), or if symptoms worsen or fail to improve, for Recheck.    Recent Results (from the past 168 hour(s))   POCT urinalysis dipstick, automated    Collection Time: 10/31/19 11:55 AM   Result Value Ref Range    Color Yellow Yellow, Straw, Dark Yellow, Berna    Clarity, UA Clear Clear    Specific Gravity  1.010 1.005 - 1.030    pH, Urine 6.0 5.0 - 8.0    Leukocytes Negative Negative    Nitrite, UA Negative Negative    Protein, POC Negative Negative mg/dL    Glucose, UA Negative Negative, 1000 mg/dL (3+) mg/dL    Ketones, UA Negative Negative    Urobilinogen, UA Normal Normal    Bilirubin Negative Negative    Blood, UA Negative Negative

## 2019-11-01 LAB
BASOPHILS # BLD AUTO: 0.03 10*3/MM3 (ref 0–0.2)
BASOPHILS NFR BLD AUTO: 0.4 % (ref 0–1.5)
BUN SERPL-MCNC: 9 MG/DL (ref 8–23)
BUN/CREAT SERPL: 13 (ref 7–25)
CALCIUM SERPL-MCNC: 9.3 MG/DL (ref 8.6–10.5)
CHLORIDE SERPL-SCNC: 105 MMOL/L (ref 98–107)
CO2 SERPL-SCNC: 26.2 MMOL/L (ref 22–29)
CREAT SERPL-MCNC: 0.69 MG/DL (ref 0.57–1)
EOSINOPHIL # BLD AUTO: 0.19 10*3/MM3 (ref 0–0.4)
EOSINOPHIL NFR BLD AUTO: 2.7 % (ref 0.3–6.2)
ERYTHROCYTE [DISTWIDTH] IN BLOOD BY AUTOMATED COUNT: 12.4 % (ref 12.3–15.4)
GLUCOSE SERPL-MCNC: 97 MG/DL (ref 65–99)
HCT VFR BLD AUTO: 38.3 % (ref 34–46.6)
HGB BLD-MCNC: 13.3 G/DL (ref 12–15.9)
IMM GRANULOCYTES # BLD AUTO: 0.02 10*3/MM3 (ref 0–0.05)
IMM GRANULOCYTES NFR BLD AUTO: 0.3 % (ref 0–0.5)
LYMPHOCYTES # BLD AUTO: 2.28 10*3/MM3 (ref 0.7–3.1)
LYMPHOCYTES NFR BLD AUTO: 33 % (ref 19.6–45.3)
MCH RBC QN AUTO: 33.2 PG (ref 26.6–33)
MCHC RBC AUTO-ENTMCNC: 34.7 G/DL (ref 31.5–35.7)
MCV RBC AUTO: 95.5 FL (ref 79–97)
MONOCYTES # BLD AUTO: 0.71 10*3/MM3 (ref 0.1–0.9)
MONOCYTES NFR BLD AUTO: 10.3 % (ref 5–12)
NEUTROPHILS # BLD AUTO: 3.68 10*3/MM3 (ref 1.7–7)
NEUTROPHILS NFR BLD AUTO: 53.3 % (ref 42.7–76)
NRBC BLD AUTO-RTO: 0 /100 WBC (ref 0–0.2)
PLATELET # BLD AUTO: 292 10*3/MM3 (ref 140–450)
POTASSIUM SERPL-SCNC: 5.2 MMOL/L (ref 3.5–5.2)
RBC # BLD AUTO: 4.01 10*6/MM3 (ref 3.77–5.28)
SODIUM SERPL-SCNC: 143 MMOL/L (ref 136–145)
WBC # BLD AUTO: 6.91 10*3/MM3 (ref 3.4–10.8)

## 2019-11-09 NOTE — TELEPHONE ENCOUNTER
Attempted call back today. Left  stating pt to call [facility] w questions and /or concerns regarding visit earlier this week.  CORINNA Trevizo MS BLUM SAYS THAT SHE HAS BEEN IN THE BED FOR 2 DAYS WITH UTI, PAIN IN ABDOMEN, HAVING UNCONTROLLABLE URINATION, AND BOWEL MOVEMENTS, LOW GRADE FEVER, CHILLS, JOINT PAIN.  SHE WOULD LIKE A CALL BACK

## 2019-11-14 ENCOUNTER — OFFICE VISIT (OUTPATIENT)
Dept: INTERNAL MEDICINE | Facility: CLINIC | Age: 79
End: 2019-11-14

## 2019-11-14 VITALS
OXYGEN SATURATION: 98 % | HEIGHT: 60 IN | HEART RATE: 59 BPM | TEMPERATURE: 96.7 F | WEIGHT: 153.8 LBS | DIASTOLIC BLOOD PRESSURE: 60 MMHG | SYSTOLIC BLOOD PRESSURE: 126 MMHG | BODY MASS INDEX: 30.19 KG/M2

## 2019-11-14 DIAGNOSIS — K57.32 DIVERTICULITIS OF LARGE INTESTINE WITHOUT PERFORATION OR ABSCESS WITHOUT BLEEDING: ICD-10-CM

## 2019-11-14 DIAGNOSIS — K57.30 DIVERTICULOSIS OF COLON: Primary | ICD-10-CM

## 2019-11-14 PROCEDURE — 99212 OFFICE O/P EST SF 10 MIN: CPT | Performed by: FAMILY MEDICINE

## 2019-11-14 NOTE — PROGRESS NOTES
"Subjective   Chelsi Ferguson is a 79 y.o. female.     Chief Complaint   Patient presents with   • Abdominal Pain     f/u and CT scan results       Visit Vitals  /60 (BP Location: Left arm, Cuff Size: Adult)   Pulse 59   Temp 96.7 °F (35.9 °C)   Ht 152.4 cm (60\")   Wt 69.8 kg (153 lb 12.8 oz)   LMP  (LMP Unknown) Comment: Last Mammogram 2017   SpO2 98%   BMI 30.04 kg/m²         History of Present Illness   Pt has has LLQ pain that has improved with antibiotic.  Pt had diverticulosis without diverticulitis on CT. Pt was feeling better by the time that she had CT scan.  Pt has had constipation alternating with diarrhea.     Pt was found to have fat containing umbilical hernia. Pt is not having pain in umbilical hernia.     The following portions of the patient's history were reviewed and updated as appropriate: allergies, current medications, past family history, past medical history, past social history, past surgical history and problem list.    Past Medical History:   Diagnosis Date   • Ankle pain    • Arthritis    • Cancer (CMS/HCC)     Skin   • Chest discomfort    • Cholecystitis 1/15/2018   • Coronary artery disease    • Edema    • Elevated liver enzymes 1/15/2018   • Gallstones    • GERD (gastroesophageal reflux disease)    • Glaucoma     pt is currently off of meds and Dr Tanner does not think that she is glaucoma   • H/O complete eye exam 06/2013   • H/O mammogram 11/16/2015   • H/O non-ST elevation myocardial infarction (NSTEMI) 01/2005   • Hammer toe    • Heart attack (CMS/HCC) 01/2005   • History of blood transfusion 01/2005   • History of cardiovascular stress test 12/02/2015    normal   • HTN (hypertension)    • Hyperlipidemia    • Hypokalemia 1/15/2018   • Kidney infection 1971   • Menopausal symptoms    • Onychia and paronychia of finger    • Osteopenia    • Other elevated white blood cell count    • Pap smear for cervical cancer screening 2010    [facility]   • Pneumonia    • Sepsis (CMS/HCC) 1/15/2018   • " UTI (urinary tract infection) 1/15/2018   • Viral warts    • Wears glasses       Past Surgical History:   Procedure Laterality Date   • BLADDER REPAIR  2002   • BLADDER REPAIR  2003   • BUNIONECTOMY Right 2010   • CHOLECYSTECTOMY WITH INTRAOPERATIVE CHOLANGIOGRAM N/A 3/7/2018    Procedure: CHOLECYSTECTOMY LAPAROSCOPIC INTRAOPERATIVE CHOLANGIOGRAM;  Surgeon: Harinder Mason MD;  Location: [facility];  Service:    • COLONOSCOPY  2008, 4/2014    [facility]   • CORONARY STENT PLACEMENT Left 01/2005    drug eluting stent 1/2005 LAD   • DILATATION AND CURETTAGE  1971   • DILATATION AND CURETTAGE  1978   • EYE CAPSULOTOMY WITH LASER Bilateral 2018   • EYE SURGERY Bilateral 05/2016    Cataract   • FRONTALIS SUSPENSION  2010   • HAMMER TOE REPAIR Right 11/03/2010   • HEAD & NECK SKIN LESION EXCISIONAL BIOPSY  08/20/2018    benign scalp cyst   • HYSTERECTOMY  1978   • LAPAROTOMY OOPHERECTOMY Bilateral 2002   • NOSE SURGERY  03/2017    repair 3 fractured areas. Dr Dunne   • OTHER SURGICAL HISTORY  2005    rectocele repair   • OTHER SURGICAL HISTORY  2010    eyelid surgery   • REPLACEMENT TOTAL KNEE BILATERAL Bilateral    • SKIN BIOPSY     • TONSILLECTOMY  1952   • TOTAL KNEE ARTHROPLASTY  01/2005   • UTERINE FIBROID SURGERY  1978    emergency removal of fibroid from birth canal      Family History   Problem Relation Age of Onset   • Stroke Mother    • Heart failure Father         congestive   • Cancer Father         lip   • Breast cancer Sister         60's   • Breast cancer Daughter 40   • Breast cancer Other         NIECE 60's   • Breast cancer Other         NIECE 60's   • Ovarian cancer Neg Hx       Social History     Socioeconomic History   • Marital status:      Spouse name: Not on file   • Number of children: Not on file   • Years of education: Not on file   • Highest education level: Not on file   Tobacco Use   • Smoking status: Never Smoker   • Smokeless tobacco: Never Used   Substance and Sexual Activity   •  Alcohol use: No   • Drug use: No   • Sexual activity: Yes     Birth control/protection: None      Allergies   Allergen Reactions   • Aleve [Naproxen] Hives   • Indocin [Indomethacin] Hallucinations   • Lipitor [Atorvastatin] Hives   • Statins Hives   • Zocor [Simvastatin] Hives   • Celebrex [Celecoxib] Diarrhea   • Keflex [Cephalexin] GI Intolerance     Upset stomach, may have had some blood in stool but cannot remember the reason why she was put on it. Tolerates penicillins without problem.   • Ibuprofen GI Intolerance     Heartburn.   • Prevacid [Lansoprazole] Nausea And Vomiting   • Prilosec [Omeprazole] Nausea And Vomiting       Review of Systems   Constitutional: Negative.  Negative for chills, diaphoresis, fatigue and fever.   HENT: Positive for rhinorrhea and sneezing. Negative for ear pain, nosebleeds, postnasal drip, sinus pressure and sore throat.    Eyes: Negative.  Negative for redness and itching.   Respiratory: Negative.  Negative for cough, shortness of breath and wheezing.    Cardiovascular: Negative.  Negative for chest pain and palpitations.   Gastrointestinal: Positive for abdominal pain (better), constipation and diarrhea. Negative for nausea and vomiting.   Endocrine: Negative.  Negative for cold intolerance and heat intolerance.   Genitourinary: Negative.  Negative for dysuria, frequency, hematuria and urgency.   Musculoskeletal: Positive for arthralgias. Negative for back pain and neck pain.        Left achilles tenderness since on the antibiotics   Skin: Negative.  Negative for color change and rash.   Allergic/Immunologic: Negative.  Negative for environmental allergies.   Neurological: Negative.  Negative for dizziness, syncope, light-headedness and headaches.   Hematological: Negative.  Negative for adenopathy. Does not bruise/bleed easily.   Psychiatric/Behavioral: Negative.  Negative for dysphoric mood. The patient is not nervous/anxious.        Objective   Physical Exam    Constitutional: She is oriented to person, place, and time. She appears well-developed.   HENT:   Head: Normocephalic.   Right Ear: External ear normal.   Left Ear: External ear normal.   Nose: Nose normal.   Eyes: Conjunctivae, EOM and lids are normal. Pupils are equal, round, and reactive to light.   Neck: Trachea normal and normal range of motion. Neck supple. Carotid bruit is not present. No thyroid mass and no thyromegaly present.   Cardiovascular: Normal rate and regular rhythm.   No murmur heard.  Pulmonary/Chest: Effort normal and breath sounds normal. No respiratory distress. She has no decreased breath sounds. She has no wheezes. She has no rhonchi. She has no rales. She exhibits no tenderness.   Abdominal: Soft. Bowel sounds are normal. She exhibits no mass. There is tenderness in the left lower quadrant. There is no rebound and no guarding.   Musculoskeletal: Normal range of motion.   Neurological: She is alert and oriented to person, place, and time.   Skin: Skin is warm and dry.   Psychiatric: She has a normal mood and affect. Her behavior is normal.   Nursing note and vitals reviewed.      Assessment/Plan   Chelsi was seen today for abdominal pain.    Diagnoses and all orders for this visit:    Diverticulosis of colon    Diverticulitis of large intestine without perforation or abscess without bleeding          Improving diverticulitis, f/u if not continuing to improve         Current Outpatient Medications:   •  aspirin 81 MG EC tablet, Take 81 mg by mouth Every Night., Disp: , Rfl:   •  Cholecalciferol (VITAMIN D3 PO), Vitamin D3  Daily, Disp: , Rfl:   •  coenzyme Q10 100 MG capsule, Take 100 mg by mouth Daily., Disp: , Rfl:   •  famotidine (PEPCID) 20 MG tablet, Take 20 mg by mouth 2 (Two) Times a Day., Disp: , Rfl:   •  latanoprost (XALATAN) 0.005 % ophthalmic solution, 1 drop Every Night., Disp: , Rfl:   •  metoprolol succinate XL (TOPROL-XL) 25 MG 24 hr tablet, Take 25 mg by mouth Every Morning.,  Disp: , Rfl:   •  nitroglycerin (NITROSTAT) 0.4 MG SL tablet, Place 0.4 mg under the tongue Every 5 (Five) Minutes As Needed., Disp: , Rfl:   •  Omega-3 Fatty Acids (FISH OIL PO), Fish Oil 300 mg-1,000 mg capsule,delayed release  Daily, Disp: , Rfl:   •  pravastatin (PRAVACHOL) 80 MG tablet, Take 0.5 tablets by mouth Daily., Disp: 90 tablet, Rfl: 1  •  Probiotic Product (PROBIOTIC DAILY PO), Take 1 tablet by mouth Daily., Disp: , Rfl:   •  YUVAFEM 10 MCG tablet vaginal tablet, INSERT 1 TABLET INTO THE VAGINA 2 (TWO) TIMES A WEEK., Disp: 8 tablet, Rfl: 2    Return in about 8 weeks (around 1/7/2020), or if symptoms worsen or fail to improve, for Next scheduled follow up, Recheck.

## 2020-01-03 ENCOUNTER — HOSPITAL ENCOUNTER (OUTPATIENT)
Dept: MAMMOGRAPHY | Facility: HOSPITAL | Age: 80
Discharge: HOME | End: 2020-01-03
Admitting: FAMILY MEDICINE

## 2020-01-03 DIAGNOSIS — Z12.31 VISIT FOR SCREENING MAMMOGRAM: ICD-10-CM

## 2020-01-03 PROCEDURE — 77067 SCR MAMMO BI INCL CAD: CPT

## 2020-01-03 PROCEDURE — 77063 BREAST TOMOSYNTHESIS BI: CPT

## 2020-01-03 PROCEDURE — 77067 SCR MAMMO BI INCL CAD: CPT | Performed by: RADIOLOGY

## 2020-01-03 PROCEDURE — 77063 BREAST TOMOSYNTHESIS BI: CPT | Performed by: RADIOLOGY

## 2020-01-07 ENCOUNTER — OFFICE VISIT (OUTPATIENT)
Dept: INTERNAL MEDICINE | Facility: CLINIC | Age: 80
End: 2020-01-07

## 2020-01-07 VITALS
BODY MASS INDEX: 29.64 KG/M2 | HEIGHT: 60 IN | TEMPERATURE: 97.1 F | HEART RATE: 66 BPM | OXYGEN SATURATION: 98 % | WEIGHT: 151 LBS | SYSTOLIC BLOOD PRESSURE: 128 MMHG | DIASTOLIC BLOOD PRESSURE: 68 MMHG

## 2020-01-07 DIAGNOSIS — I25.2 H/O NON-ST ELEVATION MYOCARDIAL INFARCTION (NSTEMI): ICD-10-CM

## 2020-01-07 DIAGNOSIS — E78.00 PURE HYPERCHOLESTEROLEMIA: Primary | Chronic | ICD-10-CM

## 2020-01-07 DIAGNOSIS — J06.9 VIRAL URI: ICD-10-CM

## 2020-01-07 LAB
ALBUMIN SERPL-MCNC: 4.3 G/DL (ref 3.5–5.2)
ALBUMIN/GLOB SERPL: 1.9 G/DL
ALP SERPL-CCNC: 62 U/L (ref 39–117)
ALT SERPL-CCNC: 9 U/L (ref 1–33)
AST SERPL-CCNC: 19 U/L (ref 1–32)
BILIRUB SERPL-MCNC: 0.5 MG/DL (ref 0.2–1.2)
BUN SERPL-MCNC: 12 MG/DL (ref 8–23)
BUN/CREAT SERPL: 18.2 (ref 7–25)
CALCIUM SERPL-MCNC: 9.6 MG/DL (ref 8.6–10.5)
CHLORIDE SERPL-SCNC: 102 MMOL/L (ref 98–107)
CHOLEST SERPL-MCNC: 166 MG/DL (ref 0–200)
CK SERPL-CCNC: 75 U/L (ref 20–180)
CO2 SERPL-SCNC: 27.6 MMOL/L (ref 22–29)
CREAT SERPL-MCNC: 0.66 MG/DL (ref 0.57–1)
GLOBULIN SER CALC-MCNC: 2.3 GM/DL
GLUCOSE SERPL-MCNC: 88 MG/DL (ref 65–99)
HDLC SERPL-MCNC: 43 MG/DL (ref 40–60)
LDLC SERPL CALC-MCNC: 94 MG/DL (ref 0–100)
POTASSIUM SERPL-SCNC: 5 MMOL/L (ref 3.5–5.2)
PROT SERPL-MCNC: 6.6 G/DL (ref 6–8.5)
SODIUM SERPL-SCNC: 141 MMOL/L (ref 136–145)
TRIGL SERPL-MCNC: 145 MG/DL (ref 0–150)
VLDLC SERPL CALC-MCNC: 29 MG/DL

## 2020-01-07 PROCEDURE — 99214 OFFICE O/P EST MOD 30 MIN: CPT | Performed by: FAMILY MEDICINE

## 2020-01-07 RX ORDER — PRAVASTATIN SODIUM 40 MG
40 TABLET ORAL DAILY
Qty: 90 TABLET | Refills: 1 | Status: SHIPPED | OUTPATIENT
Start: 2020-01-07 | End: 2020-05-20

## 2020-01-07 NOTE — PROGRESS NOTES
"Subjective   Chelsi Ferguson is a 79 y.o. female.     Chief Complaint   Patient presents with   • Diverticulitis     f/u   • Cough   • Sinus Problem   • Nasal Congestion       Visit Vitals  /68 (BP Location: Left arm, Cuff Size: Adult)   Pulse 66   Temp 97.1 °F (36.2 °C)   Ht 152.4 cm (60\")   Wt 68.5 kg (151 lb)   LMP  (LMP Unknown) Comment: Last Mammogram 2017   SpO2 98%   BMI 29.49 kg/m²       Wt Readings from Last 3 Encounters:   01/07/20 68.5 kg (151 lb)   11/14/19 69.8 kg (153 lb 12.8 oz)   10/31/19 70.2 kg (154 lb 12.8 oz)         URI    This is a new problem. The current episode started in the past 7 days. The problem has been rapidly improving. There has been no fever. Associated symptoms include congestion, joint pain, joint swelling, a plugged ear sensation and rhinorrhea. Pertinent negatives include no abdominal pain, chest pain, coughing, diarrhea, dysuria, ear pain, headaches, nausea, neck pain, rash, sinus pain, sneezing, sore throat, swollen glands, vomiting or wheezing. She has tried acetaminophen (zicam) for the symptoms. The treatment provided mild relief.   Hyperlipidemia   This is a chronic problem. The current episode started more than 1 year ago. The problem is controlled. Recent lipid tests were reviewed and are normal. She has no history of chronic renal disease, diabetes, hypothyroidism, liver disease, obesity or nephrotic syndrome. There are no known factors aggravating her hyperlipidemia. Associated symptoms include myalgias. Pertinent negatives include no chest pain, focal sensory loss, focal weakness, leg pain or shortness of breath. Current antihyperlipidemic treatment includes statins. The current treatment provides significant improvement of lipids. Compliance problems include medication side effects.  Risk factors for coronary artery disease include dyslipidemia, post-menopausal and family history.   Coronary Artery Disease   Presents for follow-up visit. Pertinent negatives " include no chest pain, chest pressure, chest tightness, dizziness, leg swelling, muscle weakness, palpitations, shortness of breath or weight gain. Risk factors include hyperlipidemia. Risk factors do not include obesity. The symptoms have been stable. Compliance with diet is variable. Compliance with exercise is good. Compliance with medications is good.      Pt has URI. Pt thinks that she is on the mend.  Pt has coughing spell yesterday. Pt denies fever or chills.   Pt did not want to increase the dose of pravastatin. Pt had leg cramps on 80mg of pravastatin. Pt is currently taking 40 mg.     Pt had diverticulitis in November. Pt has been on antibiotics and has improved.  Pt due for medicare wellness soon.   The following portions of the patient's history were reviewed and updated as appropriate: allergies, current medications, past family history, past medical history, past social history, past surgical history and problem list.    Past Medical History:   Diagnosis Date   • Ankle pain    • Arthritis    • Cancer (CMS/HCC)     Skin   • Chest discomfort    • Cholecystitis 1/15/2018   • Coronary artery disease    • Edema    • Elevated liver enzymes 1/15/2018   • Gallstones    • GERD (gastroesophageal reflux disease)    • Glaucoma     pt is currently off of meds and Dr Tanner does not think that she is glaucoma   • H/O complete eye exam 06/2013   • H/O mammogram 11/16/2015   • H/O non-ST elevation myocardial infarction (NSTEMI) 01/2005   • Hammer toe    • Heart attack (CMS/HCC) 01/2005   • History of blood transfusion 01/2005   • History of cardiovascular stress test 12/02/2015    normal   • HTN (hypertension)    • Hyperlipidemia    • Hypokalemia 1/15/2018   • Kidney infection 1971   • Menopausal symptoms    • Onychia and paronychia of finger    • Osteopenia    • Other elevated white blood cell count    • Pap smear for cervical cancer screening 2010    [facility]   • Pneumonia    • Sepsis (CMS/HCC) 1/15/2018   • UTI (urinary  tract infection) 1/15/2018   • Viral warts    • Wears glasses       Past Surgical History:   Procedure Laterality Date   • BLADDER REPAIR  2002   • BLADDER REPAIR  2003   • BUNIONECTOMY Right 2010   • CHOLECYSTECTOMY WITH INTRAOPERATIVE CHOLANGIOGRAM N/A 3/7/2018    Procedure: CHOLECYSTECTOMY LAPAROSCOPIC INTRAOPERATIVE CHOLANGIOGRAM;  Surgeon: Harinder Mason MD;  Location: [facility];  Service:    • COLONOSCOPY  2008, 4/2014    [facility]   • CORONARY STENT PLACEMENT Left 01/2005    drug eluting stent 1/2005 LAD   • DILATATION AND CURETTAGE  1971   • DILATATION AND CURETTAGE  1978   • EYE CAPSULOTOMY WITH LASER Bilateral 2018   • EYE SURGERY Bilateral 05/2016    Cataract   • FRONTALIS SUSPENSION  2010   • HAMMER TOE REPAIR Right 11/03/2010   • HEAD & NECK SKIN LESION EXCISIONAL BIOPSY  08/20/2018    benign scalp cyst   • HYSTERECTOMY  1978   • LAPAROTOMY OOPHERECTOMY Bilateral 2002   • NOSE SURGERY  03/2017    repair 3 fractured areas. Dr Dunne   • OTHER SURGICAL HISTORY  2005    rectocele repair   • OTHER SURGICAL HISTORY  2010    eyelid surgery   • REPLACEMENT TOTAL KNEE BILATERAL Bilateral    • SKIN BIOPSY     • TONSILLECTOMY  1952   • TOTAL KNEE ARTHROPLASTY  01/2005   • UTERINE FIBROID SURGERY  1978    emergency removal of fibroid from birth canal      Family History   Problem Relation Age of Onset   • Stroke Mother    • Heart failure Father         congestive   • Cancer Father         lip   • Breast cancer Sister         60's   • Breast cancer Daughter 40   • Breast cancer Other         NIECE 60's   • Breast cancer Other         NIECE 60's   • Ovarian cancer Neg Hx       Social History     Socioeconomic History   • Marital status:      Spouse name: Not on file   • Number of children: Not on file   • Years of education: Not on file   • Highest education level: Not on file   Tobacco Use   • Smoking status: Never Smoker   • Smokeless tobacco: Never Used   Substance and Sexual Activity   • Alcohol use:  No   • Drug use: No   • Sexual activity: Yes     Birth control/protection: None      Allergies   Allergen Reactions   • Aleve [Naproxen] Hives   • Indocin [Indomethacin] Hallucinations   • Lipitor [Atorvastatin] Hives   • Statins Hives   • Zocor [Simvastatin] Hives   • Celebrex [Celecoxib] Diarrhea   • Keflex [Cephalexin] GI Intolerance     Upset stomach, may have had some blood in stool but cannot remember the reason why she was put on it. Tolerates penicillins without problem.   • Ibuprofen GI Intolerance     Heartburn.   • Prevacid [Lansoprazole] Nausea And Vomiting   • Prilosec [Omeprazole] Nausea And Vomiting       Review of Systems   Constitutional: Positive for fatigue. Negative for chills, diaphoresis, fever and weight gain.   HENT: Positive for congestion, postnasal drip, rhinorrhea and sinus pressure. Negative for ear pain, nosebleeds, sinus pain, sneezing and sore throat.    Eyes: Negative.  Negative for redness and itching.   Respiratory: Negative.  Negative for cough, chest tightness, shortness of breath and wheezing.    Cardiovascular: Negative.  Negative for chest pain, palpitations and leg swelling.   Gastrointestinal: Negative.  Negative for abdominal pain, constipation, diarrhea, nausea and vomiting.   Endocrine: Negative.  Negative for cold intolerance and heat intolerance.   Genitourinary: Negative.  Negative for dysuria, frequency, hematuria and urgency.   Musculoskeletal: Positive for arthralgias, back pain, gait problem, joint pain and myalgias. Negative for muscle weakness, neck pain and neck stiffness.   Skin: Negative.  Negative for color change and rash.   Allergic/Immunologic: Positive for environmental allergies.   Neurological: Positive for tremors. Negative for dizziness, focal weakness, syncope, light-headedness and headaches.   Hematological: Negative.  Negative for adenopathy. Does not bruise/bleed easily.   Psychiatric/Behavioral: Negative.  Negative for dysphoric mood. The patient  is not nervous/anxious.        Objective   Physical Exam   Constitutional: She is oriented to person, place, and time. She appears well-developed.   HENT:   Head: Normocephalic.   Right Ear: Tympanic membrane, external ear and ear canal normal.   Left Ear: Tympanic membrane, external ear and ear canal normal.   Nose: Rhinorrhea present. Right sinus exhibits no maxillary sinus tenderness and no frontal sinus tenderness. Left sinus exhibits no maxillary sinus tenderness and no frontal sinus tenderness.   Mouth/Throat: Uvula is midline, oropharynx is clear and moist and mucous membranes are normal. No oropharyngeal exudate, posterior oropharyngeal edema or posterior oropharyngeal erythema.   Eyes: Pupils are equal, round, and reactive to light. Conjunctivae, EOM and lids are normal.   Neck: Trachea normal and normal range of motion. Neck supple. Carotid bruit is not present. No thyroid mass and no thyromegaly present.   Cardiovascular: Normal rate and regular rhythm.   No murmur heard.  Pulmonary/Chest: Effort normal and breath sounds normal. No respiratory distress. She has no decreased breath sounds. She has no wheezes. She has no rhonchi. She has no rales. She exhibits no tenderness.   Abdominal: Soft. Bowel sounds are normal. There is no tenderness.   Musculoskeletal: Normal range of motion.   Neurological: She is alert and oriented to person, place, and time.   Skin: Skin is warm and dry.   Psychiatric: She has a normal mood and affect. Her behavior is normal.   Nursing note and vitals reviewed.      Assessment/Plan   Chelsi was seen today for diverticulitis, cough, sinus problem and nasal congestion.    Diagnoses and all orders for this visit:    Pure hypercholesterolemia  -     pravastatin (PRAVACHOL) 40 MG tablet; Take 1 tablet by mouth Daily.  -     Lipid Panel  -     Comprehensive Metabolic Panel  -     CK    H/O non-ST elevation myocardial infarction (NSTEMI)    Viral URI        Discussed Shingrix vaccine with  pt,  that is currently available at the pharmacy.   Symptomatic care for URI           Current Outpatient Medications:   •  aspirin 81 MG EC tablet, Take 81 mg by mouth Every Night., Disp: , Rfl:   •  Cholecalciferol (VITAMIN D3 PO), Vitamin D3  Daily, Disp: , Rfl:   •  coenzyme Q10 100 MG capsule, Take 100 mg by mouth Daily., Disp: , Rfl:   •  famotidine (PEPCID) 20 MG tablet, Take 20 mg by mouth 2 (Two) Times a Day., Disp: , Rfl:   •  latanoprost (XALATAN) 0.005 % ophthalmic solution, 1 drop Every Night., Disp: , Rfl:   •  metoprolol succinate XL (TOPROL-XL) 25 MG 24 hr tablet, Take 25 mg by mouth Every Morning., Disp: , Rfl:   •  nitroglycerin (NITROSTAT) 0.4 MG SL tablet, Place 0.4 mg under the tongue Every 5 (Five) Minutes As Needed., Disp: , Rfl:   •  Omega-3 Fatty Acids (FISH OIL PO), Fish Oil 300 mg-1,000 mg capsule,delayed release  Daily, Disp: , Rfl:   •  Probiotic Product (PROBIOTIC DAILY PO), Take 1 tablet by mouth Daily., Disp: , Rfl:   •  YUVAFEM 10 MCG tablet vaginal tablet, INSERT 1 TABLET INTO THE VAGINA 2 (TWO) TIMES A WEEK., Disp: 8 tablet, Rfl: 2  •  pravastatin (PRAVACHOL) 40 MG tablet, Take 1 tablet by mouth Daily., Disp: 90 tablet, Rfl: 1    Return in about 6 months (around 7/7/2020), or if symptoms worsen or fail to improve, for Recheck.

## 2020-01-09 ENCOUNTER — TELEPHONE (OUTPATIENT)
Dept: INTERNAL MEDICINE | Facility: CLINIC | Age: 80
End: 2020-01-09

## 2020-01-09 NOTE — TELEPHONE ENCOUNTER
Pt was notified of results, states she does take the CoQ10 daily this is on her med list, advised to double the cholesterol to 80mg daily. She states if she starts to have leg cramps she was call to inform us.

## 2020-01-09 NOTE — TELEPHONE ENCOUNTER
----- Message from Reina MAI MD sent at 1/8/2020  8:16 AM EST -----  LDL 94 should be less than 70. Pt can try adding CoQ10 100 mg per day from vitamin section with Pravastatin and increase the Pravastatin to 80 mg.  Please follow a low animal fat diet that is also low in sugar, low in junk food, low in sweet drinks and low in alcohol.  Please increase the amount of fiber in your diet as well as increasing your daily exercise, such as walking.

## 2020-02-05 ENCOUNTER — OFFICE VISIT (OUTPATIENT)
Dept: NEUROLOGY | Facility: CLINIC | Age: 80
End: 2020-02-05

## 2020-02-05 VITALS
WEIGHT: 151 LBS | SYSTOLIC BLOOD PRESSURE: 128 MMHG | HEIGHT: 60 IN | DIASTOLIC BLOOD PRESSURE: 60 MMHG | BODY MASS INDEX: 29.64 KG/M2 | HEART RATE: 65 BPM

## 2020-02-05 DIAGNOSIS — M54.31 BILATERAL SCIATICA: ICD-10-CM

## 2020-02-05 DIAGNOSIS — M54.32 BILATERAL SCIATICA: ICD-10-CM

## 2020-02-05 DIAGNOSIS — R25.2 MUSCLE CRAMPS: Primary | ICD-10-CM

## 2020-02-05 DIAGNOSIS — G60.9 IDIOPATHIC PERIPHERAL NEUROPATHY: ICD-10-CM

## 2020-02-05 DIAGNOSIS — G25.0 ESSENTIAL TREMOR: ICD-10-CM

## 2020-02-05 PROCEDURE — 99214 OFFICE O/P EST MOD 30 MIN: CPT | Performed by: PSYCHIATRY & NEUROLOGY

## 2020-02-05 NOTE — PROGRESS NOTES
Subjective:    CC: Chelsi Ferguson is seen today  for essential tremor       HPI:  Current visit- since the last visit patient feels that her neck stiffness and tremors have improved slightly after she got neck PT.  However today she has a new complaint.  She has recently started to get excruciating muscle cramps in her legs in her feet or groin.  They usually occur after prolonged sitting but may happen in the middle of the night waking her up from sleep.  Patient recently increased her pravastatin to 80 mg daily as she was instructed by the cardiologist to do so for goal LDL of less than 70.  Her LDL was 94.  With regards to her neuropathy her symptoms of tingling and numbness in her feet are stable.  She has occasionally also started to get some burning pain in her legs.  Still does not want to start medications.  Today she is also complaining of low back pain that was initially radiating down her right leg but now goes down both legs.    Last visit-since the last visit patient's head and left hand tremor has remained the same.  She only notices it occasionally when she is at Religious and sitting still otherwise it does not bother her.  The numbness in her feet has also remained the same.  She denies having any severe pain.  Does have slight balance problems due to the numbness.  Her neuropathy panel was within normal limits.    Last visit-since her last visit her neck pain has completely resolved and she has finished her physical therapy.  The tremors in her head and left hand still persist however they have not become worse and she still does not want to start taking any medications.  Recently she has also started to notice tingling, numbness and occasional sharp pain in the bottoms of both feet.  At times she also feels off balance.  Denies having diabetes or heavy alcohol intake.      Last visit-since her last visit she had a CT of her cervical spine that showed diffuse degenerative changes right more than left.   She did go for physical therapy which has helped.  She still gets occasional neck pain and stiffness but does not want to take muscle relaxants.  Today she also complains of a head tremor as well as a tremor in her left hand  that occurs more while doing tasks or holding objects in her hand.  Anxiety also seems to make the tremors worse.  She denies any family history of tremors.    Initial [id number] female with a history of hypertension, hyperlipidemia, arthritis, CAD, GERD presents with neck pain.  As per patient she was bedridden and frequently hospitalized for several UTIs and cholecystitis between January to June of this year.  Then in May of this year she started having a severe neck spasm on the left as well as pain that radiated from the neck to the side of her face and up into her scalp.  She underwent physical therapy for 4-6 weeks at the time that helped bring down the pain from 8 to about 3/10 in severity.  She was also found to have 2 cysts on her scalp and underwent removal for those which helped relieve her headaches.  Currently she has mild neck pain on the left that worsens while turning her neck to the right (like when she is backing her car up ) but denies having any facial pain or headaches.  She does have mild scalp numbness in the area where the cysts were removed.  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  She was asked to undergo surgery but refused.    The following portions of the patient's history were reviewed today and updated as of 02/05/2020  : allergies, current medications, past family history, past medical history, past social history, past surgical history and problem list  These document will be scanned to patient's chart.      Current Outpatient Medications:   •  aspirin 81 MG EC tablet, Take  81 mg by mouth Every Night., Disp: , Rfl:   •  Cholecalciferol (VITAMIN D3 PO), Vitamin D3  Daily, Disp: , Rfl:   •  coenzyme Q10 100 MG capsule, Take 100 mg by mouth Daily., Disp: , Rfl:   •  famotidine (PEPCID) 20 MG tablet, Take 20 mg by mouth 2 (Two) Times a Day., Disp: , Rfl:   •  latanoprost (XALATAN) 0.005 % ophthalmic solution, 1 drop Every Night., Disp: , Rfl:   •  metoprolol succinate XL (TOPROL-XL) 25 MG 24 hr tablet, Take 25 mg by mouth Every Morning., Disp: , Rfl:   •  nitroglycerin (NITROSTAT) 0.4 MG SL tablet, Place 0.4 mg under the tongue Every 5 (Five) Minutes As Needed., Disp: , Rfl:   •  Omega-3 Fatty Acids (FISH OIL PO), Fish Oil 300 mg-1,000 mg capsule,delayed release  Daily, Disp: , Rfl:   •  pravastatin (PRAVACHOL) 40 MG tablet, Take 1 tablet by mouth Daily. (Patient taking differently: Take 80 mg by mouth Daily.), Disp: 90 tablet, Rfl: 1  •  Probiotic Product (PROBIOTIC DAILY PO), Take 1 tablet by mouth Daily., Disp: , Rfl:   •  YUVAFEM 10 MCG tablet vaginal tablet, INSERT 1 TABLET INTO THE VAGINA 2 (TWO) TIMES A WEEK., Disp: 8 tablet, Rfl: 2   Past Medical History:   Diagnosis Date   • Ankle pain    • Arthritis    • Cancer (CMS/HCC)     Skin   • Chest discomfort    • Cholecystitis 1/15/2018   • Coronary artery disease    • Edema    • Elevated liver enzymes 1/15/2018   • Gallstones    • GERD (gastroesophageal reflux disease)    • Glaucoma     pt is currently off of meds and Dr Tanner does not think that she is glaucoma   • H/O complete eye exam 06/2013   • H/O mammogram 11/16/2015   • H/O non-ST elevation myocardial infarction (NSTEMI) 01/2005   • Hammer toe    • Heart attack (CMS/HCC) 01/2005   • History of blood transfusion 01/2005   • History of cardiovascular stress test 12/02/2015    normal   • HTN (hypertension)    • Hyperlipidemia    • Hypokalemia 1/15/2018   • Kidney infection 1971   • Menopausal symptoms    • Onychia and paronychia of finger    • Osteopenia    • Other elevated white  blood cell count    • Pap smear for cervical cancer screening 2010    [facility]   • Pneumonia    • Sepsis (CMS/HCC) 1/15/2018   • UTI (urinary tract infection) 1/15/2018   • Viral warts    • Wears glasses       Past Surgical History:   Procedure Laterality Date   • BLADDER REPAIR  2002   • BLADDER REPAIR  2003   • BUNIONECTOMY Right 2010   • CHOLECYSTECTOMY WITH INTRAOPERATIVE CHOLANGIOGRAM N/A 3/7/2018    Procedure: CHOLECYSTECTOMY LAPAROSCOPIC INTRAOPERATIVE CHOLANGIOGRAM;  Surgeon: Harinder Mason MD;  Location: [facility];  Service:    • COLONOSCOPY  2008, 4/2014    [facility]   • CORONARY STENT PLACEMENT Left 01/2005    drug eluting stent 1/2005 LAD   • DILATATION AND CURETTAGE  1971   • DILATATION AND CURETTAGE  1978   • EYE CAPSULOTOMY WITH LASER Bilateral 2018   • EYE SURGERY Bilateral 05/2016    Cataract   • FRONTALIS SUSPENSION  2010   • HAMMER TOE REPAIR Right 11/03/2010   • HEAD & NECK SKIN LESION EXCISIONAL BIOPSY  08/20/2018    benign scalp cyst   • HYSTERECTOMY  1978   • LAPAROTOMY OOPHERECTOMY Bilateral 2002   • NOSE SURGERY  03/2017    repair 3 fractured areas. Dr Dunne   • OTHER SURGICAL HISTORY  2005    rectocele repair   • OTHER SURGICAL HISTORY  2010    eyelid surgery   • REPLACEMENT TOTAL KNEE BILATERAL Bilateral    • SKIN BIOPSY     • TONSILLECTOMY  1952   • TOTAL KNEE ARTHROPLASTY  01/2005   • UTERINE FIBROID SURGERY  1978    emergency removal of fibroid from birth canal      Family History   Problem Relation Age of Onset   • Stroke Mother    • Heart failure Father         congestive   • Cancer Father         lip   • Breast cancer Sister         60's   • Breast cancer Daughter 40   • Breast cancer Other         NIECE 60's   • Breast cancer Other         NIECE 60's   • Ovarian cancer Neg Hx       Social History     Socioeconomic History   • Marital status:      Spouse name: Not on file   • Number of children: Not on file   • Years of education: Not on file   • Highest education level:  "Not on file   Tobacco Use   • Smoking status: Never Smoker   • Smokeless tobacco: Never Used   Substance and Sexual Activity   • Alcohol use: No   • Drug use: No   • Sexual activity: Yes     Birth control/protection: None     Review of Systems   Musculoskeletal: Positive for back pain, myalgias and neck stiffness.   Neurological: Positive for tremors.       Objective:    /60   Pulse 65   Ht 152.4 cm (60\")   Wt 68.5 kg (151 lb)   LMP  (LMP Unknown) Comment: Last Mammogram 2017  BMI 29.49 kg/m²     Neurology Exam:    General apperance: NAD.     Mental status: Alert, awake and oriented to time place and person.    Recent and Remote memory: Intact.    Attention span and Concentration: Normal.     Language and Speech: Intact- No dysarthria.    Fluency, Naming , Repitition and Comprehension:  Intact    Cranial Nerves:   CN II: Visual fields are full. Intact. Fundi - Normal, No papillederma, Pupils - COLE  CN III, IV and VI: Extraocular movements are intact. Normal saccades.   CN V: Facial sensation is intact.   CN VII: Muscles of facial expression reveal no asymmetry. Intact.   CN VIII: Hearing is intact. Whispered voice intact.   CN IX and X: Palate elevates symmetrically. Intact  CN XI: Shoulder shrug is intact.   CN XII: Tongue is midline without evidence of atrophy or fasciculation.     Ophthalmoscopic exam of optic disc-normal    Motor:-Constant head tremor noted.  Also mild bilateral postural hand tremors     Right UE muscle strength 5/5. Normal tone.     Left UE muscle strength 5/5. Normal tone.      Right LE muscle strength5/5. Normal tone.     Left LE muscle strength 5/5. Normal tone.      Sensory: Normal light touch, vibration and pinprick sensation bilaterally.    DTRs: 2+ bilaterally in upper and lower extremities.    Babinski: Negative bilaterally.    Co-ordination: Normal finger-to-nose, heel to shin B/L.    Rhomberg: Negative.    Gait: Normal.    Cardiovascular: Regular rate and rhythm without " murmur, gallop or rub.    Assessment and Plan:  1. Muscle cramps  I have told her to lower the dose of pravastatin back to 40 mg daily as that may be the cause of her muscle cramps  I have also told her to start taking magnesium supplements    2. Essential tremor  She could have an essential tremor versus a dystonic tremor  Has improved with PT.  Does not want to try any medications or Botox    3. Idiopathic peripheral neuropathy  Again she does not want to start on medications.  Neuropathy panel in the past was normal    4. Bilateral sciatica  Will refer to PT  - Ambulatory Referral to Physical Therapy       Return in about 3 months (around 5/5/2020).         Marianela Flores MD

## 2020-02-16 DIAGNOSIS — N95.1 MENOPAUSAL SYMPTOM: ICD-10-CM

## 2020-02-17 RX ORDER — ESTRADIOL 10 UG/1
TABLET VAGINAL
Qty: 8 TABLET | Refills: 2 | Status: SHIPPED | OUTPATIENT
Start: 2020-02-17 | End: 2020-05-07

## 2020-02-24 ENCOUNTER — TELEPHONE (OUTPATIENT)
Dept: INTERNAL MEDICINE | Facility: CLINIC | Age: 80
End: 2020-02-24

## 2020-02-24 DIAGNOSIS — I25.84 CORONARY ARTERY DISEASE DUE TO CALCIFIED CORONARY LESION: Primary | ICD-10-CM

## 2020-02-24 DIAGNOSIS — I25.2 OLD MI (MYOCARDIAL INFARCTION): ICD-10-CM

## 2020-02-24 DIAGNOSIS — I25.10 CORONARY ARTERY DISEASE DUE TO CALCIFIED CORONARY LESION: Primary | ICD-10-CM

## 2020-02-24 NOTE — TELEPHONE ENCOUNTER
Pt called to requested a referral to the cardiologist, Dr. Oralia Greene, at [facility]. Please advise [phone]

## 2020-02-25 NOTE — TELEPHONE ENCOUNTER
PT CALLED BACK WANTS TO SEE DR NAT COATES HEART ATTACK AND STENT SHE HAD PUT IN 2005 THIS WILL BE A CHECKUP SHE HAS DONE YEARLY

## 2020-03-04 ENCOUNTER — TELEPHONE (OUTPATIENT)
Dept: CARDIOLOGY | Facility: CLINIC | Age: 80
End: 2020-03-04

## 2020-03-04 NOTE — TELEPHONE ENCOUNTER
REFERRED BY ALEXANDRA BARFIELD- [facility]      *PREVIOUS CARDIOLOGIST: PASTOR KAY (SCANED IN MEDIA)   *TESTING: EKG 2018 IN EPIC

## 2020-04-06 ENCOUNTER — TELEPHONE (OUTPATIENT)
Dept: INTERNAL MEDICINE | Facility: CLINIC | Age: 80
End: 2020-04-06

## 2020-04-06 NOTE — TELEPHONE ENCOUNTER
PATIENT IS HAVING TROUBLES WITH BACK SPASMS AND WOULD LIKE TO KNOW IF PCP CAN CALL IN A MUSCLE RELAXER FOR SOME RELIEF, WHEN WALKING OR MOVING AROUND MUSCLES ARE TIGHTENING AND FREQUENT BY THE END OF THE DAY. PT IS USING WALKER AND JUST WANTS RELEIF HAS BEEN GOING ON ABOUT A WEEK        SIRIA CHANG RD  [phone]    PLEASE ADVISE  [phone]

## 2020-04-08 ENCOUNTER — OFFICE VISIT (OUTPATIENT)
Dept: INTERNAL MEDICINE | Facility: CLINIC | Age: 80
End: 2020-04-08

## 2020-04-08 VITALS — HEART RATE: 65 BPM | SYSTOLIC BLOOD PRESSURE: 153 MMHG | DIASTOLIC BLOOD PRESSURE: 73 MMHG

## 2020-04-08 DIAGNOSIS — M62.830 SPASM OF MUSCLE OF LOWER BACK: Primary | ICD-10-CM

## 2020-04-08 PROCEDURE — 99442 PR PHYS/QHP TELEPHONE EVALUATION 11-20 MIN: CPT | Performed by: FAMILY MEDICINE

## 2020-04-08 RX ORDER — TIZANIDINE 4 MG/1
4 TABLET ORAL EVERY 8 HOURS PRN
Qty: 30 TABLET | Refills: 1 | Status: SHIPPED | OUTPATIENT
Start: 2020-04-08 | End: 2020-04-28 | Stop reason: SDUPTHER

## 2020-04-08 NOTE — PROGRESS NOTES
Subjective   Chelsi Ferguson is a 79 y.o. female.     Chief Complaint   Patient presents with   • Spasms     back spasms, having problems moving around, using walker just to get around the house. Around her waist and lower back, pain and frequency several times a day, has been trying hot and cold compress, lying, sitting and/or standing-doesn't make a difference. Using biofreeze       Visit Vitals  /73 Comment: pt reported   Pulse 65 Comment: pt reported   LMP  (LMP Unknown) Comment: Last Mammogram 2017       You have chosen to receive care through a telephone visit today. Do you consent to use a telephone visit for your medical care today? Yes  Back Pain   This is a recurrent problem. The current episode started in the past 7 days (5-6 days). The problem occurs constantly. The problem is unchanged. The pain is present in the lumbar spine. The pain does not radiate (1 month ago when was in PT pt had bilateral sciatica). The pain is at a severity of 3/10 (walking causes pain to 9-10, reminds her of labor pain). The pain is severe. The symptoms are aggravated by position and standing (walking). Associated symptoms include abdominal pain (left side mild, hx of diverticulitis) and weight loss (initially that has resolved). Pertinent negatives include no bladder incontinence, bowel incontinence, chest pain, dysuria, fever, headaches, leg pain, numbness, paresis, paresthesias, pelvic pain, perianal numbness, tingling or weakness. Risk factors include menopause. She has tried bed rest for the symptoms. The treatment provided no relief.        The following portions of the patient's history were reviewed and updated as appropriate: allergies, current medications, past family history, past medical history, past social history, past surgical history and problem list.    Past Medical History:   Diagnosis Date   • Ankle pain    • Arthritis    • Cancer (CMS/HCC)     Skin   • Chest discomfort    • Cholecystitis 1/15/2018   •  Coronary artery disease    • Edema    • Elevated liver enzymes 1/15/2018   • Gallstones    • GERD (gastroesophageal reflux disease)    • Glaucoma     pt is currently off of meds and Dr Tanner does not think that she is glaucoma   • H/O complete eye exam 06/2013   • H/O mammogram 11/16/2015   • H/O non-ST elevation myocardial infarction (NSTEMI) 01/2005   • Hammer toe    • Heart attack (CMS/HCC) 01/2005   • History of blood transfusion 01/2005   • History of cardiovascular stress test 12/02/2015    normal   • HTN (hypertension)    • Hyperlipidemia    • Hypokalemia 1/15/2018   • Kidney infection 1971   • Menopausal symptoms    • Onychia and paronychia of finger    • Osteopenia    • Other elevated white blood cell count    • Pap smear for cervical cancer screening 2010 [facility]   • Pneumonia    • Sepsis (CMS/HCC) 1/15/2018   • UTI (urinary tract infection) 1/15/2018   • Viral warts    • Wears glasses       Past Surgical History:   Procedure Laterality Date   • BLADDER REPAIR  2002   • BLADDER REPAIR  2003   • BUNIONECTOMY Right 2010   • CHOLECYSTECTOMY WITH INTRAOPERATIVE CHOLANGIOGRAM N/A 3/7/2018    Procedure: CHOLECYSTECTOMY LAPAROSCOPIC INTRAOPERATIVE CHOLANGIOGRAM;  Surgeon: Harinder Mason MD;  Location: [facility];  Service:    • COLONOSCOPY  2008, 4/2014    [facility]   • CORONARY STENT PLACEMENT Left 01/2005    drug eluting stent 1/2005 LAD   • DILATATION AND CURETTAGE  1971   • DILATATION AND CURETTAGE  1978   • EYE CAPSULOTOMY WITH LASER Bilateral 2018   • EYE SURGERY Bilateral 05/2016    Cataract   • FRONTALIS SUSPENSION  2010   • HAMMER TOE REPAIR Right 11/03/2010   • HEAD & NECK SKIN LESION EXCISIONAL BIOPSY  08/20/2018    benign scalp cyst   • HYSTERECTOMY  1978   • LAPAROTOMY OOPHERECTOMY Bilateral 2002   • NOSE SURGERY  03/2017    repair 3 fractured areas. Dr Dunne   • OTHER SURGICAL HISTORY  2005    rectocele repair   • OTHER SURGICAL HISTORY  2010    eyelid surgery   • REPLACEMENT TOTAL KNEE  BILATERAL Bilateral    • SKIN BIOPSY     • TONSILLECTOMY  1952   • TOTAL KNEE ARTHROPLASTY  01/2005   • UTERINE FIBROID SURGERY  1978    emergency removal of fibroid from birth canal      Family History   Problem Relation Age of Onset   • Stroke Mother    • Heart failure Father         congestive   • Cancer Father         lip   • Breast cancer Sister         60's   • Breast cancer Daughter 40   • Breast cancer Other         NIECE 60's   • Breast cancer Other         NIECE 60's   • Ovarian cancer Neg Hx       Social History     Socioeconomic History   • Marital status:      Spouse name: Not on file   • Number of children: Not on file   • Years of education: Not on file   • Highest education level: Not on file   Tobacco Use   • Smoking status: Never Smoker   • Smokeless tobacco: Never Used   Substance and Sexual Activity   • Alcohol use: No   • Drug use: No   • Sexual activity: Yes     Birth control/protection: None      Allergies   Allergen Reactions   • Aleve [Naproxen] Hives   • Indocin [Indomethacin] Hallucinations   • Lipitor [Atorvastatin] Hives   • Statins Hives   • Zocor [Simvastatin] Hives   • Celebrex [Celecoxib] Diarrhea   • Keflex [Cephalexin] GI Intolerance     Upset stomach, may have had some blood in stool but cannot remember the reason why she was put on it. Tolerates penicillins without problem.   • Ibuprofen GI Intolerance     Heartburn.   • Prevacid [Lansoprazole] Nausea And Vomiting   • Prilosec [Omeprazole] Nausea And Vomiting       Review of Systems   Constitutional: Positive for weight loss (initially that has resolved). Negative for chills, diaphoresis, fatigue and fever.   HENT: Negative.  Negative for ear pain, nosebleeds, postnasal drip, rhinorrhea, sinus pressure, sneezing and sore throat.    Eyes: Negative.  Negative for redness and itching.   Respiratory: Negative.  Negative for cough, shortness of breath and wheezing.    Cardiovascular: Negative.  Negative for chest pain and  palpitations.   Gastrointestinal: Positive for abdominal pain (left side mild, hx of diverticulitis). Negative for bowel incontinence, constipation, diarrhea, nausea and vomiting.   Endocrine: Negative.  Negative for cold intolerance and heat intolerance.   Genitourinary: Negative.  Negative for bladder incontinence, dysuria, frequency, hematuria, pelvic pain and urgency.   Musculoskeletal: Positive for arthralgias and back pain. Negative for neck pain.   Skin: Negative.  Negative for color change and rash.   Allergic/Immunologic: Negative.  Negative for environmental allergies.   Neurological: Negative.  Negative for dizziness, tingling, syncope, weakness, light-headedness, numbness, headaches and paresthesias.   Hematological: Negative.  Negative for adenopathy. Does not bruise/bleed easily.   Psychiatric/Behavioral: Negative.  Negative for dysphoric mood. The patient is not nervous/anxious.        Objective   Physical Exam    Assessment/Plan   Chelsi was seen today for spasms.    Diagnoses and all orders for this visit:    Spasm of muscle of lower back  -     tiZANidine (ZANAFLEX) 4 MG tablet; Take 1 tablet by mouth Every 8 (Eight) Hours As Needed for Muscle Spasms.        And advised to try a lower dose(1/2 tab) of tizanidine initially and if that does not work then she can use the full 4 mg dose.           Current Outpatient Medications:   •  aspirin 81 MG EC tablet, Take 81 mg by mouth Every Night., Disp: , Rfl:   •  Cholecalciferol (VITAMIN D3 PO), Vitamin D3  Daily, Disp: , Rfl:   •  coenzyme Q10 100 MG capsule, Take 100 mg by mouth Daily., Disp: , Rfl:   •  famotidine (PEPCID) 20 MG tablet, Take 20 mg by mouth 2 (Two) Times a Day., Disp: , Rfl:   •  latanoprost (XALATAN) 0.005 % ophthalmic solution, 1 drop Every Night., Disp: , Rfl:   •  metoprolol succinate XL (TOPROL-XL) 25 MG 24 hr tablet, Take 25 mg by mouth Every Morning., Disp: , Rfl:   •  nitroglycerin (NITROSTAT) 0.4 MG SL tablet, Place 0.4 mg under the  tongue Every 5 (Five) Minutes As Needed., Disp: , Rfl:   •  Omega-3 Fatty Acids (FISH OIL PO), Fish Oil 300 mg-1,000 mg capsule,delayed release  Daily, Disp: , Rfl:   •  pravastatin (PRAVACHOL) 40 MG tablet, Take 1 tablet by mouth Daily. (Patient taking differently: Take 80 mg by mouth Daily.), Disp: 90 tablet, Rfl: 1  •  YUVAFEM 10 MCG tablet vaginal tablet, INSERT 1 TABLET INTO THE VAGINA 2 (TWO) TIMES A WEEK., Disp: 8 tablet, Rfl: 2  •  tiZANidine (ZANAFLEX) 4 MG tablet, Take 1 tablet by mouth Every 8 (Eight) Hours As Needed for Muscle Spasms., Disp: 30 tablet, Rfl: 1    Return if symptoms worsen or fail to improve, for Recheck.     This visit has been rescheduled as a phone visit to comply with patient safety concerns in accordance with CDC recommendations. Total time of discussion was 12 minutes.

## 2020-04-27 DIAGNOSIS — M62.830 SPASM OF MUSCLE OF LOWER BACK: ICD-10-CM

## 2020-04-27 RX ORDER — TIZANIDINE 4 MG/1
4 TABLET ORAL EVERY 8 HOURS PRN
Qty: 30 TABLET | Refills: 1 | Status: CANCELLED | OUTPATIENT
Start: 2020-04-27

## 2020-04-27 RX ORDER — TIZANIDINE 4 MG/1
4 TABLET ORAL EVERY 8 HOURS PRN
Qty: 30 TABLET | Refills: 1 | OUTPATIENT
Start: 2020-04-27

## 2020-04-28 ENCOUNTER — TELEPHONE (OUTPATIENT)
Dept: INTERNAL MEDICINE | Facility: CLINIC | Age: 80
End: 2020-04-28

## 2020-04-28 DIAGNOSIS — M62.830 SPASM OF MUSCLE OF LOWER BACK: ICD-10-CM

## 2020-04-28 RX ORDER — TIZANIDINE 4 MG/1
4 TABLET ORAL EVERY 8 HOURS PRN
Qty: 30 TABLET | Refills: 1 | Status: SHIPPED | OUTPATIENT
Start: 2020-04-28 | End: 2020-12-10

## 2020-05-07 DIAGNOSIS — N95.1 MENOPAUSAL SYMPTOM: ICD-10-CM

## 2020-05-07 RX ORDER — ESTRADIOL 10 UG/1
TABLET VAGINAL
Qty: 8 TABLET | Refills: 2 | Status: SHIPPED | OUTPATIENT
Start: 2020-05-07 | End: 2020-08-31

## 2020-05-20 ENCOUNTER — CONSULT (OUTPATIENT)
Dept: CARDIOLOGY | Facility: CLINIC | Age: 80
End: 2020-05-20

## 2020-05-20 VITALS
WEIGHT: 150 LBS | SYSTOLIC BLOOD PRESSURE: 120 MMHG | HEIGHT: 61 IN | BODY MASS INDEX: 28.32 KG/M2 | DIASTOLIC BLOOD PRESSURE: 80 MMHG | HEART RATE: 66 BPM

## 2020-05-20 DIAGNOSIS — E78.00 PURE HYPERCHOLESTEROLEMIA: Chronic | ICD-10-CM

## 2020-05-20 DIAGNOSIS — I25.10 CORONARY ARTERY DISEASE INVOLVING NATIVE CORONARY ARTERY OF NATIVE HEART WITHOUT ANGINA PECTORIS: ICD-10-CM

## 2020-05-20 DIAGNOSIS — I10 ESSENTIAL HYPERTENSION: Primary | Chronic | ICD-10-CM

## 2020-05-20 PROCEDURE — 93000 ELECTROCARDIOGRAM COMPLETE: CPT | Performed by: NURSE PRACTITIONER

## 2020-05-20 PROCEDURE — 99204 OFFICE O/P NEW MOD 45 MIN: CPT | Performed by: INTERNAL MEDICINE

## 2020-05-20 RX ORDER — ROSUVASTATIN CALCIUM 20 MG/1
20 TABLET, COATED ORAL NIGHTLY
Qty: 30 TABLET | Refills: 11 | Status: SHIPPED | OUTPATIENT
Start: 2020-05-20 | End: 2020-12-10 | Stop reason: SDUPTHER

## 2020-05-20 RX ORDER — NITROGLYCERIN 0.4 MG/1
0.4 TABLET SUBLINGUAL
Qty: 30 TABLET | Refills: 3 | Status: SHIPPED | OUTPATIENT
Start: 2020-05-20 | End: 2022-09-09 | Stop reason: SDUPTHER

## 2020-05-20 NOTE — PROGRESS NOTES
[facility] Cardiology    Subjective:     Encounter Date:05/20/2020    Patient ID: Chelsi Ferguson is a 79 y.o. female.  Referring provider: Reina MAI MD     Reason for consultation:   Chief Complaint   Patient presents with   • Coronary Artery Disease      PROBLEM LIST:  1. Coronary artery disease  a. S/p NSTEMI with TATO (cypher sirolimus eluting stent) to LAD, 1/2005- data deficit.   b. Nuclear stress test 12/2015: no ischemia.   2. Hyperlipidemia  a. FLP 1/2020: , Trig 145, HDL 43, LDL 94.   3. Hypertension  4. GERD  5. Osteoarthritis  6. Essential tremor      Allergies   Allergen Reactions   • Aleve [Naproxen] Hives   • Indocin [Indomethacin] Hallucinations   • Lipitor [Atorvastatin] Hives   • Statins Hives   • Zocor [Simvastatin] Hives   • Celebrex [Celecoxib] Diarrhea   • Keflex [Cephalexin] GI Intolerance     Upset stomach, may have had some blood in stool but cannot remember the reason why she was put on it. Tolerates penicillins without problem.   • Ibuprofen GI Intolerance     Heartburn.   • Prevacid [Lansoprazole] Nausea And Vomiting   • Prilosec [Omeprazole] Nausea And Vomiting         Current Outpatient Medications:   •  aspirin 81 MG EC tablet, Take 81 mg by mouth Every Night., Disp: , Rfl:   •  Cholecalciferol (VITAMIN D3 PO), Vitamin D3  Daily, Disp: , Rfl:   •  coenzyme Q10 100 MG capsule, Take 100 mg by mouth Daily., Disp: , Rfl:   •  famotidine (PEPCID) 20 MG tablet, Take 20 mg by mouth 2 (Two) Times a Day., Disp: , Rfl:   •  latanoprost (XALATAN) 0.005 % ophthalmic solution, 1 drop Every Night., Disp: , Rfl:   •  metoprolol succinate XL (TOPROL-XL) 25 MG 24 hr tablet, Take 25 mg by mouth Every Morning., Disp: , Rfl:   •  nitroglycerin (NITROSTAT) 0.4 MG SL tablet, Place 0.4 mg under the tongue Every 5 (Five) Minutes As Needed., Disp: , Rfl:   •  Omega-3 Fatty Acids (FISH OIL PO), Fish Oil 300 mg-1,000 mg capsule,delayed release  Daily, Disp: , Rfl:   •   pravastatin (PRAVACHOL) 40 MG tablet, Take 1 tablet by mouth Daily. (Patient taking differently: Take 80 mg by mouth Daily.), Disp: 90 tablet, Rfl: 1  •  tiZANidine (ZANAFLEX) 4 MG tablet, Take 1 tablet by mouth Every 8 (Eight) Hours As Needed for Muscle Spasms., Disp: 30 tablet, Rfl: 1  •  YUVAFEM 10 MCG tablet vaginal tablet, INSERT 1 TABLET INTO THE VAGINA 2 (TWO) TIMES A WEEK., Disp: 8 tablet, Rfl: 2    HPI:      Chelsi Ferguson is a 79 y.o. female who is seen in consultation today at the request of Reina MAI MD for CAD, HTN, HLD. She is a former patient of Dr. Brooks at [facility]. She had a MI in 2005 following knee replacement. She felt an impending doom but does not recall having chest pain, SOB with the event. She received a stent to the LAD.  She has not had any stents since then. Her last stress test was 2015 without ischemia. She has a lot of muscle cramps with Pravastatin 80 mg daily and has done better on pravastatin 40 mg daily. However, she still gets muscle cramps and aches. BP is well controlled. She does not routinely exercise.     Cardiac Risk Factors:  Personal h/o CAD  HTN  HLD      Social History     Socioeconomic History   • Marital status:      Spouse name: Not on file   • Number of children: Not on file   • Years of education: Not on file   • Highest education level: Not on file   Tobacco Use   • Smoking status: Never Smoker   • Smokeless tobacco: Never Used   Substance and Sexual Activity   • Alcohol use: No   • Drug use: No   • Sexual activity: Yes     Birth control/protection: None     Family History   Problem Relation Age of Onset   • Stroke Mother    • Heart failure Father         congestive   • Cancer Father         lip   • Breast cancer Sister         60's   • Breast cancer Daughter 40   • Breast cancer Other         NIECE 60's   • Breast cancer Other         NIECE 60's   • Ovarian cancer Neg Hx        Review of Systems:  The following portions of the patient's history  were reviewed and updated as appropriate: allergies, current medications and problem list.    Constitution: Negative for chills, fatigue, fever, and generalized weakness.   Cardiovascular: See HPI  Respiratory: See HPI  HENT: Negative for ear pain, nosebleeds, and tinnitus.  Gastrointestinal: Negative for abdominal pain, constipation, diarrhea, nausea and vomiting.   Genitourinary: No urinary symptoms.   Musculoskeletal: Positive for muscle cramps.  Neurological: Negative for dizziness, headaches, loss of balance, numbness, and symptoms of stroke.  Psychiatric: Negative for depression, anxiety.           Objective:     Vitals:    05/20/20 1316   BP: 120/80   Pulse: 66     GENERAL: This is a well-developed, well-nourished, female who is in no acute distress. Alert and oriented x3. Normal mood and affect.   SKIN: Pink and warm without rash or abnormality noted.   HEENT: Head is normocephalic and atraumatic. Pupils are equal and reactive to light bilaterally. Mucous membranes are pink and moist.   NECK: Supple without lymphadenopathy or thyromegaly. There is no jugular venous distention at 30°.  LUNGS: Clear to auscultation bilaterally without wheezing, rhonchi, or rales noted.   CARDIOVASCULAR: The heart has a regular rate with a normal S1 and S2. There is no murmur, gallop, rub, or click appreciated. The PMI is nondisplaced. Carotid upstrokes are 2+ and symmetrical without bruits.   ABDOMEN: Soft and nondistended with positive bowel sounds x4. The patient denies tenderness of palpitation.   MUSCULOSKELETAL: There are no obvious bony abnormalities. Normal range of tenderness to palpation.   NEUROLOGICAL: Nonfocal.   PERIPHERAL VASCULAR: Femoral pulses are 2+ and symmetrical without bruits. Posterior tibial and dorsalis pedis pulses are 2+ and symmetrical. There is no peripheral edema.         ECG 12 Lead  Date/Time: 5/20/2020 1:50 PM  Performed by: Cindy Madrigal APRN  Authorized by: Cindy Madrigal,  APRN   Comparison: not compared with previous ECG   Previous ECG: no previous ECG available  Rhythm: sinus rhythm  BPM: 66    Clinical impression: abnormal EKG  Comments: Evidence of prior anterior infarct.             Advance Care Planning   ACP discussion was held with the patient during this visit. Patient does not have an advance directive, declines further assistance.          Assessment:       ICD-10-CM ICD-9-CM   1. Essential hypertension I10 401.9   2. Pure hypercholesterolemia E78.00 272.0   3. Coronary artery disease involving native coronary artery of native heart without angina pectoris I25.10 414.01            Plan:     1. Will get nuclear chemical stress test to evaluate for ischemia with h/o silent MI.   2. Will stop pravastatin and try Crestor 20 mg daily. Repeat FLP 3 months from now if she tolerates this.  Goal LDL less than 70 mg/dL  3. Continue ASA, BB for CAD  4. Continue metoprolol for HTN.   5. Patient was counseled to begin aerobic exercise 30 min per day for at least 4 days per week.   6. Follow-up in 6 months, sooner as needed.        Scribed for Oralia Greene MD by LINA Martinez. 5/20/2020  13:49     I Oralia Greene MD personally performed the services described in this documentation as scribed by the above individual in my presence, and it is both accurate and complete.    Oralia Greene MD, [facility]

## 2020-06-01 ENCOUNTER — OFFICE VISIT (OUTPATIENT)
Dept: NEUROLOGY | Facility: CLINIC | Age: 80
End: 2020-06-01

## 2020-06-01 VITALS
DIASTOLIC BLOOD PRESSURE: 76 MMHG | HEIGHT: 61 IN | OXYGEN SATURATION: 98 % | SYSTOLIC BLOOD PRESSURE: 124 MMHG | WEIGHT: 152.4 LBS | BODY MASS INDEX: 28.77 KG/M2 | HEART RATE: 70 BPM

## 2020-06-01 DIAGNOSIS — G25.0 ESSENTIAL TREMOR: Primary | ICD-10-CM

## 2020-06-01 DIAGNOSIS — G60.9 IDIOPATHIC PERIPHERAL NEUROPATHY: ICD-10-CM

## 2020-06-01 PROCEDURE — 99213 OFFICE O/P EST LOW 20 MIN: CPT | Performed by: PSYCHIATRY & NEUROLOGY

## 2020-06-01 NOTE — PROGRESS NOTES
Subjective:    CC: Chelsi Ferguson is seen today   for Spasms       HPI:  Current visit- since her last visit patient feels that her head tremors are about the same and have not really worsened.  Her leg muscle spasms have improved since Dr. Greene switched her from pravastatin to Crestor.  Her sciatica has also improved after she got PT.  With regards to her neuropathy she still has numbness in the bottoms of her feet but denies having any severe pain.  She is a little tearful today due to the recent demise of her .    Last visit-since the last visit patient feels that her neck stiffness and tremors have improved slightly after she got neck PT.  However today she has a new complaint.  She has recently started to get excruciating muscle cramps in her legs in her feet or groin.  They usually occur after prolonged sitting but may happen in the middle of the night waking her up from sleep.  Patient recently increased her pravastatin to 80 mg daily as she was instructed by the cardiologist to do so for goal LDL of less than 70.  Her LDL was 94.  With regards to her neuropathy her symptoms of tingling and numbness in her feet are stable.  She has occasionally also started to get some burning pain in her legs.  Still does not want to start medications.  Today she is also complaining of low back pain that was initially radiating down her right leg but now goes down both legs.    Last visit-since the last visit patient's head and left hand tremor has remained the same.  She only notices it occasionally when she is at [employer] and sitting still otherwise it does not bother her.  The numbness in her feet has also remained the same.  She denies having any severe pain.  Does have slight balance problems due to the numbness.  Her neuropathy panel was within normal limits.    Last visit-since her last visit her neck pain has completely resolved and she has finished her physical therapy.  The tremors in her head and left  hand still persist however they have not become worse and she still does not want to start taking any medications.  Recently she has also started to notice tingling, numbness and occasional sharp pain in the bottoms of both feet.  At times she also feels off balance.  Denies having diabetes or heavy alcohol intake.      Last visit-since her last visit she had a CT of her cervical spine that showed diffuse degenerative changes right more than left.  She did go for physical therapy which has helped.  She still gets occasional neck pain and stiffness but does not want to take muscle relaxants.  Today she also complains of a head tremor as well as a tremor in her left hand  that occurs more while doing tasks or holding objects in her hand.  Anxiety also seems to make the tremors worse.  She denies any family history of tremors.    Initial cxsit-[id number]-wvpg-old female with a history of hypertension, hyperlipidemia, arthritis, CAD, GERD presents with neck pain.  As per patient she was bedridden and frequently hospitalized for several UTIs and cholecystitis between January to June of this year.  Then in May of this year she started having a severe neck spasm on the left as well as pain that radiated from the neck to the side of her face and up into her scalp.  She underwent physical therapy for 4-6 weeks at the time that helped bring down the pain from 8 to about 3/10 in severity.  She was also found to have 2 cysts on her scalp and underwent removal for those which helped relieve her headaches.  Currently she has mild neck pain on the left that worsens while turning her neck to the right (like when she is backing her car up ) but denies having any facial pain or headaches.  She does have mild scalp numbness in the area where the cysts were removed.  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  She was asked to undergo surgery but refused.    The following portions of the patient's history were reviewed today and updated as of 06/01/2020  : allergies, current medications, past family history, past medical history, past social history, past surgical history and problem list  These document will be scanned to patient's chart.      Current Outpatient Medications:   •  aspirin 81 MG EC tablet, Take 81 mg by mouth Every Night., Disp: , Rfl:   •  Cholecalciferol (VITAMIN D3 PO), Vitamin D3  Daily, Disp: , Rfl:   •  coenzyme Q10 100 MG capsule, Take 100 mg by mouth Daily., Disp: , Rfl:   •  famotidine (PEPCID) 20 MG tablet, Take 20 mg by mouth 2 (Two) Times a Day., Disp: , Rfl:   •  latanoprost (XALATAN) 0.005 % ophthalmic solution, 1 drop Every Night., Disp: , Rfl:   •  metoprolol succinate XL (TOPROL-XL) 25 MG 24 hr tablet, Take 25 mg by mouth Every Morning., Disp: , Rfl:   •  nitroglycerin (NITROSTAT) 0.4 MG SL tablet, Place 1 tablet under the tongue Every 5 (Five) Minutes As Needed for Chest Pain., Disp: 30 tablet, Rfl: 3  •  rosuvastatin (CRESTOR) 20 MG tablet, Take 1 tablet by mouth Every Night., Disp: 30 tablet, Rfl: 11  •  tiZANidine (ZANAFLEX) 4 MG tablet, Take 1 tablet by mouth Every 8 (Eight) Hours As Needed for Muscle Spasms., Disp: 30 tablet, Rfl: 1  •  YUVAFEM 10 MCG tablet vaginal tablet, INSERT 1 TABLET INTO THE VAGINA 2 (TWO) TIMES A WEEK., Disp: 8 tablet, Rfl: 2   Past Medical History:   Diagnosis Date   • Ankle pain    • Arthritis    • Cancer (CMS/HCC)     Skin   • Chest discomfort    • Cholecystitis 1/15/2018   • Coronary artery disease    • Edema    • Elevated liver enzymes 1/15/2018   • Gallstones    • GERD (gastroesophageal reflux disease)    • Glaucoma     pt is currently off of meds and Dr Tanner does not think that she is glaucoma   • H/O complete eye exam 06/2013   • H/O mammogram 11/16/2015   • H/O non-ST elevation myocardial infarction  (NSTEMI) 01/2005   • Hammer toe    • Heart attack (CMS/HCC) 01/2005   • History of blood transfusion 01/2005   • History of cardiovascular stress test 12/02/2015    normal   • HTN (hypertension)    • Hyperlipidemia    • Hypokalemia 1/15/2018   • Kidney infection 1971   • Menopausal symptoms    • Onychia and paronychia of finger    • Osteopenia    • Other elevated white blood cell count    • Pap smear for cervical cancer screening 2010    [facility]   • Pneumonia    • Sepsis (CMS/HCC) 1/15/2018   • UTI (urinary tract infection) 1/15/2018   • Viral warts    • Wears glasses       Past Surgical History:   Procedure Laterality Date   • BLADDER REPAIR  2002   • BLADDER REPAIR  2003   • BUNIONECTOMY Right 2010   • CHOLECYSTECTOMY WITH INTRAOPERATIVE CHOLANGIOGRAM N/A 3/7/2018    Procedure: CHOLECYSTECTOMY LAPAROSCOPIC INTRAOPERATIVE CHOLANGIOGRAM;  Surgeon: Harinder Mason MD;  Location: [facility];  Service:    • COLONOSCOPY  2008, 4/2014    [facility]   • CORONARY STENT PLACEMENT Left 01/2005    drug eluting stent 1/2005 LAD   • DILATATION AND CURETTAGE  1971   • DILATATION AND CURETTAGE  1978   • EYE CAPSULOTOMY WITH LASER Bilateral 2018   • EYE SURGERY Bilateral 05/2016    Cataract   • FRONTALIS SUSPENSION  2010   • HAMMER TOE REPAIR Right 11/03/2010   • HEAD & NECK SKIN LESION EXCISIONAL BIOPSY  08/20/2018    benign scalp cyst   • HYSTERECTOMY  1978   • LAPAROTOMY OOPHERECTOMY Bilateral 2002   • NOSE SURGERY  03/2017    repair 3 fractured areas. Dr Dunne   • OTHER SURGICAL HISTORY  2005    rectocele repair   • OTHER SURGICAL HISTORY  2010    eyelid surgery   • REPLACEMENT TOTAL KNEE BILATERAL Bilateral    • SKIN BIOPSY     • TONSILLECTOMY  1952   • TOTAL KNEE ARTHROPLASTY  01/2005   • UTERINE FIBROID SURGERY  1978    emergency removal of fibroid from birth canal      Family History   Problem Relation Age of Onset   • Stroke Mother    • Heart failure Father         congestive   • Cancer Father         lip   • Breast  "cancer Sister         60's   • Breast cancer Daughter 40   • Breast cancer Other         NIECE 60's   • Breast cancer Other         NIECE 60's   • Ovarian cancer Neg Hx       Social History     Socioeconomic History   • Marital status:      Spouse name: Not on file   • Number of children: Not on file   • Years of education: Not on file   • Highest education level: Not on file   Tobacco Use   • Smoking status: Never Smoker   • Smokeless tobacco: Never Used   Substance and Sexual Activity   • Alcohol use: No   • Drug use: No   • Sexual activity: Yes     Birth control/protection: None     Review of Systems   Musculoskeletal: Positive for myalgias (cramps are better since changing to Crestor).   All other systems reviewed and are negative.      Objective:    /76   Pulse 70   Ht 154.9 cm (61\")   Wt 69.1 kg (152 lb 6.4 oz)   LMP  (LMP Unknown) Comment: Last Mammogram 2017  SpO2 98%   BMI 28.80 kg/m²     Neurology Exam:    General apperance: NAD.     Mental status: Alert, awake and oriented to time place and person.    Recent and Remote memory: Intact.    Attention span and Concentration: Normal.     Language and Speech: Intact- No dysarthria.    Fluency, Naming , Repitition and Comprehension:  Intact    Cranial Nerves:   CN II: Visual fields are full. Intact. Fundi - Normal, No papillederma, Pupils - COLE  CN III, IV and VI: Extraocular movements are intact. Normal saccades.   CN V: Facial sensation is intact.   CN VII: Muscles of facial expression reveal no asymmetry. Intact.   CN VIII: Hearing is intact. Whispered voice intact.   CN IX and X: Palate elevates symmetrically. Intact  CN XI: Shoulder shrug is intact.   CN XII: Tongue is midline without evidence of atrophy or fasciculation.     Ophthalmoscopic exam of optic disc-normal    Motor:-Constant head tremor noted.  No postural tremors noted in the hands    Right UE muscle strength 5/5. Normal tone.     Left UE muscle strength 5/5. Normal tone.    "   Right LE muscle strength5/5. Normal tone.     Left LE muscle strength 5/5. Normal tone.      Sensory: Normal light touch, vibration and pinprick sensation bilaterally.    DTRs: 2+ bilaterally in upper and lower extremities.    Babinski: Negative bilaterally.    Co-ordination: Normal finger-to-nose, heel to shin B/L.    Rhomberg: Negative.    Gait: Normal.    Cardiovascular: Regular rate and rhythm without murmur, gallop or rub.    Assessment and Plan:  1. Essential tremor  Patient could have a mild dystonic tremor versus an essential tremor  Once again she does not want to start any medications or be referred for Botox as the tremors have been stable    2. Idiopathic peripheral neuropathy  I discussed starting her on low doses of gabapentin however she wants to wait.  Will let me know if symptoms worsen  Neuropathy panel in the past was normal       Return if symptoms worsen or fail to improve.         Marianela Flores MD

## 2020-06-19 ENCOUNTER — HOSPITAL ENCOUNTER (OUTPATIENT)
Dept: CARDIOLOGY | Facility: HOSPITAL | Age: 80
Discharge: HOME | End: 2020-06-19
Admitting: NURSE PRACTITIONER

## 2020-06-19 VITALS
BODY MASS INDEX: 28.72 KG/M2 | WEIGHT: 152.12 LBS | RESPIRATION RATE: 18 BRPM | SYSTOLIC BLOOD PRESSURE: 110 MMHG | DIASTOLIC BLOOD PRESSURE: 68 MMHG | HEART RATE: 64 BPM | OXYGEN SATURATION: 98 % | HEIGHT: 61 IN

## 2020-06-19 PROCEDURE — 78452 HT MUSCLE IMAGE SPECT MULT: CPT

## 2020-06-19 PROCEDURE — 0 TECHNETIUM SESTAMIBI: Performed by: NURSE PRACTITIONER

## 2020-06-19 PROCEDURE — 93017 CV STRESS TEST TRACING ONLY: CPT

## 2020-06-19 PROCEDURE — 78452 HT MUSCLE IMAGE SPECT MULT: CPT | Performed by: INTERNAL MEDICINE

## 2020-06-19 PROCEDURE — 25010000002 REGADENOSON 0.4 MG/5ML SOLUTION: Performed by: NURSE PRACTITIONER

## 2020-06-19 PROCEDURE — 93018 CV STRESS TEST I&R ONLY: CPT | Performed by: INTERNAL MEDICINE

## 2020-06-19 PROCEDURE — A9500 TC99M SESTAMIBI: HCPCS | Performed by: NURSE PRACTITIONER

## 2020-06-19 RX ORDER — CAFFEINE CITRATE 20 MG/ML
60 SOLUTION INTRAVENOUS ONCE
Status: COMPLETED | OUTPATIENT
Start: 2020-06-19 | End: 2020-06-19

## 2020-06-19 RX ADMIN — CAFFEINE CITRATE 60 MG: 20 INJECTION, SOLUTION INTRAVENOUS at 12:57

## 2020-06-19 RX ADMIN — TECHNETIUM TC 99M SESTAMIBI 1 DOSE: 1 INJECTION INTRAVENOUS at 10:25

## 2020-06-19 RX ADMIN — REGADENOSON 0.4 MG: 0.08 INJECTION, SOLUTION INTRAVENOUS at 12:38

## 2020-06-19 RX ADMIN — TECHNETIUM TC 99M SESTAMIBI 1 DOSE: 1 INJECTION INTRAVENOUS at 12:40

## 2020-06-20 LAB
BH CV STRESS BP STAGE 2: NORMAL
BH CV STRESS BP STAGE 4: NORMAL
BH CV STRESS COMMENTS STAGE 1: NORMAL
BH CV STRESS DOSE REGADENOSON STAGE 1: 0.4
BH CV STRESS DURATION MIN STAGE 1: 1
BH CV STRESS DURATION MIN STAGE 2: 1
BH CV STRESS DURATION MIN STAGE 3: 1
BH CV STRESS DURATION MIN STAGE 4: 1
BH CV STRESS HR STAGE 1: 90
BH CV STRESS HR STAGE 2: 96
BH CV STRESS HR STAGE 3: 96
BH CV STRESS HR STAGE 4: 94
BH CV STRESS O2 STAGE 1: 97
BH CV STRESS O2 STAGE 2: 97
BH CV STRESS O2 STAGE 3: 98
BH CV STRESS O2 STAGE 4: 99
BH CV STRESS PROTOCOL 1: NORMAL
BH CV STRESS RECOVERY BP: NORMAL MMHG
BH CV STRESS RECOVERY HR: 83 BPM
BH CV STRESS RECOVERY O2: 98 %
BH CV STRESS STAGE 1: 1
BH CV STRESS STAGE 2: 2
BH CV STRESS STAGE 3: 3
BH CV STRESS STAGE 4: 4
LV EF NUC BP: 72 %
MAXIMAL PREDICTED HEART RATE: 140 BPM
PERCENT MAX PREDICTED HR: 68.57 %
STRESS BASELINE BP: NORMAL MMHG
STRESS BASELINE HR: 63 BPM
STRESS O2 SAT REST: 98 %
STRESS PERCENT HR: 81 %
STRESS POST O2 SAT PEAK: 99 %
STRESS POST PEAK BP: NORMAL MMHG
STRESS POST PEAK HR: 96 BPM
STRESS TARGET HR: 119 BPM

## 2020-07-08 ENCOUNTER — LAB REQUISITION (OUTPATIENT)
Dept: LAB | Facility: HOSPITAL | Age: 80
End: 2020-07-08

## 2020-07-08 ENCOUNTER — APPOINTMENT (OUTPATIENT)
Dept: LAB | Facility: HOSPITAL | Age: 80
End: 2020-07-08

## 2020-07-08 ENCOUNTER — OFFICE VISIT (OUTPATIENT)
Dept: INTERNAL MEDICINE | Facility: CLINIC | Age: 80
End: 2020-07-08

## 2020-07-08 VITALS
WEIGHT: 153.2 LBS | TEMPERATURE: 96.9 F | SYSTOLIC BLOOD PRESSURE: 138 MMHG | BODY MASS INDEX: 32.16 KG/M2 | HEIGHT: 58 IN | HEART RATE: 68 BPM | OXYGEN SATURATION: 96 % | DIASTOLIC BLOOD PRESSURE: 70 MMHG

## 2020-07-08 DIAGNOSIS — I10 ESSENTIAL HYPERTENSION: Chronic | ICD-10-CM

## 2020-07-08 DIAGNOSIS — I25.10 CORONARY ARTERY DISEASE INVOLVING NATIVE CORONARY ARTERY OF NATIVE HEART WITHOUT ANGINA PECTORIS: ICD-10-CM

## 2020-07-08 DIAGNOSIS — R39.15 URGENCY OF URINATION: ICD-10-CM

## 2020-07-08 DIAGNOSIS — I21.A9 OTHER MYOCARDIAL INFARCTION TYPE (HCC): ICD-10-CM

## 2020-07-08 DIAGNOSIS — Z00.00 MEDICARE ANNUAL WELLNESS VISIT, SUBSEQUENT: Primary | ICD-10-CM

## 2020-07-08 DIAGNOSIS — Z78.0 MENOPAUSE: ICD-10-CM

## 2020-07-08 DIAGNOSIS — Z00.00 ROUTINE GENERAL MEDICAL EXAMINATION AT A HEALTH CARE FACILITY: ICD-10-CM

## 2020-07-08 DIAGNOSIS — H53.2 DIPLOPIA: ICD-10-CM

## 2020-07-08 DIAGNOSIS — G60.9 IDIOPATHIC PERIPHERAL NEUROPATHY: ICD-10-CM

## 2020-07-08 DIAGNOSIS — E78.00 PURE HYPERCHOLESTEROLEMIA: Chronic | ICD-10-CM

## 2020-07-08 PROCEDURE — 99397 PER PM REEVAL EST PAT 65+ YR: CPT | Performed by: FAMILY MEDICINE

## 2020-07-08 PROCEDURE — G0439 PPPS, SUBSEQ VISIT: HCPCS | Performed by: FAMILY MEDICINE

## 2020-07-08 PROCEDURE — 96160 PT-FOCUSED HLTH RISK ASSMT: CPT | Performed by: FAMILY MEDICINE

## 2020-07-08 PROCEDURE — 36415 COLL VENOUS BLD VENIPUNCTURE: CPT | Performed by: FAMILY MEDICINE

## 2020-07-08 NOTE — PATIENT INSTRUCTIONS
Calorie Counting for Weight Loss  Calories are units of energy. Your body needs a certain amount of calories from food to keep you going throughout the day. When you eat more calories than your body needs, your body stores the extra calories as fat. When you eat fewer calories than your body needs, your body burns fat to get the energy it needs.  Calorie counting means keeping track of how many calories you eat and drink each day. Calorie counting can be helpful if you need to lose weight. If you make sure to eat fewer calories than your body needs, you should lose weight. Ask your health care provider what a healthy weight is for you.  For calorie counting to work, you will need to eat the right number of calories in a day in order to lose a healthy amount of weight per week. A dietitian can help you determine how many calories you need in a day and will give you suggestions on how to reach your calorie goal.  · A healthy amount of weight to lose per week is usually 1-2 lb (0.5-0.9 kg). This usually means that your daily calorie intake should be reduced by 500-750 calories.  · Eating 1,200 - 1,500 calories per day can help most women lose weight.  · Eating 1,500 - 1,800 calories per day can help most men lose weight.  What is my plan?  My goal is to have __________ calories per day.  If I have this many calories per day, I should lose around __________ pounds per week.  What do I need to know about calorie counting?  In order to meet your daily calorie goal, you will need to:  · Find out how many calories are in each food you would like to eat. Try to do this before you eat.  · Decide how much of the food you plan to eat.  · Write down what you ate and how many calories it had. Doing this is called keeping a food log.  To successfully lose weight, it is important to balance calorie counting with a healthy lifestyle that includes regular activity. Aim for 150 minutes of moderate exercise (such as walking) or 75  minutes of vigorous exercise (such as running) each week.  Where do I find calorie information?    The number of calories in a food can be found on a Nutrition Facts label. If a food does not have a Nutrition Facts label, try to look up the calories online or ask your dietitian for help.  Remember that calories are listed per serving. If you choose to have more than one serving of a food, you will have to multiply the calories per serving by the amount of servings you plan to eat. For example, the label on a package of bread might say that a serving size is 1 slice and that there are 90 calories in a serving. If you eat 1 slice, you will have eaten 90 calories. If you eat 2 slices, you will have eaten 180 calories.  How do I keep a food log?  Immediately after each meal, record the following information in your food log:  · What you ate. Don't forget to include toppings, sauces, and other extras on the food.  · How much you ate. This can be measured in cups, ounces, or number of items.  · How many calories each food and drink had.  · The total number of calories in the meal.  Keep your food log near you, such as in a small notebook in your pocket, or use a mobile sid or website. Some programs will calculate calories for you and show you how many calories you have left for the day to meet your goal.  What are some calorie counting tips?    · Use your calories on foods and drinks that will fill you up and not leave you hungry:  ? Some examples of foods that fill you up are nuts and nut butters, vegetables, lean proteins, and high-fiber foods like whole grains. High-fiber foods are foods with more than 5 g fiber per serving.  ? Drinks such as sodas, specialty coffee drinks, alcohol, and juices have a lot of calories, yet do not fill you up.  · Eat nutritious foods and avoid empty calories. Empty calories are calories you get from foods or beverages that do not have many vitamins or protein, such as candy, sweets, and  "soda. It is better to have a nutritious high-calorie food (such as an avocado) than a food with few nutrients (such as a bag of chips).  · Know how many calories are in the foods you eat most often. This will help you calculate calorie counts faster.  · Pay attention to calories in drinks. Low-calorie drinks include water and unsweetened drinks.  · Pay attention to nutrition labels for \"low fat\" or \"fat free\" foods. These foods sometimes have the same amount of calories or more calories than the full fat versions. They also often have added sugar, starch, or salt, to make up for flavor that was removed with the fat.  · Find a way of tracking calories that works for you. Get creative. Try different apps or programs if writing down calories does not work for you.  What are some portion control tips?  · Know how many calories are in a serving. This will help you know how many servings of a certain food you can have.  · Use a measuring cup to measure serving sizes. You could also try weighing out portions on a kitchen scale. With time, you will be able to estimate serving sizes for some foods.  · Take some time to put servings of different foods on your favorite plates, bowls, and cups so you know what a serving looks like.  · Try not to eat straight from a bag or box. Doing this can lead to overeating. Put the amount you would like to eat in a cup or on a plate to make sure you are eating the right portion.  · Use smaller plates, glasses, and bowls to prevent overeating.  · Try not to multitask (for example, watch TV or use your computer) while eating. If it is time to eat, sit down at a table and enjoy your food. This will help you to know when you are full. It will also help you to be aware of what you are eating and how much you are eating.  What are tips for following this plan?  Reading food labels  · Check the calorie count compared to the serving size. The serving size may be smaller than what you are used to " "eating.  · Check the source of the calories. Make sure the food you are eating is high in vitamins and protein and low in saturated and trans fats.  Shopping  · Read nutrition labels while you shop. This will help you make healthy decisions before you decide to purchase your food.  · Make a grocery list and stick to it.  Cooking  · Try to cook your favorite foods in a healthier way. For example, try baking instead of frying.  · Use low-fat dairy products.  Meal planning  · Use more fruits and vegetables. Half of your plate should be fruits and vegetables.  · Include lean proteins like poultry and fish.  How do I count calories when eating out?  · Ask for smaller portion sizes.  · Consider sharing an entree and sides instead of getting your own entree.  · If you get your own entree, eat only half. Ask for a box at the beginning of your meal and put the rest of your entree in it so you are not tempted to eat it.  · If calories are listed on the menu, choose the lower calorie options.  · Choose dishes that include vegetables, fruits, whole grains, low-fat dairy products, and lean protein.  · Choose items that are boiled, broiled, grilled, or steamed. Stay away from items that are buttered, battered, fried, or served with cream sauce. Items labeled \"crispy\" are usually fried, unless stated otherwise.  · Choose water, low-fat milk, unsweetened iced tea, or other drinks without added sugar. If you want an alcoholic beverage, choose a lower calorie option such as a glass of wine or light beer.  · Ask for dressings, sauces, and syrups on the side. These are usually high in calories, so you should limit the amount you eat.  · If you want a salad, choose a garden salad and ask for grilled meats. Avoid extra toppings like nelson, cheese, or fried items. Ask for the dressing on the side, or ask for olive oil and vinegar or lemon to use as dressing.  · Estimate how many servings of a food you are given. For example, a serving of " "cooked rice is ½ cup or about the size of half a baseball. Knowing serving sizes will help you be aware of how much food you are eating at restaurants. The list below tells you how big or small some common portion sizes are based on everyday objects:  ? 1 oz--4 stacked dice.  ? 3 oz--1 deck of cards.  ? 1 tsp--1 die.  ? 1 Tbsp--½ a ping-pong ball.  ? 2 Tbsp--1 ping-pong ball.  ? ½ cup--½ baseball.  ? 1 cup--1 baseball.  Summary  · Calorie counting means keeping track of how many calories you eat and drink each day. If you eat fewer calories than your body needs, you should lose weight.  · A healthy amount of weight to lose per week is usually 1-2 lb (0.5-0.9 kg). This usually means reducing your daily calorie intake by 500-750 calories.  · The number of calories in a food can be found on a Nutrition Facts label. If a food does not have a Nutrition Facts label, try to look up the calories online or ask your dietitian for help.  · Use your calories on foods and drinks that will fill you up, and not on foods and drinks that will leave you hungry.  · Use smaller plates, glasses, and bowls to prevent overeating.  This information is not intended to replace advice given to you by your health care provider. Make sure you discuss any questions you have with your health care provider.  Document Released: 12/18/2006 Document Revised: 09/06/2019 Document Reviewed: 11/17/2017  [employer] Patient Education © 2020 [employer]  DASH Eating Plan  DASH stands for \"Dietary Approaches to Stop Hypertension.\" The DASH eating plan is a healthy eating plan that has been shown to reduce high blood pressure (hypertension). It may also reduce your risk for type 2 diabetes, heart disease, and stroke. The DASH eating plan may also help with weight loss.  What are tips for following this plan?    General guidelines  · Avoid eating more than 2,300 mg (milligrams) of salt (sodium) a day. If you have hypertension, you may need to reduce your " "sodium intake to 1,500 mg a day.  · Limit alcohol intake to no more than 1 drink a day for nonpregnant women and 2 drinks a day for men. One drink equals 12 oz of beer, 5 oz of wine, or 1½ oz of hard liquor.  · Work with your health care provider to maintain a healthy body weight or to lose weight. Ask what an ideal weight is for you.  · Get at least 30 minutes of exercise that causes your heart to beat faster (aerobic exercise) most days of the week. Activities may include walking, swimming, or biking.  · Work with your health care provider or diet and nutrition specialist (dietitian) to adjust your eating plan to your individual calorie needs.  Reading food labels    · Check food labels for the amount of sodium per serving. Choose foods with less than 5 percent of the Daily Value of sodium. Generally, foods with less than 300 mg of sodium per serving fit into this eating plan.  · To find whole grains, look for the word \"whole\" as the first word in the ingredient list.  Shopping  · Buy products labeled as \"low-sodium\" or \"no salt added.\"  · Buy fresh foods. Avoid canned foods and premade or frozen meals.  Cooking  · Avoid adding salt when cooking. Use salt-free seasonings or herbs instead of table salt or sea salt. Check with your health care provider or pharmacist before using salt substitutes.  · Do not dickens foods. Cook foods using healthy methods such as baking, boiling, grilling, and broiling instead.  · Cook with heart-healthy oils, such as olive, canola, soybean, or sunflower oil.  Meal planning  · Eat a balanced diet that includes:  ? 5 or more servings of fruits and vegetables each day. At each meal, try to fill half of your plate with fruits and vegetables.  ? Up to 6-8 servings of whole grains each day.  ? Less than 6 oz of lean meat, poultry, or fish each day. A 3-oz serving of meat is about the same size as a deck of cards. One egg equals 1 oz.  ? 2 servings of low-fat dairy each day.  ? A serving of " nuts, seeds, or beans 5 times each week.  ? Heart-healthy fats. Healthy fats called Omega-3 fatty acids are found in foods such as flaxseeds and coldwater fish, like sardines, salmon, and mackerel.  · Limit how much you eat of the following:  ? Canned or prepackaged foods.  ? Food that is high in trans fat, such as fried foods.  ? Food that is high in saturated fat, such as fatty meat.  ? Sweets, desserts, sugary drinks, and other foods with added sugar.  ? Full-fat dairy products.  · Do not salt foods before eating.  · Try to eat at least 2 vegetarian meals each week.  · Eat more home-cooked food and less restaurant, buffet, and fast food.  · When eating at a restaurant, ask that your food be prepared with less salt or no salt, if possible.  What foods are recommended?  The items listed may not be a complete list. Talk with your dietitian about what dietary choices are best for you.  Grains  Whole-grain or whole-wheat bread. Whole-grain or whole-wheat pasta. Brown rice. Oatmeal. Quinoa. Bulgur. Whole-grain and low-sodium cereals. Caroline bread. Low-fat, low-sodium crackers. Whole-wheat flour tortillas.  Vegetables  Fresh or frozen vegetables (raw, steamed, roasted, or grilled). Low-sodium or reduced-sodium tomato and vegetable juice. Low-sodium or reduced-sodium tomato sauce and tomato paste. Low-sodium or reduced-sodium canned vegetables.  Fruits  All fresh, dried, or frozen fruit. Canned fruit in natural juice (without added sugar).  Meat and other protein foods  Skinless chicken or turkey. Ground chicken or turkey. Pork with fat trimmed off. Fish and seafood. Egg whites. Dried beans, peas, or lentils. Unsalted nuts, nut butters, and seeds. Unsalted canned beans. Lean cuts of beef with fat trimmed off. Low-sodium, lean deli meat.  Dairy  Low-fat (1%) or fat-free (skim) milk. Fat-free, low-fat, or reduced-fat cheeses. Nonfat, low-sodium ricotta or cottage cheese. Low-fat or nonfat yogurt. Low-fat, low-sodium  cheese.  Fats and oils  Soft margarine without trans fats. Vegetable oil. Low-fat, reduced-fat, or light mayonnaise and salad dressings (reduced-sodium). Canola, safflower, olive, soybean, and sunflower oils. Avocado.  Seasoning and other foods  Herbs. Spices. Seasoning mixes without salt. Unsalted popcorn and pretzels. Fat-free sweets.  What foods are not recommended?  The items listed may not be a complete list. Talk with your dietitian about what dietary choices are best for you.  Grains  Baked goods made with fat, such as croissants, muffins, or some breads. Dry pasta or rice meal packs.  Vegetables  Creamed or fried vegetables. Vegetables in a cheese sauce. Regular canned vegetables (not low-sodium or reduced-sodium). Regular canned tomato sauce and paste (not low-sodium or reduced-sodium). Regular tomato and vegetable juice (not low-sodium or reduced-sodium). Pickles. Olives.  Fruits  Canned fruit in a light or heavy syrup. Fried fruit. Fruit in cream or butter sauce.  Meat and other protein foods  Fatty cuts of meat. Ribs. Fried meat. Ng. Sausage. Bologna and other processed lunch meats. Salami. Fatback. Hotdogs. Bratwurst. Salted nuts and seeds. Canned beans with added salt. Canned or smoked fish. Whole eggs or egg yolks. Chicken or turkey with skin.  Dairy  Whole or 2% milk, cream, and half-and-half. Whole or full-fat cream cheese. Whole-fat or sweetened yogurt. Full-fat cheese. Nondairy creamers. Whipped toppings. Processed cheese and cheese spreads.  Fats and oils  Butter. Stick margarine. Lard. Shortening. Ghee. Ng fat. Tropical oils, such as coconut, palm kernel, or palm oil.  Seasoning and other foods  Salted popcorn and pretzels. Onion salt, garlic salt, seasoned salt, table salt, and sea salt. Worcestershire sauce. Tartar sauce. Barbecue sauce. Teriyaki sauce. Soy sauce, including reduced-sodium. Steak sauce. Canned and packaged gravies. Fish sauce. Oyster sauce. Cocktail sauce. Horseradish  that you find on the shelf. Ketchup. Mustard. Meat flavorings and tenderizers. Bouillon cubes. Hot sauce and Tabasco sauce. Premade or packaged marinades. Premade or packaged taco seasonings. Relishes. Regular salad dressings.  Where to find more information:  · National Heart, Lung, and Blood [place]: www.nhlbi.nih.gov  · American Heart Association: www.heart.org  Summary  · The DASH eating plan is a healthy eating plan that has been shown to reduce high blood pressure (hypertension). It may also reduce your risk for type 2 diabetes, heart disease, and stroke.  · With the DASH eating plan, you should limit salt (sodium) intake to 2,300 mg a day. If you have hypertension, you may need to reduce your sodium intake to 1,500 mg a day.  · When on the DASH eating plan, aim to eat more fresh fruits and vegetables, whole grains, lean proteins, low-fat dairy, and heart-healthy fats.  · Work with your health care provider or diet and nutrition specialist (dietitian) to adjust your eating plan to your individual calorie needs.  This information is not intended to replace advice given to you by your health care provider. Make sure you discuss any questions you have with your health care provider.  Document Released: 12/06/2012 Document Revised: 11/30/2018 Document Reviewed: 12/11/2017  Elsevier Patient Education © 2020 [employer]  Mediterranean Diet  A Mediterranean diet refers to food and lifestyle choices that are based on the traditions of countries located on the [place]. This way of eating has been shown to help prevent certain conditions and improve outcomes for people who have chronic diseases, like kidney disease and heart disease.  What are tips for following this plan?  Lifestyle  · Cook and eat meals together with your family, when possible.  · Drink enough fluid to keep your urine clear or pale yellow.  · Be physically active every day. This includes:  ? Aerobic exercise like running or  swimming.  ? Leisure activities like gardening, walking, or housework.  · Get 7-8 hours of sleep each night.  · If recommended by your health care provider, drink red wine in moderation. This means 1 glass a day for nonpregnant women and 2 glasses a day for men. A glass of wine equals 5 oz (150 mL).  Reading food labels    · Check the serving size of packaged foods. For foods such as rice and pasta, the serving size refers to the amount of cooked product, not dry.  · Check the total fat in packaged foods. Avoid foods that have saturated fat or trans fats.  · Check the ingredients list for added sugars, such as corn syrup.  Shopping  · At the grocery store, buy most of your food from the areas near the walls of the store. This includes:  ? Fresh fruits and vegetables (produce).  ? Grains, beans, nuts, and seeds. Some of these may be available in unpackaged forms or large amounts (in bulk).  ? Fresh seafood.  ? Poultry and eggs.  ? Low-fat dairy products.  · Buy whole ingredients instead of prepackaged foods.  · Buy fresh fruits and vegetables in-season from local [employer] markets.  · Buy frozen fruits and vegetables in resealable bags.  · If you do not have access to quality fresh seafood, buy precooked frozen shrimp or canned fish, such as tuna, salmon, or sardines.  · Buy small amounts of raw or cooked vegetables, salads, or olives from the deli or salad bar at your store.  · Stock your pantry so you always have certain foods on hand, such as olive oil, canned tuna, canned tomatoes, rice, pasta, and beans.  Cooking  · Cook foods with extra-virgin olive oil instead of using butter or other vegetable oils.  · Have meat as a side dish, and have vegetables or grains as your main dish. This means having meat in small portions or adding small amounts of meat to foods like pasta or stew.  · Use beans or vegetables instead of meat in common dishes like chili or lasagna.  · [place] with different cooking methods. Try  roasting or broiling vegetables instead of steaming or sautéeing them.  · Add frozen vegetables to soups, stews, pasta, or rice.  · Add nuts or seeds for added healthy fat at each meal. You can add these to yogurt, salads, or vegetable dishes.  · Marinate fish or vegetables using olive oil, lemon juice, garlic, and fresh herbs.  Meal planning    · Plan to eat 1 vegetarian meal one day each week. Try to work up to 2 vegetarian meals, if possible.  · Eat seafood 2 or more times a week.  · Have healthy snacks readily available, such as:  ? Vegetable sticks with hummus.  ? Greek yogurt.  ? Fruit and nut trail mix.  · Eat balanced meals throughout the week. This includes:  ? Fruit: 2-3 servings a day  ? Vegetables: 4-5 servings a day  ? Low-fat dairy: 2 servings a day  ? Fish, poultry, or lean meat: 1 serving a day  ? Beans and legumes: 2 or more servings a week  ? Nuts and seeds: 1-2 servings a day  ? Whole grains: 6-8 servings a day  ? Extra-virgin olive oil: 3-4 servings a day  · Limit red meat and sweets to only a few servings a month  What are my food choices?  · Mediterranean diet  ? Recommended  § Grains: Whole-grain pasta. Brown rice. Bulgar wheat. Polenta. Couscous. Whole-wheat bread. Oatmeal. Quinoa.  § Vegetables: Artichokes. Beets. Broccoli. Cabbage. Carrots. Eggplant. Green beans. Chard. Kale. Spinach. Onions. Leeks. Peas. Squash. Tomatoes. Peppers. Radishes.  § Fruits: Apples. Apricots. Avocado. Berries. Bananas. Cherries. Dates. Figs. Grapes. Keith. Melon. Oranges. Peaches. Plums. Pomegranate.  § Meats and other protein foods: Beans. Almonds. Sunflower seeds. Pine nuts. Peanuts. Cod. [place]. Scallops. Shrimp. Tuna. Tilapia. Clams. Oysters. Eggs.  § Dairy: Low-fat milk. Cheese. Greek yogurt.  § Beverages: Water. Red wine. Herbal tea.  § Fats and oils: Extra virgin olive oil. Avocado oil. Grape seed oil.  § Sweets and desserts: Greek yogurt with honey. Baked apples. Poached pears. Trail mix.  § Seasoning  and other foods: Basil. Cilantro. Coriander. Cumin. Mint. Parsley. [place]. Rosemary. Tarragon. Garlic. Oregano. Thyme. Pepper. Balsalmic vinegar. Tahini. Hummus. Tomato sauce. Olives. Mushrooms.  ? Limit these  § Grains: Prepackaged pasta or rice dishes. Prepackaged cereal with added sugar.  § Vegetables: Deep fried potatoes (french fries).  § Fruits: Fruit canned in syrup.  § Meats and other protein foods: Beef. Pork. Lamb. Poultry with skin. Hot dogs. Ng.  § Dairy: Ice cream. Sour cream. Whole milk.  § Beverages: Juice. Sugar-sweetened soft drinks. Beer. Liquor and spirits.  § Fats and oils: Butter. Canola oil. Vegetable oil. Beef fat (tallow). Lard.  § Sweets and desserts: Cookies. Cakes. Pies. Candy.  § Seasoning and other foods: Mayonnaise. Premade sauces and marinades.  The items listed may not be a complete list. Talk with your dietitian about what dietary choices are right for you.  Summary  · The Mediterranean diet includes both food and lifestyle choices.  · Eat a variety of fresh fruits and vegetables, beans, nuts, seeds, and whole grains.  · Limit the amount of red meat and sweets that you eat.  · Talk with your health care provider about whether it is safe for you to drink red wine in moderation. This means 1 glass a day for nonpregnant women and 2 glasses a day for men. A glass of wine equals 5 oz (150 mL).  This information is not intended to replace advice given to you by your health care provider. Make sure you discuss any questions you have with your health care provider.  Document Released: 08/10/2017 Document Revised: 08/17/2017 Document Reviewed: 08/10/2017  Elsevier Patient Education © 2020 [employer]  Advance Directive    Advance directives are legal documents that let you make choices ahead of time about your health care and medical treatment in case you become unable to communicate for yourself. Advance directives are a way for you to communicate your wishes to family, friends, and  health care providers. This can help convey your decisions about end-of-life care if you become unable to communicate.  Discussing and writing advance directives should happen over time rather than all at once. Advance directives can be changed depending on your situation and what you want, even after you have signed the advance directives.  If you do not have an advance directive, some states assign family decision makers to act on your behalf based on how closely you are related to them. Each state has its own laws regarding advance directives. You may want to check with your health care provider, , or state representative about the laws in your state. There are different types of advance directives, such as:  · Medical power of .  · Living will.  · Do not resuscitate (DNR) or do not attempt resuscitation (DNAR) order.  Health care proxy and medical power of   A health care proxy, also called a health care agent, is a person who is appointed to make medical decisions for you in cases in which you are unable to make the decisions yourself. Generally, people choose someone they know well and trust to represent their preferences. Make sure to ask this person for an agreement to act as your proxy. A proxy may have to exercise judgment in the event of a medical decision for which your wishes are not known.  A medical power of  is a legal document that names your health care proxy. Depending on the laws in your state, after the document is written, it may also need to be:  · Signed.  · Notarized.  · Dated.  · Copied.  · Witnessed.  · Incorporated into your medical record.  You may also want to appoint someone to manage your financial affairs in a situation in which you are unable to do so. This is called a durable power of  for finances. It is a separate legal document from the durable power of  for health care. You may choose the same person or someone different from your  health care proxy to act as your agent in financial matters.  If you do not appoint a proxy, or if there is a concern that the proxy is not acting in your best interests, a court-appointed guardian may be designated to act on your behalf.  Living will  A living will is a set of instructions documenting your wishes about medical care when you cannot express them yourself. Health care providers should keep a copy of your living will in your medical record. You may want to give a copy to family members or friends. To alert caregivers in case of an emergency, you can place a card in your wallet to let them know that you have a living will and where they can find it. A living will is used if you become:  · Terminally ill.  · Incapacitated.  · Unable to communicate or make decisions.  Items to consider in your living will include:  · The use or non-use of life-sustaining equipment, such as dialysis machines and breathing machines (ventilators).  · A DNR or DNAR order, which is the instruction not to use cardiopulmonary resuscitation (CPR) if breathing or heartbeat stops.  · The use or non-use of tube feeding.  · Withholding of food and fluids.  · Comfort (palliative) care when the goal becomes comfort rather than a cure.  · Organ and tissue donation.  A living will does not give instructions for distributing your money and property if you should pass away. It is recommended that you seek the advice of a  when writing a will. Decisions about taxes, beneficiaries, and asset distribution will be legally binding. This process can relieve your family and friends of any concerns surrounding disputes or questions that may come up about the distribution of your assets.  DNR or DNAR  A DNR or DNAR order is a request not to have CPR in the event that your heart stops beating or you stop breathing. If a DNR or DNAR order has not been made and shared, a health care provider will try to help any patient whose heart has stopped  or who has stopped breathing. If you plan to have surgery, talk with your health care provider about how your DNR or DNAR order will be followed if problems occur.  Summary  · Advance directives are the legal documents that allow you to make choices ahead of time about your health care and medical treatment in case you become unable to communicate for yourself.  · The process of discussing and writing advance directives should happen over time. You can change the advance directives, even after you have signed them.  · Advance directives include DNR or DNAR orders, living duke, and designating an agent as your medical power of .  This information is not intended to replace advice given to you by your health care provider. Make sure you discuss any questions you have with your health care provider.  Document Released: 03/26/2009 Document Revised: 01/22/2020 Document Reviewed: 11/06/2017  [employer] Patient Education © 2020 [employer]  Exercising to Stay Healthy  To become healthy and stay healthy, it is recommended that you do moderate-intensity and vigorous-intensity exercise. You can tell that you are exercising at a moderate intensity if your heart starts beating faster and you start breathing faster but can still hold a conversation. You can tell that you are exercising at a vigorous intensity if you are breathing much harder and faster and cannot hold a conversation while exercising.  Exercising regularly is important. It has many health benefits, such as:  · Improving overall fitness, flexibility, and endurance.  · Increasing bone density.  · Helping with weight control.  · Decreasing body fat.  · Increasing muscle strength.  · Reducing stress and tension.  · Improving overall health.  How often should I exercise?  Choose an activity that you enjoy, and set realistic goals. Your health care provider can help you make an activity plan that works for you.  Exercise regularly as told by your health care  provider. This may include:  · Doing strength training two times a week, such as:  ? Lifting weights.  ? Using resistance bands.  ? Push-ups.  ? Sit-ups.  ? Yoga.  · Doing a certain intensity of exercise for a given amount of time. Choose from these options:  ? A total of 150 minutes of moderate-intensity exercise every week.  ? A total of 75 minutes of vigorous-intensity exercise every week.  ? A mix of moderate-intensity and vigorous-intensity exercise every week.  Children, pregnant women, people who have not exercised regularly, people who are overweight, and older adults may need to talk with a health care provider about what activities are safe to do. If you have a medical condition, be sure to talk with your health care provider before you start a new exercise program.  What are some exercise ideas?  Moderate-intensity exercise ideas include:  · Walking 1 mile (1.6 km) in about 15 minutes.  · Biking.  · Hiking.  · Golfing.  · Dancing.  · Water aerobics.  Vigorous-intensity exercise ideas include:  · Walking 4.5 miles (7.2 km) or more in about 1 hour.  · Jogging or running 5 miles (8 km) in about 1 hour.  · Biking 10 miles (16.1 km) or more in about 1 hour.  · Lap swimming.  · Roller-skating or in-line skating.  · Cross-country skiing.  · Vigorous competitive sports, such as football, basketball, and soccer.  · Jumping rope.  · Aerobic dancing.  What are some everyday activities that can help me to get exercise?  · Yard work, such as:  ? Pushing a .  ? Raking and bagging leaves.  · Washing your car.  · Pushing a stroller.  · Shoveling snow.  · Gardening.  · Washing windows or floors.  How can I be more active in my day-to-day activities?  · Use stairs instead of an elevator.  · Take a walk during your lunch break.  · If you drive, park your car farther away from your work or school.  · If you take public transportation, get off one stop early and walk the rest of the way.  · Stand up or walk around  during all of your indoor phone calls.  · Get up, stretch, and walk around every 30 minutes throughout the day.  · Enjoy exercise with a friend. Support to continue exercising will help you keep a regular routine of activity.  What guidelines can I follow while exercising?  · Before you start a new exercise program, talk with your health care provider.  · Do not exercise so much that you hurt yourself, feel dizzy, or get very short of breath.  · Wear comfortable clothes and wear shoes with good support.  · Drink plenty of water while you exercise to prevent dehydration or heat stroke.  · Work out until your breathing and your heartbeat get faster.  Where to find more information  · [place] Department of Health and Human Services: www.hhs.gov  · Centers for Disease Control and Prevention (CDC): www.cdc.gov  Summary  · Exercising regularly is important. It will improve your overall fitness, flexibility, and endurance.  · Regular exercise also will improve your overall health. It can help you control your weight, reduce stress, and improve your bone density.  · Do not exercise so much that you hurt yourself, feel dizzy, or get very short of breath.  · Before you start a new exercise program, talk with your health care provider.  This information is not intended to replace advice given to you by your health care provider. Make sure you discuss any questions you have with your health care provider.  Document Released: 01/20/2012 Document Revised: 11/30/2018 Document Reviewed: 11/08/2018  [employer] Patient Education © 2020 [employer]  Fall Prevention in the Home, Adult  Falls can cause injuries and can affect people from all age groups. There are many simple things that you can do to make your home safe and to help prevent falls. Ask for help when making these changes, if needed.  What actions can I take to prevent falls?  General instructions  · Use good lighting in all rooms. Replace any light bulbs that burn  out.  · Turn on lights if it is dark. Use night-lights.  · Place frequently used items in easy-to-reach places. Lower the shelves around your home if necessary.  · Set up furniture so that there are clear paths around it. Avoid moving your furniture around.  · Remove throw rugs and other tripping hazards from the floor.  · Avoid walking on wet floors.  · Fix any uneven floor surfaces.  · Add color or contrast paint or tape to grab bars and handrails in your home. Place contrasting color strips on the first and last steps of stairways.  · When you use a stepladder, make sure that it is completely opened and that the sides are firmly locked. Have someone hold the ladder while you are using it. Do not climb a closed stepladder.  · Be aware of any and all pets.  What can I do in the bathroom?         · Keep the floor dry. Immediately clean up any water that spills onto the floor.  · Remove soap buildup in the tub or shower on a regular basis.  · Use non-skid mats or decals on the floor of the tub or shower.  · Attach bath mats securely with double-sided, non-slip rug tape.  · If you need to sit down while you are in the shower, use a plastic, non-slip stool.  · Install grab bars by the toilet and in the tub and shower. Do not use towel bars as grab bars.  What can I do in the bedroom?  · Make sure that a bedside light is easy to reach.  · Do not use oversized bedding that drapes onto the floor.  · Have a firm chair that has side arms to use for getting dressed.  What can I do in the kitchen?  · Clean up any spills right away.  · If you need to reach for something above you, use a sturdy step stool that has a grab bar.  · Keep electrical cables out of the way.  · Do not use floor polish or wax that makes floors slippery. If you must use wax, make sure that it is non-skid floor wax.  What can I do in the stairways?  · Do not leave any items on the stairs.  · Make sure that you have a light switch at the top of the stairs  and the bottom of the stairs. Have them installed if you do not have them.  · Make sure that there are handrails on both sides of the stairs. Fix handrails that are broken or loose. Make sure that handrails are as long as the stairways.  · Install non-slip stair treads on all stairs in your home.  · Avoid having throw rugs at the top or bottom of stairways, or secure the rugs with carpet tape to prevent them from moving.  · Choose a carpet design that does not hide the edge of steps on the stairway.  · Check any carpeting to make sure that it is firmly attached to the stairs. Fix any carpet that is loose or worn.  What can I do on the outside of my home?  · Use bright outdoor lighting.  · Regularly repair the edges of walkways and driveways and fix any cracks.  · Remove high doorway thresholds.  · Trim any shrubbery on the main path into your home.  · Regularly check that handrails are securely fastened and in good repair. Both sides of any steps should have handrails.  · Install guardrails along the edges of any raised decks or porches.  · Clear walkways of debris and clutter, including tools and rocks.  · Have leaves, snow, and ice cleared regularly.  · Use sand or salt on walkways during winter months.  · In the garage, clean up any spills right away, including grease or oil spills.  What other actions can I take?  · Wear closed-toe shoes that fit well and support your feet. Wear shoes that have rubber soles or low heels.  · Use mobility aids as needed, such as canes, walkers, scooters, and crutches.  · Review your medicines with your health care provider. Some medicines can cause dizziness or changes in blood pressure, which increase your risk of falling.  Talk with your health care provider about other ways that you can decrease your risk of falls. This may include working with a physical therapist or  to improve your strength, balance, and endurance.  Where to find more information  · Centers for  Disease Control and Prevention, [place]: https://www.cdc.gov  · [place]: https://qq6tuuc.alvarado.nih.gov  Contact a health care provider if:  · You are afraid of falling at home.  · You feel weak, drowsy, or dizzy at home.  · You fall at home.  Summary  · There are many simple things that you can do to make your home safe and to help prevent falls.  · Ways to make your home safe include removing tripping hazards and installing grab bars in the bathroom.  · Ask for help when making these changes in your home.  This information is not intended to replace advice given to you by your health care provider. Make sure you discuss any questions you have with your health care provider.  Document Released: 2003 Document Revised: 2018 Document Reviewed: 2018  [employer] Patient Education ©  [employer].    Medicare Wellness  Personal Prevention Plan of Service     Date of Office Visit:  2020  Encounter Provider:  Reina Yarbrough MD  Place of Service:  [facility]  Patient Name: Chelsi Ferguson  :  1940    As part of the Medicare Wellness portion of your visit today, we are providing you with this personalized preventive plan of services (PPPS). This plan is based upon recommendations of the [place] Preventive Services Task Force (USPSTF) and the Advisory Committee on Immunization Practices (ACIP).    This lists the preventive care services that should be considered, and provides dates of when you are due. Items listed as completed are up-to-date and do not require any further intervention.    Health Maintenance   Topic Date Due   • ZOSTER VACCINE (2 of 3) 2007   • MEDICARE ANNUAL WELLNESS  2020   • DXA SCAN  2020   • INFLUENZA VACCINE  2020   • LIPID PANEL  2021   • MAMMOGRAM  2022   • TDAP/TD VACCINES (3 - Td) 2022   • Pneumococcal Vaccine Once at 65 Years Old  Completed       Orders Placed  This Encounter   Procedures   • Urine Culture - Urine, Urine, Clean Catch     Standing Status:   Future     Standing Expiration Date:   7/8/2021   • DEXA Bone Density Axial     Standing Status:   Future     Standing Expiration Date:   7/8/2021     Order Specific Question:   Reason for Exam:     Answer:   menopause   • Comprehensive Metabolic Panel     Standing Status:   Future     Standing Expiration Date:   7/8/2021   • Lipid Panel     Standing Status:   Future     Standing Expiration Date:   7/8/2021   • TSH     Standing Status:   Future     Standing Expiration Date:   7/8/2021   • T4, Free     Standing Status:   Future     Standing Expiration Date:   7/8/2021   • Myasthenia Gravis Full Panel     Standing Status:   Future     Standing Expiration Date:   7/8/2021   • POCT urinalysis dipstick, automated   • CBC & Differential     Standing Status:   Future     Standing Expiration Date:   7/8/2021     Order Specific Question:   Manual Differential     Answer:   No       Return in about 6 months (around 1/8/2021), or if symptoms worsen or fail to improve, for Recheck.

## 2020-07-08 NOTE — PROGRESS NOTES
The ABCs of the Annual Wellness Visit  Subsequent Medicare Wellness Visit    Chief Complaint   Patient presents with   • Medicare Wellness-subsequent       Subjective   History of Present Illness:  Chelsi Ferguson is a 79 y.o. female who presents for a Subsequent Medicare Wellness Visit.    Pt sees Dr Bonner for glaucoma. Pt has double vision and he requested fasting glucose, thyroid lab and work up for myasthenia gravis  HEALTH RISK ASSESSMENT    Recent Hospitalizations:  No hospitalization(s) within the last year.    Current Medical Providers:  Patient Care Team:  Reina Yarbrough MD as PCP - General (Family Medicine)  Александр Dunne MD as Consulting Physician (Otolaryngology)  Gabby Rao MD as Consulting Physician (Dermatology)  Tylor Bonner MD as Consulting Physician (Ophthalmology)  Harinder Mason MD as Consulting Physician (General Surgery)  Han Alonzo MD as Consulting Physician (Urology)  Marianela Flores MD as Consulting Physician (Neurology)  Oralia Greene MD as Consulting Physician (Cardiology)    Smoking Status:  Social History     Tobacco Use   Smoking Status Never Smoker   Smokeless Tobacco Never Used       Alcohol Consumption:  Social History     Substance and Sexual Activity   Alcohol Use No       Depression Screen:   PHQ-2/PHQ-9 Depression Screening 7/8/2020   Little interest or pleasure in doing things 0   Feeling down, depressed, or hopeless 0   Total Score 0       Fall Risk Screen:  STEADI Fall Risk Assessment was completed, and patient is at MODERATE risk for falls. Assessment completed on:7/8/2020    Health Habits and Functional and Cognitive Screening:  Functional & Cognitive Status 7/8/2020   Do you have difficulty preparing food and eating? -   Do you have difficulty bathing yourself, getting dressed or grooming yourself? -   Do you have difficulty using the toilet? No   Do you have difficulty moving around from place to place? Yes   Do you have  trouble with steps or getting out of a bed or a chair? Yes   Current Diet Unhealthy Diet   Dental Exam Up to date   Eye Exam Up to date   Exercise (times per week) 0 times per week   Current Exercise Activities Include None   Do you need help using the phone?  No   Are you deaf or do you have serious difficulty hearing?  No   Do you need help with transportation? Yes   Do you need help shopping? No   Do you need help preparing meals?  No   Do you need help with housework?  No   Do you need help with laundry? No   Do you need help taking your medications? No   Do you need help managing money? No   Do you ever drive or ride in a car without wearing a seat belt? No   Have you felt unusual stress, anger or loneliness in the last month? Yes   Who do you live with? Alone   If you need help, do you have trouble finding someone available to you? No   Have you been bothered in the last four weeks by sexual problems? -   Do you have difficulty concentrating, remembering or making decisions? No     Family helps her out and does the shopping for her.     Does the patient have evidence of cognitive impairment? No    Asprin use counseling:Taking ASA appropriately as indicated    Age-appropriate Screening Schedule:  Refer to the list below for future screening recommendations based on patient's age, sex and/or medical conditions. Orders for these recommended tests are listed in the plan section. The patient has been provided with a written plan.    Health Maintenance   Topic Date Due   • ZOSTER VACCINE (2 of 3) 06/26/2007   • DXA SCAN  02/09/2020   • INFLUENZA VACCINE  08/01/2020   • LIPID PANEL  01/07/2021   • MAMMOGRAM  01/03/2022   • TDAP/TD VACCINES (3 - Td) 02/08/2022          The following portions of the patient's history were reviewed and updated as appropriate: allergies, current medications, past family history, past medical history, past social history, past surgical history and problem list.    Past Medical History:    Diagnosis Date   • Ankle pain    • Arthritis    • Cancer (CMS/HCC)     Skin   • Chest discomfort    • Cholecystitis 1/15/2018   • Coronary artery disease    • Edema    • Elevated liver enzymes 1/15/2018   • Gallstones    • GERD (gastroesophageal reflux disease)    • Glaucoma     pt is currently off of meds and Dr Tanner does not think that she is glaucoma   • H/O complete eye exam 06/2013   • H/O mammogram 11/16/2015   • H/O non-ST elevation myocardial infarction (NSTEMI) 01/2005   • Hammer toe    • Heart attack (CMS/HCC) 01/2005   • History of blood transfusion 01/2005   • History of cardiovascular stress test 12/02/2015    normal   • HTN (hypertension)    • Hyperlipidemia    • Hypokalemia 1/15/2018   • Kidney infection 1971   • Menopausal symptoms    • Onychia and paronychia of finger    • Osteopenia    • Other elevated white blood cell count    • Pap smear for cervical cancer screening 2010 [facility]   • Pneumonia    • Sepsis (CMS/HCC) 1/15/2018   • UTI (urinary tract infection) 1/15/2018   • Viral warts    • Wears glasses      Past Surgical History:   Procedure Laterality Date   • BLADDER REPAIR  2002   • BLADDER REPAIR  2003   • BUNIONECTOMY Right 2010   • CHOLECYSTECTOMY WITH INTRAOPERATIVE CHOLANGIOGRAM N/A 3/7/2018    Procedure: CHOLECYSTECTOMY LAPAROSCOPIC INTRAOPERATIVE CHOLANGIOGRAM;  Surgeon: Harinder Mason MD;  Location: [facility];  Service:    • COLONOSCOPY  2008, 4/2014    [facility]   • CORONARY STENT PLACEMENT Left 01/2005    drug eluting stent 1/2005 LAD   • DILATATION AND CURETTAGE  1971   • DILATATION AND CURETTAGE  1978   • EYE CAPSULOTOMY WITH LASER Bilateral 2018   • EYE SURGERY Bilateral 05/2016    Cataract   • FRONTALIS SUSPENSION  2010   • HAMMER TOE REPAIR Right 11/03/2010   • HEAD & NECK SKIN LESION EXCISIONAL BIOPSY  08/20/2018    benign scalp cyst   • HYSTERECTOMY  1978   • LAPAROTOMY OOPHERECTOMY Bilateral 2002   • NOSE SURGERY  03/2017    repair 3 fractured areas. Dr Dunne   •  OTHER SURGICAL HISTORY  2005    rectocele repair   • OTHER SURGICAL HISTORY  2010    eyelid surgery   • REPLACEMENT TOTAL KNEE BILATERAL Bilateral    • SKIN BIOPSY     • TONSILLECTOMY  1952   • TOTAL KNEE ARTHROPLASTY  01/2005   • UTERINE FIBROID SURGERY  1978    emergency removal of fibroid from birth canal       Allergies   Allergen Reactions   • Aleve [Naproxen] Hives   • Indocin [Indomethacin] Hallucinations   • Lipitor [Atorvastatin] Hives   • Statins Hives   • Zocor [Simvastatin] Hives   • Celebrex [Celecoxib] Diarrhea   • Keflex [Cephalexin] GI Intolerance     Upset stomach, may have had some blood in stool but cannot remember the reason why she was put on it. Tolerates penicillins without problem.   • Ibuprofen GI Intolerance     Heartburn.   • Prevacid [Lansoprazole] Nausea And Vomiting   • Prilosec [Omeprazole] Nausea And Vomiting       Family History   Problem Relation Age of Onset   • Stroke Mother    • Heart failure Father         congestive   • Cancer Father         lip   • Breast cancer Sister         60's   • Breast cancer Daughter 40   • Breast cancer Other         NIECE 60's   • Breast cancer Other         NIECE 60's   • Ovarian cancer Neg Hx        Social History     Socioeconomic History   • Marital status:      Spouse name: Not on file   • Number of children: Not on file   • Years of education: Not on file   • Highest education level: Not on file   Tobacco Use   • Smoking status: Never Smoker   • Smokeless tobacco: Never Used   Substance and Sexual Activity   • Alcohol use: No   • Drug use: No   • Sexual activity: Yes     Birth control/protection: None         Outpatient Medications Prior to Visit   Medication Sig Dispense Refill   • aspirin 81 MG EC tablet Take 81 mg by mouth Every Night.     • BIOTIN 5000 PO Take  by mouth.     • Cholecalciferol (VITAMIN D3 PO) Vitamin D3   Daily     • coenzyme Q10 100 MG capsule Take 100 mg by mouth Daily.     • famotidine (PEPCID) 20 MG tablet Take 20  mg by mouth 2 (Two) Times a Day.     • latanoprost (XALATAN) 0.005 % ophthalmic solution 1 drop Every Night.     • metoprolol succinate XL (TOPROL-XL) 25 MG 24 hr tablet Take 25 mg by mouth Every Morning.     • nitroglycerin (NITROSTAT) 0.4 MG SL tablet Place 1 tablet under the tongue Every 5 (Five) Minutes As Needed for Chest Pain. 30 tablet 3   • Omega-3 Fatty Acids (FISH OIL) 1200 MG capsule delayed-release Take  by mouth.     • rosuvastatin (CRESTOR) 20 MG tablet Take 1 tablet by mouth Every Night. 30 tablet 11   • tiZANidine (ZANAFLEX) 4 MG tablet Take 1 tablet by mouth Every 8 (Eight) Hours As Needed for Muscle Spasms. 30 tablet 1   • YUVAFEM 10 MCG tablet vaginal tablet INSERT 1 TABLET INTO THE VAGINA 2 (TWO) TIMES A WEEK. 8 tablet 2     No facility-administered medications prior to visit.        Patient Active Problem List   Diagnosis   • Loss of weight   • Hyperlipidemia   • Edema   • GERD (gastroesophageal reflux disease)   • Nausea with vomiting   • Diarrhea   • Incontinence of feces   • HTN (hypertension)   • Cholelithiasis   • Menopausal symptom   • Cramp in limb   • Glaucoma   • Hammer toe   • Dry mouth   • Osteopenia   • Elevated liver enzymes   • UTI (urinary tract infection)   • Bacterial UTI   • Cholecystitis   • H/O non-ST elevation myocardial infarction (NSTEMI)   • Neck pain   • Essential tremor   • Idiopathic peripheral neuropathy   • Muscle cramps   • Coronary artery disease involving native coronary artery of native heart without angina pectoris       Advanced Care Planning:  ACP discussion was held with the patient during this visit. Patient has an advance directive in EMR which is still valid.     Review of Systems   Constitutional: Positive for activity change (decreased since Covid pandemic). Negative for appetite change, chills, diaphoresis, fatigue, fever and unexpected weight change.   HENT: Negative.  Negative for congestion, dental problem, drooling, ear discharge, ear pain, facial  swelling, hearing loss, mouth sores, nosebleeds, postnasal drip, rhinorrhea, sinus pressure, sinus pain, sneezing, sore throat, tinnitus, trouble swallowing and voice change.    Eyes: Positive for visual disturbance (occasional double vision, pt sees Dr Moseley.). Negative for photophobia, pain, discharge, redness and itching.   Respiratory: Negative.  Negative for apnea, cough, choking, chest tightness, shortness of breath, wheezing and stridor.    Cardiovascular: Positive for leg swelling (left ankle swelling). Negative for chest pain and palpitations.   Gastrointestinal: Positive for abdominal pain (indigestion). Negative for abdominal distention, anal bleeding, blood in stool, constipation, diarrhea, nausea, rectal pain and vomiting.   Endocrine: Negative.  Negative for cold intolerance, heat intolerance, polydipsia, polyphagia and polyuria.   Genitourinary: Positive for frequency and urgency. Negative for decreased urine volume, difficulty urinating, dyspareunia, dysuria, enuresis, flank pain, genital sores, hematuria, menstrual problem, pelvic pain, vaginal bleeding, vaginal discharge and vaginal pain.   Musculoskeletal: Positive for arthralgias (ankle) and back pain. Negative for gait problem, joint swelling, myalgias, neck pain and neck stiffness.   Skin: Negative.  Negative for color change, pallor, rash and wound.   Allergic/Immunologic: Negative.  Negative for environmental allergies, food allergies and immunocompromised state.   Neurological: Negative.  Negative for dizziness, tremors, seizures, syncope, facial asymmetry, speech difficulty, weakness, light-headedness, numbness and headaches.   Hematological: Negative.  Negative for adenopathy. Does not bruise/bleed easily.   Psychiatric/Behavioral: Negative.  Negative for agitation, behavioral problems, confusion, decreased concentration, dysphoric mood, hallucinations, self-injury, sleep disturbance and suicidal ideas. The patient is not  "nervous/anxious and is not hyperactive.        Compared to one year ago, the patient feels her physical health is the same.  Compared to one year ago, the patient feels her mental health is the same.    Reviewed chart for potential of high risk medication in the elderly: yes  Reviewed chart for potential of harmful drug interactions in the elderly:yes    Objective         Vitals:    07/08/20 0906   BP: 138/70   BP Location: Left arm   Cuff Size: Adult   Pulse: 68   Temp: 96.9 °F (36.1 °C)   SpO2: 96%   Weight: 69.5 kg (153 lb 3.2 oz)   Height: 147.3 cm (58\")   PainSc:   4   PainLoc: Generalized       Body mass index is 32.02 kg/m².  Discussed the patient's BMI with her. The BMI is above average; BMI management plan is completed.    Physical Exam   Constitutional: She is oriented to person, place, and time. She appears well-developed and well-nourished. No distress.   HENT:   Head: Normocephalic and atraumatic.   Right Ear: Tympanic membrane, external ear and ear canal normal.   Left Ear: Tympanic membrane, external ear and ear canal normal.   Nose: Nose normal.   Mouth/Throat: Uvula is midline, oropharynx is clear and moist and mucous membranes are normal. Mucous membranes are not pale, not dry and not cyanotic. No oropharyngeal exudate, posterior oropharyngeal edema or posterior oropharyngeal erythema.   Eyes: Pupils are equal, round, and reactive to light. Conjunctivae, EOM and lids are normal. Right eye exhibits no chemosis, no discharge, no exudate and no hordeolum. No foreign body present in the right eye. Left eye exhibits no chemosis, no discharge, no exudate and no hordeolum. No foreign body present in the left eye. Right conjunctiva is not injected. Right conjunctiva has no hemorrhage. Left conjunctiva is not injected. Left conjunctiva has no hemorrhage. No scleral icterus. Right eye exhibits normal extraocular motion and no nystagmus. Left eye exhibits normal extraocular motion and no nystagmus.   Neck: " Trachea normal and normal range of motion. Neck supple. Carotid bruit is not present. No tracheal deviation present. No thyroid mass and no thyromegaly present.   Cardiovascular: Normal rate, regular rhythm, normal heart sounds and intact distal pulses. Exam reveals no gallop and no friction rub.   No murmur heard.  Pulses:       Dorsalis pedis pulses are 2+ on the right side, and 2+ on the left side.        Posterior tibial pulses are 2+ on the right side, and 2+ on the left side.   Pulmonary/Chest: Effort normal and breath sounds normal. No respiratory distress. She has no decreased breath sounds. She has no wheezes. She has no rhonchi. She has no rales. Chest wall is not dull to percussion. She exhibits no tenderness.   Abdominal: Soft. Bowel sounds are normal. She exhibits no distension and no mass. There is no hepatosplenomegaly. There is no tenderness. There is no rebound and no guarding.   Musculoskeletal: Normal range of motion. She exhibits edema (trace left ankle). She exhibits no tenderness or deformity.   Lymphadenopathy:        Head (right side): No submandibular adenopathy present.        Head (left side): No submandibular adenopathy present.     She has no cervical adenopathy.        Right cervical: No superficial cervical, no deep cervical and no posterior cervical adenopathy present.       Left cervical: No superficial cervical, no deep cervical and no posterior cervical adenopathy present.     She has no axillary adenopathy.        Right axillary: No pectoral and no lateral adenopathy present.        Left axillary: No pectoral and no lateral adenopathy present.       Right: No inguinal and no supraclavicular adenopathy present.        Left: No inguinal and no supraclavicular adenopathy present.   Neurological: She is alert and oriented to person, place, and time. She has normal strength and normal reflexes. No cranial nerve deficit or sensory deficit. Coordination normal.   Reflex Scores:        Bicep reflexes are 2+ on the right side and 2+ on the left side.       Brachioradialis reflexes are 2+ on the right side and 2+ on the left side.       Patellar reflexes are 2+ on the right side and 2+ on the left side.  Skin: Skin is warm and dry. No rash noted. She is not diaphoretic. No cyanosis. Nails show no clubbing.   Psychiatric: She has a normal mood and affect. Her speech is normal and behavior is normal. Judgment and thought content normal. Cognition and memory are normal.   Nursing note and vitals reviewed.            Assessment/Plan   Medicare Risks and Personalized Health Plan  CMS Preventative Services Quick Reference  Advance Directive Discussion  Chronic Pain   Fall Risk  Immunizations Discussed/Encouraged (specific immunizations; Shingrix )  Inactivity/Sedentary  Obesity/Overweight   Osteoprorosis Risk  Polypharmacy    The above risks/problems have been discussed with the patient.  Pertinent information has been shared with the patient in the After Visit Summary.  Follow up plans and orders are seen below in the Assessment/Plan Section.    Diagnoses and all orders for this visit:    1. Medicare annual wellness visit, subsequent (Primary)  -     Cancel: POCT urinalysis dipstick, automated    2. Essential hypertension  -     Comprehensive Metabolic Panel; Future  -     Lipid Panel; Future  -     TSH; Future  -     CBC & Differential; Future  -     TSH  -     Lipid Panel  -     Comprehensive Metabolic Panel  -     CBC & Differential    3. Pure hypercholesterolemia  -     Comprehensive Metabolic Panel; Future  -     Lipid Panel; Future  -     Lipid Panel  -     Comprehensive Metabolic Panel    4. Coronary artery disease involving native coronary artery of native heart without angina pectoris    5. Diplopia  -     T4, Free; Future  -     Myasthenia Gravis Full Panel; Future  -     T4, Free  -     Myasthenia Gravis Full Panel    6. Urgency of urination  -     Cancel: POC Urinalysis Dipstick, Automated  -      Urine Culture - Urine, Urine, Clean Catch; Future  -     Urinalysis With Microscopic - Urine, Clean Catch; Future  -     Urinalysis With Microscopic - Urine, Clean Catch  -     Urine Culture - Urine, Urine, Clean Catch    7. Menopause  -     DEXA Bone Density Axial; Future      Follow Up:  Return in about 6 months (around 1/8/2021), or if symptoms worsen or fail to improve, for Recheck.     An After Visit Summary and PPPS were given to the patient.         Please follow a low animal fat diet that is also low in sugar, low in junk food, low in sweet drinks and low in alcohol.  Please increase the amount of fiber in your diet as well as increasing your daily exercise, such as walking.    Handouts to pt on Fall risk, advance care directives, healthy diets such as Mediterranean or Dash or calorie counting, and healthy exercising.

## 2020-07-09 ENCOUNTER — TRANSCRIBE ORDERS (OUTPATIENT)
Dept: ADMINISTRATIVE | Facility: HOSPITAL | Age: 80
End: 2020-07-09

## 2020-07-09 DIAGNOSIS — Z12.31 VISIT FOR SCREENING MAMMOGRAM: Primary | ICD-10-CM

## 2020-07-09 LAB
ALBUMIN SERPL-MCNC: 4.3 G/DL (ref 3.7–4.7)
ALBUMIN/GLOB SERPL: 1.9 {RATIO} (ref 1.2–2.2)
ALP SERPL-CCNC: 65 IU/L (ref 39–117)
ALT SERPL-CCNC: 11 IU/L (ref 0–32)
AST SERPL-CCNC: 18 IU/L (ref 0–40)
BASOPHILS # BLD AUTO: 0 X10E3/UL (ref 0–0.2)
BASOPHILS NFR BLD AUTO: 1 %
BILIRUB SERPL-MCNC: 0.4 MG/DL (ref 0–1.2)
BUN SERPL-MCNC: 15 MG/DL (ref 8–27)
BUN/CREAT SERPL: 26 (ref 12–28)
CALCIUM SERPL-MCNC: 9.6 MG/DL (ref 8.7–10.3)
CHLORIDE SERPL-SCNC: 105 MMOL/L (ref 96–106)
CHOLEST SERPL-MCNC: 148 MG/DL (ref 100–199)
CO2 SERPL-SCNC: 26 MMOL/L (ref 20–29)
CREAT SERPL-MCNC: 0.58 MG/DL (ref 0.57–1)
EOSINOPHIL # BLD AUTO: 0.1 X10E3/UL (ref 0–0.4)
EOSINOPHIL NFR BLD AUTO: 1 %
ERYTHROCYTE [DISTWIDTH] IN BLOOD BY AUTOMATED COUNT: 12.9 % (ref 11.7–15.4)
GLOBULIN SER CALC-MCNC: 2.3 G/DL (ref 1.5–4.5)
GLUCOSE SERPL-MCNC: NORMAL MG/DL
HCT VFR BLD AUTO: 40.9 % (ref 34–46.6)
HDLC SERPL-MCNC: 52 MG/DL
HGB BLD-MCNC: 14.1 G/DL (ref 11.1–15.9)
IMM GRANULOCYTES # BLD AUTO: 0 X10E3/UL (ref 0–0.1)
IMM GRANULOCYTES NFR BLD AUTO: 0 %
LDLC SERPL CALC-MCNC: 74 MG/DL (ref 0–99)
LYMPHOCYTES # BLD AUTO: 1.9 X10E3/UL (ref 0.7–3.1)
LYMPHOCYTES NFR BLD AUTO: 27 %
MCH RBC QN AUTO: 33.9 PG (ref 26.6–33)
MCHC RBC AUTO-ENTMCNC: 34.5 G/DL (ref 31.5–35.7)
MCV RBC AUTO: 98 FL (ref 79–97)
MONOCYTES # BLD AUTO: 0.8 X10E3/UL (ref 0.1–0.9)
MONOCYTES NFR BLD AUTO: 11 %
NEUTROPHILS # BLD AUTO: 4.4 X10E3/UL (ref 1.4–7)
NEUTROPHILS NFR BLD AUTO: 60 %
PLATELET # BLD AUTO: 268 X10E3/UL (ref 150–450)
POTASSIUM SERPL-SCNC: NORMAL MMOL/L
PROT SERPL-MCNC: 6.6 G/DL (ref 6–8.5)
RBC # BLD AUTO: 4.16 X10E6/UL (ref 3.77–5.28)
SODIUM SERPL-SCNC: 143 MMOL/L (ref 134–144)
T4 FREE SERPL-MCNC: 1.31 NG/DL (ref 0.82–1.77)
TRIGL SERPL-MCNC: 112 MG/DL (ref 0–149)
TSH SERPL DL<=0.005 MIU/L-ACNC: 1.71 UIU/ML (ref 0.45–4.5)
VLDLC SERPL CALC-MCNC: 22 MG/DL (ref 5–40)
WBC # BLD AUTO: 7.3 X10E3/UL (ref 3.4–10.8)

## 2020-07-11 LAB
APPEARANCE UR: ABNORMAL
BACTERIA #/AREA URNS HPF: ABNORMAL /[HPF]
BACTERIA UR CULT: ABNORMAL
BACTERIA UR CULT: ABNORMAL
BILIRUB UR QL STRIP: NEGATIVE
COLOR UR: YELLOW
EPI CELLS #/AREA URNS HPF: ABNORMAL /HPF (ref 0–10)
GLUCOSE UR QL: NEGATIVE
HGB UR QL STRIP: ABNORMAL
KETONES UR QL STRIP: NEGATIVE
LEUKOCYTE ESTERASE UR QL STRIP: ABNORMAL
MICRO URNS: ABNORMAL
MUCOUS THREADS URNS QL MICRO: PRESENT
NITRITE UR QL STRIP: POSITIVE
OTHER ANTIBIOTIC SUSC ISLT: ABNORMAL
PH UR STRIP: 6 [PH] (ref 5–7.5)
PROT UR QL STRIP: NEGATIVE
RBC #/AREA URNS HPF: ABNORMAL /HPF (ref 0–2)
SP GR UR: 1.01 (ref 1–1.03)
UROBILINOGEN UR STRIP-MCNC: 0.2 MG/DL (ref 0.2–1)
WBC #/AREA URNS HPF: >30 /HPF (ref 0–5)

## 2020-07-12 ENCOUNTER — TELEPHONE (OUTPATIENT)
Dept: INTERNAL MEDICINE | Facility: CLINIC | Age: 80
End: 2020-07-12

## 2020-07-12 DIAGNOSIS — N30.00 ACUTE CYSTITIS WITHOUT HEMATURIA: Primary | ICD-10-CM

## 2020-07-12 RX ORDER — AMOXICILLIN AND CLAVULANATE POTASSIUM 875; 125 MG/1; MG/1
1 TABLET, FILM COATED ORAL 2 TIMES DAILY
Qty: 20 TABLET | Refills: 0 | Status: SHIPPED | OUTPATIENT
Start: 2020-07-12 | End: 2020-10-01

## 2020-07-13 ENCOUNTER — TELEPHONE (OUTPATIENT)
Dept: INTERNAL MEDICINE | Facility: CLINIC | Age: 80
End: 2020-07-13

## 2020-07-13 NOTE — TELEPHONE ENCOUNTER
----- Message from Reina MAI MD sent at 7/9/2020  5:41 PM EDT -----  Please send copy of lab to Dr. Bonner eye doctor

## 2020-07-17 ENCOUNTER — TELEPHONE (OUTPATIENT)
Dept: INTERNAL MEDICINE | Facility: CLINIC | Age: 80
End: 2020-07-17

## 2020-07-17 DIAGNOSIS — I10 ESSENTIAL HYPERTENSION: Primary | ICD-10-CM

## 2020-07-17 DIAGNOSIS — G60.9 IDIOPATHIC PERIPHERAL NEUROPATHY: ICD-10-CM

## 2020-07-17 DIAGNOSIS — H53.2 DIPLOPIA: ICD-10-CM

## 2020-07-17 DIAGNOSIS — I21.A9 OTHER MYOCARDIAL INFARCTION TYPE (HCC): ICD-10-CM

## 2020-07-17 NOTE — TELEPHONE ENCOUNTER
Called and spoke to patient. Stated she just had a few questions regarding her labs from 7/9. Informed of result notes as ordered by provider and sent out in letter to pt. Pt asked what MCV and MCH means in her lab work and what it determines if levels are high or low? Also asked if lab to check for Myasthenia Gravis had been taken.  Stated her eye dr wanted her to have that checked by PCP as well as her fasting blood sugar. Also said she is ok to come back in and repeat Potassium and Glucose if she needs to along with the Myasthenia Gravis Panel. Please advise, Thanks.

## 2020-07-17 NOTE — TELEPHONE ENCOUNTER
Mean corpuscular volume and mean corpuscular hemoglobin both mildly increased not significant.    Labs added, myasthenia lab ordered but was not sent, new order placed

## 2020-07-17 NOTE — TELEPHONE ENCOUNTER
Caller: Chelsi Ferguson    Relationship: Self    Best call back number: [phone]    Caller requesting test results: YES    What test was performed: LABS    When was the test performed: 07/08/20    Where was the test performed:     Additional notes: PT WOULD LIKE A CB TO DISCUSS AND EXPLAIN WHAT SOME OF THE LABS MEAN PLEASE ADVISE

## 2020-07-20 ENCOUNTER — LAB (OUTPATIENT)
Dept: LAB | Facility: HOSPITAL | Age: 80
End: 2020-07-20

## 2020-07-20 ENCOUNTER — LAB REQUISITION (OUTPATIENT)
Dept: LAB | Facility: HOSPITAL | Age: 80
End: 2020-07-20

## 2020-07-20 DIAGNOSIS — Z00.00 ROUTINE GENERAL MEDICAL EXAMINATION AT A HEALTH CARE FACILITY: ICD-10-CM

## 2020-07-20 PROCEDURE — 36415 COLL VENOUS BLD VENIPUNCTURE: CPT | Performed by: FAMILY MEDICINE

## 2020-07-29 LAB
ACHR BIND AB SER-SCNC: <0.03 NMOL/L (ref 0–0.24)
ACHR BLOCK AB SER-ACNC: 13 % (ref 0–25)
ACHR MOD AB/ACHR TOTAL SFR SER: <12 % (ref 0–20)
BUN SERPL-MCNC: 11 MG/DL (ref 8–27)
BUN/CREAT SERPL: 19 (ref 12–28)
CALCIUM SERPL-MCNC: 9.5 MG/DL (ref 8.7–10.3)
CHLORIDE SERPL-SCNC: 105 MMOL/L (ref 96–106)
CO2 SERPL-SCNC: 25 MMOL/L (ref 20–29)
CREAT SERPL-MCNC: 0.59 MG/DL (ref 0.57–1)
GLUCOSE SERPL-MCNC: 78 MG/DL (ref 65–99)
HBA1C MFR BLD: 5.4 % (ref 4.8–5.6)
POTASSIUM SERPL-SCNC: 4.6 MMOL/L (ref 3.5–5.2)
SODIUM SERPL-SCNC: 141 MMOL/L (ref 134–144)
STRIA MUS AB TITR SER IF: NEGATIVE {TITER}

## 2020-08-03 ENCOUNTER — TELEPHONE (OUTPATIENT)
Dept: INTERNAL MEDICINE | Facility: CLINIC | Age: 80
End: 2020-08-03

## 2020-08-03 NOTE — TELEPHONE ENCOUNTER
----- Message from Reina MAI MD sent at 8/2/2020  4:17 PM EDT -----  Please send lab to Dr Moseley

## 2020-08-08 NOTE — PLAN OF CARE
Primary dx: GLF(s), Vertebrae fractures of L5 and T8, syncope, CKD Stage III, abnormal gait,             Skilled need: ASSESSMENT AND INSTRUCTION:  Teaching on all medications and medical problems.         ZIP CODE: [id number]             frequency: 2w3,1w6            Disciplines ordered: SN, PT, OT, HHA            Insurance & authorization:  [facility]            Certification period: 8/8/20 to 10/6/20            Special considerations: Pt has  NO DME equipment. Very shaky with ambulation. HIGH fall risk!  Pt's medications are arranged by a neighbor for 1 month. They have no bottles in the home and the neighbor is away this weekend. People in the home are all over 90 years old. Declined MSW at this time.        Problem: Patient Care Overview (Adult)  Goal: Plan of Care Review  Outcome: Ongoing (interventions implemented as appropriate)   03/08/18 0016   Coping/Psychosocial Response Interventions   Plan Of Care Reviewed With patient   Patient Care Overview   Progress progress toward functional goals is gradual   Outcome Evaluation   Outcome Summary/Follow up Plan Pt is ambulating well.

## 2020-08-28 DIAGNOSIS — N95.1 MENOPAUSAL SYMPTOM: ICD-10-CM

## 2020-08-31 RX ORDER — ESTRADIOL 10 UG/1
TABLET VAGINAL
Qty: 8 TABLET | Refills: 2 | Status: SHIPPED | OUTPATIENT
Start: 2020-08-31 | End: 2021-01-03

## 2020-09-23 ENCOUNTER — FLU SHOT (OUTPATIENT)
Dept: INTERNAL MEDICINE | Facility: CLINIC | Age: 80
End: 2020-09-23

## 2020-09-23 DIAGNOSIS — Z23 NEED FOR INFLUENZA VACCINATION: ICD-10-CM

## 2020-09-23 PROCEDURE — G0008 ADMIN INFLUENZA VIRUS VAC: HCPCS | Performed by: FAMILY MEDICINE

## 2020-09-23 PROCEDURE — 90694 VACC AIIV4 NO PRSRV 0.5ML IM: CPT | Performed by: FAMILY MEDICINE

## 2020-10-01 ENCOUNTER — OFFICE VISIT (OUTPATIENT)
Dept: NEUROLOGY | Facility: CLINIC | Age: 80
End: 2020-10-01

## 2020-10-01 VITALS
OXYGEN SATURATION: 98 % | TEMPERATURE: 97.5 F | SYSTOLIC BLOOD PRESSURE: 102 MMHG | DIASTOLIC BLOOD PRESSURE: 70 MMHG | HEART RATE: 61 BPM | BODY MASS INDEX: 32.37 KG/M2 | HEIGHT: 58 IN | WEIGHT: 154.2 LBS

## 2020-10-01 DIAGNOSIS — G25.0 ESSENTIAL TREMOR: Primary | ICD-10-CM

## 2020-10-01 DIAGNOSIS — H53.2 DIPLOPIA: ICD-10-CM

## 2020-10-01 DIAGNOSIS — G60.9 IDIOPATHIC PERIPHERAL NEUROPATHY: ICD-10-CM

## 2020-10-01 PROCEDURE — 99213 OFFICE O/P EST LOW 20 MIN: CPT | Performed by: PSYCHIATRY & NEUROLOGY

## 2020-10-01 NOTE — PROGRESS NOTES
Subjective:    CC: Chelsi Ferguson is seen today   for Tremors       HPI:  Current visit- patient states that she has been doing well since her last visit.  Her head tremors are stable and she still does not feel the need to start medications as they do not bother her.  The numbness in her feet has also not progressed and she once again denies any pain in the feet.  Has also stopped having leg cramps.  Continues to take Crestor 20 mg daily and her last lipid panel was extremely well controlled.  Recently patient started to have double vision with objects being side-to-side that lasts for a few seconds then goes away.  Has never occurred while driving.  Denies having any other symptoms of weakness, numbness, vision loss or speech changes.Her ophthalmologist ordered blood work for her including an A1c and a myasthenia panel both of which were unremarkable.  She has a follow-up appointment with them next week.      Last visit-since her last visit patient feels that her head tremors are about the same and have not really worsened.  Her leg muscle spasms have improved since Dr. Greene switched her from pravastatin to Crestor.  Her sciatica has also improved after she got PT.  With regards to her neuropathy she still has numbness in the bottoms of her feet but denies having any severe pain.  She is a little tearful today due to the recent demise of her .    Last visit-since the last visit patient feels that her neck stiffness and tremors have improved slightly after she got neck PT.  However today she has a new complaint.  She has recently started to get excruciating muscle cramps in her legs in her feet or groin.  They usually occur after prolonged sitting but may happen in the middle of the night waking her up from sleep.  Patient recently increased her pravastatin to 80 mg daily as she was instructed by the cardiologist to do so for goal LDL of less than 70.  Her LDL was 94.  With regards to her neuropathy  her symptoms of tingling and numbness in her feet are stable.  She has occasionally also started to get some burning pain in her legs.  Still does not want to start medications.  Today she is also complaining of low back pain that was initially radiating down her right leg but now goes down both legs.    Last visit-since the last visit patient's head and left hand tremor has remained the same.  She only notices it occasionally when she is at [facility] and sitting still otherwise it does not bother her.  The numbness in her feet has also remained the same.  She denies having any severe pain.  Does have slight balance problems due to the numbness.  Her neuropathy panel was within normal limits.    Last visit-since her last visit her neck pain has completely resolved and she has finished her physical therapy.  The tremors in her head and left hand still persist however they have not become worse and she still does not want to start taking any medications.  Recently she has also started to notice tingling, numbness and occasional sharp pain in the bottoms of both feet.  At times she also feels off balance.  Denies having diabetes or heavy alcohol intake.      Last visit-since her last visit she had a CT of her cervical spine that showed diffuse degenerative changes right more than left.  She did go for physical therapy which has helped.  She still gets occasional neck pain and stiffness but does not want to take muscle relaxants.  Today she also complains of a head tremor as well as a tremor in her left hand  that occurs more while doing tasks or holding objects in her hand.  Anxiety also seems to make the tremors worse.  She denies any family history of tremors.    Initial [id number]-old female with a history of hypertension, hyperlipidemia, arthritis, CAD, GERD presents with neck pain.  As per patient she was bedridden and frequently hospitalized for several UTIs and cholecystitis between January to June of this year.   Then in May of this year she started having a severe neck spasm on the left as well as pain that radiated from the neck to the side of her face and up into her scalp.  She underwent physical therapy for 4-6 weeks at the time that helped bring down the pain from 8 to about 3/10 in severity.  She was also found to have 2 cysts on her scalp and underwent removal for those which helped relieve her headaches.  Currently she has mild neck pain on the left that worsens while turning her neck to the right (like when she is backing her car up ) but denies having any facial pain or headaches.  She does have mild scalp numbness in the area where the cysts were removed.  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  She was asked to undergo surgery but refused.    The following portions of the patient's history were reviewed today and updated as of 06/01/2020  : allergies, current medications, past family history, past medical history, past social history, past surgical history and problem list  These document will be scanned to patient's chart.      Current Outpatient Medications:   •  aspirin 81 MG EC tablet, Take 81 mg by mouth Every Night., Disp: , Rfl:   •  BIOTIN 5000 PO, Take  by mouth., Disp: , Rfl:   •  Cholecalciferol (VITAMIN D3 PO), Vitamin D3  Daily, Disp: , Rfl:   •  coenzyme Q10 100 MG capsule, Take 100 mg by mouth Daily., Disp: , Rfl:   •  famotidine (PEPCID) 20 MG tablet, Take 20 mg by mouth At Night As Needed., Disp: , Rfl:   •  latanoprost (XALATAN) 0.005 % ophthalmic solution, 1 drop Every Night., Disp: , Rfl:   •  metoprolol succinate XL (TOPROL-XL) 25 MG 24 hr tablet, Take 25 mg by mouth Every Morning., Disp: , Rfl:   •  nitroglycerin (NITROSTAT) 0.4 MG SL tablet, Place 1 tablet under the tongue Every 5 (Five) Minutes As Needed  for Chest Pain., Disp: 30 tablet, Rfl: 3  •  rosuvastatin (CRESTOR) 20 MG tablet, Take 1 tablet by mouth Every Night., Disp: 30 tablet, Rfl: 11  •  YUVAFEM 10 MCG tablet vaginal tablet, INSERT 1 TABLET INTO THE VAGINA 2 (TWO) TIMES A WEEK., Disp: 8 tablet, Rfl: 2  •  Omega-3 Fatty Acids (FISH OIL) 1200 MG capsule delayed-release, Take  by mouth., Disp: , Rfl:   •  tiZANidine (ZANAFLEX) 4 MG tablet, Take 1 tablet by mouth Every 8 (Eight) Hours As Needed for Muscle Spasms., Disp: 30 tablet, Rfl: 1   Past Medical History:   Diagnosis Date   • Ankle pain    • Arthritis    • Cancer (CMS/HCC)     Skin   • Chest discomfort    • Cholecystitis 1/15/2018   • Coronary artery disease    • Edema    • Elevated liver enzymes 1/15/2018   • Gallstones    • GERD (gastroesophageal reflux disease)    • Glaucoma     pt is currently off of meds and Dr Tanner does not think that she is glaucoma   • H/O complete eye exam 06/2013   • H/O mammogram 11/16/2015   • H/O non-ST elevation myocardial infarction (NSTEMI) 01/2005   • Hammer toe    • Heart attack (CMS/HCC) 01/2005   • History of blood transfusion 01/2005   • History of cardiovascular stress test 12/02/2015    normal   • HTN (hypertension)    • Hyperlipidemia    • Hypokalemia 1/15/2018   • Kidney infection 1971   • Menopausal symptoms    • Onychia and paronychia of finger    • Osteopenia    • Other elevated white blood cell count    • Pap smear for cervical cancer screening 2010 [facility]   • Pneumonia    • Sepsis (CMS/HCC) 1/15/2018   • UTI (urinary tract infection) 1/15/2018   • Viral warts    • Wears glasses       Past Surgical History:   Procedure Laterality Date   • BLADDER REPAIR  2002   • BLADDER REPAIR  2003   • BUNIONECTOMY Right 2010   • CHOLECYSTECTOMY WITH INTRAOPERATIVE CHOLANGIOGRAM N/A 3/7/2018    Procedure: CHOLECYSTECTOMY LAPAROSCOPIC INTRAOPERATIVE CHOLANGIOGRAM;  Surgeon: Harinder Mason MD;  Location: [facility];  Service:    • COLONOSCOPY  2008, 4/2014    Juany  "  • CORONARY STENT PLACEMENT Left 01/2005    drug eluting stent 1/2005 LAD   • DILATATION AND CURETTAGE  1971   • DILATATION AND CURETTAGE  1978   • EYE CAPSULOTOMY WITH LASER Bilateral 2018   • EYE SURGERY Bilateral 05/2016    Cataract   • FRONTALIS SUSPENSION  2010   • HAMMER TOE REPAIR Right 11/03/2010   • HEAD & NECK SKIN LESION EXCISIONAL BIOPSY  08/20/2018    benign scalp cyst   • HYSTERECTOMY  1978   • LAPAROTOMY OOPHERECTOMY Bilateral 2002   • NOSE SURGERY  03/2017    repair 3 fractured areas. Dr Dunne   • OTHER SURGICAL HISTORY  2005    rectocele repair   • OTHER SURGICAL HISTORY  2010    eyelid surgery   • REPLACEMENT TOTAL KNEE BILATERAL Bilateral    • SKIN BIOPSY     • TONSILLECTOMY  1952   • TOTAL KNEE ARTHROPLASTY  01/2005   • UTERINE FIBROID SURGERY  1978    emergency removal of fibroid from birth canal      Family History   Problem Relation Age of Onset   • Stroke Mother    • Heart failure Father         congestive   • Cancer Father         lip   • Breast cancer Sister         60's   • Breast cancer Daughter 40   • Breast cancer Other         NIECE 60's   • Breast cancer Other         NIECE 60's   • Ovarian cancer Neg Hx       Social History     Socioeconomic History   • Marital status:      Spouse name: Not on file   • Number of children: Not on file   • Years of education: Not on file   • Highest education level: Not on file   Tobacco Use   • Smoking status: Never Smoker   • Smokeless tobacco: Never Used   Substance and Sexual Activity   • Alcohol use: No   • Drug use: No   • Sexual activity: Yes     Birth control/protection: None     Review of Systems   Eyes: Positive for double vision.   Musculoskeletal: Positive for myalgias (cramps are better since changing to Crestor).   All other systems reviewed and are negative.      Objective:    /70   Pulse 61   Temp 97.5 °F (36.4 °C) (Temporal)   Ht 147.3 cm (57.99\")   Wt 69.9 kg (154 lb 3.2 oz)   LMP  (LMP Unknown) Comment: Last " Mammogram 2017  SpO2 98%   BMI 32.24 kg/m²     Neurology Exam:    General apperance: NAD.     Mental status: Alert, awake and oriented to time place and person.    Recent and Remote memory: Intact.    Attention span and Concentration: Normal.     Language and Speech: Intact- No dysarthria.    Fluency, Naming , Repitition and Comprehension:  Intact    Cranial Nerves:   CN II: Visual fields are full. Intact. Fundi - Normal, No papillederma, Pupils - COLE  CN III, IV and VI: Extraocular movements are intact. Normal saccades.   CN V: Facial sensation is intact.   CN VII: Muscles of facial expression reveal no asymmetry. Intact.   CN VIII: Hearing is intact. Whispered voice intact.   CN IX and X: Palate elevates symmetrically. Intact  CN XI: Shoulder shrug is intact.   CN XII: Tongue is midline without evidence of atrophy or fasciculation.     Ophthalmoscopic exam of optic disc-normal    Motor:-Constant head tremor noted.  No postural tremors noted in the hands    Right UE muscle strength 5/5. Normal tone.     Left UE muscle strength 5/5. Normal tone.      Right LE muscle strength5/5. Normal tone.     Left LE muscle strength 5/5. Normal tone.      Sensory: Normal light touch, vibration and pinprick sensation bilaterally.    DTRs: 2+ bilaterally in upper and lower extremities.    Babinski: Negative bilaterally.    Co-ordination: Normal finger-to-nose, heel to shin B/L.    Rhomberg: Negative.    Gait: Normal.    Cardiovascular: Regular rate and rhythm without murmur, gallop or rub.    Assessment and Plan:  1. Essential tremor  Patient could have a mild dystonic tremor versus an essential tremor  Once again she does not want to start any medications or be referred for Botox as the tremors have been stable    2. Idiopathic peripheral neuropathy  I discussed starting her on low doses of gabapentin however she wants to wait.  Will let me know if symptoms worsen  Neuropathy panel in the past was normal    3.  Diplopia  Being  worked up by ophthalmology.  A1c and myasthenia panel were normal.  Denies any other strokelike symptoms    Return in about 6 months (around 4/1/2021).         Marianela Flores MD

## 2020-12-04 ENCOUNTER — HOSPITAL ENCOUNTER (OUTPATIENT)
Dept: BONE DENSITY | Facility: HOSPITAL | Age: 80
Discharge: HOME | End: 2020-12-04
Admitting: FAMILY MEDICINE

## 2020-12-04 DIAGNOSIS — Z78.0 MENOPAUSE: ICD-10-CM

## 2020-12-04 PROCEDURE — 77080 DXA BONE DENSITY AXIAL: CPT

## 2020-12-10 ENCOUNTER — OFFICE VISIT (OUTPATIENT)
Dept: CARDIOLOGY | Facility: CLINIC | Age: 80
End: 2020-12-10

## 2020-12-10 VITALS
HEART RATE: 70 BPM | TEMPERATURE: 96 F | WEIGHT: 153 LBS | DIASTOLIC BLOOD PRESSURE: 80 MMHG | BODY MASS INDEX: 30.84 KG/M2 | HEIGHT: 59 IN | SYSTOLIC BLOOD PRESSURE: 120 MMHG

## 2020-12-10 DIAGNOSIS — I25.10 CORONARY ARTERY DISEASE INVOLVING NATIVE CORONARY ARTERY OF NATIVE HEART WITHOUT ANGINA PECTORIS: Primary | ICD-10-CM

## 2020-12-10 DIAGNOSIS — I10 ESSENTIAL HYPERTENSION: Chronic | ICD-10-CM

## 2020-12-10 DIAGNOSIS — E78.00 PURE HYPERCHOLESTEROLEMIA: Chronic | ICD-10-CM

## 2020-12-10 PROCEDURE — 99213 OFFICE O/P EST LOW 20 MIN: CPT | Performed by: INTERNAL MEDICINE

## 2020-12-10 RX ORDER — METOPROLOL SUCCINATE 25 MG/1
25 TABLET, EXTENDED RELEASE ORAL EVERY MORNING
Qty: 90 TABLET | Refills: 3 | Status: SHIPPED | OUTPATIENT
Start: 2020-12-10 | End: 2021-01-08 | Stop reason: SDUPTHER

## 2020-12-10 RX ORDER — ROSUVASTATIN CALCIUM 20 MG/1
20 TABLET, COATED ORAL NIGHTLY
Qty: 90 TABLET | Refills: 3 | Status: SHIPPED | OUTPATIENT
Start: 2020-12-10 | End: 2021-01-08 | Stop reason: SDUPTHER

## 2020-12-10 NOTE — PROGRESS NOTES
[facility] Cardiology    Patient ID: Chelsi Ferguson is a 80 y.o. female.  : 1940   Contact: [phone]    Encounter date: 12/10/2020    PCP: Reina Yarbrough MD      Chief complaint:   Chief Complaint   Patient presents with   • Coronary Artery Disease   • Hypertension     Problem List:  1. Coronary artery disease  a. S/p NSTEMI with TATO (cypher sirolimus eluting stent) to LAD, 2005- data deficit.   b. Nuclear stress test 2015: no ischemia.  c. Nuclear stress test, 2020: No evidence of inducible ischemia by scintigraphic criteria. Low risk study.  2. Hyperlipidemia  a. FLP 2020: , Trig 145, HDL 43, LDL 94.   3. Hypertension  4. GERD  5. Osteoarthritis  6. Essential tremor    Allergies   Allergen Reactions   • Aleve [Naproxen] Hives   • Indocin [Indomethacin] Hallucinations   • Lipitor [Atorvastatin] Hives   • Statins Hives   • Zocor [Simvastatin] Hives   • Celebrex [Celecoxib] Diarrhea   • Keflex [Cephalexin] GI Intolerance     Upset stomach, may have had some blood in stool but cannot remember the reason why she was put on it. Tolerates penicillins without problem.   • Ibuprofen GI Intolerance     Heartburn.   • Prevacid [Lansoprazole] Nausea And Vomiting   • Prilosec [Omeprazole] Nausea And Vomiting       Current Medications:    Current Outpatient Medications:   •  aspirin 81 MG EC tablet, Take 81 mg by mouth Every Night., Disp: , Rfl:   •  BIOTIN 5000 PO, Take  by mouth., Disp: , Rfl:   •  Cholecalciferol (VITAMIN D3 PO), Vitamin D3  Daily, Disp: , Rfl:   •  coenzyme Q10 100 MG capsule, Take 100 mg by mouth Daily., Disp: , Rfl:   •  latanoprost (XALATAN) 0.005 % ophthalmic solution, 1 drop Every Night., Disp: , Rfl:   •  metoprolol succinate XL (TOPROL-XL) 25 MG 24 hr tablet, Take 1 tablet by mouth Every Morning., Disp: 90 tablet, Rfl: 3  •  nitroglycerin (NITROSTAT) 0.4 MG SL tablet, Place 1 tablet under the tongue Every 5 (Five) Minutes As Needed for  "Chest Pain., Disp: 30 tablet, Rfl: 3  •  rosuvastatin (CRESTOR) 20 MG tablet, Take 1 tablet by mouth Every Night., Disp: 90 tablet, Rfl: 3  •  YUVAFEM 10 MCG tablet vaginal tablet, INSERT 1 TABLET INTO THE VAGINA 2 (TWO) TIMES A WEEK., Disp: 8 tablet, Rfl: 2  •  famotidine (PEPCID) 20 MG tablet, Take 20 mg by mouth At Night As Needed., Disp: , Rfl:     HPI    Chelsi Ferguson is a 80 y.o. female who presents today for 6 month follow up of coronary artery disease and cardiac risk factors. Since last visit, patient has done well. She denies CP, SOB, PND, orthopnea. Occasional lower extremity edema mostly on the left at the end of the day. BP controlled. Cholesterol checked in July and was optimal. She has tolerated crestor well.       The following portions of the patient's history were reviewed and updated as appropriate: allergies, current medications and problem list.    Pertinent positives as listed in the HPI.  All other systems reviewed are negative.         Vitals:    12/10/20 1003   BP: 120/80   BP Location: Left arm   Patient Position: Sitting   Pulse: 70   Temp: 96 °F (35.6 °C)   Weight: 69.4 kg (153 lb)   Height: 149.9 cm (59\")       Physical Exam:  General: Alert and oriented.  Neck: Jugular venous pressure is within normal limits. Carotids have normal upstrokes without bruits.   Cardiovascular: Heart has a nondisplaced focal PMI. Regular rate and rhythm. No murmur, gallop or rub.  Lungs: Clear, no rales or wheezes. Equal expansion is noted.   Extremities: Show no edema.  Skin: Warm and dry.  Neurologic: Nonfocal.     Diagnostic Data (reviewed with patient):  Lab Results   Component Value Date    GLUCOSE 85 10/18/2018    CALCIUM 9.5 07/20/2020     07/20/2020    K 4.6 07/20/2020    CO2 25 07/20/2020     07/20/2020    BUN 11 07/20/2020    CREATININE 0.59 07/20/2020    EGFRIFAFRI 99 07/20/2020    EGFRIFNONA 86 07/20/2020    BCR 19 07/20/2020    ANIONGAP 4.0 10/18/2018      Lab Results   Component " Value Date    CHLPL 148 07/08/2020    TRIG 112 07/08/2020    HDL 52 07/08/2020    LDL 74 07/08/2020      Lab Results   Component Value Date    WBC 7.3 07/08/2020    RBC 4.16 07/08/2020    HGB 14.1 07/08/2020    HCT 40.9 07/08/2020    MCV 98 (H) 07/08/2020     07/08/2020      Lab Results   Component Value Date    TSH 1.710 07/08/2020        Procedures      Assessment:    ICD-10-CM ICD-9-CM   1. Coronary artery disease involving native coronary artery of native heart without angina pectoris  I25.10 414.01   2. Essential hypertension  I10 401.9   3. Pure hypercholesterolemia  E78.00 272.0         Plan:  1. Continue ASA, statin for CAD  2. Continue metoprolol for HTN  3. Continue statin for HLD. Reviewed FLP from July which is acceptable.   4. Continue all other current medications.  5. F/up in 12 months, sooner if needed.      Electronically signed by LINA Hernandez, 12/10/20, 10:23 AM EST.

## 2020-12-31 DIAGNOSIS — N95.1 MENOPAUSAL SYMPTOM: ICD-10-CM

## 2021-01-03 RX ORDER — ESTRADIOL 10 UG/1
TABLET VAGINAL
Qty: 24 TABLET | Refills: 0 | Status: SHIPPED | OUTPATIENT
Start: 2021-01-03 | End: 2021-03-29

## 2021-01-04 ENCOUNTER — HOSPITAL ENCOUNTER (OUTPATIENT)
Dept: MAMMOGRAPHY | Facility: HOSPITAL | Age: 81
Discharge: HOME | End: 2021-01-04
Admitting: FAMILY MEDICINE

## 2021-01-04 DIAGNOSIS — Z12.31 VISIT FOR SCREENING MAMMOGRAM: ICD-10-CM

## 2021-01-04 PROCEDURE — 77067 SCR MAMMO BI INCL CAD: CPT

## 2021-01-04 PROCEDURE — 77063 BREAST TOMOSYNTHESIS BI: CPT | Performed by: RADIOLOGY

## 2021-01-04 PROCEDURE — 77067 SCR MAMMO BI INCL CAD: CPT | Performed by: RADIOLOGY

## 2021-01-04 PROCEDURE — 77063 BREAST TOMOSYNTHESIS BI: CPT

## 2021-01-07 ENCOUNTER — TELEPHONE (OUTPATIENT)
Dept: INTERNAL MEDICINE | Facility: CLINIC | Age: 81
End: 2021-01-07

## 2021-01-08 ENCOUNTER — TELEPHONE (OUTPATIENT)
Dept: INTERNAL MEDICINE | Facility: CLINIC | Age: 81
End: 2021-01-08

## 2021-01-08 ENCOUNTER — OFFICE VISIT (OUTPATIENT)
Dept: INTERNAL MEDICINE | Facility: CLINIC | Age: 81
End: 2021-01-08

## 2021-01-08 ENCOUNTER — LAB (OUTPATIENT)
Dept: LAB | Facility: HOSPITAL | Age: 81
End: 2021-01-08

## 2021-01-08 VITALS
DIASTOLIC BLOOD PRESSURE: 62 MMHG | HEIGHT: 58 IN | BODY MASS INDEX: 31.28 KG/M2 | TEMPERATURE: 97.5 F | SYSTOLIC BLOOD PRESSURE: 120 MMHG | WEIGHT: 149 LBS | HEART RATE: 70 BPM | OXYGEN SATURATION: 97 %

## 2021-01-08 DIAGNOSIS — E78.00 PURE HYPERCHOLESTEROLEMIA: Chronic | ICD-10-CM

## 2021-01-08 DIAGNOSIS — R31.9 HEMATURIA, UNSPECIFIED TYPE: ICD-10-CM

## 2021-01-08 DIAGNOSIS — I10 ESSENTIAL HYPERTENSION: Primary | Chronic | ICD-10-CM

## 2021-01-08 DIAGNOSIS — R10.9 LEFT FLANK PAIN: ICD-10-CM

## 2021-01-08 DIAGNOSIS — I25.10 CORONARY ARTERY DISEASE INVOLVING NATIVE CORONARY ARTERY OF NATIVE HEART WITHOUT ANGINA PECTORIS: ICD-10-CM

## 2021-01-08 LAB
BILIRUB BLD-MCNC: NEGATIVE MG/DL
CLARITY, POC: CLEAR
COLOR UR: YELLOW
GLUCOSE UR STRIP-MCNC: NEGATIVE MG/DL
KETONES UR QL: NEGATIVE
LEUKOCYTE EST, POC: NEGATIVE
NITRITE UR-MCNC: NEGATIVE MG/ML
PH UR: 6 [PH] (ref 5–8)
PROT UR STRIP-MCNC: NEGATIVE MG/DL
RBC # UR STRIP: ABNORMAL /UL
SP GR UR: 1.02 (ref 1–1.03)
UROBILINOGEN UR QL: NORMAL

## 2021-01-08 PROCEDURE — 99214 OFFICE O/P EST MOD 30 MIN: CPT | Performed by: FAMILY MEDICINE

## 2021-01-08 PROCEDURE — 81003 URINALYSIS AUTO W/O SCOPE: CPT | Performed by: FAMILY MEDICINE

## 2021-01-08 PROCEDURE — 80061 LIPID PANEL: CPT | Performed by: FAMILY MEDICINE

## 2021-01-08 PROCEDURE — 36415 COLL VENOUS BLD VENIPUNCTURE: CPT | Performed by: FAMILY MEDICINE

## 2021-01-08 PROCEDURE — 80053 COMPREHEN METABOLIC PANEL: CPT | Performed by: FAMILY MEDICINE

## 2021-01-08 RX ORDER — METOPROLOL SUCCINATE 25 MG/1
25 TABLET, EXTENDED RELEASE ORAL EVERY MORNING
Qty: 90 TABLET | Refills: 1 | Status: SHIPPED | OUTPATIENT
Start: 2021-01-08 | End: 2021-07-08 | Stop reason: SDUPTHER

## 2021-01-08 RX ORDER — ROSUVASTATIN CALCIUM 20 MG/1
20 TABLET, COATED ORAL NIGHTLY
Qty: 90 TABLET | Refills: 1 | Status: SHIPPED | OUTPATIENT
Start: 2021-01-08 | End: 2021-07-08 | Stop reason: SDUPTHER

## 2021-01-08 NOTE — PROGRESS NOTES
"Subjective   Chelsi Ferguson is a 80 y.o. female.     Chief Complaint   Patient presents with   • Hyperlipidemia     f/u   • Hypertension       Visit Vitals  /62 (BP Location: Right arm, Patient Position: Sitting, Cuff Size: Adult)   Pulse 70   Temp 97.5 °F (36.4 °C)   Ht 147.3 cm (57.99\")   Wt 67.6 kg (149 lb)   LMP  (LMP Unknown) Comment: Last Mammogram 2017   SpO2 97%   BMI 31.15 kg/m²       Vitals:    01/08/21 1007 01/08/21 1102   BP: 120/62    BP Location: Right arm    Patient Position: Sitting    Cuff Size: Adult    Pulse: 70    Temp: (!) 91.5 °F (33.1 °C) 97.5 °F (36.4 °C)   TempSrc: Infrared    SpO2: 97%    Weight: 67.6 kg (149 lb)    Height: 147.3 cm (57.99\")        Hyperlipidemia  This is a chronic problem. The current episode started more than 1 year ago. The problem is controlled. Recent lipid tests were reviewed and are normal. She has no history of chronic renal disease, diabetes, hypothyroidism, liver disease, obesity or nephrotic syndrome. There are no known factors aggravating her hyperlipidemia. Pertinent negatives include no chest pain, focal sensory loss, focal weakness, leg pain, myalgias or shortness of breath. Current antihyperlipidemic treatment includes statins. The current treatment provides significant improvement of lipids. There are no compliance problems.  Risk factors for coronary artery disease include dyslipidemia, hypertension and post-menopausal.   Hypertension  This is a chronic problem. The current episode started more than 1 year ago. The problem is unchanged. The problem is controlled. Pertinent negatives include no anxiety, blurred vision, chest pain, headaches, malaise/fatigue, neck pain, orthopnea, palpitations, peripheral edema, PND, shortness of breath or sweats. There are no associated agents to hypertension. Risk factors for coronary artery disease include dyslipidemia and family history. Current antihypertension treatment includes beta blockers. The current " treatment provides significant improvement. There are no compliance problems.  Hypertensive end-organ damage includes CAD/MI. There is no history of angina, kidney disease, CVA, heart failure, left ventricular hypertrophy, PVD or retinopathy. There is no history of chronic renal disease, sleep apnea or a thyroid problem.   Coronary Artery Disease  Presents for follow-up visit. Pertinent negatives include no chest pain, chest pressure, chest tightness, dizziness, leg swelling, muscle weakness, palpitations, shortness of breath or weight gain. Risk factors include hyperlipidemia. Risk factors do not include obesity. The symptoms have been stable. Compliance with diet is good. Compliance with exercise is good. Compliance with medications is good.   Abdominal Pain  This is a chronic problem. The current episode started more than 1 month ago. The problem occurs intermittently. The problem has been waxing and waning. The pain is located in the left flank. The pain is at a severity of 2/10. Quality: feels like gas bubble. Associated symptoms include anorexia, arthralgias and weight loss. Pertinent negatives include no belching, constipation, diarrhea, dysuria, fever, flatus, frequency, headaches, hematochezia, hematuria, melena, myalgias, nausea or vomiting. The pain is aggravated by certain positions (pt has had pain  left flank when she bends over. ). Relieved by: antibiotic. She has tried antibiotics for the symptoms. The treatment provided significant relief. Prior diagnostic workup includes CT scan, ultrasound and GI consult.      Pt states that the wind was very cold when she got here and was outside for awhile walking to the door. Temp was low coming in.    Pt has hyperlipidemia and is taking and tolerating the crestor.    Pt is taking metoprolol xl 25 for her htn and tolerating it well.     Pt fell recently and bruised her right small finger.     The following portions of the patient's history were reviewed and  updated as appropriate: allergies, current medications, past family history, past medical history, past social history, past surgical history and problem list.    Past Medical History:   Diagnosis Date   • Ankle pain    • Arthritis    • Cancer (CMS/HCC)     Skin   • Chest discomfort    • Cholecystitis 1/15/2018   • Coronary artery disease    • Edema    • Elevated liver enzymes 1/15/2018   • Gallstones    • GERD (gastroesophageal reflux disease)    • Glaucoma     pt is currently off of meds and Dr Tanner does not think that she is glaucoma   • H/O complete eye exam 06/2013   • H/O mammogram 11/16/2015   • H/O non-ST elevation myocardial infarction (NSTEMI) 01/2005   • Hammer toe    • Heart attack (CMS/HCC) 01/2005   • History of blood transfusion 01/2005   • History of cardiovascular stress test 12/02/2015    normal   • HTN (hypertension)    • Hyperlipidemia    • Hypokalemia 1/15/2018   • Kidney infection 1971   • Menopausal symptoms    • Onychia and paronychia of finger    • Osteopenia    • Other elevated white blood cell count    • Pap smear for cervical cancer screening 2010    [facility]   • Pneumonia    • Sepsis (CMS/HCC) 1/15/2018   • UTI (urinary tract infection) 1/15/2018   • Viral warts    • Wears glasses       Past Surgical History:   Procedure Laterality Date   • BLADDER REPAIR  2002   • BLADDER REPAIR  2003   • BUNIONECTOMY Right 2010   • CHOLECYSTECTOMY WITH INTRAOPERATIVE CHOLANGIOGRAM N/A 3/7/2018    Procedure: CHOLECYSTECTOMY LAPAROSCOPIC INTRAOPERATIVE CHOLANGIOGRAM;  Surgeon: Harinder Mason MD;  Location: [facility];  Service:    • COLONOSCOPY  2008, 4/2014    [facility]   • CORONARY STENT PLACEMENT Left 01/2005    drug eluting stent 1/2005 LAD   • DILATATION AND CURETTAGE  1971   • DILATATION AND CURETTAGE  1978   • EYE CAPSULOTOMY WITH LASER Bilateral 2018   • EYE SURGERY Bilateral 05/2016    Cataract   • FRONTALIS SUSPENSION  2010   • HAMMER TOE REPAIR Right 11/03/2010   • HEAD & NECK SKIN LESION  EXCISIONAL BIOPSY  08/20/2018    benign scalp cyst   • HYSTERECTOMY  1978   • LAPAROTOMY OOPHERECTOMY Bilateral 2002   • NOSE SURGERY  03/2017    repair 3 fractured areas. Dr Dunne   • OTHER SURGICAL HISTORY  2005    rectocele repair   • OTHER SURGICAL HISTORY  2010    eyelid surgery   • REPLACEMENT TOTAL KNEE BILATERAL Bilateral    • SKIN BIOPSY     • TONSILLECTOMY  1952   • TOTAL KNEE ARTHROPLASTY  01/2005   • UTERINE FIBROID SURGERY  1978    emergency removal of fibroid from birth canal      Family History   Problem Relation Age of Onset   • Stroke Mother    • Heart failure Father         congestive   • Cancer Father         lip   • Breast cancer Sister         60's   • Breast cancer Daughter 40   • Breast cancer Other         NIECE 60's   • Breast cancer Other         NIECE 60's   • Ovarian cancer Neg Hx       Social History     Socioeconomic History   • Marital status:      Spouse name: Not on file   • Number of children: Not on file   • Years of education: Not on file   • Highest education level: Not on file   Tobacco Use   • Smoking status: Never Smoker   • Smokeless tobacco: Never Used   Substance and Sexual Activity   • Alcohol use: No   • Drug use: No   • Sexual activity: Yes     Birth control/protection: None      Allergies   Allergen Reactions   • Aleve [Naproxen] Hives   • Indocin [Indomethacin] Hallucinations   • Lipitor [Atorvastatin] Hives   • Statins Hives   • Zocor [Simvastatin] Hives   • Celebrex [Celecoxib] Diarrhea   • Keflex [Cephalexin] GI Intolerance     Upset stomach, may have had some blood in stool but cannot remember the reason why she was put on it. Tolerates penicillins without problem.   • Ibuprofen GI Intolerance     Heartburn.   • Prevacid [Lansoprazole] Nausea And Vomiting   • Prilosec [Omeprazole] Nausea And Vomiting       Review of Systems   Constitutional: Positive for weight loss. Negative for chills, diaphoresis, fatigue, fever, malaise/fatigue and weight gain.    Eyes: Negative for blurred vision.   Respiratory: Negative for chest tightness and shortness of breath.    Cardiovascular: Negative for chest pain, palpitations, orthopnea, leg swelling and PND.   Gastrointestinal: Positive for abdominal pain (left side, intermittent, better with antibiotic, hx of diverticulosis) and anorexia. Negative for constipation, diarrhea, flatus, hematochezia, melena, nausea and vomiting.        Heart burn has improved   Genitourinary: Negative for dysuria, frequency and hematuria.   Musculoskeletal: Positive for arthralgias. Negative for myalgias, muscle weakness and neck pain.   Neurological: Negative for dizziness, focal weakness and headaches.       Objective   Physical Exam  Vitals signs and nursing note reviewed.   Constitutional:       Appearance: She is well-developed.   HENT:      Head: Normocephalic.      Right Ear: External ear normal.      Left Ear: External ear normal.      Nose: Nose normal.   Eyes:      General: Lids are normal.      Conjunctiva/sclera: Conjunctivae normal.      Pupils: Pupils are equal, round, and reactive to light.   Neck:      Musculoskeletal: Normal range of motion and neck supple.      Thyroid: No thyroid mass or thyromegaly.      Vascular: No carotid bruit.      Trachea: Trachea normal.   Cardiovascular:      Rate and Rhythm: Normal rate and regular rhythm.      Heart sounds: No murmur.   Pulmonary:      Effort: Pulmonary effort is normal. No respiratory distress.      Breath sounds: Normal breath sounds. No decreased breath sounds, wheezing, rhonchi or rales.   Chest:      Chest wall: No tenderness.   Abdominal:      General: Bowel sounds are normal.      Palpations: Abdomen is soft.      Tenderness: There is abdominal tenderness in the left upper quadrant. There is left CVA tenderness. There is no right CVA tenderness, guarding or rebound.   Musculoskeletal: Normal range of motion.   Skin:     General: Skin is warm and dry.   Neurological:       Mental Status: She is alert and oriented to person, place, and time.   Psychiatric:         Behavior: Behavior normal.         Assessment/Plan   Diagnoses and all orders for this visit:    1. Essential hypertension (Primary)  -     metoprolol succinate XL (TOPROL-XL) 25 MG 24 hr tablet; Take 1 tablet by mouth Every Morning.  Dispense: 90 tablet; Refill: 1  -     Cancel: Comprehensive Metabolic Panel  -     Cancel: Lipid Panel  -     Lipid Panel; Future  -     Comprehensive Metabolic Panel; Future  -     Lipid Panel  -     Comprehensive Metabolic Panel    2. Pure hypercholesterolemia  -     rosuvastatin (CRESTOR) 20 MG tablet; Take 1 tablet by mouth Every Night.  Dispense: 90 tablet; Refill: 1  -     Cancel: Comprehensive Metabolic Panel  -     Cancel: Lipid Panel  -     Lipid Panel; Future  -     Comprehensive Metabolic Panel; Future  -     Lipid Panel  -     Comprehensive Metabolic Panel    3. Left flank pain  -     POC Urinalysis Dipstick, Automated  -     Urine Culture - Urine, Urine, Clean Catch  -     Lipid Panel; Future  -     Comprehensive Metabolic Panel; Future  -     Lipid Panel  -     Comprehensive Metabolic Panel    4. Coronary artery disease involving native coronary artery of native heart without angina pectoris  -     Lipid Panel; Future  -     Comprehensive Metabolic Panel; Future  -     Lipid Panel  -     Comprehensive Metabolic Panel    5. Hematuria, unspecified type  -     Lipid Panel; Future  -     Comprehensive Metabolic Panel; Future  -     Lipid Panel  -     Comprehensive Metabolic Panel                   Current Outpatient Medications:   •  aspirin 81 MG EC tablet, Take 81 mg by mouth Every Night., Disp: , Rfl:   •  BIOTIN 5000 PO, Take  by mouth., Disp: , Rfl:   •  Cholecalciferol (VITAMIN D3 PO), Vitamin D3  Daily, Disp: , Rfl:   •  coenzyme Q10 100 MG capsule, Take 100 mg by mouth Daily., Disp: , Rfl:   •  famotidine (PEPCID) 20 MG tablet, Take 20 mg by mouth At Night As Needed., Disp: ,  Rfl:   •  latanoprost (XALATAN) 0.005 % ophthalmic solution, 1 drop Every Night., Disp: , Rfl:   •  metoprolol succinate XL (TOPROL-XL) 25 MG 24 hr tablet, Take 1 tablet by mouth Every Morning., Disp: 90 tablet, Rfl: 1  •  nitroglycerin (NITROSTAT) 0.4 MG SL tablet, Place 1 tablet under the tongue Every 5 (Five) Minutes As Needed for Chest Pain., Disp: 30 tablet, Rfl: 3  •  rosuvastatin (CRESTOR) 20 MG tablet, Take 1 tablet by mouth Every Night., Disp: 90 tablet, Rfl: 1  •  Yuvafem 10 MCG tablet vaginal tablet, INSERT 1 TABLET INTO THE VAGINA 2 (TWO) TIMES A WEEK., Disp: 24 tablet, Rfl: 0    Return in about 6 months (around 7/8/2021), or if symptoms worsen or fail to improve, for Recheck, Medicare Wellness.

## 2021-01-08 NOTE — TELEPHONE ENCOUNTER
LVM on personal vm that if she wasn't feeling well since there was a trace of blood in urine that we could start her on antibiotics. Would call Monday since phone lines were switched to answering service.

## 2021-01-09 LAB
ALBUMIN SERPL-MCNC: 4.1 G/DL (ref 3.5–5.2)
ALBUMIN/GLOB SERPL: 1.8 G/DL
ALP SERPL-CCNC: 63 U/L (ref 39–117)
ALT SERPL W P-5'-P-CCNC: 7 U/L (ref 1–33)
ANION GAP SERPL CALCULATED.3IONS-SCNC: 10.6 MMOL/L (ref 5–15)
AST SERPL-CCNC: 22 U/L (ref 1–32)
BILIRUB SERPL-MCNC: 0.4 MG/DL (ref 0–1.2)
BUN SERPL-MCNC: 12 MG/DL (ref 8–23)
BUN/CREAT SERPL: 22.6 (ref 7–25)
CALCIUM SPEC-SCNC: 9.6 MG/DL (ref 8.6–10.5)
CHLORIDE SERPL-SCNC: 105 MMOL/L (ref 98–107)
CHOLEST SERPL-MCNC: 139 MG/DL (ref 0–200)
CO2 SERPL-SCNC: 25.4 MMOL/L (ref 22–29)
CREAT SERPL-MCNC: 0.53 MG/DL (ref 0.57–1)
GFR SERPL CREATININE-BSD FRML MDRD: 111 ML/MIN/1.73
GLOBULIN UR ELPH-MCNC: 2.3 GM/DL
GLUCOSE SERPL-MCNC: 84 MG/DL (ref 65–99)
HDLC SERPL-MCNC: 52 MG/DL (ref 40–60)
LDLC SERPL CALC-MCNC: 70 MG/DL (ref 0–100)
LDLC/HDLC SERPL: 1.32 {RATIO}
POTASSIUM SERPL-SCNC: 4.6 MMOL/L (ref 3.5–5.2)
PROT SERPL-MCNC: 6.4 G/DL (ref 6–8.5)
SODIUM SERPL-SCNC: 141 MMOL/L (ref 136–145)
TRIGL SERPL-MCNC: 92 MG/DL (ref 0–150)
VLDLC SERPL-MCNC: 17 MG/DL (ref 5–40)

## 2021-01-11 ENCOUNTER — TELEPHONE (OUTPATIENT)
Dept: INTERNAL MEDICINE | Facility: CLINIC | Age: 81
End: 2021-01-11

## 2021-01-11 RX ORDER — DOXYCYCLINE 100 MG/1
100 CAPSULE ORAL 2 TIMES DAILY
Qty: 20 CAPSULE | Refills: 0 | Status: SHIPPED | OUTPATIENT
Start: 2021-01-11 | End: 2021-07-08

## 2021-01-11 NOTE — TELEPHONE ENCOUNTER
PATIENT IS REPORTING THAT IF SHE NEEDS TO START MEDICATION FOR THE BLOOD IN HER URINE SHE WOULD LIKE TO GO AHEAD AND START IT OR SHE NEEDS TO KNOW IF EVERYTHING IS OK. PATIENT WOULD LIKE A CALL BACK TO DISCUSS  [phone].

## 2021-01-11 NOTE — TELEPHONE ENCOUNTER
Pt states that she is having urgency and sometimes burning. Wanted to know if she could go ahead and start an antibiotic. She is coming in tmrw to leave another urine specimen but wanted to start antibiotic in the mean time

## 2021-01-11 NOTE — TELEPHONE ENCOUNTER
----- Message from Reina MAI MD sent at 1/10/2021  8:55 PM EST -----  Please send copy of labs to Dr. Bonner eye doctor.

## 2021-01-14 LAB
BACTERIA UR CULT: ABNORMAL
OTHER ANTIBIOTIC SUSC ISLT: ABNORMAL

## 2021-03-29 DIAGNOSIS — N95.1 MENOPAUSAL SYMPTOM: ICD-10-CM

## 2021-03-29 RX ORDER — ESTRADIOL 10 UG/1
TABLET VAGINAL
Qty: 24 TABLET | Refills: 0 | Status: SHIPPED | OUTPATIENT
Start: 2021-03-29 | End: 2021-06-15

## 2021-06-15 DIAGNOSIS — N95.1 MENOPAUSAL SYMPTOM: ICD-10-CM

## 2021-06-15 RX ORDER — ESTRADIOL 10 UG/1
TABLET VAGINAL
Qty: 24 TABLET | Refills: 0 | Status: SHIPPED | OUTPATIENT
Start: 2021-06-15 | End: 2021-10-20

## 2021-07-07 DIAGNOSIS — E78.00 PURE HYPERCHOLESTEROLEMIA: Chronic | ICD-10-CM

## 2021-07-07 NOTE — TELEPHONE ENCOUNTER
Last Office Visit: 01/08/2021  Next Office Visit: 07/08/2021    Labs completed in past 6 months? no  Labs completed in past year? yes    Last Refill Date: 01/08/2021  Quantity: 90  Refills: 1     Pharmacy: [facility] - [place], [place] - [address]  - [phone]  - [phone] FX   [address], [facility] [address]   Phone:  [phone]  Fax:  [phone]    Please review pended refill request for any changes needed on refills or quantities. Thank you!

## 2021-07-08 ENCOUNTER — OFFICE VISIT (OUTPATIENT)
Dept: INTERNAL MEDICINE | Facility: CLINIC | Age: 81
End: 2021-07-08

## 2021-07-08 ENCOUNTER — LAB (OUTPATIENT)
Dept: LAB | Facility: HOSPITAL | Age: 81
End: 2021-07-08

## 2021-07-08 VITALS
BODY MASS INDEX: 32.66 KG/M2 | WEIGHT: 155.6 LBS | HEIGHT: 58 IN | DIASTOLIC BLOOD PRESSURE: 80 MMHG | SYSTOLIC BLOOD PRESSURE: 136 MMHG | TEMPERATURE: 97.5 F | HEART RATE: 71 BPM | OXYGEN SATURATION: 99 % | RESPIRATION RATE: 16 BRPM

## 2021-07-08 DIAGNOSIS — H61.23 BILATERAL IMPACTED CERUMEN: ICD-10-CM

## 2021-07-08 DIAGNOSIS — M72.0 DUPUYTREN'S DISEASE OF PALM OF LEFT HAND: ICD-10-CM

## 2021-07-08 DIAGNOSIS — E78.00 PURE HYPERCHOLESTEROLEMIA: Chronic | ICD-10-CM

## 2021-07-08 DIAGNOSIS — I10 ESSENTIAL HYPERTENSION: Primary | Chronic | ICD-10-CM

## 2021-07-08 DIAGNOSIS — N39.0 RECURRENT UTI: ICD-10-CM

## 2021-07-08 DIAGNOSIS — L98.9 SKIN LESION: ICD-10-CM

## 2021-07-08 DIAGNOSIS — R26.81 UNSTEADY GAIT WHEN WALKING: ICD-10-CM

## 2021-07-08 DIAGNOSIS — R12 HEARTBURN: ICD-10-CM

## 2021-07-08 DIAGNOSIS — I25.10 CORONARY ARTERY DISEASE INVOLVING NATIVE CORONARY ARTERY OF NATIVE HEART WITHOUT ANGINA PECTORIS: ICD-10-CM

## 2021-07-08 DIAGNOSIS — R20.0 NUMBNESS IN FEET: ICD-10-CM

## 2021-07-08 PROCEDURE — 99214 OFFICE O/P EST MOD 30 MIN: CPT | Performed by: FAMILY MEDICINE

## 2021-07-08 RX ORDER — MULTIPLE VITAMINS W/ MINERALS TAB 9MG-400MCG
1 TAB ORAL DAILY
COMMUNITY

## 2021-07-08 RX ORDER — SOLIFENACIN SUCCINATE 10 MG/1
10 TABLET, FILM COATED ORAL DAILY
COMMUNITY
End: 2022-01-25

## 2021-07-08 RX ORDER — ROSUVASTATIN CALCIUM 20 MG/1
20 TABLET, COATED ORAL NIGHTLY
Qty: 90 TABLET | Refills: 1 | Status: SHIPPED | OUTPATIENT
Start: 2021-07-08 | End: 2022-01-17

## 2021-07-08 RX ORDER — SENNOSIDES 8.6 MG
1300 CAPSULE ORAL 2 TIMES DAILY
COMMUNITY

## 2021-07-08 RX ORDER — METOPROLOL SUCCINATE 25 MG/1
25 TABLET, EXTENDED RELEASE ORAL EVERY MORNING
Qty: 90 TABLET | Refills: 1 | Status: SHIPPED | OUTPATIENT
Start: 2021-07-08 | End: 2022-01-17

## 2021-07-08 NOTE — PROGRESS NOTES
"Subjective   Chelsi Ferguson is a 80 y.o. female.     Chief Complaint   Patient presents with   • Hypertension     6 month follow-up   • Hyperlipidemia   • Coronary Artery Disease   • Blood in Urine   • Heartburn     wants to discuss possible medication changes, can not take pepcid and tums        Visit Vitals  /80   Pulse 71   Temp 97.5 °F (36.4 °C) (Infrared)   Resp 16   Ht 147.3 cm (57.99\")   Wt 70.6 kg (155 lb 9.6 oz)   LMP  (LMP Unknown) Comment: Last Mammogram 2017   SpO2 99%   BMI 32.53 kg/m²         Hypertension  This is a chronic problem. The current episode started more than 1 year ago. The problem is unchanged. The problem is controlled. Pertinent negatives include no anxiety, blurred vision, chest pain, headaches, malaise/fatigue, neck pain, orthopnea, palpitations, peripheral edema, PND, shortness of breath or sweats. There are no associated agents to hypertension. Risk factors for coronary artery disease include dyslipidemia, family history, obesity and post-menopausal state. Current antihypertension treatment includes beta blockers. The current treatment provides significant improvement. There are no compliance problems.  Hypertensive end-organ damage includes CAD/MI. There is no history of PVD or retinopathy. There is no history of chronic renal disease, sleep apnea or a thyroid problem.   Hyperlipidemia  This is a chronic problem. The current episode started more than 1 year ago. The problem is controlled. Recent lipid tests were reviewed and are normal. Exacerbating diseases include obesity. She has no history of chronic renal disease, diabetes, hypothyroidism, liver disease or nephrotic syndrome. Factors aggravating her hyperlipidemia include beta blockers. Pertinent negatives include no chest pain, focal sensory loss, focal weakness, leg pain, myalgias or shortness of breath. Current antihyperlipidemic treatment includes statins. Improvement on treatment: pending. There are no compliance " problems.  Risk factors for coronary artery disease include dyslipidemia, family history, obesity, hypertension and post-menopausal.   Coronary Artery Disease  Presents for follow-up visit. Symptoms include weight gain. Pertinent negatives include no chest pain, chest pressure, chest tightness, dizziness, leg swelling, muscle weakness, palpitations or shortness of breath. Risk factors include hyperlipidemia and obesity. The symptoms have been stable. Compliance with diet is good. Compliance with exercise is variable. Compliance with medications is good.   Blood in Urine  This is a recurrent problem. She describes the hematuria as microscopic hematuria. She is experiencing no pain. She describes her urine color as yellow. Irritative symptoms include frequency, nocturia and urgency. Obstructive symptoms include dribbling and a weak stream. Obstructive symptoms do not include straining. Pertinent negatives include no abdominal pain, chills, dysuria, facial swelling, fever, flank pain, genital pain, hesitancy, inability to urinate, nausea or vomiting. She is not sexually active. Her past medical history is significant for recent infection.   Heartburn  She complains of choking, dysphagia and heartburn. She reports no abdominal pain, no belching, no chest pain, no coughing, no early satiety, no globus sensation, no hoarse voice, no nausea, no sore throat, no stridor, no tooth decay, no water brash or no wheezing. This is a recurrent problem. The current episode started more than 1 year ago. The problem occurs frequently. The problem has been waxing and waning. The heartburn is located in the substernum. The heartburn is of severe intensity. The heartburn wakes her from sleep. The heartburn does not limit her activity. The heartburn doesn't change with position. The symptoms are aggravated by certain foods. Pertinent negatives include no anemia, fatigue, melena, muscle weakness, orthopnea or weight loss. Risk factors  include obesity. She has tried an antibiotic and a histamine-2 antagonist for the symptoms. The treatment provided moderate relief.      Pt is on Crestor for her lipids, which aggravates her heart burn, but does not cause cramps.  Pt has been having loss of control of bladder. Pt has prescription for vesicare. Discussed the side effects especially the confusion.    Pt is taking 2 caps of 650 mg tylenol bid.     Pt reports that she has unsteady gait and would like to go to PT for strengthening.   Pt has intermittent numbness of both feet. Pt request that pulses are checked.   The following portions of the patient's history were reviewed and updated as appropriate: allergies, current medications, past family history, past medical history, past social history, past surgical history and problem list.    Past Medical History:   Diagnosis Date   • Ankle pain    • Arthritis    • Cancer (CMS/HCC)     Skin   • Chest discomfort    • Cholecystitis 1/15/2018   • Coronary artery disease    • Edema    • Elevated liver enzymes 1/15/2018   • Gallstones    • GERD (gastroesophageal reflux disease)    • Glaucoma     pt is currently off of meds and Dr Tanner does not think that she is glaucoma   • H/O complete eye exam 06/2013   • H/O mammogram 11/16/2015   • H/O non-ST elevation myocardial infarction (NSTEMI) 01/2005   • Hammer toe    • Heart attack (CMS/HCC) 01/2005   • History of blood transfusion 01/2005   • History of cardiovascular stress test 12/02/2015    normal   • HTN (hypertension)    • Hyperlipidemia    • Hypokalemia 1/15/2018   • Kidney infection 1971   • Low back pain    • Menopausal symptoms    • Onychia and paronychia of finger    • Osteopenia    • Other elevated white blood cell count    • Pap smear for cervical cancer screening 2010    [facility]   • Pneumonia    • Sepsis (CMS/HCC) 1/15/2018   • Tremor     essential tremor   • UTI (urinary tract infection) 1/15/2018   • Viral warts    • Visual impairment 2019   • Wears  glasses       Past Surgical History:   Procedure Laterality Date   • ADENOIDECTOMY  1952   • BLADDER REPAIR  2002   • BLADDER REPAIR  2003   • BUNIONECTOMY Right 2010   • CHOLECYSTECTOMY     • CHOLECYSTECTOMY WITH INTRAOPERATIVE CHOLANGIOGRAM N/A 3/7/2018    Procedure: CHOLECYSTECTOMY LAPAROSCOPIC INTRAOPERATIVE CHOLANGIOGRAM;  Surgeon: Harinder Mason MD;  Location: [facility];  Service:    • COLONOSCOPY  2008, 4/2014    [facility]   • CORONARY STENT PLACEMENT Left 01/2005    drug eluting stent 1/2005 LAD   • DILATATION AND CURETTAGE  1971   • DILATATION AND CURETTAGE  1978   • EYE CAPSULOTOMY WITH LASER Bilateral 2018   • EYE SURGERY Bilateral 05/2016    Cataract   • FRONTALIS SUSPENSION  2010   • HAMMER TOE REPAIR Right 11/03/2010   • HEAD & NECK SKIN LESION EXCISIONAL BIOPSY  08/20/2018    benign scalp cyst   • HYSTERECTOMY  1978   • JOINT REPLACEMENT  2005   • LAPAROTOMY OOPHERECTOMY Bilateral 2002   • NOSE SURGERY  03/2017    repair 3 fractured areas. Dr Dunne   • OTHER SURGICAL HISTORY  2005    rectocele repair   • OTHER SURGICAL HISTORY  2010    eyelid surgery   • REPLACEMENT TOTAL KNEE BILATERAL Bilateral    • SKIN BIOPSY     • TONSILLECTOMY  1952   • TOTAL KNEE ARTHROPLASTY  01/2005   • UMBILICAL HERNIA REPAIR  2019   • UTERINE FIBROID SURGERY  1978    emergency removal of fibroid from birth canal      Family History   Problem Relation Age of Onset   • Stroke Mother    • Hypertension Mother    • Heart failure Father         congestive   • Cancer Father         Neck glands removed   • Vision loss Father         Had operation   • Breast cancer Sister         60's   • Cancer Sister         Breast   • Breast cancer Daughter 40   • Cancer Daughter         Breast   • Breast cancer Other         NIECE 60's   • Breast cancer Other         NIECE 60's   • Vision loss Paternal Aunt         blind-glaucoma   • Vision loss Paternal Uncle         blind-glaucoma   • Vision loss Maternal Aunt         all 9 aunts  paternal aunts/Uncles/ cousins/ sister/brother/ and niece on meds for Glaucoma or had operation   • Ovarian cancer Neg Hx       Social History     Socioeconomic History   • Marital status:      Spouse name: Not on file   • Number of children: Not on file   • Years of education: Not on file   • Highest education level: Not on file   Tobacco Use   • Smoking status: Never Smoker   • Smokeless tobacco: Never Used   Vaping Use   • Vaping Use: Never used   Substance and Sexual Activity   • Alcohol use: No   • Drug use: No   • Sexual activity: Not Currently     Birth control/protection: None      Allergies   Allergen Reactions   • Aleve [Naproxen] Hives   • Indocin [Indomethacin] Hallucinations   • Lipitor [Atorvastatin] Hives   • Statins Hives   • Zocor [Simvastatin] Hives   • Celebrex [Celecoxib] Diarrhea   • Keflex [Cephalexin] GI Intolerance     Upset stomach, may have had some blood in stool but cannot remember the reason why she was put on it. Tolerates penicillins without problem.   • Ibuprofen GI Intolerance     Heartburn.   • Prevacid [Lansoprazole] Nausea And Vomiting   • Prilosec [Omeprazole] Nausea And Vomiting       Review of Systems   Constitutional: Positive for weight gain. Negative for chills, fatigue, fever, malaise/fatigue and weight loss.   HENT: Negative for facial swelling, hoarse voice and sore throat.    Eyes: Positive for visual disturbance (double vision, pt has discussed with both her eye doctors, worse watching TV or in [employer]). Negative for blurred vision.   Respiratory: Positive for choking. Negative for cough, chest tightness, shortness of breath and wheezing.    Cardiovascular: Negative for chest pain, palpitations, orthopnea, leg swelling and PND.   Gastrointestinal: Positive for dysphagia and heartburn. Negative for abdominal pain, melena, nausea and vomiting.   Genitourinary: Positive for frequency, hematuria, nocturia and urgency. Negative for dysuria, flank pain and hesitancy.    Musculoskeletal: Negative for myalgias, muscle weakness and neck pain.   Neurological: Positive for numbness (intermittent both feet). Negative for dizziness, focal weakness and headaches.       Objective   Physical Exam  Vitals and nursing note reviewed.   Constitutional:       Appearance: She is well-developed.   HENT:      Head: Normocephalic.        Right Ear: External ear normal. There is impacted cerumen.      Left Ear: External ear normal. There is impacted cerumen.      Nose: Nose normal.   Eyes:      General: Lids are normal.      Conjunctiva/sclera: Conjunctivae normal.      Pupils: Pupils are equal, round, and reactive to light.   Neck:      Thyroid: No thyroid mass or thyromegaly.      Vascular: No carotid bruit.      Trachea: Trachea normal.   Cardiovascular:      Rate and Rhythm: Normal rate and regular rhythm.      Pulses:           Dorsalis pedis pulses are 2+ on the right side and 2+ on the left side.        Posterior tibial pulses are 2+ on the right side and 2+ on the left side.      Heart sounds: No murmur heard.     Pulmonary:      Effort: Pulmonary effort is normal. No respiratory distress.      Breath sounds: Normal breath sounds. No decreased breath sounds, wheezing, rhonchi or rales.   Chest:      Chest wall: No tenderness.   Abdominal:      General: Bowel sounds are normal.      Palpations: Abdomen is soft.      Tenderness: There is no abdominal tenderness.   Musculoskeletal:         General: Normal range of motion.      Right hand: Normal.        Arms:       Cervical back: Normal range of motion and neck supple.   Skin:     General: Skin is warm and dry.   Neurological:      Mental Status: She is alert and oriented to person, place, and time.   Psychiatric:         Behavior: Behavior normal.         Assessment/Plan   Diagnoses and all orders for this visit:    1. Essential hypertension (Primary)  -     Comprehensive Metabolic Panel; Future  -     TSH Rfx On Abnormal To Free T4; Future  -      CBC & Differential; Future  -     Lipid Panel; Future  -     metoprolol succinate XL (TOPROL-XL) 25 MG 24 hr tablet; Take 1 tablet by mouth Every Morning.  Dispense: 90 tablet; Refill: 1  -     Lipid Panel  -     CBC & Differential  -     TSH Rfx On Abnormal To Free T4  -     Comprehensive Metabolic Panel    2. Coronary artery disease involving native coronary artery of native heart without angina pectoris    3. Pure hypercholesterolemia  -     Lipid Panel; Future  -     rosuvastatin (CRESTOR) 20 MG tablet; Take 1 tablet by mouth Every Night.  Dispense: 90 tablet; Refill: 1  -     Lipid Panel    4. Heartburn  -     H. Pylori Breath Test - , Lung; Future  -     H. Pylori Breath Test - Breath, Lung    5. Unsteady gait when walking  -     Ambulatory Referral to Physical Therapy Evaluate and treat; Strengthening    6. Recurrent UTI  -     Urine Culture - , Urine, Clean Catch; Future  -     Urinalysis With Microscopic - Urine, Clean Catch; Future  -     Urine Culture - Urine, Urine, Clean Catch  -     Urinalysis With Microscopic - Urine, Clean Catch    7. Numbness in feet  -     Vitamin B12; Future  -     Folate; Future  -     Folate  -     Vitamin B12    8. Dupuytren's disease of palm of left hand    9. Skin lesion    10. Bilateral impacted cerumen      Pt to check with her eye doctor regarding vesicare.  Pt notified that it can cause confusion    Handout on Dupuytrens contracture. Pt declined referral to hand specialist.      Pt will call Dr Rao for Derm eval    otc Debrox to ears    Current Outpatient Medications:   •  acetaminophen (TYLENOL) 650 MG 8 hr tablet, Take 1,300 mg by mouth 2 (two) times a day., Disp: , Rfl:   •  aspirin 81 MG EC tablet, Take 81 mg by mouth Every Night., Disp: , Rfl:   •  Cholecalciferol (VITAMIN D3 PO), Vitamin D3  Daily, Disp: , Rfl:   •  coenzyme Q10 100 MG capsule, Take 100 mg by mouth Daily., Disp: , Rfl:   •  famotidine (PEPCID) 20 MG tablet, Take 20 mg by mouth At Night As  Needed., Disp: , Rfl:   •  latanoprost (XALATAN) 0.005 % ophthalmic solution, 1 drop Every Night., Disp: , Rfl:   •  metoprolol succinate XL (TOPROL-XL) 25 MG 24 hr tablet, Take 1 tablet by mouth Every Morning., Disp: 90 tablet, Rfl: 1  •  multivitamin with minerals (HAIR SKIN AND NAILS FORMULA PO), Take 1 tablet by mouth Daily., Disp: , Rfl:   •  nitroglycerin (NITROSTAT) 0.4 MG SL tablet, Place 1 tablet under the tongue Every 5 (Five) Minutes As Needed for Chest Pain., Disp: 30 tablet, Rfl: 3  •  rosuvastatin (CRESTOR) 20 MG tablet, Take 1 tablet by mouth Every Night., Disp: 90 tablet, Rfl: 1  •  solifenacin (VESICARE) 10 MG tablet, Take 10 mg by mouth Daily., Disp: , Rfl:   •  Yuvafem 10 MCG tablet vaginal tablet, INSERT 1 TABLET INTO THE VAGINA 2 (TWO) TIMES A WEEK., Disp: 24 tablet, Rfl: 0    Return in about 6 months (around 1/8/2022), or if symptoms worsen or fail to improve, for Recheck, Medicare Wellness tomorrow.

## 2021-07-08 NOTE — PATIENT INSTRUCTIONS
Dupuytren's Contracture Surgery  Dupuytren's contracture surgery is a procedure to break up or remove thick tissue (fascia) in the hand. Dupuytren's contracture is a hand condition in which fascia becomes thick. This causes some fingers to curve inward (contract) toward the palm. The goal of surgery is to return the affected fingers to their normal positions and improve hand function.  This procedure does not cure Dupuytren's contracture, but it may be necessary when the condition causes discomfort or interferes with everyday tasks.  Tell a health care provider about:  · Any allergies you have.  · All medicines you are taking, including vitamins, herbs, eye drops, creams, and over-the-counter medicines.  · Any problems you or family members have had with anesthetic medicines.  · Any blood disorders you have.  · Any surgeries you have had.  · Any medical conditions you have.  · Previous hand injuries you have had.  · Whether you are pregnant or may be pregnant.  What are the risks?  Generally, this is a safe procedure. However, problems may occur, including:  · Infection.  · Bleeding.  · Allergic reactions to medicines.  · Damage to other structures or organs, such as connective tissue (tendons) or nerves in the hand. This could cause numbness or weakness in the hand.  · Scarring of the hand.  · Return of Dupuytren's contracture. Surgery may relieve symptoms temporarily, but they often return.  What happens before the procedure?  Staying hydrated  Follow instructions from your health care provider about hydration, which may include:  · Up to 2 hours before the procedure - you may continue to drink clear liquids, such as water, clear fruit juice, black coffee, and plain tea.    Eating and drinking restrictions  Follow instructions from your health care provider about eating and drinking, which may include:  · 8 hours before the procedure - stop eating heavy meals or foods, such as meat, fried foods, or fatty  foods.  · 6 hours before the procedure - stop eating light meals or foods, such as toast or cereal.  · 6 hours before the procedure - stop drinking milk or drinks that contain milk.  · 2 hours before the procedure - stop drinking clear liquids.  Medicines  Ask your health care provider about:  · Changing or stopping your regular medicines. This is especially important if you are taking diabetes medicines or blood thinners.  · Taking medicines such as aspirin and ibuprofen. These medicines can thin your blood. Do not take these medicines unless your health care provider tells you to take them.  · Taking over-the-counter medicines, vitamins, herbs, and supplements.  Tests  · You may have a physical exam of your hand.  · You may have a blood or urine sample taken.  General instructions  · Plan to have someone take you home from the hospital or clinic.  · If you will be going home right after the procedure, plan to have someone with you for 24 hours.  · Ask your health care provider how your surgical site will be marked or identified.  · Ask your health care provider what steps will be taken to help prevent infection. These may include:  ? Removing hair at the surgery site.  ? Washing skin with a germ-killing soap.  ? Taking antibiotic medicine.  What happens during the procedure?  · An IV will be inserted into one of your veins.  · You will be given one or more of the following:  ? A medicine to help you relax (sedative).  ? A medicine to numb your hand (local anesthetic).  ? A medicine that is injected into an area of your body to numb everything below the injection site (regional anesthetic).  ? A medicine to make you fall asleep (general anesthetic).  · One or more incisions will be made in your hand.  · Thick fascia will be broken up or removed. Your surgeon will test your finger movement and try to straighten your fingers.  · Skin on the hand may be removed. If this is done, skin from another place on your body  (graft) will be removed and placed over your incision. Usually, a skin graft comes from the wrist or forearm.  · The incision will be closed with stitches (sutures). Skin glue or adhesive strips may also be used to close your incision.  · A bandage (dressing) will be put over the incision. This may include a splint to keep the fingers in place so they do not move.  The procedure may vary among health care providers and hospitals.  What happens after the procedure?    · You may continue to receive medicines and fluids through an IV line.  · Your blood pressure, heart rate, breathing rate, and blood oxygen level will be monitored until you leave the hospital or clinic.  · You may be given pain medicine to help control pain.  · Do not drive for 24 hours if you were given a sedative during your procedure.  · Ask your health care provider when it is safe to drive if you have a splint on your hand.  Summary  · Dupuytren's contracture surgery is a procedure to break up or remove thick tissue (fascia) in the hand.  · This procedure is performed when Dupuytren's contracture has interfered with everyday tasks.  · Generally, this is a safe procedure. However, problems may occur, including infection, bleeding, scarring, damage to tendons or nerves, and return of the problem.  · Follow all instructions before the procedure, including when to stop eating and drinking and what medicines to change or stop. Plan to have someone take you home from the hospital or clinic.  · You will receive pain medicine after the procedure. Do not drive for 24 hours after the procedure.  This information is not intended to replace advice given to you by your health care provider. Make sure you discuss any questions you have with your health care provider.  Document Revised: 04/08/2020 Document Reviewed: 07/09/2019  [employer] Patient Education © 2021 [employer]

## 2021-07-09 ENCOUNTER — OFFICE VISIT (OUTPATIENT)
Dept: INTERNAL MEDICINE | Facility: CLINIC | Age: 81
End: 2021-07-09

## 2021-07-09 VITALS
TEMPERATURE: 97.7 F | HEIGHT: 60 IN | DIASTOLIC BLOOD PRESSURE: 66 MMHG | HEART RATE: 63 BPM | WEIGHT: 156.6 LBS | BODY MASS INDEX: 30.74 KG/M2 | OXYGEN SATURATION: 99 % | SYSTOLIC BLOOD PRESSURE: 128 MMHG

## 2021-07-09 DIAGNOSIS — Z12.11 ENCOUNTER FOR SCREENING FECAL OCCULT BLOOD TESTING: ICD-10-CM

## 2021-07-09 DIAGNOSIS — Z00.00 MEDICARE ANNUAL WELLNESS VISIT, SUBSEQUENT: Primary | ICD-10-CM

## 2021-07-09 LAB
ALBUMIN SERPL-MCNC: 4.8 G/DL (ref 3.5–5.2)
ALBUMIN/GLOB SERPL: 2.3 G/DL
ALP SERPL-CCNC: 62 U/L (ref 39–117)
ALT SERPL-CCNC: 11 U/L (ref 1–33)
APPEARANCE UR: ABNORMAL
AST SERPL-CCNC: 21 U/L (ref 1–32)
BACTERIA #/AREA URNS HPF: ABNORMAL /HPF
BASOPHILS # BLD AUTO: 0.04 10*3/MM3 (ref 0–0.2)
BASOPHILS NFR BLD AUTO: 0.5 % (ref 0–1.5)
BILIRUB SERPL-MCNC: 0.6 MG/DL (ref 0–1.2)
BILIRUB UR QL STRIP: NEGATIVE
BUN SERPL-MCNC: 12 MG/DL (ref 8–23)
BUN/CREAT SERPL: 18.2 (ref 7–25)
CALCIUM SERPL-MCNC: 10.2 MG/DL (ref 8.6–10.5)
CASTS URNS MICRO: ABNORMAL
CHLORIDE SERPL-SCNC: 104 MMOL/L (ref 98–107)
CHOLEST SERPL-MCNC: 152 MG/DL (ref 0–200)
CO2 SERPL-SCNC: 27.1 MMOL/L (ref 22–29)
COLOR UR: YELLOW
CREAT SERPL-MCNC: 0.66 MG/DL (ref 0.57–1)
EOSINOPHIL # BLD AUTO: 0.09 10*3/MM3 (ref 0–0.4)
EOSINOPHIL NFR BLD AUTO: 1.2 % (ref 0.3–6.2)
EPI CELLS #/AREA URNS HPF: ABNORMAL /HPF
ERYTHROCYTE [DISTWIDTH] IN BLOOD BY AUTOMATED COUNT: 12.8 % (ref 12.3–15.4)
FOLATE SERPL-MCNC: >20 NG/ML (ref 4.78–24.2)
GLOBULIN SER CALC-MCNC: 2.1 GM/DL
GLUCOSE SERPL-MCNC: 90 MG/DL (ref 65–99)
GLUCOSE UR QL: NEGATIVE
HCT VFR BLD AUTO: 42.6 % (ref 34–46.6)
HDLC SERPL-MCNC: 52 MG/DL (ref 40–60)
HGB BLD-MCNC: 14.3 G/DL (ref 12–15.9)
HGB UR QL STRIP: ABNORMAL
IMM GRANULOCYTES # BLD AUTO: 0.03 10*3/MM3 (ref 0–0.05)
IMM GRANULOCYTES NFR BLD AUTO: 0.4 % (ref 0–0.5)
KETONES UR QL STRIP: NEGATIVE
LDLC SERPL CALC-MCNC: 72 MG/DL (ref 0–100)
LEUKOCYTE ESTERASE UR QL STRIP: ABNORMAL
LYMPHOCYTES # BLD AUTO: 2.25 10*3/MM3 (ref 0.7–3.1)
LYMPHOCYTES NFR BLD AUTO: 29.2 % (ref 19.6–45.3)
MCH RBC QN AUTO: 32.6 PG (ref 26.6–33)
MCHC RBC AUTO-ENTMCNC: 33.6 G/DL (ref 31.5–35.7)
MCV RBC AUTO: 97.3 FL (ref 79–97)
MONOCYTES # BLD AUTO: 0.63 10*3/MM3 (ref 0.1–0.9)
MONOCYTES NFR BLD AUTO: 8.2 % (ref 5–12)
NEUTROPHILS # BLD AUTO: 4.67 10*3/MM3 (ref 1.7–7)
NEUTROPHILS NFR BLD AUTO: 60.5 % (ref 42.7–76)
NITRITE UR QL STRIP: NEGATIVE
NRBC BLD AUTO-RTO: 0 /100 WBC (ref 0–0.2)
PH UR STRIP: 6 [PH] (ref 5–8)
PLATELET # BLD AUTO: 279 10*3/MM3 (ref 140–450)
POTASSIUM SERPL-SCNC: 4.7 MMOL/L (ref 3.5–5.2)
PROT SERPL-MCNC: 6.9 G/DL (ref 6–8.5)
PROT UR QL STRIP: NEGATIVE
RBC # BLD AUTO: 4.38 10*6/MM3 (ref 3.77–5.28)
RBC #/AREA URNS HPF: ABNORMAL /HPF
SODIUM SERPL-SCNC: 141 MMOL/L (ref 136–145)
SP GR UR: 1.02 (ref 1–1.03)
TRIGL SERPL-MCNC: 166 MG/DL (ref 0–150)
TSH SERPL DL<=0.005 MIU/L-ACNC: 1.8 UIU/ML (ref 0.27–4.2)
UROBILINOGEN UR STRIP-MCNC: ABNORMAL MG/DL
VIT B12 SERPL-MCNC: 571 PG/ML (ref 211–946)
VLDLC SERPL CALC-MCNC: 28 MG/DL (ref 5–40)
WBC # BLD AUTO: 7.71 10*3/MM3 (ref 3.4–10.8)
WBC #/AREA URNS HPF: ABNORMAL /HPF

## 2021-07-09 PROCEDURE — 1125F AMNT PAIN NOTED PAIN PRSNT: CPT | Performed by: FAMILY MEDICINE

## 2021-07-09 PROCEDURE — G0439 PPPS, SUBSEQ VISIT: HCPCS | Performed by: FAMILY MEDICINE

## 2021-07-09 PROCEDURE — 1170F FXNL STATUS ASSESSED: CPT | Performed by: FAMILY MEDICINE

## 2021-07-09 PROCEDURE — 1160F RVW MEDS BY RX/DR IN RCRD: CPT | Performed by: FAMILY MEDICINE

## 2021-07-09 PROCEDURE — 99397 PER PM REEVAL EST PAT 65+ YR: CPT | Performed by: FAMILY MEDICINE

## 2021-07-09 PROCEDURE — 96160 PT-FOCUSED HLTH RISK ASSMT: CPT | Performed by: FAMILY MEDICINE

## 2021-07-09 RX ORDER — ROSUVASTATIN CALCIUM 20 MG/1
20 TABLET, COATED ORAL NIGHTLY
Qty: 90 TABLET | Refills: 1 | OUTPATIENT
Start: 2021-07-09

## 2021-07-09 NOTE — PROGRESS NOTES
The ABCs of the Annual Wellness Visit  Subsequent Medicare Wellness Visit    Chief Complaint   Patient presents with   • Medicare Wellness-subsequent     question about lab results       Subjective   History of Present Illness:  Chelsi Ferguson is a 80 y.o. female who presents for a Subsequent Medicare Wellness Visit.    HEALTH RISK ASSESSMENT    Recent Hospitalizations:  No hospitalization(s) within the last year.    Current Medical Providers:  Patient Care Team:  Reina Yarbrough MD as PCP - General (Family Medicine)  Александр Dunne MD as Consulting Physician (Otolaryngology)  Gabby Rao MD as Consulting Physician (Dermatology)  Tylor Bonner MD as Consulting Physician (Ophthalmology)  Harinder Mason MD as Consulting Physician (General Surgery)  Han Alonzo MD as Consulting Physician (Urology)  Marianela Flores MD as Consulting Physician (Neurology)  Oralia Greene MD as Consulting Physician (Cardiology)    Smoking Status:  Social History     Tobacco Use   Smoking Status Never Smoker   Smokeless Tobacco Never Used       Alcohol Consumption:  Social History     Substance and Sexual Activity   Alcohol Use No       Depression Screen:   PHQ-2/PHQ-9 Depression Screening 7/9/2021   Little interest or pleasure in doing things 0   Feeling down, depressed, or hopeless 0   Total Score 0       Fall Risk Screen:  STEADI Fall Risk Assessment was completed, and patient is at MODERATE risk for falls. Assessment completed on:7/9/2021    Health Habits and Functional and Cognitive Screening:  Functional & Cognitive Status 7/9/2021   Do you have difficulty preparing food and eating? No   Do you have difficulty bathing yourself, getting dressed or grooming yourself? No   Do you have difficulty using the toilet? No   Do you have difficulty moving around from place to place? No   Do you have trouble with steps or getting out of a bed or a chair? No   Current Diet Unhealthy Diet   Dental Exam  Up to date   Eye Exam Up to date   Exercise (times per week) 3 times per week   Current Exercises Include Other   Current Exercise Activities Include -   Do you need help using the phone?  No   Are you deaf or do you have serious difficulty hearing?  No   Do you need help with transportation? No   Do you need help shopping? No   Do you need help preparing meals?  No   Do you need help with housework?  No   Do you need help with laundry? No   Do you need help taking your medications? No   Do you need help managing money? No   Do you ever drive or ride in a car without wearing a seat belt? No   Have you felt unusual stress, anger or loneliness in the last month? No   Who do you live with? Alone   If you need help, do you have trouble finding someone available to you? No   Have you been bothered in the last four weeks by sexual problems? No   Do you have difficulty concentrating, remembering or making decisions? No         Does the patient have evidence of cognitive impairment? No    Asprin use counseling:Taking ASA appropriately as indicated    Age-appropriate Screening Schedule:  Refer to the list below for future screening recommendations based on patient's age, sex and/or medical conditions. Orders for these recommended tests are listed in the plan section. The patient has been provided with a written plan.    Health Maintenance   Topic Date Due   • ZOSTER VACCINE (3 of 3) 06/08/2021   • INFLUENZA VACCINE  08/01/2021   • TDAP/TD VACCINES (3 - Td or Tdap) 02/08/2022   • LIPID PANEL  07/08/2022   • DXA SCAN  12/04/2022   • MAMMOGRAM  01/04/2023          The following portions of the patient's history were reviewed and updated as appropriate: allergies, current medications, past family history, past medical history, past social history, past surgical history and problem list.    Past Medical History:   Diagnosis Date   • Ankle pain    • Arthritis    • Cancer (CMS/HCC)     Skin   • Chest discomfort    • Cholecystitis  1/15/2018   • Coronary artery disease    • Edema    • Elevated liver enzymes 1/15/2018   • Gallstones    • GERD (gastroesophageal reflux disease)    • Glaucoma     pt is currently off of meds and Dr Tanner does not think that she is glaucoma   • H/O complete eye exam 06/2013   • H/O mammogram 11/16/2015   • H/O non-ST elevation myocardial infarction (NSTEMI) 01/2005   • Hammer toe    • Heart attack (CMS/HCC) 01/2005   • History of blood transfusion 01/2005   • History of cardiovascular stress test 12/02/2015    normal   • HTN (hypertension)    • Hyperlipidemia    • Hypokalemia 1/15/2018   • Kidney infection 1971   • Low back pain    • Menopausal symptoms    • Onychia and paronychia of finger    • Osteopenia    • Other elevated white blood cell count    • Pap smear for cervical cancer screening 2010 [facility]   • Pneumonia    • Sepsis (CMS/HCC) 1/15/2018   • Tremor     essential tremor   • UTI (urinary tract infection) 1/15/2018   • Viral warts    • Visual impairment 2019   • Wears glasses        Past Surgical History:   Procedure Laterality Date   • ADENOIDECTOMY  1952   • BLADDER REPAIR  2002   • BLADDER REPAIR  2003   • BUNIONECTOMY Right 2010   • CHOLECYSTECTOMY     • CHOLECYSTECTOMY WITH INTRAOPERATIVE CHOLANGIOGRAM N/A 3/7/2018    Procedure: CHOLECYSTECTOMY LAPAROSCOPIC INTRAOPERATIVE CHOLANGIOGRAM;  Surgeon: Harinder Mason MD;  Location: [facility];  Service:    • COLONOSCOPY  2008, 4/2014    [facility]   • CORONARY STENT PLACEMENT Left 01/2005    drug eluting stent 1/2005 LAD   • DILATATION AND CURETTAGE  1971   • DILATATION AND CURETTAGE  1978   • EYE CAPSULOTOMY WITH LASER Bilateral 2018   • EYE SURGERY Bilateral 05/2016    Cataract   • FRONTALIS SUSPENSION  2010   • HAMMER TOE REPAIR Right 11/03/2010   • HEAD & NECK SKIN LESION EXCISIONAL BIOPSY  08/20/2018    benign scalp cyst   • HYSTERECTOMY  1978   • JOINT REPLACEMENT  2005   • LAPAROTOMY OOPHERECTOMY Bilateral 2002   • NOSE SURGERY  03/2017    repair 3  fractured areas. Dr Dunne   • OTHER SURGICAL HISTORY  2005    rectocele repair   • OTHER SURGICAL HISTORY  2010    eyelid surgery   • REPLACEMENT TOTAL KNEE BILATERAL Bilateral    • SKIN BIOPSY     • TONSILLECTOMY  1952   • TOTAL KNEE ARTHROPLASTY  01/2005   • UMBILICAL HERNIA REPAIR  2019   • UTERINE FIBROID SURGERY  1978    emergency removal of fibroid from birth canal       Allergies   Allergen Reactions   • Aleve [Naproxen] Hives   • Indocin [Indomethacin] Hallucinations   • Lipitor [Atorvastatin] Hives   • Statins Hives   • Zocor [Simvastatin] Hives   • Celebrex [Celecoxib] Diarrhea   • Keflex [Cephalexin] GI Intolerance     Upset stomach, may have had some blood in stool but cannot remember the reason why she was put on it. Tolerates penicillins without problem.   • Ibuprofen GI Intolerance     Heartburn.   • Prevacid [Lansoprazole] Nausea And Vomiting   • Prilosec [Omeprazole] Nausea And Vomiting       Family History   Problem Relation Age of Onset   • Stroke Mother    • Hypertension Mother    • Heart failure Father         congestive   • Cancer Father         Neck glands removed   • Vision loss Father         Had operation   • Breast cancer Sister         60's   • Cancer Sister         Breast   • Breast cancer Daughter 40   • Cancer Daughter         Breast   • Breast cancer Other         NIECE 60's   • Breast cancer Other         NIECE 60's   • Vision loss Paternal Aunt         blind-glaucoma   • Vision loss Paternal Uncle         blind-glaucoma   • Vision loss Maternal Aunt         all 9 aunts paternal aunts/Uncles/ cousins/ sister/brother/ and niece on meds for Glaucoma or had operation   • Ovarian cancer Neg Hx        Social History     Socioeconomic History   • Marital status:      Spouse name: Not on file   • Number of children: Not on file   • Years of education: Not on file   • Highest education level: Not on file   Tobacco Use   • Smoking status: Never Smoker   • Smokeless tobacco: Never  Used   Vaping Use   • Vaping Use: Never used   Substance and Sexual Activity   • Alcohol use: No   • Drug use: No   • Sexual activity: Not Currently     Birth control/protection: None         Outpatient Medications Prior to Visit   Medication Sig Dispense Refill   • acetaminophen (TYLENOL) 650 MG 8 hr tablet Take 1,300 mg by mouth 2 (two) times a day.     • aspirin 81 MG EC tablet Take 81 mg by mouth Every Night.     • Cholecalciferol (VITAMIN D3 PO) Vitamin D3   Daily     • coenzyme Q10 100 MG capsule Take 100 mg by mouth Daily.     • famotidine (PEPCID) 20 MG tablet Take 20 mg by mouth At Night As Needed.     • latanoprost (XALATAN) 0.005 % ophthalmic solution 1 drop Every Night.     • metoprolol succinate XL (TOPROL-XL) 25 MG 24 hr tablet Take 1 tablet by mouth Every Morning. 90 tablet 1   • multivitamin with minerals (HAIR SKIN AND NAILS FORMULA PO) Take 1 tablet by mouth Daily.     • nitroglycerin (NITROSTAT) 0.4 MG SL tablet Place 1 tablet under the tongue Every 5 (Five) Minutes As Needed for Chest Pain. 30 tablet 3   • rosuvastatin (CRESTOR) 20 MG tablet Take 1 tablet by mouth Every Night. 90 tablet 1   • solifenacin (VESICARE) 10 MG tablet Take 10 mg by mouth Daily.     • Yuvafem 10 MCG tablet vaginal tablet INSERT 1 TABLET INTO THE VAGINA 2 (TWO) TIMES A WEEK. 24 tablet 0     No facility-administered medications prior to visit.       Patient Active Problem List   Diagnosis   • Loss of weight   • Hyperlipidemia   • Edema   • GERD (gastroesophageal reflux disease)   • Nausea with vomiting   • Diarrhea   • Incontinence of feces   • HTN (hypertension)   • Cholelithiasis   • Menopausal symptom   • Cramp in limb   • Glaucoma   • Hammer toe   • Dry mouth   • Osteopenia   • Elevated liver enzymes   • UTI (urinary tract infection)   • Bacterial UTI   • Cholecystitis   • H/O non-ST elevation myocardial infarction (NSTEMI)   • Neck pain   • Essential tremor   • Idiopathic peripheral neuropathy   • Muscle cramps   •  Coronary artery disease involving native coronary artery of native heart without angina pectoris       Advanced Care Planning:  ACP discussion was held with the patient during this visit. Patient has an advance directive in EMR which is still valid.     Review of Systems   Constitutional: Negative.  Negative for activity change, appetite change, chills, diaphoresis, fatigue, fever and unexpected weight change.   HENT: Positive for dental problem (pt had root canal and tooth pulled) and trouble swallowing (that pt attributes to hiatal hernia). Negative for congestion, drooling, ear discharge, ear pain, facial swelling, hearing loss, mouth sores, nosebleeds, postnasal drip, rhinorrhea, sinus pressure, sinus pain, sneezing, sore throat, tinnitus and voice change.    Eyes: Negative.  Negative for photophobia, pain, discharge, redness, itching and visual disturbance.   Respiratory: Positive for shortness of breath (INFANTE). Negative for apnea, cough, choking, chest tightness, wheezing and stridor.    Cardiovascular: Positive for leg swelling. Negative for chest pain and palpitations.   Gastrointestinal: Positive for constipation and diarrhea. Negative for abdominal distention, abdominal pain, anal bleeding, blood in stool, nausea, rectal pain and vomiting.   Endocrine: Negative.  Negative for cold intolerance, heat intolerance, polydipsia, polyphagia and polyuria.   Genitourinary: Positive for enuresis, frequency and urgency. Negative for decreased urine volume, difficulty urinating, dyspareunia, dysuria, flank pain, genital sores, hematuria, menstrual problem, pelvic pain, vaginal bleeding, vaginal discharge and vaginal pain.   Musculoskeletal: Positive for arthralgias and back pain. Negative for gait problem, joint swelling, myalgias, neck pain and neck stiffness.   Skin: Negative.  Negative for color change, pallor, rash and wound.   Allergic/Immunologic: Negative.  Negative for environmental allergies, food allergies  "and immunocompromised state.   Neurological: Negative.  Negative for dizziness, tremors, seizures, syncope, facial asymmetry, speech difficulty, weakness, light-headedness, numbness and headaches.   Hematological: Negative.  Negative for adenopathy. Does not bruise/bleed easily.   Psychiatric/Behavioral: Negative.  Negative for agitation, behavioral problems, confusion, decreased concentration, dysphoric mood, hallucinations, self-injury, sleep disturbance and suicidal ideas. The patient is not nervous/anxious and is not hyperactive.        Compared to one year ago, the patient feels her physical health is worse. Arthritis is worse.   Compared to one year ago, the patient feels her mental health is the same.  and dog  recently.     Reviewed chart for potential of high risk medication in the elderly: yes  Reviewed chart for potential of harmful drug interactions in the elderly:yes    Objective         Vitals:    21 1021   BP: 128/66   Pulse: 63   Temp: 97.7 °F (36.5 °C)   SpO2: 99%   Weight: 71 kg (156 lb 9.6 oz)   Height: 152 cm (59.84\")   PainSc:   2       Body mass index is 30.74 kg/m².  Discussed the patient's BMI with her. The BMI is above average; BMI management plan is completed.    Physical Exam  Vitals and nursing note reviewed.   Constitutional:       General: She is not in acute distress.     Appearance: She is well-developed. She is not diaphoretic.      Comments: Last 2 weights  -------------------------------                 21                          1021           -------------------------------   Weight: 71 kg (156 lb 9.6 oz)  -------------------------------    Body mass index is 30.74 kg/m².     HENT:      Head: Normocephalic and atraumatic.      Right Ear: External ear normal. There is impacted cerumen.      Left Ear: External ear normal. There is impacted cerumen.      Nose: Nose normal.      Mouth/Throat:      Lips: Pink.      Mouth: Mucous membranes are " moist. Mucous membranes are not pale, not dry and not cyanotic. No injury.      Dentition: Normal dentition.      Tongue: No lesions.      Palate: No mass.      Pharynx: Uvula midline. No pharyngeal swelling, oropharyngeal exudate, posterior oropharyngeal erythema or uvula swelling.   Eyes:      General: Lids are normal. No scleral icterus.        Right eye: No foreign body, discharge or hordeolum.         Left eye: No foreign body, discharge or hordeolum.      Extraocular Movements:      Right eye: Normal extraocular motion and no nystagmus.      Left eye: Normal extraocular motion and no nystagmus.      Conjunctiva/sclera: Conjunctivae normal.      Right eye: Right conjunctiva is not injected. No chemosis, exudate or hemorrhage.     Left eye: Left conjunctiva is not injected. No chemosis, exudate or hemorrhage.     Pupils: Pupils are equal, round, and reactive to light.   Neck:      Thyroid: No thyroid mass or thyromegaly.      Vascular: No carotid bruit.      Trachea: Trachea normal. No tracheal deviation.   Cardiovascular:      Rate and Rhythm: Normal rate and regular rhythm.      Pulses:           Dorsalis pedis pulses are 2+ on the right side and 2+ on the left side.        Posterior tibial pulses are 2+ on the right side and 2+ on the left side.      Heart sounds: Normal heart sounds. No murmur heard.   No friction rub. No gallop.    Pulmonary:      Effort: Pulmonary effort is normal. No respiratory distress.      Breath sounds: Normal breath sounds. No decreased breath sounds, wheezing, rhonchi or rales.   Chest:      Chest wall: No tenderness. There is no dullness to percussion.      Breasts: Pollo Score is 5. Breasts are symmetrical.         Right: Normal. No swelling, bleeding, inverted nipple, mass, nipple discharge, skin change or tenderness.         Left: Normal. No swelling, bleeding, inverted nipple, mass, nipple discharge, skin change or tenderness.   Abdominal:      General: Bowel sounds are  normal. There is no distension.      Palpations: Abdomen is soft. There is no mass.      Tenderness: There is no abdominal tenderness. There is no guarding or rebound.      Hernia: A hernia (large and easily reducible ) is present. Hernia is present in the umbilical area.   Musculoskeletal:         General: No tenderness or deformity. Normal range of motion.      Cervical back: Normal range of motion and neck supple.   Lymphadenopathy:      Head:      Right side of head: No submandibular adenopathy.      Left side of head: No submandibular adenopathy.      Cervical: No cervical adenopathy.      Right cervical: No superficial, deep or posterior cervical adenopathy.     Left cervical: No superficial, deep or posterior cervical adenopathy.      Upper Body:      Right upper body: No supraclavicular, axillary or pectoral adenopathy.      Left upper body: No supraclavicular, axillary or pectoral adenopathy.   Skin:     General: Skin is warm and dry.      Findings: No rash.      Nails: There is no clubbing.   Neurological:      Mental Status: She is alert and oriented to person, place, and time.      Cranial Nerves: No cranial nerve deficit.      Sensory: No sensory deficit.      Coordination: Coordination is intact. Coordination normal.      Gait: Gait is intact.      Deep Tendon Reflexes: Reflexes are normal and symmetric.      Reflex Scores:       Bicep reflexes are 2+ on the right side and 2+ on the left side.       Brachioradialis reflexes are 2+ on the right side and 2+ on the left side.       Patellar reflexes are 2+ on the right side and 2+ on the left side.  Psychiatric:         Attention and Perception: Attention normal.         Mood and Affect: Mood and affect normal.         Speech: Speech normal.         Behavior: Behavior normal.         Thought Content: Thought content normal.         Cognition and Memory: Cognition and memory normal.         Judgment: Judgment normal.         Lab Results   Component Value  Date    GLU 90 07/08/2021    CHLPL 152 07/08/2021    TRIG 166 (H) 07/08/2021    HDL 52 07/08/2021    LDL 72 07/08/2021    VLDL 28 07/08/2021        Assessment/Plan   Medicare Risks and Personalized Health Plan  CMS Preventative Services Quick Reference  Advance Directive Discussion  Breast Cancer/Mammogram Screening  Chronic Pain   Colon Cancer Screening  Fall Risk  Glaucoma Risk  Immunizations Discussed/Encouraged (specific immunizations; Shingrix )  Inadequate Social Support, Isolation, Loneliness, Lack of Transportation, Financial Difficulties, or Caregiver Stress   Obesity/Overweight   Osteoporosis Risk  Polypharmacy  Urinary Incontinence    The above risks/problems have been discussed with the patient.  Pertinent information has been shared with the patient in the After Visit Summary.  Follow up plans and orders are seen below in the Assessment/Plan Section.    Diagnoses and all orders for this visit:    1. Medicare annual wellness visit, subsequent (Primary)    2. Encounter for screening fecal occult blood testing  -     POC Occult Blood X 3, Stool; Future      Follow Up:  Return in about 6 months (around 1/6/2022), or if symptoms worsen or fail to improve, for Next scheduled follow up, Recheck lipids.     An After Visit Summary and PPPS were given to the patient.       Please follow a low animal fat diet that is also low in sugar, low in junk food, low in sweet drinks and low in alcohol.  Please increase the amount of fiber in your diet as well as increasing your daily exercise, such as walking.    Handouts to pt on Fall risk, advance care directives, healthy diets such as Mediterranean or Dash or calorie counting, and healthy exercising.       Recent Results (from the past 168 hour(s))   Urinalysis With Microscopic - Urine, Clean Catch    Collection Time: 07/08/21 12:00 AM    Specimen: Urine, Clean Catch    URINE  RELEASE TO JULIETTE   Result Value Ref Range    Specific Gravity, UA 1.019 1.005 - 1.030    pH, UA  6.0 5.0 - 8.0    Color, UA Yellow     Appearance, UA Cloudy (A) Clear    Leukocytes, UA See below: (A) Negative    Protein Negative Negative    Glucose, UA Negative Negative    Ketones Negative Negative    Blood, UA See below: (A) Negative    Bilirubin, UA Negative Negative    Urobilinogen, UA Comment     Nitrite, UA Negative Negative   Microscopic Examination -    Collection Time: 07/08/21 12:00 AM    URINE  RELEASE TO JULIETTE   Result Value Ref Range    WBC, UA See below: (A) /HPF    RBC, UA 3-5 (A) /HPF    Epithelial Cells (non renal) 3-6 (A) /HPF    Cast Type Comment     Bacteria, UA 4+ (A) None Seen /HPF   Folate    Collection Time: 07/08/21 10:57 AM    Specimen: Blood    BLOOD  RELEASE TO JULIETTE   Result Value Ref Range    Folate >20.00 4.78 - 24.20 ng/mL   Vitamin B12    Collection Time: 07/08/21 10:57 AM    Specimen: Blood    BLOOD  RELEASE TO JULIETTE   Result Value Ref Range    Vitamin B-12 571 211 - 946 pg/mL   Lipid Panel    Collection Time: 07/08/21 10:57 AM    Specimen: Blood    BLOOD  RELEASE TO JULIETTE   Result Value Ref Range    Total Cholesterol 152 0 - 200 mg/dL    Triglycerides 166 (H) 0 - 150 mg/dL    HDL Cholesterol 52 40 - 60 mg/dL    VLDL Cholesterol Michele 28 5 - 40 mg/dL    LDL Chol Calc (NIH) 72 0 - 100 mg/dL   CBC & Differential    Collection Time: 07/08/21 10:57 AM    Specimen: Blood    BLOOD  RELEASE TO JULIETTE   Result Value Ref Range    WBC 7.71 3.40 - 10.80 10*3/mm3    RBC 4.38 3.77 - 5.28 10*6/mm3    Hemoglobin 14.3 12.0 - 15.9 g/dL    Hematocrit 42.6 34.0 - 46.6 %    MCV 97.3 (H) 79.0 - 97.0 fL    MCH 32.6 26.6 - 33.0 pg    MCHC 33.6 31.5 - 35.7 g/dL    RDW 12.8 12.3 - 15.4 %    Platelets 279 140 - 450 10*3/mm3    Neutrophil Rel % 60.5 42.7 - 76.0 %    Lymphocyte Rel % 29.2 19.6 - 45.3 %    Monocyte Rel % 8.2 5.0 - 12.0 %    Eosinophil Rel % 1.2 0.3 - 6.2 %    Basophil Rel % 0.5 0.0 - 1.5 %    Neutrophils Absolute 4.67 1.70 - 7.00 10*3/mm3    Lymphocytes Absolute 2.25 0.70 - 3.10 10*3/mm3     Monocytes Absolute 0.63 0.10 - 0.90 10*3/mm3    Eosinophils Absolute 0.09 0.00 - 0.40 10*3/mm3    Basophils Absolute 0.04 0.00 - 0.20 10*3/mm3    Immature Granulocyte Rel % 0.4 0.0 - 0.5 %    Immature Grans Absolute 0.03 0.00 - 0.05 10*3/mm3    nRBC 0.0 0.0 - 0.2 /100 WBC   TSH Rfx On Abnormal To Free T4    Collection Time: 07/08/21 10:57 AM    Specimen: Blood    BLOOD  RELEASE TO JULIETTE   Result Value Ref Range    TSH 1.800 0.270 - 4.200 uIU/mL   Comprehensive Metabolic Panel    Collection Time: 07/08/21 10:57 AM    Specimen: Blood    BLOOD  RELEASE TO JULIETTE   Result Value Ref Range    Glucose 90 65 - 99 mg/dL    BUN 12 8 - 23 mg/dL    Creatinine 0.66 0.57 - 1.00 mg/dL    eGFR Non African Am 86 >60 mL/min/1.73    eGFR African Am 104 >60 mL/min/1.73    BUN/Creatinine Ratio 18.2 7.0 - 25.0    Sodium 141 136 - 145 mmol/L    Potassium 4.7 3.5 - 5.2 mmol/L    Chloride 104 98 - 107 mmol/L    Total CO2 27.1 22.0 - 29.0 mmol/L    Calcium 10.2 8.6 - 10.5 mg/dL    Total Protein 6.9 6.0 - 8.5 g/dL    Albumin 4.80 3.50 - 5.20 g/dL    Globulin 2.1 gm/dL    A/G Ratio 2.3 g/dL    Total Bilirubin 0.6 0.0 - 1.2 mg/dL    Alkaline Phosphatase 62 39 - 117 U/L    AST (SGOT) 21 1 - 32 U/L    ALT (SGPT) 11 1 - 33 U/L

## 2021-07-09 NOTE — PATIENT INSTRUCTIONS
Mediterranean Diet  A Mediterranean diet refers to food and lifestyle choices that are based on the traditions of countries located on the [place]. This way of eating has been shown to help prevent certain conditions and improve outcomes for people who have chronic diseases, like kidney disease and heart disease.  What are tips for following this plan?  Lifestyle  · Cook and eat meals together with your family, when possible.  · Drink enough fluid to keep your urine clear or pale yellow.  · Be physically active every day. This includes:  ? Aerobic exercise like running or swimming.  ? Leisure activities like gardening, walking, or housework.  · Get 7-8 hours of sleep each night.  · If recommended by your health care provider, drink red wine in moderation. This means 1 glass a day for nonpregnant women and 2 glasses a day for men. A glass of wine equals 5 oz (150 mL).  Reading food labels    · Check the serving size of packaged foods. For foods such as rice and pasta, the serving size refers to the amount of cooked product, not dry.  · Check the total fat in packaged foods. Avoid foods that have saturated fat or trans fats.  · Check the ingredients list for added sugars, such as corn syrup.  Shopping  · At the grocery store, buy most of your food from the areas near the walls of the store. This includes:  ? Fresh fruits and vegetables (produce).  ? Grains, beans, nuts, and seeds. Some of these may be available in unpackaged forms or large amounts (in bulk).  ? Fresh seafood.  ? Poultry and eggs.  ? Low-fat dairy products.  · Buy whole ingredients instead of prepackaged foods.  · Buy fresh fruits and vegetables in-season from local farmers markets.  · Buy frozen fruits and vegetables in resealable bags.  · If you do not have access to quality fresh seafood, buy precooked frozen shrimp or canned fish, such as tuna, salmon, or sardines.  · Buy small amounts of raw or cooked vegetables, salads, or olives from  the deli or salad bar at your store.  · Stock your pantry so you always have certain foods on hand, such as olive oil, canned tuna, canned tomatoes, rice, pasta, and beans.  Cooking  · Cook foods with extra-virgin olive oil instead of using butter or other vegetable oils.  · Have meat as a side dish, and have vegetables or grains as your main dish. This means having meat in small portions or adding small amounts of meat to foods like pasta or stew.  · Use beans or vegetables instead of meat in common dishes like chili or lasagna.  · [place] with different cooking methods. Try roasting or broiling vegetables instead of steaming or sautéeing them.  · Add frozen vegetables to soups, stews, pasta, or rice.  · Add nuts or seeds for added healthy fat at each meal. You can add these to yogurt, salads, or vegetable dishes.  · Marinate fish or vegetables using olive oil, lemon juice, garlic, and fresh herbs.  Meal planning    · Plan to eat 1 vegetarian meal one day each week. Try to work up to 2 vegetarian meals, if possible.  · Eat seafood 2 or more times a week.  · Have healthy snacks readily available, such as:  ? Vegetable sticks with hummus.  ? Greek yogurt.  ? Fruit and nut trail mix.  · Eat balanced meals throughout the week. This includes:  ? Fruit: 2-3 servings a day  ? Vegetables: 4-5 servings a day  ? Low-fat dairy: 2 servings a day  ? Fish, poultry, or lean meat: 1 serving a day  ? Beans and legumes: 2 or more servings a week  ? Nuts and seeds: 1-2 servings a day  ? Whole grains: 6-8 servings a day  ? Extra-virgin olive oil: 3-4 servings a day  · Limit red meat and sweets to only a few servings a month  What are my food choices?  · Mediterranean diet  ? Recommended  § Grains: Whole-grain pasta. Brown rice. Bulgar wheat. Polenta. Couscous. Whole-wheat bread. Oatmeal. Quinoa.  § Vegetables: Artichokes. Beets. Broccoli. Cabbage. Carrots. Eggplant. Green beans. Chard. Kale. Spinach. Onions. Leeks. Peas. Squash.  Tomatoes. Peppers. Radishes.  § Fruits: Apples. Apricots. Avocado. Berries. Bananas. Cherries. Dates. Figs. Grapes. Keith. Melon. Oranges. Peaches. Plums. Pomegranate.  § Meats and other protein foods: Beans. Almonds. Sunflower seeds. Pine nuts. Peanuts. Cod. [place]. Scallops. Shrimp. Tuna. Tilapia. Clams. Oysters. Eggs.  § Dairy: Low-fat milk. Cheese. Greek yogurt.  § Beverages: Water. Red wine. Herbal tea.  § Fats and oils: Extra virgin olive oil. Avocado oil. Grape seed oil.  § Sweets and desserts: Greek yogurt with honey. Baked apples. Poached pears. Trail mix.  § Seasoning and other foods: Basil. Cilantro. Coriander. Cumin. Mint. Parsley. [place]. Rosemary. Tarragon. Garlic. Oregano. Thyme. Pepper. Balsalmic vinegar. Tahini. Hummus. Tomato sauce. Olives. Mushrooms.  ? Limit these  § Grains: Prepackaged pasta or rice dishes. Prepackaged cereal with added sugar.  § Vegetables: Deep fried potatoes (french fries).  § Fruits: Fruit canned in syrup.  § Meats and other protein foods: Beef. Pork. Lamb. Poultry with skin. Hot dogs. Ng.  § Dairy: Ice cream. Sour cream. Whole milk.  § Beverages: Juice. Sugar-sweetened soft drinks. Beer. Liquor and spirits.  § Fats and oils: Butter. Canola oil. Vegetable oil. Beef fat (tallow). Lard.  § Sweets and desserts: Cookies. Cakes. Pies. Candy.  § Seasoning and other foods: Mayonnaise. Premade sauces and marinades.  The items listed may not be a complete list. Talk with your dietitian about what dietary choices are right for you.  Summary  · The Mediterranean diet includes both food and lifestyle choices.  · Eat a variety of fresh fruits and vegetables, beans, nuts, seeds, and whole grains.  · Limit the amount of red meat and sweets that you eat.  · Talk with your health care provider about whether it is safe for you to drink red wine in moderation. This means 1 glass a day for nonpregnant women and 2 glasses a day for men. A glass of wine equals 5 oz (150 mL).  This information  is not intended to replace advice given to you by your health care provider. Make sure you discuss any questions you have with your health care provider.  Document Revised: 08/17/2017 Document Reviewed: 08/10/2017  Elsevier Patient Education © 2020 [employer]  Fall Prevention in the Home, Adult  Falls can cause injuries and can affect people from all age groups. There are many simple things that you can do to make your home safe and to help prevent falls. Ask for help when making these changes, if needed.  What actions can I take to prevent falls?  General instructions  · Use good lighting in all rooms. Replace any light bulbs that burn out.  · Turn on lights if it is dark. Use night-lights.  · Place frequently used items in easy-to-reach places. Lower the shelves around your home if necessary.  · Set up furniture so that there are clear paths around it. Avoid moving your furniture around.  · Remove throw rugs and other tripping hazards from the floor.  · Avoid walking on wet floors.  · Fix any uneven floor surfaces.  · Add color or contrast paint or tape to grab bars and handrails in your home. Place contrasting color strips on the first and last steps of stairways.  · When you use a stepladder, make sure that it is completely opened and that the sides are firmly locked. Have someone hold the ladder while you are using it. Do not climb a closed stepladder.  · Be aware of any and all pets.  What can I do in the bathroom?         · Keep the floor dry. Immediately clean up any water that spills onto the floor.  · Remove soap buildup in the tub or shower on a regular basis.  · Use non-skid mats or decals on the floor of the tub or shower.  · Attach bath mats securely with double-sided, non-slip rug tape.  · If you need to sit down while you are in the shower, use a plastic, non-slip stool.  · Install grab bars by the toilet and in the tub and shower. Do not use towel bars as grab bars.  What can I do in the  bedroom?  · Make sure that a bedside light is easy to reach.  · Do not use oversized bedding that drapes onto the floor.  · Have a firm chair that has side arms to use for getting dressed.  What can I do in the kitchen?  · Clean up any spills right away.  · If you need to reach for something above you, use a sturdy step stool that has a grab bar.  · Keep electrical cables out of the way.  · Do not use floor polish or wax that makes floors slippery. If you must use wax, make sure that it is non-skid floor wax.  What can I do in the stairways?  · Do not leave any items on the stairs.  · Make sure that you have a light switch at the top of the stairs and the bottom of the stairs. Have them installed if you do not have them.  · Make sure that there are handrails on both sides of the stairs. Fix handrails that are broken or loose. Make sure that handrails are as long as the stairways.  · Install non-slip stair treads on all stairs in your home.  · Avoid having throw rugs at the top or bottom of stairways, or secure the rugs with carpet tape to prevent them from moving.  · Choose a carpet design that does not hide the edge of steps on the stairway.  · Check any carpeting to make sure that it is firmly attached to the stairs. Fix any carpet that is loose or worn.  What can I do on the outside of my home?  · Use bright outdoor lighting.  · Regularly repair the edges of walkways and driveways and fix any cracks.  · Remove high doorway thresholds.  · Trim any shrubbery on the main path into your home.  · Regularly check that handrails are securely fastened and in good repair. Both sides of any steps should have handrails.  · Install guardrails along the edges of any raised decks or porches.  · Clear walkways of debris and clutter, including tools and rocks.  · Have leaves, snow, and ice cleared regularly.  · Use sand or salt on walkways during winter months.  · In the garage, clean up any spills right away, including grease  or oil spills.  What other actions can I take?  · Wear closed-toe shoes that fit well and support your feet. Wear shoes that have rubber soles or low heels.  · Use mobility aids as needed, such as canes, walkers, scooters, and crutches.  · Review your medicines with your health care provider. Some medicines can cause dizziness or changes in blood pressure, which increase your risk of falling.  Talk with your health care provider about other ways that you can decrease your risk of falls. This may include working with a physical therapist or  to improve your strength, balance, and endurance.  Where to find more information  · Centers for Disease Control and Prevention, [place]: https://www.cdc.gov  · National [place] on Aging: https://vh8vaen.avlarado.nih.gov  Contact a health care provider if:  · You are afraid of falling at home.  · You feel weak, drowsy, or dizzy at home.  · You fall at home.  Summary  · There are many simple things that you can do to make your home safe and to help prevent falls.  · Ways to make your home safe include removing tripping hazards and installing grab bars in the bathroom.  · Ask for help when making these changes in your home.  This information is not intended to replace advice given to you by your health care provider. Make sure you discuss any questions you have with your health care provider.  Document Revised: 11/30/2018 Document Reviewed: 08/02/2018  [employer] Patient Education © 2021 [employer]  Exercising to Stay Healthy  To become healthy and stay healthy, it is recommended that you do moderate-intensity and vigorous-intensity exercise. You can tell that you are exercising at a moderate intensity if your heart starts beating faster and you start breathing faster but can still hold a conversation. You can tell that you are exercising at a vigorous intensity if you are breathing much harder and faster and cannot hold a conversation while exercising.  Exercising regularly is  important. It has many health benefits, such as:  · Improving overall fitness, flexibility, and endurance.  · Increasing bone density.  · Helping with weight control.  · Decreasing body fat.  · Increasing muscle strength.  · Reducing stress and tension.  · Improving overall health.  How often should I exercise?  Choose an activity that you enjoy, and set realistic goals. Your health care provider can help you make an activity plan that works for you.  Exercise regularly as told by your health care provider. This may include:  · Doing strength training two times a week, such as:  ? Lifting weights.  ? Using resistance bands.  ? Push-ups.  ? Sit-ups.  ? Yoga.  · Doing a certain intensity of exercise for a given amount of time. Choose from these options:  ? A total of 150 minutes of moderate-intensity exercise every week.  ? A total of 75 minutes of vigorous-intensity exercise every week.  ? A mix of moderate-intensity and vigorous-intensity exercise every week.  Children, pregnant women, people who have not exercised regularly, people who are overweight, and older adults may need to talk with a health care provider about what activities are safe to do. If you have a medical condition, be sure to talk with your health care provider before you start a new exercise program.  What are some exercise ideas?  Moderate-intensity exercise ideas include:  · Walking 1 mile (1.6 km) in about 15 minutes.  · Biking.  · Hiking.  · Golfing.  · Dancing.  · Water aerobics.  Vigorous-intensity exercise ideas include:  · Walking 4.5 miles (7.2 km) or more in about 1 hour.  · Jogging or running 5 miles (8 km) in about 1 hour.  · Biking 10 miles (16.1 km) or more in about 1 hour.  · Lap swimming.  · Roller-skating or in-line skating.  · Cross-country skiing.  · Vigorous competitive sports, such as football, basketball, and soccer.  · Jumping rope.  · Aerobic dancing.  What are some everyday activities that can help me to get  exercise?  · Yard work, such as:  ? Pushing a .  ? Raking and bagging leaves.  · Washing your car.  · Pushing a stroller.  · Shoveling snow.  · Gardening.  · Washing windows or floors.  How can I be more active in my day-to-day activities?  · Use stairs instead of an elevator.  · Take a walk during your lunch break.  · If you drive, park your car farther away from your work or school.  · If you take public transportation, get off one stop early and walk the rest of the way.  · Stand up or walk around during all of your indoor phone calls.  · Get up, stretch, and walk around every 30 minutes throughout the day.  · Enjoy exercise with a friend. Support to continue exercising will help you keep a regular routine of activity.  What guidelines can I follow while exercising?  · Before you start a new exercise program, talk with your health care provider.  · Do not exercise so much that you hurt yourself, feel dizzy, or get very short of breath.  · Wear comfortable clothes and wear shoes with good support.  · Drink plenty of water while you exercise to prevent dehydration or heat stroke.  · Work out until your breathing and your heartbeat get faster.  Where to find more information  · [place] Department of Health and Human Services: www.hhs.gov  · Centers for Disease Control and Prevention (CDC): www.cdc.gov  Summary  · Exercising regularly is important. It will improve your overall fitness, flexibility, and endurance.  · Regular exercise also will improve your overall health. It can help you control your weight, reduce stress, and improve your bone density.  · Do not exercise so much that you hurt yourself, feel dizzy, or get very short of breath.  · Before you start a new exercise program, talk with your health care provider.  This information is not intended to replace advice given to you by your health care provider. Make sure you discuss any questions you have with your health care provider.  Document Revised:  "11/30/2018 Document Reviewed: 11/08/2018  [employer] Patient Education © 2021 [employer]  https://www.nhlbi.nih.gov/files/docs/public/heart/dash_brief.pdf\">   DASH Eating Plan  DASH stands for Dietary Approaches to Stop Hypertension. The DASH eating plan is a healthy eating plan that has been shown to:  · Reduce high blood pressure (hypertension).  · Reduce your risk for type 2 diabetes, heart disease, and stroke.  · Help with weight loss.  What are tips for following this plan?  Reading food labels  · Check food labels for the amount of salt (sodium) per serving. Choose foods with less than 5 percent of the Daily Value of sodium. Generally, foods with less than 300 milligrams (mg) of sodium per serving fit into this eating plan.  · To find whole grains, look for the word \"whole\" as the first word in the ingredient list.  Shopping  · Buy products labeled as \"low-sodium\" or \"no salt added.\"  · Buy fresh foods. Avoid canned foods and pre-made or frozen meals.  Cooking  · Avoid adding salt when cooking. Use salt-free seasonings or herbs instead of table salt or sea salt. Check with your health care provider or pharmacist before using salt substitutes.  · Do not dickens foods. Cook foods using healthy methods such as baking, boiling, grilling, roasting, and broiling instead.  · Cook with heart-healthy oils, such as olive, canola, avocado, soybean, or sunflower oil.  Meal planning    · Eat a balanced diet that includes:  ? 4 or more servings of fruits and 4 or more servings of vegetables each day. Try to fill one-half of your plate with fruits and vegetables.  ? 6-8 servings of whole grains each day.  ? Less than 6 oz (170 g) of lean meat, poultry, or fish each day. A 3-oz (85-g) serving of meat is about the same size as a deck of cards. One egg equals 1 oz (28 g).  ? 2-3 servings of low-fat dairy each day. One serving is 1 cup (237 mL).  ? 1 serving of nuts, seeds, or beans 5 times each week.  ? 2-3 servings of " heart-healthy fats. Healthy fats called omega-3 fatty acids are found in foods such as walnuts, flaxseeds, fortified milks, and eggs. These fats are also found in cold-water fish, such as sardines, salmon, and mackerel.  · Limit how much you eat of:  ? Canned or prepackaged foods.  ? Food that is high in trans fat, such as some fried foods.  ? Food that is high in saturated fat, such as fatty meat.  ? Desserts and other sweets, sugary drinks, and other foods with added sugar.  ? Full-fat dairy products.  · Do not salt foods before eating.  · Do not eat more than 4 egg yolks a week.  · Try to eat at least 2 vegetarian meals a week.  · Eat more home-cooked food and less restaurant, buffet, and fast food.  Lifestyle  · When eating at a restaurant, ask that your food be prepared with less salt or no salt, if possible.  · If you drink alcohol:  ? Limit how much you use to:  § 0-1 drink a day for women who are not pregnant.  § 0-2 drinks a day for men.  ? Be aware of how much alcohol is in your drink. In the [place] one drink equals one 12 oz bottle of beer (355 mL), one 5 oz glass of wine (148 mL), or one 1½ oz glass of hard liquor (44 mL).  General information  · Avoid eating more than 2,300 mg of salt a day. If you have hypertension, you may need to reduce your sodium intake to 1,500 mg a day.  · Work with your health care provider to maintain a healthy body weight or to lose weight. Ask what an ideal weight is for you.  · Get at least 30 minutes of exercise that causes your heart to beat faster (aerobic exercise) most days of the week. Activities may include walking, swimming, or biking.  · Work with your health care provider or dietitian to adjust your eating plan to your individual calorie needs.  What foods should I eat?  Fruits  All fresh, dried, or frozen fruit. Canned fruit in natural juice (without added sugar).  Vegetables  Fresh or frozen vegetables (raw, steamed, roasted, or grilled). Low-sodium or  reduced-sodium tomato and vegetable juice. Low-sodium or reduced-sodium tomato sauce and tomato paste. Low-sodium or reduced-sodium canned vegetables.  Grains  Whole-grain or whole-wheat bread. Whole-grain or whole-wheat pasta. Brown rice. Oatmeal. Quinoa. [place]. Whole-grain and low-sodium cereals. [place] bread. Low-fat, low-sodium crackers. Whole-wheat flour tortillas.  Meats and other proteins  Skinless chicken or turkey. Ground chicken or turkey. Pork with fat trimmed off. Fish and seafood. Egg whites. Dried beans, peas, or lentils. Unsalted nuts, nut butters, and seeds. Unsalted canned beans. Lean cuts of beef with fat trimmed off. Low-sodium, lean precooked or cured meat, such as sausages or meat loaves.  Dairy  Low-fat (1%) or fat-free (skim) milk. Reduced-fat, low-fat, or fat-free cheeses. Nonfat, low-sodium ricotta or cottage cheese. Low-fat or nonfat yogurt. Low-fat, low-sodium cheese.  Fats and oils  Soft margarine without trans fats. Vegetable oil. Reduced-fat, low-fat, or light mayonnaise and salad dressings (reduced-sodium). Canola, safflower, olive, avocado, soybean, and sunflower oils. Avocado.  Seasonings and condiments  Herbs. Spices. Seasoning mixes without salt.  Other foods  Unsalted popcorn and pretzels. Fat-free sweets.  The items listed above may not be a complete list of foods and beverages you can eat. Contact a dietitian for more information.  What foods should I avoid?  Fruits  Canned fruit in a light or heavy syrup. Fried fruit. Fruit in cream or butter sauce.  Vegetables  Creamed or fried vegetables. Vegetables in a cheese sauce. Regular canned vegetables (not low-sodium or reduced-sodium). Regular canned tomato sauce and paste (not low-sodium or reduced-sodium). Regular tomato and vegetable juice (not low-sodium or reduced-sodium). Pickles. Olives.  Grains  Baked goods made with fat, such as croissants, muffins, or some breads. Dry pasta or rice meal packs.  Meats and other  proteins  Fatty cuts of meat. Ribs. Fried meat. Ng. Bologna, salami, and other precooked or cured meats, such as sausages or meat loaves. Fat from the back of a pig (fatback). Bratwurst. Salted nuts and seeds. Canned beans with added salt. Canned or smoked fish. Whole eggs or egg yolks. Chicken or turkey with skin.  Dairy  Whole or 2% milk, cream, and half-and-half. Whole or full-fat cream cheese. Whole-fat or sweetened yogurt. Full-fat cheese. Nondairy creamers. Whipped toppings. Processed cheese and cheese spreads.  Fats and oils  Butter. Stick margarine. Lard. Shortening. Ghee. Ng fat. Tropical oils, such as coconut, palm kernel, or palm oil.  Seasonings and condiments  Onion salt, garlic salt, seasoned salt, table salt, and sea salt. Worcestershire sauce. Tartar sauce. Barbecue sauce. Teriyaki sauce. Soy sauce, including reduced-sodium. Steak sauce. Canned and packaged gravies. Fish sauce. Oyster sauce. Cocktail sauce. Store-bought horseradish. Ketchup. Mustard. Meat flavorings and tenderizers. Bouillon cubes. Hot sauces. Pre-made or packaged marinades. Pre-made or packaged taco seasonings. Relishes. Regular salad dressings.  Other foods  Salted popcorn and pretzels.  The items listed above may not be a complete list of foods and beverages you should avoid. Contact a dietitian for more information.  Where to find more information  · National Heart, Lung, and Blood [place]: www.nhlbi.nih.gov  · American Heart Association: www.heart.org  · Academy of Nutrition and Dietetics: www.eatright.org  · National Kidney Foundation: www.kidney.org  Summary  · The DASH eating plan is a healthy eating plan that has been shown to reduce high blood pressure (hypertension). It may also reduce your risk for type 2 diabetes, heart disease, and stroke.  · When on the DASH eating plan, aim to eat more fresh fruits and vegetables, whole grains, lean proteins, low-fat dairy, and heart-healthy fats.  · With the DASH eating plan,  you should limit salt (sodium) intake to 2,300 mg a day. If you have hypertension, you may need to reduce your sodium intake to 1,500 mg a day.  · Work with your health care provider or dietitian to adjust your eating plan to your individual calorie needs.  This information is not intended to replace advice given to you by your health care provider. Make sure you discuss any questions you have with your health care provider.  Document Revised: 11/20/2020 Document Reviewed: 11/20/2020  [employer] Patient Education © 2021 [employer]  Calorie Counting for Weight Loss  Calories are units of energy. Your body needs a certain number of calories from food to keep going throughout the day. When you eat or drink more calories than your body needs, your body stores the extra calories mostly as fat. When you eat or drink fewer calories than your body needs, your body burns fat to get the energy it needs.  Calorie counting means keeping track of how many calories you eat and drink each day. Calorie counting can be helpful if you need to lose weight. If you eat fewer calories than your body needs, you should lose weight. Ask your health care provider what a healthy weight is for you.  For calorie counting to work, you will need to eat the right number of calories each day to lose a healthy amount of weight per week. A dietitian can help you figure out how many calories you need in a day and will suggest ways to reach your calorie goal.  · A healthy amount of weight to lose each week is usually 1-2 lb (0.5-0.9 kg). This usually means that your daily calorie intake should be reduced by 500-750 calories.  · Eating 1,200-1,500 calories a day can help most women lose weight.  · Eating 1,500-1,800 calories a day can help most men lose weight.  What do I need to know about calorie counting?  Work with your health care provider or dietitian to determine how many calories you should get each day. To meet your daily calorie goal, you  will need to:  · Find out how many calories are in each food that you would like to eat. Try to do this before you eat.  · Decide how much of the food you plan to eat.  · Keep a food log. Do this by writing down what you ate and how many calories it had.  To successfully lose weight, it is important to balance calorie counting with a healthy lifestyle that includes regular activity.  Where do I find calorie information?    The number of calories in a food can be found on a Nutrition Facts label. If a food does not have a Nutrition Facts label, try to look up the calories online or ask your dietitian for help.  Remember that calories are listed per serving. If you choose to have more than one serving of a food, you will have to multiply the calories per serving by the number of servings you plan to eat. For example, the label on a package of bread might say that a serving size is 1 slice and that there are 90 calories in a serving. If you eat 1 slice, you will have eaten 90 calories. If you eat 2 slices, you will have eaten 180 calories.  How do I keep a food log?  After each time that you eat, record the following in your food log as soon as possible:  · What you ate. Be sure to include toppings, sauces, and other extras on the food.  · How much you ate. This can be measured in cups, ounces, or number of items.  · How many calories were in each food and drink.  · The total number of calories in the food you ate.  Keep your food log near you, such as in a pocket-sized notebook or on an sid or website on your mobile phone. Some programs will calculate calories for you and show you how many calories you have left to meet your daily goal.  What are some portion-control tips?  · Know how many calories are in a serving. This will help you know how many servings you can have of a certain food.  · Use a measuring cup to measure serving sizes. You could also try weighing out portions on a kitchen scale. With time, you will  be able to estimate serving sizes for some foods.  · Take time to put servings of different foods on your favorite plates or in your favorite bowls and cups so you know what a serving looks like.  · Try not to eat straight from a food's packaging, such as from a bag or box. Eating straight from the package makes it hard to see how much you are eating and can lead to overeating. Put the amount you would like to eat in a cup or on a plate to make sure you are eating the right portion.  · Use smaller plates, glasses, and bowls for smaller portions and to prevent overeating.  · Try not to multitask. For example, avoid watching TV or using your computer while eating. If it is time to eat, sit down at a table and enjoy your food. This will help you recognize when you are full. It will also help you be more mindful of what and how much you are eating.  What are tips for following this plan?  Reading food labels  · Check the calorie count compared with the serving size. The serving size may be smaller than what you are used to eating.  · Check the source of the calories. Try to choose foods that are high in protein, fiber, and vitamins, and low in saturated fat, trans fat, and sodium.  Shopping  · Read nutrition labels while you shop. This will help you make healthy decisions about which foods to buy.  · Pay attention to nutrition labels for low-fat or fat-free foods. These foods sometimes have the same number of calories or more calories than the full-fat versions. They also often have added sugar, starch, or salt to make up for flavor that was removed with the fat.  · Make a grocery list of lower-calorie foods and stick to it.  Cooking  · Try to cook your favorite foods in a healthier way. For example, try baking instead of frying.  · Use low-fat dairy products.  Meal planning  · Use more fruits and vegetables. One-half of your plate should be fruits and vegetables.  · Include lean proteins, such as chicken, turkey, and  fish.  Lifestyle  Each week, aim to do one of the following:  · 150 minutes of moderate exercise, such as walking.  · 75 minutes of vigorous exercise, such as running.  General information  · Know how many calories are in the foods you eat most often. This will help you calculate calorie counts faster.  · Find a way of tracking calories that works for you. Get creative. Try different apps or programs if writing down calories does not work for you.  What foods should I eat?    · Eat nutritious foods. It is better to have a nutritious, high-calorie food, such as an avocado, than a food with few nutrients, such as a bag of potato chips.  · Use your calories on foods and drinks that will fill you up and will not leave you hungry soon after eating.  ? Examples of foods that fill you up are nuts and nut butters, vegetables, lean proteins, and high-fiber foods such as whole grains. High-fiber foods are foods with more than 5 g of fiber per serving.  · Pay attention to calories in drinks. Low-calorie drinks include water and unsweetened drinks.  The items listed above may not be a complete list of foods and beverages you can eat. Contact a dietitian for more information.  What foods should I limit?  Limit foods or drinks that are not good sources of vitamins, minerals, or protein or that are high in unhealthy fats. These include:  · Candy.  · Other sweets.  · Sodas, specialty coffee drinks, alcohol, and juice.  The items listed above may not be a complete list of foods and beverages you should avoid. Contact a dietitian for more information.  How do I count calories when eating out?  · Pay attention to portions. Often, portions are much larger when eating out. Try these tips to keep portions smaller:  ? Consider sharing a meal instead of getting your own.  ? If you get your own meal, eat only half of it. Before you start eating, ask for a container and put half of your meal into it.  ? When available, consider ordering  smaller portions from the menu instead of full portions.  · Pay attention to your food and drink choices. Knowing the way food is cooked and what is included with the meal can help you eat fewer calories.  ? If calories are listed on the menu, choose the lower-calorie options.  ? Choose dishes that include vegetables, fruits, whole grains, low-fat dairy products, and lean proteins.  ? Choose items that are boiled, broiled, grilled, or steamed. Avoid items that are buttered, battered, fried, or served with cream sauce. Items labeled as crispy are usually fried, unless stated otherwise.  ? Choose water, low-fat milk, unsweetened iced tea, or other drinks without added sugar. If you want an alcoholic beverage, choose a lower-calorie option, such as a glass of wine or light beer.  ? Ask for dressings, sauces, and syrups on the side. These are usually high in calories, so you should limit the amount you eat.  ? If you want a salad, choose a garden salad and ask for grilled meats. Avoid extra toppings such as nelson, cheese, or fried items. Ask for the dressing on the side, or ask for olive oil and vinegar or lemon to use as dressing.  · Estimate how many servings of a food you are given. Knowing serving sizes will help you be aware of how much food you are eating at restaurants.  Where to find more information  · Centers for Disease Control and Prevention: www.cdc.gov  · [place]. Department of Agriculture: myplate.gov  Summary  · Calorie counting means keeping track of how many calories you eat and drink each day. If you eat fewer calories than your body needs, you should lose weight.  · A healthy amount of weight to lose per week is usually 1-2 lb (0.5-0.9 kg). This usually means reducing your daily calorie intake by 500-750 calories.  · The number of calories in a food can be found on a Nutrition Facts label. If a food does not have a Nutrition Facts label, try to look up the calories online or ask your dietitian for  help.  · Use smaller plates, glasses, and bowls for smaller portions and to prevent overeating.  · Use your calories on foods and drinks that will fill you up and not leave you hungry shortly after a meal.  This information is not intended to replace advice given to you by your health care provider. Make sure you discuss any questions you have with your health care provider.  Document Revised: 01/28/2021 Document Reviewed: 01/28/2021  [employer] Patient Education © 2021 [employer]  Advance Directive    Advance directives are legal documents that let you make choices ahead of time about your health care and medical treatment in case you become unable to communicate for yourself. Advance directives are a way for you to make known your wishes to family, friends, and health care providers. This can let others know about your end-of-life care if you become unable to communicate.  Discussing and writing advance directives should happen over time rather than all at once. Advance directives can be changed depending on your situation and what you want, even after you have signed the advance directives.  There are different types of advance directives, such as:  · Medical power of .  · Living will.  · Do not resuscitate (DNR) or do not attempt resuscitation (DNAR) order.  Health care proxy and medical power of   A health care proxy is also called a health care agent. This is a person who is appointed to make medical decisions for you in cases where you are unable to make the decisions yourself. Generally, people choose someone they know well and trust to represent their preferences. Make sure to ask this person for an agreement to act as your proxy. A proxy may have to exercise judgment in the event of a medical decision for which your wishes are not known.  A medical power of  is a legal document that names your health care proxy. Depending on the laws in your state, after the document is written, it  may also need to be:  · Signed.  · Notarized.  · Dated.  · Copied.  · Witnessed.  · Incorporated into your medical record.  You may also want to appoint someone to manage your money in a situation in which you are unable to do so. This is called a durable power of  for finances. It is a separate legal document from the durable power of  for health care. You may choose the same person or someone different from your health care proxy to act as your agent in money matters.  If you do not appoint a proxy, or if there is a concern that the proxy is not acting in your best interests, a court may appoint a guardian to act on your behalf.  Living will  A living will is a set of instructions that state your wishes about medical care when you cannot express them yourself. Health care providers should keep a copy of your living will in your medical record. You may want to give a copy to family members or friends. To alert caregivers in case of an emergency, you can place a card in your wallet to let them know that you have a living will and where they can find it. A living will is used if you become:  · Terminally ill.  · Disabled.  · Unable to communicate or make decisions.  Items to consider in your living will include:  · To use or not to use life-support equipment, such as dialysis machines and breathing machines (ventilators).  · A DNR or DNAR order. This tells health care providers not to use cardiopulmonary resuscitation (CPR) if breathing or heartbeat stops.  · To use or not to use tube feeding.  · To be given or not to be given food and fluids.  · Comfort (palliative) care when the goal becomes comfort rather than a cure.  · Donation of organs and tissues.  A living will does not give instructions for distributing your money and property if you should pass away.  DNR or DNAR  A DNR or DNAR order is a request not to have CPR in the event that your heart stops beating or you stop breathing. If a DNR or  DNAR order has not been made and shared, a health care provider will try to help any patient whose heart has stopped or who has stopped breathing. If you plan to have surgery, talk with your health care provider about how your DNR or DNAR order will be followed if problems occur.  What if I do not have an advance directive?  If you do not have an advance directive, some states assign family decision makers to act on your behalf based on how closely you are related to them. Each state has its own laws about advance directives. You may want to check with your health care provider, , or state representative about the laws in your state.  Summary  · Advance directives are the legal documents that allow you to make choices ahead of time about your health care and medical treatment in case you become unable to tell others about your care.  · The process of discussing and writing advance directives should happen over time. You can change the advance directives, even after you have signed them.  · Advance directives include DNR or DNAR orders, living duke, and designating an agent as your medical power of .  This information is not intended to replace advice given to you by your health care provider. Make sure you discuss any questions you have with your health care provider.  Document Revised: 2021 Document Reviewed: 2020  [employer]vier Patient Education ©  [employer]    Medicare Wellness  Personal Prevention Plan of Service     Date of Office Visit:  2021  Encounter Provider:  Reina Yarbrough MD  Place of Service:  [facility]  Patient Name: Chelsi Ferguson  :  1940    As part of the Medicare Wellness portion of your visit today, we are providing you with this personalized preventive plan of services (PPPS). This plan is based upon recommendations of the [place] Preventive Services Task Force (USPSTF) and the Advisory Committee on  Immunization Practices (ACIP).    This lists the preventive care services that should be considered, and provides dates of when you are due. Items listed as completed are up-to-date and do not require any further intervention.    Health Maintenance   Topic Date Due   • ZOSTER VACCINE (3 of 3) 06/08/2021   • INFLUENZA VACCINE  08/01/2021   • TDAP/TD VACCINES (3 - Td or Tdap) 02/08/2022   • LIPID PANEL  07/08/2022   • ANNUAL WELLNESS VISIT  07/09/2022   • DXA SCAN  12/04/2022   • MAMMOGRAM  01/04/2023   • COVID-19 Vaccine  Completed   • Pneumococcal Vaccine 65+  Completed       Orders Placed This Encounter   Procedures   • POC Occult Blood X 3, Stool     Standing Status:   Future     Standing Expiration Date:   7/9/2022     Order Specific Question:   Release to patient     Answer:   Immediate       Return in about 6 months (around 1/6/2022), or if symptoms worsen or fail to improve, for Next scheduled follow up, Recheck lipids.

## 2021-07-12 LAB — UREA BREATH TEST QL: NEGATIVE

## 2021-07-26 ENCOUNTER — TELEPHONE (OUTPATIENT)
Dept: INTERNAL MEDICINE | Facility: CLINIC | Age: 81
End: 2021-07-26

## 2021-07-26 NOTE — TELEPHONE ENCOUNTER
PATIENT NEEDS DIRECTIONS ON COLOGUARD PACKETS.  WHAT TO DO WITH EXTRA PACKETS.     PATIENT WOULD LIKE RESULTS OF URINE TESTING.     PLEASE CALL [phone] AND LEAVE MESSAGE.

## 2021-07-26 NOTE — TELEPHONE ENCOUNTER
If she is usually Christiano stool test are in the lab she does 3 different samples.  This is to try and improve the accuracy.  I do not see an order for Cologuard which is the tub that  gets UPS or FedEx to her.

## 2021-07-29 NOTE — TELEPHONE ENCOUNTER
Dr. Yarbrough,   We have attempted to contact patient 3 times and can not reach her. What do you recommend for next steps.

## 2021-08-16 DIAGNOSIS — Z12.11 ENCOUNTER FOR SCREENING FECAL OCCULT BLOOD TESTING: ICD-10-CM

## 2021-08-16 PROCEDURE — 82274 ASSAY TEST FOR BLOOD FECAL: CPT | Performed by: FAMILY MEDICINE

## 2021-10-05 ENCOUNTER — FLU SHOT (OUTPATIENT)
Dept: INTERNAL MEDICINE | Facility: CLINIC | Age: 81
End: 2021-10-05

## 2021-10-05 DIAGNOSIS — Z23 INFLUENZA VACCINE ADMINISTERED: Primary | ICD-10-CM

## 2021-10-05 PROCEDURE — 90662 IIV NO PRSV INCREASED AG IM: CPT | Performed by: FAMILY MEDICINE

## 2021-10-05 PROCEDURE — G0008 ADMIN INFLUENZA VIRUS VAC: HCPCS | Performed by: FAMILY MEDICINE

## 2021-10-15 DIAGNOSIS — N95.1 MENOPAUSAL SYMPTOM: ICD-10-CM

## 2021-10-20 RX ORDER — ESTRADIOL 10 UG/1
TABLET VAGINAL
Qty: 24 TABLET | Refills: 0 | Status: SHIPPED | OUTPATIENT
Start: 2021-10-20 | End: 2022-01-17

## 2021-10-20 NOTE — TELEPHONE ENCOUNTER
Caller: Chelsi Ferguson    Relationship: Self    Requested Prescriptions:   Requested Prescriptions     Pending Prescriptions Disp Refills   • Yuvafem 10 MCG tablet vaginal tablet [Pharmacy Med Name: YUVAFEM 10 MCG VAGINAL INSERT] 24 tablet 0     Sig: INSERT 1 TABLET INTO THE VAGINA 2 (TWO) TIMES A WEEK.        Pharmacy where request should be sent:   [facility]/pharmacy #[id number] - [facility] [address] [facility] [phone] [facility] [phone] FX  [phone]    Additional details provided by patient:   PATIENT IS COMPLETELY OUT OF MEDICATION     Best call back number:[phone]    Does the patient have less than a 3 day supply:  [x] Yes  [] No    Melissa Waterman Rep   10/20/21 09:34 EDT

## 2021-10-26 DIAGNOSIS — N95.1 MENOPAUSAL SYMPTOM: ICD-10-CM

## 2021-10-26 NOTE — TELEPHONE ENCOUNTER
Pt called in regards to her Yuvafem 10 MCG medication that she states that she has not received yet and she states that she has enough to get her through next week but would like for Dr Yarbrough to call her in a refill to her [facility] pharmacy.    The patient stated that [facility] suggested that she may need a Prior Auth as well.     Please advise and call pt back at [phone].

## 2021-10-27 RX ORDER — ESTRADIOL 10 UG/1
1 INSERT VAGINAL 2 TIMES DAILY
Qty: 24 TABLET | Refills: 0 | OUTPATIENT
Start: 2021-10-27

## 2021-10-29 NOTE — TELEPHONE ENCOUNTER
PA has been initiated.  Outcome was that PA not needed. Pharm notified on VM. Pt notified on VM also

## 2021-12-03 ENCOUNTER — TRANSCRIBE ORDERS (OUTPATIENT)
Dept: ADMINISTRATIVE | Facility: HOSPITAL | Age: 81
End: 2021-12-03

## 2021-12-03 DIAGNOSIS — Z12.31 VISIT FOR SCREENING MAMMOGRAM: Primary | ICD-10-CM

## 2022-01-11 ENCOUNTER — HOSPITAL ENCOUNTER (OUTPATIENT)
Dept: MAMMOGRAPHY | Facility: HOSPITAL | Age: 82
Discharge: HOME | End: 2022-01-11
Admitting: FAMILY MEDICINE

## 2022-01-11 DIAGNOSIS — Z12.31 VISIT FOR SCREENING MAMMOGRAM: ICD-10-CM

## 2022-01-11 PROCEDURE — 77063 BREAST TOMOSYNTHESIS BI: CPT

## 2022-01-11 PROCEDURE — 77067 SCR MAMMO BI INCL CAD: CPT | Performed by: RADIOLOGY

## 2022-01-11 PROCEDURE — 77067 SCR MAMMO BI INCL CAD: CPT

## 2022-01-11 PROCEDURE — 77063 BREAST TOMOSYNTHESIS BI: CPT | Performed by: RADIOLOGY

## 2022-01-14 DIAGNOSIS — I10 ESSENTIAL HYPERTENSION: Chronic | ICD-10-CM

## 2022-01-14 DIAGNOSIS — E78.00 PURE HYPERCHOLESTEROLEMIA: Chronic | ICD-10-CM

## 2022-01-16 DIAGNOSIS — N95.1 MENOPAUSAL SYMPTOM: ICD-10-CM

## 2022-01-17 RX ORDER — METOPROLOL SUCCINATE 25 MG/1
TABLET, EXTENDED RELEASE ORAL
Qty: 90 TABLET | Refills: 0 | Status: SHIPPED | OUTPATIENT
Start: 2022-01-17 | End: 2022-01-25 | Stop reason: SDUPTHER

## 2022-01-17 RX ORDER — ESTRADIOL 10 UG/1
TABLET VAGINAL
Qty: 24 TABLET | Refills: 0 | Status: SHIPPED | OUTPATIENT
Start: 2022-01-17 | End: 2022-04-19

## 2022-01-17 RX ORDER — ROSUVASTATIN CALCIUM 20 MG/1
20 TABLET, COATED ORAL NIGHTLY
Qty: 90 TABLET | Refills: 0 | Status: SHIPPED | OUTPATIENT
Start: 2022-01-17 | End: 2022-01-25 | Stop reason: SDUPTHER

## 2022-01-20 ENCOUNTER — TELEPHONE (OUTPATIENT)
Dept: INTERNAL MEDICINE | Facility: CLINIC | Age: 82
End: 2022-01-20

## 2022-01-20 NOTE — TELEPHONE ENCOUNTER
Received fax from pharmacy stating that Yuvafem Vaginal inserts are not on formulary. Would you like to change prescription or attempt a PA.     Suggested Alternatives include: Estring 2mg vaginal ring; osphena 60mg tablet; estradiol 0.01% cream.

## 2022-01-25 ENCOUNTER — LAB (OUTPATIENT)
Dept: LAB | Facility: HOSPITAL | Age: 82
End: 2022-01-25

## 2022-01-25 ENCOUNTER — OFFICE VISIT (OUTPATIENT)
Dept: INTERNAL MEDICINE | Facility: CLINIC | Age: 82
End: 2022-01-25

## 2022-01-25 VITALS
RESPIRATION RATE: 18 BRPM | DIASTOLIC BLOOD PRESSURE: 72 MMHG | TEMPERATURE: 97.5 F | WEIGHT: 153 LBS | BODY MASS INDEX: 30.04 KG/M2 | SYSTOLIC BLOOD PRESSURE: 124 MMHG | HEART RATE: 75 BPM | HEIGHT: 60 IN | OXYGEN SATURATION: 98 %

## 2022-01-25 DIAGNOSIS — R26.89 BALANCE PROBLEM: ICD-10-CM

## 2022-01-25 DIAGNOSIS — M19.049 ARTHRITIS OF HAND: ICD-10-CM

## 2022-01-25 DIAGNOSIS — I10 ESSENTIAL HYPERTENSION: Chronic | ICD-10-CM

## 2022-01-25 DIAGNOSIS — M72.0 DUPUYTREN'S CONTRACTURE OF BOTH HANDS: ICD-10-CM

## 2022-01-25 DIAGNOSIS — K21.9 GASTROESOPHAGEAL REFLUX DISEASE, UNSPECIFIED WHETHER ESOPHAGITIS PRESENT: Primary | ICD-10-CM

## 2022-01-25 DIAGNOSIS — K21.9 GASTROESOPHAGEAL REFLUX DISEASE, UNSPECIFIED WHETHER ESOPHAGITIS PRESENT: ICD-10-CM

## 2022-01-25 DIAGNOSIS — I10 ESSENTIAL HYPERTENSION: ICD-10-CM

## 2022-01-25 DIAGNOSIS — E78.00 PURE HYPERCHOLESTEROLEMIA: Chronic | ICD-10-CM

## 2022-01-25 PROCEDURE — 99214 OFFICE O/P EST MOD 30 MIN: CPT | Performed by: FAMILY MEDICINE

## 2022-01-25 RX ORDER — METOPROLOL SUCCINATE 25 MG/1
25 TABLET, EXTENDED RELEASE ORAL EVERY MORNING
Qty: 90 TABLET | Refills: 0 | Status: SHIPPED | OUTPATIENT
Start: 2022-01-25 | End: 2022-04-25 | Stop reason: SDUPTHER

## 2022-01-25 RX ORDER — ROSUVASTATIN CALCIUM 20 MG/1
20 TABLET, COATED ORAL NIGHTLY
Qty: 90 TABLET | Refills: 0 | Status: SHIPPED | OUTPATIENT
Start: 2022-01-25 | End: 2022-04-25 | Stop reason: SDUPTHER

## 2022-01-25 NOTE — PROGRESS NOTES
"Subjective   Chelsi Ferguson is a 81 y.o. female.     Chief Complaint   Patient presents with   • Hypertension     6 month recheck    • Hyperlipidemia     6 month recheck        Visit Vitals  /72   Pulse 75   Temp 97.5 °F (36.4 °C) (Infrared)   Resp 18   Ht 152 cm (59.84\")   Wt 69.4 kg (153 lb)   LMP  (LMP Unknown) Comment: Last Mammogram   SpO2 98%   BMI 30.04 kg/m²         Hypertension  This is a chronic problem. The current episode started more than 1 year ago. The problem is unchanged. The problem is controlled. Pertinent negatives include no anxiety, blurred vision, chest pain, headaches, malaise/fatigue, neck pain, orthopnea, palpitations, peripheral edema, PND, shortness of breath or sweats. There are no associated agents to hypertension. Risk factors for coronary artery disease include dyslipidemia and post-menopausal state. Past treatments include beta blockers. Current antihypertension treatment includes beta blockers. The current treatment provides significant improvement. Hypertensive end-organ damage includes CAD/MI. There is no history of angina, kidney disease, CVA, heart failure, left ventricular hypertrophy, PVD or retinopathy. There is no history of chronic renal disease, sleep apnea or a thyroid problem.   Hyperlipidemia  This is a chronic problem. The current episode started more than 1 year ago. The problem is controlled. Recent lipid tests were reviewed and are normal. She has no history of chronic renal disease, diabetes, hypothyroidism, liver disease, obesity or nephrotic syndrome. Factors aggravating her hyperlipidemia include beta blockers. Pertinent negatives include no chest pain, focal sensory loss, focal weakness, leg pain, myalgias or shortness of breath. Current antihyperlipidemic treatment includes statins. The current treatment provides significant improvement of lipids. Risk factors for coronary artery disease include dyslipidemia, hypertension, post-menopausal and family " history.   Coronary Artery Disease  Presents for follow-up visit. Symptoms include leg swelling. Pertinent negatives include no chest pain, chest pressure, chest tightness, dizziness, muscle weakness, palpitations, shortness of breath or weight gain. Risk factors include hyperlipidemia. Risk factors do not include obesity. The symptoms have been resolved. Compliance with diet is good. Compliance with exercise is good. Compliance with medications is good.        Answers for HPI/ROS submitted by the patient on 1/19/2022  Please describe your symptoms.: This is my six month follow up appointment.  My main physical complaints are still Arthritis in multiple areas of my body with ankle swelling, cold feet and partial numbness and pain and  loss in hands developing more recently; digestive issues of heartburn and acid reflux., and continuing pain on left side of abdomen, a feeling of fulness, constipation and/or diarrhea.  Have you had these symptoms before?: Yes  How long have you been having these symptoms?: Greater than 2 weeks  Please list any medications you are currently taking for this condition.: Tylenol 8 hour Arthritis and Pepcid Complete  What is the primary reason for your visit?: Other      Pt stopped her PT because granddaughter tested positive for Covid.  Pt has a lot of heartburn. Pt is still taking the pepcid. GERD is better when she takes antiacid a few hours later. Pt has not done well on PPI.   Pt is sleeping on 2 pillows instead of wedge pillow.     Pt has had Basal cell ca removed from scalp and from neck.   The following portions of the patient's history were reviewed and updated as appropriate: allergies, current medications, past family history, past medical history, past social history, past surgical history and problem list.    Past Medical History:   Diagnosis Date   • Ankle pain    • Arthritis    • Cancer (HCC)     Skin   • Chest discomfort    • Cholecystitis 1/15/2018   • Coronary artery  disease    • Edema    • Elevated liver enzymes 1/15/2018   • Gallstones    • GERD (gastroesophageal reflux disease)    • Glaucoma     pt is currently off of meds and Dr Tanner does not think that she is glaucoma   • H/O complete eye exam 06/2013   • H/O mammogram 11/16/2015   • H/O non-ST elevation myocardial infarction (NSTEMI) 01/2005   • Hammer toe    • Heart attack (HCC) 01/2005   • History of blood transfusion 01/2005   • History of cardiovascular stress test 12/02/2015    normal   • HTN (hypertension)    • Hyperlipidemia    • Hypokalemia 1/15/2018   • Kidney infection 1971   • Low back pain    • Menopausal symptoms    • Onychia and paronychia of finger    • Osteopenia    • Other elevated white blood cell count    • Pap smear for cervical cancer screening 2010 [facility]   • Pneumonia    • Sepsis (HCC) 1/15/2018   • Tremor     essential tremor   • UTI (urinary tract infection) 1/15/2018   • Viral warts    • Visual impairment 2019   • Wears glasses       Past Surgical History:   Procedure Laterality Date   • ADENOIDECTOMY  1952   • BLADDER REPAIR  2002   • BLADDER REPAIR  2003   • BUNIONECTOMY Right 2010   • CARDIAC CATHETERIZATION  stent placed 2005   • CHOLECYSTECTOMY     • CHOLECYSTECTOMY WITH INTRAOPERATIVE CHOLANGIOGRAM N/A 3/7/2018    Procedure: CHOLECYSTECTOMY LAPAROSCOPIC INTRAOPERATIVE CHOLANGIOGRAM;  Surgeon: Harinder Mason MD;  [facility];  Service:    • COLONOSCOPY  2008, 4/2014    [facility]   • CORONARY STENT PLACEMENT Left 01/2005    drug eluting stent 1/2005 LAD   • DILATATION AND CURETTAGE  1971   • DILATATION AND CURETTAGE  1978   • EYE CAPSULOTOMY WITH LASER Bilateral 2018   • EYE SURGERY Bilateral 05/2016    Cataract   • FRONTALIS SUSPENSION  2010   • HAMMER TOE REPAIR Right 11/03/2010   • HEAD & NECK SKIN LESION EXCISIONAL BIOPSY  08/20/2018    benign scalp cyst   • HYSTERECTOMY  1978   • JOINT REPLACEMENT  2005   • LAPAROTOMY OOPHERECTOMY Bilateral 2002   • NOSE SURGERY  03/2017     repair 3 fractured areas. Dr Dunne   • OTHER SURGICAL HISTORY      rectocele repair   • OTHER SURGICAL HISTORY      eyelid surgery   • REPLACEMENT TOTAL KNEE BILATERAL Bilateral    • SKIN BIOPSY     • SUBTOTAL HYSTERECTOMY     • TONSILLECTOMY     • TOTAL KNEE ARTHROPLASTY  2005   • UMBILICAL HERNIA REPAIR     • UTERINE FIBROID SURGERY      emergency removal of fibroid from birth canal      Family History   Problem Relation Age of Onset   • Stroke Mother    • Hypertension Mother    • Heart failure Father         congestive   • Cancer Father         Neck glands removed   • Vision loss Father         Had operation   • Heart disease Father          of Congestive Heart Failure at 91   • Breast cancer Sister         60's   • Cancer Sister         Breast   • Breast cancer Daughter 40   • Cancer Daughter         Breast   • Thyroid disease Daughter         on meds   • Breast cancer Other         NIECE 60's   • Breast cancer Other         NIECE 60's   • Vision loss Paternal Aunt         blind-glaucoma   • Vision loss Paternal Uncle         blind-glaucoma   • Vision loss Maternal Aunt         all 9 aunts paternal aunts/Uncles/ cousins/ sister/brother/ and niece on meds for Glaucoma or had operation   • Early death Daughter         Celiac Sprue reaction   • Ovarian cancer Neg Hx       Social History     Socioeconomic History   • Marital status:    Tobacco Use   • Smoking status: Never Smoker   • Smokeless tobacco: Never Used   Vaping Use   • Vaping Use: Never used   Substance and Sexual Activity   • Alcohol use: No   • Drug use: No   • Sexual activity: Not Currently     Birth control/protection: None      Allergies   Allergen Reactions   • Aleve [Naproxen] Hives   • Indocin [Indomethacin] Hallucinations   • Lipitor [Atorvastatin] Hives   • Statins Hives   • Zocor [Simvastatin] Hives   • Celebrex [Celecoxib] Diarrhea   • Keflex [Cephalexin] GI Intolerance     Upset stomach, may have had  some blood in stool but cannot remember the reason why she was put on it. Tolerates penicillins without problem.   • Ibuprofen GI Intolerance     Heartburn.   • Prevacid [Lansoprazole] Nausea And Vomiting   • Prilosec [Omeprazole] Nausea And Vomiting       Review of Systems   Constitutional: Negative for chills, diaphoresis, fatigue, fever, malaise/fatigue and weight gain.   Eyes: Negative for blurred vision.   Respiratory: Negative for chest tightness and shortness of breath.    Cardiovascular: Positive for leg swelling. Negative for chest pain, palpitations, orthopnea and PND.   Musculoskeletal: Negative for myalgias, muscle weakness and neck pain.   Neurological: Negative for dizziness, focal weakness and headaches.       Objective   Physical Exam  Vitals and nursing note reviewed.   Constitutional:       Appearance: She is well-developed.   HENT:      Head: Normocephalic.      Right Ear: External ear normal.      Left Ear: External ear normal.      Nose: Nose normal.   Eyes:      General: Lids are normal.      Conjunctiva/sclera: Conjunctivae normal.      Pupils: Pupils are equal, round, and reactive to light.   Neck:      Thyroid: No thyroid mass or thyromegaly.      Vascular: No carotid bruit.      Trachea: Trachea normal.   Cardiovascular:      Rate and Rhythm: Normal rate and regular rhythm.      Heart sounds: No murmur heard.      Pulmonary:      Effort: Pulmonary effort is normal. No respiratory distress.      Breath sounds: Normal breath sounds. No decreased breath sounds, wheezing, rhonchi or rales.   Chest:      Chest wall: No tenderness.   Abdominal:      General: Bowel sounds are normal.      Palpations: Abdomen is soft.      Tenderness: There is no abdominal tenderness.   Musculoskeletal:         General: Normal range of motion.      Right hand: Tenderness (in palm) present.      Left hand: Deformity (Dupuytrens contracture) and tenderness (in palm) present.      Cervical back: Normal range of motion  and neck supple.   Skin:     General: Skin is warm and dry.   Neurological:      Mental Status: She is alert and oriented to person, place, and time.   Psychiatric:         Behavior: Behavior normal.         Assessment/Plan      Diagnoses and all orders for this visit:    1. Gastroesophageal reflux disease, unspecified whether esophagitis present (Primary)  -     H. Pylori Breath Test - , Lung; Future  -     H. Pylori Breath Test - Breath, Lung; Future  -     CBC & Differential; Future  -     Sedimentation Rate; Future  -     Rheumatoid Factor, Quant; Future  -     Comprehensive Metabolic Panel; Future  -     Lipid Panel; Future  -     Lipid Panel  -     Comprehensive Metabolic Panel  -     Rheumatoid Factor, Quant  -     Sedimentation Rate  -     CBC & Differential    2. Essential hypertension  -     Comprehensive Metabolic Panel; Future  -     Lipid Panel; Future  -     metoprolol succinate XL (TOPROL-XL) 25 MG 24 hr tablet; Take 1 tablet by mouth Every Morning.  Dispense: 90 tablet; Refill: 0  -     CBC & Differential; Future  -     H. Pylori Breath Test - Breath, Lung; Future  -     CBC & Differential; Future  -     Sedimentation Rate; Future  -     Rheumatoid Factor, Quant; Future  -     Comprehensive Metabolic Panel; Future  -     Lipid Panel; Future  -     Lipid Panel  -     Comprehensive Metabolic Panel  -     Rheumatoid Factor, Quant  -     Sedimentation Rate  -     CBC & Differential    3. Pure hypercholesterolemia  -     Comprehensive Metabolic Panel; Future  -     Lipid Panel; Future  -     rosuvastatin (CRESTOR) 20 MG tablet; Take 1 tablet by mouth Every Night.  Dispense: 90 tablet; Refill: 0  -     H. Pylori Breath Test - Breath, Lung; Future  -     CBC & Differential; Future  -     Sedimentation Rate; Future  -     Rheumatoid Factor, Quant; Future  -     Comprehensive Metabolic Panel; Future  -     Lipid Panel; Future  -     Lipid Panel  -     Comprehensive Metabolic Panel  -     Rheumatoid Factor,  Quant  -     Sedimentation Rate  -     CBC & Differential    4. Arthritis of hand  -     Ambulatory Referral to Physical Therapy Evaluate and treat; Strengthening  -     Sedimentation Rate; Future  -     Rheumatoid Factor; Future  -     H. Pylori Breath Test - Breath, Lung; Future  -     CBC & Differential; Future  -     Sedimentation Rate; Future  -     Rheumatoid Factor, Quant; Future  -     Comprehensive Metabolic Panel; Future  -     Lipid Panel; Future  -     Lipid Panel  -     Comprehensive Metabolic Panel  -     Rheumatoid Factor, Quant  -     Sedimentation Rate  -     CBC & Differential    5. Balance problem  -     Ambulatory Referral to Physical Therapy Evaluate and treat; Strengthening  -     H. Pylori Breath Test - Breath, Lung; Future  -     CBC & Differential; Future  -     Sedimentation Rate; Future  -     Rheumatoid Factor, Quant; Future  -     Comprehensive Metabolic Panel; Future  -     Lipid Panel; Future  -     Lipid Panel  -     Comprehensive Metabolic Panel  -     Rheumatoid Factor, Quant  -     Sedimentation Rate  -     CBC & Differential    6. Dupuytren's contracture of both hands  -     Ambulatory Referral to Physical Therapy Evaluate and treat; Strengthening  -     H. Pylori Breath Test - Breath, Lung; Future  -     CBC & Differential; Future  -     Sedimentation Rate; Future  -     Rheumatoid Factor, Quant; Future  -     Comprehensive Metabolic Panel; Future  -     Lipid Panel; Future  -     Lipid Panel  -     Comprehensive Metabolic Panel  -     Rheumatoid Factor, Quant  -     Sedimentation Rate  -     CBC & Differential      Patient wants to research some of the names of hand specialist.             Current Outpatient Medications:   •  acetaminophen (TYLENOL) 650 MG 8 hr tablet, Take 1,300 mg by mouth 2 (two) times a day., Disp: , Rfl:   •  aspirin 81 MG EC tablet, Take 81 mg by mouth Every Night., Disp: , Rfl:   •  Cholecalciferol (VITAMIN D3 PO), Vitamin D3  Daily, Disp: , Rfl:   •   coenzyme Q10 100 MG capsule, Take 100 mg by mouth Daily., Disp: , Rfl:   •  famotidine (PEPCID) 20 MG tablet, Take 20 mg by mouth 2 (Two) Times a Day., Disp: , Rfl:   •  latanoprost (XALATAN) 0.005 % ophthalmic solution, 1 drop Every Night., Disp: , Rfl:   •  metoprolol succinate XL (TOPROL-XL) 25 MG 24 hr tablet, Take 1 tablet by mouth Every Morning., Disp: 90 tablet, Rfl: 0  •  multivitamin with minerals (HAIR SKIN AND NAILS FORMULA PO), Take 1 tablet by mouth Daily., Disp: , Rfl:   •  nitroglycerin (NITROSTAT) 0.4 MG SL tablet, Place 1 tablet under the tongue Every 5 (Five) Minutes As Needed for Chest Pain., Disp: 30 tablet, Rfl: 3  •  rosuvastatin (CRESTOR) 20 MG tablet, Take 1 tablet by mouth Every Night., Disp: 90 tablet, Rfl: 0  •  Yuvafem 10 MCG tablet vaginal tablet, INSERT 1 TABLET INTO THE VAGINA 2 TIMES A WEEK., Disp: 24 tablet, Rfl: 0    Return in about 3 months (around 4/25/2022), or if symptoms worsen or fail to improve, for Recheck.

## 2022-01-25 NOTE — PROGRESS NOTES
[facility] Cardiology    Patient ID: Chelsi Ferguson is a 81 y.o. female.  : 1940   Contact: [phone]    Encounter date: 2022    PCP: Reina Yarbrough MD      Chief complaint:   Chief Complaint   Patient presents with   • Coronary Artery Disease       Problem List:  1. Coronary artery disease  a. S/p NSTEMI with TATO (cypher sirolimus eluting stent) to LAD, 2005- data deficit.   b. Nuclear stress test 2015: no ischemia.  c. Nuclear stress test, 2020: No evidence of inducible ischemia by scintigraphic criteria. Low risk study.  2. Hyperlipidemia  a. FLP 2020: , Trig 145, HDL 43, LDL 94.   3. GERD  4. Osteoarthritis  5. Essential tremor    Allergies   Allergen Reactions   • Aleve [Naproxen] Hives   • Indocin [Indomethacin] Hallucinations   • Lipitor [Atorvastatin] Hives   • Statins Hives   • Zocor [Simvastatin] Hives   • Celebrex [Celecoxib] Diarrhea   • Keflex [Cephalexin] GI Intolerance     Upset stomach, may have had some blood in stool but cannot remember the reason why she was put on it. Tolerates penicillins without problem.   • Ibuprofen GI Intolerance     Heartburn.   • Prevacid [Lansoprazole] Nausea And Vomiting   • Prilosec [Omeprazole] Nausea And Vomiting       Current Medications:    Current Outpatient Medications:   •  acetaminophen (TYLENOL) 650 MG 8 hr tablet, Take 1,300 mg by mouth 2 (two) times a day., Disp: , Rfl:   •  aspirin 81 MG EC tablet, Take 81 mg by mouth Every Night., Disp: , Rfl:   •  Cholecalciferol (VITAMIN D3 PO), Vitamin D3  Daily, Disp: , Rfl:   •  coenzyme Q10 100 MG capsule, Take 100 mg by mouth Daily., Disp: , Rfl:   •  famotidine (PEPCID) 20 MG tablet, Take 20 mg by mouth 2 (Two) Times a Day., Disp: , Rfl:   •  latanoprost (XALATAN) 0.005 % ophthalmic solution, 1 drop Every Night., Disp: , Rfl:   •  metoprolol succinate XL (TOPROL-XL) 25 MG 24 hr tablet, Take 1 tablet by mouth Every Morning., Disp: 90 tablet, Rfl:  "0  •  multivitamin with minerals (HAIR SKIN AND NAILS FORMULA PO), Take 1 tablet by mouth Daily., Disp: , Rfl:   •  nitroglycerin (NITROSTAT) 0.4 MG SL tablet, Place 1 tablet under the tongue Every 5 (Five) Minutes As Needed for Chest Pain., Disp: 30 tablet, Rfl: 3  •  rosuvastatin (CRESTOR) 20 MG tablet, Take 1 tablet by mouth Every Night., Disp: 90 tablet, Rfl: 0  •  Yuvafem 10 MCG tablet vaginal tablet, INSERT 1 TABLET INTO THE VAGINA 2 TIMES A WEEK., Disp: 24 tablet, Rfl: 0    HPI    Chelsi Ferguson is a 81 y.o. female who presents today for an annual follow up of coronary artery disease and cardiac risk factors. Since last visit, the patient has been doing well overall from a cardiovascular standpoint. She says she's never had a history of high blood pressure and if anything she used to pass out from low blood pressure. She's on metoprolol but asks why this is since she doesn't have hypertension. She does not monitor her blood pressure at home. She stays physically active running errands like going to the grocery and doesn't have too many issues besides the other day she came home feeling very fatigued but thinks this was due to inactivity for two weeks. She's experienced heartburn after taking aspirin and rosuvastatin, this improves after eating. Reports mild left ankle edema. Patient denies chest pain, shortness of breath, palpitations, dizziness, and syncope.     The following portions of the patient's history were reviewed and updated as appropriate: allergies, current medications and problem list.    Pertinent positives as listed in the HPI.  All other systems reviewed are negative.         Vitals:    01/26/22 1156   BP: 140/82   BP Location: Left arm   Patient Position: Sitting   Pulse: 77   SpO2: 97%   Weight: 69.4 kg (153 lb)   Height: 152.4 cm (60\")       Physical Exam:  General: Alert and oriented.  Neck: Jugular venous pressure is within normal limits. Carotids have normal upstrokes without bruits. "   Cardiovascular: Heart has a nondisplaced focal PMI. Regular rate and rhythm. No murmur, gallop or rub.  Lungs: Clear, no rales or wheezes. Equal expansion is noted.   Extremities: Show no edema.  Skin: Warm and dry.  Neurologic: Nonfocal.     Diagnostic Data (reviewed with patient):  Lab Results   Component Value Date    GLUCOSE 90 07/08/2021    BUN 12 07/08/2021    CREATININE 0.66 07/08/2021    EGFRIFNONA 86 07/08/2021    EGFRIFAFRI 104 07/08/2021    BCR 18.2 07/08/2021     07/08/2021    K 4.7 07/08/2021     07/08/2021    CO2 27.1 07/08/2021    CALCIUM 10.2 07/08/2021    ALBUMIN 4.80 07/08/2021    ALKPHOS 62 07/08/2021    AST 21 07/08/2021    ALT 11 07/08/2021     Lab Results   Component Value Date    CHLPL 152 07/08/2021    TRIG 166 (H) 07/08/2021    HDL 52 07/08/2021    LDL 72 07/08/2021      Lab Results   Component Value Date    WBC 7.71 07/08/2021    RBC 4.38 07/08/2021    HGB 14.3 07/08/2021    HCT 42.6 07/08/2021    MCV 97.3 (H) 07/08/2021     07/08/2021      Lab Results   Component Value Date    TSH 1.800 07/08/2021        Procedures      Assessment:    ICD-10-CM ICD-9-CM   1. Coronary artery disease involving native coronary artery of native heart without angina pectoris  I25.10 414.01   2. Pure hypercholesterolemia  E78.00 272.0         Plan:  1. Patient can try taking aspirin and rosuvastatin in the morning or noon to reduce heartburn.   2. Begin monitoring blood pressure at home with goal for systolic to be <130 mmHg.   3. Begin routine aerobic exercise for at least 30 minutes 5 days per week.  4. Continue on nitroglycerin 0.4 mg as needed for angina.   5. Continue on metoprolol 25 mg daily due to CAD and as MI prevention.   6. Continue all other current medications.  7. F/up in 12 months, sooner if needed.    Scribed for Oralia Greene MD by Kerry Rosario. 1/26/2022 12:03 EST      I Oralia Greene MD personally performed the services described in this documentation as  scribed by the above individual in my presence, and it is both accurate and complete.    Oralia Greene MD, FACC

## 2022-01-26 ENCOUNTER — OFFICE VISIT (OUTPATIENT)
Dept: CARDIOLOGY | Facility: CLINIC | Age: 82
End: 2022-01-26

## 2022-01-26 VITALS
HEART RATE: 77 BPM | DIASTOLIC BLOOD PRESSURE: 82 MMHG | OXYGEN SATURATION: 97 % | HEIGHT: 60 IN | WEIGHT: 153 LBS | SYSTOLIC BLOOD PRESSURE: 140 MMHG | BODY MASS INDEX: 30.04 KG/M2

## 2022-01-26 DIAGNOSIS — I25.10 CORONARY ARTERY DISEASE INVOLVING NATIVE CORONARY ARTERY OF NATIVE HEART WITHOUT ANGINA PECTORIS: Primary | ICD-10-CM

## 2022-01-26 DIAGNOSIS — E78.00 PURE HYPERCHOLESTEROLEMIA: ICD-10-CM

## 2022-01-26 LAB
ALBUMIN SERPL-MCNC: 4.3 G/DL (ref 3.5–5.2)
ALBUMIN/GLOB SERPL: 2 G/DL
ALP SERPL-CCNC: 64 U/L (ref 39–117)
ALT SERPL-CCNC: 8 U/L (ref 1–33)
AST SERPL-CCNC: 17 U/L (ref 1–32)
BASOPHILS # BLD AUTO: 0.05 10*3/MM3 (ref 0–0.2)
BASOPHILS NFR BLD AUTO: 0.8 % (ref 0–1.5)
BILIRUB SERPL-MCNC: 0.6 MG/DL (ref 0–1.2)
BUN SERPL-MCNC: 16 MG/DL (ref 8–23)
BUN/CREAT SERPL: 22.9 (ref 7–25)
CALCIUM SERPL-MCNC: 9.7 MG/DL (ref 8.6–10.5)
CHLORIDE SERPL-SCNC: 104 MMOL/L (ref 98–107)
CHOLEST SERPL-MCNC: 155 MG/DL (ref 0–200)
CO2 SERPL-SCNC: 28.7 MMOL/L (ref 22–29)
CREAT SERPL-MCNC: 0.7 MG/DL (ref 0.57–1)
EOSINOPHIL # BLD AUTO: 0.09 10*3/MM3 (ref 0–0.4)
EOSINOPHIL NFR BLD AUTO: 1.4 % (ref 0.3–6.2)
ERYTHROCYTE [DISTWIDTH] IN BLOOD BY AUTOMATED COUNT: 12.5 % (ref 12.3–15.4)
ERYTHROCYTE [SEDIMENTATION RATE] IN BLOOD BY WESTERGREN METHOD: 18 MM/HR (ref 0–30)
GLOBULIN SER CALC-MCNC: 2.2 GM/DL
GLUCOSE SERPL-MCNC: 92 MG/DL (ref 65–99)
HCT VFR BLD AUTO: 42.3 % (ref 34–46.6)
HDLC SERPL-MCNC: 52 MG/DL (ref 40–60)
HGB BLD-MCNC: 14 G/DL (ref 12–15.9)
IMM GRANULOCYTES # BLD AUTO: 0.01 10*3/MM3 (ref 0–0.05)
IMM GRANULOCYTES NFR BLD AUTO: 0.2 % (ref 0–0.5)
LDLC SERPL CALC-MCNC: 80 MG/DL (ref 0–100)
LYMPHOCYTES # BLD AUTO: 1.79 10*3/MM3 (ref 0.7–3.1)
LYMPHOCYTES NFR BLD AUTO: 27.7 % (ref 19.6–45.3)
MCH RBC QN AUTO: 32.4 PG (ref 26.6–33)
MCHC RBC AUTO-ENTMCNC: 33.1 G/DL (ref 31.5–35.7)
MCV RBC AUTO: 97.9 FL (ref 79–97)
MONOCYTES # BLD AUTO: 0.71 10*3/MM3 (ref 0.1–0.9)
MONOCYTES NFR BLD AUTO: 11 % (ref 5–12)
NEUTROPHILS # BLD AUTO: 3.82 10*3/MM3 (ref 1.7–7)
NEUTROPHILS NFR BLD AUTO: 58.9 % (ref 42.7–76)
NRBC BLD AUTO-RTO: 0 /100 WBC (ref 0–0.2)
PLATELET # BLD AUTO: 256 10*3/MM3 (ref 140–450)
POTASSIUM SERPL-SCNC: 4.7 MMOL/L (ref 3.5–5.2)
PROT SERPL-MCNC: 6.5 G/DL (ref 6–8.5)
RBC # BLD AUTO: 4.32 10*6/MM3 (ref 3.77–5.28)
RHEUMATOID FACT SERPL-ACNC: 15.7 IU/ML
SODIUM SERPL-SCNC: 142 MMOL/L (ref 136–145)
TRIGL SERPL-MCNC: 128 MG/DL (ref 0–150)
VLDLC SERPL CALC-MCNC: 23 MG/DL (ref 5–40)
WBC # BLD AUTO: 6.47 10*3/MM3 (ref 3.4–10.8)

## 2022-01-26 PROCEDURE — 99213 OFFICE O/P EST LOW 20 MIN: CPT | Performed by: INTERNAL MEDICINE

## 2022-02-02 ENCOUNTER — LAB (OUTPATIENT)
Dept: LAB | Facility: HOSPITAL | Age: 82
End: 2022-02-02

## 2022-02-02 DIAGNOSIS — E78.00 PURE HYPERCHOLESTEROLEMIA: Chronic | ICD-10-CM

## 2022-02-02 DIAGNOSIS — I10 ESSENTIAL HYPERTENSION: Chronic | ICD-10-CM

## 2022-02-02 DIAGNOSIS — M72.0 DUPUYTREN'S CONTRACTURE OF BOTH HANDS: ICD-10-CM

## 2022-02-02 DIAGNOSIS — K21.9 GASTROESOPHAGEAL REFLUX DISEASE, UNSPECIFIED WHETHER ESOPHAGITIS PRESENT: ICD-10-CM

## 2022-02-02 DIAGNOSIS — R26.89 BALANCE PROBLEM: ICD-10-CM

## 2022-02-02 DIAGNOSIS — M19.049 ARTHRITIS OF HAND: ICD-10-CM

## 2022-02-03 LAB — UREA BREATH TEST QL: NEGATIVE

## 2022-02-04 ENCOUNTER — PATIENT MESSAGE (OUTPATIENT)
Dept: INTERNAL MEDICINE | Facility: CLINIC | Age: 82
End: 2022-02-04

## 2022-02-04 NOTE — TELEPHONE ENCOUNTER
Halina Hassan MA 2/4/2022 2:59 PM EST      ----- Message -----  From: Chelsi Ferguson  Sent: 2/4/2022 12:53 PM EST  To: [facility] [address] Clinical [facility]  Subject: lab results from January 25, 2021     I had two items flagged in my blood test results--My MCV and the Rheumatoid Factor Quant. What does this mean and is there anything I should be concerned about or be doing to manage these?  Chelsi Ferguson ( July 14, 1940)

## 2022-04-16 DIAGNOSIS — N95.1 MENOPAUSAL SYMPTOM: ICD-10-CM

## 2022-04-19 RX ORDER — ESTRADIOL 10 UG/1
TABLET VAGINAL
Qty: 24 TABLET | Refills: 0 | Status: SHIPPED | OUTPATIENT
Start: 2022-04-19 | End: 2022-07-12

## 2022-04-25 ENCOUNTER — OFFICE VISIT (OUTPATIENT)
Dept: INTERNAL MEDICINE | Facility: CLINIC | Age: 82
End: 2022-04-25

## 2022-04-25 VITALS
OXYGEN SATURATION: 97 % | BODY MASS INDEX: 31.02 KG/M2 | HEIGHT: 60 IN | HEART RATE: 62 BPM | SYSTOLIC BLOOD PRESSURE: 130 MMHG | TEMPERATURE: 97.1 F | DIASTOLIC BLOOD PRESSURE: 72 MMHG | WEIGHT: 158 LBS

## 2022-04-25 DIAGNOSIS — E78.00 PURE HYPERCHOLESTEROLEMIA: Chronic | ICD-10-CM

## 2022-04-25 DIAGNOSIS — R06.09 DOE (DYSPNEA ON EXERTION): ICD-10-CM

## 2022-04-25 DIAGNOSIS — R07.9 CHEST PAIN, UNSPECIFIED TYPE: ICD-10-CM

## 2022-04-25 DIAGNOSIS — K21.9 GASTROESOPHAGEAL REFLUX DISEASE, UNSPECIFIED WHETHER ESOPHAGITIS PRESENT: Primary | ICD-10-CM

## 2022-04-25 DIAGNOSIS — I10 ESSENTIAL HYPERTENSION: Chronic | ICD-10-CM

## 2022-04-25 PROBLEM — H53.2 DOUBLE VISION: Status: ACTIVE | Noted: 2022-04-25

## 2022-04-25 PROCEDURE — 93000 ELECTROCARDIOGRAM COMPLETE: CPT | Performed by: FAMILY MEDICINE

## 2022-04-25 PROCEDURE — 99214 OFFICE O/P EST MOD 30 MIN: CPT | Performed by: FAMILY MEDICINE

## 2022-04-25 RX ORDER — METOPROLOL SUCCINATE 25 MG/1
25 TABLET, EXTENDED RELEASE ORAL EVERY MORNING
Qty: 90 TABLET | Refills: 1 | Status: SHIPPED | OUTPATIENT
Start: 2022-04-25 | End: 2023-01-13 | Stop reason: SDUPTHER

## 2022-04-25 RX ORDER — ROSUVASTATIN CALCIUM 20 MG/1
20 TABLET, COATED ORAL NIGHTLY
Qty: 90 TABLET | Refills: 1 | Status: SHIPPED | OUTPATIENT
Start: 2022-04-25 | End: 2023-01-13 | Stop reason: SDUPTHER

## 2022-04-25 NOTE — PROGRESS NOTES
"Subjective   Chelsi Ferguson is a 81 y.o. female.     Chief Complaint   Patient presents with   • Hypertension   • Hyperlipidemia   • Heartburn       Visit Vitals  /72   Pulse 62   Temp 97.1 °F (36.2 °C)   Ht 152.4 cm (60\")   Wt 71.7 kg (158 lb)   LMP  (LMP Unknown) Comment: Last Mammogram   SpO2 97%   BMI 30.86 kg/m²         Hypertension  This is a chronic problem. The current episode started more than 1 year ago. The problem is unchanged. The problem is controlled. Associated symptoms include chest pain and shortness of breath. Pertinent negatives include no anxiety, blurred vision, headaches, malaise/fatigue, neck pain, orthopnea, palpitations, peripheral edema (left ankle), PND or sweats. There are no associated agents to hypertension. Risk factors for coronary artery disease include dyslipidemia. Current antihypertension treatment includes beta blockers. The current treatment provides significant improvement. There are no compliance problems.  There is no history of angina, kidney disease, CAD/MI, CVA, heart failure, left ventricular hypertrophy, PVD or retinopathy. There is no history of chronic renal disease, sleep apnea or a thyroid problem.   Hyperlipidemia  This is a chronic problem. The current episode started more than 1 year ago. The problem is controlled. Recent lipid tests were reviewed and are normal. Exacerbating diseases include obesity. She has no history of chronic renal disease, diabetes, hypothyroidism, liver disease or nephrotic syndrome. Factors aggravating her hyperlipidemia include beta blockers. Associated symptoms include chest pain, myalgias and shortness of breath. Pertinent negatives include no focal sensory loss, focal weakness or leg pain. Current antihyperlipidemic treatment includes statins. The current treatment provides significant improvement of lipids. There are no compliance problems.  Risk factors for coronary artery disease include dyslipidemia.   Heartburn  She " complains of chest pain and heartburn. She reports no abdominal pain, no belching, no choking, no coughing, no dysphagia, no early satiety, no globus sensation, no hoarse voice, no nausea, no sore throat, no stridor, no tooth decay, no water brash or no wheezing. This is a chronic problem. The current episode started more than 1 year ago. The problem occurs frequently. The problem has been waxing and waning. The heartburn is located in the substernum. The heartburn is of moderate intensity. The heartburn wakes her from sleep. The heartburn limits her activity. The symptoms are aggravated by lying down and certain foods. Pertinent negatives include no anemia, fatigue, melena, muscle weakness, orthopnea or weight loss. She has tried an antacid and a histamine-2 antagonist for the symptoms. The treatment provided moderate relief.      Pt is going to a cabin in [place] and will be going on family vacation.  Pt is taking pepcid AC for heartburn. Pt has not had much acid reflux  Pt had raw sensation in the back of her throat that has resolved.  Pt has a chihuahua and gets SOB when walking. Pt is worrying because [place]  on a walk. Pt has intermittent chest pain that is sometimes heartburn  Pt got winded walking in from parking lot. Pt was not having this when she went to Cardiology.    The following portions of the patient's history were reviewed and updated as appropriate: allergies, current medications, past family history, past medical history, past social history, past surgical history and problem list.    Past Medical History:   Diagnosis Date   • Ankle pain    • Arthritis    • Cancer (HCC)     Skin   • Chest discomfort    • Cholecystitis 1/15/2018   • Clotting disorder (HCC)     may be result of aspirin   • Coronary artery disease    • Double vision 2022    Uses glasses with prism   • Edema    • Elevated liver enzymes 1/15/2018   • Gallstones    • GERD (gastroesophageal reflux disease)    • Glaucoma      pt is currently off of meds and Dr Tanner does not think that she is glaucoma   • H/O complete eye exam 06/2013   • H/O mammogram 11/16/2015   • H/O non-ST elevation myocardial infarction (NSTEMI) 01/2005   • Hammer toe    • Heart attack (HCC) 01/2005   • History of blood transfusion 01/2005   • History of cardiovascular stress test 12/02/2015    normal   • HTN (hypertension)    • Hyperlipidemia    • Hypokalemia 1/15/2018   • Kidney infection 1971   • Low back pain    • Menopausal symptoms    • Onychia and paronychia of finger    • Osteopenia    • Other elevated white blood cell count    • Pap smear for cervical cancer screening 2010    [facility]   • Pneumonia    • Sepsis (HCC) 1/15/2018   • Tremor     essential tremor   • UTI (urinary tract infection) 1/15/2018   • Viral warts    • Visual impairment 2019   • Wears glasses       Past Surgical History:   Procedure Laterality Date   • ADENOIDECTOMY  1952   • BLADDER REPAIR  2002   • BLADDER REPAIR  2003   • BUNIONECTOMY Right 2010   • CARDIAC CATHETERIZATION  stent placed 2005   • CHOLECYSTECTOMY     • CHOLECYSTECTOMY WITH INTRAOPERATIVE CHOLANGIOGRAM N/A 3/7/2018    Procedure: CHOLECYSTECTOMY LAPAROSCOPIC INTRAOPERATIVE CHOLANGIOGRAM;  Surgeon: Harinder Mason MD;  Location: [facility] include [facility] beds at the [facility];  Service:    • COLONOSCOPY  2008, 4/2014    [facility]   • CORONARY STENT PLACEMENT Left 01/2005    drug eluting stent 1/2005 LAD   • DILATATION AND CURETTAGE  1971   • DILATATION AND CURETTAGE  1978   • EYE CAPSULOTOMY WITH LASER Bilateral 2018   • EYE SURGERY Bilateral 05/2016    Cataract   • FRONTALIS SUSPENSION  2010   • HAMMER TOE REPAIR Right 11/03/2010   • HEAD & NECK SKIN LESION EXCISIONAL BIOPSY  08/20/2018    benign scalp cyst   • HYSTERECTOMY  1978   • JOINT REPLACEMENT  2005   • LAPAROTOMY OOPHERECTOMY Bilateral 2002   • NOSE SURGERY  03/2017    repair 3 fractured areas. Dr Dunne   • OTHER SURGICAL HISTORY  2005    rectocele repair   • OTHER SURGICAL HISTORY  2010    eyelid  surgery   • REPLACEMENT TOTAL KNEE BILATERAL Bilateral    • SKIN BIOPSY     • SUBTOTAL HYSTERECTOMY     • TONSILLECTOMY     • TOTAL KNEE ARTHROPLASTY  2005   • UMBILICAL HERNIA REPAIR     • UTERINE FIBROID SURGERY      emergency removal of fibroid from birth canal      Family History   Problem Relation Age of Onset   • Stroke Mother    • Hypertension Mother    • Heart failure Father         congestive   • Cancer Father         Neck glands removed   • Vision loss Father         Had operation   • Heart disease Father          of Congestive Heart Failure at 91   • Breast cancer Sister         60's   • Cancer Sister         Breast   • Breast cancer Daughter 40   • Cancer Daughter         Breast   • Thyroid disease Daughter         on meds   • Breast cancer Other         NIECE 60's   • Breast cancer Other         NIECE 60's   • Vision loss Paternal Aunt         blind-glaucoma   • Vision loss Paternal Uncle         blind-glaucoma   • Vision loss Maternal Aunt         all 9 aunts paternal aunts/Uncles/ cousins/ sister/brother/ and niece on meds for Glaucoma or had operation   • Early death Daughter         Celiac Sprue reaction   • Ovarian cancer Neg Hx       Social History     Socioeconomic History   • Marital status:    Tobacco Use   • Smoking status: Never Smoker   • Smokeless tobacco: Never Used   Vaping Use   • Vaping Use: Never used   Substance and Sexual Activity   • Alcohol use: No   • Drug use: No   • Sexual activity: Not Currently     Birth control/protection: None      Allergies   Allergen Reactions   • Aleve [Naproxen] Hives   • Indocin [Indomethacin] Hallucinations   • Lipitor [Atorvastatin] Hives   • Statins Hives   • Zocor [Simvastatin] Hives   • Celebrex [Celecoxib] Diarrhea   • Keflex [Cephalexin] GI Intolerance     Upset stomach, may have had some blood in stool but cannot remember the reason why she was put on it. Tolerates penicillins without problem.   • Ibuprofen GI  Intolerance     Heartburn.   • Prevacid [Lansoprazole] Nausea And Vomiting   • Prilosec [Omeprazole] Nausea And Vomiting       Review of Systems   Constitutional: Negative for fatigue, malaise/fatigue and weight loss.   HENT: Negative for hoarse voice and sore throat.    Eyes: Negative for blurred vision.   Respiratory: Positive for shortness of breath. Negative for cough, choking and wheezing.    Cardiovascular: Positive for chest pain. Negative for palpitations, orthopnea and PND.   Gastrointestinal: Positive for heartburn. Negative for abdominal pain, dysphagia, melena and nausea.   Musculoskeletal: Positive for arthralgias, gait problem and myalgias. Negative for muscle weakness and neck pain.   Neurological: Negative for focal weakness and headaches.       Objective   Physical Exam  Vitals and nursing note reviewed.   Constitutional:       Appearance: She is well-developed.   HENT:      Head: Normocephalic.      Right Ear: External ear normal.      Left Ear: External ear normal.      Nose: Nose normal.   Eyes:      General: Lids are normal.      Conjunctiva/sclera: Conjunctivae normal.      Pupils: Pupils are equal, round, and reactive to light.   Neck:      Thyroid: No thyroid mass or thyromegaly.      Vascular: No carotid bruit.      Trachea: Trachea normal.   Cardiovascular:      Rate and Rhythm: Normal rate and regular rhythm.      Heart sounds: No murmur heard.  Pulmonary:      Effort: Pulmonary effort is normal. No respiratory distress.      Breath sounds: Normal breath sounds. No decreased breath sounds, wheezing, rhonchi or rales.   Chest:      Chest wall: No tenderness.   Abdominal:      General: Bowel sounds are normal.      Palpations: Abdomen is soft.      Tenderness: There is no abdominal tenderness.   Musculoskeletal:         General: Normal range of motion.      Cervical back: Normal range of motion and neck supple.   Skin:     General: Skin is warm and dry.   Neurological:      Mental Status:  She is alert and oriented to person, place, and time.   Psychiatric:         Behavior: Behavior normal.           Assessment/Plan   Diagnoses and all orders for this visit:    1. Gastroesophageal reflux disease, unspecified whether esophagitis present (Primary)    2. Essential hypertension  -     metoprolol succinate XL (TOPROL-XL) 25 MG 24 hr tablet; Take 1 tablet by mouth Every Morning.  Dispense: 90 tablet; Refill: 1    3. Pure hypercholesterolemia  -     rosuvastatin (CRESTOR) 20 MG tablet; Take 1 tablet by mouth Every Night.  Dispense: 90 tablet; Refill: 1    4. Chest pain, unspecified type  -     Ambulatory Referral to [facility]  -     ECG 12 Lead    5. INFANTE (dyspnea on exertion)  -     Ambulatory Referral to [facility]  -     ECG 12 Lead        Continue pepcid and antacid for GERD           Current Outpatient Medications:   •  acetaminophen (TYLENOL) 650 MG 8 hr tablet, Take 1,300 mg by mouth 2 (two) times a day., Disp: , Rfl:   •  aspirin 81 MG EC tablet, Take 81 mg by mouth Every Night., Disp: , Rfl:   •  Cholecalciferol (VITAMIN D3 PO), Vitamin D3  Daily, Disp: , Rfl:   •  coenzyme Q10 100 MG capsule, Take 100 mg by mouth Daily., Disp: , Rfl:   •  famotidine (PEPCID) 20 MG tablet, Take 20 mg by mouth 2 (Two) Times a Day., Disp: , Rfl:   •  latanoprost (XALATAN) 0.005 % ophthalmic solution, 1 drop Every Night., Disp: , Rfl:   •  metoprolol succinate XL (TOPROL-XL) 25 MG 24 hr tablet, Take 1 tablet by mouth Every Morning., Disp: 90 tablet, Rfl: 1  •  multivitamin with minerals tablet tablet, Take 1 tablet by mouth Daily., Disp: , Rfl:   •  nitroglycerin (NITROSTAT) 0.4 MG SL tablet, Place 1 tablet under the tongue Every 5 (Five) Minutes As Needed for Chest Pain., Disp: 30 tablet, Rfl: 3  •  rosuvastatin (CRESTOR) 20 MG tablet, Take 1 tablet by mouth Every Night., Disp: 90 tablet, Rfl: 1  •  Yuvafem 10 MCG tablet vaginal tablet, INSERT 1 TABLET INTO  THE VAGINA 2 TIMES A WEEK., Disp: 24 tablet, Rfl: 0    Return in about 4 months (around 8/23/2022) for Next scheduled follow up.       ECG 12 Lead    Date/Time: 4/25/2022 11:43 AM  Performed by: Reina Yarbrough MD  Authorized by: Reina Yarbrough MD   Comparison: compared with previous ECG from 5/20/2020  Comparison to previous ECG: Suspect loose lead V5.  V4 more positive than previous.  Rhythm: sinus rhythm and sinus bradycardia  Rate: bradycardic  BPM: 57  Conduction: 1st degree AV block  QRS axis: normal (P, R, T: 56, 3, 39)  Other findings: non-specific ST-T wave changes    Clinical impression: non-specific ECG  Comments: WV int 209 ms  QRS dur 83 ms  QT/QTc 421/415 ms            Answers for HPI/ROS submitted by the patient on 4/18/2022  Please describe your symptoms.: This is a follow up scheduled by Dr. Yarbrough.  I do want to talk with her about shortness of breath when walking , pains in chest region, and some high and low blood pressure readings.  Have you had these symptoms before?: No  How long have you been having these symptoms?: Greater than 2 weeks  Please list any medications you are currently taking for this condition.: [id number] ( ERSUCC)  25mg tablet once a day  Please describe any probable cause for these symptoms. : out of shape most likely or continued heart burn symptoms but also worry about missing signs of heart problems.  I think my 's death following his heart attack while out walking has heightened my anxiety over my heart even though I have done well since my heart attack and stent placement in Jan. 2005.  What is the primary reason for your visit?: Other

## 2022-04-29 ENCOUNTER — OFFICE VISIT (OUTPATIENT)
Dept: CARDIOLOGY | Facility: HOSPITAL | Age: 82
End: 2022-04-29

## 2022-04-29 VITALS
DIASTOLIC BLOOD PRESSURE: 55 MMHG | BODY MASS INDEX: 30.78 KG/M2 | SYSTOLIC BLOOD PRESSURE: 142 MMHG | OXYGEN SATURATION: 96 % | HEART RATE: 69 BPM | TEMPERATURE: 95.3 F | HEIGHT: 60 IN | WEIGHT: 156.8 LBS | RESPIRATION RATE: 12 BRPM

## 2022-04-29 DIAGNOSIS — I10 ESSENTIAL HYPERTENSION: ICD-10-CM

## 2022-04-29 DIAGNOSIS — R06.09 DOE (DYSPNEA ON EXERTION): ICD-10-CM

## 2022-04-29 DIAGNOSIS — E78.00 PURE HYPERCHOLESTEROLEMIA: ICD-10-CM

## 2022-04-29 DIAGNOSIS — R07.2 PRECORDIAL PAIN: ICD-10-CM

## 2022-04-29 DIAGNOSIS — R53.83 OTHER FATIGUE: ICD-10-CM

## 2022-04-29 DIAGNOSIS — I25.118 CORONARY ARTERY DISEASE OF NATIVE ARTERY OF NATIVE HEART WITH STABLE ANGINA PECTORIS: Primary | ICD-10-CM

## 2022-04-29 PROCEDURE — 99214 OFFICE O/P EST MOD 30 MIN: CPT | Performed by: NURSE PRACTITIONER

## 2022-05-02 NOTE — PROGRESS NOTES
"Chief Complaint  Establish Care, Chest Pain, and Shortness of Breath    Subjective    History of Present Illness {CC  Problem List  Visit  Diagnosis   Encounters  Notes  Medications  Labs  Result Review Imaging  Media :23}       History of Present Illness   Patient presents the office today at the request of her primary care provider for ongoing evaluation of her chest pain and dyspnea.  Patient has known coronary disease and is status post drug-eluting stent to the LAD January 2005 after non-STEMI.  Patient reports she has been experiencing chest tightness in the left side of her chest with exertion.  Chest tightness/pain improves with rest.  She also reports significant dyspnea on exertion as well as fatigue which are new symptoms as well.  Reports symptoms have been present for the last few weeks and are continuing to worsen.  She currently denies palpitations, syncope, orthopnea, PND or pedal edema.      Objective     Vital Signs:   Vitals:    04/29/22 1358 04/29/22 1359 04/29/22 1400   BP: 143/65 149/68 142/55   BP Location: Right arm Left arm Left arm   Patient Position: Sitting Standing Sitting   Cuff Size: Adult Adult Adult   Pulse: 69 69 69   Resp: 10 10 12   Temp: 95.3 °F (35.2 °C) 95.3 °F (35.2 °C) 95.3 °F (35.2 °C)   TempSrc: Temporal Temporal Temporal   SpO2: 96% 95% 96%   Weight:   71.1 kg (156 lb 12.8 oz)   Height:   152.4 cm (60\")     Body mass index is 30.62 kg/m².  Physical Exam  Vitals and nursing note reviewed.   Constitutional:       Appearance: Normal appearance.   HENT:      Head: Normocephalic.   Eyes:      Pupils: Pupils are equal, round, and reactive to light.   Cardiovascular:      Rate and Rhythm: Normal rate and regular rhythm.      Pulses: Normal pulses.      Heart sounds: Normal heart sounds. No murmur heard.  Pulmonary:      Effort: Pulmonary effort is normal.      Breath sounds: Normal breath sounds.   Abdominal:      General: Bowel sounds are normal.      Palpations: " Abdomen is soft.   Musculoskeletal:         General: Normal range of motion.      Cervical back: Normal range of motion.      Right lower leg: No edema.      Left lower leg: No edema.   Skin:     General: Skin is warm and dry.      Capillary Refill: Capillary refill takes less than 2 seconds.   Neurological:      Mental Status: She is alert and oriented to person, place, and time.   Psychiatric:         Mood and Affect: Mood normal.         Thought Content: Thought content normal.              Result Review  Data Reviewed:{ Labs  Result Review  Imaging  Med Tab  Media :23}     EKG 4/25/2022 sinus bradycardia at 57 bpm      Lab Results   Component Value Date    GLUCOSE 92 01/25/2022    CALCIUM 9.7 01/25/2022     01/25/2022    K 4.7 01/25/2022    CO2 28.7 01/25/2022     01/25/2022    BUN 16 01/25/2022    CREATININE 0.70 01/25/2022    EGFRIFAFRI 97 01/25/2022    EGFRIFNONA 80 01/25/2022    BCR 22.9 01/25/2022    ANIONGAP 10.6 01/08/2021     Lab Results   Component Value Date    WBC 6.47 01/25/2022    HGB 14.0 01/25/2022    HCT 42.3 01/25/2022    MCV 97.9 (H) 01/25/2022     01/25/2022          Assessment and Plan {CC Problem List  Visit Diagnosis  ROS  Review (Popup)  Health Maintenance  Quality  BestPractice  Medications  SmartSets  SnapShot Encounters  Media :23}   1. Coronary artery disease of native artery of native heart with stable angina pectoris (HCC)  Continue asa, toprol, crestor   - Stress Test With Myocardial Perfusion (1 Day); Future    2. Precordial pain    - Stress Test With Myocardial Perfusion (1 Day); Future    3. INFANTE (dyspnea on exertion)    - Stress Test With Myocardial Perfusion (1 Day); Future    4. Essential hypertension  Well controlled on toprol  - Stress Test With Myocardial Perfusion (1 Day); Future    5. Pure hypercholesterolemia  Stable on crestor   - Stress Test With Myocardial Perfusion (1 Day); Future    6. Other fatigue    - Stress Test With Myocardial  Perfusion (1 Day); Future      Follow Up {Instructions Charge Capture  Follow-up Communications :23}   Return if symptoms worsen or fail to improve.    Patient was given instructions and counseling regarding her condition or for health maintenance advice. Please see specific information pulled into the AVS if appropriate.  Patient was instructed to call the [facility] with any questions, concerns, or worsening symptoms.

## 2022-05-13 ENCOUNTER — HOSPITAL ENCOUNTER (OUTPATIENT)
Dept: CARDIOLOGY | Facility: HOSPITAL | Age: 82
Discharge: HOME | End: 2022-05-13
Admitting: NURSE PRACTITIONER

## 2022-05-13 VITALS
SYSTOLIC BLOOD PRESSURE: 146 MMHG | OXYGEN SATURATION: 99 % | BODY MASS INDEX: 31.45 KG/M2 | WEIGHT: 156 LBS | HEIGHT: 59 IN | DIASTOLIC BLOOD PRESSURE: 88 MMHG | HEART RATE: 68 BPM

## 2022-05-13 DIAGNOSIS — R07.2 PRECORDIAL PAIN: ICD-10-CM

## 2022-05-13 DIAGNOSIS — R06.09 DOE (DYSPNEA ON EXERTION): ICD-10-CM

## 2022-05-13 DIAGNOSIS — I10 ESSENTIAL HYPERTENSION: ICD-10-CM

## 2022-05-13 DIAGNOSIS — E78.00 PURE HYPERCHOLESTEROLEMIA: ICD-10-CM

## 2022-05-13 DIAGNOSIS — R53.83 OTHER FATIGUE: ICD-10-CM

## 2022-05-13 DIAGNOSIS — I25.118 CORONARY ARTERY DISEASE OF NATIVE ARTERY OF NATIVE HEART WITH STABLE ANGINA PECTORIS: ICD-10-CM

## 2022-05-13 LAB
BH CV REST NUCLEAR ISOTOPE DOSE: 9 MCI
BH CV STRESS BP STAGE 2: NORMAL
BH CV STRESS BP STAGE 4: NORMAL
BH CV STRESS COMMENTS STAGE 1: NORMAL
BH CV STRESS DOSE REGADENOSON STAGE 1: 0.4
BH CV STRESS DURATION MIN STAGE 1: 1
BH CV STRESS DURATION MIN STAGE 2: 1
BH CV STRESS DURATION MIN STAGE 3: 1
BH CV STRESS DURATION MIN STAGE 4: 1
BH CV STRESS DURATION SEC STAGE 1: 0
BH CV STRESS DURATION SEC STAGE 2: 0
BH CV STRESS DURATION SEC STAGE 3: 0
BH CV STRESS DURATION SEC STAGE 4: 0
BH CV STRESS HR STAGE 1: 72
BH CV STRESS HR STAGE 2: 100
BH CV STRESS HR STAGE 3: 107
BH CV STRESS HR STAGE 4: 98
BH CV STRESS NUCLEAR ISOTOPE DOSE: 32.4 MCI
BH CV STRESS O2 STAGE 1: 97
BH CV STRESS O2 STAGE 2: 99
BH CV STRESS O2 STAGE 3: 100
BH CV STRESS O2 STAGE 4: 99
BH CV STRESS PROTOCOL 1: NORMAL
BH CV STRESS RECOVERY BP: NORMAL MMHG
BH CV STRESS RECOVERY HR: 96 BPM
BH CV STRESS RECOVERY O2: 98 %
BH CV STRESS STAGE 1: 1
BH CV STRESS STAGE 2: 2
BH CV STRESS STAGE 3: 3
BH CV STRESS STAGE 4: 4
LV EF NUC BP: 71 %
MAXIMAL PREDICTED HEART RATE: 138 BPM
PERCENT MAX PREDICTED HR: 78.26 %
STRESS BASELINE BP: NORMAL MMHG
STRESS BASELINE HR: 66 BPM
STRESS O2 SAT REST: 98 %
STRESS PERCENT HR: 92 %
STRESS POST ESTIMATED WORKLOAD: 1 METS
STRESS POST EXERCISE DUR MIN: 4 MIN
STRESS POST EXERCISE DUR SEC: 0 SEC
STRESS POST O2 SAT PEAK: 99 %
STRESS POST PEAK BP: NORMAL MMHG
STRESS POST PEAK HR: 108 BPM
STRESS TARGET HR: 117 BPM

## 2022-05-13 PROCEDURE — 78452 HT MUSCLE IMAGE SPECT MULT: CPT

## 2022-05-13 PROCEDURE — 78452 HT MUSCLE IMAGE SPECT MULT: CPT | Performed by: INTERNAL MEDICINE

## 2022-05-13 PROCEDURE — 25010000002 REGADENOSON 0.4 MG/5ML SOLUTION: Performed by: NURSE PRACTITIONER

## 2022-05-13 PROCEDURE — A9500 TC99M SESTAMIBI: HCPCS | Performed by: NURSE PRACTITIONER

## 2022-05-13 PROCEDURE — 93017 CV STRESS TEST TRACING ONLY: CPT

## 2022-05-13 PROCEDURE — 93018 CV STRESS TEST I&R ONLY: CPT | Performed by: INTERNAL MEDICINE

## 2022-05-13 PROCEDURE — 0 TECHNETIUM SESTAMIBI: Performed by: NURSE PRACTITIONER

## 2022-05-13 RX ORDER — CAFFEINE CITRATE 20 MG/ML
60 SOLUTION INTRAVENOUS ONCE
Status: COMPLETED | OUTPATIENT
Start: 2022-05-13 | End: 2022-05-13

## 2022-05-13 RX ADMIN — REGADENOSON 0.4 MG: 0.08 INJECTION, SOLUTION INTRAVENOUS at 14:48

## 2022-05-13 RX ADMIN — TECHNETIUM TC 99M SESTAMIBI 1 DOSE: 1 INJECTION INTRAVENOUS at 12:59

## 2022-05-13 RX ADMIN — CAFFEINE CITRATE 60 MG: 20 INJECTION, SOLUTION INTRAVENOUS at 15:00

## 2022-05-13 RX ADMIN — TECHNETIUM TC 99M SESTAMIBI 1 DOSE: 1 INJECTION INTRAVENOUS at 14:50

## 2022-05-16 ENCOUNTER — TELEPHONE (OUTPATIENT)
Dept: CARDIOLOGY | Facility: HOSPITAL | Age: 82
End: 2022-05-16

## 2022-05-16 NOTE — TELEPHONE ENCOUNTER
Reviewed stress test with patient. Stress test within normal limits. Continue current plan of care

## 2022-07-12 DIAGNOSIS — N95.1 MENOPAUSAL SYMPTOM: ICD-10-CM

## 2022-07-12 RX ORDER — ESTRADIOL 10 UG/1
TABLET VAGINAL
Qty: 24 TABLET | Refills: 0 | Status: SHIPPED | OUTPATIENT
Start: 2022-07-12 | End: 2022-10-04

## 2022-08-31 ENCOUNTER — OFFICE VISIT (OUTPATIENT)
Dept: INTERNAL MEDICINE | Facility: CLINIC | Age: 82
End: 2022-08-31

## 2022-08-31 ENCOUNTER — LAB (OUTPATIENT)
Dept: LAB | Facility: HOSPITAL | Age: 82
End: 2022-08-31

## 2022-08-31 VITALS
HEART RATE: 68 BPM | DIASTOLIC BLOOD PRESSURE: 76 MMHG | HEIGHT: 60 IN | OXYGEN SATURATION: 97 % | WEIGHT: 155 LBS | BODY MASS INDEX: 30.43 KG/M2 | SYSTOLIC BLOOD PRESSURE: 128 MMHG | TEMPERATURE: 97.8 F

## 2022-08-31 DIAGNOSIS — M19.90 ARTHRITIS: ICD-10-CM

## 2022-08-31 DIAGNOSIS — Z00.00 MEDICARE ANNUAL WELLNESS VISIT, SUBSEQUENT: Primary | ICD-10-CM

## 2022-08-31 DIAGNOSIS — I25.10 CORONARY ARTERY DISEASE INVOLVING NATIVE CORONARY ARTERY OF NATIVE HEART WITHOUT ANGINA PECTORIS: ICD-10-CM

## 2022-08-31 DIAGNOSIS — I10 PRIMARY HYPERTENSION: Chronic | ICD-10-CM

## 2022-08-31 DIAGNOSIS — I10 PRIMARY HYPERTENSION: ICD-10-CM

## 2022-08-31 DIAGNOSIS — K21.9 GASTROESOPHAGEAL REFLUX DISEASE, UNSPECIFIED WHETHER ESOPHAGITIS PRESENT: ICD-10-CM

## 2022-08-31 DIAGNOSIS — R10.33 ABDOMINAL PAIN, PERIUMBILICAL: ICD-10-CM

## 2022-08-31 DIAGNOSIS — R25.2 MUSCLE CRAMPS: ICD-10-CM

## 2022-08-31 DIAGNOSIS — R82.90 ABNORMAL URINALYSIS: ICD-10-CM

## 2022-08-31 DIAGNOSIS — R13.10 DYSPHAGIA, UNSPECIFIED TYPE: ICD-10-CM

## 2022-08-31 LAB
BASOPHILS # BLD AUTO: 0.03 10*3/MM3 (ref 0–0.2)
BASOPHILS NFR BLD AUTO: 0.4 % (ref 0–1.5)
BILIRUB BLD-MCNC: NEGATIVE MG/DL
CLARITY, POC: ABNORMAL
COLOR UR: YELLOW
DEPRECATED RDW RBC AUTO: 43.6 FL (ref 37–54)
EOSINOPHIL # BLD AUTO: 0.15 10*3/MM3 (ref 0–0.4)
EOSINOPHIL NFR BLD AUTO: 1.8 % (ref 0.3–6.2)
ERYTHROCYTE [DISTWIDTH] IN BLOOD BY AUTOMATED COUNT: 12.5 % (ref 12.3–15.4)
EXPIRATION DATE: ABNORMAL
GLUCOSE UR STRIP-MCNC: NEGATIVE MG/DL
HCT VFR BLD AUTO: 40.9 % (ref 34–46.6)
HGB BLD-MCNC: 13.7 G/DL (ref 12–15.9)
IMM GRANULOCYTES # BLD AUTO: 0.02 10*3/MM3 (ref 0–0.05)
IMM GRANULOCYTES NFR BLD AUTO: 0.2 % (ref 0–0.5)
KETONES UR QL: NEGATIVE
LEUKOCYTE EST, POC: ABNORMAL
LYMPHOCYTES # BLD AUTO: 2.45 10*3/MM3 (ref 0.7–3.1)
LYMPHOCYTES NFR BLD AUTO: 30.2 % (ref 19.6–45.3)
Lab: ABNORMAL
MCH RBC QN AUTO: 32.5 PG (ref 26.6–33)
MCHC RBC AUTO-ENTMCNC: 33.5 G/DL (ref 31.5–35.7)
MCV RBC AUTO: 96.9 FL (ref 79–97)
MONOCYTES # BLD AUTO: 0.72 10*3/MM3 (ref 0.1–0.9)
MONOCYTES NFR BLD AUTO: 8.9 % (ref 5–12)
NEUTROPHILS NFR BLD AUTO: 4.74 10*3/MM3 (ref 1.7–7)
NEUTROPHILS NFR BLD AUTO: 58.5 % (ref 42.7–76)
NITRITE UR-MCNC: NEGATIVE MG/ML
NRBC BLD AUTO-RTO: 0 /100 WBC (ref 0–0.2)
PH UR: 6 [PH] (ref 5–8)
PLATELET # BLD AUTO: 264 10*3/MM3 (ref 140–450)
PMV BLD AUTO: 9.4 FL (ref 6–12)
PROT UR STRIP-MCNC: NEGATIVE MG/DL
RBC # BLD AUTO: 4.22 10*6/MM3 (ref 3.77–5.28)
RBC # UR STRIP: ABNORMAL /UL
SP GR UR: 1.02 (ref 1–1.03)
UROBILINOGEN UR QL: NORMAL
WBC NRBC COR # BLD: 8.11 10*3/MM3 (ref 3.4–10.8)

## 2022-08-31 PROCEDURE — 87086 URINE CULTURE/COLONY COUNT: CPT | Performed by: FAMILY MEDICINE

## 2022-08-31 PROCEDURE — G0439 PPPS, SUBSEQ VISIT: HCPCS | Performed by: FAMILY MEDICINE

## 2022-08-31 PROCEDURE — 80061 LIPID PANEL: CPT | Performed by: FAMILY MEDICINE

## 2022-08-31 PROCEDURE — 96160 PT-FOCUSED HLTH RISK ASSMT: CPT | Performed by: FAMILY MEDICINE

## 2022-08-31 PROCEDURE — 80053 COMPREHEN METABOLIC PANEL: CPT | Performed by: FAMILY MEDICINE

## 2022-08-31 PROCEDURE — 1125F AMNT PAIN NOTED PAIN PRSNT: CPT | Performed by: FAMILY MEDICINE

## 2022-08-31 PROCEDURE — 1170F FXNL STATUS ASSESSED: CPT | Performed by: FAMILY MEDICINE

## 2022-08-31 PROCEDURE — 81003 URINALYSIS AUTO W/O SCOPE: CPT | Performed by: FAMILY MEDICINE

## 2022-08-31 PROCEDURE — 1160F RVW MEDS BY RX/DR IN RCRD: CPT | Performed by: FAMILY MEDICINE

## 2022-08-31 PROCEDURE — 84443 ASSAY THYROID STIM HORMONE: CPT | Performed by: FAMILY MEDICINE

## 2022-08-31 PROCEDURE — 85025 COMPLETE CBC W/AUTO DIFF WBC: CPT | Performed by: FAMILY MEDICINE

## 2022-08-31 PROCEDURE — 85652 RBC SED RATE AUTOMATED: CPT | Performed by: FAMILY MEDICINE

## 2022-08-31 NOTE — PROGRESS NOTES
The ABCs of the Annual Wellness Visit  Subsequent Medicare Wellness Visit    Chief Complaint   Patient presents with   • Medicare Wellness-subsequent      Subjective    History of Present Illness:  Chelsi Ferguson is a 82 y.o. female who presents for a Subsequent Medicare Wellness Visit.    Pt states she has enough medication.   Pt got a chihuahua a yr ago. Pt is walking her dog almost every day.   The following portions of the patient's history were reviewed and   updated as appropriate: allergies, current medications, past family history, past medical history, past social history, past surgical history and problem list.    Past Medical History:   Diagnosis Date   • Ankle pain    • Arthritis    • Cancer (HCC)     Skin   • Cataract     removed   • Chest discomfort    • Cholecystitis 01/15/2018   • Clotting disorder (HCC)     may be result of aspirin   • Coronary artery disease    • Double vision 04/25/2022    Uses glasses with prism   • Edema    • Elevated liver enzymes 01/15/2018   • Gallstones    • GERD (gastroesophageal reflux disease)    • Glaucoma     pt is currently off of meds and Dr Tanner does not think that she is glaucoma   • H/O complete eye exam 06/2013   • H/O mammogram 11/16/2015   • H/O non-ST elevation myocardial infarction (NSTEMI) 01/2005   • Hammer toe    • Heart attack (HCC) 01/2005   • History of blood transfusion 01/2005   • History of cardiovascular stress test 12/02/2015    normal   • HTN (hypertension)    • Hyperlipidemia    • Hypokalemia 01/15/2018   • Kidney infection 1971   • Low back pain    • Menopausal symptoms    • Onychia and paronychia of finger    • Osteopenia    • Other elevated white blood cell count    • Pap smear for cervical cancer screening 2010    Leandro   • Pneumonia    • Sepsis (HCC) 01/15/2018   • Tremor     essential tremor   • UTI (urinary tract infection) 01/15/2018   • Viral warts    • Visual impairment 2019   • Wears glasses        Past Surgical History:   Procedure  Laterality Date   • ADENOIDECTOMY  1952   • BLADDER REPAIR  2002   • BLADDER REPAIR  2003   • BUNIONECTOMY Right 2010   • CARDIAC CATHETERIZATION  stent placed 2005   • CHOLECYSTECTOMY     • CHOLECYSTECTOMY WITH INTRAOPERATIVE CHOLANGIOGRAM N/A 3/7/2018    Procedure: CHOLECYSTECTOMY LAPAROSCOPIC INTRAOPERATIVE CHOLANGIOGRAM;  Surgeon: Harinder Mason MD;  Location: [facility] OR;  Service:    • COLONOSCOPY  2008, 4/2014    Juany   • CORONARY STENT PLACEMENT Left 01/2005    drug eluting stent 1/2005 LAD   • DILATATION AND CURETTAGE  1971   • DILATATION AND CURETTAGE  1978   • EYE CAPSULOTOMY WITH LASER Bilateral 2018   • EYE SURGERY Bilateral 05/2016    Cataract   • FRONTALIS SUSPENSION  2010   • HAMMER TOE REPAIR Right 11/03/2010   • HEAD & NECK SKIN LESION EXCISIONAL BIOPSY  08/20/2018    benign scalp cyst   • HYSTERECTOMY  1978   • JOINT REPLACEMENT  2005   • LAPAROTOMY OOPHERECTOMY Bilateral 2002   • NOSE SURGERY  03/2017    repair 3 fractured areas. Dr Dunne   • OTHER SURGICAL HISTORY  2005    rectocele repair   • OTHER SURGICAL HISTORY  2010    eyelid surgery   • REPLACEMENT TOTAL KNEE BILATERAL Bilateral    • SKIN BIOPSY     • SUBTOTAL HYSTERECTOMY  1978   • TONSILLECTOMY  1952   • TOTAL KNEE ARTHROPLASTY  01/2005   • UMBILICAL HERNIA REPAIR  2019   • UTERINE FIBROID SURGERY  1978    emergency removal of fibroid from birth canal     Allergies   Allergen Reactions   • Aleve [Naproxen] Hives   • Indocin [Indomethacin] Hallucinations   • Lipitor [Atorvastatin] Hives   • Statins Hives   • Zocor [Simvastatin] Hives   • Celebrex [Celecoxib] Diarrhea   • Keflex [Cephalexin] GI Intolerance     Upset stomach, may have had some blood in stool but cannot remember the reason why she was put on it. Tolerates penicillins without problem.   • Ibuprofen GI Intolerance     Heartburn.   • Prevacid [Lansoprazole] Nausea And Vomiting   • Prilosec [Omeprazole] Nausea And Vomiting     Family History   Problem Relation Age of  Onset   • Stroke Mother    • Hypertension Mother         My Mother  of brain hemmorage/stroke   • Heart failure Father         congestive   • Cancer Father         Neck glands removed   • Vision loss Father         Had operation   • Heart disease Father          of Congestive Heart Failure at 91   • Breast cancer Sister         60's   • Cancer Sister         Breast   • Breast cancer Daughter 40   • Cancer Daughter         Breast   • Thyroid disease Daughter         on meds   • Breast cancer Other         NIECE 60's   • Breast cancer Other         NIECE 60's   • Vision loss Paternal Aunt         blind-glaucoma   • Vision loss Paternal Uncle         blind-glaucoma   • Vision loss Maternal Aunt         all 9 aunts paternal aunts/Uncles/ cousins/ sister/brother/ and niece on meds for Glaucoma or had operation   • Early death Daughter         Celiac Sprue reaction   • Ovarian cancer Neg Hx      Social History     Socioeconomic History   • Marital status:    Tobacco Use   • Smoking status: Never Smoker   • Smokeless tobacco: Never Used   • Tobacco comment: My father, brother in law, friends, and  smoked so had second hand exposure   Vaping Use   • Vaping Use: Never used   Substance and Sexual Activity   • Alcohol use: No   • Drug use: No   • Sexual activity: Not Currently     Birth control/protection: None       Compared to one year ago, the patient feels her physical   health is better. Pt is getting out more.     Compared to one year ago, the patient feels her mental   health is the same.    Recent Hospitalizations:  She was not admitted to the hospital during the last year.       Current Medical Providers:  Patient Care Team:  Reina Yarbrough MD as PCP - General (Family Medicine)  Александр Dunne MD as Consulting Physician (Otolaryngology)  Gabby Rao MD as Consulting Physician (Dermatology)  Tylor Bonner MD as Consulting Physician (Ophthalmology)  Harinder Mason MD  as Consulting Physician (General Surgery)  Han Alonzo MD as Consulting Physician (Urology)  Marianela Flores MD as Consulting Physician (Neurology)  Oralia Greene MD as Consulting Physician (Cardiology)    Outpatient Medications Prior to Visit   Medication Sig Dispense Refill   • acetaminophen (TYLENOL) 650 MG 8 hr tablet Take 1,300 mg by mouth 2 (two) times a day.     • aspirin 81 MG EC tablet Take 81 mg by mouth Every Night.     • BIOTIN 5000 PO Take  by mouth.     • Cholecalciferol (VITAMIN D3 PO) Take 1 tablet by mouth Daily.     • coenzyme Q10 100 MG capsule Take 100 mg by mouth Daily.     • famotidine (PEPCID) 20 MG tablet Take 20 mg by mouth 2 (Two) Times a Day.     • latanoprost (XALATAN) 0.005 % ophthalmic solution 1 drop Every Night.     • metoprolol succinate XL (TOPROL-XL) 25 MG 24 hr tablet Take 1 tablet by mouth Every Morning. 90 tablet 1   • multivitamin with minerals tablet tablet Take 1 tablet by mouth Daily.     • nitroglycerin (NITROSTAT) 0.4 MG SL tablet Place 1 tablet under the tongue Every 5 (Five) Minutes As Needed for Chest Pain. 30 tablet 3   • rosuvastatin (CRESTOR) 20 MG tablet Take 1 tablet by mouth Every Night. 90 tablet 1   • Yuvafem 10 MCG tablet vaginal tablet INSERT 1 TABLET INTO THE VAGINA 2 TIMES A WEEK. 24 tablet 0     No facility-administered medications prior to visit.       No opioid medication identified on active medication list. I have reviewed chart for other potential  high risk medication/s and harmful drug interactions in the elderly.          Aspirin is on active medication list. Aspirin use is indicated based on review of current medical condition/s. Pros and cons of this therapy have been discussed today. Benefits of this medication outweigh potential harm.  Patient has been encouraged to continue taking this medication.  .      Patient Active Problem List   Diagnosis   • Loss of weight   • Hyperlipidemia   • Edema   • GERD (gastroesophageal reflux  disease)   • Nausea with vomiting   • Diarrhea   • Incontinence of feces   • HTN (hypertension)   • Cholelithiasis   • Menopausal symptom   • Cramp in limb   • Glaucoma   • Hammer toe   • Dry mouth   • Osteopenia   • Elevated liver enzymes   • UTI (urinary tract infection)   • Bacterial UTI   • Cholecystitis   • H/O non-ST elevation myocardial infarction (NSTEMI)   • Neck pain   • Essential tremor   • Idiopathic peripheral neuropathy   • Muscle cramps   • Coronary artery disease involving native coronary artery of native heart without angina pectoris   • Double vision     Advance Care Planning  Advance Directive is on file.  ACP discussion was held with the patient during this visit. Patient has an advance directive in EMR which is still valid.     Review of Systems   Constitutional: Positive for activity change (increased). Negative for appetite change, chills, diaphoresis, fatigue and fever.   HENT: Positive for trouble swallowing. Negative for congestion, dental problem, drooling, ear discharge, ear pain, facial swelling, hearing loss, mouth sores, nosebleeds, postnasal drip, rhinorrhea, sinus pressure, sneezing, sore throat, tinnitus and voice change.         Wax in ears   Eyes: Positive for photophobia (gets problems with glare). Negative for pain, discharge, redness, itching and visual disturbance.   Respiratory: Positive for shortness of breath. Negative for apnea, cough, choking, chest tightness, wheezing and stridor.    Cardiovascular: Negative for chest pain, palpitations and leg swelling.   Gastrointestinal: Positive for abdominal pain (umbilical hernia). Negative for abdominal distention, anal bleeding, blood in stool, constipation, diarrhea, nausea, rectal pain and vomiting.        Pt gets heart burn from crestor and aspirin   Endocrine: Negative for cold intolerance, heat intolerance, polydipsia, polyphagia and polyuria.   Genitourinary: Positive for frequency and urgency. Negative for decreased urine  "volume, difficulty urinating, dyspareunia, dysuria, enuresis, flank pain, genital sores, hematuria, menstrual problem, pelvic pain, vaginal bleeding, vaginal discharge and vaginal pain.        Leaking urine   Musculoskeletal: Positive for arthralgias and myalgias. Negative for back pain, gait problem, joint swelling, neck pain and neck stiffness.   Skin: Negative for color change, pallor, rash and wound.   Allergic/Immunologic: Negative for environmental allergies, food allergies and immunocompromised state.   Neurological: Positive for tremors. Negative for dizziness, seizures, syncope, facial asymmetry, speech difficulty, weakness, light-headedness, numbness and confusion.   Hematological: Negative for adenopathy. Does not bruise/bleed easily.   Psychiatric/Behavioral: Negative for agitation, behavioral problems, decreased concentration, dysphoric mood, hallucinations, self-injury, sleep disturbance and suicidal ideas. The patient is not nervous/anxious and is not hyperactive.         Objective    Vitals:    08/31/22 1109   BP: 128/76   Pulse: 68   Temp: 97.8 °F (36.6 °C)   SpO2: 97%   Weight: 70.3 kg (155 lb)   Height: 151.4 cm (59.6\")   PainSc:   3     Estimated body mass index is 30.68 kg/m² as calculated from the following:    Height as of this encounter: 151.4 cm (59.6\").    Weight as of this encounter: 70.3 kg (155 lb).    BMI is >= 30 and <35. (Class 1 Obesity). The following options were offered after discussion;: weight loss educational material (shared in after visit summary), exercise counseling/recommendations and nutrition counseling/recommendations      Does the patient have evidence of cognitive impairment? No    Physical Exam  Vitals and nursing note reviewed.   Constitutional:       General: She is not in acute distress.     Appearance: She is well-developed. She is not diaphoretic.   HENT:      Head: Normocephalic and atraumatic.      Right Ear: Tympanic membrane, ear canal and external ear " normal.      Left Ear: Tympanic membrane, ear canal and external ear normal.      Nose: Nose normal.      Mouth/Throat:      Lips: Pink.      Mouth: Mucous membranes are moist. Mucous membranes are not pale, not dry and not cyanotic. No injury.      Dentition: Normal dentition.      Tongue: No lesions.      Palate: No mass.      Pharynx: Uvula midline. No pharyngeal swelling, oropharyngeal exudate, posterior oropharyngeal erythema or uvula swelling.   Eyes:      General: Lids are normal. No scleral icterus.        Right eye: No foreign body, discharge or hordeolum.         Left eye: No foreign body, discharge or hordeolum.      Extraocular Movements:      Right eye: Normal extraocular motion and no nystagmus.      Left eye: Normal extraocular motion and no nystagmus.      Conjunctiva/sclera: Conjunctivae normal.      Right eye: Right conjunctiva is not injected. No chemosis, exudate or hemorrhage.     Left eye: Left conjunctiva is not injected. No chemosis, exudate or hemorrhage.     Pupils: Pupils are equal, round, and reactive to light.   Neck:      Thyroid: No thyroid mass or thyromegaly.      Vascular: No carotid bruit.      Trachea: Trachea normal. No tracheal deviation.   Cardiovascular:      Rate and Rhythm: Normal rate and regular rhythm.      Pulses:           Dorsalis pedis pulses are 2+ on the right side and 2+ on the left side.        Posterior tibial pulses are 2+ on the right side and 2+ on the left side.      Heart sounds: Normal heart sounds. No murmur heard.    No friction rub. No gallop.   Pulmonary:      Effort: Pulmonary effort is normal. No respiratory distress.      Breath sounds: Normal breath sounds. No decreased breath sounds, wheezing, rhonchi or rales.   Chest:      Chest wall: No tenderness. There is no dullness to percussion.   Breasts:      Pollo Score is 5. Breasts are symmetrical.      Right: Normal. No swelling, bleeding, inverted nipple, mass, nipple discharge, skin change,  tenderness, axillary adenopathy or supraclavicular adenopathy.      Left: Normal. No swelling, bleeding, inverted nipple, mass, nipple discharge, skin change, tenderness, axillary adenopathy or supraclavicular adenopathy.       Abdominal:      General: Bowel sounds are normal. There is no distension.      Palpations: Abdomen is soft. There is no mass.      Tenderness: There is abdominal tenderness in the periumbilical area. There is no guarding or rebound.      Hernia: A hernia is present. Hernia is present in the umbilical area.   Musculoskeletal:         General: No tenderness or deformity. Normal range of motion.      Cervical back: Normal range of motion and neck supple.   Lymphadenopathy:      Head:      Right side of head: No submandibular adenopathy.      Left side of head: No submandibular adenopathy.      Cervical: No cervical adenopathy.      Right cervical: No superficial, deep or posterior cervical adenopathy.     Left cervical: No superficial, deep or posterior cervical adenopathy.      Upper Body:      Right upper body: No supraclavicular, axillary or pectoral adenopathy.      Left upper body: No supraclavicular, axillary or pectoral adenopathy.   Skin:     General: Skin is warm and dry.      Findings: No rash.      Nails: There is no clubbing.   Neurological:      Mental Status: She is alert and oriented to person, place, and time.      Cranial Nerves: No cranial nerve deficit.      Sensory: No sensory deficit.      Coordination: Coordination normal.      Deep Tendon Reflexes: Reflexes are normal and symmetric.      Reflex Scores:       Bicep reflexes are 2+ on the right side and 2+ on the left side.       Brachioradialis reflexes are 2+ on the right side and 2+ on the left side.       Patellar reflexes are 2+ on the right side and 2+ on the left side.  Psychiatric:         Attention and Perception: Attention normal.         Mood and Affect: Mood and affect normal.         Speech: Speech normal.          Behavior: Behavior normal.         Thought Content: Thought content normal.         Cognition and Memory: Cognition and memory normal.         Judgment: Judgment normal.                 HEALTH RISK ASSESSMENT    Smoking Status:  Social History     Tobacco Use   Smoking Status Never Smoker   Smokeless Tobacco Never Used   Tobacco Comment    My father, brother in law, friends, and  smoked so had second hand exposure     Alcohol Consumption:  Social History     Substance and Sexual Activity   Alcohol Use No     Fall Risk Screen:    JUAN CARLOSADI Fall Risk Assessment was completed, and patient is at LOW risk for falls.Assessment completed on:8/31/2022    Depression Screening:  PHQ-2/PHQ-9 Depression Screening 8/31/2022   Retired PHQ-9 Total Score -   Retired Total Score -   Little Interest or Pleasure in Doing Things 0-->not at all   Feeling Down, Depressed or Hopeless 0-->not at all   PHQ-9: Brief Depression Severity Measure Score 0       Health Habits and Functional and Cognitive Screening:  Functional & Cognitive Status 8/31/2022   Do you have difficulty preparing food and eating? No   Do you have difficulty bathing yourself, getting dressed or grooming yourself? No   Do you have difficulty using the toilet? No   Do you have difficulty moving around from place to place? Yes   Do you have trouble with steps or getting out of a bed or a chair? Yes   Current Diet Limited Junk Food        Current Diet Comment sweats   Dental Exam Up to date        Dental Exam Comment 5/5/2021   Eye Exam Up to date        Eye Exam Comment 7/20/2022   Exercise (times per week) 7 times per week   Current Exercises Include Walking        Exercise Comment move legs, arms, trying to walking around 4000.  Walks the dog   Current Exercise Activities Include -   Do you need help using the phone?  No   Are you deaf or do you have serious difficulty hearing?  No   Do you need help with transportation? (No Data)   Do you need help shopping? No    Do you need help preparing meals?  No   Do you need help with housework?  No   Do you need help with laundry? No   Do you need help taking your medications? No   Do you need help managing money? No   Do you ever drive or ride in a car without wearing a seat belt? No   Have you felt unusual stress, anger or loneliness in the last month? No   Who do you live with? Alone   If you need help, do you have trouble finding someone available to you? No   Have you been bothered in the last four weeks by sexual problems? No   Do you have difficulty concentrating, remembering or making decisions? (No Data)       Age-appropriate Screening Schedule:  Refer to the list below for future screening recommendations based on patient's age, sex and/or medical conditions. Orders for these recommended tests are listed in the plan section. The patient has been provided with a written plan.    Health Maintenance   Topic Date Due   • INFLUENZA VACCINE  10/01/2022   • DXA SCAN  12/04/2022   • LIPID PANEL  01/25/2023   • MAMMOGRAM  01/11/2024   • TDAP/TD VACCINES (4 - Td or Tdap) 03/04/2032   • ZOSTER VACCINE  Completed              Assessment & Plan   CMS Preventative Services Quick Reference  Risk Factors Identified During Encounter  Cardiovascular Disease  Chronic Pain   Glaucoma or Family History of Glaucoma  Obesity/Overweight   Polypharmacy  Urinary Incontinence  The above risks/problems have been discussed with the patient.  Follow up actions/plans if indicated are seen below in the Assessment/Plan Section.  Pertinent information has been shared with the patient in the After Visit Summary.    Diagnoses and all orders for this visit:    1. Medicare annual wellness visit, subsequent (Primary)  -     POCT urinalysis dipstick, automated    2. Gastroesophageal reflux disease, unspecified whether esophagitis present  -     Ambulatory Referral to Gastroenterology  -     Comprehensive Metabolic Panel; Future  -     Lipid Panel; Future    3.  Dysphagia, unspecified type  -     Ambulatory Referral to Gastroenterology    4. Abdominal pain, periumbilical  -     CT Abdomen Pelvis Without Contrast; Future    5. Coronary artery disease involving native coronary artery of native heart without angina pectoris    6. Primary hypertension  -     Comprehensive Metabolic Panel; Future  -     TSH Rfx On Abnormal To Free T4; Future  -     Lipid Panel; Future  -     CBC & Differential; Future    7. Muscle cramps    8. Arthritis  -     Sedimentation Rate; Future    9. Abnormal urinalysis  -     Urine Culture - Urine, Urine, Clean Catch        Follow Up:   Return in about 3 months (around 11/30/2022), or if symptoms worsen or fail to improve, for Recheck lipid.     An After Visit Summary and PPPS were made available to the patient.          I spent 31 minutes caring for Chelsi on this date of service. This time includes time spent by me in the following activities:preparing for the visit, reviewing tests, obtaining and/or reviewing a separately obtained history, performing a medically appropriate examination and/or evaluation , counseling and educating the patient/family/caregiver, ordering medications, tests, or procedures, referring and communicating with other health care professionals  and documenting information in the medical record       Please follow a low animal fat diet that is also low in sugar, low in junk food, low in sweet drinks and low in alcohol.  Please increase the amount of fiber in your diet as well as increasing your daily exercise, such as walking.    Handouts to pt on Fall risk, advance care directives, healthy diets such as Mediterranean or Dash or calorie counting, and healthy exercising.     Recent Results (from the past 168 hour(s))   POCT urinalysis dipstick, automated    Collection Time: 08/31/22 12:57 PM    Specimen: Urine   Result Value Ref Range    Color Yellow Yellow, Straw, Dark Yellow, Berna    Clarity, UA Slightly Cloudy (A) Clear     Specific Gravity  1.025 1.005 - 1.030    pH, Urine 6.0 5.0 - 8.0    Leukocytes Large (3+) (A) Negative    Nitrite, UA Negative Negative    Protein, POC Negative Negative mg/dL    Glucose, UA Negative Negative mg/dL    Ketones, UA Negative Negative    Urobilinogen, UA Normal Normal, 0.2 E.U./dL    Bilirubin Negative Negative    Blood, UA 1+ (A) Negative    Lot Number [id number]     Expiration Date 2/10/2024

## 2022-09-01 LAB
ALBUMIN SERPL-MCNC: 4.2 G/DL (ref 3.5–5.2)
ALBUMIN/GLOB SERPL: 1.8 G/DL
ALP SERPL-CCNC: 54 U/L (ref 39–117)
ALT SERPL W P-5'-P-CCNC: 6 U/L (ref 1–33)
ANION GAP SERPL CALCULATED.3IONS-SCNC: 9.5 MMOL/L (ref 5–15)
AST SERPL-CCNC: 18 U/L (ref 1–32)
BACTERIA SPEC AEROBE CULT: NORMAL
BILIRUB SERPL-MCNC: 0.7 MG/DL (ref 0–1.2)
BUN SERPL-MCNC: 9 MG/DL (ref 8–23)
BUN/CREAT SERPL: 13.2 (ref 7–25)
CALCIUM SPEC-SCNC: 9.7 MG/DL (ref 8.6–10.5)
CHLORIDE SERPL-SCNC: 104 MMOL/L (ref 98–107)
CHOLEST SERPL-MCNC: 147 MG/DL (ref 0–200)
CO2 SERPL-SCNC: 26.5 MMOL/L (ref 22–29)
CREAT SERPL-MCNC: 0.68 MG/DL (ref 0.57–1)
EGFRCR SERPLBLD CKD-EPI 2021: 86.5 ML/MIN/1.73
ERYTHROCYTE [SEDIMENTATION RATE] IN BLOOD: 15 MM/HR (ref 0–30)
GLOBULIN UR ELPH-MCNC: 2.4 GM/DL
GLUCOSE SERPL-MCNC: 76 MG/DL (ref 65–99)
HDLC SERPL-MCNC: 48 MG/DL (ref 40–60)
LDLC SERPL CALC-MCNC: 77 MG/DL (ref 0–100)
LDLC/HDLC SERPL: 1.54 {RATIO}
POTASSIUM SERPL-SCNC: 4.3 MMOL/L (ref 3.5–5.2)
PROT SERPL-MCNC: 6.6 G/DL (ref 6–8.5)
SODIUM SERPL-SCNC: 140 MMOL/L (ref 136–145)
TRIGL SERPL-MCNC: 126 MG/DL (ref 0–150)
TSH SERPL DL<=0.05 MIU/L-ACNC: 1.24 UIU/ML (ref 0.27–4.2)
VLDLC SERPL-MCNC: 22 MG/DL (ref 5–40)

## 2022-09-09 RX ORDER — NITROGLYCERIN 0.4 MG/1
0.4 TABLET SUBLINGUAL
Qty: 25 TABLET | Refills: 11 | Status: SHIPPED | OUTPATIENT
Start: 2022-09-09

## 2022-09-16 ENCOUNTER — HOSPITAL ENCOUNTER (OUTPATIENT)
Dept: CT IMAGING | Facility: HOSPITAL | Age: 82
Discharge: HOME | End: 2022-09-16
Admitting: FAMILY MEDICINE

## 2022-09-16 DIAGNOSIS — R10.33 ABDOMINAL PAIN, PERIUMBILICAL: ICD-10-CM

## 2022-09-16 PROCEDURE — 74176 CT ABD & PELVIS W/O CONTRAST: CPT

## 2022-09-19 ENCOUNTER — PATIENT MESSAGE (OUTPATIENT)
Dept: INTERNAL MEDICINE | Facility: CLINIC | Age: 82
End: 2022-09-19

## 2022-09-19 DIAGNOSIS — K42.9 UMBILICAL HERNIA WITHOUT OBSTRUCTION AND WITHOUT GANGRENE: Primary | ICD-10-CM

## 2022-09-19 NOTE — TELEPHONE ENCOUNTER
Mary Flowers MA 9/19/2022 10:44 AM EDT      ----- Message -----  From: Chelsi Ferguson  Sent: 9/19/2022 10:41 AM EDT  To: [facility] [address] Clinical Pool  Subject: Catscan results     Please refer me to the appropriate surgeon for umbibical hernia repair.    Some questions for now:  Is there an emergency to having this done?  In the meantime, should I be wearing a binder or similar item?  Are there specific things I should do or shouldn't do prior to surgery?

## 2022-09-27 ENCOUNTER — TELEPHONE (OUTPATIENT)
Dept: GASTROENTEROLOGY | Facility: CLINIC | Age: 82
End: 2022-09-27

## 2022-09-27 NOTE — TELEPHONE ENCOUNTER
Patient requests medication to be called in prior to EGD procedure. Patient states she experienced pain in throat after previous EGD procedure.

## 2022-10-04 DIAGNOSIS — N95.1 MENOPAUSAL SYMPTOM: ICD-10-CM

## 2022-10-04 RX ORDER — ESTRADIOL 10 UG/1
TABLET VAGINAL
Qty: 24 TABLET | Refills: 0 | Status: SHIPPED | OUTPATIENT
Start: 2022-10-04

## 2022-10-10 ENCOUNTER — FLU SHOT (OUTPATIENT)
Dept: INTERNAL MEDICINE | Facility: CLINIC | Age: 82
End: 2022-10-10

## 2022-10-10 DIAGNOSIS — Z23 NEED FOR INFLUENZA VACCINATION: Primary | ICD-10-CM

## 2022-10-10 PROCEDURE — G0008 ADMIN INFLUENZA VIRUS VAC: HCPCS | Performed by: FAMILY MEDICINE

## 2022-10-10 PROCEDURE — 90662 IIV NO PRSV INCREASED AG IM: CPT | Performed by: FAMILY MEDICINE

## 2022-11-07 ENCOUNTER — HOSPITAL ENCOUNTER (OUTPATIENT)
Dept: CARDIOLOGY | Facility: HOSPITAL | Age: 82
Discharge: HOME | End: 2022-11-07

## 2022-11-07 ENCOUNTER — OFFICE VISIT (OUTPATIENT)
Dept: CARDIOLOGY | Facility: HOSPITAL | Age: 82
End: 2022-11-07

## 2022-11-07 DIAGNOSIS — I10 ESSENTIAL HYPERTENSION: ICD-10-CM

## 2022-11-07 DIAGNOSIS — I25.10 CORONARY ARTERY DISEASE INVOLVING NATIVE CORONARY ARTERY OF NATIVE HEART WITHOUT ANGINA PECTORIS: Primary | ICD-10-CM

## 2022-11-07 DIAGNOSIS — E78.00 PURE HYPERCHOLESTEROLEMIA: Chronic | ICD-10-CM

## 2022-11-07 DIAGNOSIS — I25.10 CORONARY ARTERY DISEASE INVOLVING NATIVE CORONARY ARTERY OF NATIVE HEART WITHOUT ANGINA PECTORIS: ICD-10-CM

## 2022-11-07 DIAGNOSIS — Z01.810 PREOP CARDIOVASCULAR EXAM: ICD-10-CM

## 2022-11-07 LAB
QT INTERVAL: 380 MS
QTC INTERVAL: 427 MS

## 2022-11-07 PROCEDURE — 93010 ELECTROCARDIOGRAM REPORT: CPT | Performed by: INTERNAL MEDICINE

## 2022-11-07 PROCEDURE — 93005 ELECTROCARDIOGRAM TRACING: CPT | Performed by: NURSE PRACTITIONER

## 2022-11-07 PROCEDURE — 99214 OFFICE O/P EST MOD 30 MIN: CPT | Performed by: NURSE PRACTITIONER

## 2022-11-07 NOTE — PATIENT INSTRUCTIONS
Begin checking blood pressure once or twice a day and call myself or Dr Yarbrough if bp > 150 consistently

## 2022-11-09 VITALS
DIASTOLIC BLOOD PRESSURE: 76 MMHG | SYSTOLIC BLOOD PRESSURE: 140 MMHG | HEIGHT: 60 IN | BODY MASS INDEX: 30.27 KG/M2 | RESPIRATION RATE: 16 BRPM | TEMPERATURE: 97.5 F | OXYGEN SATURATION: 96 % | WEIGHT: 154.2 LBS | HEART RATE: 74 BPM

## 2022-11-09 NOTE — PROGRESS NOTES
"Chief Complaint  Cardiac clearance  EGD with Dr Frank   Subjective    History of Present Illness {CC  Problem List  Visit  Diagnosis   Encounters  Notes  Medications  Labs  Result Review Imaging  Media :23}       History of Present Illness   82-year-old female presents the office today for cardiac evaluation for upcoming EGD with Dr. Frank.  Patient does report epigastric pain and dysphagia but denies chest pain, dyspnea, dizziness, palpitations, presyncope or syncope.   has a history of hypertension, hyperlipidemia, CAD, glaucoma, GERD.  Patient had normal stress test May 2022.  Objective     Vital Signs:   Vitals:    11/07/22 1017 11/07/22 1030   BP: 152/69 140/76   BP Location: Left arm Left arm   Patient Position: Sitting Sitting   Cuff Size: Adult    Pulse: 74    Resp: 16    Temp: 97.5 °F (36.4 °C)    TempSrc: Temporal    SpO2: 96%    Weight: 69.9 kg (154 lb 3.2 oz)    Height: 152.4 cm (60\")      Body mass index is 30.12 kg/m².  Physical Exam  Vitals and nursing note reviewed.   Constitutional:       Appearance: Normal appearance.   HENT:      Head: Normocephalic.   Eyes:      Pupils: Pupils are equal, round, and reactive to light.   Cardiovascular:      Rate and Rhythm: Normal rate and regular rhythm.      Pulses: Normal pulses.      Heart sounds: Normal heart sounds. No murmur heard.  Pulmonary:      Effort: Pulmonary effort is normal.      Breath sounds: Normal breath sounds.   Abdominal:      General: Bowel sounds are normal.      Palpations: Abdomen is soft.   Musculoskeletal:         General: Normal range of motion.      Cervical back: Normal range of motion.      Right lower leg: No edema.      Left lower leg: No edema.   Skin:     General: Skin is warm and dry.      Capillary Refill: Capillary refill takes less than 2 seconds.   Neurological:      Mental Status: She is alert and oriented to person, place, and time.   Psychiatric:         Mood and Affect: Mood normal.         Thought Content: " Thought content normal.              Result Review  Data Reviewed:{ Labs  Result Review  Imaging  Med Tab  Media :23}     EKG: NSR at 76 bpm  Cannot rule out Anterior infarct (cited on or before 07-NOV-2022)  Abnormal ECG  When compared with ECG of 14-AUG-2018 10:06,  Questionable change in initial forces of Anteroseptal leads  Confirmed by MD Richards Robert ([id number]) on 11/7/2022 11:30:10 AM     Referred By: SAVI BEAUCHAMP           Confirmed By: Max Richards MD        Stress Test With Myocardial Perfusion (1 Day) (05/13/2022 15:03)    Assessment and Plan {CC Problem List  Visit Diagnosis  ROS  Review (Popup)  Health Maintenance  Quality  BestPractice  Medications  SmartSets  SnapShot Encounters  Media :23}   1. Coronary artery disease involving native coronary artery of native heart without angina pectoris  Normal stress test May 2022  - ECG 12 Lead; Future    2. Preop cardiovascular exam  EKG today stable  - ECG 12 Lead; Future    3. Essential hypertension  Well-controlled on Toprol    4. Pure hypercholesterolemia  Stable on  Crestor    This individual is at ow risk for cardiovascular event during their upcoming surgery. The revised cardiovascular risk index is <2 points and is associated with a 3.9 percent 30-day risk of major cardiovascular events.She does have the ability to perform > 4 MET levels of activity and cardiovascular status is stable at this time.   Patient is stable to proceed with EGD.    Follow Up {Instructions Charge Capture  Follow-up Communications :23}   Return if symptoms worsen or fail to improve.    Patient was given instructions and counseling regarding her condition or for health maintenance advice. Please see specific information pulled into the AVS if appropriate.  Patient was instructed to call the [facility] with any questions, concerns, or worsening symptoms.

## 2022-11-17 ENCOUNTER — OUTSIDE FACILITY SERVICE (OUTPATIENT)
Dept: GASTROENTEROLOGY | Facility: CLINIC | Age: 82
End: 2022-11-17

## 2022-11-17 PROCEDURE — 43239 EGD BIOPSY SINGLE/MULTIPLE: CPT | Performed by: INTERNAL MEDICINE

## 2022-11-18 ENCOUNTER — TELEPHONE (OUTPATIENT)
Dept: GASTROENTEROLOGY | Facility: CLINIC | Age: 82
End: 2022-11-18

## 2022-12-01 ENCOUNTER — OFFICE VISIT (OUTPATIENT)
Dept: INTERNAL MEDICINE | Facility: CLINIC | Age: 82
End: 2022-12-01

## 2022-12-01 ENCOUNTER — LAB (OUTPATIENT)
Dept: LAB | Facility: HOSPITAL | Age: 82
End: 2022-12-01

## 2022-12-01 VITALS
HEART RATE: 65 BPM | OXYGEN SATURATION: 94 % | DIASTOLIC BLOOD PRESSURE: 72 MMHG | BODY MASS INDEX: 29.64 KG/M2 | SYSTOLIC BLOOD PRESSURE: 118 MMHG | WEIGHT: 151 LBS | TEMPERATURE: 97.5 F | HEIGHT: 60 IN

## 2022-12-01 DIAGNOSIS — I10 PRIMARY HYPERTENSION: Chronic | ICD-10-CM

## 2022-12-01 DIAGNOSIS — R39.15 URGENCY OF URINATION: ICD-10-CM

## 2022-12-01 DIAGNOSIS — M25.50 ARTHRALGIA, UNSPECIFIED JOINT: ICD-10-CM

## 2022-12-01 DIAGNOSIS — R53.83 OTHER FATIGUE: Primary | ICD-10-CM

## 2022-12-01 DIAGNOSIS — R53.83 OTHER FATIGUE: ICD-10-CM

## 2022-12-01 DIAGNOSIS — E78.00 PURE HYPERCHOLESTEROLEMIA: Chronic | ICD-10-CM

## 2022-12-01 DIAGNOSIS — R82.90 ABNORMAL URINALYSIS: ICD-10-CM

## 2022-12-01 DIAGNOSIS — K21.00 GASTROESOPHAGEAL REFLUX DISEASE WITH ESOPHAGITIS WITHOUT HEMORRHAGE: Chronic | ICD-10-CM

## 2022-12-01 LAB
BILIRUB BLD-MCNC: NEGATIVE MG/DL
CLARITY, POC: CLEAR
COLOR UR: YELLOW
EXPIRATION DATE: ABNORMAL
GLUCOSE UR STRIP-MCNC: NEGATIVE MG/DL
KETONES UR QL: NEGATIVE
LEUKOCYTE EST, POC: ABNORMAL
Lab: ABNORMAL
NITRITE UR-MCNC: NEGATIVE MG/ML
PH UR: 5.5 [PH] (ref 5–8)
PROT UR STRIP-MCNC: NEGATIVE MG/DL
RBC # UR STRIP: ABNORMAL /UL
SP GR UR: 1.02 (ref 1–1.03)
UROBILINOGEN UR QL: NORMAL

## 2022-12-01 PROCEDURE — 86431 RHEUMATOID FACTOR QUANT: CPT

## 2022-12-01 PROCEDURE — 99214 OFFICE O/P EST MOD 30 MIN: CPT | Performed by: FAMILY MEDICINE

## 2022-12-01 PROCEDURE — 81003 URINALYSIS AUTO W/O SCOPE: CPT | Performed by: FAMILY MEDICINE

## 2022-12-01 RX ORDER — PANTOPRAZOLE SODIUM 40 MG/1
40 TABLET, DELAYED RELEASE ORAL 2 TIMES DAILY
COMMUNITY
Start: 2022-11-17 | End: 2023-01-13 | Stop reason: SDUPTHER

## 2022-12-01 NOTE — PROGRESS NOTES
"Subjective   Chelsi Ferguson is a 82 y.o. female.     Chief Complaint   Patient presents with   • Hypertension   • Heartburn   • Hyperlipidemia       Visit Vitals  /72   Pulse 65   Temp 97.5 °F (36.4 °C)   Ht 152.4 cm (60\")   Wt 68.5 kg (151 lb)   LMP  (LMP Unknown) Comment: Last Mammogram   SpO2 94%   BMI 29.49 kg/m²       Wt Readings from Last 3 Encounters:   12/01/22 68.5 kg (151 lb)   11/07/22 69.9 kg (154 lb 3.2 oz)   08/31/22 70.3 kg (155 lb)         Hypertension  This is a chronic problem. The current episode started more than 1 year ago. The problem is unchanged. The problem is controlled. Associated symptoms include chest pain (which pt attributes to heartburn), malaise/fatigue and shortness of breath. Pertinent negatives include no anxiety, blurred vision, headaches, neck pain, orthopnea, palpitations, peripheral edema, PND or sweats. There are no associated agents to hypertension. Risk factors for coronary artery disease include dyslipidemia and post-menopausal state. Current antihypertension treatment includes beta blockers. The current treatment provides significant improvement. Hypertensive end-organ damage includes CAD/MI. There is no history of angina, kidney disease, CVA, heart failure, left ventricular hypertrophy, PVD or retinopathy. There is no history of chronic renal disease, sleep apnea or a thyroid problem.   Hyperlipidemia  This is a chronic problem. The current episode started more than 1 year ago. The problem is controlled. Recent lipid tests were reviewed and are normal. She has no history of chronic renal disease, diabetes, hypothyroidism, liver disease, obesity or nephrotic syndrome. Factors aggravating her hyperlipidemia include beta blockers. Associated symptoms include chest pain (which pt attributes to heartburn), a focal sensory loss (feet hands, ), myalgias and shortness of breath. Pertinent negatives include no focal weakness or leg pain. Current antihyperlipidemic treatment " includes statins. The current treatment provides significant improvement of lipids. There are no compliance problems.  Risk factors for coronary artery disease include dyslipidemia, hypertension and family history.   Heartburn  She complains of belching, chest pain (which pt attributes to heartburn), heartburn and nausea. She reports no abdominal pain, no choking, no coughing, no dysphagia, no early satiety, no globus sensation, no hoarse voice, no sore throat, no stridor, no tooth decay, no water brash or no wheezing. This is a chronic problem. The current episode started more than 1 year ago. The problem occurs occasionally. The problem has been rapidly improving. The heartburn is located in the substernum. The heartburn is of severe intensity. The heartburn wakes her from sleep. The heartburn does not limit her activity. The heartburn doesn't change with position. The symptoms are aggravated by certain foods. Associated symptoms include fatigue. Pertinent negatives include no anemia, melena, muscle weakness, orthopnea or weight loss. Risk factors include lack of exercise. She has tried a PPI for the symptoms. The treatment provided moderate relief. Past procedures include an EGD.      Pt has nausea without vomiting.   Pt cannot walk long distances.   Grocery is ok because of cart.   Pt has joint pain.   Pt has some urgency or urination  The following portions of the patient's history were reviewed and updated as appropriate: allergies, current medications, past family history, past medical history, past social history, past surgical history and problem list.    Past Medical History:   Diagnosis Date   • Ankle pain    • Arthritis    • Cancer (HCC)     Skin   • Cataract     removed   • Chest discomfort    • Cholecystitis 01/15/2018   • Clotting disorder (HCC)     may be result of aspirin   • Coronary artery disease    • Double vision 04/25/2022    Uses glasses with prism   • Edema    • Elevated liver enzymes  01/15/2018   • Gallstones    • GERD (gastroesophageal reflux disease)    • Glaucoma     pt is currently off of meds and Dr Tanner does not think that she is glaucoma   • H/O complete eye exam 06/2013   • H/O mammogram 11/16/2015   • H/O non-ST elevation myocardial infarction (NSTEMI) 01/2005   • Hammer toe    • Heart attack (HCC) 01/2005   • History of blood transfusion 01/2005   • History of cardiovascular stress test 12/02/2015    normal   • HTN (hypertension)    • Hyperlipidemia    • Hypokalemia 01/15/2018   • Kidney infection 1971   • Low back pain    • Menopausal symptoms    • Onychia and paronychia of finger    • Osteopenia    • Other elevated white blood cell count    • Pap smear for cervical cancer screening 2010 [facility]   • Pneumonia    • Sepsis (HCC) 01/15/2018   • Tremor     essential tremor   • UTI (urinary tract infection) 01/15/2018   • Viral warts    • Visual impairment 2019   • Wears glasses       Past Surgical History:   Procedure Laterality Date   • ADENOIDECTOMY  1952   • BLADDER REPAIR  2002   • BLADDER REPAIR  2003   • BUNIONECTOMY Right 2010   • CARDIAC CATHETERIZATION  stent placed 2005   • CHOLECYSTECTOMY     • CHOLECYSTECTOMY WITH INTRAOPERATIVE CHOLANGIOGRAM N/A 03/07/2018    Procedure: CHOLECYSTECTOMY LAPAROSCOPIC INTRAOPERATIVE CHOLANGIOGRAM;  Surgeon: Harinder Mason MD;  [facility];  Service:    • COLONOSCOPY  2008, 4/2014    [facility]   • CORONARY STENT PLACEMENT Left 01/2005    drug eluting stent 1/2005 LAD   • DILATATION AND CURETTAGE  1971   • DILATATION AND CURETTAGE  1978   • EYE CAPSULOTOMY WITH LASER Bilateral 2018   • EYE SURGERY Bilateral 05/2016    Cataract   • FRONTALIS SUSPENSION  2010   • HAMMER TOE REPAIR Right 11/03/2010   • HEAD & NECK SKIN LESION EXCISIONAL BIOPSY  08/20/2018    benign scalp cyst   • HYSTERECTOMY  1978   • JOINT REPLACEMENT  2005   • LAPAROTOMY OOPHERECTOMY Bilateral 2002   • NOSE SURGERY  03/2017    repair 3 fractured areas. Dr Dunne   •  OTHER SURGICAL HISTORY      rectocele repair   • OTHER SURGICAL HISTORY      eyelid surgery   • REPLACEMENT TOTAL KNEE BILATERAL Bilateral    • SKIN BIOPSY     • SUBTOTAL HYSTERECTOMY     • TONSILLECTOMY     • TOTAL KNEE ARTHROPLASTY  2005   • UMBILICAL HERNIA REPAIR     • UPPER GASTROINTESTINAL ENDOSCOPY  2022    Dr Frank   • UTERINE FIBROID SURGERY      emergency removal of fibroid from birth canal      Family History   Problem Relation Age of Onset   • Stroke Mother    • Hypertension Mother         My Mother  of brain hemmorage/stroke   • Heart failure Father         congestive   • Cancer Father         Neck glands removed   • Vision loss Father         Had operation   • Heart disease Father          of Congestive Heart Failure at 91   • Breast cancer Sister         60's   • Cancer Sister         Breast   • Breast cancer Daughter 40   • Cancer Daughter         Breast   • Thyroid disease Daughter         on meds   • Breast cancer Other         NIECE 60's   • Breast cancer Other         NIECE 60's   • Vision loss Paternal Aunt         blind-glaucoma   • Vision loss Paternal Uncle         blind-glaucoma   • Vision loss Maternal Aunt         all 9 aunts paternal aunts/Uncles/ cousins/ sister/brother/ and niece on meds for Glaucoma or had operation   • Early death Daughter         Celiac Sprue reaction   • Ovarian cancer Neg Hx       Social History     Socioeconomic History   • Marital status:    Tobacco Use   • Smoking status: Never   • Smokeless tobacco: Never   • Tobacco comments:     My father, brother in law, friends, and  smoked so had second hand exposure   Vaping Use   • Vaping Use: Never used   Substance and Sexual Activity   • Alcohol use: No   • Drug use: No   • Sexual activity: Not Currently     Birth control/protection: Hysterectomy      Allergies   Allergen Reactions   • Aleve [Naproxen] Hives   • Indocin [Indomethacin] Hallucinations   • Lipitor  [Atorvastatin] Hives   • Statins Hives   • Zocor [Simvastatin] Hives   • Celebrex [Celecoxib] Diarrhea   • Keflex [Cephalexin] GI Intolerance     Upset stomach, may have had some blood in stool but cannot remember the reason why she was put on it. Tolerates penicillins without problem.   • Ibuprofen GI Intolerance     Heartburn.   • Prevacid [Lansoprazole] Nausea And Vomiting   • Prilosec [Omeprazole] Nausea And Vomiting       Review of Systems   Constitutional: Positive for fatigue and malaise/fatigue. Negative for weight loss.   HENT: Negative for hoarse voice and sore throat.    Eyes: Negative for blurred vision.   Respiratory: Positive for shortness of breath. Negative for cough, choking and wheezing.    Cardiovascular: Positive for chest pain (which pt attributes to heartburn). Negative for palpitations, orthopnea and PND.   Gastrointestinal: Positive for heartburn and nausea. Negative for abdominal pain, dysphagia and melena.   Musculoskeletal: Positive for myalgias. Negative for muscle weakness and neck pain.   Neurological: Negative for focal weakness and headaches.       Objective   Physical Exam  Vitals and nursing note reviewed.   Constitutional:       Appearance: She is well-developed.   HENT:      Head: Normocephalic.      Right Ear: External ear normal.      Left Ear: External ear normal.      Nose: Nose normal.   Eyes:      General: Lids are normal.      Conjunctiva/sclera: Conjunctivae normal.      Pupils: Pupils are equal, round, and reactive to light.   Neck:      Thyroid: No thyroid mass or thyromegaly.      Vascular: No carotid bruit.      Trachea: Trachea normal.   Cardiovascular:      Rate and Rhythm: Normal rate and regular rhythm.      Heart sounds: No murmur heard.  Pulmonary:      Effort: Pulmonary effort is normal. No respiratory distress.      Breath sounds: Normal breath sounds. No decreased breath sounds, wheezing, rhonchi or rales.   Chest:      Chest wall: No tenderness.   Abdominal:       General: Bowel sounds are normal.      Palpations: Abdomen is soft.      Tenderness: There is no abdominal tenderness.   Musculoskeletal:         General: Normal range of motion.      Cervical back: Normal range of motion and neck supple.   Skin:     General: Skin is warm and dry.   Neurological:      Mental Status: She is alert and oriented to person, place, and time.   Psychiatric:         Behavior: Behavior normal.         Assessment & Plan  Answers for HPI/ROS submitted by the patient on 11/27/2022  Please describe your symptoms.: Follow up visit following testing by Catscan, EKG, and Endoscophy.  Symptoms:  Shortness of Breath, difficulty swallowing, stomach pains nausea.  Have you had these symptoms before?: Yes  How long have you been having these symptoms?: Greater than 2 weeks  Please list any medications you are currently taking for this condition.: Dr. Frank prescribed Pantoprazole SOD DR 40MG twice a day for 3months with follow up visit with him.  Please describe any probable cause for these symptoms. : Test showed acid burns in Esophagus.  What is the primary reason for your visit?: Other      Diagnoses and all orders for this visit:    1. Other fatigue (Primary)  -     TSH Rfx On Abnormal To Free T4; Future  -     Vitamin B12; Future  -     Vitamin B6; Future  -     Comprehensive Metabolic Panel; Future  -     CBC & Differential; Future    2. Primary hypertension  -     TSH Rfx On Abnormal To Free T4; Future  -     Lipid Panel; Future    3. Pure hypercholesterolemia  -     Comprehensive Metabolic Panel; Future  -     Lipid Panel; Future    4. Gastroesophageal reflux disease with esophagitis without hemorrhage    5. Urgency of urination  -     POC Urinalysis Dipstick, Automated    6. Arthralgia, unspecified joint  -     Sedimentation Rate; Future  -     Rheumatoid Factor; Future  -     Nuclear Antigen Antibody, IFA; Future    7. Abnormal urinalysis  -     Urine Culture - Urine, Urine, Clean  Catch    pt has enough medication for now               Current Outpatient Medications:   •  acetaminophen (TYLENOL) 650 MG 8 hr tablet, Take 1,300 mg by mouth 2 (two) times a day., Disp: , Rfl:   •  aspirin 81 MG EC tablet, Take 81 mg by mouth Every Night., Disp: , Rfl:   •  BIOTIN 5000 PO, Take  by mouth., Disp: , Rfl:   •  Cholecalciferol (VITAMIN D3 PO), Take 1 tablet by mouth Daily., Disp: , Rfl:   •  coenzyme Q10 100 MG capsule, Take 100 mg by mouth Daily., Disp: , Rfl:   •  latanoprost (XALATAN) 0.005 % ophthalmic solution, 1 drop Every Night., Disp: , Rfl:   •  metoprolol succinate XL (TOPROL-XL) 25 MG 24 hr tablet, Take 1 tablet by mouth Every Morning., Disp: 90 tablet, Rfl: 1  •  multivitamin with minerals tablet tablet, Take 1 tablet by mouth Daily., Disp: , Rfl:   •  nitroglycerin (NITROSTAT) 0.4 MG SL tablet, Place 1 tablet under the tongue Every 5 (Five) Minutes As Needed for Chest Pain., Disp: 25 tablet, Rfl: 11  •  pantoprazole (PROTONIX) 40 MG EC tablet, Take 40 mg by mouth 2 (Two) Times a Day., Disp: , Rfl:   •  rosuvastatin (CRESTOR) 20 MG tablet, Take 1 tablet by mouth Every Night., Disp: 90 tablet, Rfl: 1  •  Yuvafem 10 MCG tablet vaginal tablet, INSERT 1 TABLET INTO THE VAGINA 2 TIMES A WEEK., Disp: 24 tablet, Rfl: 0  •  famotidine (PEPCID) 20 MG tablet, Take 20 mg by mouth 2 (Two) Times a Day., Disp: , Rfl:     Return in about 6 weeks (around 1/12/2023), or if symptoms worsen or fail to improve, for Recheck.     Recent Results (from the past 168 hour(s))   POC Urinalysis Dipstick, Automated    Collection Time: 12/01/22  2:59 PM    Specimen: Urine   Result Value Ref Range    Color Yellow Yellow, Straw, Dark Yellow, Berna    Clarity, UA Clear Clear    Specific Gravity  1.025 1.005 - 1.030    pH, Urine 5.5 5.0 - 8.0    Leukocytes Large (3+) (A) Negative    Nitrite, UA Negative Negative    Protein, POC Negative Negative mg/dL    Glucose, UA Negative Negative mg/dL    Ketones, UA Negative Negative     Urobilinogen, UA Normal Normal, 0.2 E.U./dL    Bilirubin Negative Negative    Blood, UA 2+ (A) Negative    Lot Number [id number]     Expiration Date 3/25/2024

## 2022-12-02 LAB — CHROMATIN AB SERPL-ACNC: 24.3 IU/ML (ref 0–14)

## 2022-12-04 LAB
BACTERIA UR CULT: NORMAL
BACTERIA UR CULT: NORMAL

## 2022-12-05 ENCOUNTER — TELEPHONE (OUTPATIENT)
Dept: INTERNAL MEDICINE | Facility: CLINIC | Age: 82
End: 2022-12-05

## 2022-12-05 DIAGNOSIS — M79.641 PAIN IN BOTH HANDS: ICD-10-CM

## 2022-12-05 DIAGNOSIS — M79.642 PAIN IN BOTH HANDS: ICD-10-CM

## 2022-12-05 DIAGNOSIS — R76.8 RHEUMATOID FACTOR POSITIVE: Primary | ICD-10-CM

## 2022-12-05 LAB
ALBUMIN SERPL-MCNC: 4.3 G/DL (ref 3.6–4.6)
ALBUMIN/GLOB SERPL: 2 {RATIO} (ref 1.2–2.2)
ALP SERPL-CCNC: 70 IU/L (ref 44–121)
ALT SERPL-CCNC: 16 IU/L (ref 0–32)
ANA SER QL IF: NEGATIVE
AST SERPL-CCNC: 29 IU/L (ref 0–40)
BASOPHILS # BLD AUTO: 0 X10E3/UL (ref 0–0.2)
BASOPHILS NFR BLD AUTO: 0 %
BILIRUB SERPL-MCNC: 0.5 MG/DL (ref 0–1.2)
BUN SERPL-MCNC: 11 MG/DL (ref 8–27)
BUN/CREAT SERPL: 17 (ref 12–28)
CALCIUM SERPL-MCNC: 9.5 MG/DL (ref 8.7–10.3)
CHLORIDE SERPL-SCNC: 106 MMOL/L (ref 96–106)
CHOLEST SERPL-MCNC: 146 MG/DL (ref 100–199)
CO2 SERPL-SCNC: 24 MMOL/L (ref 20–29)
CREAT SERPL-MCNC: 0.65 MG/DL (ref 0.57–1)
EGFRCR SERPLBLD CKD-EPI 2021: 87 ML/MIN/1.73
EOSINOPHIL # BLD AUTO: 0.1 X10E3/UL (ref 0–0.4)
EOSINOPHIL NFR BLD AUTO: 2 %
ERYTHROCYTE [DISTWIDTH] IN BLOOD BY AUTOMATED COUNT: 12.9 % (ref 11.7–15.4)
ERYTHROCYTE [SEDIMENTATION RATE] IN BLOOD BY WESTERGREN METHOD: 8 MM/HR (ref 0–40)
GLOBULIN SER CALC-MCNC: 2.2 G/DL (ref 1.5–4.5)
GLUCOSE SERPL-MCNC: 92 MG/DL (ref 70–99)
HCT VFR BLD AUTO: 39.6 % (ref 34–46.6)
HDLC SERPL-MCNC: 46 MG/DL
HGB BLD-MCNC: 13.7 G/DL (ref 11.1–15.9)
IMM GRANULOCYTES # BLD AUTO: 0 X10E3/UL (ref 0–0.1)
IMM GRANULOCYTES NFR BLD AUTO: 0 %
LDLC SERPL CALC-MCNC: 81 MG/DL (ref 0–99)
LYMPHOCYTES # BLD AUTO: 2.2 X10E3/UL (ref 0.7–3.1)
LYMPHOCYTES NFR BLD AUTO: 28 %
MCH RBC QN AUTO: 32.4 PG (ref 26.6–33)
MCHC RBC AUTO-ENTMCNC: 34.6 G/DL (ref 31.5–35.7)
MCV RBC AUTO: 94 FL (ref 79–97)
MONOCYTES # BLD AUTO: 0.6 X10E3/UL (ref 0.1–0.9)
MONOCYTES NFR BLD AUTO: 8 %
NEUTROPHILS # BLD AUTO: 4.7 X10E3/UL (ref 1.4–7)
NEUTROPHILS NFR BLD AUTO: 62 %
PLATELET # BLD AUTO: 294 X10E3/UL (ref 150–450)
POTASSIUM SERPL-SCNC: 4.3 MMOL/L (ref 3.5–5.2)
PROT SERPL-MCNC: 6.5 G/DL (ref 6–8.5)
PYRIDOXAL PHOS SERPL-MCNC: 33.5 UG/L (ref 3.4–65.2)
RBC # BLD AUTO: 4.23 X10E6/UL (ref 3.77–5.28)
SODIUM SERPL-SCNC: 142 MMOL/L (ref 134–144)
TRIGL SERPL-MCNC: 105 MG/DL (ref 0–149)
TSH SERPL DL<=0.005 MIU/L-ACNC: 1.62 UIU/ML (ref 0.45–4.5)
VIT B12 SERPL-MCNC: 519 PG/ML (ref 232–1245)
VLDLC SERPL CALC-MCNC: 19 MG/DL (ref 5–40)
WBC # BLD AUTO: 7.6 X10E3/UL (ref 3.4–10.8)

## 2022-12-06 NOTE — TELEPHONE ENCOUNTER
Patient was informed of results, verbalized a good understanding. She stated that she is okay with seeing a rheumatologist and wanted us to let know that she has [employer] until the end of the year and then it switches to [facility] at the beginning of the year.

## 2022-12-08 ENCOUNTER — TRANSCRIBE ORDERS (OUTPATIENT)
Dept: INTERNAL MEDICINE | Facility: CLINIC | Age: 82
End: 2022-12-08

## 2022-12-08 DIAGNOSIS — Z12.31 ENCOUNTER FOR SCREENING MAMMOGRAM FOR BREAST CANCER: Primary | ICD-10-CM

## 2023-01-13 ENCOUNTER — OFFICE VISIT (OUTPATIENT)
Dept: INTERNAL MEDICINE | Facility: CLINIC | Age: 83
End: 2023-01-13
Payer: MEDICARE

## 2023-01-13 VITALS
DIASTOLIC BLOOD PRESSURE: 70 MMHG | WEIGHT: 148 LBS | TEMPERATURE: 97.7 F | BODY MASS INDEX: 29.06 KG/M2 | SYSTOLIC BLOOD PRESSURE: 134 MMHG | HEIGHT: 60 IN | HEART RATE: 68 BPM | OXYGEN SATURATION: 97 %

## 2023-01-13 DIAGNOSIS — I25.10 CORONARY ARTERY DISEASE INVOLVING NATIVE CORONARY ARTERY OF NATIVE HEART WITHOUT ANGINA PECTORIS: ICD-10-CM

## 2023-01-13 DIAGNOSIS — K21.00 GASTROESOPHAGEAL REFLUX DISEASE WITH ESOPHAGITIS WITHOUT HEMORRHAGE: Chronic | ICD-10-CM

## 2023-01-13 DIAGNOSIS — J02.9 SORE THROAT: ICD-10-CM

## 2023-01-13 DIAGNOSIS — J06.9 UPPER RESPIRATORY TRACT INFECTION, UNSPECIFIED TYPE: Primary | ICD-10-CM

## 2023-01-13 DIAGNOSIS — E78.00 PURE HYPERCHOLESTEROLEMIA: Chronic | ICD-10-CM

## 2023-01-13 DIAGNOSIS — I10 ESSENTIAL HYPERTENSION: Chronic | ICD-10-CM

## 2023-01-13 LAB
EXPIRATION DATE: NORMAL
EXPIRATION DATE: NORMAL
FLUAV AG UPPER RESP QL IA.RAPID: NOT DETECTED
FLUBV AG UPPER RESP QL IA.RAPID: NOT DETECTED
INTERNAL CONTROL: NORMAL
INTERNAL CONTROL: NORMAL
Lab: NORMAL
Lab: NORMAL
S PYO AG THROAT QL: NEGATIVE
SARS-COV-2 RNA RESP QL NAA+PROBE: NOT DETECTED

## 2023-01-13 PROCEDURE — 87636 SARSCOV2 & INF A&B AMP PRB: CPT | Performed by: FAMILY MEDICINE

## 2023-01-13 PROCEDURE — 87880 STREP A ASSAY W/OPTIC: CPT | Performed by: FAMILY MEDICINE

## 2023-01-13 PROCEDURE — 99214 OFFICE O/P EST MOD 30 MIN: CPT | Performed by: FAMILY MEDICINE

## 2023-01-13 RX ORDER — METOPROLOL SUCCINATE 25 MG/1
25 TABLET, EXTENDED RELEASE ORAL EVERY MORNING
Qty: 90 TABLET | Refills: 1 | Status: SHIPPED | OUTPATIENT
Start: 2023-01-13

## 2023-01-13 RX ORDER — ROSUVASTATIN CALCIUM 20 MG/1
20 TABLET, COATED ORAL NIGHTLY
Qty: 90 TABLET | Refills: 1 | Status: SHIPPED | OUTPATIENT
Start: 2023-01-13

## 2023-01-13 RX ORDER — PANTOPRAZOLE SODIUM 40 MG/1
40 TABLET, DELAYED RELEASE ORAL 2 TIMES DAILY
Qty: 180 TABLET | Refills: 1 | Status: SHIPPED | OUTPATIENT
Start: 2023-01-13

## 2023-01-13 NOTE — PROGRESS NOTES
"Subjective   Chelsi Ferguson is a 82 y.o. female.     Chief Complaint   Patient presents with   • Fatigue   • Heartburn   • URI     At end of cold.  Chest congestion and cough.  History of bronchitis and pneumonia.        Visit Vitals  /70   Pulse 68   Temp 97.7 °F (36.5 °C)   Ht 152.4 cm (60\")   Wt 67.1 kg (148 lb)   LMP  (LMP Unknown) Comment: Last Mammogram   SpO2 97%   BMI 28.90 kg/m²       Wt Readings from Last 3 Encounters:   01/13/23 67.1 kg (148 lb)   12/01/22 68.5 kg (151 lb)   11/07/22 69.9 kg (154 lb 3.2 oz)         Fatigue  This is a new problem. The current episode started 1 to 4 weeks ago. The problem occurs constantly. The problem has been unchanged. Associated symptoms include arthralgias, congestion, coughing, fatigue, joint swelling, myalgias, numbness and a sore throat (dry). Pertinent negatives include no abdominal pain, anorexia, change in bowel habit, chest pain, chills, diaphoresis, fever, headaches, nausea, neck pain, rash, swollen glands, urinary symptoms, vertigo, visual change, vomiting or weakness. Nothing aggravates the symptoms. She has tried nothing for the symptoms. The treatment provided no relief.   Heartburn  She complains of choking, coughing, early satiety, heartburn and a sore throat (dry). She reports no abdominal pain, no belching, no chest pain, no dysphagia, no globus sensation, no hoarse voice, no nausea, no stridor, no tooth decay, no water brash or no wheezing. This is a chronic problem. The current episode started more than 1 year ago. The problem occurs frequently. The problem has been gradually improving. The heartburn is located in the substernum. The heartburn wakes her from sleep. The heartburn does not limit her activity. The heartburn changes with position. The symptoms are aggravated by lying down. Associated symptoms include fatigue and weight loss. Pertinent negatives include no anemia, melena, muscle weakness or orthopnea. Risk factors include caffeine " use. She has tried a PPI for the symptoms. The treatment provided significant relief. Past procedures include an EGD.   URI   This is a new problem. The current episode started 1 to 4 weeks ago. The problem has been gradually improving. There has been no fever. Associated symptoms include congestion, coughing, diarrhea, a plugged ear sensation, rhinorrhea, sinus pain, sneezing and a sore throat (dry). Pertinent negatives include no abdominal pain, chest pain, dysuria, ear pain, headaches, joint pain, joint swelling, nausea, neck pain, rash, swollen glands, vomiting or wheezing. She has tried acetaminophen (vicks vapor rub) for the symptoms. The treatment provided no relief.   Coronary Artery Disease  Presents for follow-up visit. Symptoms include chest tightness (pt attributes this to heartburn) and shortness of breath (INFANTE). Pertinent negatives include no chest pain, chest pressure, dizziness, leg swelling, muscle weakness, palpitations or weight gain. Risk factors include hyperlipidemia. Risk factors do not include obesity. The symptoms have been stable. Compliance with diet is variable. Compliance with exercise is variable. Compliance with medications is good.   Hypertension  This is a chronic problem. The current episode started more than 1 year ago. The problem is unchanged. The problem is controlled. Associated symptoms include shortness of breath (INFANTE). Pertinent negatives include no anxiety, blurred vision, chest pain, headaches, malaise/fatigue, neck pain, orthopnea, palpitations, peripheral edema, PND or sweats. There are no associated agents to hypertension. Risk factors for coronary artery disease include dyslipidemia, family history, post-menopausal state and sedentary lifestyle. Current antihypertension treatment includes beta blockers. The current treatment provides significant improvement. Compliance problems include exercise.  Hypertensive end-organ damage includes CAD/MI. There is no history of  angina, kidney disease, CVA, heart failure, left ventricular hypertrophy, PVD or retinopathy. There is no history of chronic renal disease, sleep apnea or a thyroid problem.   Hyperlipidemia  This is a chronic problem. The current episode started more than 1 year ago. The problem is controlled. Recent lipid tests were reviewed and are normal. She has no history of chronic renal disease, diabetes, hypothyroidism, liver disease, obesity or nephrotic syndrome. Factors aggravating her hyperlipidemia include beta blockers. Associated symptoms include myalgias and shortness of breath (INFANTE). Pertinent negatives include no chest pain. Current antihyperlipidemic treatment includes statins. The current treatment provides significant improvement of lipids. Compliance problems include adherence to exercise.  Risk factors for coronary artery disease include dyslipidemia, family history, hypertension and post-menopausal.      Pt had benign upper endoscopy 11/2022 and will see Dr Frank the end of February.     Pt reports a negative Covid test.  Grandchildren have been sick.  Patient thinks she got a cold from her grandchildren.    Pt has joint pain that is better this week.    Pt needs refill of chronic meds as her insurance has changed.     Pt has seen Dr Mason for her hernia. They are watching it for change.   The following portions of the patient's history were reviewed and updated as appropriate: allergies, current medications, past family history, past medical history, past social history, past surgical history and problem list.    Past Medical History:   Diagnosis Date   • Ankle pain    • Arthritis    • Cancer (HCC)     Skin   • Cataract     removed   • Chest discomfort    • Cholecystitis 01/15/2018   • Clotting disorder (HCC)     may be result of aspirin   • Coronary artery disease    • Double vision 04/25/2022    Uses glasses with prism   • Edema    • Elevated liver enzymes 01/15/2018   • Gallstones    • GERD  (gastroesophageal reflux disease)    • Glaucoma     pt is currently off of meds and Dr Tanner does not think that she is glaucoma   • H/O complete eye exam 06/2013   • H/O mammogram 11/16/2015   • H/O non-ST elevation myocardial infarction (NSTEMI) 01/2005   • Hammer toe    • Heart attack (HCC) 01/2005   • History of blood transfusion 01/2005   • History of cardiovascular stress test 12/02/2015    normal   • HTN (hypertension)    • Hyperlipidemia    • Hypokalemia 01/15/2018   • Kidney infection 1971   • Low back pain    • Menopausal symptoms    • Onychia and paronychia of finger    • Osteopenia    • Other elevated white blood cell count    • Pap smear for cervical cancer screening 2010 [facility]   • Pneumonia    • Rheumatoid factor positive 12/5/2022   • Sepsis (HCC) 01/15/2018   • Tremor     essential tremor   • UTI (urinary tract infection) 01/15/2018   • Viral warts    • Visual impairment 2019   • Wears glasses       Past Surgical History:   Procedure Laterality Date   • ADENOIDECTOMY  1952   • BLADDER REPAIR  2002   • BLADDER REPAIR  2003   • BUNIONECTOMY Right 2010   • CARDIAC CATHETERIZATION  stent placed 2005   • CHOLECYSTECTOMY     • CHOLECYSTECTOMY WITH INTRAOPERATIVE CHOLANGIOGRAM N/A 03/07/2018    Procedure: CHOLECYSTECTOMY LAPAROSCOPIC INTRAOPERATIVE CHOLANGIOGRAM;  Surgeon: Harinder Mason MD;  Location: [facility];  Service:    • COLONOSCOPY  2008, 4/2014    [facility]   • CORONARY STENT PLACEMENT Left 01/2005    drug eluting stent 1/2005 LAD   • DILATATION AND CURETTAGE  1971   • DILATATION AND CURETTAGE  1978   • EYE CAPSULOTOMY WITH LASER Bilateral 2018   • EYE SURGERY Bilateral 05/2016    Cataract   • FRONTALIS SUSPENSION  2010   • HAMMER TOE REPAIR Right 11/03/2010   • HEAD & NECK SKIN LESION EXCISIONAL BIOPSY  08/20/2018    benign scalp cyst   • HYSTERECTOMY  1978   • JOINT REPLACEMENT  2005   • LAPAROTOMY OOPHERECTOMY Bilateral 2002   • NOSE SURGERY  03/2017    repair 3 fractured areas. Dr Dunne    • OTHER SURGICAL HISTORY      rectocele repair   • OTHER SURGICAL HISTORY      eyelid surgery   • REPLACEMENT TOTAL KNEE BILATERAL Bilateral    • SKIN BIOPSY     • SUBTOTAL HYSTERECTOMY     • TONSILLECTOMY     • TOTAL KNEE ARTHROPLASTY  2005   • UMBILICAL HERNIA REPAIR     • UPPER GASTROINTESTINAL ENDOSCOPY  2022    Dr Frank   • UTERINE FIBROID SURGERY      emergency removal of fibroid from birth canal      Family History   Problem Relation Age of Onset   • Stroke Mother    • Hypertension Mother         My Mother  of brain hemmorage/stroke   • Heart failure Father         congestive   • Cancer Father         Neck glands removed   • Vision loss Father         Had operation   • Heart disease Father          of Congestive Heart Failure at 91   • Breast cancer Sister         60's   • Cancer Sister         Breast   • Breast cancer Daughter 40   • Cancer Daughter         Breast   • Thyroid disease Daughter         on meds   • Breast cancer Other         NIECE 60's   • Breast cancer Other         NIECE 60's   • Vision loss Paternal Aunt         blind-glaucoma   • Vision loss Paternal Uncle         blind-glaucoma   • Vision loss Maternal Aunt         all 9 aunts paternal aunts/Uncles/ cousins/ sister/brother/ and niece on meds for Glaucoma or had operation   • Early death Daughter         Celiac Sprue reaction   • Ovarian cancer Neg Hx       Social History     Socioeconomic History   • Marital status:    Tobacco Use   • Smoking status: Never   • Smokeless tobacco: Never   • Tobacco comments:     My father, brother in law, friends, and  smoked so had second hand exposure   Vaping Use   • Vaping Use: Never used   Substance and Sexual Activity   • Alcohol use: No   • Drug use: No   • Sexual activity: Not Currently     Birth control/protection: Hysterectomy      Allergies   Allergen Reactions   • Aleve [Naproxen] Hives   • Indocin [Indomethacin] Hallucinations   • Lipitor  [Atorvastatin] Hives   • Statins Hives   • Zocor [Simvastatin] Hives   • Celebrex [Celecoxib] Diarrhea   • Keflex [Cephalexin] GI Intolerance     Upset stomach, may have had some blood in stool but cannot remember the reason why she was put on it. Tolerates penicillins without problem.   • Ibuprofen GI Intolerance     Heartburn.   • Prevacid [Lansoprazole] Nausea And Vomiting   • Prilosec [Omeprazole] Nausea And Vomiting       Review of Systems   Constitutional: Positive for fatigue and weight loss. Negative for chills, diaphoresis, fever, malaise/fatigue and weight gain.   HENT: Positive for congestion, rhinorrhea, sinus pain, sneezing and sore throat (dry). Negative for ear pain and hoarse voice.    Eyes: Negative for blurred vision.   Respiratory: Positive for cough, choking, chest tightness (pt attributes this to heartburn) and shortness of breath (INFANTE). Negative for wheezing.    Cardiovascular: Negative for chest pain, palpitations, orthopnea, leg swelling and PND.   Gastrointestinal: Positive for constipation, diarrhea and heartburn. Negative for abdominal pain, anorexia, change in bowel habit, dysphagia, melena, nausea and vomiting.        Alternates with constipation and diarrhea   Genitourinary: Negative for dysuria.   Musculoskeletal: Positive for arthralgias, joint swelling and myalgias. Negative for joint pain, muscle weakness and neck pain.   Skin: Negative for rash.   Neurological: Positive for numbness. Negative for dizziness, vertigo, weakness and headaches.       Objective   Physical Exam  Vitals and nursing note reviewed.   Constitutional:       Appearance: She is well-developed.   HENT:      Head: Normocephalic.      Right Ear: External ear normal. There is impacted cerumen.      Left Ear: External ear normal. There is impacted cerumen.      Nose: Nose normal.      Mouth/Throat:      Mouth: Mucous membranes are moist. No injury.      Tongue: No lesions.      Palate: No mass.      Pharynx:  Posterior oropharyngeal erythema present. No pharyngeal swelling, oropharyngeal exudate or uvula swelling.   Eyes:      General: Lids are normal.      Conjunctiva/sclera: Conjunctivae normal.      Pupils: Pupils are equal, round, and reactive to light.   Neck:      Thyroid: No thyroid mass or thyromegaly.      Vascular: No carotid bruit.      Trachea: Trachea normal.   Cardiovascular:      Rate and Rhythm: Normal rate and regular rhythm.      Heart sounds: No murmur heard.  Pulmonary:      Effort: Pulmonary effort is normal. No respiratory distress.      Breath sounds: Normal breath sounds. No decreased breath sounds, wheezing, rhonchi or rales.   Chest:      Chest wall: No tenderness.   Abdominal:      General: Bowel sounds are normal.      Palpations: Abdomen is soft.      Tenderness: There is no abdominal tenderness.   Musculoskeletal:         General: Normal range of motion.      Cervical back: Normal range of motion and neck supple.   Skin:     General: Skin is warm and dry.   Neurological:      Mental Status: She is alert and oriented to person, place, and time.   Psychiatric:         Behavior: Behavior normal.         Assessment & Plan   Diagnoses and all orders for this visit:    1. Upper respiratory tract infection, unspecified type (Primary)  -     JUHI FLU + SARS PCR    2. Pure hypercholesterolemia  -     rosuvastatin (CRESTOR) 20 MG tablet; Take 1 tablet by mouth Every Night.  Dispense: 90 tablet; Refill: 1    3. Essential hypertension  -     metoprolol succinate XL (TOPROL-XL) 25 MG 24 hr tablet; Take 1 tablet by mouth Every Morning.  Dispense: 90 tablet; Refill: 1    4. Gastroesophageal reflux disease with esophagitis without hemorrhage  -     pantoprazole (PROTONIX) 40 MG EC tablet; Take 1 tablet by mouth 2 (Two) Times a Day.  Dispense: 180 tablet; Refill: 1    5. Coronary artery disease involving native coronary artery of native heart without angina pectoris    6. Sore throat  -     POCT rapid strep  A      Continue over-the-counter medication for symptoms.             Current Outpatient Medications:   •  acetaminophen (TYLENOL) 650 MG 8 hr tablet, Take 1,300 mg by mouth 2 (two) times a day., Disp: , Rfl:   •  aspirin 81 MG EC tablet, Take 81 mg by mouth Every Night., Disp: , Rfl:   •  BIOTIN 5000 PO, Take  by mouth., Disp: , Rfl:   •  Cholecalciferol (VITAMIN D3 PO), Take 1 tablet by mouth Daily., Disp: , Rfl:   •  coenzyme Q10 100 MG capsule, Take 100 mg by mouth Daily., Disp: , Rfl:   •  latanoprost (XALATAN) 0.005 % ophthalmic solution, 1 drop Every Night., Disp: , Rfl:   •  metoprolol succinate XL (TOPROL-XL) 25 MG 24 hr tablet, Take 1 tablet by mouth Every Morning., Disp: 90 tablet, Rfl: 1  •  multivitamin with minerals tablet tablet, Take 1 tablet by mouth Daily., Disp: , Rfl:   •  nitroglycerin (NITROSTAT) 0.4 MG SL tablet, Place 1 tablet under the tongue Every 5 (Five) Minutes As Needed for Chest Pain., Disp: 25 tablet, Rfl: 11  •  pantoprazole (PROTONIX) 40 MG EC tablet, Take 1 tablet by mouth 2 (Two) Times a Day., Disp: 180 tablet, Rfl: 1  •  rosuvastatin (CRESTOR) 20 MG tablet, Take 1 tablet by mouth Every Night., Disp: 90 tablet, Rfl: 1  •  Yuvafem 10 MCG tablet vaginal tablet, INSERT 1 TABLET INTO THE VAGINA 2 TIMES A WEEK., Disp: 24 tablet, Rfl: 0    Return in about 3 months (around 4/13/2023), or if symptoms worsen or fail to improve, for Recheck bp.     Recent Results (from the past 1176 hour(s))   Urine Culture - ,    Collection Time: 12/01/22 12:00 AM   Result Value Ref Range    Urine Culture Final report     Result 1 Comment    Nuclear Antigen Antibody, IFA    Collection Time: 12/01/22 12:55 PM    Specimen: Blood    Blood  Release to baltazar   Result Value Ref Range    WILFREDO Negative    Rheumatoid Factor    Collection Time: 12/01/22 12:55 PM    Specimen: Blood   Result Value Ref Range    Rheumatoid Factor Quantitative 24.3 (H) 0.0 - 14.0 IU/mL   Sedimentation Rate    Collection Time: 12/01/22  12:55 PM    Specimen: Blood    Blood  Release to baltazar   Result Value Ref Range    Sed Rate 8 0 - 40 mm/hr   CBC & Differential    Collection Time: 12/01/22 12:55 PM    Specimen: Blood    Blood  Release to baltazar   Result Value Ref Range    WBC 7.6 3.4 - 10.8 x10E3/uL    RBC 4.23 3.77 - 5.28 x10E6/uL    Hemoglobin 13.7 11.1 - 15.9 g/dL    Hematocrit 39.6 34.0 - 46.6 %    MCV 94 79 - 97 fL    MCH 32.4 26.6 - 33.0 pg    MCHC 34.6 31.5 - 35.7 g/dL    RDW 12.9 11.7 - 15.4 %    Platelets 294 150 - 450 x10E3/uL    Neutrophil Rel % 62 Not Estab. %    Lymphocyte Rel % 28 Not Estab. %    Monocyte Rel % 8 Not Estab. %    Eosinophil Rel % 2 Not Estab. %    Basophil Rel % 0 Not Estab. %    Neutrophils Absolute 4.7 1.4 - 7.0 x10E3/uL    Lymphocytes Absolute 2.2 0.7 - 3.1 x10E3/uL    Monocytes Absolute 0.6 0.1 - 0.9 x10E3/uL    Eosinophils Absolute 0.1 0.0 - 0.4 x10E3/uL    Basophils Absolute 0.0 0.0 - 0.2 x10E3/uL    Immature Granulocyte Rel % 0 Not Estab. %    Immature Grans Absolute 0.0 0.0 - 0.1 x10E3/uL   Lipid Panel    Collection Time: 12/01/22 12:55 PM    Specimen: Blood    Blood  Release to baltazar   Result Value Ref Range    Total Cholesterol 146 100 - 199 mg/dL    Triglycerides 105 0 - 149 mg/dL    HDL Cholesterol 46 >39 mg/dL    VLDL Cholesterol Michele 19 5 - 40 mg/dL    LDL Chol Calc ([facility]) 81 0 - 99 mg/dL   Comprehensive Metabolic Panel    Collection Time: 12/01/22 12:55 PM    Specimen: Blood    Blood  Release to baltazar   Result Value Ref Range    Glucose 92 70 - 99 mg/dL    BUN 11 8 - 27 mg/dL    Creatinine 0.65 0.57 - 1.00 mg/dL    EGFR Result 87 >59 mL/min/1.73    BUN/Creatinine Ratio 17 12 - 28    Sodium 142 134 - 144 mmol/L    Potassium 4.3 3.5 - 5.2 mmol/L    Chloride 106 96 - 106 mmol/L    Total CO2 24 20 - 29 mmol/L    Calcium 9.5 8.7 - 10.3 mg/dL    Total Protein 6.5 6.0 - 8.5 g/dL    Albumin 4.3 3.6 - 4.6 g/dL    Globulin 2.2 1.5 - 4.5 g/dL    A/G Ratio 2.0 1.2 - 2.2    Total Bilirubin 0.5 0.0 - 1.2 mg/dL    Alkaline  Phosphatase 70 44 - 121 IU/L    AST (SGOT) 29 0 - 40 IU/L    ALT (SGPT) 16 0 - 32 IU/L   Vitamin B6    Collection Time: 12/01/22 12:55 PM    Specimen: Blood    Blood  Release to baltazar   Result Value Ref Range    Vitamin B6 33.5 3.4 - 65.2 ug/L   Vitamin B12    Collection Time: 12/01/22 12:55 PM    Specimen: Blood    Blood  Release to baltazar   Result Value Ref Range    Vitamin B-12 519 232 - 1,245 pg/mL   TSH Rfx On Abnormal To Free T4    Collection Time: 12/01/22 12:55 PM    Specimen: Blood    Blood  Release to baltazar   Result Value Ref Range    TSH 1.620 0.450 - 4.500 uIU/mL   POC Urinalysis Dipstick, Automated    Collection Time: 12/01/22  2:59 PM    Specimen: Urine   Result Value Ref Range    Color Yellow Yellow, Straw, Dark Yellow, Berna    Clarity, UA Clear Clear    Specific Gravity  1.025 1.005 - 1.030    pH, Urine 5.5 5.0 - 8.0    Leukocytes Large (3+) (A) Negative    Nitrite, UA Negative Negative    Protein, POC Negative Negative mg/dL    Glucose, UA Negative Negative mg/dL    Ketones, UA Negative Negative    Urobilinogen, UA Normal Normal, 0.2 E.U./dL    Bilirubin Negative Negative    Blood, UA 2+ (A) Negative    Lot Number [id number]     Expiration Date 3/25/2024    JUHI FLU + SARS PCR    Collection Time: 01/13/23 12:14 PM    Specimen: Nasal Cavity; Swab   Result Value Ref Range    COVID19 Not Detected Not Detected - Ref. Range    Influenza A Antigen BLANCA Not Detected Not Detected    Influenza B Antigen BLANCA Not Detected Not Detected    Internal Control Passed Passed    Lot Number [id number]     Expiration Date 3/7/2024    POCT rapid strep A    Collection Time: 01/13/23 12:16 PM    Specimen: Swab   Result Value Ref Range    Rapid Strep A Screen Negative Negative, VALID, INVALID, Not Performed    Internal Control Passed Passed    Lot Number [id number]     Expiration Date 3/31/2024        Answers for HPI/ROS submitted by the patient on 1/10/2023  Please describe your symptoms.: This is a follow up visit after  some testing; however, I have been fighting a cold for over a week and while there need to have doctor listen to chest and check ears, sinus, and throat.  I have history of Bronchitis and pneumonia.  I am not aware of any fever.  Tested negative for Covid in midst of this.  Have you had these symptoms before?: Yes  How long have you been having these symptoms?: 1-2 weeks  Please list any medications you are currently taking for this condition.: Tylenol, Vicks Vapor Rub, Zeng's cough drops, water  Please describe any probable cause for these symptoms. : Great Grandchildren had colds while here for [employer].  Several of us have had these same symptoms. I just want to be sure mine has not settled in lungs or caused ear or sinus infection.,  , Main reason for this visit though is follow up to last visit.  What is the primary reason for your visit?: Other    I spent 35 minutes caring for Chelsi on this date of service. This time includes time spent by me in the following activities: preparing for the visit, reviewing tests, obtaining and/or reviewing a separately obtained history, performing a medically appropriate examination and/or evaluation, counseling and educating the patient/family/caregiver, ordering medications, tests, or procedures and documenting information in the medical record

## 2023-01-19 ENCOUNTER — HOSPITAL ENCOUNTER (OUTPATIENT)
Dept: MAMMOGRAPHY | Facility: HOSPITAL | Age: 83
Discharge: HOME | End: 2023-01-19
Admitting: FAMILY MEDICINE
Payer: MEDICARE

## 2023-01-19 DIAGNOSIS — Z12.31 ENCOUNTER FOR SCREENING MAMMOGRAM FOR BREAST CANCER: ICD-10-CM

## 2023-01-19 PROCEDURE — 77063 BREAST TOMOSYNTHESIS BI: CPT

## 2023-01-19 PROCEDURE — 77063 BREAST TOMOSYNTHESIS BI: CPT | Performed by: RADIOLOGY

## 2023-01-19 PROCEDURE — 77067 SCR MAMMO BI INCL CAD: CPT

## 2023-01-19 PROCEDURE — 77067 SCR MAMMO BI INCL CAD: CPT | Performed by: RADIOLOGY

## 2023-02-08 ENCOUNTER — OFFICE VISIT (OUTPATIENT)
Dept: CARDIOLOGY | Facility: CLINIC | Age: 83
End: 2023-02-08
Payer: MEDICARE

## 2023-02-08 VITALS
SYSTOLIC BLOOD PRESSURE: 126 MMHG | BODY MASS INDEX: 28.86 KG/M2 | DIASTOLIC BLOOD PRESSURE: 62 MMHG | HEART RATE: 80 BPM | WEIGHT: 147 LBS | HEIGHT: 60 IN | OXYGEN SATURATION: 96 %

## 2023-02-08 DIAGNOSIS — I25.10 CORONARY ARTERY DISEASE INVOLVING NATIVE CORONARY ARTERY OF NATIVE HEART WITHOUT ANGINA PECTORIS: Primary | ICD-10-CM

## 2023-02-08 DIAGNOSIS — I10 ESSENTIAL HYPERTENSION: ICD-10-CM

## 2023-02-08 DIAGNOSIS — E78.00 PURE HYPERCHOLESTEROLEMIA: ICD-10-CM

## 2023-02-08 PROCEDURE — 99214 OFFICE O/P EST MOD 30 MIN: CPT | Performed by: INTERNAL MEDICINE

## 2023-02-08 NOTE — PROGRESS NOTES
[facility]    Patient ID: Chelsi Ferguson is a 82 y.o. female.  : 1940   Contact: [phone]    Encounter date: 2023    PCP: Reina Yarbrough MD      Chief complaint:   Chief Complaint   Patient presents with   • Coronary artery disease involving native coronary artery of       Problem List:  1. Coronary artery disease  a. S/p NSTEMI with TATO (cypher sirolimus eluting stent) to LAD, 2005- data deficit.   b. Nuclear stress test 2015: no ischemia.  c. Nuclear stress test, 2020: No evidence of inducible ischemia by scintigraphic criteria. Low risk study.  d. Nuclear stress test, 2022; EF 71%. No evidence of ischemia. Low risk study.    2. Hyperlipidemia  a. FLP 2020: , Trig 145, HDL 43, LDL 94.   3. GERD  a. Esophageal ulcerations by EGD  4. Osteoarthritis  5. Essential tremor    Allergies   Allergen Reactions   • Aleve [Naproxen] Hives   • Indocin [Indomethacin] Hallucinations   • Lipitor [Atorvastatin] Hives   • Statins Hives   • Zocor [Simvastatin] Hives   • Celebrex [Celecoxib] Diarrhea   • Keflex [Cephalexin] GI Intolerance     Upset stomach, may have had some blood in stool but cannot remember the reason why she was put on it. Tolerates penicillins without problem.   • Ibuprofen GI Intolerance     Heartburn.   • Prevacid [Lansoprazole] Nausea And Vomiting   • Prilosec [Omeprazole] Nausea And Vomiting       Current Medications:    Current Outpatient Medications:   •  acetaminophen (TYLENOL) 650 MG 8 hr tablet, Take 1,300 mg by mouth 2 (two) times a day., Disp: , Rfl:   •  aspirin 81 MG EC tablet, Take 81 mg by mouth Every Night., Disp: , Rfl:   •  BIOTIN 5000 PO, Take  by mouth Daily., Disp: , Rfl:   •  Cholecalciferol (VITAMIN D3 PO), Take 1 tablet by mouth Daily., Disp: , Rfl:   •  coenzyme Q10 100 MG capsule, Take 100 mg by mouth Daily., Disp: , Rfl:   •  latanoprost (XALATAN) 0.005 % ophthalmic solution, 1 drop Every Night., Disp:  ", Rfl:   •  metoprolol succinate XL (TOPROL-XL) 25 MG 24 hr tablet, Take 1 tablet by mouth Every Morning., Disp: 90 tablet, Rfl: 1  •  multivitamin with minerals tablet tablet, Take 1 tablet by mouth Daily., Disp: , Rfl:   •  nitroglycerin (NITROSTAT) 0.4 MG SL tablet, Place 1 tablet under the tongue Every 5 (Five) Minutes As Needed for Chest Pain., Disp: 25 tablet, Rfl: 11  •  pantoprazole (PROTONIX) 40 MG EC tablet, Take 1 tablet by mouth 2 (Two) Times a Day., Disp: 180 tablet, Rfl: 1  •  rosuvastatin (CRESTOR) 20 MG tablet, Take 1 tablet by mouth Every Night., Disp: 90 tablet, Rfl: 1  •  Yuvafem 10 MCG tablet vaginal tablet, INSERT 1 TABLET INTO THE VAGINA 2 TIMES A WEEK., Disp: 24 tablet, Rfl: 0    HPI    Chelsi Ferguson is a 82 y.o. female who presents today for an annual follow up of coronary artery disease and cardiac risk factors. Since last visit, the patient reports that she is still having shortness of breath. She has not bought an exercise bike. She is in physical therapy for strength training. She states she does have some intermittent chest pains but does not believe that it is cardiac related. She has had a recent endoscopy which revealed some burns on her esophagus so she believes her chest pain is related to GERD. Patient denies cardiac related chest pain, shortness of breath, orthopnea, palpitations, edema, dizziness, and syncope.      The following portions of the patient's history were reviewed and updated as appropriate: allergies, current medications and problem list.    Pertinent positives as listed in the HPI.  All other systems reviewed are negative.         Vitals:    02/08/23 1019   BP: 126/62   BP Location: Left arm   Patient Position: Sitting   Pulse: 80   SpO2: 96%   Weight: 66.7 kg (147 lb)   Height: 152.4 cm (60\")       Physical Exam:  General: Alert and oriented.  Neck: Jugular venous pressure is within normal limits. Carotids have normal upstrokes without bruits.   Cardiovascular: " Heart has a nondisplaced focal PMI. Regular rate and rhythm. No murmur, gallop or rub.  Lungs: Clear, no rales or wheezes. Equal expansion is noted.   Extremities: Show no edema.  Skin: Warm and dry.  Neurologic: Nonfocal.     Diagnostic Data (reviewed with patient):  Lab Results   Component Value Date    GLUCOSE 92 12/01/2022    BUN 11 12/01/2022    CREATININE 0.65 12/01/2022    EGFRIFNONA 80 01/25/2022    EGFRIFAFRI 97 01/25/2022    BCR 17 12/01/2022     12/01/2022    K 4.3 12/01/2022     12/01/2022    CO2 24 12/01/2022    CALCIUM 9.5 12/01/2022    ALBUMIN 4.3 12/01/2022    ALKPHOS 70 12/01/2022    AST 29 12/01/2022    ALT 16 12/01/2022     Lab Results   Component Value Date    CHLPL 146 12/01/2022    TRIG 105 12/01/2022    HDL 46 12/01/2022    LDL 81 12/01/2022      Lab Results   Component Value Date    WBC 7.6 12/01/2022    RBC 4.23 12/01/2022    HGB 13.7 12/01/2022    HCT 39.6 12/01/2022    MCV 94 12/01/2022     12/01/2022      Lab Results   Component Value Date    TSH 1.620 12/01/2022        Procedures      Assessment:    ICD-10-CM ICD-9-CM   1. Coronary artery disease involving native coronary artery of native heart without angina pectoris  I25.10 414.01   2. Essential hypertension  I10 401.9   3. Pure hypercholesterolemia  E78.00 272.0         Plan:  1. Stable cardiac status.   2. Patient was encouraged to continue to be active and have a healthy diet.   3. Continue on aspirin 81 mg for antiplatelet therapy and nitroglycerin 0.4 mg as needed for angina.    4. Continue on metoprolol 25 mg daily due to CAD and as MI prevention.  5. Continue on rosuvastatin 25 mg nightly for hyperlipidemia.    6. Continue all other current medications.  7. F/up in 12 months, sooner if needed.      Scribed for Oralia Greene MD by Heydi Kang. 2/8/2023 10:38 EST    I Oralia Greene MD personally performed the services described in this documentation as scribed by the above individual in my  presence, and it is both accurate and complete.    Oralia Greene MD, FACC

## 2023-02-22 ENCOUNTER — TRANSCRIBE ORDERS (OUTPATIENT)
Dept: ADMINISTRATIVE | Facility: HOSPITAL | Age: 83
End: 2023-02-22
Payer: MEDICARE

## 2023-02-22 DIAGNOSIS — K42.9 UMBILICAL HERNIA WITHOUT OBSTRUCTION OR GANGRENE: Primary | ICD-10-CM

## 2023-02-27 ENCOUNTER — OFFICE VISIT (OUTPATIENT)
Dept: GASTROENTEROLOGY | Facility: CLINIC | Age: 83
End: 2023-02-27
Payer: MEDICARE

## 2023-02-27 VITALS — BODY MASS INDEX: 29.25 KG/M2 | WEIGHT: 149 LBS | HEIGHT: 60 IN

## 2023-02-27 DIAGNOSIS — K21.00 GASTROESOPHAGEAL REFLUX DISEASE WITH ESOPHAGITIS WITHOUT HEMORRHAGE: Primary | Chronic | ICD-10-CM

## 2023-02-27 DIAGNOSIS — R13.19 ESOPHAGEAL DYSPHAGIA: ICD-10-CM

## 2023-02-27 PROCEDURE — 99214 OFFICE O/P EST MOD 30 MIN: CPT | Performed by: INTERNAL MEDICINE

## 2023-02-27 NOTE — PROGRESS NOTES
PCP:  Reina Yarbrough MD     No referring provider defined for this encounter.    Chief Complaint   Patient presents with   • Follow-up     Follow up EGD        HPI   The patient is an 82-year-old female with a history of erosive esophagitis.  She was having odynophagia and dysphagia.  She was having refractory reflux.  She still has some reflux at times but it is much better than it has been.  She had 1 episode where she woke up choking.  She was having that fairly regularly.  She is on her pantoprazole twice daily.  Her dysphagia appears to be improved.  Her chest pain appears to be improved.    Allergies   Allergen Reactions   • Aleve [Naproxen] Hives   • Indocin [Indomethacin] Hallucinations   • Lipitor [Atorvastatin] Hives   • Statins Hives   • Zocor [Simvastatin] Hives   • Celebrex [Celecoxib] Diarrhea   • Keflex [Cephalexin] GI Intolerance     Upset stomach, may have had some blood in stool but cannot remember the reason why she was put on it. Tolerates penicillins without problem.   • Ibuprofen GI Intolerance     Heartburn.   • Prevacid [Lansoprazole] Nausea And Vomiting   • Prilosec [Omeprazole] Nausea And Vomiting          Current Outpatient Medications:   •  acetaminophen (TYLENOL) 650 MG 8 hr tablet, Take 1,300 mg by mouth 2 (two) times a day., Disp: , Rfl:   •  aspirin 81 MG EC tablet, Take 81 mg by mouth Every Night., Disp: , Rfl:   •  BIOTIN 5000 PO, Take  by mouth Daily., Disp: , Rfl:   •  Cholecalciferol (VITAMIN D3 PO), Take 1 tablet by mouth Daily., Disp: , Rfl:   •  coenzyme Q10 100 MG capsule, Take 100 mg by mouth Daily., Disp: , Rfl:   •  latanoprost (XALATAN) 0.005 % ophthalmic solution, 1 drop Every Night., Disp: , Rfl:   •  metoprolol succinate XL (TOPROL-XL) 25 MG 24 hr tablet, Take 1 tablet by mouth Every Morning., Disp: 90 tablet, Rfl: 1  •  multivitamin with minerals tablet tablet, Take 1 tablet by mouth Daily., Disp: , Rfl:   •  nitroglycerin (NITROSTAT) 0.4 MG SL tablet, Place 1  tablet under the tongue Every 5 (Five) Minutes As Needed for Chest Pain., Disp: 25 tablet, Rfl: 11  •  pantoprazole (PROTONIX) 40 MG EC tablet, Take 1 tablet by mouth 2 (Two) Times a Day., Disp: 180 tablet, Rfl: 1  •  rosuvastatin (CRESTOR) 20 MG tablet, Take 1 tablet by mouth Every Night., Disp: 90 tablet, Rfl: 1  •  Yuvafem 10 MCG tablet vaginal tablet, INSERT 1 TABLET INTO THE VAGINA 2 TIMES A WEEK., Disp: 24 tablet, Rfl: 0     Past Medical History:   Diagnosis Date   • Ankle pain    • Arthritis    • Cancer (HCC)     Skin   • Cataract     removed   • Chest discomfort    • Cholecystitis 01/15/2018   • Clotting disorder (HCC)     may be result of aspirin   • Coronary artery disease    • Double vision 04/25/2022    Uses glasses with prism   • Edema    • Elevated liver enzymes 01/15/2018   • Gallstones    • GERD (gastroesophageal reflux disease)    • Glaucoma     pt is currently off of meds and Dr Tanner does not think that she is glaucoma   • H/O complete eye exam 06/2013   • H/O mammogram 11/16/2015   • H/O non-ST elevation myocardial infarction (NSTEMI) 01/2005   • Hammer toe    • Heart attack (HCC) 01/2005   • History of blood transfusion 01/2005   • History of cardiovascular stress test 12/02/2015    normal   • HTN (hypertension)    • Hyperlipidemia    • Hypokalemia 01/15/2018   • Kidney infection 1971   • Low back pain    • Menopausal symptoms    • Onychia and paronychia of finger    • Osteopenia    • Other elevated white blood cell count    • Pap smear for cervical cancer screening 2010    Leandro   • Pneumonia    • Rheumatoid factor positive 12/05/2022   • Sepsis (HCC) 01/15/2018   • Tremor     essential tremor   • UTI (urinary tract infection) 01/15/2018   • Viral warts    • Visual impairment 2019   • Wears glasses        Past Surgical History:   Procedure Laterality Date   • ADENOIDECTOMY  1952   • BLADDER REPAIR  2002   • BLADDER REPAIR  2003   • BUNIONECTOMY Right 2010   • CARDIAC CATHETERIZATION  stent  placed    • CHOLECYSTECTOMY     • CHOLECYSTECTOMY WITH INTRAOPERATIVE CHOLANGIOGRAM N/A 2018    Procedure: CHOLECYSTECTOMY LAPAROSCOPIC INTRAOPERATIVE CHOLANGIOGRAM;  Surgeon: Harinder Mason MD;  Location: [facility];  Service:    • COLONOSCOPY  , 2014    Juany   • CORONARY STENT PLACEMENT Left 2005    drug eluting stent 2005 LAD   • DILATATION AND CURETTAGE     • DILATATION AND CURETTAGE     • EYE CAPSULOTOMY WITH LASER Bilateral    • EYE SURGERY Bilateral 2016    Cataract   • FRONTALIS SUSPENSION     • HAMMER TOE REPAIR Right 2010   • HEAD & NECK SKIN LESION EXCISIONAL BIOPSY  2018    benign scalp cyst   • HYSTERECTOMY     • JOINT REPLACEMENT     • LAPAROTOMY OOPHERECTOMY Bilateral    • NOSE SURGERY  2017    repair 3 fractured areas. Dr Dunne   • OTHER SURGICAL HISTORY      rectocele repair   • OTHER SURGICAL HISTORY      eyelid surgery   • REPLACEMENT TOTAL KNEE BILATERAL Bilateral    • SKIN BIOPSY     • SUBTOTAL HYSTERECTOMY     • TONSILLECTOMY     • TOTAL KNEE ARTHROPLASTY  2005   • UMBILICAL HERNIA REPAIR     • UPPER GASTROINTESTINAL ENDOSCOPY  2022    Dr Frank   • UTERINE FIBROID SURGERY      emergency removal of fibroid from birth canal        Social History     Socioeconomic History   • Marital status:    Tobacco Use   • Smoking status: Never   • Smokeless tobacco: Never   • Tobacco comments:     My father, brother in law, friends, and  smoked so had second hand exposure   Vaping Use   • Vaping Use: Never used   Substance and Sexual Activity   • Alcohol use: No   • Drug use: No   • Sexual activity: Not Currently     Partners: Male     Birth control/protection: Hysterectomy        Family History   Problem Relation Age of Onset   • Stroke Mother    • Hypertension Mother         My Mother  of brain hemmorage/stroke   • Heart failure Father         congestive   • Cancer Father          Neck glands removed   • Vision loss Father         Had operation   • Heart disease Father          of Congestive Heart Failure at 91   • Breast cancer Sister         60's   • Cancer Sister         Breast   • Breast cancer Daughter 40   • Cancer Daughter         Breast   • Thyroid disease Daughter         on meds   • Breast cancer Other         NIECE 60's   • Breast cancer Other         NIECE 60's   • Vision loss Paternal Aunt         blind-glaucoma   • Vision loss Paternal Uncle         blind-glaucoma   • Vision loss Maternal Aunt         all 9 aunts paternal aunts/Uncles/ cousins/ sister/brother/ and niece on meds for Glaucoma or had operation   • Early death Daughter         Celiac Sprue reaction   • Ovarian cancer Neg Hx         Review of Systems     There were no vitals filed for this visit.     Physical Exam  Constitutional:       General: She is not in acute distress.     Appearance: Normal appearance. She is not ill-appearing.   Neurological:      Mental Status: She is alert.          Diagnoses and all orders for this visit:    1. Gastroesophageal reflux disease with esophagitis without hemorrhage (Primary)    2. Esophageal dysphagia    Impressions and plan #1 erosive esophagitis with dysphagia and odynophagia: She seems to be doing clinically much better on the Protonix.  She still had 1 choking episode at night.  I am going to keep her on twice daily for now.  I am hesitant to drop her back.  If we do drop her back in the future we will keep her nighttime dose.  She does use Tums intermittently as well.  We will follow-up with her in about 3 months to see how she is doing and maybe we can cut back her dose at that time.    Jorge Frank MD

## 2023-02-28 ENCOUNTER — TRANSCRIBE ORDERS (OUTPATIENT)
Dept: ADMINISTRATIVE | Facility: HOSPITAL | Age: 83
End: 2023-02-28
Payer: MEDICARE

## 2023-02-28 DIAGNOSIS — R09.89 CORONARY ARTERY DISEASE WITH CARDIAC SYMPTOMS: Primary | ICD-10-CM

## 2023-02-28 DIAGNOSIS — I25.10 CORONARY ARTERY DISEASE WITH CARDIAC SYMPTOMS: Primary | ICD-10-CM

## 2023-03-03 ENCOUNTER — HOSPITAL ENCOUNTER (OUTPATIENT)
Dept: CT IMAGING | Facility: HOSPITAL | Age: 83
Discharge: HOME | End: 2023-03-03
Admitting: SURGERY
Payer: MEDICARE

## 2023-03-03 DIAGNOSIS — K42.9 UMBILICAL HERNIA WITHOUT OBSTRUCTION OR GANGRENE: ICD-10-CM

## 2023-03-03 PROCEDURE — 74176 CT ABD & PELVIS W/O CONTRAST: CPT

## 2023-03-07 ENCOUNTER — HOSPITAL ENCOUNTER (OUTPATIENT)
Dept: CARDIOLOGY | Facility: HOSPITAL | Age: 83
Discharge: HOME | End: 2023-03-07
Payer: MEDICARE

## 2023-03-07 ENCOUNTER — TRANSCRIBE ORDERS (OUTPATIENT)
Dept: ADMINISTRATIVE | Facility: HOSPITAL | Age: 83
End: 2023-03-07
Payer: MEDICARE

## 2023-03-07 DIAGNOSIS — I25.110 CORONARY ARTERY DISEASE INVOLVING NATIVE CORONARY ARTERY OF NATIVE HEART WITH UNSTABLE ANGINA PECTORIS: Primary | ICD-10-CM

## 2023-03-07 DIAGNOSIS — I25.10 CORONARY ARTERY DISEASE WITH CARDIAC SYMPTOMS: ICD-10-CM

## 2023-03-07 DIAGNOSIS — I25.110 CORONARY ARTERY DISEASE INVOLVING NATIVE CORONARY ARTERY OF NATIVE HEART WITH UNSTABLE ANGINA PECTORIS: ICD-10-CM

## 2023-03-07 DIAGNOSIS — R09.89 CORONARY ARTERY DISEASE WITH CARDIAC SYMPTOMS: ICD-10-CM

## 2023-03-07 LAB
MAXIMAL PREDICTED HEART RATE: 137 BPM
STRESS TARGET HR: 116 BPM

## 2023-03-17 ENCOUNTER — HOSPITAL ENCOUNTER (OUTPATIENT)
Dept: CARDIOLOGY | Facility: HOSPITAL | Age: 83
Discharge: HOME | End: 2023-03-17
Admitting: SURGERY
Payer: MEDICARE

## 2023-03-17 PROCEDURE — 0 TECHNETIUM SESTAMIBI: Performed by: SURGERY

## 2023-03-17 PROCEDURE — 78452 HT MUSCLE IMAGE SPECT MULT: CPT

## 2023-03-17 PROCEDURE — 93017 CV STRESS TEST TRACING ONLY: CPT

## 2023-03-17 PROCEDURE — 93018 CV STRESS TEST I&R ONLY: CPT | Performed by: INTERNAL MEDICINE

## 2023-03-17 PROCEDURE — A9500 TC99M SESTAMIBI: HCPCS | Performed by: SURGERY

## 2023-03-17 PROCEDURE — 25010000002 REGADENOSON 0.4 MG/5ML SOLUTION: Performed by: SURGERY

## 2023-03-17 PROCEDURE — 78452 HT MUSCLE IMAGE SPECT MULT: CPT | Performed by: INTERNAL MEDICINE

## 2023-03-17 RX ADMIN — REGADENOSON 0.4 MG: 0.08 INJECTION, SOLUTION INTRAVENOUS at 11:36

## 2023-03-17 RX ADMIN — TECHNETIUM TC 99M SESTAMIBI 1 DOSE: 1 INJECTION INTRAVENOUS at 11:40

## 2023-03-17 RX ADMIN — TECHNETIUM TC 99M SESTAMIBI 1 DOSE: 1 INJECTION INTRAVENOUS at 09:47

## 2023-03-20 LAB
BH CV REST NUCLEAR ISOTOPE DOSE: 9.9 MCI
BH CV STRESS BP STAGE 2: NORMAL
BH CV STRESS BP STAGE 4: NORMAL
BH CV STRESS COMMENTS STAGE 1: NORMAL
BH CV STRESS DOSE REGADENOSON STAGE 1: 0.4
BH CV STRESS DURATION MIN STAGE 1: 1
BH CV STRESS DURATION MIN STAGE 2: 1
BH CV STRESS DURATION MIN STAGE 3: 1
BH CV STRESS DURATION MIN STAGE 4: 1
BH CV STRESS DURATION SEC STAGE 1: 0
BH CV STRESS DURATION SEC STAGE 2: 0
BH CV STRESS DURATION SEC STAGE 3: 0
BH CV STRESS DURATION SEC STAGE 4: 0
BH CV STRESS HR STAGE 1: 73
BH CV STRESS HR STAGE 2: 95
BH CV STRESS HR STAGE 3: 94
BH CV STRESS HR STAGE 4: 93
BH CV STRESS NUCLEAR ISOTOPE DOSE: 33 MCI
BH CV STRESS O2 STAGE 1: 99
BH CV STRESS O2 STAGE 2: 98
BH CV STRESS O2 STAGE 3: 99
BH CV STRESS O2 STAGE 4: 99
BH CV STRESS PROTOCOL 1: NORMAL
BH CV STRESS RECOVERY BP: NORMAL MMHG
BH CV STRESS RECOVERY HR: 89 BPM
BH CV STRESS RECOVERY O2: 99 %
BH CV STRESS STAGE 1: 1
BH CV STRESS STAGE 2: 2
BH CV STRESS STAGE 3: 3
BH CV STRESS STAGE 4: 4
LV EF NUC BP: 75 %
MAXIMAL PREDICTED HEART RATE: 137 BPM
PERCENT MAX PREDICTED HR: 73.72 %
STRESS BASELINE BP: NORMAL MMHG
STRESS BASELINE HR: 62 BPM
STRESS O2 SAT REST: 96 %
STRESS PERCENT HR: 87 %
STRESS POST ESTIMATED WORKLOAD: 1 METS
STRESS POST EXERCISE DUR MIN: 4 MIN
STRESS POST EXERCISE DUR SEC: 0 SEC
STRESS POST O2 SAT PEAK: 99 %
STRESS POST PEAK BP: NORMAL MMHG
STRESS POST PEAK HR: 101 BPM
STRESS TARGET HR: 116 BPM

## 2023-04-12 ENCOUNTER — LAB (OUTPATIENT)
Dept: INTERNAL MEDICINE | Facility: CLINIC | Age: 83
End: 2023-04-12
Payer: MEDICARE

## 2023-04-12 ENCOUNTER — OFFICE VISIT (OUTPATIENT)
Dept: INTERNAL MEDICINE | Facility: CLINIC | Age: 83
End: 2023-04-12
Payer: MEDICARE

## 2023-04-12 VITALS
BODY MASS INDEX: 27.88 KG/M2 | OXYGEN SATURATION: 97 % | HEART RATE: 64 BPM | TEMPERATURE: 98 F | WEIGHT: 142 LBS | HEIGHT: 60 IN | SYSTOLIC BLOOD PRESSURE: 124 MMHG | DIASTOLIC BLOOD PRESSURE: 66 MMHG

## 2023-04-12 DIAGNOSIS — N95.1 MENOPAUSAL SYMPTOM: ICD-10-CM

## 2023-04-12 DIAGNOSIS — E78.00 PURE HYPERCHOLESTEROLEMIA: Chronic | ICD-10-CM

## 2023-04-12 DIAGNOSIS — R19.7 DIARRHEA, UNSPECIFIED TYPE: ICD-10-CM

## 2023-04-12 DIAGNOSIS — I10 ESSENTIAL HYPERTENSION: Primary | Chronic | ICD-10-CM

## 2023-04-12 DIAGNOSIS — K21.00 GASTROESOPHAGEAL REFLUX DISEASE WITH ESOPHAGITIS WITHOUT HEMORRHAGE: Chronic | ICD-10-CM

## 2023-04-12 DIAGNOSIS — Z78.0 MENOPAUSE: ICD-10-CM

## 2023-04-12 DIAGNOSIS — I10 ESSENTIAL HYPERTENSION: Chronic | ICD-10-CM

## 2023-04-12 DIAGNOSIS — R82.90 ABNORMAL URINALYSIS: ICD-10-CM

## 2023-04-12 LAB
BILIRUB BLD-MCNC: NEGATIVE MG/DL
CHOLEST SERPL-MCNC: 135 MG/DL (ref 0–200)
CLARITY, POC: ABNORMAL
COLOR UR: YELLOW
EXPIRATION DATE: ABNORMAL
GLUCOSE UR STRIP-MCNC: NEGATIVE MG/DL
HDLC SERPL-MCNC: 41 MG/DL (ref 40–60)
KETONES UR QL: NEGATIVE
LDLC SERPL CALC-MCNC: 77 MG/DL (ref 0–100)
LDLC/HDLC SERPL: 1.85 {RATIO}
LEUKOCYTE EST, POC: ABNORMAL
Lab: ABNORMAL
NITRITE UR-MCNC: NEGATIVE MG/ML
PH UR: 6 [PH] (ref 5–8)
PROT UR STRIP-MCNC: NEGATIVE MG/DL
RBC # UR STRIP: ABNORMAL /UL
SP GR UR: 1.02 (ref 1–1.03)
TRIGL SERPL-MCNC: 91 MG/DL (ref 0–150)
UROBILINOGEN UR QL: NORMAL
VLDLC SERPL-MCNC: 17 MG/DL (ref 5–40)

## 2023-04-12 PROCEDURE — 36415 COLL VENOUS BLD VENIPUNCTURE: CPT | Performed by: FAMILY MEDICINE

## 2023-04-12 PROCEDURE — 3078F DIAST BP <80 MM HG: CPT | Performed by: FAMILY MEDICINE

## 2023-04-12 PROCEDURE — 80061 LIPID PANEL: CPT | Performed by: FAMILY MEDICINE

## 2023-04-12 PROCEDURE — 3074F SYST BP LT 130 MM HG: CPT | Performed by: FAMILY MEDICINE

## 2023-04-12 PROCEDURE — 1160F RVW MEDS BY RX/DR IN RCRD: CPT | Performed by: FAMILY MEDICINE

## 2023-04-12 PROCEDURE — 80053 COMPREHEN METABOLIC PANEL: CPT | Performed by: FAMILY MEDICINE

## 2023-04-12 PROCEDURE — 87086 URINE CULTURE/COLONY COUNT: CPT | Performed by: FAMILY MEDICINE

## 2023-04-12 PROCEDURE — 1159F MED LIST DOCD IN RCRD: CPT | Performed by: FAMILY MEDICINE

## 2023-04-12 RX ORDER — ESTRADIOL 10 UG/1
1 INSERT VAGINAL 2 TIMES WEEKLY
Qty: 24 TABLET | Refills: 0 | Status: SHIPPED | OUTPATIENT
Start: 2023-04-13

## 2023-04-12 RX ORDER — PANTOPRAZOLE SODIUM 40 MG/1
40 TABLET, DELAYED RELEASE ORAL 2 TIMES DAILY
Qty: 180 TABLET | Refills: 1 | Status: SHIPPED | OUTPATIENT
Start: 2023-04-12

## 2023-04-12 RX ORDER — METOPROLOL SUCCINATE 25 MG/1
25 TABLET, EXTENDED RELEASE ORAL EVERY MORNING
Qty: 90 TABLET | Refills: 1 | Status: SHIPPED | OUTPATIENT
Start: 2023-04-12

## 2023-04-12 RX ORDER — ROSUVASTATIN CALCIUM 20 MG/1
20 TABLET, COATED ORAL NIGHTLY
Qty: 90 TABLET | Refills: 1 | Status: SHIPPED | OUTPATIENT
Start: 2023-04-12

## 2023-04-12 NOTE — PROGRESS NOTES
"Subjective   Chelsi Ferguson is a 82 y.o. female.     Chief Complaint   Patient presents with   • Hypertension   • Hyperlipidemia   • unintentional weight loss       Visit Vitals  /66 (BP Location: Left arm, Patient Position: Sitting, Cuff Size: Adult)   Pulse 64   Temp 98 °F (36.7 °C)   Ht 152.4 cm (60\")   Wt 64.4 kg (142 lb)   LMP  (LMP Unknown) Comment: Last Mammogram   SpO2 97%   BMI 27.73 kg/m²       Wt Readings from Last 3 Encounters:   04/12/23 64.4 kg (142 lb)   02/27/23 67.6 kg (149 lb)   02/08/23 66.7 kg (147 lb)         Hypertension  This is a chronic problem. The current episode started more than 1 year ago. The problem is unchanged. The problem is controlled. Associated symptoms include shortness of breath (INFANTE when walking the dog). Pertinent negatives include no anxiety, blurred vision, chest pain, headaches, malaise/fatigue, neck pain, orthopnea, palpitations, peripheral edema, PND or sweats. Agents associated with hypertension include estrogens. Risk factors for coronary artery disease include dyslipidemia, post-menopausal state and family history. Current antihypertension treatment includes beta blockers. The current treatment provides significant improvement. There are no compliance problems.  Hypertensive end-organ damage includes CAD/MI. There is no history of angina, kidney disease, CVA, heart failure, left ventricular hypertrophy, PVD or retinopathy. There is no history of chronic renal disease, sleep apnea or a thyroid problem.   Hyperlipidemia  This is a chronic problem. The current episode started more than 1 year ago. The problem is controlled. Recent lipid tests were reviewed and are normal. She has no history of chronic renal disease, diabetes, hypothyroidism, liver disease, obesity or nephrotic syndrome. Factors aggravating her hyperlipidemia include beta blockers. Associated symptoms include myalgias and shortness of breath (INFANTE when walking the dog). Pertinent negatives include no " chest pain, focal sensory loss, focal weakness or leg pain. Current antihyperlipidemic treatment includes statins. The current treatment provides significant improvement of lipids. There are no compliance problems.  Risk factors for coronary artery disease include dyslipidemia, family history, hypertension and post-menopausal.   Weight Loss  This is a new problem. The current episode started more than 1 month ago. The problem occurs constantly. The problem has been gradually worsening. Associated symptoms include abdominal pain, anorexia, arthralgias, a change in bowel habit, myalgias and nausea. Pertinent negatives include no chest pain, chills, congestion, coughing, diaphoresis, fatigue, fever, headaches, joint swelling, neck pain, numbness, rash, sore throat, swollen glands, urinary symptoms, vertigo, visual change, vomiting (3 weeks ago) or weakness. Nothing aggravates the symptoms. She has tried nothing for the symptoms. The treatment provided no relief.   Diarrhea   This is a new problem. The problem occurs less than 2 times per day. The problem has been unchanged. The stool consistency is described as watery. Associated symptoms include abdominal pain, arthralgias, bloating, increased flatus, myalgias and weight loss. Pertinent negatives include no chills, coughing, fever, headaches, sweats, URI or vomiting (3 weeks ago). The symptoms are aggravated by rye/wheat and dairy products. There are no known risk factors. She has tried nothing for the symptoms. The treatment provided no relief.   Heartburn  She complains of abdominal pain, heartburn and nausea. She reports no belching, no chest pain, no choking, no coughing, no dysphagia, no early satiety, no globus sensation, no hoarse voice, no sore throat, no stridor, no tooth decay, no water brash or no wheezing. This is a recurrent problem. The current episode started more than 1 year ago. The problem occurs occasionally. The problem has been rapidly improving.  The heartburn is located in the substernum. The heartburn is of moderate intensity. The heartburn wakes (better with pantoprazole) her from sleep. The heartburn does not limit her activity. The heartburn doesn't change with position. Nothing aggravates the symptoms. Associated symptoms include weight loss. Pertinent negatives include no anemia, fatigue, melena, muscle weakness or orthopnea. Risk factors include Maddox's esophagus. She has tried a PPI for the symptoms. The treatment provided significant relief. Past procedures include an EGD. Past procedures do not include an abdominal ultrasound, esophageal manometry, esophageal pH monitoring, H. pylori antibody titer or a UGI.      Pt is getting explosive diarrhea and she has to get up several times per night.   Pt has hernia surgery 6/6/23.    Pt is doing well with her PT. Pt has less pain.     Pt has hx of esophagitis that is improving with pantoprazole. Pt started this in November 2022 and diarrhea started shortly afterward.       The following portions of the patient's history were reviewed and updated as appropriate: allergies, current medications, past family history, past medical history, past social history, past surgical history and problem list.    Past Medical History:   Diagnosis Date   • Ankle pain    • Arthritis    • Cancer     Skin   • Cataract     removed   • Chest discomfort    • Cholecystitis 01/15/2018   • Clotting disorder     may be result of aspirin   • Coronary artery disease    • Double vision 04/25/2022    Uses glasses with prism   • Edema    • Elevated liver enzymes 01/15/2018   • Gallstones    • GERD (gastroesophageal reflux disease)    • Glaucoma     pt is currently off of meds and Dr Tanner does not think that she is glaucoma   • H/O complete eye exam 06/2013   • H/O mammogram 11/16/2015   • H/O non-ST elevation myocardial infarction (NSTEMI) 01/2005   • Hammer toe    • Heart attack 01/2005   • History of blood transfusion 01/2005   •  History of cardiovascular stress test 12/02/2015    normal   • HTN (hypertension)    • Hyperlipidemia    • Hypokalemia 01/15/2018   • Kidney infection 1971   • Low back pain    • Menopausal symptoms    • Onychia and paronychia of finger    • Osteopenia    • Other elevated white blood cell count    • Pap smear for cervical cancer screening 2010 [facility]   • Pneumonia    • Rheumatoid factor positive 12/05/2022   • Sepsis 01/15/2018   • Tremor     essential tremor   • UTI (urinary tract infection) 01/15/2018   • Viral warts    • Visual impairment 2019   • Wears glasses       Past Surgical History:   Procedure Laterality Date   • ADENOIDECTOMY  1952   • BLADDER REPAIR  2002   • BLADDER REPAIR  2003   • BUNIONECTOMY Right 2010   • CARDIAC CATHETERIZATION  stent placed 2005   • CHOLECYSTECTOMY     • CHOLECYSTECTOMY WITH INTRAOPERATIVE CHOLANGIOGRAM N/A 03/07/2018    Procedure: CHOLECYSTECTOMY LAPAROSCOPIC INTRAOPERATIVE CHOLANGIOGRAM;  Surgeon: Harinder Mason MD;  Location: [facility];  Service:    • COLONOSCOPY  2008, 4/2014    [facility]   • CORONARY STENT PLACEMENT Left 01/2005    drug eluting stent 1/2005 LAD   • DILATATION AND CURETTAGE  1971   • DILATATION AND CURETTAGE  1978   • EYE CAPSULOTOMY WITH LASER Bilateral 2018   • EYE SURGERY Bilateral 05/2016    Cataract   • FRONTALIS SUSPENSION  2010   • HAMMER TOE REPAIR Right 11/03/2010   • HEAD & NECK SKIN LESION EXCISIONAL BIOPSY  08/20/2018    benign scalp cyst   • HYSTERECTOMY  1978   • JOINT REPLACEMENT  2005   • LAPAROTOMY OOPHERECTOMY Bilateral 2002   • NOSE SURGERY  03/2017    repair 3 fractured areas. Dr Dunne   • OTHER SURGICAL HISTORY  2005    rectocele repair   • OTHER SURGICAL HISTORY  2010    eyelid surgery   • REPLACEMENT TOTAL KNEE BILATERAL Bilateral    • SKIN BIOPSY     • SUBTOTAL HYSTERECTOMY  1978   • TONSILLECTOMY  1952   • TOTAL KNEE ARTHROPLASTY  01/2005   • UMBILICAL HERNIA REPAIR  2019   • UPPER GASTROINTESTINAL ENDOSCOPY  11/17/2022      Zac   • UTERINE FIBROID SURGERY      emergency removal of fibroid from birth canal      Family History   Problem Relation Age of Onset   • Stroke Mother    • Hypertension Mother         My Mother  of brain hemmorage/stroke   • Heart failure Father         congestive   • Cancer Father         Neck glands removed   • Vision loss Father         Had operation   • Heart disease Father          of Congestive Heart Failure at 91   • Breast cancer Sister         60's   • Cancer Sister         Breast   • Breast cancer Daughter 40   • Cancer Daughter         Breast   • Thyroid disease Daughter         on meds   • Breast cancer Other         NIECE 60's   • Breast cancer Other         NIECE 60's   • Vision loss Paternal Aunt         blind-glaucoma   • Vision loss Paternal Uncle         blind-glaucoma   • Vision loss Maternal Aunt         all 9 aunts paternal aunts/Uncles/ cousins/ sister/brother/ and niece on meds for Glaucoma or had operation   • Early death Daughter         Celiac Sprue reaction   • Ovarian cancer Neg Hx       Social History     Socioeconomic History   • Marital status:    Tobacco Use   • Smoking status: Never   • Smokeless tobacco: Never   • Tobacco comments:     My father, brother in law, friends, and  smoked so had second hand exposure   Vaping Use   • Vaping Use: Never used   Substance and Sexual Activity   • Alcohol use: No   • Drug use: No   • Sexual activity: Not Currently     Partners: Male     Birth control/protection: Hysterectomy      Allergies   Allergen Reactions   • Aleve [Naproxen] Hives   • Indocin [Indomethacin] Hallucinations   • Lipitor [Atorvastatin] Hives   • Statins Hives   • Zocor [Simvastatin] Hives   • Celebrex [Celecoxib] Diarrhea   • Keflex [Cephalexin] GI Intolerance     Upset stomach, may have had some blood in stool but cannot remember the reason why she was put on it. Tolerates penicillins without problem.   • Ibuprofen GI Intolerance     Heartburn.    • Prevacid [Lansoprazole] Nausea And Vomiting   • Prilosec [Omeprazole] Nausea And Vomiting       Review of Systems   Constitutional: Positive for weight loss. Negative for chills, diaphoresis, fatigue, fever and malaise/fatigue.   HENT: Negative for congestion, hoarse voice and sore throat.    Eyes: Negative for blurred vision.   Respiratory: Positive for shortness of breath (INFANTE when walking the dog). Negative for cough, choking and wheezing.    Cardiovascular: Negative for chest pain, palpitations, orthopnea and PND.   Gastrointestinal: Positive for abdominal pain, anorexia, bloating, change in bowel habit, diarrhea, flatus, heartburn and nausea. Negative for dysphagia, melena and vomiting (3 weeks ago).   Musculoskeletal: Positive for arthralgias and myalgias. Negative for joint swelling, muscle weakness and neck pain.   Skin: Negative for rash.   Neurological: Negative for vertigo, focal weakness, weakness, numbness and headaches.       Objective   Physical Exam  Vitals and nursing note reviewed.   Constitutional:       Appearance: She is well-developed.   HENT:      Head: Normocephalic.      Right Ear: External ear normal.      Left Ear: External ear normal.      Nose: Nose normal.   Eyes:      General: Lids are normal.      Conjunctiva/sclera: Conjunctivae normal.      Pupils: Pupils are equal, round, and reactive to light.   Neck:      Thyroid: No thyroid mass or thyromegaly.      Vascular: No carotid bruit.      Trachea: Trachea normal.   Cardiovascular:      Rate and Rhythm: Normal rate and regular rhythm.      Heart sounds: No murmur heard.  Pulmonary:      Effort: Pulmonary effort is normal. No respiratory distress.      Breath sounds: Normal breath sounds. No decreased breath sounds, wheezing, rhonchi or rales.   Chest:      Chest wall: No tenderness.   Abdominal:      General: Bowel sounds are normal.      Palpations: Abdomen is soft.      Tenderness: There is no abdominal tenderness.    Musculoskeletal:         General: Normal range of motion.      Cervical back: Normal range of motion and neck supple.   Skin:     General: Skin is warm and dry.   Neurological:      Mental Status: She is alert and oriented to person, place, and time.   Psychiatric:         Behavior: Behavior normal.         Assessment & Plan   Diagnoses and all orders for this visit:    1. Essential hypertension (Primary)  -     metoprolol succinate XL (TOPROL-XL) 25 MG 24 hr tablet; Take 1 tablet by mouth Every Morning.  Dispense: 90 tablet; Refill: 1  -     Comprehensive Metabolic Panel; Future  -     Lipid Panel; Future    2. Gastroesophageal reflux disease with esophagitis without hemorrhage  -     pantoprazole (PROTONIX) 40 MG EC tablet; Take 1 tablet by mouth 2 (Two) Times a Day.  Dispense: 180 tablet; Refill: 1    3. Pure hypercholesterolemia  -     rosuvastatin (CRESTOR) 20 MG tablet; Take 1 tablet by mouth Every Night.  Dispense: 90 tablet; Refill: 1  -     Comprehensive Metabolic Panel; Future  -     Lipid Panel; Future    4. Menopausal symptom  -     estradiol (Yuvafem) 10 MCG tablet vaginal tablet; Insert 1 tablet into the vagina 2 (Two) Times a Week.  Dispense: 24 tablet; Refill: 0    5. Diarrhea, unspecified type  -     Clostridioides difficile Toxin - Stool, Per Rectum  -     Fecal Leukocytes - Stool, Per Rectum  -     Ova & Parasite Examination - Stool, Per Rectum  -     Rotavirus Antigen, Stool - Stool, Per Rectum  -     Stool Culture With Yersinia - Stool, Per Rectum    6. Menopause  -     DEXA Bone Density Axial; Future    7. Abnormal urinalysis  -     POC Urinalysis Dipstick, Automated  -     Urine Culture - , Urine, Clean Catch; Future      Handout on Fodmap diet.                Current Outpatient Medications:   •  acetaminophen (TYLENOL) 650 MG 8 hr tablet, Take 2 tablets by mouth 2 (two) times a day., Disp: , Rfl:   •  aspirin 81 MG EC tablet, Take 1 tablet by mouth Every Night., Disp: , Rfl:   •  BIOTIN  5000 PO, Take  by mouth Daily., Disp: , Rfl:   •  Cholecalciferol (VITAMIN D3 PO), Take 1 tablet by mouth Daily., Disp: , Rfl:   •  coenzyme Q10 100 MG capsule, Take 1 capsule by mouth Daily., Disp: , Rfl:   •  [START ON 4/13/2023] estradiol (Yuvafem) 10 MCG tablet vaginal tablet, Insert 1 tablet into the vagina 2 (Two) Times a Week., Disp: 24 tablet, Rfl: 0  •  latanoprost (XALATAN) 0.005 % ophthalmic solution, 1 drop Every Night., Disp: , Rfl:   •  metoprolol succinate XL (TOPROL-XL) 25 MG 24 hr tablet, Take 1 tablet by mouth Every Morning., Disp: 90 tablet, Rfl: 1  •  multivitamin with minerals tablet tablet, Take 1 tablet by mouth Daily., Disp: , Rfl:   •  nitroglycerin (NITROSTAT) 0.4 MG SL tablet, Place 1 tablet under the tongue Every 5 (Five) Minutes As Needed for Chest Pain., Disp: 25 tablet, Rfl: 11  •  pantoprazole (PROTONIX) 40 MG EC tablet, Take 1 tablet by mouth 2 (Two) Times a Day., Disp: 180 tablet, Rfl: 1  •  rosuvastatin (CRESTOR) 20 MG tablet, Take 1 tablet by mouth Every Night., Disp: 90 tablet, Rfl: 1    Return in about 3 months (around 7/12/2023), or if symptoms worsen or fail to improve, for Recheck bp. lipid, diarrhea.

## 2023-04-12 NOTE — PATIENT INSTRUCTIONS
More information on FODMAP diet is at the [facility] site in [place], and at [employer]    FODMAP challenge.  Mannitol: Celery, cauliflower.  Sorbitol: Prunes, green cabbage.  Fructose: Honey, 1 cup broccoli.  Oligosaccharide: Scallions, beans and chickpeas.

## 2023-04-13 LAB
ALBUMIN SERPL-MCNC: 4.2 G/DL (ref 3.5–5.2)
ALBUMIN/GLOB SERPL: 1.8 G/DL
ALP SERPL-CCNC: 75 U/L (ref 39–117)
ALT SERPL W P-5'-P-CCNC: 9 U/L (ref 1–33)
ANION GAP SERPL CALCULATED.3IONS-SCNC: 8.5 MMOL/L (ref 5–15)
AST SERPL-CCNC: 19 U/L (ref 1–32)
BACTERIA SPEC AEROBE CULT: NO GROWTH
BILIRUB SERPL-MCNC: 0.4 MG/DL (ref 0–1.2)
BUN SERPL-MCNC: 11 MG/DL (ref 8–23)
BUN/CREAT SERPL: 14.7 (ref 7–25)
CALCIUM SPEC-SCNC: 9.7 MG/DL (ref 8.6–10.5)
CHLORIDE SERPL-SCNC: 102 MMOL/L (ref 98–107)
CO2 SERPL-SCNC: 26.5 MMOL/L (ref 22–29)
CREAT SERPL-MCNC: 0.75 MG/DL (ref 0.57–1)
EGFRCR SERPLBLD CKD-EPI 2021: 79.1 ML/MIN/1.73
GLOBULIN UR ELPH-MCNC: 2.3 GM/DL
GLUCOSE SERPL-MCNC: 92 MG/DL (ref 65–99)
POTASSIUM SERPL-SCNC: 4.3 MMOL/L (ref 3.5–5.2)
PROT SERPL-MCNC: 6.5 G/DL (ref 6–8.5)
SODIUM SERPL-SCNC: 137 MMOL/L (ref 136–145)

## 2023-04-27 ENCOUNTER — LAB (OUTPATIENT)
Dept: LAB | Facility: HOSPITAL | Age: 83
End: 2023-04-27
Payer: MEDICARE

## 2023-04-27 LAB
C DIFF TOX GENS STL QL NAA+PROBE: NOT DETECTED
WBC STL QL MICRO: NORMAL

## 2023-04-27 PROCEDURE — 87046 STOOL CULTR AEROBIC BACT EA: CPT | Performed by: FAMILY MEDICINE

## 2023-04-27 PROCEDURE — 87045 FECES CULTURE AEROBIC BACT: CPT | Performed by: FAMILY MEDICINE

## 2023-04-27 PROCEDURE — 87205 SMEAR GRAM STAIN: CPT | Performed by: FAMILY MEDICINE

## 2023-04-27 PROCEDURE — 87493 C DIFF AMPLIFIED PROBE: CPT | Performed by: FAMILY MEDICINE

## 2023-04-27 PROCEDURE — 87425 ROTAVIRUS AG IA: CPT | Performed by: FAMILY MEDICINE

## 2023-04-27 PROCEDURE — 87209 SMEAR COMPLEX STAIN: CPT | Performed by: FAMILY MEDICINE

## 2023-04-27 PROCEDURE — 87427 SHIGA-LIKE TOXIN AG IA: CPT | Performed by: FAMILY MEDICINE

## 2023-04-27 PROCEDURE — 87177 OVA AND PARASITES SMEARS: CPT | Performed by: FAMILY MEDICINE

## 2023-04-28 LAB — RV AG STL QL IA: NEGATIVE

## 2023-05-02 LAB
BACTERIA SPEC CULT: NORMAL
CAMPYLOBACTER STL CULT: NORMAL
E COLI SXT STL QL IA: NEGATIVE
SALM + SHIG STL CULT: NORMAL
YERSINIA SPEC CULT: NORMAL

## 2023-05-07 LAB
O+P SPEC MICRO: NORMAL
O+P STL TRI STN: NORMAL

## 2023-05-30 ENCOUNTER — PRE-ADMISSION TESTING (OUTPATIENT)
Dept: PREADMISSION TESTING | Facility: HOSPITAL | Age: 83
End: 2023-05-30

## 2023-05-30 VITALS — WEIGHT: 143.41 LBS | BODY MASS INDEX: 28.91 KG/M2 | HEIGHT: 59 IN

## 2023-05-30 LAB
ANION GAP SERPL CALCULATED.3IONS-SCNC: 10 MMOL/L (ref 5–15)
APTT PPP: 27.2 SECONDS (ref 22–39)
BUN SERPL-MCNC: 9 MG/DL (ref 8–23)
BUN/CREAT SERPL: 17.3 (ref 7–25)
CALCIUM SPEC-SCNC: 9.5 MG/DL (ref 8.6–10.5)
CHLORIDE SERPL-SCNC: 108 MMOL/L (ref 98–107)
CO2 SERPL-SCNC: 26 MMOL/L (ref 22–29)
CREAT SERPL-MCNC: 0.52 MG/DL (ref 0.57–1)
DEPRECATED RDW RBC AUTO: 47 FL (ref 37–54)
EGFRCR SERPLBLD CKD-EPI 2021: 92.3 ML/MIN/1.73
ERYTHROCYTE [DISTWIDTH] IN BLOOD BY AUTOMATED COUNT: 13 % (ref 12.3–15.4)
GLUCOSE SERPL-MCNC: 101 MG/DL (ref 65–99)
HBA1C MFR BLD: 5.6 % (ref 4.8–5.6)
HCT VFR BLD AUTO: 37.8 % (ref 34–46.6)
HGB BLD-MCNC: 12.6 G/DL (ref 12–15.9)
INR PPP: 0.97 (ref 0.89–1.12)
MCH RBC QN AUTO: 32.8 PG (ref 26.6–33)
MCHC RBC AUTO-ENTMCNC: 33.3 G/DL (ref 31.5–35.7)
MCV RBC AUTO: 98.4 FL (ref 79–97)
PLATELET # BLD AUTO: 265 10*3/MM3 (ref 140–450)
PMV BLD AUTO: 8.9 FL (ref 6–12)
POTASSIUM SERPL-SCNC: 4 MMOL/L (ref 3.5–5.2)
PROTHROMBIN TIME: 13 SECONDS (ref 12.2–14.5)
RBC # BLD AUTO: 3.84 10*6/MM3 (ref 3.77–5.28)
SODIUM SERPL-SCNC: 144 MMOL/L (ref 136–145)
WBC NRBC COR # BLD: 6.35 10*3/MM3 (ref 3.4–10.8)

## 2023-05-30 PROCEDURE — 85730 THROMBOPLASTIN TIME PARTIAL: CPT

## 2023-05-30 PROCEDURE — 80048 BASIC METABOLIC PNL TOTAL CA: CPT

## 2023-05-30 PROCEDURE — 36415 COLL VENOUS BLD VENIPUNCTURE: CPT

## 2023-05-30 PROCEDURE — 85610 PROTHROMBIN TIME: CPT

## 2023-05-30 PROCEDURE — 85027 COMPLETE CBC AUTOMATED: CPT

## 2023-05-30 PROCEDURE — 83036 HEMOGLOBIN GLYCOSYLATED A1C: CPT

## 2023-05-30 NOTE — PAT
An arrival time for procedure was not provided during PAT visit. If patient had any questions or concerns about their arrival time, they were instructed to contact their surgeon/physician.  Additionally, if the patient referred to an arrival time that was acquired from their my chart account, patient was encouraged to verify that time with their surgeon/physician. Arrival times are NOT provided in Pre Admission Testing Department.    Patient viewed general PAT education video as instructed in their preoperative information received from their surgeon.  Patient stated the general PAT education video was viewed in its entirety and survey completed.  Copies of PAT general education handouts (Incentive Spirometry, Meds to Beds Program, Patient Belongings, Pre-op skin preparation instructions, Blood Glucose testing, Visitor policy, Surgery FAQ, Code H) distributed to patient if not printed. Education related to the PAT pass and skin preparation for surgery (if applicable) completed in PAT as a reinforcement to PAT education video. Patient instructed to return PAT pass provided today as well as completed skin preparation sheet (if applicable) on the day of procedure.     Additionally if patient had not viewed video yet but intended to view it at home or in our waiting area, then referred them to the handout with QR code/link provided during PAT visit.  Instructed patient to complete survey after viewing the video in its entirety.  Encouraged patient/family to read PAT general education handouts thoroughly and notify PAT staff with any questions or concerns. Patient verbalized understanding of all information and priority content.    Patient to apply Chlorhexadine wipes  to surgical area (as instructed) the night before procedure and the AM of procedure. Wipes provided.    Patient denies any current skin issues.     Resting EKG from 03/2023 in Epic and paper chart.

## 2023-06-01 ENCOUNTER — OFFICE VISIT (OUTPATIENT)
Dept: GASTROENTEROLOGY | Facility: CLINIC | Age: 83
End: 2023-06-01

## 2023-06-01 VITALS — BODY MASS INDEX: 28.27 KG/M2 | HEIGHT: 60 IN | WEIGHT: 144 LBS

## 2023-06-01 DIAGNOSIS — K21.00 GASTROESOPHAGEAL REFLUX DISEASE WITH ESOPHAGITIS WITHOUT HEMORRHAGE: Primary | Chronic | ICD-10-CM

## 2023-06-01 DIAGNOSIS — R13.19 ESOPHAGEAL DYSPHAGIA: ICD-10-CM

## 2023-06-01 PROCEDURE — 1160F RVW MEDS BY RX/DR IN RCRD: CPT | Performed by: INTERNAL MEDICINE

## 2023-06-01 PROCEDURE — 1159F MED LIST DOCD IN RCRD: CPT | Performed by: INTERNAL MEDICINE

## 2023-06-01 PROCEDURE — 99214 OFFICE O/P EST MOD 30 MIN: CPT | Performed by: INTERNAL MEDICINE

## 2023-06-01 RX ORDER — DEXLANSOPRAZOLE 60 MG/1
CAPSULE, DELAYED RELEASE ORAL
Qty: 90 CAPSULE | Refills: 3 | Status: SHIPPED | OUTPATIENT
Start: 2023-06-01

## 2023-06-01 NOTE — PROGRESS NOTES
PCP:  Reina Yarbrough MD     No referring provider defined for this encounter.    Chief Complaint   Patient presents with   • Heartburn        HPI   Patient is an 82-year-old known to me with a history of erosive esophagitis she still having significant symptoms despite her pantoprazole twice daily.  She has had trouble in the past with Prevacid and Prilosec.  She is getting an umbilical hernia repair.  She like to try another medicine if she is able to.  Her swallowing is better.  Last upper endoscopy was 11/17/2022.  This showed a medium sized hiatal hernia and gastritis.  She had reflux esophagitis as well.    Allergies   Allergen Reactions   • Indocin [Indomethacin] Hallucinations   • Statins Hives   • Keflex [Cephalexin] GI Intolerance     Upset stomach, may have had some blood in stool but cannot remember the reason why she was put on it. Tolerates penicillins without problem.   • Adhesive Tape Other (See Comments)     Skin irritation with bandaids, surgical bandages that stay on skin for extended periods of time   • Aleve [Naproxen] Hives   • Celecoxib Diarrhea   • Ibuprofen GI Intolerance     Heartburn.   • Lipitor [Atorvastatin] Hives   • Prevacid [Lansoprazole] Nausea And Vomiting   • Prilosec [Omeprazole] Nausea And Vomiting   • Zocor [Simvastatin] Hives          Current Outpatient Medications:   •  acetaminophen (TYLENOL) 650 MG 8 hr tablet, Take 2 tablets by mouth 2 (two) times a day., Disp: , Rfl:   •  aspirin 81 MG EC tablet, Take 1 tablet by mouth Every Night., Disp: , Rfl:   •  BIOTIN 5000 PO, Take 5,000 mcg by mouth Daily., Disp: , Rfl:   •  Cholecalciferol (Vitamin D3) 50 MCG (2000 UT) capsule, Take 1 capsule by mouth Daily., Disp: , Rfl:   •  coenzyme Q10 100 MG capsule, Take 1 capsule by mouth Daily., Disp: , Rfl:   •  estradiol (Yuvafem) 10 MCG tablet vaginal tablet, Insert 1 tablet into the vagina 2 (Two) Times a Week., Disp: 24 tablet, Rfl: 0  •  latanoprost (XALATAN) 0.005 % ophthalmic  solution, 1 drop Every Night., Disp: , Rfl:   •  metoprolol succinate XL (TOPROL-XL) 25 MG 24 hr tablet, Take 1 tablet by mouth Every Morning., Disp: 90 tablet, Rfl: 1  •  multivitamin with minerals tablet tablet, Take 1 tablet by mouth Daily., Disp: , Rfl:   •  nitroglycerin (NITROSTAT) 0.4 MG SL tablet, Place 1 tablet under the tongue Every 5 (Five) Minutes As Needed for Chest Pain., Disp: 25 tablet, Rfl: 11  •  pantoprazole (PROTONIX) 40 MG EC tablet, Take 1 tablet by mouth 2 (Two) Times a Day., Disp: 180 tablet, Rfl: 1  •  rosuvastatin (CRESTOR) 20 MG tablet, Take 1 tablet by mouth Every Night., Disp: 90 tablet, Rfl: 1     Past Medical History:   Diagnosis Date   • Ankle pain    • Arthritis    • Cancer     Skin   • Cataract     removed   • Chest discomfort    • Cholecystitis 01/15/2018   • Clotting disorder     may be result of aspirin   • Coronary artery disease    • Double vision 04/25/2022    Uses glasses with prism   • Edema    • Elevated cholesterol    • Elevated liver enzymes 01/15/2018   • Gallstones    • GERD (gastroesophageal reflux disease)    • Glaucoma     pt is currently off of meds and Dr Tanner does not think that she is glaucoma   • H/O complete eye exam 06/2013   • H/O mammogram 11/16/2015   • H/O non-ST elevation myocardial infarction (NSTEMI) 01/2005   • Hammer toe    • Heart attack 01/2005   • History of blood transfusion 01/2005   • History of cardiovascular stress test 12/02/2015    normal   • HTN (hypertension)    • Hyperlipidemia    • Hypokalemia 01/15/2018   • Kidney infection 1971   • Low back pain    • Menopausal symptoms    • Onychia and paronychia of finger    • Osteopenia    • Other elevated white blood cell count    • Pap smear for cervical cancer screening 2010    [facility]   • Pneumonia    • Rheumatoid factor positive 12/05/2022   • Sepsis 01/15/2018   • Tremor     essential tremor   • UTI (urinary tract infection) 01/15/2018   • Viral warts    • Visual impairment 2019   • Wears  glasses        Past Surgical History:   Procedure Laterality Date   • ADENOIDECTOMY  1952   • BLADDER REPAIR  2002   • BLADDER REPAIR  2003   • BUNIONECTOMY Right 2010   • CARDIAC CATHETERIZATION  stent placed 2005   • CHOLECYSTECTOMY     • CHOLECYSTECTOMY WITH INTRAOPERATIVE CHOLANGIOGRAM N/A 03/07/2018    Procedure: CHOLECYSTECTOMY LAPAROSCOPIC INTRAOPERATIVE CHOLANGIOGRAM;  Surgeon: Harinder Mason MD;  Location: [facility];  Service:    • COLONOSCOPY  2008, 4/2014    [facility]   • CORONARY STENT PLACEMENT Left 01/2005    drug eluting stent 1/2005 LAD   • DILATATION AND CURETTAGE  1971   • DILATATION AND CURETTAGE  1978   • EYE CAPSULOTOMY WITH LASER Bilateral 2018   • EYE SURGERY Bilateral 05/2016    Cataract   • FRONTALIS SUSPENSION  2010   • HAMMER TOE REPAIR Right 11/03/2010   • HEAD & NECK SKIN LESION EXCISIONAL BIOPSY  08/20/2018    benign scalp cyst   • HYSTERECTOMY  1978   • JOINT REPLACEMENT  2005   • LAPAROTOMY OOPHERECTOMY Bilateral 2002   • NOSE SURGERY  03/2017    repair 3 fractured areas. Dr Dunne   • OTHER SURGICAL HISTORY  2005    rectocele repair   • OTHER SURGICAL HISTORY  2010    eyelid surgery   • REPLACEMENT TOTAL KNEE BILATERAL Bilateral    • SKIN BIOPSY     • SUBTOTAL HYSTERECTOMY  1978   • TONSILLECTOMY  1952   • TOTAL KNEE ARTHROPLASTY  01/2005   • UMBILICAL HERNIA REPAIR  2019   • UPPER GASTROINTESTINAL ENDOSCOPY  11/17/2022    Dr Frank   • UTERINE FIBROID SURGERY  1978    emergency removal of fibroid from birth canal        Social History     Socioeconomic History   • Marital status:    Tobacco Use   • Smoking status: Never     Passive exposure: Past   • Smokeless tobacco: Never   • Tobacco comments:     My father, brother in law, friends, and  smoked so had second hand exposure   Vaping Use   • Vaping Use: Never used   Substance and Sexual Activity   • Alcohol use: No   • Drug use: No   • Sexual activity: Defer        Family History   Problem Relation Age of Onset   •  Stroke Mother    • Hypertension Mother         My Mother  of brain hemmorage/stroke   • Heart failure Father         congestive   • Cancer Father         Neck glands removed   • Vision loss Father         Had operation   • Heart disease Father          of Congestive Heart Failure at 91   • Breast cancer Sister         60's   • Cancer Sister         Breast   • Breast cancer Daughter 40   • Cancer Daughter         Breast   • Thyroid disease Daughter         on meds   • Breast cancer Other         NIECE 60's   • Breast cancer Other         NIECE 60's   • Vision loss Paternal Aunt         blind-glaucoma   • Vision loss Paternal Uncle         blind-glaucoma   • Vision loss Maternal Aunt         all 9 aunts paternal aunts/Uncles/ cousins/ sister/brother/ and niece on meds for Glaucoma or had operation   • Early death Daughter         Celiac Sprue reaction   • Ovarian cancer Neg Hx         Review of Systems     There were no vitals filed for this visit.     Physical Exam  Constitutional:       General: She is not in acute distress.     Appearance: Normal appearance. She is not ill-appearing.   Neurological:      Mental Status: She is alert.          Diagnoses and all orders for this visit:    1. Gastroesophageal reflux disease with esophagitis without hemorrhage (Primary)    2. Esophageal dysphagia    Impressions and plan #1 gastroesophageal reflux disease: She seems to not be thriving on the Protonix twice daily.  She has tried omeprazole and Prevacid.  We will try some Dexilant.  We will call her in Dexilant 60 mg.  She can still use Tums or antacids as needed.    #2 dysphagia: Thankfully, this seems to be doing better.  We will try the Dexilant and hopefully she will be able to get that covered and will control her symptoms to a better degree.  Her symptoms are worse in the evening so I would say to take the Dexilant in the evening.    Jorge Frank MD

## 2023-06-01 NOTE — LETTER
June 1, 2023       No Recipients    Patient: Chelsi Ferguson   YOB: 1940   Date of Visit: 6/1/2023       Dear Dr. Santana Recipients:    Thank you for referring Chelsi Ferguson to me for evaluation. Below are the relevant portions of my assessment and plan of care.    If you have questions, please do not hesitate to call me. I look forward to following Chelsi along with you.         Sincerely,        Jorge Frank MD        CC:   No Recipients    Jorge Frank MD  06/01/23 1425  Signed  PCP:  Reina Yarbrough MD     No referring provider defined for this encounter.    Chief Complaint   Patient presents with   • Heartburn        HPI   Patient is an 82-year-old known to me with a history of erosive esophagitis she still having significant symptoms despite her pantoprazole twice daily.  She has had trouble in the past with Prevacid and Prilosec.  She is getting an umbilical hernia repair.  She like to try another medicine if she is able to.  Her swallowing is better.  Last upper endoscopy was 11/17/2022.  This showed a medium sized hiatal hernia and gastritis.  She had reflux esophagitis as well.    Allergies   Allergen Reactions   • Indocin [Indomethacin] Hallucinations   • Statins Hives   • Keflex [Cephalexin] GI Intolerance     Upset stomach, may have had some blood in stool but cannot remember the reason why she was put on it. Tolerates penicillins without problem.   • Adhesive Tape Other (See Comments)     Skin irritation with bandaids, surgical bandages that stay on skin for extended periods of time   • Aleve [Naproxen] Hives   • Celecoxib Diarrhea   • Ibuprofen GI Intolerance     Heartburn.   • Lipitor [Atorvastatin] Hives   • Prevacid [Lansoprazole] Nausea And Vomiting   • Prilosec [Omeprazole] Nausea And Vomiting   • Zocor [Simvastatin] Hives          Current Outpatient Medications:   •  acetaminophen (TYLENOL) 650 MG 8 hr tablet, Take 2 tablets by mouth 2 (two) times a day., Disp: , Rfl:   •   aspirin 81 MG EC tablet, Take 1 tablet by mouth Every Night., Disp: , Rfl:   •  BIOTIN 5000 PO, Take 5,000 mcg by mouth Daily., Disp: , Rfl:   •  Cholecalciferol (Vitamin D3) 50 MCG (2000 UT) capsule, Take 1 capsule by mouth Daily., Disp: , Rfl:   •  coenzyme Q10 100 MG capsule, Take 1 capsule by mouth Daily., Disp: , Rfl:   •  estradiol (Yuvafem) 10 MCG tablet vaginal tablet, Insert 1 tablet into the vagina 2 (Two) Times a Week., Disp: 24 tablet, Rfl: 0  •  latanoprost (XALATAN) 0.005 % ophthalmic solution, 1 drop Every Night., Disp: , Rfl:   •  metoprolol succinate XL (TOPROL-XL) 25 MG 24 hr tablet, Take 1 tablet by mouth Every Morning., Disp: 90 tablet, Rfl: 1  •  multivitamin with minerals tablet tablet, Take 1 tablet by mouth Daily., Disp: , Rfl:   •  nitroglycerin (NITROSTAT) 0.4 MG SL tablet, Place 1 tablet under the tongue Every 5 (Five) Minutes As Needed for Chest Pain., Disp: 25 tablet, Rfl: 11  •  pantoprazole (PROTONIX) 40 MG EC tablet, Take 1 tablet by mouth 2 (Two) Times a Day., Disp: 180 tablet, Rfl: 1  •  rosuvastatin (CRESTOR) 20 MG tablet, Take 1 tablet by mouth Every Night., Disp: 90 tablet, Rfl: 1     Past Medical History:   Diagnosis Date   • Ankle pain    • Arthritis    • Cancer     Skin   • Cataract     removed   • Chest discomfort    • Cholecystitis 01/15/2018   • Clotting disorder     may be result of aspirin   • Coronary artery disease    • Double vision 04/25/2022    Uses glasses with prism   • Edema    • Elevated cholesterol    • Elevated liver enzymes 01/15/2018   • Gallstones    • GERD (gastroesophageal reflux disease)    • Glaucoma     pt is currently off of meds and Dr Tanner does not think that she is glaucoma   • H/O complete eye exam 06/2013   • H/O mammogram 11/16/2015   • H/O non-ST elevation myocardial infarction (NSTEMI) 01/2005   • Hammer toe    • Heart attack 01/2005   • History of blood transfusion 01/2005   • History of cardiovascular stress test 12/02/2015    normal   • HTN  (hypertension)    • Hyperlipidemia    • Hypokalemia 01/15/2018   • Kidney infection 1971   • Low back pain    • Menopausal symptoms    • Onychia and paronychia of finger    • Osteopenia    • Other elevated white blood cell count    • Pap smear for cervical cancer screening 2010 [facility]   • Pneumonia    • Rheumatoid factor positive 12/05/2022   • Sepsis 01/15/2018   • Tremor     essential tremor   • UTI (urinary tract infection) 01/15/2018   • Viral warts    • Visual impairment 2019   • Wears glasses        Past Surgical History:   Procedure Laterality Date   • ADENOIDECTOMY  1952   • BLADDER REPAIR  2002   • BLADDER REPAIR  2003   • BUNIONECTOMY Right 2010   • CARDIAC CATHETERIZATION  stent placed 2005   • CHOLECYSTECTOMY     • CHOLECYSTECTOMY WITH INTRAOPERATIVE CHOLANGIOGRAM N/A 03/07/2018    Procedure: CHOLECYSTECTOMY LAPAROSCOPIC INTRAOPERATIVE CHOLANGIOGRAM;  Surgeon: Harinder Mason MD;  Location: [facility];  Service:    • COLONOSCOPY  2008, 4/2014    [facility]   • CORONARY STENT PLACEMENT Left 01/2005    drug eluting stent 1/2005 LAD   • DILATATION AND CURETTAGE  1971   • DILATATION AND CURETTAGE  1978   • EYE CAPSULOTOMY WITH LASER Bilateral 2018   • EYE SURGERY Bilateral 05/2016    Cataract   • FRONTALIS SUSPENSION  2010   • HAMMER TOE REPAIR Right 11/03/2010   • HEAD & NECK SKIN LESION EXCISIONAL BIOPSY  08/20/2018    benign scalp cyst   • HYSTERECTOMY  1978   • JOINT REPLACEMENT  2005   • LAPAROTOMY OOPHERECTOMY Bilateral 2002   • NOSE SURGERY  03/2017    repair 3 fractured areas. Dr Dunne   • OTHER SURGICAL HISTORY  2005    rectocele repair   • OTHER SURGICAL HISTORY  2010    eyelid surgery   • REPLACEMENT TOTAL KNEE BILATERAL Bilateral    • SKIN BIOPSY     • SUBTOTAL HYSTERECTOMY  1978   • TONSILLECTOMY  1952   • TOTAL KNEE ARTHROPLASTY  01/2005   • UMBILICAL HERNIA REPAIR  2019   • UPPER GASTROINTESTINAL ENDOSCOPY  11/17/2022    Dr Frank   • UTERINE FIBROID SURGERY  1978    emergency removal of  fibroid from birth canal        Social History     Socioeconomic History   • Marital status:    Tobacco Use   • Smoking status: Never     Passive exposure: Past   • Smokeless tobacco: Never   • Tobacco comments:     My father, brother in law, friends, and  smoked so had second hand exposure   Vaping Use   • Vaping Use: Never used   Substance and Sexual Activity   • Alcohol use: No   • Drug use: No   • Sexual activity: Defer        Family History   Problem Relation Age of Onset   • Stroke Mother    • Hypertension Mother         My Mother  of brain hemmorage/stroke   • Heart failure Father         congestive   • Cancer Father         Neck glands removed   • Vision loss Father         Had operation   • Heart disease Father          of Congestive Heart Failure at 91   • Breast cancer Sister         60's   • Cancer Sister         Breast   • Breast cancer Daughter 40   • Cancer Daughter         Breast   • Thyroid disease Daughter         on meds   • Breast cancer Other         NIECE 60's   • Breast cancer Other         NIECE 60's   • Vision loss Paternal Aunt         blind-glaucoma   • Vision loss Paternal Uncle         blind-glaucoma   • Vision loss Maternal Aunt         all 9 aunts paternal aunts/Uncles/ cousins/ sister/brother/ and niece on meds for Glaucoma or had operation   • Early death Daughter         Celiac Sprue reaction   • Ovarian cancer Neg Hx         Review of Systems     There were no vitals filed for this visit.     Physical Exam  Constitutional:       General: She is not in acute distress.     Appearance: Normal appearance. She is not ill-appearing.   Neurological:      Mental Status: She is alert.          Diagnoses and all orders for this visit:    1. Gastroesophageal reflux disease with esophagitis without hemorrhage (Primary)    2. Esophageal dysphagia    Impressions and plan #1 gastroesophageal reflux disease: She seems to not be thriving on the Protonix twice daily.  She has  tried omeprazole and Prevacid.  We will try some Dexilant.  We will call her in Dexilant 60 mg.  She can still use Tums or antacids as needed.    #2 dysphagia: Thankfully, this seems to be doing better.  We will try the Dexilant and hopefully she will be able to get that covered and will control her symptoms to a better degree.  Her symptoms are worse in the evening so I would say to take the Dexilant in the evening.    Jorge Frank MD

## 2023-06-05 ENCOUNTER — ANESTHESIA EVENT (OUTPATIENT)
Dept: PERIOP | Facility: HOSPITAL | Age: 83
End: 2023-06-05
Payer: MEDICARE

## 2023-06-05 RX ORDER — SODIUM CHLORIDE 9 MG/ML
40 INJECTION, SOLUTION INTRAVENOUS AS NEEDED
Status: CANCELLED | OUTPATIENT
Start: 2023-06-05

## 2023-06-05 RX ORDER — FAMOTIDINE 10 MG/ML
20 INJECTION, SOLUTION INTRAVENOUS ONCE
Status: CANCELLED | OUTPATIENT
Start: 2023-06-05 | End: 2023-06-05

## 2023-06-06 ENCOUNTER — ANESTHESIA EVENT CONVERTED (OUTPATIENT)
Dept: ANESTHESIOLOGY | Facility: HOSPITAL | Age: 83
End: 2023-06-06
Payer: MEDICARE

## 2023-06-06 ENCOUNTER — ANESTHESIA (OUTPATIENT)
Dept: PERIOP | Facility: HOSPITAL | Age: 83
End: 2023-06-06
Payer: MEDICARE

## 2023-06-06 ENCOUNTER — HOSPITAL ENCOUNTER (OUTPATIENT)
Facility: HOSPITAL | Age: 83
Discharge: HOME | End: 2023-06-08
Attending: SURGERY | Admitting: SURGERY
Payer: MEDICARE

## 2023-06-06 DIAGNOSIS — K43.2 INCISIONAL HERNIA WITHOUT OBSTRUCTION OR GANGRENE: Primary | ICD-10-CM

## 2023-06-06 PROBLEM — K42.9 UMBILICAL HERNIA: Status: ACTIVE | Noted: 2023-06-06

## 2023-06-06 PROCEDURE — 25010000002 CEFAZOLIN IN DEXTROSE 2-4 GM/100ML-% SOLUTION: Performed by: SURGERY

## 2023-06-06 PROCEDURE — 63710000001 OXYCODONE-ACETAMINOPHEN 10-325 MG TABLET: Performed by: SURGERY

## 2023-06-06 PROCEDURE — 93010 ELECTROCARDIOGRAM REPORT: CPT | Performed by: INTERNAL MEDICINE

## 2023-06-06 PROCEDURE — 25010000002 ONDANSETRON PER 1 MG: Performed by: SURGERY

## 2023-06-06 PROCEDURE — A9270 NON-COVERED ITEM OR SERVICE: HCPCS | Performed by: SURGERY

## 2023-06-06 PROCEDURE — 25010000002 METOCLOPRAMIDE PER 10 MG: Performed by: SURGERY

## 2023-06-06 PROCEDURE — 63710000001 ROSUVASTATIN 20 MG TABLET: Performed by: SURGERY

## 2023-06-06 PROCEDURE — 25010000002 FENTANYL CITRATE (PF) 100 MCG/2ML SOLUTION: Performed by: NURSE ANESTHETIST, CERTIFIED REGISTERED

## 2023-06-06 PROCEDURE — 25010000002 PROPOFOL 10 MG/ML EMULSION: Performed by: NURSE ANESTHETIST, CERTIFIED REGISTERED

## 2023-06-06 PROCEDURE — 25010000002 ONDANSETRON PER 1 MG: Performed by: NURSE ANESTHETIST, CERTIFIED REGISTERED

## 2023-06-06 PROCEDURE — 25010000002 PHENYLEPHRINE 10 MG/ML SOLUTION: Performed by: NURSE ANESTHETIST, CERTIFIED REGISTERED

## 2023-06-06 PROCEDURE — C1781 MESH (IMPLANTABLE): HCPCS | Performed by: SURGERY

## 2023-06-06 PROCEDURE — 63710000001 DOCUSATE SODIUM 100 MG CAPSULE: Performed by: SURGERY

## 2023-06-06 PROCEDURE — 25010000002 SUGAMMADEX 200 MG/2ML SOLUTION: Performed by: NURSE ANESTHETIST, CERTIFIED REGISTERED

## 2023-06-06 PROCEDURE — 25010000002 MORPHINE PER 10 MG: Performed by: SURGERY

## 2023-06-06 PROCEDURE — 25010000002 DEXAMETHASONE SODIUM PHOSPHATE 10 MG/ML SOLUTION: Performed by: NURSE ANESTHETIST, CERTIFIED REGISTERED

## 2023-06-06 PROCEDURE — 93005 ELECTROCARDIOGRAM TRACING: CPT | Performed by: SURGERY

## 2023-06-06 PROCEDURE — 63710000001 LATANOPROST 0.005 % SOLUTION 2.5 ML BOTTLE: Performed by: SURGERY

## 2023-06-06 DEVICE — VENTRALIGHT ST MESH
Type: IMPLANTABLE DEVICE | Site: ABDOMEN | Status: FUNCTIONAL
Brand: VENTRALIGHT ST

## 2023-06-06 DEVICE — CAPSURE PERMANENT FIXATION SYSTEM 30 PERMANENT FASTENERS
Type: IMPLANTABLE DEVICE | Site: ABDOMEN | Status: FUNCTIONAL
Brand: CAPSURE PERMANENT FIXATION SYSTEM

## 2023-06-06 RX ORDER — DEXAMETHASONE SODIUM PHOSPHATE 10 MG/ML
INJECTION, SOLUTION INTRAMUSCULAR; INTRAVENOUS
Status: COMPLETED | OUTPATIENT
Start: 2023-06-06 | End: 2023-06-06

## 2023-06-06 RX ORDER — NITROGLYCERIN 0.4 MG/1
0.4 TABLET SUBLINGUAL
Status: DISCONTINUED | OUTPATIENT
Start: 2023-06-06 | End: 2023-06-08 | Stop reason: HOSPADM

## 2023-06-06 RX ORDER — DEXAMETHASONE SODIUM PHOSPHATE 10 MG/ML
INJECTION, SOLUTION INTRAMUSCULAR; INTRAVENOUS AS NEEDED
Status: DISCONTINUED | OUTPATIENT
Start: 2023-06-06 | End: 2023-06-06 | Stop reason: SURG

## 2023-06-06 RX ORDER — BUPIVACAINE HYDROCHLORIDE 2.5 MG/ML
INJECTION, SOLUTION EPIDURAL; INFILTRATION; INTRACAUDAL
Status: COMPLETED | OUTPATIENT
Start: 2023-06-06 | End: 2023-06-06

## 2023-06-06 RX ORDER — ROCURONIUM BROMIDE 10 MG/ML
INJECTION, SOLUTION INTRAVENOUS AS NEEDED
Status: DISCONTINUED | OUTPATIENT
Start: 2023-06-06 | End: 2023-06-06 | Stop reason: SURG

## 2023-06-06 RX ORDER — LIDOCAINE HYDROCHLORIDE 10 MG/ML
INJECTION, SOLUTION EPIDURAL; INFILTRATION; INTRACAUDAL; PERINEURAL AS NEEDED
Status: DISCONTINUED | OUTPATIENT
Start: 2023-06-06 | End: 2023-06-06 | Stop reason: SURG

## 2023-06-06 RX ORDER — FAMOTIDINE 20 MG/1
20 TABLET, FILM COATED ORAL ONCE
Status: COMPLETED | OUTPATIENT
Start: 2023-06-06 | End: 2023-06-06

## 2023-06-06 RX ORDER — SODIUM CHLORIDE 0.9 % (FLUSH) 0.9 %
10 SYRINGE (ML) INJECTION EVERY 12 HOURS SCHEDULED
Status: DISCONTINUED | OUTPATIENT
Start: 2023-06-06 | End: 2023-06-06 | Stop reason: HOSPADM

## 2023-06-06 RX ORDER — SODIUM CHLORIDE, SODIUM LACTATE, POTASSIUM CHLORIDE, CALCIUM CHLORIDE 600; 310; 30; 20 MG/100ML; MG/100ML; MG/100ML; MG/100ML
INJECTION, SOLUTION INTRAVENOUS CONTINUOUS PRN
Status: DISCONTINUED | OUTPATIENT
Start: 2023-06-06 | End: 2023-06-06 | Stop reason: SURG

## 2023-06-06 RX ORDER — FENTANYL CITRATE 50 UG/ML
50 INJECTION, SOLUTION INTRAMUSCULAR; INTRAVENOUS
Status: DISCONTINUED | OUTPATIENT
Start: 2023-06-06 | End: 2023-06-06 | Stop reason: HOSPADM

## 2023-06-06 RX ORDER — SODIUM CHLORIDE, SODIUM LACTATE, POTASSIUM CHLORIDE, CALCIUM CHLORIDE 600; 310; 30; 20 MG/100ML; MG/100ML; MG/100ML; MG/100ML
9 INJECTION, SOLUTION INTRAVENOUS CONTINUOUS
Status: DISCONTINUED | OUTPATIENT
Start: 2023-06-06 | End: 2023-06-06

## 2023-06-06 RX ORDER — SODIUM CHLORIDE 0.9 % (FLUSH) 0.9 %
10 SYRINGE (ML) INJECTION AS NEEDED
Status: DISCONTINUED | OUTPATIENT
Start: 2023-06-06 | End: 2023-06-06 | Stop reason: HOSPADM

## 2023-06-06 RX ORDER — FENTANYL CITRATE 50 UG/ML
INJECTION, SOLUTION INTRAMUSCULAR; INTRAVENOUS AS NEEDED
Status: DISCONTINUED | OUTPATIENT
Start: 2023-06-06 | End: 2023-06-06 | Stop reason: SURG

## 2023-06-06 RX ORDER — BISACODYL 5 MG/1
10 TABLET, DELAYED RELEASE ORAL DAILY
Status: DISCONTINUED | OUTPATIENT
Start: 2023-06-06 | End: 2023-06-06 | Stop reason: SDUPTHER

## 2023-06-06 RX ORDER — ROSUVASTATIN CALCIUM 20 MG/1
20 TABLET, COATED ORAL NIGHTLY
Status: DISCONTINUED | OUTPATIENT
Start: 2023-06-06 | End: 2023-06-08 | Stop reason: HOSPADM

## 2023-06-06 RX ORDER — NALOXONE HCL 0.4 MG/ML
0.4 VIAL (ML) INJECTION
Status: DISCONTINUED | OUTPATIENT
Start: 2023-06-06 | End: 2023-06-08 | Stop reason: HOSPADM

## 2023-06-06 RX ORDER — MIDAZOLAM HYDROCHLORIDE 1 MG/ML
0.5 INJECTION INTRAMUSCULAR; INTRAVENOUS
Status: DISCONTINUED | OUTPATIENT
Start: 2023-06-06 | End: 2023-06-06 | Stop reason: HOSPADM

## 2023-06-06 RX ORDER — EPHEDRINE SULFATE 50 MG/ML
INJECTION, SOLUTION INTRAVENOUS AS NEEDED
Status: DISCONTINUED | OUTPATIENT
Start: 2023-06-06 | End: 2023-06-06 | Stop reason: SURG

## 2023-06-06 RX ORDER — LATANOPROST 50 UG/ML
1 SOLUTION/ DROPS OPHTHALMIC NIGHTLY
Status: DISCONTINUED | OUTPATIENT
Start: 2023-06-06 | End: 2023-06-08 | Stop reason: HOSPADM

## 2023-06-06 RX ORDER — DOCUSATE SODIUM 100 MG/1
100 CAPSULE, LIQUID FILLED ORAL 2 TIMES DAILY
Status: DISCONTINUED | OUTPATIENT
Start: 2023-06-06 | End: 2023-06-06 | Stop reason: SDUPTHER

## 2023-06-06 RX ORDER — ENALAPRILAT 2.5 MG/2ML
1.25 INJECTION INTRAVENOUS EVERY 6 HOURS PRN
Status: DISCONTINUED | OUTPATIENT
Start: 2023-06-06 | End: 2023-06-08 | Stop reason: HOSPADM

## 2023-06-06 RX ORDER — PHENYLEPHRINE HYDROCHLORIDE 10 MG/ML
INJECTION INTRAVENOUS AS NEEDED
Status: DISCONTINUED | OUTPATIENT
Start: 2023-06-06 | End: 2023-06-06 | Stop reason: SURG

## 2023-06-06 RX ORDER — ONDANSETRON 4 MG/1
4 TABLET, FILM COATED ORAL EVERY 6 HOURS PRN
Status: DISCONTINUED | OUTPATIENT
Start: 2023-06-06 | End: 2023-06-08 | Stop reason: HOSPADM

## 2023-06-06 RX ORDER — PROMETHAZINE HYDROCHLORIDE 25 MG/1
25 TABLET ORAL EVERY 6 HOURS PRN
Status: DISCONTINUED | OUTPATIENT
Start: 2023-06-06 | End: 2023-06-08 | Stop reason: HOSPADM

## 2023-06-06 RX ORDER — SIMETHICONE 80 MG
80 TABLET,CHEWABLE ORAL 4 TIMES DAILY PRN
Status: DISCONTINUED | OUTPATIENT
Start: 2023-06-06 | End: 2023-06-08 | Stop reason: HOSPADM

## 2023-06-06 RX ORDER — MAGNESIUM HYDROXIDE 1200 MG/15ML
LIQUID ORAL AS NEEDED
Status: DISCONTINUED | OUTPATIENT
Start: 2023-06-06 | End: 2023-06-06 | Stop reason: HOSPADM

## 2023-06-06 RX ORDER — LIDOCAINE HYDROCHLORIDE 10 MG/ML
0.5 INJECTION, SOLUTION EPIDURAL; INFILTRATION; INTRACAUDAL; PERINEURAL ONCE AS NEEDED
Status: COMPLETED | OUTPATIENT
Start: 2023-06-06 | End: 2023-06-06

## 2023-06-06 RX ORDER — ONDANSETRON 2 MG/ML
4 INJECTION INTRAMUSCULAR; INTRAVENOUS EVERY 6 HOURS PRN
Status: DISCONTINUED | OUTPATIENT
Start: 2023-06-06 | End: 2023-06-07 | Stop reason: SDUPTHER

## 2023-06-06 RX ORDER — HEPARIN SODIUM 5000 [USP'U]/ML
5000 INJECTION, SOLUTION INTRAVENOUS; SUBCUTANEOUS EVERY 8 HOURS SCHEDULED
Status: DISCONTINUED | OUTPATIENT
Start: 2023-06-07 | End: 2023-06-08 | Stop reason: HOSPADM

## 2023-06-06 RX ORDER — DOCUSATE SODIUM 100 MG/1
100 CAPSULE, LIQUID FILLED ORAL 2 TIMES DAILY
Status: DISCONTINUED | OUTPATIENT
Start: 2023-06-06 | End: 2023-06-08 | Stop reason: HOSPADM

## 2023-06-06 RX ORDER — PANTOPRAZOLE SODIUM 40 MG/1
40 TABLET, DELAYED RELEASE ORAL
Status: DISCONTINUED | OUTPATIENT
Start: 2023-06-07 | End: 2023-06-07

## 2023-06-06 RX ORDER — BISACODYL 5 MG/1
10 TABLET, DELAYED RELEASE ORAL DAILY
Status: DISCONTINUED | OUTPATIENT
Start: 2023-06-06 | End: 2023-06-08 | Stop reason: HOSPADM

## 2023-06-06 RX ORDER — OXYCODONE AND ACETAMINOPHEN 10; 325 MG/1; MG/1
1 TABLET ORAL EVERY 4 HOURS PRN
Status: DISCONTINUED | OUTPATIENT
Start: 2023-06-06 | End: 2023-06-08 | Stop reason: HOSPADM

## 2023-06-06 RX ORDER — SODIUM CHLORIDE, SODIUM LACTATE, POTASSIUM CHLORIDE, CALCIUM CHLORIDE 600; 310; 30; 20 MG/100ML; MG/100ML; MG/100ML; MG/100ML
75 INJECTION, SOLUTION INTRAVENOUS CONTINUOUS
Status: DISCONTINUED | OUTPATIENT
Start: 2023-06-06 | End: 2023-06-07

## 2023-06-06 RX ORDER — GLYCOPYRROLATE 0.2 MG/ML
INJECTION INTRAMUSCULAR; INTRAVENOUS AS NEEDED
Status: DISCONTINUED | OUTPATIENT
Start: 2023-06-06 | End: 2023-06-06 | Stop reason: SURG

## 2023-06-06 RX ORDER — ONDANSETRON 2 MG/ML
INJECTION INTRAMUSCULAR; INTRAVENOUS AS NEEDED
Status: DISCONTINUED | OUTPATIENT
Start: 2023-06-06 | End: 2023-06-06 | Stop reason: SURG

## 2023-06-06 RX ORDER — ONDANSETRON 2 MG/ML
4 INJECTION INTRAMUSCULAR; INTRAVENOUS EVERY 6 HOURS PRN
Status: DISCONTINUED | OUTPATIENT
Start: 2023-06-06 | End: 2023-06-08 | Stop reason: HOSPADM

## 2023-06-06 RX ORDER — PROPOFOL 10 MG/ML
VIAL (ML) INTRAVENOUS CONTINUOUS PRN
Status: DISCONTINUED | OUTPATIENT
Start: 2023-06-06 | End: 2023-06-06 | Stop reason: SURG

## 2023-06-06 RX ORDER — METOPROLOL SUCCINATE 25 MG/1
25 TABLET, EXTENDED RELEASE ORAL EVERY MORNING
Status: DISCONTINUED | OUTPATIENT
Start: 2023-06-06 | End: 2023-06-06

## 2023-06-06 RX ORDER — METOCLOPRAMIDE HYDROCHLORIDE 5 MG/ML
10 INJECTION INTRAMUSCULAR; INTRAVENOUS EVERY 6 HOURS
Status: DISCONTINUED | OUTPATIENT
Start: 2023-06-06 | End: 2023-06-06

## 2023-06-06 RX ORDER — MORPHINE SULFATE 2 MG/ML
2 INJECTION, SOLUTION INTRAMUSCULAR; INTRAVENOUS
Status: DISCONTINUED | OUTPATIENT
Start: 2023-06-06 | End: 2023-06-08 | Stop reason: HOSPADM

## 2023-06-06 RX ORDER — METOPROLOL SUCCINATE 25 MG/1
25 TABLET, EXTENDED RELEASE ORAL DAILY
Status: DISCONTINUED | OUTPATIENT
Start: 2023-06-07 | End: 2023-06-06

## 2023-06-06 RX ORDER — CEFAZOLIN SODIUM 2 G/100ML
2 INJECTION, SOLUTION INTRAVENOUS ONCE
Status: COMPLETED | OUTPATIENT
Start: 2023-06-06 | End: 2023-06-06

## 2023-06-06 RX ORDER — PANTOPRAZOLE SODIUM 40 MG/1
40 TABLET, DELAYED RELEASE ORAL
Status: DISCONTINUED | OUTPATIENT
Start: 2023-06-06 | End: 2023-06-06 | Stop reason: SDUPTHER

## 2023-06-06 RX ORDER — METOPROLOL SUCCINATE 25 MG/1
25 TABLET, EXTENDED RELEASE ORAL DAILY
Status: DISCONTINUED | OUTPATIENT
Start: 2023-06-07 | End: 2023-06-08 | Stop reason: HOSPADM

## 2023-06-06 RX ORDER — ALPRAZOLAM 0.5 MG/1
0.5 TABLET ORAL NIGHTLY PRN
Status: DISCONTINUED | OUTPATIENT
Start: 2023-06-06 | End: 2023-06-08 | Stop reason: HOSPADM

## 2023-06-06 RX ADMIN — SODIUM CHLORIDE, POTASSIUM CHLORIDE, SODIUM LACTATE AND CALCIUM CHLORIDE 9 ML/HR: 600; 310; 30; 20 INJECTION, SOLUTION INTRAVENOUS at 08:37

## 2023-06-06 RX ADMIN — SUGAMMADEX 200 MG: 100 INJECTION, SOLUTION INTRAVENOUS at 11:17

## 2023-06-06 RX ADMIN — ONDANSETRON 4 MG: 2 INJECTION INTRAMUSCULAR; INTRAVENOUS at 14:03

## 2023-06-06 RX ADMIN — PHENYLEPHRINE HYDROCHLORIDE 100 MCG: 10 INJECTION INTRAVENOUS at 10:25

## 2023-06-06 RX ADMIN — CEFAZOLIN SODIUM 2 G: 2 INJECTION, SOLUTION INTRAVENOUS at 10:27

## 2023-06-06 RX ADMIN — FENTANYL CITRATE 50 MCG: 0.05 INJECTION, SOLUTION INTRAMUSCULAR; INTRAVENOUS at 10:36

## 2023-06-06 RX ADMIN — LIDOCAINE HYDROCHLORIDE 0.5 ML: 10 INJECTION, SOLUTION EPIDURAL; INFILTRATION; INTRACAUDAL; PERINEURAL at 08:34

## 2023-06-06 RX ADMIN — ROSUVASTATIN 20 MG: 20 TABLET, FILM COATED ORAL at 20:26

## 2023-06-06 RX ADMIN — PROPOFOL 200 MG: 10 INJECTION, EMULSION INTRAVENOUS at 10:21

## 2023-06-06 RX ADMIN — ROCURONIUM BROMIDE 50 MG: 50 INJECTION, SOLUTION INTRAVENOUS at 10:22

## 2023-06-06 RX ADMIN — DEXAMETHASONE SODIUM PHOSPHATE 4 MG: 10 INJECTION, SOLUTION INTRAMUSCULAR; INTRAVENOUS at 10:11

## 2023-06-06 RX ADMIN — EPHEDRINE SULFATE 10 MG: 50 INJECTION INTRAVENOUS at 10:28

## 2023-06-06 RX ADMIN — MORPHINE SULFATE 2 MG: 2 INJECTION, SOLUTION INTRAMUSCULAR; INTRAVENOUS at 16:16

## 2023-06-06 RX ADMIN — LIDOCAINE HYDROCHLORIDE 50 MG: 10 INJECTION, SOLUTION EPIDURAL; INFILTRATION; INTRACAUDAL; PERINEURAL at 10:21

## 2023-06-06 RX ADMIN — FAMOTIDINE 20 MG: 20 TABLET ORAL at 08:34

## 2023-06-06 RX ADMIN — ONDANSETRON HYDROCHLORIDE 4 MG: 2 INJECTION, SOLUTION INTRAMUSCULAR; INTRAVENOUS at 11:17

## 2023-06-06 RX ADMIN — OXYCODONE HYDROCHLORIDE AND ACETAMINOPHEN 1 TABLET: 10; 325 TABLET ORAL at 20:26

## 2023-06-06 RX ADMIN — METOCLOPRAMIDE 10 MG: 5 INJECTION, SOLUTION INTRAMUSCULAR; INTRAVENOUS at 20:27

## 2023-06-06 RX ADMIN — SODIUM CHLORIDE, POTASSIUM CHLORIDE, SODIUM LACTATE AND CALCIUM CHLORIDE: 600; 310; 30; 20 INJECTION, SOLUTION INTRAVENOUS at 10:12

## 2023-06-06 RX ADMIN — PROPOFOL 25 MCG/KG/MIN: 10 INJECTION, EMULSION INTRAVENOUS at 10:27

## 2023-06-06 RX ADMIN — FENTANYL CITRATE 50 MCG: 0.05 INJECTION, SOLUTION INTRAMUSCULAR; INTRAVENOUS at 10:21

## 2023-06-06 RX ADMIN — DEXAMETHASONE SODIUM PHOSPHATE 6 MG: 10 INJECTION, SOLUTION INTRAMUSCULAR; INTRAVENOUS at 10:27

## 2023-06-06 RX ADMIN — SODIUM CHLORIDE, POTASSIUM CHLORIDE, SODIUM LACTATE AND CALCIUM CHLORIDE 75 ML/HR: 600; 310; 30; 20 INJECTION, SOLUTION INTRAVENOUS at 14:03

## 2023-06-06 RX ADMIN — LATANOPROST 1 DROP: 50 SOLUTION OPHTHALMIC at 20:27

## 2023-06-06 RX ADMIN — BUPIVACAINE HYDROCHLORIDE 30 ML: 2.5 INJECTION, SOLUTION EPIDURAL; INFILTRATION; INTRACAUDAL; PERINEURAL at 10:11

## 2023-06-06 RX ADMIN — MORPHINE SULFATE 2 MG: 2 INJECTION, SOLUTION INTRAMUSCULAR; INTRAVENOUS at 14:02

## 2023-06-06 RX ADMIN — DOCUSATE SODIUM 100 MG: 100 CAPSULE, LIQUID FILLED ORAL at 20:27

## 2023-06-06 RX ADMIN — GLYCOPYRROLATE 0.1 MCG: 0.2 INJECTION INTRAMUSCULAR; INTRAVENOUS at 10:32

## 2023-06-06 NOTE — PROGRESS NOTES
Patient Name:  Chelsi Ferguson  YOB: 1940  [id number]    Surgery Post - Operative Note    Date of visit: 6/6/2023    Subjective   Subjective: Feels Ok, a bit sore. Voiding well.       Objective     Objective:    /63 (BP Location: Right arm, Patient Position: Lying)   Pulse 74   Temp 98 °F (36.7 °C) (Axillary)   Resp 18   LMP  (LMP Unknown) Comment: Last Mammogram  SpO2 96%     CV:  Rate  regular and rhythm  regular  L:  Clear  to auscultation bilaterally   ABD:  Soft, appropriately tender. Dressings  clean, dry and intact   EXT:  No cyanosis, clubbing or edema             Assessment/ Plan: Doing well after Lap incisional hernia repair. Continue Pulmonary toilet    Problem List Items Addressed This Visit    None       Active Hospital Problems    Diagnosis  POA    **Incisional hernia, without obstruction or gangrene [K43.2]  Yes    Incisional hernia without obstruction or gangrene [K43.2]  Yes    Umbilical hernia [K42.9]  Yes    Rheumatoid factor positive [R76.8]  Yes    Coronary artery disease involving native coronary artery of native heart without angina pectoris [I25.10]  Yes    HTN (hypertension) [I10]  Yes    H/O non-ST elevation myocardial infarction (NSTEMI) [I25.2]  Not Applicable      Resolved Hospital Problems   No resolved problems to display.            Harinder Mason MD  6/6/2023  18:31 EDT

## 2023-06-06 NOTE — ANESTHESIA POSTPROCEDURE EVALUATION
Patient: Chelsi Ferguson    Procedure Summary       Date: 06/06/23 Room / Location:  [facility] /  [facility]    Anesthesia Start: 1011 Anesthesia Stop:     Procedure: UMBILICAL HERNIA REPAIR WITH MESH LAPAROSCOPIC (Abdomen) Diagnosis:     Surgeons: Harinder Mason MD Provider: Festus Castaneda Jr., MD    Anesthesia Type: general with block ASA Status: 2            Anesthesia Type: general with block    Vitals  Vitals Value Taken Time   BP     Temp     Pulse 71 06/06/23 1132   Resp     SpO2 96 % 06/06/23 1132   Vitals shown include unvalidated device data.        Post Anesthesia Care and Evaluation    Patient location during evaluation: PACU  Patient participation: waiting for patient participation  Level of consciousness: responsive to verbal stimuli  Pain management: adequate    Airway patency: patent  Anesthetic complications: No anesthetic complications  PONV Status: none  Cardiovascular status: hemodynamically stable and acceptable  Respiratory status: nonlabored ventilation, acceptable, nasal cannula and oral airway  Hydration status: acceptable

## 2023-06-06 NOTE — BRIEF OP NOTE
UMBILICAL HERNIA REPAIR LAPAROSCOPIC  Progress Note    SharriRukhsana Ferguson  6/6/2023    Pre-op Diagnosis:   Incisional hernia 5 x 6 cm       Post-Op Diagnosis Codes:  Same    Procedure/CPT® Codes:        Procedure(s):  UMBILICAL HERNIA REPAIR WITH MESH LAPAROSCOPIC              Surgeon(s):  Harinder Mason MD    Anesthesia: General    Staff:   Circulator: Saray Abbasi RN  Scrub Person: Roger Hansen  Nursing Assistant: Chey Garza PCT  Assistant: Lionel Rosas PA  Assistant: Lioenl Rosas PA      Estimated Blood Loss: minimal    Urine Voided: * No values recorded between 6/6/2023 10:11 AM and 6/6/2023 11:14 AM *    Specimens:                None          Drains: * No LDAs found *    Findings: 6 x 5 cm incisional hernia completely reduced and repaired with retrofascial placement of an 11.4 by 11.4 cm piece of Ventralight ST mesh        Complications: None    Assistant: Lionel Rosas PA  was responsible for performing the following activities: Retraction, Closing, Placing Dressing, and Held/Positioned Camera and their skilled assistance was necessary for the success of this case.    Harinder Mason MD     Date: 6/6/2023  Time: 11:16 EDT

## 2023-06-06 NOTE — ANESTHESIA PROCEDURE NOTES
"Peripheral Block      Patient reassessed immediately prior to procedure    Patient location during procedure: OR  Start time: 6/6/2023 10:11 AM  Reason for block: at surgeon's request and post-op pain management  Performed by  Anesthesiologist: Festus Castaneda Jr., MD  Preanesthetic Checklist  Completed: patient identified, IV checked, site marked, risks and benefits discussed, surgical consent, monitors and equipment checked, pre-op evaluation and timeout performed  Prep:  Pt Position: supine  Sterile barriers:cap, gloves, mask and washed/disinfected hands  Prep: ChloraPrep  Patient monitoring: blood pressure monitoring, continuous pulse oximetry and EKG  Procedure    Sedation: yes  Performed under: general  Guidance:ultrasound guided  Images:still images obtained, printed/placed on chart    Laterality:Bilateral  Block Type:TAP  Injection Technique:single-shot  Needle Type:short-bevel and echogenic  Needle Gauge:20 G  Resistance on Injection: none    Medications Used: dexamethasone sodium phosphate injection - Injection   4 mg - 6/6/2023 10:11:00 AM  bupivacaine PF (MARCAINE) 0.25 % injection - Injection   30 mL - 6/6/2023 10:11:00 AM      Medications  Comment:Block Injection:  LA dose divided between Right and Left block        Post Assessment  Injection Assessment: negative aspiration for heme, incremental injection and no paresthesia on injection  Patient Tolerance:comfortable throughout block  Complications:no  Additional Notes    Subcostal TAPs    A high-frequency linear transducer, with sterile cover, was placed sub-xiphoid to identify Line[place] Alba, right and left Rectus Abdominus Muscles (INDRA). The transducer was moved either right or left subcostally to identify the INDRA and the Transverse Abdominus Muscle (ROUSSEAU). The insertion site was prepped in sterile fashion and then localized with 2-5 ml of 1% Lidocaine. Using ultrasound-guidance, a 20-gauge B-Cisneros 4\" Ultraplex 360 non-stimulating echogenic needle was " "advanced in plane, from medial to lateral, until the tip of the needle was in the fascial plane between the INDRA and ROUSSEAU. 1-3ml of preservative free normal saline was used to hydro-dissect the fascial planes. After the fascial plane was verified, the local anesthetic (LA) was injected. The procedure was repeated on the opposite side for bilateral coverage. Aspiration every 5 ml to prevent intravascular injection. Injection was completed with negative aspiration of blood and negative intravascular injection. Injection pressures were normal with minimal resistance. The subcostal approach to the TAP nerve block ideally anesthetizes the intercostal nerves T6-T9.     Mid-Axillary/Lateral TAPs    A high-frequency linear transducer, with sterile cover, was placed in the midaxillary line between the ASIS and costal margin. The External Oblique Muscle (EOM), Internal Oblique Muscle (IOM), Transverse Abdominus Muscle (ROUSSEAU), and Peritoneum were identified. The insertion site was prepped in sterile fashion and then localized with 2-5 ml of 1% Lidocaine. Using ultrasound-guidance, a 20-gauge B-Cisneros 4\" Ultraplex 360 non-stimulating echogenic needle was advanced in plane, from medial to lateral, until the tip of the needle was in the fascial plane between the IOM and ROUSSEAU. 1-3ml of preservative free normal saline was used to hydro-dissect the fascial planes. After the fascial plane was verified, the local anesthetic (LA) was injected. The procedure was repeated on the opposite side for bilateral coverage. Aspiration every 5 ml to prevent intravascular injection. Injection was completed with negative aspiration of blood and negative intravascular injection. Injection pressures were normal with minimal resistance. Midaxillary TAPs should reach intercostal nerves T10- T11 and the subcostal nerve T12.            "

## 2023-06-06 NOTE — OP NOTE
OPERATIVE NOTE    Patient Name:  Chelsi Ferguson  YOB: 1940  [id number]    Date of Surgery:  6/6/2023      PREOPERATIVE DIAGNOSIS: Incisional hernia 6 x 5 cm      POSTOPERATIVE DIAGNOSIS: Same        PROCEDURE PERFORMED: Laparoscopic incisional hernia repair with mesh          SURGEON: Harinder Mason MD       Circulator: Saray Abbasi RN  Scrub Person: Roger Hansen  Nursing Assistant: Chey Garza PCT  Assistant: Lionel Rosas PA       Assistant: Lionel Rosas PA    ASSISTANT SURGEON: None       SPECIMENS: None       EBL: Minimal       ANESTHESIA: General.        FINDINGS:   1.  6 x 5 cm incisional hernia completely reduced and repaired with retrofascial placement of an 11.4 by 11.4 cm piece of Ventralight ST mesh       INDICATIONS: The patient is a 82 y.o. female who presented with an incisional hernia at her umbilicus.  The risk and benefits of laparoscopic repair were discussed at length with the patient and her family, and they agreed to proceed         DESCRIPTION OF PROCEDURE:      After obtaining informed consent, the patient was taken to the operating room and placed in supine  position. After appropriate DVT and antibiotic prophylaxis, general anesthesia was induced. TAP blocks were placed by the anesthesia staff bilaterally to aid in post-op pain control . The abdomen  was prepped and draped in standard sterile fashion and covered with an [place] dressing to prevent contact of mesh with the skin.  A 12 mm skin incision was made inferior to the left costal margin in the anterior x-ray line.  An optically guided trocar was advanced without difficulty into the abdominal cavity.  The abdomen is insufflated with carbon dioxide gas to a pressure of 15 mmHg.  At this point the laparoscope was advanced through the trocar and the abdominal contents inspected.  There is no evidence of bowel bladder or visceral injury with entrance of the trocar.  At this point 2 additional 5 mm  ports were placed, 1 in the midclavicular line inferior to the left costal margin, the other in the anterior axillary line at the level of the umbilicus on the left.    There were adhesions of omentum to the anterior abdominal wall that were taken down using sharp and electrocautery dissection.  There were also some small adhesions of omentum adjacent to the transverse colon at the inferior aspect that were taken down using sharp dissection.  No injury to the bowel was made.  There was a small amount of oozing from an omental vessel that was oversewn with a silk suture in [place]t fashion.  This successfully reduced all hernia contents.    The urachal remnant was taken down using electrocautery and ligated with an Endoloop.  In similar fashion the falciform ligament was taken down using electrocautery and ligated with an Endoloop as well.  The intra-abdominal pressure was deflated to 8 mmHg pressure.  The fascial defect was measured and was 6 x 5 cm.  Therefore an 11.4 x 11.4 cm piece of Ventralight ST mesh was chosen.  It was placed in a retrofascial location and affixed using transfascial sutures of [place] at the 4 cardinal points.  This allowed for 3 to 4 cm of overlap in all directions.  The mesh was then further affixed to the intra-abdominal wall using 2 concentric rings of spiral tacks.  This completely affix the mesh to the anterior abdominal wall, with the adhesion barrier facing the bowel.    There is no evidence of bleeding.  The bowel was peristalsing normally and there was no evidence of bowel injury.  There is no evidence of further bleeding.  At this point all trocars were removed under direct and laparoscopic visualization and the abdomen is desufflated.  The wounds were irrigated with saline until clear and the trocar sites closed using running subcuticular sutures.  The transfascial suture sites were closed using tissue glue and dressed in standard sterile fashion.  The incisions were dressed in  standard sterile fashion, and covered with dry sterile dressings.  An abdominal binder was placed.    All sponge and needle counts were correct times two at the completion of the procedure. The patient recovered from anesthesia, was extubated in the operating room  and was transported to the PACU  in stable condition.    COMPLICATIONS: None     Assistant: Lionel Rosas PA  was responsible for performing the following activities: Retraction, Closing, Placing Dressing, and Held/Positioned Camera and their skilled assistance was necessary for the success of this case.    Harinder Mason MD  6/6/2023  11:17 EDT        Dictated using Dragon Dictation

## 2023-06-06 NOTE — PLAN OF CARE
Goal Outcome Evaluation:  Problem: Adult Inpatient Plan of Care  Goal: Optimal Comfort and Wellbeing  Intervention: Monitor Pain and Promote Comfort  Recent Flowsheet Documentation  Taken 6/6/2023 1500 by Jean Gambino, RN  Pain Management Interventions: see MAR  Taken 6/6/2023 1400 by Jean Gambino, RN  Pain Management Interventions:   see MAR   pain management plan reviewed with patient/caregiver   pillow support provided

## 2023-06-06 NOTE — ANESTHESIA PROCEDURE NOTES
Airway  Urgency: elective    Date/Time: 6/6/2023 10:24 AM  Airway not difficult    General Information and Staff    Patient location during procedure: OR  SRNA: Vanna Tomas SRNA  Indications and Patient Condition  Indications for airway management: airway protection    Preoxygenated: yes  MILS not maintained throughout  Mask difficulty assessment: 1 - vent by mask    Final Airway Details  Final airway type: endotracheal airway      Successful airway: ETT  Cuffed: yes   Successful intubation technique: video laryngoscopy  Facilitating devices/methods: intubating stylet  Endotracheal tube insertion site: oral  Blade: [place]  Blade size: 3  ETT size (mm): 7.0  Cormack-Lehane Classification: grade I - full view of glottis  Placement verified by: chest auscultation and capnometry   Cuff volume (mL): 7  Measured from: lips  ETT/EBT  to lips (cm): 21  Number of attempts at approach: 1  Assessment: lips, teeth, and gum same as pre-op and atraumatic intubation    Additional Comments  Negative epigastric sounds, Breath sound equal bilaterally with symmetric chest rise and fall

## 2023-06-06 NOTE — H&P
Pre-Op H&P  Chelsi Ferguson  [id number]  1940      Chief complaint: Umbilical hernia      Subjective:  Patient is a 82 y.o.female presents for scheduled surgery by Dr. Mason. She anticipates a UMBILICAL HERNIA REPAIR WITH MESH LAPAROSCOPIC today. She has had an umbilical hernia for about a year. It causes mild left sided abdominal discomfort. Symptoms are worse with straining or lifting and relieved with lying down. She had CT abd on 3/3/23 that showed  the hernia measures up to 7.0 x 5.4 cm.      Review of Systems:  Constitutional-- No fever, chills or sweats. No fatigue.  CV-- No chest pain, palpitation or syncope. +HTN, HLD  Resp-- No SOB, cough, hemoptysis  Skin--No rashes or lesions      Allergies:   Allergies   Allergen Reactions    Indocin [Indomethacin] Hallucinations    Statins Hives    Keflex [Cephalexin] GI Intolerance     Upset stomach, may have had some blood in stool but cannot remember the reason why she was put on it. Tolerates penicillins without problem.    Adhesive Tape Other (See Comments)     Skin irritation with bandaids, surgical bandages that stay on skin for extended periods of time    Aleve [Naproxen] Hives    Celecoxib Diarrhea    Ibuprofen GI Intolerance     Heartburn.    Lipitor [Atorvastatin] Hives    Prevacid [Lansoprazole] Nausea And Vomiting    Prilosec [Omeprazole] Nausea And Vomiting    Zocor [Simvastatin] Hives         Home Meds:  Medications Prior to Admission   Medication Sig Dispense Refill Last Dose    acetaminophen (TYLENOL) 650 MG 8 hr tablet Take 2 tablets by mouth 2 (two) times a day.   6/5/2023 at 2200    aspirin 81 MG EC tablet Take 1 tablet by mouth Every Night.   6/5/2023 at 2200    BIOTIN 5000 PO Take 5,000 mcg by mouth Daily.   6/5/2023 at 0700    Cholecalciferol (Vitamin D3) 50 MCG (2000 UT) capsule Take 1 capsule by mouth Daily.   6/5/2023 at 0700    coenzyme Q10 100 MG capsule Take 1 capsule by mouth Daily.   6/5/2023 at 0700    estradiol (Yuvafem) 10 MCG  tablet vaginal tablet Insert 1 tablet into the vagina 2 (Two) Times a Week. 24 tablet 0 Past Week    latanoprost (XALATAN) 0.005 % ophthalmic solution 1 drop Every Night.   6/5/2023 at 2230    metoprolol succinate XL (TOPROL-XL) 25 MG 24 hr tablet Take 1 tablet by mouth Every Morning. 90 tablet 1 6/6/2023 at 0600    multivitamin with minerals tablet tablet Take 1 tablet by mouth Daily.   6/5/2023 at 0700    rosuvastatin (CRESTOR) 20 MG tablet Take 1 tablet by mouth Every Night. 90 tablet 1 6/5/2023 at 2200    dexlansoprazole (DEXILANT) 60 MG capsule Take 1 capsule daily 90 capsule 3     nitroglycerin (NITROSTAT) 0.4 MG SL tablet Place 1 tablet under the tongue Every 5 (Five) Minutes As Needed for Chest Pain. 25 tablet 11          PMH:   Past Medical History:   Diagnosis Date    Ankle pain     Arthritis     Cancer     Skin    Cataract     removed    Chest discomfort     Cholecystitis 01/15/2018    Clotting disorder     may be result of aspirin    Coronary artery disease     Double vision 04/25/2022    Uses glasses with prism    Edema     Elevated cholesterol     Elevated liver enzymes 01/15/2018    Gallstones     GERD (gastroesophageal reflux disease)     Glaucoma     pt is currently off of meds and Dr Tanner does not think that she is glaucoma    H/O complete eye exam 06/2013    H/O mammogram 11/16/2015    H/O non-ST elevation myocardial infarction (NSTEMI) 01/2005    Hammer toe     Heart attack 01/2005    History of blood transfusion 01/2005    History of cardiovascular stress test 12/02/2015    normal    HTN (hypertension)     Hyperlipidemia     Hypokalemia 01/15/2018    Kidney infection 1971    Low back pain     Menopausal symptoms     Onychia and paronychia of finger     Osteopenia     Other elevated white blood cell count     Pap smear for cervical cancer screening 2010    [place]    Pneumonia     Rheumatoid factor positive 12/05/2022    Sepsis 01/15/2018    Tremor     essential tremor    UTI (urinary tract  infection) 01/15/2018    Viral warts     Visual impairment 2019    Wears glasses      PSH:    Past Surgical History:   Procedure Laterality Date    ADENOIDECTOMY  1952    BLADDER REPAIR  2002    BLADDER REPAIR  2003    BUNIONECTOMY Right 2010    CARDIAC CATHETERIZATION  stent placed 2005    CHOLECYSTECTOMY      CHOLECYSTECTOMY WITH INTRAOPERATIVE CHOLANGIOGRAM N/A 03/07/2018    Procedure: CHOLECYSTECTOMY LAPAROSCOPIC INTRAOPERATIVE CHOLANGIOGRAM;  Surgeon: Harinder Mason MD;  Location: [facility] OR;  Service:     COLONOSCOPY  2008, 4/2014    [place]    CORONARY STENT PLACEMENT Left 01/2005    drug eluting stent 1/2005 LAD    DILATATION AND CURETTAGE  1971    DILATATION AND CURETTAGE  1978    EYE CAPSULOTOMY WITH LASER Bilateral 2018    EYE SURGERY Bilateral 05/2016    Cataract    FRONTALIS SUSPENSION  2010    HAMMER TOE REPAIR Right 11/03/2010    HEAD & NECK SKIN LESION EXCISIONAL BIOPSY  08/20/2018    benign scalp cyst    HYSTERECTOMY  1978    JOINT REPLACEMENT  2005    LAPAROTOMY OOPHERECTOMY Bilateral 2002    NOSE SURGERY  03/2017    repair 3 fractured areas. Dr Dunne    OTHER SURGICAL HISTORY  2005    rectocele repair    OTHER SURGICAL HISTORY  2010    eyelid surgery    REPLACEMENT TOTAL KNEE BILATERAL Bilateral     SKIN BIOPSY      SUBTOTAL HYSTERECTOMY  1978    TONSILLECTOMY  1952    TOTAL KNEE ARTHROPLASTY  01/2005    UMBILICAL HERNIA REPAIR  2019    UPPER GASTROINTESTINAL ENDOSCOPY  11/17/2022    Dr Frank    UTERINE FIBROID SURGERY  1978    emergency removal of fibroid from birth canal       Social History:   Tobacco:   Social History     Tobacco Use   Smoking Status Never    Passive exposure: Past   Smokeless Tobacco Never   Tobacco Comments    My father, brother in law, friends, and  smoked so had second hand exposure      Alcohol:     Social History     Substance and Sexual Activity   Alcohol Use No         Physical Exam:/69 (BP Location: Right arm, Patient Position: Lying)   Pulse 69    Temp 97.9 °F (36.6 °C) (Temporal)   Resp 18   LMP  (LMP Unknown) Comment: Last Mammogram  SpO2 95%       General Appearance:    Alert, cooperative, no distress, appears stated age   Head:    Normocephalic, without obvious abnormality, atraumatic   Lungs:     Clear to auscultation bilaterally, respirations unlabored    Heart:   Regular rate and rhythm, S1 and S2 normal    Abdomen:    Soft without tenderness   Extremities:   Extremities normal, atraumatic, no cyanosis or edema   Skin:   Skin color, texture, turgor normal, no rashes or lesions   Neurologic:   Grossly intact     Results Review:     LABS:  Lab Results   Component Value Date    WBC 6.35 05/30/2023    HGB 12.6 05/30/2023    HCT 37.8 05/30/2023    MCV 98.4 (H) 05/30/2023     05/30/2023    NEUTROABS 4.7 12/01/2022    GLUCOSE 101 (H) 05/30/2023    BUN 9 05/30/2023    CREATININE 0.52 (L) 05/30/2023    EGFRIFNONA 80 01/25/2022    EGFRIFAFRI 97 01/25/2022     05/30/2023    K 4.0 05/30/2023     (H) 05/30/2023    CO2 26.0 05/30/2023    MG 1.4 01/15/2018    CALCIUM 9.5 05/30/2023    ALBUMIN 4.2 04/12/2023    AST 19 04/12/2023    ALT 9 04/12/2023    BILITOT 0.4 04/12/2023       RADIOLOGY:  3/3/23 CT abd:  FINDINGS:  Mild atelectasis is noted within the lung bases.      The liver, spleen, and pancreas are unremarkable without contrast. The patient is status post prior cholecystectomy. The bilateral adrenal glands are symmetric and unremarkable without contrast.      No definitive nephrolithiasis is seen bilaterally. There is no hydronephrosis or hydroureter. There is no evidence for obstructive uropathy. No stones are seen in the bladder. The bilateral kidneys are otherwise unremarkable without contrast.     An umbilical hernia is noted. The opening of the hernia measures approximately 3 cm. The hernia measures up to approximately 7.0 x 5.4 cm transverse by craniocaudal dimension. There is extension of omental soft tissues into the hernia.  There is no   extension of bowel into the hernia.     There is no bowel dilatation or obstruction. A normal-appearing appendix is observed. There is no evidence for acute appendicitis. There is diverticulosis throughout the colon without signs of acute diverticulitis. No significant free fluid is observed.   No abnormal fluid collections are identified. No significant lymphadenopathy is seen throughout the abdomen or pelvis. The bladder is partially decompressed.      No acute osseous abnormalities are observed. Degenerative/age-related changes are noted. There are chronic changes seen at the pubic symphysis suggesting changes of chronic pubic symphysitis.     IMPRESSION:  1.An umbilical hernia is noted. The opening of the hernia measures approximately 3 cm. The hernia measures up to 7.0 x 5.4 cm in transverse by craniocaudal dimensions. There is extension of omental soft tissues into the hernia. There is no extension of   bowel into the hernia.   2.No evidence for significant nephrolithiasis. There is no evidence for obstructive uropathy.  3.No evidence for acute abnormality throughout the abdomen or pelvis on this noncontrast exam.      I reviewed the patient's new clinical results.    Cancer Staging (if applicable)  Cancer Patient: __ yes __no __unknown; If yes, clinical stage T:__ N:__M:__, stage group or __N/A      Impression: Umbilical hernia      Plan: UMBILICAL HERNIA REPAIR WITH MESH LAPAROSCOPIC       LINA Hurd   6/6/2023   08:48 EDT

## 2023-06-07 LAB
ANION GAP SERPL CALCULATED.3IONS-SCNC: 10 MMOL/L (ref 5–15)
BASOPHILS # BLD AUTO: 0.01 10*3/MM3 (ref 0–0.2)
BASOPHILS NFR BLD AUTO: 0.1 % (ref 0–1.5)
BUN SERPL-MCNC: 10 MG/DL (ref 8–23)
BUN/CREAT SERPL: 18.5 (ref 7–25)
CALCIUM SPEC-SCNC: 9.3 MG/DL (ref 8.6–10.5)
CHLORIDE SERPL-SCNC: 104 MMOL/L (ref 98–107)
CO2 SERPL-SCNC: 27 MMOL/L (ref 22–29)
CREAT SERPL-MCNC: 0.54 MG/DL (ref 0.57–1)
DEPRECATED RDW RBC AUTO: 46.7 FL (ref 37–54)
EGFRCR SERPLBLD CKD-EPI 2021: 91.5 ML/MIN/1.73
EOSINOPHIL # BLD AUTO: 0 10*3/MM3 (ref 0–0.4)
EOSINOPHIL NFR BLD AUTO: 0 % (ref 0.3–6.2)
ERYTHROCYTE [DISTWIDTH] IN BLOOD BY AUTOMATED COUNT: 12.7 % (ref 12.3–15.4)
GLUCOSE SERPL-MCNC: 103 MG/DL (ref 65–99)
HCT VFR BLD AUTO: 40.7 % (ref 34–46.6)
HGB BLD-MCNC: 13.2 G/DL (ref 12–15.9)
IMM GRANULOCYTES # BLD AUTO: 0.05 10*3/MM3 (ref 0–0.05)
IMM GRANULOCYTES NFR BLD AUTO: 0.4 % (ref 0–0.5)
LYMPHOCYTES # BLD AUTO: 1.6 10*3/MM3 (ref 0.7–3.1)
LYMPHOCYTES NFR BLD AUTO: 12 % (ref 19.6–45.3)
MCH RBC QN AUTO: 32.3 PG (ref 26.6–33)
MCHC RBC AUTO-ENTMCNC: 32.4 G/DL (ref 31.5–35.7)
MCV RBC AUTO: 99.5 FL (ref 79–97)
MONOCYTES # BLD AUTO: 0.99 10*3/MM3 (ref 0.1–0.9)
MONOCYTES NFR BLD AUTO: 7.4 % (ref 5–12)
NEUTROPHILS NFR BLD AUTO: 10.69 10*3/MM3 (ref 1.7–7)
NEUTROPHILS NFR BLD AUTO: 80.1 % (ref 42.7–76)
NRBC BLD AUTO-RTO: 0 /100 WBC (ref 0–0.2)
PLATELET # BLD AUTO: 293 10*3/MM3 (ref 140–450)
PMV BLD AUTO: 9.2 FL (ref 6–12)
POTASSIUM SERPL-SCNC: 4.6 MMOL/L (ref 3.5–5.2)
QT INTERVAL: 400 MS
QTC INTERVAL: 458 MS
RBC # BLD AUTO: 4.09 10*6/MM3 (ref 3.77–5.28)
SODIUM SERPL-SCNC: 141 MMOL/L (ref 136–145)
WBC NRBC COR # BLD: 13.34 10*3/MM3 (ref 3.4–10.8)

## 2023-06-07 PROCEDURE — 97161 PT EVAL LOW COMPLEX 20 MIN: CPT

## 2023-06-07 PROCEDURE — 63710000001 SIMETHICONE 80 MG CHEWABLE TABLET: Performed by: SURGERY

## 2023-06-07 PROCEDURE — 63710000001 BISACODYL 5 MG TABLET DELAYED-RELEASE: Performed by: SURGERY

## 2023-06-07 PROCEDURE — A9270 NON-COVERED ITEM OR SERVICE: HCPCS | Performed by: SURGERY

## 2023-06-07 PROCEDURE — 80048 BASIC METABOLIC PNL TOTAL CA: CPT | Performed by: SURGERY

## 2023-06-07 PROCEDURE — 25010000002 MORPHINE PER 10 MG: Performed by: SURGERY

## 2023-06-07 PROCEDURE — 97116 GAIT TRAINING THERAPY: CPT

## 2023-06-07 PROCEDURE — 63710000001 PANTOPRAZOLE 40 MG TABLET DELAYED-RELEASE: Performed by: SURGERY

## 2023-06-07 PROCEDURE — 63710000001 OXYCODONE-ACETAMINOPHEN 10-325 MG TABLET: Performed by: SURGERY

## 2023-06-07 PROCEDURE — 63710000001 ROSUVASTATIN 20 MG TABLET: Performed by: SURGERY

## 2023-06-07 PROCEDURE — 97535 SELF CARE MNGMENT TRAINING: CPT

## 2023-06-07 PROCEDURE — 63710000001 ALUMINUM-MAGNESIUM HYDROXIDE-SIMETHICONE 400-400-40 MG/5ML SUSPENSION: Performed by: SURGERY

## 2023-06-07 PROCEDURE — 85025 COMPLETE CBC W/AUTO DIFF WBC: CPT | Performed by: SURGERY

## 2023-06-07 PROCEDURE — 63710000001 METOPROLOL SUCCINATE XL 25 MG TABLET SUSTAINED-RELEASE 24 HOUR: Performed by: SURGERY

## 2023-06-07 PROCEDURE — 25010000002 HEPARIN (PORCINE) PER 1000 UNITS: Performed by: SURGERY

## 2023-06-07 PROCEDURE — 63710000001 DOCUSATE SODIUM 100 MG CAPSULE: Performed by: SURGERY

## 2023-06-07 PROCEDURE — 97165 OT EVAL LOW COMPLEX 30 MIN: CPT

## 2023-06-07 RX ORDER — ALUMINA, MAGNESIA, AND SIMETHICONE 2400; 2400; 240 MG/30ML; MG/30ML; MG/30ML
15 SUSPENSION ORAL EVERY 6 HOURS PRN
Status: DISCONTINUED | OUTPATIENT
Start: 2023-06-07 | End: 2023-06-08 | Stop reason: HOSPADM

## 2023-06-07 RX ADMIN — BISACODYL 10 MG: 5 TABLET, COATED ORAL at 09:29

## 2023-06-07 RX ADMIN — HEPARIN SODIUM 5000 UNITS: 5000 INJECTION INTRAVENOUS; SUBCUTANEOUS at 14:47

## 2023-06-07 RX ADMIN — DOCUSATE SODIUM 100 MG: 100 CAPSULE, LIQUID FILLED ORAL at 20:40

## 2023-06-07 RX ADMIN — OXYCODONE HYDROCHLORIDE AND ACETAMINOPHEN 1 TABLET: 10; 325 TABLET ORAL at 14:51

## 2023-06-07 RX ADMIN — ROSUVASTATIN 20 MG: 20 TABLET, FILM COATED ORAL at 20:40

## 2023-06-07 RX ADMIN — HEPARIN SODIUM 5000 UNITS: 5000 INJECTION INTRAVENOUS; SUBCUTANEOUS at 20:40

## 2023-06-07 RX ADMIN — HEPARIN SODIUM 5000 UNITS: 5000 INJECTION INTRAVENOUS; SUBCUTANEOUS at 05:31

## 2023-06-07 RX ADMIN — DOCUSATE SODIUM 100 MG: 100 CAPSULE, LIQUID FILLED ORAL at 09:29

## 2023-06-07 RX ADMIN — ALUMINUM HYDROXIDE, MAGNESIUM HYDROXIDE, AND DIMETHICONE 15 ML: 400; 400; 40 SUSPENSION ORAL at 15:48

## 2023-06-07 RX ADMIN — OXYCODONE HYDROCHLORIDE AND ACETAMINOPHEN 1 TABLET: 10; 325 TABLET ORAL at 09:41

## 2023-06-07 RX ADMIN — SIMETHICONE 80 MG: 80 TABLET, CHEWABLE ORAL at 09:43

## 2023-06-07 RX ADMIN — LATANOPROST 1 DROP: 50 SOLUTION OPHTHALMIC at 20:40

## 2023-06-07 RX ADMIN — ALUMINUM HYDROXIDE, MAGNESIUM HYDROXIDE, AND DIMETHICONE 15 ML: 400; 400; 40 SUSPENSION ORAL at 21:17

## 2023-06-07 RX ADMIN — METOPROLOL SUCCINATE 25 MG: 25 TABLET, EXTENDED RELEASE ORAL at 09:29

## 2023-06-07 RX ADMIN — MORPHINE SULFATE 2 MG: 2 INJECTION, SOLUTION INTRAMUSCULAR; INTRAVENOUS at 01:55

## 2023-06-07 RX ADMIN — OXYCODONE HYDROCHLORIDE AND ACETAMINOPHEN 1 TABLET: 10; 325 TABLET ORAL at 21:17

## 2023-06-07 NOTE — PROGRESS NOTES
Patient Name:  Chelsi Ferguson  YOB: 1940  [id number]    Surgery Progress Note    Date of visit: 6/7/2023    Subjective   Subjective: Feeling better, pain controlled. Some anxiety last night.         Objective     Objective:     /74 (BP Location: Left arm, Patient Position: Lying)   Pulse 72   Temp 97.2 °F (36.2 °C) (Oral)   Resp 18   LMP  (LMP Unknown) Comment: Last Mammogram  SpO2 97%     Intake/Output Summary (Last 24 hours) at 6/7/2023 0639  Last data filed at 6/6/2023 1256  Gross per 24 hour   Intake 800 ml   Output 300 ml   Net 500 ml       CV:  Rhythm  regular and rate regular   L:  Clear  to auscultation bilaterally   Abd:  Bowel sounds positive , soft, appropriately tender. Dressings c/d/i  Ext:  No cyanosis, clubbing, edema    Recent labs that are back at this time have been reviewed.            Assessment/ Plan:    Incisional hernia- Doing well after repair. HLIV. Increase mobility, work with PT. Possiblyhome later today vs tomorrow.        Active Hospital Problems    Diagnosis  POA    **Incisional hernia, without obstruction or gangrene [K43.2]  Yes    Incisional hernia without obstruction or gangrene [K43.2]  Yes    Umbilical hernia [K42.9]  Yes    Rheumatoid factor positive [R76.8]  Yes    Coronary artery disease involving native coronary artery of native heart without angina pectoris [I25.10]  Yes    HTN (hypertension) [I10]  Yes    H/O non-ST elevation myocardial infarction (NSTEMI) [I25.2]  Not Applicable      Resolved Hospital Problems   No resolved problems to display.              Harinder Mason MD  6/7/2023  06:39 EDT

## 2023-06-07 NOTE — THERAPY EVALUATION
Patient Name: Chelsi Ferguson  : 1940    MRN: [id number]                              Today's Date: 2023       Admit Date: 2023    Visit Dx: No diagnosis found.  Patient Active Problem List   Diagnosis    Loss of weight    Hyperlipidemia    Edema    GERD (gastroesophageal reflux disease)    Nausea with vomiting    Diarrhea    Incontinence of feces    HTN (hypertension)    Cholelithiasis    Menopausal symptom    Cramp in limb    Glaucoma    Hammer toe    Dry mouth    Osteopenia    Elevated liver enzymes    UTI (urinary tract infection)    Bacterial UTI    Cholecystitis    H/O non-ST elevation myocardial infarction (NSTEMI)    Neck pain    Essential tremor    Idiopathic peripheral neuropathy    Muscle cramps    Coronary artery disease involving native coronary artery of native heart without angina pectoris    Double vision    Rheumatoid factor positive    Incisional hernia, without obstruction or gangrene    Incisional hernia without obstruction or gangrene    Umbilical hernia     Past Medical History:   Diagnosis Date    Ankle pain     Arthritis     Cancer     Skin    Cataract     removed    Chest discomfort     Cholecystitis 01/15/2018    Clotting disorder     may be result of aspirin    Coronary artery disease     Double vision 2022    Uses glasses with prism    Edema     Elevated cholesterol     Elevated liver enzymes 01/15/2018    Gallstones     GERD (gastroesophageal reflux disease)     Glaucoma     pt is currently off of meds and Dr Tanner does not think that she is glaucoma    H/O complete eye exam 2013    H/O mammogram 2015    H/O non-ST elevation myocardial infarction (NSTEMI) 2005    Hammer toe     Heart attack 2005    History of blood transfusion 2005    History of cardiovascular stress test 2015    normal    HTN (hypertension)     Hyperlipidemia     Hypokalemia 01/15/2018    Kidney infection 1971    Low back pain     Menopausal symptoms     Onychia and paronychia  of finger     Osteopenia     Other elevated white blood cell count     Pap smear for cervical cancer screening 2010    Leandro    Pneumonia     Rheumatoid factor positive 12/05/2022    Sepsis 01/15/2018    Tremor     essential tremor    UTI (urinary tract infection) 01/15/2018    Viral warts     Visual impairment 2019    Wears glasses      Past Surgical History:   Procedure Laterality Date    ADENOIDECTOMY  1952    BLADDER REPAIR  2002    BLADDER REPAIR  2003    BUNIONECTOMY Right 2010    CARDIAC CATHETERIZATION  stent placed 2005    CHOLECYSTECTOMY      CHOLECYSTECTOMY WITH INTRAOPERATIVE CHOLANGIOGRAM N/A 03/07/2018    Procedure: CHOLECYSTECTOMY LAPAROSCOPIC INTRAOPERATIVE CHOLANGIOGRAM;  Surgeon: Harinder Mason MD;  Location: [facility] OR;  Service:     COLONOSCOPY  2008, 4/2014    [facility]    CORONARY STENT PLACEMENT Left 01/2005    drug eluting stent 1/2005 LAD    DILATATION AND CURETTAGE  1971    DILATATION AND CURETTAGE  1978    EYE CAPSULOTOMY WITH LASER Bilateral 2018    EYE SURGERY Bilateral 05/2016    Cataract    FRONTALIS SUSPENSION  2010    HAMMER TOE REPAIR Right 11/03/2010    HEAD & NECK SKIN LESION EXCISIONAL BIOPSY  08/20/2018    benign scalp cyst    HYSTERECTOMY  1978    JOINT REPLACEMENT  2005    LAPAROTOMY OOPHERECTOMY Bilateral 2002    NOSE SURGERY  03/2017    repair 3 fractured areas. Dr Dunne    OTHER SURGICAL HISTORY  2005    rectocele repair    OTHER SURGICAL HISTORY  2010    eyelid surgery    REPLACEMENT TOTAL KNEE BILATERAL Bilateral     SKIN BIOPSY      SUBTOTAL HYSTERECTOMY  1978    TONSILLECTOMY  1952    TOTAL KNEE ARTHROPLASTY  01/2005    UMBILICAL HERNIA REPAIR  2019    UMBILICAL HERNIA REPAIR N/A 6/6/2023    Procedure: UMBILICAL HERNIA REPAIR WITH MESH LAPAROSCOPIC;  Surgeon: Harinder Mason MD;  Location: [facility] OR;  Service: General;  Laterality: N/A;    UPPER GASTROINTESTINAL ENDOSCOPY  11/17/2022    Dr Frank    UTERINE FIBROID SURGERY  1978    emergency removal of fibroid  from birth canal      General Information       Row Name 06/07/23 1531          OT Time and Intention    Document Type evaluation  -AC (r) CH (t) AC (c)     Mode of Treatment occupational therapy  -AC (r) CH (t) AC (c)       Row Name 06/07/23 1531          General Information    Patient Profile Reviewed yes  -AC (r) CH (t) AC (c)     Prior Level of Function independent:;all household mobility;ADL's;driving;mod assist:;cooking;home management  Pt. w/ occasional difficulty donning/doffing socks  -AC (r) CH (t) AC (c)     Existing Precautions/Restrictions fall;other (see comments)  laparascopic abdominal incisions with binder  -AC (r) CH (t) AC (c)     Barriers to Rehab medically complex  -AC (r) CH (t) AC (c)       Row Name 06/07/23 1531          Living Environment    People in Home alone  daughter and HERBERT will be staying with patient at D/C.  -AC (r) CH (t) AC (c)       Row Name 06/07/23 1531          Home Main Entrance    Number of Stairs, Main Entrance two  -AC (r) CH (t) AC (c)     Stair Railings, Main Entrance railing on right side (ascending)  -AC (r) CH (t) AC (c)       Row Name 06/07/23 1531          Stairs Within Home, Primary    Stairs, Within Home, Primary 2 story home with basement, needs met on main floor, laundry in basement, however patient's daughter will be staying with her at D/C  -AC (r) CH (t) AC (c)     Number of Stairs, Within Home, Primary twelve  -AC (r) CH (t) AC (c)       Row Name 06/07/23 1531          Cognition    Orientation Status (Cognition) oriented x 4  -AC (r) CH (t) AC (c)       Row Name 06/07/23 1531          Safety Issues, Functional Mobility    Safety Issues Affecting Function (Mobility) awareness of need for assistance;insight into deficits/self-awareness;safety precaution awareness;safety precautions follow-through/compliance  -AC (r) CH (t) AC (c)     Impairments Affecting Function (Mobility) balance;endurance/activity tolerance;pain;strength  -AC (r) CH (t) AC (c)                User Key  (r) = Recorded By, (t) = Taken By, (c) = Cosigned By      Initials Name Provider Type    AC Darcy Huerta, OT Occupational Therapist    Izzy Nichols, OT Student OT Student                     Mobility/ADL's       Row Name 06/07/23 1534          Bed Mobility    Bed Mobility sit-supine;scooting/bridging  -AC (r) CH (t) AC (c)     Scooting/Bridging Nelson (Bed Mobility) standby assist;verbal cues  -AC (r) CH (t) AC (c)     Sit-Supine Nelson (Bed Mobility) minimum assist (75% patient effort);2 person assist;verbal cues  -AC (r) CH (t) AC (c)     Comment, (Bed Mobility) in bathroom upon OT arrival  -AC (r) CH (t) AC (c)       Row Name 06/07/23 1534          Transfers    Transfers sit-stand transfer;stand-sit transfer  -AC (r) CH (t) AC (c)       Row Name 06/07/23 1534          Sit-Stand Transfer    Sit-Stand Nelson (Transfers) standby assist  -AC (r) CH (t) AC (c)     Assistive Device (Sit-Stand Transfers) walker, front-wheeled  -AC (r) CH (t) AC (c)       Row Name 06/07/23 1534          Stand-Sit Transfer    Stand-Sit Nelson (Transfers) standby assist  -AC (r) CH (t) AC (c)     Assistive Device (Stand-Sit Transfers) walker, front-wheeled  -AC (r) CH (t) AC (c)       Row Name 06/07/23 1534          Functional Mobility    Functional Mobility- Ind. Level standby assist  -AC (r) CH (t) AC (c)     Functional Mobility- Device walker, front-wheeled  -AC (r) CH (t) AC (c)     Functional Mobility-Distance (Feet) 200  -AC (r) CH (t) AC (c)     Functional Mobility- Safety Issues step length decreased  -AC (r) CH (t) AC (c)     Functional Mobility- Comment no LOB during fxal mobility  -AC (r) CH (t) AC (c)       Row Name 06/07/23 1534          Activities of Daily Living    BADL Assessment/Intervention lower body dressing;toileting;bathing  -AC (r) CH (t) AC (c)       Row Name 06/07/23 1534          Lower Body Dressing Assessment/Training    Nelson Level (Lower Body Dressing)  minimum assist (75% patient effort);don;doff;socks  -AC (r) CH (t) AC (c)     Assistive Devices (Lower Body Dressing) sock-aid;reacher  -AC (r) CH (t) AC (c)     Position (Lower Body Dressing) unsupported sitting;edge of bed sitting  -AC (r) CH (t) AC (c)       Row Name 06/07/23 1534          Toileting Assessment/Training    Matthews Level (Toileting) supervision;perform perineal hygiene;adjust/manage clothing  -AC (r) CH (t) AC (c)     Assistive Devices (Toileting) commode;grab bar/safety frame  -AC (r) CH (t) AC (c)     Position (Toileting) supported sitting  -AC (r) CH (t) AC (c)       Row Name 06/07/23 1534          Bathing Assessment/Intervention    Assistive Devices (Bathing) long-handled sponge  -AC (r) CH (t) AC (c)     Comment, (Bathing) Pt. educated on use of AE to perform LE bathing at home  -AC (r) CH (t) AC (c)               User Key  (r) = Recorded By, (t) = Taken By, (c) = Cosigned By      Initials Name Provider Type    AC Darcy Huerta, OT Occupational Therapist    Izzy Nichols, OT Student OT Student                   Obj/Interventions       Row Name 06/07/23 1537          Sensory Assessment (Somatosensory)    Sensory Assessment (Somatosensory) UE sensation intact  -AC (r) CH (t) AC (c)     Sensory Assessment Pt. states she has ocassional numbness in hands d/t raynaud's  -AC (r) CH (t) AC (c)       Row Name 06/07/23 1537          Vision Assessment/Intervention    Visual Impairment/Limitations WFL;corrective lenses full-time  -AC (r) CH (t) AC (c)       Row Name 06/07/23 1537          Range of Motion Comprehensive    General Range of Motion bilateral upper extremity ROM WFL  -AC (r) CH (t) AC (c)       Row Name 06/07/23 1537          Strength Comprehensive (MMT)    General Manual Muscle Testing (MMT) Assessment upper extremity strength deficits identified  -AC (r) CH (t) AC (c)     Comment, General Manual Muscle Testing (MMT) Assessment BUE grossly -4/5  -AC (r) CH (t) AC (c)       Row Name  06/07/23 1537          Balance    Balance Assessment sitting static balance;sitting dynamic balance;sit to stand dynamic balance;standing static balance;standing dynamic balance  -AC (r) CH (t) AC (c)     Static Sitting Balance supervision  -AC (r) CH (t) AC (c)     Dynamic Sitting Balance standby assist  -AC (r) CH (t) AC (c)     Position, Sitting Balance unsupported;sitting edge of bed  -AC (r) CH (t) AC (c)     Sit to Stand Dynamic Balance standby assist  -AC (r) CH (t) AC (c)     Static Standing Balance standby assist  -AC (r) CH (t) AC (c)     Dynamic Standing Balance standby assist  -AC (r) CH (t) AC (c)     Position/Device Used, Standing Balance supported;walker, front-wheeled  -AC (r) CH (t) AC (c)     Balance Interventions sitting;standing;sit to stand;supported;static;dynamic;dynamic reaching;occupation based/functional task  -AC (r) CH (t) AC (c)               User Key  (r) = Recorded By, (t) = Taken By, (c) = Cosigned By      Initials Name Provider Type    AC Darcy Huerta, OT Occupational Therapist    CH Izzy Sebastian, OT Student OT Student                   Goals/Plan       Row Name 06/07/23 1546          Bed Mobility Goal 1 (OT)    Activity/Assistive Device (Bed Mobility Goal 1, OT) sit to supine  -AC (r) CH (t) AC (c)     Vernon Level/Cues Needed (Bed Mobility Goal 1, OT) standby assist  -AC (r) CH (t) AC (c)     Time Frame (Bed Mobility Goal 1, OT) by discharge  -AC (r) CH (t) AC (c)       Row Name 06/07/23 1546          Dressing Goal 1 (OT)    Activity/Device (Dressing Goal 1, OT) lower body dressing  -AC (r) CH (t) AC (c)     Vernon/Cues Needed (Dressing Goal 1, OT) modified independence  -AC (r) CH (t) AC (c)     Time Frame (Dressing Goal 1, OT) by discharge  -AC (r) CH (t) AC (c)     Strategies/Barriers (Dressing Goal 1, OT) don/doff socks  -AC (r) CH (t) AC (c)               User Key  (r) = Recorded By, (t) = Taken By, (c) = Cosigned By      Initials Name Provider Type    AC  Darcy Huerta, OT Occupational Therapist    Izzy Nichols, OT Student OT Student                   Clinical Impression       Row Name 06/07/23 1541          Pain Assessment    Additional Documentation Pain Scale: FACES Pre/Post-Treatment (Group)  -AC (r) CH (t) AC (c)       Row Name 06/07/23 1541          Pain Scale: FACES Pre/Post-Treatment    Pain: FACES Scale, Pretreatment 0-->no hurt  -AC (r) CH (t) AC (c)     Posttreatment Pain Rating 4-->hurts little more  w/ bed mobility  -AC (r) CH (t) AC (c)     Pain Location incisional  -AC (r) CH (t) AC (c)     Pain Location - abdomen  -AC (r) CH (t) AC (c)       Row Name 06/07/23 1541          Plan of Care Review    Plan of Care Reviewed With patient;daughter  -AC (r) CH (t) AC (c)     Progress --  Initial eval  -AC (r) CH (t) AC (c)     Outcome Evaluation Pt. presents below baseline for BADLs d/t decreased strength, and acute pain w/ mobility. Pt. would benefit from skilled OT to remediate these deficits and promote retun to PLOF. Continue IPOT per POC. OT recommends Home w/ A upon d/c.  -AC (r) CH (t) AC (c)       Row Name 06/07/23 1541          Therapy Assessment/Plan (OT)    Patient/Family Therapy Goal Statement (OT) retunr to PLOF  -AC (r) CH (t) AC (c)     Rehab Potential (OT) good, to achieve stated therapy goals  -AC (r) CH (t) AC (c)     Criteria for Skilled Therapeutic Interventions Met (OT) meets criteria  -AC (r) CH (t) AC (c)     Therapy Frequency (OT) daily  -AC (r) CH (t) AC (c)       Row Name 06/07/23 1541          Therapy Plan Review/Discharge Plan (OT)    Anticipated Discharge Disposition (OT) home with assist  -AC (r) CH (t) AC (c)       Row Name 06/07/23 1541          Vital Signs    Pre Systolic BP Rehab 125  -AC (r) CH (t) AC (c)     Pre Treatment Diastolic BP 75  -AC (r) CH (t) AC (c)     Posttreatment Heart Rate (beats/min) 71  -AC (r) CH (t) AC (c)     O2 Delivery Pre Treatment room air  -AC (r) CH (t) AC (c)     O2 Delivery Intra Treatment  room air  -AC (r) CH (t) AC (c)     Post SpO2 (%) 95  -AC (r) CH (t) AC (c)     O2 Delivery Post Treatment room air  -AC (r) CH (t) AC (c)     Pre Patient Position Sitting  -AC (r) CH (t) AC (c)     Intra Patient Position Standing  -AC (r) CH (t) AC (c)     Post Patient Position Supine  -AC (r) CH (t) AC (c)       Row Name 06/07/23 1541          Positioning and Restraints    Pre-Treatment Position bathroom  -AC (r) CH (t) AC (c)     Post Treatment Position bed  -AC (r) CH (t) AC (c)     In Bed sitting;call light within reach;encouraged to call for assist;side rails up x2;with family/caregiver  nsg cleared no exit alarm  -AC (r) CH (t) AC (c)               User Key  (r) = Recorded By, (t) = Taken By, (c) = Cosigned By      Initials Name Provider Type    AC Darcy Huerta, OT Occupational Therapist    Izzy Nichols, OT Student OT Student                   Outcome Measures       Row Name 06/07/23 3731          How much help from another is currently needed...    Putting on and taking off regular lower body clothing? 3  -AC (r) CH (t) AC (c)     Bathing (including washing, rinsing, and drying) 3  -AC (r) CH (t) AC (c)     Toileting (which includes using toilet bed pan or urinal) 3  -AC (r) CH (t) AC (c)     Putting on and taking off regular upper body clothing 4  -AC (r) CH (t) AC (c)     Taking care of personal grooming (such as brushing teeth) 4  -AC (r) CH (t) AC (c)     Eating meals 4  -AC (r) CH (t) AC (c)     AM-PAC 6 Clicks Score (OT) 21  -AC (r) CH (t)       Row Name 06/07/23 4377          How much help from another person do you currently need...    Turning from your back to your side while in flat bed without using bedrails? 3  -CM     Moving from lying on back to sitting on the side of a flat bed without bedrails? 3  -CM     Moving to and from a bed to a chair (including a wheelchair)? 3  -CM     Standing up from a chair using your arms (e.g., wheelchair, bedside chair)? 4  -CM     Climbing 3-5 steps with  a railing? 3  -CM     To walk in hospital room? 3  -CM     AM-PAC 6 Clicks Score (PT) 19  -CM     Highest level of mobility 6 --> Walked 10 steps or more  -CM       Row Name 06/07/23 1547 06/07/23 1437       Functional Assessment    Outcome Measure Options AM-PAC 6 Clicks Daily Activity (OT)  -AC (r) CH (t) AC (c) AM-PAC 6 Clicks Basic Mobility (PT)  -CM              User Key  (r) = Recorded By, (t) = Taken By, (c) = Cosigned By      Initials Name Provider Type    AC Darcy Huerta, OT Occupational Therapist    CM Lana Sanchez, PT Physical Therapist    CH Izzy Sebastian, OT Student OT Student                    Occupational Therapy Education       Title: PT OT SLP Therapies (In Progress)       Topic: Occupational Therapy (In Progress)       Point: ADL training (Done)       Description:   Instruct learner(s) on proper safety adaptation and remediation techniques during self care or transfers.   Instruct in proper use of assistive devices.                  Learning Progress Summary             Patient Acceptance, E,D, VU,DU,NR by  at 6/7/2023 1547   Family Acceptance, E,D, VU,DU,NR by  at 6/7/2023 1547                         Point: Home exercise program (Not Started)       Description:   Instruct learner(s) on appropriate technique for monitoring, assisting and/or progressing therapeutic exercises/activities.                  Learner Progress:  Not documented in this visit.              Point: Precautions (Done)       Description:   Instruct learner(s) on prescribed precautions during self-care and functional transfers.                  Learning Progress Summary             Patient Acceptance, E,D, VU,DU,NR by  at 6/7/2023 1547   Family Acceptance, E,D, VU,DU,NR by  at 6/7/2023 1547                         Point: Body mechanics (Done)       Description:   Instruct learner(s) on proper positioning and spine alignment during self-care, functional mobility activities and/or exercises.                   Learning Progress Summary             Patient Acceptance, E,D, VU,DU,NR by  at 6/7/2023 1547   Family Acceptance, E,D, VU,DU,NR by  at 6/7/2023 1547                                         User Key       Initials Effective Dates Name Provider Type Discipline     03/10/23 -  Izzy Sebastian, OT Student OT Student OT                  OT Recommendation and Plan  Therapy Frequency (OT): daily  Plan of Care Review  Plan of Care Reviewed With: patient, daughter  Progress:  (Initial eval)  Outcome Evaluation: Pt. presents below baseline for BADLs d/t decreased strength, and acute pain w/ mobility. Pt. would benefit from skilled OT to remediate these deficits and promote retun to PLOF. Continue IPOT per POC. OT recommends Home w/ A upon d/c.     Time Calculation:    Time Calculation- OT       Row Name 06/07/23 1548 06/07/23 1438          Time Calculation- OT    OT Start Time 1456  -AC (r) CH (t) AC (c) --     OT Received On 06/07/23  -AC (r) CH (t) AC (c) --     OT Goal Re-Cert Due Date 06/17/23  -AC (r) CH (t) AC (c) --        Timed Charges    38664 - Gait Training Minutes  -- 15  -CM     03555 - OT Self Care/Mgmt Minutes 13  -AC (r) CH (t) AC (c) --        Untimed Charges    OT Eval/Re-eval Minutes 38  -AC (r) CH (t) AC (c) --        Total Minutes    Timed Charges Total Minutes 13  -AC (r) CH (t) 15  -CM     Untimed Charges Total Minutes 38  -AC (r) CH (t) --      Total Minutes 51  -AC (r) CH (t) 15  -CM               User Key  (r) = Recorded By, (t) = Taken By, (c) = Cosigned By      Initials Name Provider Type    AC Darcy Huerta, OT Occupational Therapist    CM Lana Sanchez, PT Physical Therapist    CH Izzy Sebastian, OT Student OT Student                  Therapy Charges for Today       Code Description Service Date Service Provider Modifiers Qty    93933762163 HC OT SELF CARE/MGMT/TRAIN EA 15 MIN 6/7/2023 Izzy Sebastian, OT Student GO 1    15494978940 HC OT EVAL LOW COMPLEXITY 3 6/7/2023 Izzy Sebastian,  OT Student GO 1                 Izzy Sebastian, OT Student  6/7/2023

## 2023-06-07 NOTE — PLAN OF CARE
Goal Outcome Evaluation:  Plan of Care Reviewed With: patient, daughter        Progress:  (Initial eval)  Outcome Evaluation: Pt. presents below baseline for BADLs d/t decreased strength, and acute pain w/ mobility. Pt. would benefit from skilled OT to remediate these deficits and promote retun to PLOF. Continue IPOT per POC. OT recommends Home w/ A upon d/c.

## 2023-06-07 NOTE — THERAPY EVALUATION
Patient Name: Chelsi Ferguson  : 1940    MRN: [id number]                              Today's Date: 2023       Admit Date: 2023    Visit Dx: No diagnosis found.  Patient Active Problem List   Diagnosis    Loss of weight    Hyperlipidemia    Edema    GERD (gastroesophageal reflux disease)    Nausea with vomiting    Diarrhea    Incontinence of feces    HTN (hypertension)    Cholelithiasis    Menopausal symptom    Cramp in limb    Glaucoma    Hammer toe    Dry mouth    Osteopenia    Elevated liver enzymes    UTI (urinary tract infection)    Bacterial UTI    Cholecystitis    H/O non-ST elevation myocardial infarction (NSTEMI)    Neck pain    Essential tremor    Idiopathic peripheral neuropathy    Muscle cramps    Coronary artery disease involving native coronary artery of native heart without angina pectoris    Double vision    Rheumatoid factor positive    Incisional hernia, without obstruction or gangrene    Incisional hernia without obstruction or gangrene    Umbilical hernia     Past Medical History:   Diagnosis Date    Ankle pain     Arthritis     Cancer     Skin    Cataract     removed    Chest discomfort     Cholecystitis 01/15/2018    Clotting disorder     may be result of aspirin    Coronary artery disease     Double vision 2022    Uses glasses with prism    Edema     Elevated cholesterol     Elevated liver enzymes 01/15/2018    Gallstones     GERD (gastroesophageal reflux disease)     Glaucoma     pt is currently off of meds and Dr Tanner does not think that she is glaucoma    H/O complete eye exam 2013    H/O mammogram 2015    H/O non-ST elevation myocardial infarction (NSTEMI) 2005    Hammer toe     Heart attack 2005    History of blood transfusion 2005    History of cardiovascular stress test 2015    normal    HTN (hypertension)     Hyperlipidemia     Hypokalemia 01/15/2018    Kidney infection 1971    Low back pain     Menopausal symptoms     Onychia and paronychia  of finger     Osteopenia     Other elevated white blood cell count     Pap smear for cervical cancer screening 2010    [place]    Pneumonia     Rheumatoid factor positive 12/05/2022    Sepsis 01/15/2018    Tremor     essential tremor    UTI (urinary tract infection) 01/15/2018    Viral warts     Visual impairment 2019    Wears glasses      Past Surgical History:   Procedure Laterality Date    ADENOIDECTOMY  1952    BLADDER REPAIR  2002    BLADDER REPAIR  2003    BUNIONECTOMY Right 2010    CARDIAC CATHETERIZATION  stent placed 2005    CHOLECYSTECTOMY      CHOLECYSTECTOMY WITH INTRAOPERATIVE CHOLANGIOGRAM N/A 03/07/2018    Procedure: CHOLECYSTECTOMY LAPAROSCOPIC INTRAOPERATIVE CHOLANGIOGRAM;  Surgeon: Harinder Mason MD;  Location: [facility] OR;  Service:     COLONOSCOPY  2008, 4/2014    [place]    CORONARY STENT PLACEMENT Left 01/2005    drug eluting stent 1/2005 LAD    DILATATION AND CURETTAGE  1971    DILATATION AND CURETTAGE  1978    EYE CAPSULOTOMY WITH LASER Bilateral 2018    EYE SURGERY Bilateral 05/2016    Cataract    FRONTALIS SUSPENSION  2010    HAMMER TOE REPAIR Right 11/03/2010    HEAD & NECK SKIN LESION EXCISIONAL BIOPSY  08/20/2018    benign scalp cyst    HYSTERECTOMY  1978    JOINT REPLACEMENT  2005    LAPAROTOMY OOPHERECTOMY Bilateral 2002    NOSE SURGERY  03/2017    repair 3 fractured areas. Dr Dunne    OTHER SURGICAL HISTORY  2005    rectocele repair    OTHER SURGICAL HISTORY  2010    eyelid surgery    REPLACEMENT TOTAL KNEE BILATERAL Bilateral     SKIN BIOPSY      SUBTOTAL HYSTERECTOMY  1978    TONSILLECTOMY  1952    TOTAL KNEE ARTHROPLASTY  01/2005    UMBILICAL HERNIA REPAIR  2019    UMBILICAL HERNIA REPAIR N/A 6/6/2023    Procedure: UMBILICAL HERNIA REPAIR WITH MESH LAPAROSCOPIC;  Surgeon: Harinder Mason MD;  Location: [facility] OR;  Service: General;  Laterality: N/A;    UPPER GASTROINTESTINAL ENDOSCOPY  11/17/2022    Dr Frank    UTERINE FIBROID SURGERY  1978    emergency removal of fibroid  from birth canal      General Information       Row Name 06/07/23 1404          Physical Therapy Time and Intention    Document Type evaluation  -CM     Mode of Treatment physical therapy;individual therapy  -CM       Row Name 06/07/23 1404          General Information    Patient Profile Reviewed yes  -CM     Prior Level of Function independent:;all household mobility;ADL's;driving  ind no AD  -CM     Existing Precautions/Restrictions fall;other (see comments)  laparascopic abdominal incisions with binder  -CM     Barriers to Rehab none identified  -CM       Row Name 06/07/23 1404          Living Environment    People in Home alone;other (see comments)  daughter and HERBERT will be staying with patient at D/C.  -CM       Row Name 06/07/23 1404          Home Main Entrance    Number of Stairs, Main Entrance two  -CM     Stair Railings, Main Entrance railing on right side (ascending)  -CM       Row Name 06/07/23 1404          Stairs Within Home, Primary    Stairs, Within Home, Primary 2 story home with basement, needs met on main floor, laundry in basement, however patient's daughter will be staying with her at D/C  -CM     Number of Stairs, Within Home, Primary twelve  -CM       Row Name 06/07/23 1404          Cognition    Orientation Status (Cognition) oriented x 4  -CM       Row Name 06/07/23 1404          Safety Issues, Functional Mobility    Safety Issues Affecting Function (Mobility) awareness of need for assistance;insight into deficits/self-awareness;safety precaution awareness;safety precautions follow-through/compliance  -CM     Impairments Affecting Function (Mobility) balance;endurance/activity tolerance;pain;strength  -CM               User Key  (r) = Recorded By, (t) = Taken By, (c) = Cosigned By      Initials Name Provider Type    Lana Lee PT Physical Therapist                   Mobility       Row Name 06/07/23 1406          Bed Mobility    Comment, (Bed Mobility) [facility] pre/post eval, patient  and her daughter were educated on logroll technique for pain management, they both verbalize good understanding  -CM       Row Name 06/07/23 1406          Sit-Stand Transfer    Sit-Stand [place] (Transfers) standby assist  -CM     Assistive Device (Sit-Stand Transfers) walker, front-wheeled  -CM     Comment, (Sit-Stand Transfer) simran safe hand placement  -CM       Row Name 06/07/23 1406          Gait/Stairs (Locomotion)    [place] Level (Gait) standby assist  -CM     Assistive Device (Gait) walker, front-wheeled  -CM     Distance in Feet (Gait) 200+100  -CM     Deviations/Abnormal Patterns (Gait) bilateral deviations;johann decreased;gait speed decreased;stride length decreased  -CM     Bilateral Gait Deviations forward flexed posture  -CM     [place] Level (Stairs) contact guard  -CM     Assistive Device (Stairs) other (see comments)  unilateral HHA  -CM     Handrail Location (Stairs) right side (ascending);left side (descending)  -CM     Ascending Technique (Stairs) step-over-step  -CM     Descending Technique (Stairs) step-to-step  -CM     Comment, (Gait/Stairs) Patient ambulated in [place] with a step through gait pattern and decreased gait speed. She demonstrates good sequencing with RW and no LOB. She does become fatigued and takes a seated rest break before completing additional ambulation and stair training. Patient ascended and descended 10 steps with use of unilateral handrail and HHA. She was educated on safe sequencing including step to step pattern when descending steps.  -CM               User Key  (r) = Recorded By, (t) = Taken By, (c) = Cosigned By      Initials Name Provider Type    Lana Lee PT Physical Therapist                   Obj/Interventions       Row Name 06/07/23 1432          Range of Motion Comprehensive    General Range of Motion bilateral lower extremity ROM WFL  -CM       Row Name 06/07/23 1432          Strength Comprehensive (MMT)    General Manual Muscle  Testing (MMT) Assessment lower extremity strength deficits identified  -CM     Comment, General Manual Muscle Testing (MMT) Assessment BLE grossly 4-/5  -CM       Row Name 06/07/23 1432          Balance    Balance Assessment sitting static balance;standing static balance;standing dynamic balance  -CM     Static Sitting Balance standby assist  -CM     Position, Sitting Balance unsupported;sitting in chair  -CM     Static Standing Balance contact guard  -CM     Dynamic Standing Balance contact guard  -CM     Position/Device Used, Standing Balance supported;walker, front-wheeled  -CM     Comment, Balance no LOB  -CM       Row Name 06/07/23 1432          Sensory Assessment (Somatosensory)    Sensory Assessment (Somatosensory) LE sensation intact  -CM               User Key  (r) = Recorded By, (t) = Taken By, (c) = Cosigned By      Initials Name Provider Type    CM Lana Sanchez, PT Physical Therapist                   Goals/Plan       Row Name 06/07/23 1436          Bed Mobility Goal 1 (PT)    Activity/Assistive Device (Bed Mobility Goal 1, PT) sit to supine/supine to sit  -CM     [place] Level/Cues Needed (Bed Mobility Goal 1, PT) independent  -CM     Time Frame (Bed Mobility Goal 1, PT) long term goal (LTG);10 days  -CM     Strategies/Barriers (Bed Mobility Goal 1, PT) demonstrating logroll technique  -CM     Progress/Outcomes (Bed Mobility Goal 1, PT) new goal  -CM       Row Name 06/07/23 1436          Transfer Goal 1 (PT)    Activity/Assistive Device (Transfer Goal 1, PT) sit-to-stand/stand-to-sit;bed-to-chair/chair-to-bed  -CM     [place] Level/Cues Needed (Transfer Goal 1, PT) modified independence  -CM     Time Frame (Transfer Goal 1, PT) long term goal (LTG);10 days  -CM     Progress/Outcome (Transfer Goal 1, PT) new goal  -CM       Row Name 06/07/23 1436          Gait Training Goal 1 (PT)    Activity/Assistive Device (Gait Training Goal 1, PT) gait (walking locomotion);assistive device use  -CM      [place] Level (Gait Training Goal 1, PT) modified independence  -CM     Distance (Gait Training Goal 1, PT) 500'  -CM     Time Frame (Gait Training Goal 1, PT) long term goal (LTG);10 days  -CM     Progress/Outcome (Gait Training Goal 1, PT) new goal  -CM       Row Name 06/07/23 1436          Stairs Goal 1 (PT)    Activity/Assistive Device (Stairs Goal 1, PT) ascending stairs;descending stairs  -CM     [place] Level/Cues Needed (Stairs Goal 1, PT) standby assist  -CM     Number of Stairs (Stairs Goal 1, PT) 2  -CM     Time Frame (Stairs Goal 1, PT) long term goal (LTG);10 days  -CM     Progress/Outcome (Stairs Goal 1, PT) new goal  -CM       Row Name 06/07/23 1436          Therapy Assessment/Plan (PT)    Planned Therapy Interventions (PT) balance training;bed mobility training;gait training;home exercise program;stretching;strengthening;stair training;ROM (range of motion);patient/family education;transfer training  -CM               User Key  (r) = Recorded By, (t) = Taken By, (c) = Cosigned By      Initials Name Provider Type    CM Lana Sanchez, PT Physical Therapist                   Clinical Impression       Row Name 06/07/23 1432          Pain    Pretreatment Pain Rating 3/10  -CM     Posttreatment Pain Rating 5/10  -CM     Pain Location - Side/Orientation Bilateral  -CM     Pain Location generalized  -CM     Pain Location - abdomen  -CM     Pain Intervention(s) Ambulation/increased activity;Repositioned  -CM       Row Name 06/07/23 1432          Plan of Care Review    Plan of Care Reviewed With patient;daughter  -CM     Outcome Evaluation Patient presents with strength, endurance, and balance deficits currently limiting her functional mobility below her baseline. She ambulated 200'+100' SBA with a FWW limited by fatigue. She also ascended/descended 10 steps per her home setup with CGA. IPPT is indicated to address her current deficits. Recommend that she D/C home with assist from her  family with use of FWW and OPPT.  -CM       Row Name 06/07/23 1432          Therapy Assessment/Plan (PT)    Rehab Potential (PT) good, to achieve stated therapy goals  -CM     Criteria for Skilled Interventions Met (PT) yes;meets criteria  -CM     Therapy Frequency (PT) daily  -CM       Row Name 06/07/23 1432          Vital Signs    Pre Systolic BP Rehab --  VSS, RN cleared PT for eval  -CM     O2 Delivery Pre Treatment room air  -CM     O2 Delivery Intra Treatment room air  -CM     O2 Delivery Post Treatment room air  -CM     Pre Patient Position Sitting  -CM     Intra Patient Position Standing  -CM     Post Patient Position Sitting  -CM       Row Name 06/07/23 1432          Positioning and Restraints    Pre-Treatment Position in bed  -CM     Post Treatment Position chair  -CM     In Chair reclined;call light within reach;encouraged to call for assist;with family/caregiver;notified nsg  no alarm upon entry  -CM               User Key  (r) = Recorded By, (t) = Taken By, (c) = Cosigned By      Initials Name Provider Type    Lana Lee, PT Physical Therapist                   Outcome Measures       Row Name 06/07/23 1437          How much help from another person do you currently need...    Turning from your back to your side while in flat bed without using bedrails? 3  -CM     Moving from lying on back to sitting on the side of a flat bed without bedrails? 3  -CM     Moving to and from a bed to a chair (including a wheelchair)? 3  -CM     Standing up from a chair using your arms (e.g., wheelchair, bedside chair)? 4  -CM     Climbing 3-5 steps with a railing? 3  -CM     To walk in hospital room? 3  -CM     AM-PAC 6 Clicks Score (PT) 19  -CM     Highest level of mobility 6 --> Walked 10 steps or more  -CM       Row Name 06/07/23 1437          Functional Assessment    Outcome Measure Options AM-PAC 6 Clicks Basic Mobility (PT)  -CM               User Key  (r) = Recorded By, (t) = Taken By, (c) = Cosigned By       Initials Name Provider Type    Lana Lee PT Physical Therapist                                 Physical Therapy Education       Title: PT OT SLP Therapies (In Progress)       Topic: Physical Therapy (In Progress)       Point: Mobility training (Done)       Learning Progress Summary             Patient Acceptance, E, VU by CM at 6/7/2023 1437   Family Acceptance, E, VU by CM at 6/7/2023 1437                         Point: Home exercise program (Not Started)       Learner Progress:  Not documented in this visit.              Point: Body mechanics (Done)       Learning Progress Summary             Patient Acceptance, E, VU by CM at 6/7/2023 1437   Family Acceptance, E, VU by CM at 6/7/2023 1437                         Point: Precautions (Done)       Learning Progress Summary             Patient Acceptance, E, VU by CM at 6/7/2023 1437   Family Acceptance, E, VU by CM at 6/7/2023 1437                                         User Key       Initials Effective Dates Name Provider Type Discipline     09/22/22 -  Lana Sanchez PT Physical Therapist PT                  PT Recommendation and Plan  Planned Therapy Interventions (PT): balance training, bed mobility training, gait training, home exercise program, stretching, strengthening, stair training, ROM (range of motion), patient/family education, transfer training  Plan of Care Reviewed With: patient, daughter  Outcome Evaluation: Patient presents with strength, endurance, and balance deficits currently limiting her functional mobility below her baseline. She ambulated 200'+100' SBA with a FWW limited by fatigue. She also ascended/descended 10 steps per her home setup with CGA. IPPT is indicated to address her current deficits. Recommend that she D/C home with assist from her family with use of FWW and OPPT.     Time Calculation:    PT Charges       Row Name 06/07/23 1438             Time Calculation    Start Time 1319  -CM      PT Received On  06/07/23  -CM      PT Goal Re-Cert Due Date 06/17/23  -CM         Timed Charges    74035 - Gait Training Minutes  15  -CM         Untimed Charges    PT Eval/Re-eval Minutes 40  -CM         Total Minutes    Timed Charges Total Minutes 15  -CM      Untimed Charges Total Minutes 40  -CM       Total Minutes 55  -CM                User Key  (r) = Recorded By, (t) = Taken By, (c) = Cosigned By      Initials Name Provider Type    CM Lana Sanchez, PT Physical Therapist                  Therapy Charges for Today       Code Description Service Date Service Provider Modifiers Qty    84115507757 HC GAIT TRAINING EA 15 MIN 6/7/2023 Lana Sanchez, PT GP 1    61528453822 HC PT EVAL LOW COMPLEXITY 4 6/7/2023 Lana Sanchez, PT GP 1            PT G-Codes  Outcome Measure Options: AM-PAC 6 Clicks Basic Mobility (PT)  AM-PAC 6 Clicks Score (PT): 19  PT Discharge Summary  Anticipated Discharge Disposition (PT): home with assist, home with outpatient therapy services    Lana Sanchez, FAB  6/7/2023

## 2023-06-07 NOTE — CASE MANAGEMENT/SOCIAL WORK
Discharge Planning Assessment  [facility]     Patient Name: Chelsi Ferguson  MRN: [id number]  Today's Date: 6/7/2023    Admit Date: 6/6/2023    Plan: home   Discharge Needs Assessment       Row Name 06/07/23 0922       Living Environment    People in Home alone    Current Living Arrangements home    Primary Care Provided by self;other (see comments)  meals are delivered;        Transition Planning    Patient/Family Anticipates Transition to home       Discharge Needs Assessment    Readmission Within the Last 30 Days no previous admission in last 30 days    Equipment Currently Used at Home walker, rolling    Concerns to be Addressed discharge planning;basic needs                   Discharge Plan       Row Name 06/07/23 0923       Plan    Plan home    Patient/Family in Agreement with Plan yes    Plan Comments I met with this patient and her daughter at the bedside. She lives alone in [place]. Her daughter states that she will be at her mother's house for approximately 1.5 weeks to help with her care and then the patient's son will come after that to help. She is independent with self care and med preparation. She gets assistance for meals and household chores, but does her own laundry. PT and OT have been consulted. She does go to OPPT at [facility] in [place] on [address]. She anticipates returning home after this hospitalization and her family will transport. Case management will continue to follow her plan of care and assist with any discharge planning needs.    Final Discharge Disposition Code 01 - home or self-care                  Continued Care and Services - Admitted Since 6/6/2023    Coordination has not been started for this encounter.       Expected Discharge Date and Time       Expected Discharge Date Expected Discharge Time    Jun 14, 2023            Demographic Summary       Row Name 06/07/23 0921       General Information    General Information Comments I confirmed  that Reina Zenon is Ms Ferguson's PCP and she has [employer] Medicare for insurance                   Functional Status       Row Name 06/07/23 0922       Functional Status, IADL    Medications independent    Meal Preparation assistive person    Housekeeping assistive person    Laundry independent    Shopping assistive person                   Psychosocial    No documentation.                  Abuse/Neglect    No documentation.                  Legal    No documentation.                  Substance Abuse    No documentation.                  Patient Forms    No documentation.                     Stephanie Gonzalez RN

## 2023-06-07 NOTE — PLAN OF CARE
Goal Outcome Evaluation:  Plan of Care Reviewed With: patient, daughter           Outcome Evaluation: Patient presents with strength, endurance, and balance deficits currently limiting her functional mobility below her baseline. She ambulated 200'+100' SBA with a FWW limited by fatigue. She also ascended/descended 10 steps per her home setup with CGA. IPPT is indicated to address her current deficits. Recommend that she D/C home with assist from her family with use of FWW and OPPT.

## 2023-06-08 VITALS
SYSTOLIC BLOOD PRESSURE: 136 MMHG | DIASTOLIC BLOOD PRESSURE: 65 MMHG | RESPIRATION RATE: 18 BRPM | OXYGEN SATURATION: 98 % | TEMPERATURE: 97.1 F | HEART RATE: 71 BPM

## 2023-06-08 LAB
ANION GAP SERPL CALCULATED.3IONS-SCNC: 8 MMOL/L (ref 5–15)
BUN SERPL-MCNC: 11 MG/DL (ref 8–23)
BUN/CREAT SERPL: 21.6 (ref 7–25)
CALCIUM SPEC-SCNC: 9.3 MG/DL (ref 8.6–10.5)
CHLORIDE SERPL-SCNC: 100 MMOL/L (ref 98–107)
CO2 SERPL-SCNC: 32 MMOL/L (ref 22–29)
CREAT SERPL-MCNC: 0.51 MG/DL (ref 0.57–1)
DEPRECATED RDW RBC AUTO: 48.6 FL (ref 37–54)
EGFRCR SERPLBLD CKD-EPI 2021: 92.8 ML/MIN/1.73
ERYTHROCYTE [DISTWIDTH] IN BLOOD BY AUTOMATED COUNT: 13.2 % (ref 12.3–15.4)
GLUCOSE SERPL-MCNC: 103 MG/DL (ref 65–99)
HCT VFR BLD AUTO: 38.9 % (ref 34–46.6)
HGB BLD-MCNC: 12.6 G/DL (ref 12–15.9)
MCH RBC QN AUTO: 32.6 PG (ref 26.6–33)
MCHC RBC AUTO-ENTMCNC: 32.4 G/DL (ref 31.5–35.7)
MCV RBC AUTO: 100.5 FL (ref 79–97)
PLATELET # BLD AUTO: 255 10*3/MM3 (ref 140–450)
PMV BLD AUTO: 9 FL (ref 6–12)
POTASSIUM SERPL-SCNC: 3.8 MMOL/L (ref 3.5–5.2)
RBC # BLD AUTO: 3.87 10*6/MM3 (ref 3.77–5.28)
SODIUM SERPL-SCNC: 140 MMOL/L (ref 136–145)
WBC NRBC COR # BLD: 10.47 10*3/MM3 (ref 3.4–10.8)

## 2023-06-08 PROCEDURE — 80048 BASIC METABOLIC PNL TOTAL CA: CPT | Performed by: SURGERY

## 2023-06-08 PROCEDURE — 63710000001 ALUMINUM-MAGNESIUM HYDROXIDE-SIMETHICONE 400-400-40 MG/5ML SUSPENSION: Performed by: SURGERY

## 2023-06-08 PROCEDURE — 63710000001 OXYCODONE-ACETAMINOPHEN 10-325 MG TABLET: Performed by: SURGERY

## 2023-06-08 PROCEDURE — 63710000001 DOCUSATE SODIUM 100 MG CAPSULE: Performed by: SURGERY

## 2023-06-08 PROCEDURE — 85027 COMPLETE CBC AUTOMATED: CPT | Performed by: SURGERY

## 2023-06-08 PROCEDURE — A9270 NON-COVERED ITEM OR SERVICE: HCPCS | Performed by: SURGERY

## 2023-06-08 PROCEDURE — 25010000002 HEPARIN (PORCINE) PER 1000 UNITS: Performed by: SURGERY

## 2023-06-08 PROCEDURE — 25010000002 ONDANSETRON PER 1 MG: Performed by: SURGERY

## 2023-06-08 PROCEDURE — 63710000001 METOPROLOL SUCCINATE XL 25 MG TABLET SUSTAINED-RELEASE 24 HOUR: Performed by: SURGERY

## 2023-06-08 PROCEDURE — 63710000001 BISACODYL 5 MG TABLET DELAYED-RELEASE: Performed by: SURGERY

## 2023-06-08 RX ORDER — ONDANSETRON 4 MG/1
4 TABLET, FILM COATED ORAL EVERY 6 HOURS PRN
Qty: 60 TABLET | Refills: 1 | Status: SHIPPED | OUTPATIENT
Start: 2023-06-08

## 2023-06-08 RX ORDER — DOCUSATE SODIUM 100 MG/1
100 CAPSULE, LIQUID FILLED ORAL 2 TIMES DAILY
Qty: 30 CAPSULE | Refills: 1 | Status: SHIPPED | OUTPATIENT
Start: 2023-06-08

## 2023-06-08 RX ORDER — OXYCODONE AND ACETAMINOPHEN 10; 325 MG/1; MG/1
1 TABLET ORAL EVERY 4 HOURS PRN
Qty: 14 TABLET | Refills: 0 | Status: SHIPPED | OUTPATIENT
Start: 2023-06-08

## 2023-06-08 RX ORDER — NALOXONE HYDROCHLORIDE 4 MG/.1ML
SPRAY NASAL
Qty: 2 EACH | Refills: 0 | Status: SHIPPED | OUTPATIENT
Start: 2023-06-08

## 2023-06-08 RX ADMIN — OXYCODONE HYDROCHLORIDE AND ACETAMINOPHEN 1 TABLET: 10; 325 TABLET ORAL at 08:11

## 2023-06-08 RX ADMIN — BISACODYL 10 MG: 5 TABLET, COATED ORAL at 08:11

## 2023-06-08 RX ADMIN — DOCUSATE SODIUM 100 MG: 100 CAPSULE, LIQUID FILLED ORAL at 08:11

## 2023-06-08 RX ADMIN — OXYCODONE HYDROCHLORIDE AND ACETAMINOPHEN 1 TABLET: 10; 325 TABLET ORAL at 12:18

## 2023-06-08 RX ADMIN — HEPARIN SODIUM 5000 UNITS: 5000 INJECTION INTRAVENOUS; SUBCUTANEOUS at 06:25

## 2023-06-08 RX ADMIN — ALUMINUM HYDROXIDE, MAGNESIUM HYDROXIDE, AND DIMETHICONE 15 ML: 400; 400; 40 SUSPENSION ORAL at 08:13

## 2023-06-08 RX ADMIN — ONDANSETRON 4 MG: 2 INJECTION INTRAMUSCULAR; INTRAVENOUS at 04:16

## 2023-06-08 RX ADMIN — METOPROLOL SUCCINATE 25 MG: 25 TABLET, EXTENDED RELEASE ORAL at 08:11

## 2023-06-08 NOTE — CASE MANAGEMENT/SOCIAL WORK
Continued Stay Note  [facility]     Patient Name: Chelsi Ferguson  MRN: [id number]  Today's Date: 6/8/2023    Admit Date: 6/6/2023    Plan: home   Discharge Plan       Row Name 06/08/23 0814       Plan    Plan home    Patient/Family in Agreement with Plan yes    Plan Comments I met with this patient and her daughter at the bedside regarding discharge today. She stated that she will resume her OPPT through [facility] when she goes home. Her daughter will transport. She denies having any further discharge needs at this time.    Final Discharge Disposition Code 01 - home or self-care                   Discharge Codes    No documentation.                 Expected Discharge Date and Time       Expected Discharge Date Expected Discharge Time    Jun 8, 2023               Stephanie Gonzalez RN

## 2023-06-08 NOTE — PROGRESS NOTES
Patient Name:  Chelsi Ferguson  YOB: 1940  [id number]    Surgery Progress Note    Date of visit: 6/8/2023    Subjective   Subjective: Feeling better, had a BM. Wants to go home.         Objective     Objective:     /92 (BP Location: Left arm, Patient Position: Lying)   Pulse 78   Temp 98 °F (36.7 °C) (Oral)   Resp 18   LMP  (LMP Unknown) Comment: Last Mammogram  SpO2 95%     Intake/Output Summary (Last 24 hours) at 6/8/2023 0705  Last data filed at 6/7/2023 2300  Gross per 24 hour   Intake 240 ml   Output 0 ml   Net 240 ml       CV:  Rhythm  regular and rate regular   L:  Clear  to auscultation bilaterally   Abd:  Bowel sounds positive , soft, appropriately tender. Dressings c/d/i  Ext:  No cyanosis, clubbing, edema    Recent labs that are back at this time have been reviewed.            Assessment/ Plan:    Incisional hernia- Doing well after Lap repair/. D/C home. RTC with me in 2 weeks. No lifting > 30 lbs until RTC. May remove dressings in 24 hours, may shower at that time.      Problem List Items Addressed This Visit    None       [facility] Problems    Diagnosis  POA    **Incisional hernia, without obstruction or gangrene [K43.2]  Yes    Incisional hernia without obstruction or gangrene [K43.2]  Yes    Umbilical hernia [K42.9]  Yes    Rheumatoid factor positive [R76.8]  Yes    Coronary artery disease involving native coronary artery of native heart without angina pectoris [I25.10]  Yes    HTN (hypertension) [I10]  Yes    H/O non-ST elevation myocardial infarction (NSTEMI) [I25.2]  Not Applicable      Resolved Hospital Problems   No resolved problems to display.              Harinder Mason MD  6/8/2023  07:05 EDT

## 2023-06-08 NOTE — DISCHARGE SUMMARY
Discharge Summary    Patient Name:  Chelsi Ferguson  YOB: 1940  [id number]      DATE OF ADMISSION: 6/6/2023    DATE OF DISCHARGE: 6/8/2023       FINAL DIAGNOSES:     Incisional hernia, without obstruction or gangrene    HTN (hypertension)    H/O non-ST elevation myocardial infarction (NSTEMI)    Coronary artery disease involving native coronary artery of native heart without angina pectoris    Rheumatoid factor positive    Incisional hernia without obstruction or gangrene    Umbilical hernia       CONSULTING SERVICES: None      PROCEDURES PERFORMED:   1.  Laparoscopic incisional hernia repair with mesh performed on 6/6/2023    HISTORY: The patient is a very pleasant 82 y.o. female with a history of an incisional hernia related to prior laparoscopic cholecystectomy.  She is now ready for definitive surgical management.  The risks and benefits were discussed at length with the patient and her family, and they agreed to proceed.      HOSPITAL COURSE: The patient came in as an outpatient, underwent her operative procedure, transferred to the floor.  Postoperatively she did well.  Her pain was controlled on oral pain medication with IV pain medication for breakthrough pain.  She worked with physical and occupational therapy and continued to improve.  She had return of bowel function.  She was ready for discharge home on 6/8/2023.     CONDITION: At the time of discharge, the patient is tolerating a regular diet without difficulty. she is having normal bowel and bladder function. her incisions are clean, dry and intact.      DISCHARGE MEDICATIONS:   1. All previous home medications.   2. Percocet  5 - 10 mg PO  Q4h  PRN pain  3. Colace 100mg PO BID  4.  Zofran 4 mg every 6 hours as needed for nausea     DISCHARGE INSTRUCTIONS:  1. The patient is instructed to follow up with Dr. Mason in 2 weeks’ time.  2. she is instructed to refrain from lifting more than 15 or 20 pounds until their return to clinic.   3. If  the patient has worsening problems with fever or chills nausea or vomiting she is to contact Dr. Mason's office and a contact card has been provided.  4.  She is to wear her abdominal binder when up and about.      Harinder Mason MD  6/8/2023  07:08 EDT         \

## 2023-06-09 ENCOUNTER — OFFICE VISIT (OUTPATIENT)
Dept: INTERNAL MEDICINE | Facility: CLINIC | Age: 83
End: 2023-06-09
Payer: MEDICARE

## 2023-06-09 VITALS
HEIGHT: 60 IN | TEMPERATURE: 98.2 F | WEIGHT: 142 LBS | OXYGEN SATURATION: 94 % | DIASTOLIC BLOOD PRESSURE: 80 MMHG | SYSTOLIC BLOOD PRESSURE: 138 MMHG | HEART RATE: 80 BPM | BODY MASS INDEX: 27.88 KG/M2

## 2023-06-09 DIAGNOSIS — Z09 STATUS POST UMBILICAL HERNIA REPAIR, FOLLOW-UP EXAM: Primary | ICD-10-CM

## 2023-06-09 NOTE — PROGRESS NOTES
"Transitional Care Follow Up Visit  Subjective     SharriRukhsana Ferguson is a 82 y.o. female who presents for a transitional care management visit.       I reviewed and discussed the details of that call along with the discharge summary, hospital problems, inpatient lab results, inpatient diagnostic studies, and consultation reports with Chelsi.     Current outpatient and discharge medications have been reconciled for the patient.    Visit Vitals  /80 (BP Location: Left arm, Patient Position: Sitting, Cuff Size: Adult)   Pulse 80   Temp 98.2 °F (36.8 °C)   Ht 152.4 cm (60\")   Wt 64.4 kg (142 lb)   LMP  (LMP Unknown) Comment: Last Mammogram   SpO2 94%   BMI 27.73 kg/m²            1/19/2018    12:18 PM 3/12/2018     2:52 PM   Date of TCM Phone Call   [facility]   Date of Admission 1/15/2018 3/7/2018   Date of Discharge 1/18/2018 3/8/2018   Discharge Disposition Home or Self Care Home or Self Care     Risk for Readmission (LACE) Score: 3 (6/8/2023  6:00 AM)      History of Present Illness   Course During Hospital Stay:       DATE OF ADMISSION: 6/6/2023     DATE OF DISCHARGE: 6/8/2023        FINAL DIAGNOSES:     Incisional hernia, without obstruction or gangrene    HTN (hypertension)    H/O non-ST elevation myocardial infarction (NSTEMI)    Coronary artery disease involving native coronary artery of native heart without angina pectoris    Rheumatoid factor positive    Incisional hernia without obstruction or gangrene    Umbilical hernia        CONSULTING SERVICES: None      PROCEDURES PERFORMED:   1.  Laparoscopic incisional hernia repair with mesh performed on 6/6/2023     HISTORY: The patient is a very pleasant 82 y.o. female with a history of an incisional hernia related to prior laparoscopic cholecystectomy.  She is now ready for definitive surgical management.  The risks and benefits were discussed at length with the patient and her family, and they agreed to proceed.    "   HOSPITAL COURSE: The patient came in as an outpatient, underwent her operative procedure, transferred to the floor.  Postoperatively she did well.  Her pain was controlled on oral pain medication with IV pain medication for breakthrough pain.  She worked with physical and occupational therapy and continued to improve.  She had return of bowel function.  She was ready for discharge home on 6/8/2023.     CONDITION: At the time of discharge, the patient is tolerating a regular diet without difficulty. she is having normal bowel and bladder function. her incisions are clean, dry and intact.      DISCHARGE MEDICATIONS:   1. All previous home medications.   2. Percocet  5 - 10 mg PO  Q4h  PRN pain  3. Colace 100mg PO BID  4.  Zofran 4 mg every 6 hours as needed for nausea     DISCHARGE INSTRUCTIONS:  1. The patient is instructed to follow up with Dr. Mason in 2 weeks’ time.  2. she is instructed to refrain from lifting more than 15 or 20 pounds until their return to clinic.   3. If the patient has worsening problems with fever or chills nausea or vomiting she is to contact Dr. Mason's office and a contact card has been provided.  4.  She is to wear her abdominal binder when up and about.        Harinder Mason MD  6/8/2023  07:08 EDT    ______________________________________________  Pt had shower this morning and bandages and steri-strips were removed.   Pt has not needed oxycontin since last night.  Pt had tylenol today for pain.  Pt is limited to 4000mg per day of tylenol.  Pt still as nausea. Pt saw Dr Cassidy the day before surgery because of nausea.   He started her on Dexilant that she has not tried because of nausea with prilosec. Pt is currently on Pepcid and Tums.      The following portions of the patient's history were reviewed and updated as appropriate: allergies, current medications, past family history, past medical history, past social history, past surgical history, and problem list.    Past Medical  History:   Diagnosis Date    Ankle pain     Arthritis     Cancer     Skin    Cataract     removed    Chest discomfort     Cholecystitis 01/15/2018    Clotting disorder     may be result of aspirin    Coronary artery disease     Double vision 04/25/2022    Uses glasses with prism    Edema     Elevated cholesterol     Elevated liver enzymes 01/15/2018    Gallstones     GERD (gastroesophageal reflux disease)     Glaucoma     pt is currently off of meds and Dr Tanner does not think that she is glaucoma    H/O complete eye exam 06/2013    H/O mammogram 11/16/2015    H/O non-ST elevation myocardial infarction (NSTEMI) 01/2005    Hammer toe     Heart attack 01/2005    History of blood transfusion 01/2005    History of cardiovascular stress test 12/02/2015    normal    HTN (hypertension)     Hyperlipidemia     Hypokalemia 01/15/2018    Kidney infection 1971    Low back pain     Menopausal symptoms     Onychia and paronychia of finger     Osteopenia     Other elevated white blood cell count     Pap smear for cervical cancer screening 2010    [place]    Pneumonia     Rheumatoid factor positive 12/05/2022    Sepsis 01/15/2018    Tremor     essential tremor    UTI (urinary tract infection) 01/15/2018    Viral warts     Visual impairment 2019    Wears glasses       Past Surgical History:   Procedure Laterality Date    ADENOIDECTOMY  1952    BLADDER REPAIR  2002    BLADDER REPAIR  2003    BUNIONECTOMY Right 2010    CARDIAC CATHETERIZATION  stent placed 2005    CHOLECYSTECTOMY      CHOLECYSTECTOMY WITH INTRAOPERATIVE CHOLANGIOGRAM N/A 03/07/2018    Procedure: CHOLECYSTECTOMY LAPAROSCOPIC INTRAOPERATIVE CHOLANGIOGRAM;  Surgeon: Harinder Mason MD;  Location: [facility];  Service:     COLONOSCOPY  2008, 4/2014    [place]    CORONARY STENT PLACEMENT Left 01/2005    drug eluting stent 1/2005 LAD    DILATATION AND CURETTAGE  1971    DILATATION AND CURETTAGE  1978    EYE CAPSULOTOMY WITH LASER Bilateral 2018    EYE SURGERY Bilateral  2016    Cataract    FRONTALIS SUSPENSION  2010    HAMMER TOE REPAIR Right 2010    HEAD & NECK SKIN LESION EXCISIONAL BIOPSY  2018    benign scalp cyst    HYSTERECTOMY  1978    JOINT REPLACEMENT  2005    LAPAROTOMY OOPHERECTOMY Bilateral 2002    NOSE SURGERY  2017    repair 3 fractured areas. Dr Dunne    OTHER SURGICAL HISTORY      rectocele repair    OTHER SURGICAL HISTORY      eyelid surgery    REPLACEMENT TOTAL KNEE BILATERAL Bilateral     SKIN BIOPSY      SUBTOTAL HYSTERECTOMY  1978    TONSILLECTOMY  89    TOTAL KNEE ARTHROPLASTY  2005    UMBILICAL HERNIA REPAIR  2019    UMBILICAL HERNIA REPAIR N/A 2023    Procedure: UMBILICAL HERNIA REPAIR WITH MESH LAPAROSCOPIC;  Surgeon: Harinder Mason MD;  Location: [facility];  Service: General;  Laterality: N/A;    UPPER GASTROINTESTINAL ENDOSCOPY  2022    Dr Frank    UTERINE FIBROID SURGERY      emergency removal of fibroid from birth canal      Family History   Problem Relation Age of Onset    Stroke Mother     Hypertension Mother         My Mother  of brain hemmorage/stroke    Heart failure Father         congestive    Cancer Father         Neck glands removed    Vision loss Father         Had operation    Heart disease Father          of Congestive Heart Failure at 91    Breast cancer Sister         60's    Cancer Sister         Breast    Breast cancer Daughter 40    Cancer Daughter         Breast    Thyroid disease Daughter         on meds    Breast cancer Other         NIECE 60's    Breast cancer Other         NIECE 60's    Vision loss Paternal Aunt         blind-glaucoma    Vision loss Paternal Uncle         blind-glaucoma    Vision loss Maternal Aunt         all 9 aunts paternal aunts/Uncles/ cousins/ sister/brother/ and niece on meds for Glaucoma or had operation    Early death Daughter         Celiac Sprue reaction    Ovarian cancer Neg Hx       Social History     Socioeconomic History    Marital status:     Tobacco Use    Smoking status: Never     Passive exposure: Past    Smokeless tobacco: Never    Tobacco comments:     My father, brother in law, friends, and  smoked so had second hand exposure   Vaping Use    Vaping Use: Never used   Substance and Sexual Activity    Alcohol use: No    Drug use: No    Sexual activity: Defer        Review of Systems   Constitutional:  Positive for fatigue. Negative for chills, diaphoresis and fever.   HENT: Negative.  Negative for ear pain, nosebleeds, postnasal drip, rhinorrhea, sinus pressure, sneezing and sore throat.    Eyes: Negative.  Negative for redness and itching.   Respiratory: Negative.  Negative for cough, shortness of breath and wheezing.    Cardiovascular: Negative.  Negative for chest pain, palpitations and leg swelling.   Gastrointestinal:  Positive for abdominal pain (where there are sutures) and nausea. Negative for constipation, diarrhea and vomiting.   Endocrine: Positive for polydipsia. Negative for cold intolerance and heat intolerance.   Genitourinary: Negative.  Negative for dysuria, frequency, hematuria and urgency.   Musculoskeletal:  Positive for arthralgias, back pain and gait problem (using walker). Negative for neck pain.   Skin: Negative.  Negative for color change and rash.   Allergic/Immunologic: Negative.  Negative for environmental allergies.   Neurological:  Negative for dizziness, syncope, light-headedness and headaches.   Hematological: Negative.  Negative for adenopathy. Does not bruise/bleed easily.   Psychiatric/Behavioral: Negative.  Negative for dysphoric mood. The patient is not nervous/anxious.      Objective   Physical Exam  Vitals and nursing note reviewed.   Constitutional:       Appearance: She is well-developed.      Comments: Using rolling walker   HENT:      Head: Normocephalic.      Right Ear: External ear normal.      Left Ear: External ear normal.      Nose: Nose normal.   Eyes:      General: Lids are normal.       Conjunctiva/sclera: Conjunctivae normal.      Pupils: Pupils are equal, round, and reactive to light.   Neck:      Thyroid: No thyroid mass or thyromegaly.      Vascular: No carotid bruit.      Trachea: Trachea normal.   Cardiovascular:      Rate and Rhythm: Normal rate and regular rhythm.      Heart sounds: No murmur heard.  Pulmonary:      Effort: Pulmonary effort is normal. No respiratory distress.      Breath sounds: Normal breath sounds. No decreased breath sounds, wheezing, rhonchi or rales.   Chest:      Chest wall: No tenderness.   Abdominal:      General: A surgical scar is present. Bowel sounds are normal.      Palpations: Abdomen is soft. There is no hepatomegaly or splenomegaly.      Tenderness: There is no abdominal tenderness. There is no guarding or rebound.      Hernia: No hernia is present.          Comments: Wounds cleaned with alcohol and steri-strips applied   Musculoskeletal:         General: Normal range of motion.      Cervical back: Normal range of motion and neck supple.   Skin:     General: Skin is warm and dry.   Neurological:      Mental Status: She is alert and oriented to person, place, and time.   Psychiatric:         Behavior: Behavior normal.       Assessment & Plan   Problems Addressed this Visit    None  Visit Diagnoses       Status post umbilical hernia repair, follow-up exam    -  Primary          Diagnoses         Codes Comments    Status post umbilical hernia repair, follow-up exam    -  Primary ICD-10-CM: Z09  ICD-9-CM: V67.09           Continue the pepcid if not tolerating dexilant.  Helped daughter apply abdominal binder.     Current Outpatient Medications:     acetaminophen (TYLENOL) 650 MG 8 hr tablet, Take 2 tablets by mouth 2 (two) times a day., Disp: , Rfl:     aspirin 81 MG EC tablet, Take 1 tablet by mouth Every Night., Disp: , Rfl:     BIOTIN 5000 PO, Take 5,000 mcg by mouth Daily., Disp: , Rfl:     Cholecalciferol (Vitamin D3) 50 MCG (2000 UT) capsule, Take 1  capsule by mouth Daily., Disp: , Rfl:     coenzyme Q10 100 MG capsule, Take 1 capsule by mouth Daily., Disp: , Rfl:     docusate sodium (Stool Softener) 100 MG capsule, Take 1 capsule by mouth 2 Times a Day., Disp: 30 capsule, Rfl: 1    estradiol (Yuvafem) 10 MCG tablet vaginal tablet, Insert 1 tablet into the vagina 2 (Two) Times a Week., Disp: 24 tablet, Rfl: 0    latanoprost (XALATAN) 0.005 % ophthalmic solution, 1 drop Every Night., Disp: , Rfl:     metoprolol succinate XL (TOPROL-XL) 25 MG 24 hr tablet, Take 1 tablet by mouth Every Morning., Disp: 90 tablet, Rfl: 1    multivitamin with minerals tablet tablet, Take 1 tablet by mouth Daily., Disp: , Rfl:     naloxone (NARCAN) 4 MG/0.1ML nasal spray, Call 911. Don't prime. [place] in 1 nostril for overdose. Repeat in 2-3 minutes in other nostril if no or minimal breathing/responsiveness., Disp: 2 each, Rfl: 0    nitroglycerin (NITROSTAT) 0.4 MG SL tablet, Place 1 tablet under the tongue Every 5 (Five) Minutes As Needed for Chest Pain., Disp: 25 tablet, Rfl: 11    ondansetron (ZOFRAN) 4 MG tablet, Take 1 tablet by mouth Every 6 (Six) Hours As Needed for Nausea or Vomiting., Disp: 60 tablet, Rfl: 1    oxyCODONE-acetaminophen (PERCOCET)  MG per tablet, Take 1 tablet by mouth Every 4 (Four) Hours As Needed for Severe Pain., Disp: 14 tablet, Rfl: 0    rosuvastatin (CRESTOR) 20 MG tablet, Take 1 tablet by mouth Every Night., Disp: 90 tablet, Rfl: 1    dexlansoprazole (DEXILANT) 60 MG capsule, Take 1 capsule daily (Patient not taking: Reported on 6/9/2023), Disp: 90 capsule, Rfl: 3  No current facility-administered medications for this visit.          I spent 19 minutes caring for Chelsi on this date of service. This time includes time spent by me in the following activities: preparing for the visit, reviewing tests, obtaining and/or reviewing a separately obtained history, performing a medically appropriate examination and/or evaluation, counseling and educating the  patient/family/caregiver, and documenting information in the medical record

## 2023-06-15 ENCOUNTER — TELEPHONE (OUTPATIENT)
Dept: GASTROENTEROLOGY | Facility: CLINIC | Age: 83
End: 2023-06-15
Payer: MEDICARE

## 2023-06-15 NOTE — TELEPHONE ENCOUNTER
I returned patient phone call concerning questions about medication Colace and Dexilant. I advised patient to follow the discharge instructions from Dr Mason's office and also instructed her to follow the advise of Dr Frank concerning the Dexilant. Patient verbalized understanding and had no further questions.

## 2023-07-23 DIAGNOSIS — N95.1 MENOPAUSAL SYMPTOM: ICD-10-CM

## 2023-07-24 RX ORDER — ESTRADIOL 10 UG/1
TABLET VAGINAL
Qty: 24 TABLET | Refills: 0 | Status: SHIPPED | OUTPATIENT
Start: 2023-07-24

## 2023-08-11 ENCOUNTER — OFFICE VISIT (OUTPATIENT)
Dept: INTERNAL MEDICINE | Facility: CLINIC | Age: 83
End: 2023-08-11
Payer: MEDICARE

## 2023-08-11 VITALS
TEMPERATURE: 98 F | OXYGEN SATURATION: 96 % | BODY MASS INDEX: 27.01 KG/M2 | HEIGHT: 60 IN | DIASTOLIC BLOOD PRESSURE: 64 MMHG | WEIGHT: 137.6 LBS | HEART RATE: 74 BPM | SYSTOLIC BLOOD PRESSURE: 115 MMHG

## 2023-08-11 DIAGNOSIS — R21 RASH: ICD-10-CM

## 2023-08-11 DIAGNOSIS — L30.9 DERMATITIS: Primary | ICD-10-CM

## 2023-08-11 PROCEDURE — 3074F SYST BP LT 130 MM HG: CPT | Performed by: FAMILY MEDICINE

## 2023-08-11 PROCEDURE — 3078F DIAST BP <80 MM HG: CPT | Performed by: FAMILY MEDICINE

## 2023-08-11 PROCEDURE — 1159F MED LIST DOCD IN RCRD: CPT | Performed by: FAMILY MEDICINE

## 2023-08-11 PROCEDURE — 99213 OFFICE O/P EST LOW 20 MIN: CPT | Performed by: FAMILY MEDICINE

## 2023-08-11 PROCEDURE — 1160F RVW MEDS BY RX/DR IN RCRD: CPT | Performed by: FAMILY MEDICINE

## 2023-08-11 RX ORDER — METHYLPREDNISOLONE 4 MG/1
TABLET ORAL
Qty: 1 EACH | Refills: 0 | Status: SHIPPED | OUTPATIENT
Start: 2023-08-11

## 2023-08-11 NOTE — PROGRESS NOTES
"    Office Note     Name: Chelsi Ferguson    : 1940     MRN: [id number]     Chief Complaint  Rash    Subjective     History of Present Illness:  Chelsi Ferguson is a 83 y.o. female who presents today for several concerns.  She states yesterday she noticed a rash on her chest and it has spread around her chest over the last 24 hours and it is itchy and she had a similar rash a few months ago and had to go to urgent treatment center and they gave her a steroid shot and triamcinolone ointment and that cleared it up.  She thinks it is an allergy to something but she has had no new medicines or new detergents.  She has not nowhere else on her body.  She has no other symptoms.    Review of Systems   Respiratory:  Negative for cough, shortness of breath and wheezing.    Cardiovascular:  Negative for chest pain and palpitations.   Gastrointestinal:  Negative for blood in stool, diarrhea and vomiting.   Genitourinary:  Negative for dysuria, flank pain and genital sores.     Objective     Vital Signs  /64 (BP Location: Left arm, Patient Position: Sitting, Cuff Size: Adult)   Pulse 74   Temp 98 øF (36.7 øC) (Temporal)   Ht 151.9 cm (59.8\")   Wt 62.4 kg (137 lb 9.6 oz)   SpO2 96%   BMI 27.05 kg/mý   Estimated body mass index is 27.05 kg/mý as calculated from the following:    Height as of this encounter: 151.9 cm (59.8\").    Weight as of this encounter: 62.4 kg (137 lb 9.6 oz).          Physical Exam  Vitals and nursing note reviewed.   HENT:      Head: Normocephalic and atraumatic.   Neck:      Vascular: No carotid bruit.   Cardiovascular:      Rate and Rhythm: Normal rate and regular rhythm.      Heart sounds: Normal heart sounds. No murmur heard.    No gallop.   Pulmonary:      Effort: Pulmonary effort is normal. No respiratory distress.      Breath sounds: No stridor. No wheezing, rhonchi or rales.   Musculoskeletal:      Cervical back: Normal range of motion and neck supple.      Right lower leg: No edema. "      Left lower leg: No edema.   Lymphadenopathy:      Cervical: No cervical adenopathy.   Neurological:      Mental Status: She is alert.               Assessment and Plan     Diagnoses and all orders for this visit:    1. Dermatitis (Primary)  -     methylPREDNISolone (MEDROL) 4 MG dose pack; Take as directed on package instructions.  Dispense: 1 each; Refill: 0    2. Rash  -     methylPREDNISolone (MEDROL) 4 MG dose pack; Take as directed on package instructions.  Dispense: 1 each; Refill: 0    May use the triamcinolone ointment rx she has at home      Follow Up  Prn with Dr. Zenon Zeng,

## 2023-09-21 ENCOUNTER — OFFICE VISIT (OUTPATIENT)
Dept: INTERNAL MEDICINE | Facility: CLINIC | Age: 83
End: 2023-09-21
Payer: MEDICARE

## 2023-09-21 ENCOUNTER — LAB (OUTPATIENT)
Dept: INTERNAL MEDICINE | Facility: CLINIC | Age: 83
End: 2023-09-21
Payer: MEDICARE

## 2023-09-21 ENCOUNTER — HOSPITAL ENCOUNTER (OUTPATIENT)
Dept: GENERAL RADIOLOGY | Facility: HOSPITAL | Age: 83
Discharge: HOME | End: 2023-09-21
Admitting: FAMILY MEDICINE
Payer: MEDICARE

## 2023-09-21 VITALS
DIASTOLIC BLOOD PRESSURE: 74 MMHG | HEART RATE: 77 BPM | OXYGEN SATURATION: 97 % | BODY MASS INDEX: 26.5 KG/M2 | WEIGHT: 135 LBS | TEMPERATURE: 97.8 F | HEIGHT: 60 IN | SYSTOLIC BLOOD PRESSURE: 128 MMHG

## 2023-09-21 DIAGNOSIS — Z00.00 MEDICARE ANNUAL WELLNESS VISIT, SUBSEQUENT: Primary | ICD-10-CM

## 2023-09-21 DIAGNOSIS — I10 PRIMARY HYPERTENSION: Chronic | ICD-10-CM

## 2023-09-21 DIAGNOSIS — E78.00 PURE HYPERCHOLESTEROLEMIA: Chronic | ICD-10-CM

## 2023-09-21 DIAGNOSIS — R82.90 ABNORMAL URINALYSIS: ICD-10-CM

## 2023-09-21 DIAGNOSIS — R73.09 ABNORMAL GLUCOSE: ICD-10-CM

## 2023-09-21 DIAGNOSIS — I10 PRIMARY HYPERTENSION: ICD-10-CM

## 2023-09-21 DIAGNOSIS — R09.89 RHONCHI: ICD-10-CM

## 2023-09-21 DIAGNOSIS — I25.10 CORONARY ARTERY DISEASE INVOLVING NATIVE CORONARY ARTERY OF NATIVE HEART WITHOUT ANGINA PECTORIS: ICD-10-CM

## 2023-09-21 LAB
BILIRUB BLD-MCNC: NEGATIVE MG/DL
CHOLEST SERPL-MCNC: 139 MG/DL (ref 0–200)
CLARITY, POC: CLEAR
COLOR UR: YELLOW
EXPIRATION DATE: ABNORMAL
GLUCOSE UR STRIP-MCNC: NEGATIVE MG/DL
HDLC SERPL-MCNC: 52 MG/DL (ref 40–60)
KETONES UR QL: NEGATIVE
LDLC SERPL CALC-MCNC: 72 MG/DL (ref 0–100)
LDLC/HDLC SERPL: 1.37 {RATIO}
LEUKOCYTE EST, POC: ABNORMAL
Lab: ABNORMAL
NITRITE UR-MCNC: NEGATIVE MG/ML
PH UR: 6 [PH] (ref 5–8)
PROT UR STRIP-MCNC: NEGATIVE MG/DL
RBC # UR STRIP: ABNORMAL /UL
SP GR UR: 1.01 (ref 1–1.03)
TRIGL SERPL-MCNC: 78 MG/DL (ref 0–150)
UROBILINOGEN UR QL: NORMAL
VLDLC SERPL-MCNC: 15 MG/DL (ref 5–40)

## 2023-09-21 PROCEDURE — 3074F SYST BP LT 130 MM HG: CPT | Performed by: FAMILY MEDICINE

## 2023-09-21 PROCEDURE — 85025 COMPLETE CBC W/AUTO DIFF WBC: CPT | Performed by: FAMILY MEDICINE

## 2023-09-21 PROCEDURE — 80061 LIPID PANEL: CPT | Performed by: FAMILY MEDICINE

## 2023-09-21 PROCEDURE — 81003 URINALYSIS AUTO W/O SCOPE: CPT | Performed by: FAMILY MEDICINE

## 2023-09-21 PROCEDURE — 83036 HEMOGLOBIN GLYCOSYLATED A1C: CPT | Performed by: FAMILY MEDICINE

## 2023-09-21 PROCEDURE — 87086 URINE CULTURE/COLONY COUNT: CPT | Performed by: FAMILY MEDICINE

## 2023-09-21 PROCEDURE — 1160F RVW MEDS BY RX/DR IN RCRD: CPT | Performed by: FAMILY MEDICINE

## 2023-09-21 PROCEDURE — 80053 COMPREHEN METABOLIC PANEL: CPT | Performed by: FAMILY MEDICINE

## 2023-09-21 PROCEDURE — 1170F FXNL STATUS ASSESSED: CPT | Performed by: FAMILY MEDICINE

## 2023-09-21 PROCEDURE — 3078F DIAST BP <80 MM HG: CPT | Performed by: FAMILY MEDICINE

## 2023-09-21 PROCEDURE — 71046 X-RAY EXAM CHEST 2 VIEWS: CPT

## 2023-09-21 PROCEDURE — G0439 PPPS, SUBSEQ VISIT: HCPCS | Performed by: FAMILY MEDICINE

## 2023-09-21 PROCEDURE — 1159F MED LIST DOCD IN RCRD: CPT | Performed by: FAMILY MEDICINE

## 2023-09-21 PROCEDURE — 36415 COLL VENOUS BLD VENIPUNCTURE: CPT | Performed by: FAMILY MEDICINE

## 2023-09-21 PROCEDURE — 84443 ASSAY THYROID STIM HORMONE: CPT | Performed by: FAMILY MEDICINE

## 2023-09-21 NOTE — PROGRESS NOTES
The ABCs of the Annual Wellness Visit  Subsequent Medicare Wellness Visit    Subjective      Chelsi Ferguson is a 83 y.o. female who presents for a Subsequent Medicare Wellness Visit.    Pt would like to go back to PT. PT did not have arthritis pain while she was working with them after surgery.  The following portions of the patient's history were reviewed and   updated as appropriate: allergies, current medications, past family history, past medical history, past social history, past surgical history, and problem list.  Past Medical History:   Diagnosis Date    Ankle pain     Arthritis     Cancer     Skin    Cataract     removed    Chest discomfort     Cholecystitis 01/15/2018    Clotting disorder     may be result of aspirin    Coronary artery disease     Double vision 04/25/2022    Uses glasses with prism    Edema     Elevated cholesterol     Elevated liver enzymes 01/15/2018    Gallstones     GERD (gastroesophageal reflux disease)     Glaucoma     pt is currently off of meds and Dr Tanner does not think that she is glaucoma    H/O complete eye exam 06/2013    H/O mammogram 11/16/2015    H/O non-ST elevation myocardial infarction (NSTEMI) 01/2005    Hammer toe     Heart attack 01/2005    History of blood transfusion 01/2005    History of cardiovascular stress test 12/02/2015    normal    HTN (hypertension)     Hyperlipidemia     Hypokalemia 01/15/2018    Kidney infection 1971    Low back pain     Menopausal symptoms     Onychia and paronychia of finger     Osteopenia     Other elevated white blood cell count     Pap smear for cervical cancer screening 2010    Leandro    Pneumonia     Rheumatoid factor positive 12/05/2022    Sepsis 01/15/2018    Tremor     essential tremor    UTI (urinary tract infection) 01/15/2018    Viral warts     Visual impairment 2019    Wears glasses        Past Surgical History:   Procedure Laterality Date    ADENOIDECTOMY  1952    BLADDER REPAIR  2002    BLADDER REPAIR  2003    BUNIONECTOMY  Right 2010    CARDIAC CATHETERIZATION  stent placed 2005    CHOLECYSTECTOMY      CHOLECYSTECTOMY WITH INTRAOPERATIVE CHOLANGIOGRAM N/A 03/07/2018    Procedure: CHOLECYSTECTOMY LAPAROSCOPIC INTRAOPERATIVE CHOLANGIOGRAM;  Surgeon: Harinder Mason MD;  Location: [facility] OR;  Service:     COLONOSCOPY  2008, 4/2014    [facility]    CORONARY STENT PLACEMENT Left 01/2005    drug eluting stent 1/2005 LAD    DILATATION AND CURETTAGE  1971    DILATATION AND CURETTAGE  1978    EYE CAPSULOTOMY WITH LASER Bilateral 2018    EYE SURGERY Bilateral 05/2016    Cataract    FRONTALIS SUSPENSION  2010    HAMMER TOE REPAIR Right 11/03/2010    HEAD & NECK SKIN LESION EXCISIONAL BIOPSY  08/20/2018    benign scalp cyst    HYSTERECTOMY  1978    JOINT REPLACEMENT  2005    LAPAROTOMY OOPHERECTOMY Bilateral 2002    NOSE SURGERY  03/2017    repair 3 fractured areas. Dr Dunne    OTHER SURGICAL HISTORY  2005    rectocele repair    OTHER SURGICAL HISTORY  2010    eyelid surgery    REPLACEMENT TOTAL KNEE BILATERAL Bilateral     SKIN BIOPSY      SUBTOTAL HYSTERECTOMY  1978    TONSILLECTOMY  1952    TOTAL KNEE ARTHROPLASTY  01/2005    UMBILICAL HERNIA REPAIR  2019    UMBILICAL HERNIA REPAIR N/A 6/6/2023    Procedure: UMBILICAL HERNIA REPAIR WITH MESH LAPAROSCOPIC;  Surgeon: Harinder Mason MD;  Location: [facility] OR;  Service: General;  Laterality: N/A;    UPPER GASTROINTESTINAL ENDOSCOPY  11/17/2022    Dr Frank    UTERINE FIBROID SURGERY  1978    emergency removal of fibroid from birth canal       Allergies   Allergen Reactions    Indocin [Indomethacin] Hallucinations    Statins Hives    Keflex [Cephalexin] GI Intolerance     Upset stomach, may have had some blood in stool but cannot remember the reason why she was put on it. Tolerates penicillins without problem.    Adhesive Tape Other (See Comments)     Skin irritation with bandaids, surgical bandages that stay on skin for extended periods of time    Aleve [Naproxen] Hives    Celecoxib Diarrhea     Dexlansoprazole Rash    Ibuprofen GI Intolerance     Heartburn.    Lipitor [Atorvastatin] Hives    Prevacid [Lansoprazole] Nausea And Vomiting    Prilosec [Omeprazole] Nausea And Vomiting    Zocor [Simvastatin] Hives       Family History   Problem Relation Age of Onset    Stroke Mother     Hypertension Mother         My Mother  of brain hemmorage/stroke    Heart failure Father         congestive    Cancer Father         Neck glands removed    Vision loss Father         Had operation    Heart disease Father          of Congestive Heart Failure at 91    Breast cancer Sister         60's    Cancer Sister         Breast    Breast cancer Daughter 40    Cancer Daughter         Breast    Thyroid disease Daughter         on meds    Breast cancer Other         NIECE 60's    Breast cancer Other         NIECE 60's    Vision loss Paternal Aunt         blind-glaucoma    Vision loss Paternal Uncle         blind-glaucoma    Vision loss Maternal Aunt         all 9 aunts paternal aunts/Uncles/ cousins/ sister/brother/ and niece on meds for Glaucoma or had operation    Early death Daughter         Celiac Sprue reaction    Ovarian cancer Neg Hx        Social History     Socioeconomic History    Marital status:    Tobacco Use    Smoking status: Never     Passive exposure: Past    Smokeless tobacco: Never    Tobacco comments:     My father, brother in law, friends, and  smoked so had second hand exposure   Vaping Use    Vaping Use: Never used   Substance and Sexual Activity    Alcohol use: No    Drug use: No    Sexual activity: Defer         Compared to one year ago, the patient feels her physical   health is better.    Compared to one year ago, the patient feels her mental   health is the same.    Recent Hospitalizations:  She was admitted within the past 365 days at [facility].       Current Medical Providers:  Patient Care Team:  Reina Yarbrough MD as PCP - General (Family Medicine)  Александр Dunne,  MD as Consulting Physician (Otolaryngology)  Gabby Rao MD as Consulting Physician (Dermatology)  Tylor Bonner MD as Consulting Physician (Ophthalmology)  Harinder Mason MD as Consulting Physician (General Surgery)  Han Alonzo MD as Consulting Physician (Urology)  Marianela Flores MD as Consulting Physician (Neurology)  Oralia Greene MD as Consulting Physician (Cardiology)  Jorge Frank MD as Consulting Physician (Gastroenterology)  Solange Villagomez MD as Consulting Physician (Dermatology)  Dr. Rivera (Dental General Practice)  Dr. Reza (Ophthalmology)    Outpatient Medications Prior to Visit   Medication Sig Dispense Refill    acetaminophen (TYLENOL) 650 MG 8 hr tablet Take 2 tablets by mouth 2 (two) times a day.      aspirin 81 MG EC tablet Take 1 tablet by mouth Every Night.      BIOTIN 5000 PO Take 5,000 mcg by mouth Daily.      Cholecalciferol (Vitamin D3) 50 MCG (2000 UT) capsule Take 1 capsule by mouth Daily.      coenzyme Q10 100 MG capsule Take 1 capsule by mouth Daily.      docusate sodium (Stool Softener) 100 MG capsule Take 1 capsule by mouth 2 Times a Day. 30 capsule 1    famotidine (PEPCID) 40 MG tablet Take 1 tablet by mouth two times daily 180 tablet 3    latanoprost (XALATAN) 0.005 % ophthalmic solution 1 drop Every Night.      metoprolol succinate XL (TOPROL-XL) 25 MG 24 hr tablet Take 1 tablet by mouth Every Morning. 90 tablet 1    multivitamin with minerals tablet tablet Take 1 tablet by mouth Daily.      naloxone (NARCAN) 4 MG/0.1ML nasal spray Call 911. Don't prime. [place] in 1 nostril for overdose. Repeat in 2-3 minutes in other nostril if no or minimal breathing/responsiveness. 2 each 0    nitroglycerin (NITROSTAT) 0.4 MG SL tablet Place 1 tablet under the tongue Every 5 (Five) Minutes As Needed for Chest Pain. 25 tablet 11    rosuvastatin (CRESTOR) 20 MG tablet Take 1 tablet by mouth Every Night. 90 tablet 1    Yuvafem 10 MCG tablet vaginal  tablet INSERT 1 TABLET INTO THE VAGINA 2 TIMES A WEEK. 24 tablet 0    methylPREDNISolone (MEDROL) 4 MG dose pack Take as directed on package instructions. 1 each 0    ondansetron (ZOFRAN) 4 MG tablet Take 1 tablet by mouth Every 6 (Six) Hours As Needed for Nausea or Vomiting. 60 tablet 1     No facility-administered medications prior to visit.       No opioid medication identified on active medication list. I have reviewed chart for other potential  high risk medication/s and harmful drug interactions in the elderly.        Aspirin is on active medication list. Aspirin use is indicated based on review of current medical condition/s. Pros and cons of this therapy have been discussed today. Benefits of this medication outweigh potential harm.  Patient has been encouraged to continue taking this medication.  .      Patient Active Problem List   Diagnosis    Loss of weight    Hyperlipidemia    Edema    GERD (gastroesophageal reflux disease)    Nausea with vomiting    Diarrhea    Incontinence of feces    HTN (hypertension)    Cholelithiasis    Menopausal symptom    Cramp in limb    Glaucoma    Hammer toe    Dry mouth    Osteopenia    Elevated liver enzymes    UTI (urinary tract infection)    Bacterial UTI    Cholecystitis    H/O non-ST elevation myocardial infarction (NSTEMI)    Neck pain    Essential tremor    Idiopathic peripheral neuropathy    Muscle cramps    Coronary artery disease involving native coronary artery of native heart without angina pectoris    Double vision    Rheumatoid factor positive    Incisional hernia, without obstruction or gangrene    Incisional hernia without obstruction or gangrene    Umbilical hernia    Dermatitis    Rash     Review of Systems   Constitutional:  Positive for activity change (increased, she is exercising) and unexpected weight change (pt is losing weight). Negative for appetite change, chills, diaphoresis, fatigue and fever.   HENT:  Positive for dental problem (right jaw  pain, she was checked by Dentist). Negative for congestion, drooling, ear discharge, ear pain, facial swelling, hearing loss, mouth sores, nosebleeds, postnasal drip, rhinorrhea, sinus pressure, sinus pain, sneezing, sore throat, tinnitus, trouble swallowing and voice change.    Eyes:  Positive for visual disturbance (pt has eye appt in November for double vision. glare bothers her when unloading ). Negative for photophobia, pain, discharge, redness and itching.   Respiratory:  Positive for shortness of breath (better with PT, INFANTE). Negative for apnea, cough, choking, chest tightness, wheezing and stridor.    Cardiovascular:  Positive for leg swelling (left ankle). Negative for chest pain and palpitations.   Gastrointestinal: Negative.  Negative for abdominal distention, abdominal pain, anal bleeding, blood in stool, constipation, diarrhea, nausea, rectal pain and vomiting.   Endocrine: Negative.  Negative for cold intolerance, heat intolerance, polydipsia, polyphagia and polyuria.   Genitourinary:  Positive for frequency and urgency. Negative for decreased urine volume, difficulty urinating, dyspareunia, dysuria, enuresis, flank pain, genital sores, hematuria, menstrual problem, pelvic pain, vaginal bleeding, vaginal discharge and vaginal pain.   Musculoskeletal:  Positive for arthralgias. Negative for back pain, gait problem, joint swelling, myalgias, neck pain and neck stiffness.   Skin: Negative.  Negative for color change, pallor, rash and wound.        2 scabbed area on scalp   Allergic/Immunologic: Negative.  Negative for environmental allergies, food allergies and immunocompromised state.   Neurological:  Positive for dizziness and light-headedness. Negative for tremors, seizures, syncope, facial asymmetry, speech difficulty, weakness, numbness and headaches.   Hematological: Negative.  Negative for adenopathy. Does not bruise/bleed easily.   Psychiatric/Behavioral: Negative.  Negative for  "agitation, behavioral problems, confusion, decreased concentration, dysphoric mood, hallucinations, self-injury, sleep disturbance and suicidal ideas. The patient is not nervous/anxious and is not hyperactive.         Pt worries about her memory     Advance Care Planning   Advance Care Planning     Advance Directive is on file.  ACP discussion was held with the patient during this visit. Patient has an advance directive in EMR which is still valid.      Objective    Vitals:    09/21/23 1040   BP: 128/74   BP Location: Right arm   Patient Position: Sitting   Cuff Size: Adult   Pulse: 77   Temp: 97.8 °F (36.6 °C)   SpO2: 97%   Weight: 61.2 kg (135 lb)   Height: 151.9 cm (59.8\")   PainSc:   3   PainLoc: Hip  Comment: right hip\     Estimated body mass index is 26.54 kg/m² as calculated from the following:    Height as of this encounter: 151.9 cm (59.8\").    Weight as of this encounter: 61.2 kg (135 lb).    Wt Readings from Last 3 Encounters:   09/21/23 61.2 kg (135 lb)   08/11/23 62.4 kg (137 lb 9.6 oz)   07/12/23 61.7 kg (136 lb)       Physical Exam  Vitals and nursing note reviewed.   Constitutional:       General: She is not in acute distress.     Appearance: She is well-developed. She is not diaphoretic.   HENT:      Head: Normocephalic and atraumatic.      Right Ear: External ear normal. There is impacted cerumen.      Left Ear: External ear normal. There is impacted cerumen.      Nose: Nose normal.      Mouth/Throat:      Lips: Pink.      Mouth: Mucous membranes are moist. Mucous membranes are not pale, not dry and not cyanotic.      Dentition: Normal dentition.      Tongue: No lesions.      Palate: No mass.      Pharynx: Uvula midline. No pharyngeal swelling, oropharyngeal exudate, posterior oropharyngeal erythema or uvula swelling.   Eyes:      General: Lids are normal. No scleral icterus.        Right eye: No foreign body, discharge or hordeolum.         Left eye: No foreign body, discharge or hordeolum.      " Extraocular Movements:      Right eye: Normal extraocular motion and no nystagmus.      Left eye: Normal extraocular motion and no nystagmus.      Conjunctiva/sclera: Conjunctivae normal.      Right eye: Right conjunctiva is not injected. No chemosis, exudate or hemorrhage.     Left eye: Left conjunctiva is not injected. No chemosis, exudate or hemorrhage.     Pupils: Pupils are equal, round, and reactive to light.   Neck:      Thyroid: No thyroid mass or thyromegaly.      Vascular: No carotid bruit.      Trachea: Trachea normal. No tracheal deviation.   Cardiovascular:      Rate and Rhythm: Normal rate and regular rhythm.      Pulses:           Dorsalis pedis pulses are 2+ on the right side and 2+ on the left side.        Posterior tibial pulses are 2+ on the right side and 2+ on the left side.      Heart sounds: Normal heart sounds. No murmur heard.    No friction rub. No gallop.   Pulmonary:      Effort: Pulmonary effort is normal. No respiratory distress.      Breath sounds: Examination of the right-upper field reveals wheezing, rhonchi and rales. Wheezing, rhonchi and rales present. No decreased breath sounds.   Chest:      Chest wall: No tenderness. There is no dullness to percussion.   Breasts:     Pollo Score is 5.      Breasts are symmetrical.      Right: Normal. No swelling, bleeding, inverted nipple, mass, nipple discharge, skin change or tenderness.      Left: Normal. No swelling, bleeding, inverted nipple, mass, nipple discharge, skin change or tenderness.   Abdominal:      General: Bowel sounds are normal. There is no distension.      Palpations: Abdomen is soft. There is no hepatomegaly, splenomegaly or mass.      Tenderness: There is no abdominal tenderness. There is no guarding or rebound.      Hernia: No hernia is present.   Musculoskeletal:         General: No tenderness or deformity. Normal range of motion.      Cervical back: Normal range of motion and neck supple.   Lymphadenopathy:      Head:       Right side of head: No submandibular adenopathy.      Left side of head: No submandibular adenopathy.      Cervical: No cervical adenopathy.      Right cervical: No superficial, deep or posterior cervical adenopathy.     Left cervical: No superficial, deep or posterior cervical adenopathy.      Upper Body:      Right upper body: No supraclavicular, axillary or pectoral adenopathy.      Left upper body: No supraclavicular, axillary or pectoral adenopathy.   Skin:     General: Skin is warm and dry.      Findings: No rash.      Nails: There is no clubbing.   Neurological:      Mental Status: She is alert and oriented to person, place, and time.      Cranial Nerves: No cranial nerve deficit.      Sensory: No sensory deficit.      Coordination: Coordination normal.      Deep Tendon Reflexes: Reflexes are normal and symmetric.      Reflex Scores:       Bicep reflexes are 2+ on the right side and 2+ on the left side.       Brachioradialis reflexes are 2+ on the right side and 2+ on the left side.       Patellar reflexes are 2+ on the right side and 2+ on the left side.  Psychiatric:         Attention and Perception: Attention and perception normal.         Mood and Affect: Mood normal.         Speech: Speech normal.         Behavior: Behavior normal.         Thought Content: Thought content normal.         Cognition and Memory: Cognition and memory normal.         Judgment: Judgment normal.           Does the patient have evidence of cognitive impairment?   No            HEALTH RISK ASSESSMENT    Smoking Status:  Social History     Tobacco Use   Smoking Status Never    Passive exposure: Past   Smokeless Tobacco Never   Tobacco Comments    My father, brother in law, friends, and  smoked so had second hand exposure     Alcohol Consumption:  Social History     Substance and Sexual Activity   Alcohol Use No     Fall Risk Screen:    STEADI Fall Risk Assessment was completed, and patient is at LOW risk for  falls.Assessment completed on:2023    Depression Screenin/21/2023    10:34 AM   PHQ-2/PHQ-9 Depression Screening   Little Interest or Pleasure in Doing Things 0-->not at all   Feeling Down, Depressed or Hopeless 0-->not at all   PHQ-9: Brief Depression Severity Measure Score 0       Health Habits and Functional and Cognitive Screenin/21/2023    10:31 AM   Functional & Cognitive Status   Do you have difficulty preparing food and eating? No   Do you have difficulty bathing yourself, getting dressed or grooming yourself? No   Do you have difficulty using the toilet? No   Do you have difficulty moving around from place to place? No   Do you have trouble with steps or getting out of a bed or a chair? No   Current Diet Unhealthy Diet        Current Diet Comment frozen meals   Dental Exam Up to date        Dental Exam Comment 23   Eye Exam Up to date        Eye Exam Comment next scheduled 11/10/23   Exercise (times per week) 2 times per week        Exercise Frequency Comment 2-3 times weekly        Exercise Comment sit/stand, leg strengthing, balance   Do you need help using the phone?  No   Are you deaf or do you have serious difficulty hearing?  No   Do you need help to go to places out of walking distance? Yes   Do you need help shopping? No   Do you need help preparing meals?  No   Do you need help with housework?  Yes   Do you need help with laundry? No   Do you need help taking your medications? No   Do you need help managing money? No   Do you ever drive or ride in a car without wearing a seat belt? No   Have you felt unusual stress, anger or loneliness in the last month? No   Who do you live with? Alone   If you need help, do you have trouble finding someone available to you? No   Have you been bothered in the last four weeks by sexual problems? No   Do you have difficulty concentrating, remembering or making decisions? No       Age-appropriate Screening Schedule:  Refer to the list  below for future screening recommendations based on patient's age, sex and/or medical conditions. Orders for these recommended tests are listed in the plan section. The patient has been provided with a written plan.    Health Maintenance   Topic Date Due    DXA SCAN  12/04/2022    INFLUENZA VACCINE  10/01/2023    COVID-19 Vaccine (7 - Moderna series) 12/04/2023    LIPID PANEL  04/12/2024    ANNUAL WELLNESS VISIT  09/21/2024    BMI FOLLOWUP  09/21/2024    TDAP/TD VACCINES (4 - Td or Tdap) 03/04/2032    Pneumococcal Vaccine 65+  Completed    ZOSTER VACCINE  Completed                  CMS Preventative Services Quick Reference  Risk Factors Identified During Encounter:    Chronic Pain: Chronic Pain Educational material Discussed and Printed for Patient.   Glaucoma or Family History of Glaucoma:   pt is seeing eye doctor  Immunizations Discussed/Encouraged: Influenza, COVID19, and RSV  Polypharmacy: Medication List reviewed and Medications are appropriate for patient  Urinary Incontinence: Kegel exercises discussed    The above risks/problems have been discussed with the patient.  Pertinent information has been shared with the patient in the After Visit Summary.    Diagnoses and all orders for this visit:    1. Medicare annual wellness visit, subsequent (Primary)  -     POCT urinalysis dipstick, automated    2. Coronary artery disease involving native coronary artery of native heart without angina pectoris    3. Primary hypertension  -     Comprehensive Metabolic Panel; Future  -     TSH Rfx On Abnormal To Free T4; Future  -     Lipid Panel; Future  -     CBC & Differential; Future    4. Pure hypercholesterolemia  -     Comprehensive Metabolic Panel; Future  -     Lipid Panel; Future    5. Abnormal glucose  -     Hemoglobin A1c; Future    6. Rhonchi  -     XR Chest PA & Lateral; Future    7. Abnormal urinalysis  -     Urine Culture - Urine, Urine, Clean Catch    Warm compresses to scalp to see of what appears to be to  eschar less than a centimeter in size slough over the next week.    Follow Up:   Next Medicare Wellness visit to be scheduled in 1 year.    Return in about 6 months (around 3/21/2024), or if symptoms worsen or fail to improve, for Recheck.      An After Visit Summary and PPPS were made available to the patient.    Handouts to pt on Fall risk, advance care directives, healthy diets such as Mediterranean or Dash or calorie counting, and healthy exercising.     Handouts to pt on Fall risk, advance care directives, healthy diets such as Mediterranean or Dash or calorie counting, and healthy exercising.       Recent Results (from the past 168 hour(s))   POCT urinalysis dipstick, automated    Collection Time: 09/21/23 12:00 PM    Specimen: Urine   Result Value Ref Range    Color Yellow Yellow, Straw, Dark Yellow, Berna    Clarity, UA Clear Clear    Specific Gravity  1.010 1.005 - 1.030    pH, Urine 6.0 5.0 - 8.0    Leukocytes Trace (A) Negative    Nitrite, UA Negative Negative    Protein, POC Negative Negative mg/dL    Glucose, UA Negative Negative mg/dL    Ketones, UA Negative Negative    Urobilinogen, UA Normal Normal, 0.2 E.U./dL    Bilirubin Negative Negative    Blood, UA Trace (A) Negative    Lot Number [id number]     Expiration Date 1/14/2025

## 2023-09-22 LAB
ALBUMIN SERPL-MCNC: 4.5 G/DL (ref 3.5–5.2)
ALBUMIN/GLOB SERPL: 1.7 G/DL
ALP SERPL-CCNC: 74 U/L (ref 39–117)
ALT SERPL W P-5'-P-CCNC: 6 U/L (ref 1–33)
ANION GAP SERPL CALCULATED.3IONS-SCNC: 12.3 MMOL/L (ref 5–15)
AST SERPL-CCNC: 18 U/L (ref 1–32)
BASOPHILS # BLD AUTO: 0.04 10*3/MM3 (ref 0–0.2)
BASOPHILS NFR BLD AUTO: 0.4 % (ref 0–1.5)
BILIRUB SERPL-MCNC: 0.6 MG/DL (ref 0–1.2)
BUN SERPL-MCNC: 11 MG/DL (ref 8–23)
BUN/CREAT SERPL: 15.1 (ref 7–25)
CALCIUM SPEC-SCNC: 10 MG/DL (ref 8.6–10.5)
CHLORIDE SERPL-SCNC: 105 MMOL/L (ref 98–107)
CO2 SERPL-SCNC: 25.7 MMOL/L (ref 22–29)
CREAT SERPL-MCNC: 0.73 MG/DL (ref 0.57–1)
DEPRECATED RDW RBC AUTO: 46.8 FL (ref 37–54)
EGFRCR SERPLBLD CKD-EPI 2021: 81.2 ML/MIN/1.73
EOSINOPHIL # BLD AUTO: 0.13 10*3/MM3 (ref 0–0.4)
EOSINOPHIL NFR BLD AUTO: 1.4 % (ref 0.3–6.2)
ERYTHROCYTE [DISTWIDTH] IN BLOOD BY AUTOMATED COUNT: 13.3 % (ref 12.3–15.4)
GLOBULIN UR ELPH-MCNC: 2.7 GM/DL
GLUCOSE SERPL-MCNC: 88 MG/DL (ref 65–99)
HBA1C MFR BLD: 5.3 % (ref 4.8–5.6)
HCT VFR BLD AUTO: 40.3 % (ref 34–46.6)
HGB BLD-MCNC: 13.5 G/DL (ref 12–15.9)
IMM GRANULOCYTES # BLD AUTO: 0.03 10*3/MM3 (ref 0–0.05)
IMM GRANULOCYTES NFR BLD AUTO: 0.3 % (ref 0–0.5)
LYMPHOCYTES # BLD AUTO: 2.26 10*3/MM3 (ref 0.7–3.1)
LYMPHOCYTES NFR BLD AUTO: 25.1 % (ref 19.6–45.3)
MCH RBC QN AUTO: 32.2 PG (ref 26.6–33)
MCHC RBC AUTO-ENTMCNC: 33.5 G/DL (ref 31.5–35.7)
MCV RBC AUTO: 96.2 FL (ref 79–97)
MONOCYTES # BLD AUTO: 0.72 10*3/MM3 (ref 0.1–0.9)
MONOCYTES NFR BLD AUTO: 8 % (ref 5–12)
NEUTROPHILS NFR BLD AUTO: 5.84 10*3/MM3 (ref 1.7–7)
NEUTROPHILS NFR BLD AUTO: 64.8 % (ref 42.7–76)
NRBC BLD AUTO-RTO: 0 /100 WBC (ref 0–0.2)
PLATELET # BLD AUTO: 287 10*3/MM3 (ref 140–450)
PMV BLD AUTO: 9.7 FL (ref 6–12)
POTASSIUM SERPL-SCNC: 4.4 MMOL/L (ref 3.5–5.2)
PROT SERPL-MCNC: 7.2 G/DL (ref 6–8.5)
RBC # BLD AUTO: 4.19 10*6/MM3 (ref 3.77–5.28)
SODIUM SERPL-SCNC: 143 MMOL/L (ref 136–145)
TSH SERPL DL<=0.05 MIU/L-ACNC: 1.5 UIU/ML (ref 0.27–4.2)
WBC NRBC COR # BLD: 9.02 10*3/MM3 (ref 3.4–10.8)

## 2023-09-23 LAB — BACTERIA SPEC AEROBE CULT: NORMAL

## 2023-09-25 ENCOUNTER — TELEPHONE (OUTPATIENT)
Dept: INTERNAL MEDICINE | Facility: CLINIC | Age: 83
End: 2023-09-25

## 2023-09-25 NOTE — TELEPHONE ENCOUNTER
----- Message from Reina MAI MD sent at 9/25/2023  8:32 AM EDT -----  Send copy of lab to Specialist Dr Bonner in ophthalmology

## 2023-10-05 ENCOUNTER — OFFICE VISIT (OUTPATIENT)
Dept: GASTROENTEROLOGY | Facility: CLINIC | Age: 83
End: 2023-10-05
Payer: MEDICARE

## 2023-10-05 VITALS — BODY MASS INDEX: 26.11 KG/M2 | HEIGHT: 60 IN | WEIGHT: 133 LBS

## 2023-10-05 DIAGNOSIS — R63.4 WEIGHT LOSS: ICD-10-CM

## 2023-10-05 DIAGNOSIS — K21.00 GASTROESOPHAGEAL REFLUX DISEASE WITH ESOPHAGITIS WITHOUT HEMORRHAGE: Primary | ICD-10-CM

## 2023-10-05 PROCEDURE — 99213 OFFICE O/P EST LOW 20 MIN: CPT | Performed by: INTERNAL MEDICINE

## 2023-10-05 PROCEDURE — 1159F MED LIST DOCD IN RCRD: CPT | Performed by: INTERNAL MEDICINE

## 2023-10-05 PROCEDURE — 1160F RVW MEDS BY RX/DR IN RCRD: CPT | Performed by: INTERNAL MEDICINE

## 2023-10-05 NOTE — PROGRESS NOTES
PCP:  Reina Yarbrough MD     No referring provider defined for this encounter.    Chief Complaint   Patient presents with    Follow-up     Follow up         HPI   The patient is an 83-year-old female known to me with a history of esophagitis.  She did have erosive esophagitis and did not tolerate a proton pump inhibitor.  She has been switched to Pepcid 40 mg twice a day.  She seems to be doing well from that standpoint.  She has changed her diet since her  .  She is not eating as much and has lost about 10 pounds.  She does not want to lose anymore.  Her reflux however seems to be under better control.  She has been diagnosed with COPD.  She has been around a lot of secondhand smoke in her life.  He is wondering if the reflux could contribute as well.    Allergies   Allergen Reactions    Indocin [Indomethacin] Hallucinations    Statins Hives    Keflex [Cephalexin] GI Intolerance     Upset stomach, may have had some blood in stool but cannot remember the reason why she was put on it. Tolerates penicillins without problem.    Adhesive Tape Other (See Comments)     Skin irritation with bandaids, surgical bandages that stay on skin for extended periods of time    Aleve [Naproxen] Hives    Celecoxib Diarrhea    Dexlansoprazole Rash    Ibuprofen GI Intolerance     Heartburn.    Lipitor [Atorvastatin] Hives    Prevacid [Lansoprazole] Nausea And Vomiting    Prilosec [Omeprazole] Nausea And Vomiting    Zocor [Simvastatin] Hives          Current Outpatient Medications:     acetaminophen (TYLENOL) 650 MG 8 hr tablet, Take 2 tablets by mouth 2 (two) times a day., Disp: , Rfl:     aspirin 81 MG EC tablet, Take 1 tablet by mouth Every Night., Disp: , Rfl:     BIOTIN 5000 PO, Take 5,000 mcg by mouth Daily., Disp: , Rfl:     Cholecalciferol (Vitamin D3) 50 MCG (2000) capsule, Take 1 capsule by mouth Daily., Disp: , Rfl:     coenzyme Q10 100 MG capsule, Take 1 capsule by mouth Daily., Disp: , Rfl:     docusate  sodium (Stool Softener) 100 MG capsule, Take 1 capsule by mouth 2 Times a Day., Disp: 30 capsule, Rfl: 1    famotidine (PEPCID) 40 MG tablet, Take 1 tablet by mouth two times daily, Disp: 180 tablet, Rfl: 3    latanoprost (XALATAN) 0.005 % ophthalmic solution, 1 drop Every Night., Disp: , Rfl:     metoprolol succinate XL (TOPROL-XL) 25 MG 24 hr tablet, Take 1 tablet by mouth Every Morning., Disp: 90 tablet, Rfl: 1    multivitamin with minerals tablet tablet, Take 1 tablet by mouth Daily., Disp: , Rfl:     naloxone (NARCAN) 4 MG/0.1ML nasal spray, Call 911. Don't prime. [place] in 1 nostril for overdose. Repeat in 2-3 minutes in other nostril if no or minimal breathing/responsiveness., Disp: 2 each, Rfl: 0    nitroglycerin (NITROSTAT) 0.4 MG SL tablet, Place 1 tablet under the tongue Every 5 (Five) Minutes As Needed for Chest Pain., Disp: 25 tablet, Rfl: 11    rosuvastatin (CRESTOR) 20 MG tablet, Take 1 tablet by mouth Every Night., Disp: 90 tablet, Rfl: 1    Yuvafem 10 MCG tablet vaginal tablet, INSERT 1 TABLET INTO THE VAGINA 2 TIMES A WEEK., Disp: 24 tablet, Rfl: 0     Past Medical History:   Diagnosis Date    Ankle pain     Arthritis     Cancer     Skin    Cataract     removed    Chest discomfort     Cholecystitis 01/15/2018    Clotting disorder     may be result of aspirin    Coronary artery disease     Double vision 04/25/2022    Uses glasses with prism    Edema     Elevated cholesterol     Elevated liver enzymes 01/15/2018    Gallstones     GERD (gastroesophageal reflux disease)     Glaucoma     pt is currently off of meds and Dr Tanner does not think that she is glaucoma    H/O complete eye exam 06/2013    H/O mammogram 11/16/2015    H/O non-ST elevation myocardial infarction (NSTEMI) 01/2005    Hammer toe     Heart attack 01/2005    History of blood transfusion 01/2005    History of cardiovascular stress test 12/02/2015    normal    HTN (hypertension)     Hyperlipidemia     Hypokalemia 01/15/2018    Kidney  infection 1971    Low back pain     Menopausal symptoms     Onychia and paronychia of finger     Osteopenia     Other elevated white blood cell count     Pap smear for cervical cancer screening 2010    [place]    Pneumonia     Rheumatoid factor positive 12/05/2022    Sepsis 01/15/2018    Tremor     essential tremor    UTI (urinary tract infection) 01/15/2018    Viral warts     Visual impairment 2019    Wears glasses        Past Surgical History:   Procedure Laterality Date    ADENOIDECTOMY  1952    BLADDER REPAIR  2002    BLADDER REPAIR  2003    BUNIONECTOMY Right 2010    CARDIAC CATHETERIZATION  stent placed 2005    CHOLECYSTECTOMY      CHOLECYSTECTOMY WITH INTRAOPERATIVE CHOLANGIOGRAM N/A 03/07/2018    Procedure: CHOLECYSTECTOMY LAPAROSCOPIC INTRAOPERATIVE CHOLANGIOGRAM;  Surgeon: Harinder Mason MD;  Location:  [facility];  Service:     COLONOSCOPY  2008, 4/2014    [facility]    CORONARY STENT PLACEMENT Left 01/2005    drug eluting stent 1/2005 LAD    DILATATION AND CURETTAGE  1971    DILATATION AND CURETTAGE  1978    EYE CAPSULOTOMY WITH LASER Bilateral 2018    EYE SURGERY Bilateral 05/2016    Cataract    FRONTALIS SUSPENSION  2010    HAMMER TOE REPAIR Right 11/03/2010    HEAD & NECK SKIN LESION EXCISIONAL BIOPSY  08/20/2018    benign scalp cyst    HYSTERECTOMY  1978    JOINT REPLACEMENT  2005    LAPAROTOMY OOPHERECTOMY Bilateral 2002    NOSE SURGERY  03/2017    repair 3 fractured areas. Dr Dunne    OTHER SURGICAL HISTORY  2005    rectocele repair    OTHER SURGICAL HISTORY  2010    eyelid surgery    REPLACEMENT TOTAL KNEE BILATERAL Bilateral     SKIN BIOPSY      SUBTOTAL HYSTERECTOMY  1978    TONSILLECTOMY  1952    TOTAL KNEE ARTHROPLASTY  01/2005    UMBILICAL HERNIA REPAIR  2019    UMBILICAL HERNIA REPAIR N/A 6/6/2023    Procedure: UMBILICAL HERNIA REPAIR WITH MESH LAPAROSCOPIC;  Surgeon: Harinder Mason MD;  Location:  [facility];  Service: General;  Laterality: N/A;    UPPER GASTROINTESTINAL ENDOSCOPY   2022    Dr Frank    UTERINE FIBROID SURGERY      emergency removal of fibroid from birth canal        Social History     Socioeconomic History    Marital status:    Tobacco Use    Smoking status: Never     Passive exposure: Past    Smokeless tobacco: Never    Tobacco comments:     My father, brother in law, friends, and  smoked so had second hand exposure   Vaping Use    Vaping Use: Never used   Substance and Sexual Activity    Alcohol use: No    Drug use: No    Sexual activity: Defer        Family History   Problem Relation Age of Onset    Stroke Mother     Hypertension Mother         My Mother  of brain hemmorage/stroke    Heart failure Father         congestive    Cancer Father         Neck glands removed    Vision loss Father         Had operation    Heart disease Father          of Congestive Heart Failure at 91    Breast cancer Sister         60's    Cancer Sister         Breast    Breast cancer Daughter 40    Cancer Daughter         Breast    Thyroid disease Daughter         on meds    Breast cancer Other         NIECE 60's    Breast cancer Other         NIECE 60's    Vision loss Paternal Aunt         blind-glaucoma    Vision loss Paternal Uncle         blind-glaucoma    Vision loss Maternal Aunt         all 9 aunts paternal aunts/Uncles/ cousins/ sister/brother/ and niece on meds for Glaucoma or had operation    Early death Daughter         Celiac Sprue reaction    Ovarian cancer Neg Hx         Review of Systems     There were no vitals filed for this visit.     Physical Exam  Constitutional:       Appearance: Normal appearance. She is normal weight. She is obese.   Neurological:      Mental Status: She is alert.        Diagnoses and all orders for this visit:    1. Gastroesophageal reflux disease with esophagitis without hemorrhage (Primary)    2. Weight loss    Impressions and plan #1 gastroesophageal reflux disease: She seems to have responded fairly well to the H2  blockers.  She can continue those for now.  We will see her back in 6 months.  If she has any difficulty she will call us earlier.    2 weight loss: She has some weight loss but I think it is likely attributed the fact that she significantly changed her diet since her  .  She is eating less fried foods, she is also eating less foods overall.  She will call us with issues otherwise we will see her back in follow-up.    Jorge Frank MD

## 2023-10-05 NOTE — LETTER
2023       No Recipients    Patient: Chelsi Ferguson   YOB: 1940   Date of Visit: 10/5/2023     Dear Reina MAI MD:       Thank you for referring Chelsi Ferguson to me for evaluation. Below are the relevant portions of my assessment and plan of care.    If you have questions, please do not hesitate to call me. I look forward to following Chelsi along with you.         Sincerely,        Jorge Frank MD        CC:   No Recipients    Jorge Frank MD  10/05/23 1447  Sign when Signing Visit  PCP:  Reina Yarbrough MD     No referring provider defined for this encounter.    Chief Complaint   Patient presents with   • Follow-up     Follow up         HPI   The patient is an 83-year-old female known to me with a history of esophagitis.  She did have erosive esophagitis and did not tolerate a proton pump inhibitor.  She has been switched to Pepcid 40 mg twice a day.  She seems to be doing well from that standpoint.  She has changed her diet since her  .  She is not eating as much and has lost about 10 pounds.  She does not want to lose anymore.  Her reflux however seems to be under better control.  She has been diagnosed with COPD.  She has been around a lot of secondhand smoke in her life.  He is wondering if the reflux could contribute as well.    Allergies   Allergen Reactions   • Indocin [Indomethacin] Hallucinations   • Statins Hives   • Keflex [Cephalexin] GI Intolerance     Upset stomach, may have had some blood in stool but cannot remember the reason why she was put on it. Tolerates penicillins without problem.   • Adhesive Tape Other (See Comments)     Skin irritation with bandaids, surgical bandages that stay on skin for extended periods of time   • Aleve [Naproxen] Hives   • Celecoxib Diarrhea   • Dexlansoprazole Rash   • Ibuprofen GI Intolerance     Heartburn.   • Lipitor [Atorvastatin] Hives   • Prevacid [Lansoprazole] Nausea And Vomiting   • Prilosec  [Omeprazole] Nausea And Vomiting   • Zocor [Simvastatin] Hives          Current Outpatient Medications:   •  acetaminophen (TYLENOL) 650 MG 8 hr tablet, Take 2 tablets by mouth 2 (two) times a day., Disp: , Rfl:   •  aspirin 81 MG EC tablet, Take 1 tablet by mouth Every Night., Disp: , Rfl:   •  BIOTIN 5000 PO, Take 5,000 mcg by mouth Daily., Disp: , Rfl:   •  Cholecalciferol (Vitamin D3) 50 MCG (2000 UT) capsule, Take 1 capsule by mouth Daily., Disp: , Rfl:   •  coenzyme Q10 100 MG capsule, Take 1 capsule by mouth Daily., Disp: , Rfl:   •  docusate sodium (Stool Softener) 100 MG capsule, Take 1 capsule by mouth 2 Times a Day., Disp: 30 capsule, Rfl: 1  •  famotidine (PEPCID) 40 MG tablet, Take 1 tablet by mouth two times daily, Disp: 180 tablet, Rfl: 3  •  latanoprost (XALATAN) 0.005 % ophthalmic solution, 1 drop Every Night., Disp: , Rfl:   •  metoprolol succinate XL (TOPROL-XL) 25 MG 24 hr tablet, Take 1 tablet by mouth Every Morning., Disp: 90 tablet, Rfl: 1  •  multivitamin with minerals tablet tablet, Take 1 tablet by mouth Daily., Disp: , Rfl:   •  naloxone (NARCAN) 4 MG/0.1ML nasal spray, Call 911. Don't prime. [place] in 1 nostril for overdose. Repeat in 2-3 minutes in other nostril if no or minimal breathing/responsiveness., Disp: 2 each, Rfl: 0  •  nitroglycerin (NITROSTAT) 0.4 MG SL tablet, Place 1 tablet under the tongue Every 5 (Five) Minutes As Needed for Chest Pain., Disp: 25 tablet, Rfl: 11  •  rosuvastatin (CRESTOR) 20 MG tablet, Take 1 tablet by mouth Every Night., Disp: 90 tablet, Rfl: 1  •  Yuvafem 10 MCG tablet vaginal tablet, INSERT 1 TABLET INTO THE VAGINA 2 TIMES A WEEK., Disp: 24 tablet, Rfl: 0     Past Medical History:   Diagnosis Date   • Ankle pain    • Arthritis    • Cancer     Skin   • Cataract     removed   • Chest discomfort    • Cholecystitis 01/15/2018   • Clotting disorder     may be result of aspirin   • Coronary artery disease    • Double vision 04/25/2022    Uses glasses with  prism   • Edema    • Elevated cholesterol    • Elevated liver enzymes 01/15/2018   • Gallstones    • GERD (gastroesophageal reflux disease)    • Glaucoma     pt is currently off of meds and Dr Tanner does not think that she is glaucoma   • H/O complete eye exam 06/2013   • H/O mammogram 11/16/2015   • H/O non-ST elevation myocardial infarction (NSTEMI) 01/2005   • Hammer toe    • Heart attack 01/2005   • History of blood transfusion 01/2005   • History of cardiovascular stress test 12/02/2015    normal   • HTN (hypertension)    • Hyperlipidemia    • Hypokalemia 01/15/2018   • Kidney infection 1971   • Low back pain    • Menopausal symptoms    • Onychia and paronychia of finger    • Osteopenia    • Other elevated white blood cell count    • Pap smear for cervical cancer screening 2010 [place]   • Pneumonia    • Rheumatoid factor positive 12/05/2022   • Sepsis 01/15/2018   • Tremor     essential tremor   • UTI (urinary tract infection) 01/15/2018   • Viral warts    • Visual impairment 2019   • Wears glasses        Past Surgical History:   Procedure Laterality Date   • ADENOIDECTOMY  1952   • BLADDER REPAIR  2002   • BLADDER REPAIR  2003   • BUNIONECTOMY Right 2010   • CARDIAC CATHETERIZATION  stent placed 2005   • CHOLECYSTECTOMY     • CHOLECYSTECTOMY WITH INTRAOPERATIVE CHOLANGIOGRAM N/A 03/07/2018    Procedure: CHOLECYSTECTOMY LAPAROSCOPIC INTRAOPERATIVE CHOLANGIOGRAM;  Surgeon: Harinder Mason MD;  Location: [facility];  Service:    • COLONOSCOPY  2008, 4/2014    [facility]   • CORONARY STENT PLACEMENT Left 01/2005    drug eluting stent 1/2005 LAD   • DILATATION AND CURETTAGE  1971   • DILATATION AND CURETTAGE  1978   • EYE CAPSULOTOMY WITH LASER Bilateral 2018   • EYE SURGERY Bilateral 05/2016    Cataract   • FRONTALIS SUSPENSION  2010   • HAMMER TOE REPAIR Right 11/03/2010   • HEAD & NECK SKIN LESION EXCISIONAL BIOPSY  08/20/2018    benign scalp cyst   • HYSTERECTOMY  1978   • JOINT REPLACEMENT  2005   •  LAPAROTOMY OOPHERECTOMY Bilateral    • NOSE SURGERY  2017    repair 3 fractured areas. Dr Dunne   • OTHER SURGICAL HISTORY      rectocele repair   • OTHER SURGICAL HISTORY      eyelid surgery   • REPLACEMENT TOTAL KNEE BILATERAL Bilateral    • SKIN BIOPSY     • SUBTOTAL HYSTERECTOMY     • TONSILLECTOMY     • TOTAL KNEE ARTHROPLASTY  2005   • UMBILICAL HERNIA REPAIR  2019   • UMBILICAL HERNIA REPAIR N/A 2023    Procedure: UMBILICAL HERNIA REPAIR WITH MESH LAPAROSCOPIC;  Surgeon: Harinder Mason MD;  [facility];  Service: General;  Laterality: N/A;   • UPPER GASTROINTESTINAL ENDOSCOPY  2022    Dr Frank   • UTERINE FIBROID SURGERY      emergency removal of fibroid from birth canal        Social History     Socioeconomic History   • Marital status:    Tobacco Use   • Smoking status: Never     Passive exposure: Past   • Smokeless tobacco: Never   • Tobacco comments:     My father, brother in law, friends, and  smoked so had second hand exposure   Vaping Use   • Vaping Use: Never used   Substance and Sexual Activity   • Alcohol use: No   • Drug use: No   • Sexual activity: Defer        Family History   Problem Relation Age of Onset   • Stroke Mother    • Hypertension Mother         My Mother  of brain hemmorage/stroke   • Heart failure Father         congestive   • Cancer Father         Neck glands removed   • Vision loss Father         Had operation   • Heart disease Father          of Congestive Heart Failure at 91   • Breast cancer Sister         60's   • Cancer Sister         Breast   • Breast cancer Daughter 40   • Cancer Daughter         Breast   • Thyroid disease Daughter         on meds   • Breast cancer Other         NIECE 60's   • Breast cancer Other         NIECE 60's   • Vision loss Paternal Aunt         blind-glaucoma   • Vision loss Paternal Uncle         blind-glaucoma   • Vision loss Maternal Aunt         all 9 aunts paternal  aunts/Uncles/ cousins/ sister/brother/ and niece on meds for Glaucoma or had operation   • Early death Daughter         Celiac Sprue reaction   • Ovarian cancer Neg Hx         Review of Systems     There were no vitals filed for this visit.     Physical Exam  Constitutional:       Appearance: Normal appearance. She is normal weight. She is obese.   Neurological:      Mental Status: She is alert.        Diagnoses and all orders for this visit:    1. Gastroesophageal reflux disease with esophagitis without hemorrhage (Primary)    2. Weight loss    Impressions and plan #1 gastroesophageal reflux disease: She seems to have responded fairly well to the H2 blockers.  She can continue those for now.  We will see her back in 6 months.  If she has any difficulty she will call us earlier.    2 weight loss: She has some weight loss but I think it is likely attributed the fact that she significantly changed her diet since her  .  She is eating less fried foods, she is also eating less foods overall.  She will call us with issues otherwise we will see her back in follow-up.    Jorge Frank MD

## 2023-10-09 ENCOUNTER — HOSPITAL ENCOUNTER (OUTPATIENT)
Dept: BONE DENSITY | Facility: HOSPITAL | Age: 83
Discharge: HOME | End: 2023-10-09
Admitting: FAMILY MEDICINE
Payer: MEDICARE

## 2023-10-09 DIAGNOSIS — Z78.0 MENOPAUSE: ICD-10-CM

## 2023-10-09 PROCEDURE — 77080 DXA BONE DENSITY AXIAL: CPT

## 2023-10-16 ENCOUNTER — CLINICAL SUPPORT (OUTPATIENT)
Dept: INTERNAL MEDICINE | Facility: CLINIC | Age: 83
End: 2023-10-16
Payer: MEDICARE

## 2023-10-16 DIAGNOSIS — Z23 NEED FOR INFLUENZA VACCINATION: Primary | ICD-10-CM

## 2023-10-16 PROCEDURE — 90662 IIV NO PRSV INCREASED AG IM: CPT | Performed by: FAMILY MEDICINE

## 2023-10-16 PROCEDURE — G0008 ADMIN INFLUENZA VIRUS VAC: HCPCS | Performed by: FAMILY MEDICINE

## 2023-10-20 ENCOUNTER — TELEPHONE (OUTPATIENT)
Dept: INTERNAL MEDICINE | Facility: CLINIC | Age: 83
End: 2023-10-20
Payer: MEDICARE

## 2023-10-20 NOTE — TELEPHONE ENCOUNTER
----- Message from Reina MAI MD sent at 10/19/2023  8:21 AM EDT -----  Please call the patient regarding her abnormal result.  Your DEXA bone density showed osteopenia (decreased bone mass).   Please continue weight bearing exercise, vitamin D and calcium rich diet-or supplements.   Please avoid falls.   Please avoid tobacco and alcohol.    Please repeat a DEXA scan in about 2 years.     The osteopenia shows increased risk for fracture and medication is recommended such as a bisphosphonate i.e. Fosamax or Boniva or injectable such as Prolia.

## 2023-10-30 ENCOUNTER — TELEPHONE (OUTPATIENT)
Dept: INTERNAL MEDICINE | Facility: CLINIC | Age: 83
End: 2023-10-30

## 2023-10-30 NOTE — TELEPHONE ENCOUNTER
"    Caller: Chelsi Ferguson \"Chelsi Milian\"    Relationship: Self    Best call back number: [phone]    What test was performed: CHEST XRAY AND BONE DENSITY     Additional notes: THE PATIENT WOULD LIKE TO GET THE RESULTS OF THOSE TEST AND FIND OUT WHAT SHE NEED TO DO FOR NEXT STEPS         "

## 2023-10-31 NOTE — TELEPHONE ENCOUNTER
PN of DEXA and Xray. She stated understanding.  Would like to know next steps.  She asked for more information on the different medications.  I let her know that I will forward message to Dr. Yarbrough when she returns tomorrow.  She stated understanding

## 2023-11-01 NOTE — TELEPHONE ENCOUNTER
The medications slow the loss of bone and can increase buildup of bone.  Kidney function needs to be ok.  Prolia stops effectiveness when it is stopped.  Fosamax is a little slower to stop effectiveness.  Fosamax is oral and can cause GI upset.  With high dose fosamax can cause jaw bone weakness.  With fosamax there needs to be a drug holiday at 5 yrs to decrease weakness of the thigh bones.  Pt needs adequate calcium intake or meds will pull calcium from the bones.

## 2023-11-05 DIAGNOSIS — E78.00 PURE HYPERCHOLESTEROLEMIA: Chronic | ICD-10-CM

## 2023-11-05 DIAGNOSIS — I10 ESSENTIAL HYPERTENSION: Chronic | ICD-10-CM

## 2023-11-06 RX ORDER — METOPROLOL SUCCINATE 25 MG/1
25 TABLET, EXTENDED RELEASE ORAL EVERY MORNING
Qty: 90 TABLET | Refills: 3 | OUTPATIENT
Start: 2023-11-06

## 2023-11-06 RX ORDER — ROSUVASTATIN CALCIUM 20 MG/1
20 TABLET, COATED ORAL
Qty: 90 TABLET | Refills: 3 | OUTPATIENT
Start: 2023-11-06

## 2023-12-19 DIAGNOSIS — N95.1 MENOPAUSAL SYMPTOM: ICD-10-CM

## 2023-12-19 RX ORDER — ESTRADIOL 10 UG/1
1 TABLET VAGINAL 2 TIMES WEEKLY
Qty: 24 TABLET | Refills: 0 | Status: SHIPPED | OUTPATIENT
Start: 2023-12-21

## 2024-01-19 DIAGNOSIS — E78.00 PURE HYPERCHOLESTEROLEMIA: Chronic | ICD-10-CM

## 2024-01-19 RX ORDER — ROSUVASTATIN CALCIUM 20 MG/1
20 TABLET, COATED ORAL
Qty: 90 TABLET | Refills: 0 | Status: SHIPPED | OUTPATIENT
Start: 2024-01-19

## 2024-02-21 ENCOUNTER — OFFICE VISIT (OUTPATIENT)
Dept: CARDIOLOGY | Facility: CLINIC | Age: 84
End: 2024-02-21
Payer: MEDICARE

## 2024-02-21 VITALS
BODY MASS INDEX: 26.5 KG/M2 | WEIGHT: 135 LBS | DIASTOLIC BLOOD PRESSURE: 72 MMHG | OXYGEN SATURATION: 96 % | HEART RATE: 74 BPM | HEIGHT: 60 IN | SYSTOLIC BLOOD PRESSURE: 148 MMHG

## 2024-02-21 DIAGNOSIS — E78.00 PURE HYPERCHOLESTEROLEMIA: ICD-10-CM

## 2024-02-21 DIAGNOSIS — I10 ESSENTIAL HYPERTENSION: ICD-10-CM

## 2024-02-21 DIAGNOSIS — I25.10 CORONARY ARTERY DISEASE INVOLVING NATIVE CORONARY ARTERY OF NATIVE HEART WITHOUT ANGINA PECTORIS: Primary | ICD-10-CM

## 2024-02-21 PROCEDURE — 3078F DIAST BP <80 MM HG: CPT | Performed by: PHYSICIAN ASSISTANT

## 2024-02-21 PROCEDURE — 1159F MED LIST DOCD IN RCRD: CPT | Performed by: PHYSICIAN ASSISTANT

## 2024-02-21 PROCEDURE — 3077F SYST BP >= 140 MM HG: CPT | Performed by: PHYSICIAN ASSISTANT

## 2024-02-21 PROCEDURE — 1160F RVW MEDS BY RX/DR IN RCRD: CPT | Performed by: PHYSICIAN ASSISTANT

## 2024-02-21 PROCEDURE — 99214 OFFICE O/P EST MOD 30 MIN: CPT | Performed by: PHYSICIAN ASSISTANT

## 2024-02-21 NOTE — PROGRESS NOTES
[facility] Cardiology    Patient ID: Chelsi Ferguson is a 83 y.o. female.  : 1940   Contact: [phone]    Encounter date: 2024    PCP: Reina Yarbrough MD      Chief complaint:   Chief Complaint   Patient presents with    Coronary Artery Disease    Hypertension    Hyperlipidemia       Problem List:  Coronary artery disease  S/p NSTEMI with TATO (cypher sirolimus eluting stent) to LAD, 2005- data deficit.   Nuclear stress test 2015: no ischemia.  Nuclear stress test, 2020: No evidence of inducible ischemia by scintigraphic criteria. Low risk study.  Nuclear stress test, 2022; EF 71%. No evidence of ischemia. Low risk study.    Stress MPS, 2023: EF 75%. Aortic calcification. No evidence of ischemia.  Hyperlipidemia  Hypertension  GERD  Esophageal ulcerations by EGD  Osteoarthritis  Essential tremor    Allergies   Allergen Reactions    Indocin [Indomethacin] Hallucinations    Statins Hives    Keflex [Cephalexin] GI Intolerance     Upset stomach, may have had some blood in stool but cannot remember the reason why she was put on it. Tolerates penicillins without problem.    Adhesive Tape Other (See Comments)     Skin irritation with bandaids, surgical bandages that stay on skin for extended periods of time    Aleve [Naproxen] Hives    Celecoxib Diarrhea    Dexlansoprazole Rash    Ibuprofen GI Intolerance     Heartburn.    Lipitor [Atorvastatin] Hives    Prevacid [Lansoprazole] Nausea And Vomiting    Prilosec [Omeprazole] Nausea And Vomiting    Zocor [Simvastatin] Hives       Current Medications:    Current Outpatient Medications:     acetaminophen (TYLENOL) 650 MG 8 hr tablet, Take 2 tablets by mouth 2 (two) times a day., Disp: , Rfl:     aspirin 81 MG EC tablet, Take 1 tablet by mouth Every Night., Disp: , Rfl:     BIOTIN 5000 PO, Take 5,000 mcg by mouth Daily., Disp: , Rfl:     Cholecalciferol (Vitamin D3) 50 MCG (2000) capsule, Take 1 capsule by  mouth Daily., Disp: , Rfl:     coenzyme Q10 100 MG capsule, Take 1 capsule by mouth Daily., Disp: , Rfl:     docusate sodium (Stool Softener) 100 MG capsule, Take 1 capsule by mouth 2 Times a Day., Disp: 30 capsule, Rfl: 1    famotidine (PEPCID) 40 MG tablet, Take 1 tablet by mouth two times daily, Disp: 180 tablet, Rfl: 3    latanoprost (XALATAN) 0.005 % ophthalmic solution, 1 drop Every Night., Disp: , Rfl:     metoprolol succinate XL (TOPROL-XL) 25 MG 24 hr tablet, Take 1 tablet by mouth Every Morning., Disp: 90 tablet, Rfl: 1    multivitamin with minerals tablet tablet, Take 1 tablet by mouth Daily., Disp: , Rfl:     naloxone (NARCAN) 4 MG/0.1ML nasal spray, Call 911. Don't prime. [place] in 1 nostril for overdose. Repeat in 2-3 minutes in other nostril if no or minimal breathing/responsiveness., Disp: 2 each, Rfl: 0    nitroglycerin (NITROSTAT) 0.4 MG SL tablet, Place 1 tablet under the tongue Every 5 (Five) Minutes As Needed for Chest Pain., Disp: 25 tablet, Rfl: 11    rosuvastatin (CRESTOR) 20 MG tablet, TAKE 1 TABLET BY MOUTH AT NIGHT, Disp: 90 tablet, Rfl: 0    Yuvafem 10 MCG tablet vaginal tablet, INSERT 1 TABLET VAGINALLY TWICE A WEEK, Disp: 24 tablet, Rfl: 0    HPI    Chelsi Ferguson is a 83 y.o. female who presents today for a follow up of CAD and cardiac risk factors. Since last visit, patient overall has been doing well.  She was late today due to difficulty getting  parking and that is why her blood pressure is a little bit elevated.  She does state that she recently checked her blood pressure but has not been checking it as often as she should.  She has some mild swelling to her left ankle at times but this is not new.  She denies any increased shortness of breath or chest pain since her last visit.  She does state that she was seen by her PCP and a chest x-ray showed some emphysematous changes.  She has not had any further PFTs to investigate this.  Patient states she was never smoker and did not  "have a lot of secondhand smoke or any asbestos exposure that she is aware of.  She does state that she thinks that her shortness of breath could be related to GERD.      The following portions of the patient's history were reviewed and updated as appropriate: allergies, current medications and problem list.    Pertinent positives as listed in the HPI.  All other systems reviewed are negative.         Vitals:    02/21/24 1106   BP: 148/72   BP Location: Left arm   Patient Position: Sitting   Pulse: 74   SpO2: 96%   Weight: 61.2 kg (135 lb)   Height: 151.9 cm (59.8\")       Physical Exam:  General: Alert and oriented.  Neck: Jugular venous pressure is within normal limits. Carotids have normal upstrokes without bruits.   Cardiovascular: Heart has a nondisplaced focal PMI. Regular rate and rhythm. No murmur, gallop or rub.  Lungs: Clear, no rales or wheezes. Equal expansion is noted.   Extremities: Trace left lower extremity edema  Skin: Warm and dry.  Neurologic: Nonfocal.     Diagnostic Data (reviewed with patient):  Lab Results   Component Value Date    GLUCOSE 88 09/21/2023    BUN 11 09/21/2023    CREATININE 0.73 09/21/2023    BCR 15.1 09/21/2023     09/21/2023    K 4.4 09/21/2023     09/21/2023    CO2 25.7 09/21/2023    CALCIUM 10.0 09/21/2023    ALBUMIN 4.5 09/21/2023    ALKPHOS 74 09/21/2023    AST 18 09/21/2023    ALT 6 09/21/2023     Lab Results   Component Value Date    CHOL 139 09/21/2023    TRIG 78 09/21/2023    HDL 52 09/21/2023    LDL 72 09/21/2023      Lab Results   Component Value Date    WBC 9.02 09/21/2023    RBC 4.19 09/21/2023    HGB 13.5 09/21/2023    HCT 40.3 09/21/2023    MCV 96.2 09/21/2023     09/21/2023      Lab Results   Component Value Date    TSH 1.500 09/21/2023        Advance Care Planning   ACP discussion was held with the patient during this visit. Patient has an advance directive in EMR which is still valid.              Assessment:    ICD-10-CM ICD-9-CM   1. Coronary " artery disease involving native coronary artery of native heart without angina pectoris  I25.10 414.01   2. Essential hypertension  I10 401.9   3. Pure hypercholesterolemia  E78.00 272.0         Plan:  Patient has stable cardiac status.  She has ongoing shortness of breath but is being evaluated for emphysematous changes to her chest x-ray.  Continue on aspirin 81 mg for antiplatelet therapy.   Continue on metoprolol 25 mg daily for rate control and hypertension.   Continue on nitroglycerin 0.4 mg PRN for chest pain.  Continue on rosuvastatin 20 mg daily for hyperlipidemia as her last lipid panel was to goal  Continue all other current medications.  F/up in 12 months, sooner if needed.    Francisca Diaz PA-C

## 2024-03-04 ENCOUNTER — OFFICE VISIT (OUTPATIENT)
Dept: GASTROENTEROLOGY | Facility: CLINIC | Age: 84
End: 2024-03-04
Payer: MEDICARE

## 2024-03-04 VITALS — HEIGHT: 60 IN | BODY MASS INDEX: 26.7 KG/M2 | WEIGHT: 136 LBS

## 2024-03-04 DIAGNOSIS — K21.00 GASTROESOPHAGEAL REFLUX DISEASE WITH ESOPHAGITIS WITHOUT HEMORRHAGE: Primary | ICD-10-CM

## 2024-03-04 PROCEDURE — 1160F RVW MEDS BY RX/DR IN RCRD: CPT | Performed by: INTERNAL MEDICINE

## 2024-03-04 PROCEDURE — 1159F MED LIST DOCD IN RCRD: CPT | Performed by: INTERNAL MEDICINE

## 2024-03-04 PROCEDURE — 99213 OFFICE O/P EST LOW 20 MIN: CPT | Performed by: INTERNAL MEDICINE

## 2024-03-04 NOTE — PROGRESS NOTES
PCP:  Reina Yarbrough MD     No referring provider defined for this encounter.    Chief Complaint   Patient presents with    Follow-up     Follow up reflux        HPI   The patient is an 83-year-old known to me with a history of gastroesophageal reflux disease.  She has been intolerant to the proton pump inhibitors.  She has been on several of those.  She is on Pepcid 40 mg twice a day.  She still has some breakthrough.  She uses Tums.  She is going to start using some Gaviscon.  She is not having any more of the episodes where she gets acid coming up into her throat at night.  She has no other warning signs.    Allergies   Allergen Reactions    Indocin [Indomethacin] Hallucinations    Statins Hives    Keflex [Cephalexin] GI Intolerance     Upset stomach, may have had some blood in stool but cannot remember the reason why she was put on it. Tolerates penicillins without problem.    Adhesive Tape Other (See Comments)     Skin irritation with bandaids, surgical bandages that stay on skin for extended periods of time    Aleve [Naproxen] Hives    Celecoxib Diarrhea    Dexlansoprazole Rash    Ibuprofen GI Intolerance     Heartburn.    Lipitor [Atorvastatin] Hives    Prevacid [Lansoprazole] Nausea And Vomiting    Prilosec [Omeprazole] Nausea And Vomiting    Zocor [Simvastatin] Hives          Current Outpatient Medications:     acetaminophen (TYLENOL) 650 MG 8 hr tablet, Take 2 tablets by mouth 2 (two) times a day., Disp: , Rfl:     aspirin 81 MG EC tablet, Take 1 tablet by mouth Every Night., Disp: , Rfl:     BIOTIN 5000 PO, Take 5,000 mcg by mouth Daily., Disp: , Rfl:     Cholecalciferol (Vitamin D3) 50 MCG (2000 UT) capsule, Take 1 capsule by mouth Daily., Disp: , Rfl:     coenzyme Q10 100 MG capsule, Take 1 capsule by mouth Daily., Disp: , Rfl:     docusate sodium (Stool Softener) 100 MG capsule, Take 1 capsule by mouth 2 Times a Day., Disp: 30 capsule, Rfl: 1    famotidine (PEPCID) 40 MG tablet, Take 1 tablet by  mouth two times daily, Disp: 180 tablet, Rfl: 3    latanoprost (XALATAN) 0.005 % ophthalmic solution, 1 drop Every Night., Disp: , Rfl:     metoprolol succinate XL (TOPROL-XL) 25 MG 24 hr tablet, Take 1 tablet by mouth Every Morning., Disp: 90 tablet, Rfl: 1    multivitamin with minerals tablet tablet, Take 1 tablet by mouth Daily., Disp: , Rfl:     naloxone (NARCAN) 4 MG/0.1ML nasal spray, Call 911. Don't prime. [place] in 1 nostril for overdose. Repeat in 2-3 minutes in other nostril if no or minimal breathing/responsiveness., Disp: 2 each, Rfl: 0    nitroglycerin (NITROSTAT) 0.4 MG SL tablet, Place 1 tablet under the tongue Every 5 (Five) Minutes As Needed for Chest Pain., Disp: 25 tablet, Rfl: 11    rosuvastatin (CRESTOR) 20 MG tablet, TAKE 1 TABLET BY MOUTH AT NIGHT, Disp: 90 tablet, Rfl: 0    Yuvafem 10 MCG tablet vaginal tablet, INSERT 1 TABLET VAGINALLY TWICE A WEEK, Disp: 24 tablet, Rfl: 0     Past Medical History:   Diagnosis Date    Ankle pain     Arthritis     Cancer     Skin    Cataract     removed    Chest discomfort     Cholecystitis 01/15/2018    Clotting disorder     may be result of aspirin    COPD (chronic obstructive pulmonary disease) recent diagnoses after xray    Coronary artery disease     Double vision 04/25/2022    Uses glasses with prism    Edema     Elevated cholesterol     Elevated liver enzymes 01/15/2018    Gallstones     GERD (gastroesophageal reflux disease)     Glaucoma     pt is currently off of meds and Dr Tanner does not think that she is glaucoma    H/O complete eye exam 06/2013    H/O mammogram 11/16/2015    H/O non-ST elevation myocardial infarction (NSTEMI) 01/2005    Hammer toe     Heart attack 01/2005    History of blood transfusion 01/2005    History of cardiovascular stress test 12/02/2015    normal    HTN (hypertension)     Hyperlipidemia     Hypokalemia 01/15/2018    Kidney infection 1971    Low back pain     Menopausal symptoms     Nausea with vomiting 12/20/2016     Onychia and paronychia of finger     Osteopenia     Other elevated white blood cell count     Pap smear for cervical cancer screening 2010    Leandro    Pneumonia     Rheumatoid factor positive 12/05/2022    Sepsis 01/15/2018    Tremor     essential tremor    UTI (urinary tract infection) 01/15/2018    Viral warts     Visual impairment 2019    Wears glasses        Past Surgical History:   Procedure Laterality Date    ADENOIDECTOMY  1952    BLADDER REPAIR  2002    BLADDER REPAIR  2003    BUNIONECTOMY Right 2010    CARDIAC CATHETERIZATION  stent placed 2005    CHOLECYSTECTOMY      CHOLECYSTECTOMY WITH INTRAOPERATIVE CHOLANGIOGRAM N/A 03/07/2018    Procedure: CHOLECYSTECTOMY LAPAROSCOPIC INTRAOPERATIVE CHOLANGIOGRAM;  Surgeon: Harinder Mason MD;  Location:  [facility];  Service:     COLONOSCOPY  2008, 4/2014    [facility]    CORONARY STENT PLACEMENT Left 01/2005    drug eluting stent 1/2005 LAD    DILATATION AND CURETTAGE  1971    DILATATION AND CURETTAGE  1978    EYE CAPSULOTOMY WITH LASER Bilateral 2018    EYE SURGERY Bilateral 05/2016    Cataract    FRONTALIS SUSPENSION  2010    HAMMER TOE REPAIR Right 11/03/2010    HEAD & NECK SKIN LESION EXCISIONAL BIOPSY  08/20/2018    benign scalp cyst    HYSTERECTOMY  1978    JOINT REPLACEMENT  2005    LAPAROTOMY OOPHERECTOMY Bilateral 2002    NOSE SURGERY  03/2017    repair 3 fractured areas. Dr Dunne    OTHER SURGICAL HISTORY  2005    rectocele repair    OTHER SURGICAL HISTORY  2010    eyelid surgery    REPLACEMENT TOTAL KNEE BILATERAL Bilateral     SKIN BIOPSY      SUBTOTAL HYSTERECTOMY  1978    TONSILLECTOMY  1952    TOTAL KNEE ARTHROPLASTY  01/2005    UMBILICAL HERNIA REPAIR  2019    UMBILICAL HERNIA REPAIR N/A 6/6/2023    Procedure: UMBILICAL HERNIA REPAIR WITH MESH LAPAROSCOPIC;  Surgeon: Harinder Mason MD;  Location:  [facility];  Service: General;  Laterality: N/A;    UPPER GASTROINTESTINAL ENDOSCOPY  11/17/2022    Dr Frank    UTERINE FIBROID SURGERY  1978     emergency removal of fibroid from birth canal        Social History     Socioeconomic History    Marital status:    Tobacco Use    Smoking status: Never     Passive exposure: Past    Smokeless tobacco: Never    Tobacco comments:     My father, brother in law, friends, and  smoked so had second hand exposure   Vaping Use    Vaping status: Never Used    Passive vaping exposure: Yes   Substance and Sexual Activity    Alcohol use: No    Drug use: No    Sexual activity: Defer     Partners: Male     Birth control/protection: Hysterectomy        Family History   Problem Relation Age of Onset    Stroke Mother     Hypertension Mother         My Mother  of brain hemmorage/stroke    Heart failure Father         congestive    Cancer Father         Neck glands removed    Vision loss Father         Had operation    Heart disease Father          of Congestive Heart Failure at 91    Breast cancer Sister         60's    Cancer Sister         Breast    Breast cancer Daughter 40    Cancer Daughter         Breast    Thyroid disease Daughter         on meds    Breast cancer Other         NIECE 60's    Breast cancer Other         NIECE 60's    Vision loss Paternal Aunt         blind-glaucoma    Vision loss Paternal Uncle         blind-glaucoma    Vision loss Maternal Aunt         all 9 aunts paternal aunts/Uncles/ cousins/ sister/brother/ and niece on meds for Glaucoma or had operation    Early death Daughter         Celiac Sprue reaction    Ovarian cancer Neg Hx         Review of Systems     There were no vitals filed for this visit.     Physical Exam  Constitutional:       General: She is not in acute distress.     Appearance: Normal appearance. She is not ill-appearing.   Neurological:      Mental Status: She is alert.          Diagnoses and all orders for this visit:    1. Gastroesophageal reflux disease with esophagitis without hemorrhage (Primary)    Impressions and plan #1 gastroesophageal reflux disease: She  seems to be reasonably well-controlled.  We talked about the fact that there is a new agent called Voquenza which we could potentially use if she is having severe difficulty.  At this point she wants to stick with what she is on and I think that is very reasonable.  It is nice to have another option down the road if necessary.  I will see her back in a year although sooner with issues.    Jorge Frank MD

## 2024-03-04 NOTE — LETTER
March 4, 2024       No Recipients    Patient: Chelsi Ferguson   YOB: 1940   Date of Visit: 3/4/2024     Dear Reina MAI MD:       Thank you for referring Chelsi Ferguson to me for evaluation. Below are the relevant portions of my assessment and plan of care.    If you have questions, please do not hesitate to call me. I look forward to following Chelsi along with you.         Sincerely,        Jorge Frank MD        CC:   No Recipients    Jorge Frank MD  03/04/24 1404  Sign when Signing Visit  PCP:  Reina Yarbrough MD     No referring provider defined for this encounter.    Chief Complaint   Patient presents with   • Follow-up     Follow up reflux        HPI   The patient is an 83-year-old known to me with a history of gastroesophageal reflux disease.  She has been intolerant to the proton pump inhibitors.  She has been on several of those.  She is on Pepcid 40 mg twice a day.  She still has some breakthrough.  She uses Tums.  She is going to start using some Gaviscon.  She is not having any more of the episodes where she gets acid coming up into her throat at night.  She has no other warning signs.    Allergies   Allergen Reactions   • Indocin [Indomethacin] Hallucinations   • Statins Hives   • Keflex [Cephalexin] GI Intolerance     Upset stomach, may have had some blood in stool but cannot remember the reason why she was put on it. Tolerates penicillins without problem.   • Adhesive Tape Other (See Comments)     Skin irritation with bandaids, surgical bandages that stay on skin for extended periods of time   • Aleve [Naproxen] Hives   • Celecoxib Diarrhea   • Dexlansoprazole Rash   • Ibuprofen GI Intolerance     Heartburn.   • Lipitor [Atorvastatin] Hives   • Prevacid [Lansoprazole] Nausea And Vomiting   • Prilosec [Omeprazole] Nausea And Vomiting   • Zocor [Simvastatin] Hives          Current Outpatient Medications:   •  acetaminophen (TYLENOL) 650 MG 8 hr tablet, Take 2 tablets by  mouth 2 (two) times a day., Disp: , Rfl:   •  aspirin 81 MG EC tablet, Take 1 tablet by mouth Every Night., Disp: , Rfl:   •  BIOTIN 5000 PO, Take 5,000 mcg by mouth Daily., Disp: , Rfl:   •  Cholecalciferol (Vitamin D3) 50 MCG (2000 UT) capsule, Take 1 capsule by mouth Daily., Disp: , Rfl:   •  coenzyme Q10 100 MG capsule, Take 1 capsule by mouth Daily., Disp: , Rfl:   •  docusate sodium (Stool Softener) 100 MG capsule, Take 1 capsule by mouth 2 Times a Day., Disp: 30 capsule, Rfl: 1  •  famotidine (PEPCID) 40 MG tablet, Take 1 tablet by mouth two times daily, Disp: 180 tablet, Rfl: 3  •  latanoprost (XALATAN) 0.005 % ophthalmic solution, 1 drop Every Night., Disp: , Rfl:   •  metoprolol succinate XL (TOPROL-XL) 25 MG 24 hr tablet, Take 1 tablet by mouth Every Morning., Disp: 90 tablet, Rfl: 1  •  multivitamin with minerals tablet tablet, Take 1 tablet by mouth Daily., Disp: , Rfl:   •  naloxone (NARCAN) 4 MG/0.1ML nasal spray, Call 911. Don't prime. [place] in 1 nostril for overdose. Repeat in 2-3 minutes in other nostril if no or minimal breathing/responsiveness., Disp: 2 each, Rfl: 0  •  nitroglycerin (NITROSTAT) 0.4 MG SL tablet, Place 1 tablet under the tongue Every 5 (Five) Minutes As Needed for Chest Pain., Disp: 25 tablet, Rfl: 11  •  rosuvastatin (CRESTOR) 20 MG tablet, TAKE 1 TABLET BY MOUTH AT NIGHT, Disp: 90 tablet, Rfl: 0  •  Yuvafem 10 MCG tablet vaginal tablet, INSERT 1 TABLET VAGINALLY TWICE A WEEK, Disp: 24 tablet, Rfl: 0     Past Medical History:   Diagnosis Date   • Ankle pain    • Arthritis    • Cancer     Skin   • Cataract     removed   • Chest discomfort    • Cholecystitis 01/15/2018   • Clotting disorder     may be result of aspirin   • COPD (chronic obstructive pulmonary disease) recent diagnoses after xray   • Coronary artery disease    • Double vision 04/25/2022    Uses glasses with prism   • Edema    • Elevated cholesterol    • Elevated liver enzymes 01/15/2018   • Gallstones    • GERD  (gastroesophageal reflux disease)    • Glaucoma     pt is currently off of meds and Dr Tanner does not think that she is glaucoma   • H/O complete eye exam 06/2013   • H/O mammogram 11/16/2015   • H/O non-ST elevation myocardial infarction (NSTEMI) 01/2005   • Hammer toe    • Heart attack 01/2005   • History of blood transfusion 01/2005   • History of cardiovascular stress test 12/02/2015    normal   • HTN (hypertension)    • Hyperlipidemia    • Hypokalemia 01/15/2018   • Kidney infection 1971   • Low back pain    • Menopausal symptoms    • Nausea with vomiting 12/20/2016   • Onychia and paronychia of finger    • Osteopenia    • Other elevated white blood cell count    • Pap smear for cervical cancer screening 2010    Leandro   • Pneumonia    • Rheumatoid factor positive 12/05/2022   • Sepsis 01/15/2018   • Tremor     essential tremor   • UTI (urinary tract infection) 01/15/2018   • Viral warts    • Visual impairment 2019   • Wears glasses        Past Surgical History:   Procedure Laterality Date   • ADENOIDECTOMY  1952   • BLADDER REPAIR  2002   • BLADDER REPAIR  2003   • BUNIONECTOMY Right 2010   • CARDIAC CATHETERIZATION  stent placed 2005   • CHOLECYSTECTOMY     • CHOLECYSTECTOMY WITH INTRAOPERATIVE CHOLANGIOGRAM N/A 03/07/2018    Procedure: CHOLECYSTECTOMY LAPAROSCOPIC INTRAOPERATIVE CHOLANGIOGRAM;  Surgeon: Harinder Mason MD;  Location: [facility];  Service:    • COLONOSCOPY  2008, 4/2014    [facility]   • CORONARY STENT PLACEMENT Left 01/2005    drug eluting stent 1/2005 LAD   • DILATATION AND CURETTAGE  1971   • DILATATION AND CURETTAGE  1978   • EYE CAPSULOTOMY WITH LASER Bilateral 2018   • EYE SURGERY Bilateral 05/2016    Cataract   • FRONTALIS SUSPENSION  2010   • HAMMER TOE REPAIR Right 11/03/2010   • HEAD & NECK SKIN LESION EXCISIONAL BIOPSY  08/20/2018    benign scalp cyst   • HYSTERECTOMY  1978   • JOINT REPLACEMENT  2005   • LAPAROTOMY OOPHERECTOMY Bilateral 2002   • NOSE SURGERY  03/2017    repair 3  fractured areas. Dr Dunne   • OTHER SURGICAL HISTORY      rectocele repair   • OTHER SURGICAL HISTORY      eyelid surgery   • REPLACEMENT TOTAL KNEE BILATERAL Bilateral    • SKIN BIOPSY     • SUBTOTAL HYSTERECTOMY     • TONSILLECTOMY     • TOTAL KNEE ARTHROPLASTY  2005   • UMBILICAL HERNIA REPAIR  2019   • UMBILICAL HERNIA REPAIR N/A 2023    Procedure: UMBILICAL HERNIA REPAIR WITH MESH LAPAROSCOPIC;  Surgeon: Harinder Mason MD;  Location: [facility];  Service: General;  Laterality: N/A;   • UPPER GASTROINTESTINAL ENDOSCOPY  2022    Dr Frank   • UTERINE FIBROID SURGERY      emergency removal of fibroid from birth canal        Social History     Socioeconomic History   • Marital status:    Tobacco Use   • Smoking status: Never     Passive exposure: Past   • Smokeless tobacco: Never   • Tobacco comments:     My father, brother in law, friends, and  smoked so had second hand exposure   Vaping Use   • Vaping status: Never Used   • Passive vaping exposure: Yes   Substance and Sexual Activity   • Alcohol use: No   • Drug use: No   • Sexual activity: Defer     Partners: Male     Birth control/protection: Hysterectomy        Family History   Problem Relation Age of Onset   • Stroke Mother    • Hypertension Mother         My Mother  of brain hemmorage/stroke   • Heart failure Father         congestive   • Cancer Father         Neck glands removed   • Vision loss Father         Had operation   • Heart disease Father          of Congestive Heart Failure at 91   • Breast cancer Sister         60's   • Cancer Sister         Breast   • Breast cancer Daughter 40   • Cancer Daughter         Breast   • Thyroid disease Daughter         on meds   • Breast cancer Other         NIECE 60's   • Breast cancer Other         NIECE 60's   • Vision loss Paternal Aunt         blind-glaucoma   • Vision loss Paternal Uncle         blind-glaucoma   • Vision loss Maternal Aunt         all  9 aunts paternal aunts/Uncles/ cousins/ sister/brother/ and niece on meds for Glaucoma or had operation   • Early death Daughter         Celiac Sprue reaction   • Ovarian cancer Neg Hx         Review of Systems     There were no vitals filed for this visit.     Physical Exam  Constitutional:       General: She is not in acute distress.     Appearance: Normal appearance. She is not ill-appearing.   Neurological:      Mental Status: She is alert.          Diagnoses and all orders for this visit:    1. Gastroesophageal reflux disease with esophagitis without hemorrhage (Primary)    Impressions and plan #1 gastroesophageal reflux disease: She seems to be reasonably well-controlled.  We talked about the fact that there is a new agent called Voquenza which we could potentially use if she is having severe difficulty.  At this point she wants to stick with what she is on and I think that is very reasonable.  It is nice to have another option down the road if necessary.  I will see her back in a year although sooner with issues.    Jorge Frank MD

## 2024-03-06 ENCOUNTER — TRANSCRIBE ORDERS (OUTPATIENT)
Dept: ADMINISTRATIVE | Facility: HOSPITAL | Age: 84
End: 2024-03-06
Payer: MEDICARE

## 2024-03-06 DIAGNOSIS — Z12.31 SCREENING MAMMOGRAM FOR BREAST CANCER: Primary | ICD-10-CM

## 2024-03-20 DIAGNOSIS — N95.1 MENOPAUSAL SYMPTOM: ICD-10-CM

## 2024-03-21 ENCOUNTER — OFFICE VISIT (OUTPATIENT)
Dept: INTERNAL MEDICINE | Facility: CLINIC | Age: 84
End: 2024-03-21
Payer: MEDICARE

## 2024-03-21 ENCOUNTER — LAB (OUTPATIENT)
Dept: INTERNAL MEDICINE | Facility: CLINIC | Age: 84
End: 2024-03-21
Payer: MEDICARE

## 2024-03-21 VITALS
WEIGHT: 132 LBS | HEART RATE: 69 BPM | OXYGEN SATURATION: 94 % | BODY MASS INDEX: 25.91 KG/M2 | SYSTOLIC BLOOD PRESSURE: 128 MMHG | TEMPERATURE: 97.8 F | DIASTOLIC BLOOD PRESSURE: 72 MMHG | HEIGHT: 60 IN

## 2024-03-21 DIAGNOSIS — R35.0 URINE FREQUENCY: ICD-10-CM

## 2024-03-21 DIAGNOSIS — R93.89 ABNORMAL CHEST X-RAY: ICD-10-CM

## 2024-03-21 DIAGNOSIS — R82.90 ABNORMAL URINALYSIS: ICD-10-CM

## 2024-03-21 DIAGNOSIS — M85.89 OSTEOPENIA OF MULTIPLE SITES: ICD-10-CM

## 2024-03-21 DIAGNOSIS — I10 ESSENTIAL HYPERTENSION: Chronic | ICD-10-CM

## 2024-03-21 DIAGNOSIS — R06.02 SHORTNESS OF BREATH: Primary | ICD-10-CM

## 2024-03-21 DIAGNOSIS — M25.512 CHRONIC LEFT SHOULDER PAIN: ICD-10-CM

## 2024-03-21 DIAGNOSIS — E78.00 PURE HYPERCHOLESTEROLEMIA: Chronic | ICD-10-CM

## 2024-03-21 DIAGNOSIS — R06.02 SHORTNESS OF BREATH: ICD-10-CM

## 2024-03-21 DIAGNOSIS — G89.29 CHRONIC LEFT SHOULDER PAIN: ICD-10-CM

## 2024-03-21 LAB
ALBUMIN SERPL-MCNC: 4.4 G/DL (ref 3.5–5.2)
ALBUMIN/GLOB SERPL: 1.8 G/DL
ALP SERPL-CCNC: 76 U/L (ref 39–117)
ALT SERPL W P-5'-P-CCNC: 9 U/L (ref 1–33)
ANION GAP SERPL CALCULATED.3IONS-SCNC: 12 MMOL/L (ref 5–15)
AST SERPL-CCNC: 19 U/L (ref 1–32)
BILIRUB BLD-MCNC: NEGATIVE MG/DL
BILIRUB SERPL-MCNC: 0.5 MG/DL (ref 0–1.2)
BUN SERPL-MCNC: 10 MG/DL (ref 8–23)
BUN/CREAT SERPL: 13.3 (ref 7–25)
CALCIUM SPEC-SCNC: 10.2 MG/DL (ref 8.6–10.5)
CHLORIDE SERPL-SCNC: 106 MMOL/L (ref 98–107)
CHOLEST SERPL-MCNC: 138 MG/DL (ref 0–200)
CLARITY, POC: ABNORMAL
CO2 SERPL-SCNC: 24 MMOL/L (ref 22–29)
COLOR UR: YELLOW
CREAT SERPL-MCNC: 0.75 MG/DL (ref 0.57–1)
EGFRCR SERPLBLD CKD-EPI 2021: 78.6 ML/MIN/1.73
EXPIRATION DATE: ABNORMAL
GLOBULIN UR ELPH-MCNC: 2.4 GM/DL
GLUCOSE SERPL-MCNC: 84 MG/DL (ref 65–99)
GLUCOSE UR STRIP-MCNC: NEGATIVE MG/DL
HDLC SERPL-MCNC: 49 MG/DL (ref 40–60)
KETONES UR QL: NEGATIVE
LDLC SERPL CALC-MCNC: 69 MG/DL (ref 0–100)
LDLC/HDLC SERPL: 1.37 {RATIO}
LEUKOCYTE EST, POC: ABNORMAL
Lab: ABNORMAL
NITRITE UR-MCNC: POSITIVE MG/ML
PH UR: 5.5 [PH] (ref 5–8)
POTASSIUM SERPL-SCNC: 4.5 MMOL/L (ref 3.5–5.2)
PROT SERPL-MCNC: 6.8 G/DL (ref 6–8.5)
PROT UR STRIP-MCNC: NEGATIVE MG/DL
RBC # UR STRIP: ABNORMAL /UL
SODIUM SERPL-SCNC: 142 MMOL/L (ref 136–145)
SP GR UR: 1.01 (ref 1–1.03)
TRIGL SERPL-MCNC: 109 MG/DL (ref 0–150)
UROBILINOGEN UR QL: NORMAL
VLDLC SERPL-MCNC: 20 MG/DL (ref 5–40)

## 2024-03-21 PROCEDURE — 3078F DIAST BP <80 MM HG: CPT | Performed by: FAMILY MEDICINE

## 2024-03-21 PROCEDURE — 81003 URINALYSIS AUTO W/O SCOPE: CPT | Performed by: FAMILY MEDICINE

## 2024-03-21 PROCEDURE — 80053 COMPREHEN METABOLIC PANEL: CPT | Performed by: FAMILY MEDICINE

## 2024-03-21 PROCEDURE — 1160F RVW MEDS BY RX/DR IN RCRD: CPT | Performed by: FAMILY MEDICINE

## 2024-03-21 PROCEDURE — 87086 URINE CULTURE/COLONY COUNT: CPT | Performed by: FAMILY MEDICINE

## 2024-03-21 PROCEDURE — 80061 LIPID PANEL: CPT | Performed by: FAMILY MEDICINE

## 2024-03-21 PROCEDURE — 87088 URINE BACTERIA CULTURE: CPT | Performed by: FAMILY MEDICINE

## 2024-03-21 PROCEDURE — 87186 SC STD MICRODIL/AGAR DIL: CPT | Performed by: FAMILY MEDICINE

## 2024-03-21 PROCEDURE — 36415 COLL VENOUS BLD VENIPUNCTURE: CPT | Performed by: FAMILY MEDICINE

## 2024-03-21 PROCEDURE — 1159F MED LIST DOCD IN RCRD: CPT | Performed by: FAMILY MEDICINE

## 2024-03-21 PROCEDURE — 3074F SYST BP LT 130 MM HG: CPT | Performed by: FAMILY MEDICINE

## 2024-03-21 PROCEDURE — 82103 ALPHA-1-ANTITRYPSIN TOTAL: CPT | Performed by: FAMILY MEDICINE

## 2024-03-21 PROCEDURE — 99214 OFFICE O/P EST MOD 30 MIN: CPT | Performed by: FAMILY MEDICINE

## 2024-03-21 RX ORDER — NITROGLYCERIN 0.4 MG/1
0.4 TABLET SUBLINGUAL
Qty: 75 TABLET | Refills: 3 | Status: SHIPPED | OUTPATIENT
Start: 2024-03-21

## 2024-03-21 RX ORDER — METOPROLOL SUCCINATE 25 MG/1
25 TABLET, EXTENDED RELEASE ORAL EVERY MORNING
Qty: 90 TABLET | Refills: 1 | Status: SHIPPED | OUTPATIENT
Start: 2024-03-21

## 2024-03-21 RX ORDER — ROSUVASTATIN CALCIUM 20 MG/1
20 TABLET, COATED ORAL
Qty: 90 TABLET | Refills: 0 | Status: SHIPPED | OUTPATIENT
Start: 2024-03-21

## 2024-03-21 RX ORDER — FAMOTIDINE 40 MG/1
TABLET, FILM COATED ORAL
Qty: 180 TABLET | Refills: 3 | Status: SHIPPED | OUTPATIENT
Start: 2024-03-21

## 2024-03-21 RX ORDER — ESTRADIOL 10 UG/1
1 TABLET VAGINAL 2 TIMES WEEKLY
Qty: 24 TABLET | Refills: 0 | OUTPATIENT
Start: 2024-03-21

## 2024-03-21 NOTE — PROGRESS NOTES
"Subjective   Chelsi Ferguson is a 83 y.o. female.     Chief Complaint   Patient presents with    Hypertension    Hyperlipidemia       Visit Vitals  /72 (BP Location: Right arm, Patient Position: Sitting, Cuff Size: Adult)   Pulse 69   Temp 97.8 °F (36.6 °C)   Ht 151.9 cm (59.8\")   Wt 59.9 kg (132 lb)   LMP  (LMP Unknown) Comment: Last Mammogram   SpO2 94%   BMI 25.95 kg/m²         Hypertension  This is a chronic problem. The current episode started more than 1 year ago. The problem is unchanged. The problem is controlled. Associated symptoms include blurred vision (double vision). Pertinent negatives include no anxiety, chest pain, headaches, malaise/fatigue, neck pain, orthopnea, palpitations, peripheral edema, PND, shortness of breath or sweats. Agents associated with hypertension include estrogens. Risk factors for coronary artery disease include dyslipidemia and post-menopausal state. Current antihypertension treatment includes beta blockers. There are no compliance problems.  Hypertensive end-organ damage includes CAD/MI. There is no history of angina, kidney disease, CVA, heart failure, left ventricular hypertrophy, PVD or retinopathy. There is no history of chronic renal disease, sleep apnea or a thyroid problem.   Hyperlipidemia  This is a chronic problem. The current episode started more than 1 year ago. The problem is controlled. Recent lipid tests were reviewed and are normal. She has no history of chronic renal disease, diabetes, hypothyroidism, liver disease, obesity or nephrotic syndrome. Factors aggravating her hyperlipidemia include beta blockers. Pertinent negatives include no chest pain, focal sensory loss, focal weakness, leg pain, myalgias or shortness of breath. Current antihyperlipidemic treatment includes statins. The current treatment provides significant improvement of lipids. There are no compliance problems.  Risk factors for coronary artery disease include dyslipidemia, hypertension " and post-menopausal.      Pt has emphysematous changes on CxR.. Pt is a nonsmoker. Pt has hx of being around smokers.  Pt has had reflux that has gone into her lungs.    Pt has osteopenia on Dexa with high risk of fracture. Because of reflux will avoid bisphosphonate.  Pt is going to PT for endurance strength and balance.     Pt has been working with PT on the left shoulder with ROM. Pt has chronic pain left shoulder.    Pt requests parking sticker. Pt has lack of endurance.     Pt has urine frequency and would like her urine checked.    Pt has GERD that is stable with famotidine and needs refill.    Pt's NTG is  and she requests new scrip. Pt does not currently have angina.   The following portions of the patient's history were reviewed and updated as appropriate: allergies, current medications, past family history, past medical history, past social history, past surgical history, and problem list.    Past Medical History:   Diagnosis Date    Ankle pain     Arthritis     Cancer     Skin    Cataract     removed    Chest discomfort     Cholecystitis 01/15/2018    Clotting disorder     may be result of aspirin    COPD (chronic obstructive pulmonary disease) recent diagnoses after xray    Coronary artery disease     Diverticulosis     Double vision 2022    Uses glasses with prism    Edema     Elevated cholesterol     Elevated liver enzymes 01/15/2018    Gallstones     GERD (gastroesophageal reflux disease)     Glaucoma     pt is currently off of meds and Dr Tanner does not think that she is glaucoma    H/O complete eye exam 2013    H/O mammogram 2015    H/O non-ST elevation myocardial infarction (NSTEMI) 2005    Hammer toe     Heart attack 2005    History of blood transfusion 2005    History of cardiovascular stress test 2015    normal    HTN (hypertension)     Hyperlipidemia     Hypokalemia 01/15/2018    Kidney infection 1971    Low back pain     Menopausal symptoms     Nausea with  vomiting 12/20/2016    Onychia and paronychia of finger     Osteopenia     Other elevated white blood cell count     Pap smear for cervical cancer screening 2010    Leandro    Pneumonia     Rheumatoid factor positive 12/05/2022    Sepsis 01/15/2018    Tremor     essential tremor    UTI (urinary tract infection) 01/15/2018    Viral warts     Visual impairment 2019    Wears glasses       Past Surgical History:   Procedure Laterality Date    ADENOIDECTOMY  1952    BLADDER REPAIR  2002    BLADDER REPAIR  2003    BUNIONECTOMY Right 2010    CARDIAC CATHETERIZATION  stent placed 2005    CHOLECYSTECTOMY      CHOLECYSTECTOMY WITH INTRAOPERATIVE CHOLANGIOGRAM N/A 03/07/2018    Procedure: CHOLECYSTECTOMY LAPAROSCOPIC INTRAOPERATIVE CHOLANGIOGRAM;  Surgeon: Harinder Mason MD;  Location:  [facility];  Service:     COLONOSCOPY  2008, 4/2014    [facility]    CORONARY STENT PLACEMENT Left 01/2005    drug eluting stent 1/2005 LAD    DILATATION AND CURETTAGE  1971    DILATATION AND CURETTAGE  1978    EYE CAPSULOTOMY WITH LASER Bilateral 2018    EYE SURGERY Bilateral 05/2016    Cataract    FRONTALIS SUSPENSION  2010    HAMMER TOE REPAIR Right 11/03/2010    HEAD & NECK SKIN LESION EXCISIONAL BIOPSY  08/20/2018    benign scalp cyst    HYSTERECTOMY  1978    JOINT REPLACEMENT  2005    LAPAROTOMY OOPHERECTOMY Bilateral 2002    NOSE SURGERY  03/2017    repair 3 fractured areas. Dr Dunne    OTHER SURGICAL HISTORY  2005    rectocele repair    OTHER SURGICAL HISTORY  2010    eyelid surgery    REPLACEMENT TOTAL KNEE BILATERAL Bilateral     SKIN BIOPSY      SUBTOTAL HYSTERECTOMY  1978    TONSILLECTOMY  1952    TOTAL KNEE ARTHROPLASTY  01/2005    UMBILICAL HERNIA REPAIR  2019    UMBILICAL HERNIA REPAIR N/A 6/6/2023    Procedure: UMBILICAL HERNIA REPAIR WITH MESH LAPAROSCOPIC;  Surgeon: Harinder Mason MD;  Location:  [facility];  Service: General;  Laterality: N/A;    UPPER GASTROINTESTINAL ENDOSCOPY  11/17/2022    Dr Frank    UTERINE FIBROID  SURGERY      emergency removal of fibroid from birth canal      Family History   Problem Relation Age of Onset    Stroke Mother     Hypertension Mother         My Mother  of brain hemmorage/stroke    Heart failure Father         congestive    Cancer Father         Neck glands removed    Vision loss Father         Had operation    Heart disease Father          of Congestive Heart Failure at 91    Breast cancer Sister         60's    Cancer Sister         Breast    Breast cancer Daughter 40    Cancer Daughter         Breast    Thyroid disease Daughter         on meds    Breast cancer Other         NIECE 60's    Breast cancer Other         NIECE 60's    Vision loss Paternal Aunt         blind-glaucoma    Vision loss Paternal Uncle         blind-glaucoma    Vision loss Maternal Aunt         all 9 aunts paternal aunts/Uncles/ cousins/ sister/brother/ and niece on meds for Glaucoma or had operation    Early death Daughter         Celiac Sprue reaction    Ovarian cancer Neg Hx       Social History     Socioeconomic History    Marital status:    Tobacco Use    Smoking status: Never     Passive exposure: Past    Smokeless tobacco: Never    Tobacco comments:     My father, brother in law, friends, and  smoked so had second hand exposure   Vaping Use    Vaping status: Never Used    Passive vaping exposure: Yes   Substance and Sexual Activity    Alcohol use: No    Drug use: No    Sexual activity: Not Currently     Partners: Male     Birth control/protection: Hysterectomy      Allergies   Allergen Reactions    Indocin [Indomethacin] Hallucinations    Statins Hives    Keflex [Cephalexin] GI Intolerance     Upset stomach, may have had some blood in stool but cannot remember the reason why she was put on it. Tolerates penicillins without problem.    Adhesive Tape Other (See Comments)     Skin irritation with bandaids, surgical bandages that stay on skin for extended periods of time    Aleve [Naproxen]  Hives    Celecoxib Diarrhea    Dexlansoprazole Rash    Ibuprofen GI Intolerance     Heartburn.    Lipitor [Atorvastatin] Hives    Prevacid [Lansoprazole] Nausea And Vomiting    Prilosec [Omeprazole] Nausea And Vomiting    Zocor [Simvastatin] Hives       Review of Systems   Constitutional:  Negative for malaise/fatigue.   Eyes:  Positive for blurred vision (double vision).   Respiratory:  Negative for shortness of breath.    Cardiovascular:  Negative for chest pain, palpitations, orthopnea and PND.   Musculoskeletal:  Negative for myalgias and neck pain.   Neurological:  Negative for focal weakness and headaches.       Objective   Physical Exam  Vitals and nursing note reviewed.   Constitutional:       Appearance: She is well-developed.   HENT:      Head: Normocephalic.      Right Ear: External ear normal.      Left Ear: External ear normal.      Nose: Nose normal.   Eyes:      General: Lids are normal.      Conjunctiva/sclera: Conjunctivae normal.      Pupils: Pupils are equal, round, and reactive to light.   Neck:      Thyroid: No thyroid mass or thyromegaly.      Vascular: No carotid bruit.      Trachea: Trachea normal.   Cardiovascular:      Rate and Rhythm: Normal rate and regular rhythm.      Heart sounds: No murmur heard.  Pulmonary:      Effort: Pulmonary effort is normal. No respiratory distress.      Breath sounds: Normal breath sounds. No decreased breath sounds, wheezing, rhonchi or rales.   Chest:      Chest wall: No tenderness.   Abdominal:      General: Bowel sounds are normal.      Palpations: Abdomen is soft.      Tenderness: There is no abdominal tenderness.   Musculoskeletal:      Left shoulder: Tenderness and crepitus present. No bony tenderness. Decreased range of motion.      Cervical back: Normal range of motion and neck supple.      Comments: Decrease ABDuction   Skin:     General: Skin is warm and dry.   Neurological:      Mental Status: She is alert and oriented to person, place, and time.    Psychiatric:         Behavior: Behavior normal.         Assessment & Plan   Problems Addressed this Visit          Cardiac and Vasculature    Hyperlipidemia (Chronic)    Relevant Medications    rosuvastatin (CRESTOR) 20 MG tablet    Other Relevant Orders    Comprehensive Metabolic Panel    Lipid Panel       Musculoskeletal and Injuries    Osteopenia    Relevant Orders    Ambulatory Referral to Nephrology (Completed)     Other Visit Diagnoses       Shortness of breath    -  Primary    Relevant Orders    Complete PFT - Pre & Post Bronchodilator    Alpha - 1 - Antitrypsin    Abnormal chest x-ray        Relevant Orders    Complete PFT - Pre & Post Bronchodilator    Alpha - 1 - Antitrypsin    Chronic left shoulder pain        Relevant Orders    XR Shoulder 2+ View Left    Essential hypertension  (Chronic)       Relevant Medications    metoprolol succinate XL (TOPROL-XL) 25 MG 24 hr tablet    Other Relevant Orders    Comprehensive Metabolic Panel    Lipid Panel    Urine frequency        Relevant Orders    POC Urinalysis Dipstick, Automated    Abnormal urinalysis        Relevant Orders    Urine Culture - , Urine, Clean Catch          Diagnoses         Codes Comments    Shortness of breath    -  Primary ICD-10-CM: R06.02  ICD-9-CM: 786.05     Abnormal chest x-ray     ICD-10-CM: R93.89  ICD-9-CM: 793.2     Osteopenia of multiple sites     ICD-10-CM: M85.89  ICD-9-CM: 733.90     Chronic left shoulder pain     ICD-10-CM: M25.512, G89.29  ICD-9-CM: 719.41, 338.29     Pure hypercholesterolemia     ICD-10-CM: E78.00  ICD-9-CM: 272.0     Essential hypertension     ICD-10-CM: I10  ICD-9-CM: 401.9     Urine frequency     ICD-10-CM: R35.0  ICD-9-CM: 788.41     Abnormal urinalysis     ICD-10-CM: R82.90  ICD-9-CM: 791.9           Handicap parking permit.    Discussed antibiotics depending on urine culture report.           Current Outpatient Medications:     acetaminophen (TYLENOL) 650 MG 8 hr tablet, Take 2 tablets by mouth 2 (two)  times a day., Disp: , Rfl:     aspirin 81 MG EC tablet, Take 1 tablet by mouth Every Night., Disp: , Rfl:     BIOTIN 5000 PO, Take 5,000 mcg by mouth Daily., Disp: , Rfl:     Cholecalciferol (Vitamin D3) 50 MCG (2000 UT) capsule, Take 1 capsule by mouth Daily., Disp: , Rfl:     coenzyme Q10 100 MG capsule, Take 1 capsule by mouth Daily., Disp: , Rfl:     docusate sodium (Stool Softener) 100 MG capsule, Take 1 capsule by mouth 2 Times a Day. (Patient taking differently: Take 1 capsule by mouth 2 (Two) Times a Day As Needed for Constipation.), Disp: 30 capsule, Rfl: 1    famotidine (PEPCID) 40 MG tablet, Take 1 tablet by mouth two times daily, Disp: 180 tablet, Rfl: 3    latanoprost (XALATAN) 0.005 % ophthalmic solution, 1 drop Every Night., Disp: , Rfl:     metoprolol succinate XL (TOPROL-XL) 25 MG 24 hr tablet, Take 1 tablet by mouth Every Morning., Disp: 90 tablet, Rfl: 1    multivitamin with minerals tablet tablet, Take 1 tablet by mouth Daily., Disp: , Rfl:     naloxone (NARCAN) 4 MG/0.1ML nasal spray, Call 911. Don't prime. Greenback in 1 nostril for overdose. Repeat in 2-3 minutes in other nostril if no or minimal breathing/responsiveness., Disp: 2 each, Rfl: 0    nitroglycerin (NITROSTAT) 0.4 MG SL tablet, Place 1 tablet under the tongue Every 5 (Five) Minutes As Needed for Chest Pain., Disp: 75 tablet, Rfl: 3    rosuvastatin (CRESTOR) 20 MG tablet, Take 1 tablet by mouth every night at bedtime., Disp: 90 tablet, Rfl: 0    Yuvafem 10 MCG tablet vaginal tablet, INSERT 1 TABLET VAGINALLY TWICE A WEEK, Disp: 24 tablet, Rfl: 0    Return in about 6 months (around 9/21/2024), or if symptoms worsen or fail to improve, for Recheck, Medicare Wellness.    Recent Results (from the past 168 hour(s))   POC Urinalysis Dipstick, Automated    Collection Time: 03/21/24 12:19 PM    Specimen: Urine   Result Value Ref Range    Color Yellow Yellow, Straw, Dark Yellow, Berna    Clarity, UA Cloudy (A) Clear    Specific Gravity  1.015  1.005 - 1.030    pH, Urine 5.5 5.0 - 8.0    Leukocytes Moderate (2+) (A) Negative    Nitrite, UA Positive (A) Negative    Protein, POC Negative Negative mg/dL    Glucose, UA Negative Negative mg/dL    Ketones, UA Negative Negative    Urobilinogen, UA Normal Normal, 0.2 E.U./dL    Bilirubin Negative Negative    Blood, UA 2+ (A) Negative    Lot Number [id number]     Expiration Date 1/14/2025        Answers submitted by the patient for this visit:  Other (Submitted on 3/16/2024)  Please describe your symptoms.: This is a follow up visit following some testing for lungs after shortness of breath and regular Bone Density testing.  Have you had these symptoms before?: Yes  How long have you been having these symptoms?: Greater than 2 weeks  Please describe any probable cause for these symptoms. : New diagnosis of Empysema  Primary Reason for Visit (Submitted on 3/16/2024)  What is the primary reason for your visit?: Other

## 2024-03-22 LAB — ALPHA1 GLOB MFR UR ELPH: 155 MG/DL (ref 90–200)

## 2024-03-23 ENCOUNTER — TELEPHONE (OUTPATIENT)
Dept: INTERNAL MEDICINE | Facility: CLINIC | Age: 84
End: 2024-03-23
Payer: MEDICARE

## 2024-03-23 DIAGNOSIS — N30.90 CYSTITIS: Primary | ICD-10-CM

## 2024-03-23 LAB — BACTERIA SPEC AEROBE CULT: ABNORMAL

## 2024-03-23 RX ORDER — AMOXICILLIN AND CLAVULANATE POTASSIUM 875; 125 MG/1; MG/1
1 TABLET, FILM COATED ORAL 2 TIMES DAILY
Qty: 20 TABLET | Refills: 0 | Status: SHIPPED | OUTPATIENT
Start: 2024-03-23

## 2024-03-25 ENCOUNTER — HOSPITAL ENCOUNTER (OUTPATIENT)
Dept: GENERAL RADIOLOGY | Facility: HOSPITAL | Age: 84
Discharge: HOME | End: 2024-03-25
Admitting: FAMILY MEDICINE
Payer: MEDICARE

## 2024-03-25 DIAGNOSIS — G89.29 CHRONIC LEFT SHOULDER PAIN: ICD-10-CM

## 2024-03-25 DIAGNOSIS — M25.512 CHRONIC LEFT SHOULDER PAIN: ICD-10-CM

## 2024-03-25 PROCEDURE — 73030 X-RAY EXAM OF SHOULDER: CPT

## 2024-04-02 ENCOUNTER — HOSPITAL ENCOUNTER (OUTPATIENT)
Dept: PULMONOLOGY | Facility: HOSPITAL | Age: 84
Discharge: HOME | End: 2024-04-02
Admitting: FAMILY MEDICINE
Payer: MEDICARE

## 2024-04-02 DIAGNOSIS — R93.89 ABNORMAL CHEST X-RAY: ICD-10-CM

## 2024-04-02 DIAGNOSIS — R06.02 SHORTNESS OF BREATH: ICD-10-CM

## 2024-04-02 PROCEDURE — 94729 DIFFUSING CAPACITY: CPT

## 2024-04-02 PROCEDURE — 94726 PLETHYSMOGRAPHY LUNG VOLUMES: CPT

## 2024-04-02 PROCEDURE — 94010 BREATHING CAPACITY TEST: CPT

## 2024-04-12 ENCOUNTER — HOSPITAL ENCOUNTER (OUTPATIENT)
Dept: MAMMOGRAPHY | Facility: HOSPITAL | Age: 84
Discharge: HOME | End: 2024-04-12
Admitting: FAMILY MEDICINE
Payer: MEDICARE

## 2024-04-12 DIAGNOSIS — Z12.31 SCREENING MAMMOGRAM FOR BREAST CANCER: ICD-10-CM

## 2024-04-12 PROCEDURE — 77063 BREAST TOMOSYNTHESIS BI: CPT

## 2024-04-12 PROCEDURE — 77067 SCR MAMMO BI INCL CAD: CPT

## 2024-05-02 ENCOUNTER — TELEPHONE (OUTPATIENT)
Dept: INTERNAL MEDICINE | Facility: CLINIC | Age: 84
End: 2024-05-02
Payer: MEDICARE

## 2024-05-02 NOTE — TELEPHONE ENCOUNTER
Patient had pulmonary testing and was seen by someone for her bone density.  She would like results on both of these issues.

## 2024-05-02 NOTE — TELEPHONE ENCOUNTER
Pulmonary testing looks good.  No obstruction or restriction.    Bone density shows osteopenia decreased bone mass.  This was done in October.  Letter was sent in October as well as staff: Your to see if she is interested in starting medication as she has increased risk of fracture.  See letter from October

## 2024-05-02 NOTE — TELEPHONE ENCOUNTER
"Caller: Chelsi Ferguson \"Chelsi Milian\"    Relationship: Self    Best call back number: [phone]     Caller requesting test results: PATIENT    What test was performed: PULMONARY TEST    When was the test performed: 04/02/24     Where was the test performed: [facility]    Additional notes: THE PATIENT STATED THAT SHE WOULD ALSO LIKE TO DISCUSS HER BONE DENSITY TEST.      "

## 2024-05-02 NOTE — TELEPHONE ENCOUNTER
For the bone density you had referred her to someone at . She could not recall who did the testing.  Did you receive any additional testing.  I asked her about the letter and she received it last year.

## 2024-05-03 NOTE — TELEPHONE ENCOUNTER
Patient has been notified of the pulmonary results.  I advised patient to call the nephrologist at  for any other test results done by them.  Patient verbalized understanding.

## 2024-06-02 DIAGNOSIS — E78.00 PURE HYPERCHOLESTEROLEMIA: Chronic | ICD-10-CM

## 2024-06-03 RX ORDER — ROSUVASTATIN CALCIUM 20 MG/1
20 TABLET, COATED ORAL
Qty: 90 TABLET | Refills: 0 | Status: SHIPPED | OUTPATIENT
Start: 2024-06-03

## 2024-06-10 ENCOUNTER — OFFICE VISIT (OUTPATIENT)
Dept: INTERNAL MEDICINE | Facility: CLINIC | Age: 84
End: 2024-06-10
Payer: MEDICARE

## 2024-06-10 VITALS
TEMPERATURE: 97.7 F | DIASTOLIC BLOOD PRESSURE: 74 MMHG | OXYGEN SATURATION: 96 % | SYSTOLIC BLOOD PRESSURE: 128 MMHG | HEART RATE: 65 BPM | BODY MASS INDEX: 25.91 KG/M2 | WEIGHT: 132 LBS | HEIGHT: 60 IN

## 2024-06-10 DIAGNOSIS — M79.671 FOOT PAIN, RIGHT: Primary | ICD-10-CM

## 2024-06-10 DIAGNOSIS — M81.0 AGE-RELATED OSTEOPOROSIS WITHOUT CURRENT PATHOLOGICAL FRACTURE: ICD-10-CM

## 2024-06-10 DIAGNOSIS — H61.23 BILATERAL IMPACTED CERUMEN: ICD-10-CM

## 2024-06-10 PROCEDURE — 99214 OFFICE O/P EST MOD 30 MIN: CPT | Performed by: FAMILY MEDICINE

## 2024-06-10 PROCEDURE — 1126F AMNT PAIN NOTED NONE PRSNT: CPT | Performed by: FAMILY MEDICINE

## 2024-06-10 PROCEDURE — 3074F SYST BP LT 130 MM HG: CPT | Performed by: FAMILY MEDICINE

## 2024-06-10 PROCEDURE — 3078F DIAST BP <80 MM HG: CPT | Performed by: FAMILY MEDICINE

## 2024-06-10 PROCEDURE — 69209 REMOVE IMPACTED EAR WAX UNI: CPT | Performed by: FAMILY MEDICINE

## 2024-06-10 PROCEDURE — 1159F MED LIST DOCD IN RCRD: CPT | Performed by: FAMILY MEDICINE

## 2024-06-10 PROCEDURE — 1160F RVW MEDS BY RX/DR IN RCRD: CPT | Performed by: FAMILY MEDICINE

## 2024-06-10 NOTE — PROGRESS NOTES
"Subjective   Chelsi Ferguson is a 83 y.o. female.     Chief Complaint   Patient presents with    Osteoporosis     Discuss Prolia.  Discuss current med list        Visit Vitals  /74 (BP Location: Left arm, Patient Position: Sitting, Cuff Size: Adult)   Pulse 65   Temp 97.7 °F (36.5 °C)   Ht 151.9 cm (59.8\")   Wt 59.9 kg (132 lb)   LMP  (LMP Unknown) Comment: Last Mammogram   SpO2 96%   BMI 25.95 kg/m²         Osteoporosis  This is a new problem. The current episode started more than 1 month ago. The problem occurs constantly. The problem has been unchanged. Associated symptoms include arthralgias and a visual change (pt has eye appt, still has double vision). Pertinent negatives include no abdominal pain, anorexia, change in bowel habit, chest pain, chills, congestion, coughing, diaphoresis, fatigue, fever, headaches, joint swelling, myalgias, nausea, neck pain, numbness, rash, sore throat, swollen glands, urinary symptoms, vertigo, vomiting or weakness. Nothing aggravates the symptoms. Treatments tried: calcium and vitamin D. The treatment provided mild relief.      Pt is going to  for Prolia.  I discussed the amount of calcium and vitamin D that she should be taking which should be sufficient, with 2 Tums ultra and 2000 units of vitamin D per day as well as what ever is in her diet.    Pt asked to see Podiatry. Pt has had hammkimberlye operated on, right foot and is having pain in her toes when she walks and bunion hurts.   Pt has been going to PT. Pt states that the PT is helping. Her PT mentioned that there is a new therapist with     Pt has bilateral ear wax that is not improving with ear drops. It is starting to affect her hearing.      The following portions of the patient's history were reviewed and updated as appropriate: allergies, current medications, past family history, past medical history, past social history, past surgical history, and problem list.    Past Medical History:   Diagnosis Date    Ankle " pain     Arthritis     Cancer     Skin    Cataract     removed    Chest discomfort     Cholecystitis 01/15/2018    Clotting disorder     may be result of aspirin    COPD (chronic obstructive pulmonary disease) recent diagnoses after xray    Coronary artery disease     Diverticulosis     Double vision 04/25/2022    Uses glasses with prism    Edema     Elevated cholesterol     Elevated liver enzymes 01/15/2018    Gallstones     GERD (gastroesophageal reflux disease)     Glaucoma     pt is currently off of meds and Dr Tanner does not think that she is glaucoma    H/O complete eye exam 06/2013    H/O mammogram 11/16/2015    H/O non-ST elevation myocardial infarction (NSTEMI) 01/2005    Hammer toe     Heart attack 01/2005    History of blood transfusion 01/2005    History of cardiovascular stress test 12/02/2015    normal    HTN (hypertension)     Hyperlipidemia     Hypokalemia 01/15/2018    Kidney infection 1971    Low back pain     Menopausal symptoms     Nausea with vomiting 12/20/2016    Onychia and paronychia of finger     Osteopenia     Other elevated white blood cell count     Pap smear for cervical cancer screening 2010    Leandro    Pneumonia     Rheumatoid factor positive 12/05/2022    Sepsis 01/15/2018    Tremor     essential tremor    UTI (urinary tract infection) 01/15/2018    Viral warts     Visual impairment 2019    Wears glasses       Past Surgical History:   Procedure Laterality Date    ADENOIDECTOMY  1952    BLADDER REPAIR  2002    BLADDER REPAIR  2003    BUNIONECTOMY Right 2010    CARDIAC CATHETERIZATION  stent placed 2005    CHOLECYSTECTOMY      CHOLECYSTECTOMY WITH INTRAOPERATIVE CHOLANGIOGRAM N/A 03/07/2018    Procedure: CHOLECYSTECTOMY LAPAROSCOPIC INTRAOPERATIVE CHOLANGIOGRAM;  Surgeon: Harinder Mason MD;  Location: [facility];  Service:     COLONOSCOPY  2008, 4/2014    Juany    CORONARY STENT PLACEMENT Left 01/2005    drug eluting stent 1/2005 LAD    DILATATION AND CURETTAGE  1971    DILATATION  AND CURETTAGE      EYE CAPSULOTOMY WITH LASER Bilateral 2018    EYE SURGERY Bilateral 2016    Cataract    FRONTALIS SUSPENSION  2010    HAMMER TOE REPAIR Right 2010    HEAD & NECK SKIN LESION EXCISIONAL BIOPSY  2018    benign scalp cyst    HYSTERECTOMY  1978    JOINT REPLACEMENT  2005    LAPAROTOMY OOPHERECTOMY Bilateral 2002    NOSE SURGERY  2017    repair 3 fractured areas. Dr Dunne    OTHER SURGICAL HISTORY      rectocele repair    OTHER SURGICAL HISTORY  2010    eyelid surgery    REPLACEMENT TOTAL KNEE BILATERAL Bilateral     SKIN BIOPSY      SUBTOTAL HYSTERECTOMY  1978    TONSILLECTOMY  89    TOTAL KNEE ARTHROPLASTY  2005    UMBILICAL HERNIA REPAIR  2019    UMBILICAL HERNIA REPAIR N/A 2023    Procedure: UMBILICAL HERNIA REPAIR WITH MESH LAPAROSCOPIC;  Surgeon: Harinder Mason MD;  Location: [facility];  Service: General;  Laterality: N/A;    UPPER GASTROINTESTINAL ENDOSCOPY  2022    Dr Frank    UTERINE FIBROID SURGERY      emergency removal of fibroid from birth canal      Family History   Problem Relation Age of Onset    Stroke Mother     Hypertension Mother         My Mother  of brain hemmorage/stroke    Heart failure Father         congestive    Cancer Father         Neck glands removed    Vision loss Father         Had operation    Heart disease Father          of Congestive Heart Failure at 91    Breast cancer Sister         60's    Cancer Sister         Breast    Breast cancer Daughter 40    Cancer Daughter         Breast    Thyroid disease Daughter         on meds    Breast cancer Other         NIECE 60's    Breast cancer Other         NIECE 60's    Vision loss Paternal Aunt         blind-glaucoma    Vision loss Paternal Uncle         blind-glaucoma    Vision loss Maternal Aunt         all 9 aunts paternal aunts/Uncles/ cousins/ sister/brother/ and niece on meds for Glaucoma or had operation    Early death Daughter         Celiac Sprue reaction     Ovarian cancer Neg Hx       Social History     Socioeconomic History    Marital status:    Tobacco Use    Smoking status: Never     Passive exposure: Past    Smokeless tobacco: Never    Tobacco comments:     My father, brother in law, friends, and  smoked so had second hand exposure   Vaping Use    Vaping status: Never Used    Passive vaping exposure: Yes   Substance and Sexual Activity    Alcohol use: No    Drug use: No    Sexual activity: Not Currently     Partners: Male     Birth control/protection: Hysterectomy      Allergies   Allergen Reactions    Dexlansoprazole Hives and Rash    Indocin [Indomethacin] Hallucinations    Statins Hives    Keflex [Cephalexin] GI Intolerance     Upset stomach, may have had some blood in stool but cannot remember the reason why she was put on it. Tolerates penicillins without problem.    Adhesive Tape Other (See Comments)     Skin irritation with bandaids, surgical bandages that stay on skin for extended periods of time    Aleve [Naproxen] Hives    Celecoxib Diarrhea    Ibuprofen GI Intolerance     Heartburn.    Lipitor [Atorvastatin] Hives    Prevacid [Lansoprazole] Nausea And Vomiting    Prilosec [Omeprazole] Nausea And Vomiting    Zocor [Simvastatin] Hives       Review of Systems   Constitutional:  Negative for chills, diaphoresis, fatigue and fever.   HENT:  Negative for congestion and sore throat.    Respiratory:  Negative for cough.    Cardiovascular:  Negative for chest pain.   Gastrointestinal:  Negative for abdominal pain, anorexia, change in bowel habit, nausea and vomiting.   Musculoskeletal:  Positive for arthralgias. Negative for joint swelling, myalgias and neck pain.   Skin:  Negative for rash.   Neurological:  Negative for vertigo, weakness, numbness and headaches.       Objective   Physical Exam  Vitals and nursing note reviewed.   Constitutional:       Appearance: She is well-developed.   HENT:      Head: Normocephalic.      Right Ear: External ear  normal.      Left Ear: External ear normal.      Nose: Nose normal.   Eyes:      General: Lids are normal.      Conjunctiva/sclera: Conjunctivae normal.      Pupils: Pupils are equal, round, and reactive to light.   Neck:      Thyroid: No thyroid mass or thyromegaly.      Vascular: No carotid bruit.      Trachea: Trachea normal.   Cardiovascular:      Rate and Rhythm: Normal rate and regular rhythm.      Heart sounds: No murmur heard.  Pulmonary:      Effort: Pulmonary effort is normal. No respiratory distress.      Breath sounds: Normal breath sounds. No decreased breath sounds, wheezing, rhonchi or rales.   Chest:      Chest wall: No tenderness.   Abdominal:      General: Bowel sounds are normal.      Palpations: Abdomen is soft.      Tenderness: There is no abdominal tenderness.   Musculoskeletal:         General: Normal range of motion.      Cervical back: Normal range of motion and neck supple.   Skin:     General: Skin is warm and dry.   Neurological:      Mental Status: She is alert and oriented to person, place, and time.   Psychiatric:         Behavior: Behavior normal.         Assessment & Plan   Problems Addressed this Visit    None  Visit Diagnoses       Foot pain, right    -  Primary    Relevant Orders    Ambulatory Referral to Podiatry (Completed)    Age-related osteoporosis without current pathological fracture        Bilateral impacted cerumen              Diagnoses         Codes Comments    Foot pain, right    -  Primary ICD-10-CM: M79.671  ICD-9-CM: 729.5     Age-related osteoporosis without current pathological fracture     ICD-10-CM: M81.0  ICD-9-CM: 733.01     Bilateral impacted cerumen     ICD-10-CM: H61.23  ICD-9-CM: 380.4                        Current Outpatient Medications:     acetaminophen (TYLENOL) 650 MG 8 hr tablet, Take 2 tablets by mouth 2 (two) times a day., Disp: , Rfl:     aspirin 81 MG EC tablet, Take 1 tablet by mouth Every Night., Disp: , Rfl:     BIOTIN 5000 PO, Take 5,000 mcg  by mouth Daily., Disp: , Rfl:     coenzyme Q10 100 MG capsule, Take 1 capsule by mouth Daily., Disp: , Rfl:     docusate sodium (Stool Softener) 100 MG capsule, Take 1 capsule by mouth 2 Times a Day. (Patient taking differently: Take 1 capsule by mouth 2 (Two) Times a Day As Needed for Constipation.), Disp: 30 capsule, Rfl: 1    famotidine (PEPCID) 40 MG tablet, Take 1 tablet by mouth two times daily, Disp: 180 tablet, Rfl: 3    latanoprost (XALATAN) 0.005 % ophthalmic solution, 1 drop Every Night., Disp: , Rfl:     metoprolol succinate XL (TOPROL-XL) 25 MG 24 hr tablet, Take 1 tablet by mouth Every Morning., Disp: 90 tablet, Rfl: 1    multivitamin with minerals tablet tablet, Take 1 tablet by mouth Daily., Disp: , Rfl:     nitroglycerin (NITROSTAT) 0.4 MG SL tablet, Place 1 tablet under the tongue Every 5 (Five) Minutes As Needed for Chest Pain., Disp: 75 tablet, Rfl: 3    rosuvastatin (CRESTOR) 20 MG tablet, TAKE 1 TABLET BY MOUTH EVERY  NIGHT AT BEDTIME, Disp: 90 tablet, Rfl: 0    Yuvafem 10 MCG tablet vaginal tablet, INSERT 1 TABLET VAGINALLY TWICE A WEEK, Disp: 24 tablet, Rfl: 0    Cholecalciferol (Vitamin D3) 50 MCG (2000 UT) capsule, Take 1 capsule by mouth Daily. (Patient not taking: Reported on 6/10/2024), Disp: , Rfl:     Return in about 3 months (around 9/19/2024), or if symptoms worsen or fail to improve, for Next scheduled follow up, Recheck.    Ear Cerumen Removal    Date/Time: 6/10/2024 4:43 PM    Performed by: Reina Yarbrough MD  Authorized by: Reina Yarbrough MD    Anesthesia:  Local Anesthetic: none  Location details: left ear and right ear  Comments: Patient had tenderness with minimal irrigation to ears.  Patient to restart Debrox.  Will send her to ENT if not improving.  Procedure type: irrigation   Sedation:  Patient sedated: no

## 2024-06-25 DIAGNOSIS — N95.1 MENOPAUSAL SYMPTOM: ICD-10-CM

## 2024-06-25 RX ORDER — ESTRADIOL 10 UG/1
1 TABLET VAGINAL 2 TIMES WEEKLY
Qty: 24 TABLET | Refills: 0 | Status: SHIPPED | OUTPATIENT
Start: 2024-06-27

## 2024-08-31 DIAGNOSIS — E78.00 PURE HYPERCHOLESTEROLEMIA: Chronic | ICD-10-CM

## 2024-09-02 DIAGNOSIS — I10 ESSENTIAL HYPERTENSION: Chronic | ICD-10-CM

## 2024-09-03 RX ORDER — METOPROLOL SUCCINATE 25 MG/1
25 TABLET, EXTENDED RELEASE ORAL EVERY MORNING
Qty: 90 TABLET | Refills: 3 | Status: SHIPPED | OUTPATIENT
Start: 2024-09-03

## 2024-09-03 RX ORDER — ROSUVASTATIN CALCIUM 20 MG/1
20 TABLET, COATED ORAL
Qty: 90 TABLET | Refills: 0 | Status: SHIPPED | OUTPATIENT
Start: 2024-09-03

## 2024-09-03 NOTE — TELEPHONE ENCOUNTER
Rx Refill Note  Requested Prescriptions     Pending Prescriptions Disp Refills    metoprolol succinate XL (TOPROL-XL) 25 MG 24 hr tablet [Pharmacy Med Name: Metoprolol Succinate ER 25 MG Oral Tablet Extended Release 24 Hour] 90 tablet 3     Sig: TAKE 1 TABLET BY MOUTH EVERY  MORNING      Last office visit with prescribing clinician: 6/10/2024   Last telemedicine visit with prescribing clinician: Visit date not found   Next office visit with prescribing clinician: 9/19/2024       Gabby Mcdowell MA  09/03/24, 11:07 EDT

## 2024-09-03 NOTE — TELEPHONE ENCOUNTER
Rx Refill Note  Requested Prescriptions     Pending Prescriptions Disp Refills    rosuvastatin (CRESTOR) 20 MG tablet [Pharmacy Med Name: Rosuvastatin Calcium 20 MG Oral Tablet] 90 tablet 3     Sig: TAKE 1 TABLET BY MOUTH EVERY  NIGHT AT BEDTIME      Last office visit with prescribing clinician: 6/10/2024   Last telemedicine visit with prescribing clinician: Visit date not found   Next office visit with prescribing clinician: 9/2/2024       Gabby Mcdowell MA  09/03/24, 11:05 EDT

## 2024-09-15 DIAGNOSIS — N95.1 MENOPAUSAL SYMPTOM: ICD-10-CM

## 2024-09-16 RX ORDER — ESTRADIOL 10 UG/1
1 TABLET VAGINAL 2 TIMES WEEKLY
Qty: 24 TABLET | Refills: 0 | Status: SHIPPED | OUTPATIENT
Start: 2024-09-16

## 2024-09-19 ENCOUNTER — OFFICE VISIT (OUTPATIENT)
Dept: INTERNAL MEDICINE | Facility: CLINIC | Age: 84
End: 2024-09-19
Payer: MEDICARE

## 2024-09-19 VITALS
SYSTOLIC BLOOD PRESSURE: 126 MMHG | TEMPERATURE: 97.3 F | OXYGEN SATURATION: 97 % | HEIGHT: 60 IN | WEIGHT: 129 LBS | DIASTOLIC BLOOD PRESSURE: 72 MMHG | BODY MASS INDEX: 25.32 KG/M2 | HEART RATE: 77 BPM

## 2024-09-19 DIAGNOSIS — I25.10 CORONARY ARTERY DISEASE INVOLVING NATIVE CORONARY ARTERY OF NATIVE HEART WITHOUT ANGINA PECTORIS: ICD-10-CM

## 2024-09-19 DIAGNOSIS — I10 PRIMARY HYPERTENSION: Chronic | ICD-10-CM

## 2024-09-19 DIAGNOSIS — Z23 NEED FOR VACCINATION: ICD-10-CM

## 2024-09-19 DIAGNOSIS — Z00.00 MEDICARE ANNUAL WELLNESS VISIT, SUBSEQUENT: Primary | ICD-10-CM

## 2024-09-19 DIAGNOSIS — R82.90 ABNORMAL URINALYSIS: ICD-10-CM

## 2024-09-19 DIAGNOSIS — E78.00 PURE HYPERCHOLESTEROLEMIA: Chronic | ICD-10-CM

## 2024-09-19 DIAGNOSIS — R26.81 GAIT INSTABILITY: ICD-10-CM

## 2024-09-19 LAB
BILIRUB BLD-MCNC: NEGATIVE MG/DL
CLARITY, POC: CLEAR
COLOR UR: YELLOW
EXPIRATION DATE: ABNORMAL
GLUCOSE UR STRIP-MCNC: NEGATIVE MG/DL
KETONES UR QL: NEGATIVE
LEUKOCYTE EST, POC: ABNORMAL
Lab: ABNORMAL
NITRITE UR-MCNC: NEGATIVE MG/ML
PH UR: 6 [PH] (ref 5–8)
PROT UR STRIP-MCNC: NEGATIVE MG/DL
RBC # UR STRIP: ABNORMAL /UL
SP GR UR: 1.01 (ref 1–1.03)
UROBILINOGEN UR QL: NORMAL

## 2024-09-19 PROCEDURE — G0439 PPPS, SUBSEQ VISIT: HCPCS | Performed by: FAMILY MEDICINE

## 2024-09-19 PROCEDURE — 3074F SYST BP LT 130 MM HG: CPT | Performed by: FAMILY MEDICINE

## 2024-09-19 PROCEDURE — 91320 SARSCV2 VAC 30MCG TRS-SUC IM: CPT | Performed by: FAMILY MEDICINE

## 2024-09-19 PROCEDURE — 1160F RVW MEDS BY RX/DR IN RCRD: CPT | Performed by: FAMILY MEDICINE

## 2024-09-19 PROCEDURE — 81001 URINALYSIS AUTO W/SCOPE: CPT | Performed by: FAMILY MEDICINE

## 2024-09-19 PROCEDURE — 1159F MED LIST DOCD IN RCRD: CPT | Performed by: FAMILY MEDICINE

## 2024-09-19 PROCEDURE — 90480 ADMN SARSCOV2 VAC 1/ONLY CMP: CPT | Performed by: FAMILY MEDICINE

## 2024-09-19 PROCEDURE — 81003 URINALYSIS AUTO W/O SCOPE: CPT | Performed by: FAMILY MEDICINE

## 2024-09-19 PROCEDURE — 3078F DIAST BP <80 MM HG: CPT | Performed by: FAMILY MEDICINE

## 2024-09-19 PROCEDURE — 87086 URINE CULTURE/COLONY COUNT: CPT | Performed by: FAMILY MEDICINE

## 2024-09-19 PROCEDURE — 1125F AMNT PAIN NOTED PAIN PRSNT: CPT | Performed by: FAMILY MEDICINE

## 2024-09-19 PROCEDURE — 1170F FXNL STATUS ASSESSED: CPT | Performed by: FAMILY MEDICINE

## 2024-09-19 RX ORDER — CAMPHOR, MENTHOL, METHYL SALICYLATE 21.56; 41.73; 69.55 MG/1; MG/1; MG/1
PATCH PERCUTANEOUS; TOPICAL; TRANSDERMAL
COMMUNITY

## 2024-09-20 LAB
BACTERIA UR QL AUTO: ABNORMAL /HPF
BILIRUB UR QL STRIP: NEGATIVE
CLARITY UR: CLEAR
COLOR UR: YELLOW
GLUCOSE UR STRIP-MCNC: NEGATIVE MG/DL
HGB UR QL STRIP.AUTO: NEGATIVE
HOLD SPECIMEN: NORMAL
HYALINE CASTS UR QL AUTO: ABNORMAL /LPF
KETONES UR QL STRIP: NEGATIVE
LEUKOCYTE ESTERASE UR QL STRIP.AUTO: ABNORMAL
NITRITE UR QL STRIP: NEGATIVE
PH UR STRIP.AUTO: 6 [PH] (ref 5–8)
PROT UR QL STRIP: NEGATIVE
RBC # UR STRIP: ABNORMAL /HPF
REF LAB TEST METHOD: ABNORMAL
SP GR UR STRIP: 1.02 (ref 1–1.03)
SQUAMOUS #/AREA URNS HPF: ABNORMAL /HPF
UROBILINOGEN UR QL STRIP: ABNORMAL
WBC # UR STRIP: ABNORMAL /HPF

## 2024-09-21 LAB — BACTERIA SPEC AEROBE CULT: NORMAL

## 2024-10-04 ENCOUNTER — FLU SHOT (OUTPATIENT)
Dept: INTERNAL MEDICINE | Facility: CLINIC | Age: 84
End: 2024-10-04
Payer: MEDICARE

## 2024-10-04 DIAGNOSIS — Z23 IMMUNIZATION DUE: Primary | ICD-10-CM

## 2024-10-04 PROCEDURE — 85025 COMPLETE CBC W/AUTO DIFF WBC: CPT | Performed by: FAMILY MEDICINE

## 2024-10-04 PROCEDURE — 90662 IIV NO PRSV INCREASED AG IM: CPT | Performed by: FAMILY MEDICINE

## 2024-10-04 PROCEDURE — 80061 LIPID PANEL: CPT | Performed by: FAMILY MEDICINE

## 2024-10-04 PROCEDURE — 84443 ASSAY THYROID STIM HORMONE: CPT | Performed by: FAMILY MEDICINE

## 2024-10-04 PROCEDURE — G0008 ADMIN INFLUENZA VIRUS VAC: HCPCS | Performed by: FAMILY MEDICINE

## 2024-10-04 PROCEDURE — 80053 COMPREHEN METABOLIC PANEL: CPT | Performed by: FAMILY MEDICINE

## 2024-10-16 ENCOUNTER — TELEPHONE (OUTPATIENT)
Dept: INTERNAL MEDICINE | Facility: CLINIC | Age: 84
End: 2024-10-16
Payer: MEDICARE

## 2024-10-16 NOTE — TELEPHONE ENCOUNTER
"  Caller: Chelsi Ferguson \"Chelsi Milian\"    Relationship: Self    Best call back number: [phone]     What was the call regarding: PATIENT WOULD LIKE A CALL BACK ABOUT HER ABNORMAL TEST RESULTS.     Is it okay if the provider responds through MyChart: NO   "

## 2024-10-16 NOTE — TELEPHONE ENCOUNTER
Red cells are minimally increased in size.  This is not significant.  Sometimes this can occur with B vitamin deficiencies.

## 2024-10-17 NOTE — TELEPHONE ENCOUNTER
Called and left detailed message with office number to call back if she has any further questions or concerns

## 2024-10-21 RX ORDER — LATANOPROST 50 UG/ML
1 SOLUTION/ DROPS OPHTHALMIC NIGHTLY
Status: CANCELLED | OUTPATIENT
Start: 2024-10-21

## 2024-10-21 NOTE — TELEPHONE ENCOUNTER
Rx Refill Note  Requested Prescriptions     Pending Prescriptions Disp Refills    latanoprost (XALATAN) 0.005 % ophthalmic solution       Si drop Every Night.      Last office visit with prescribing clinician: 2024   Next office visit with prescribing clinician: 3/12/2025     Last prescribed by historical provider     Harriet Seals  10/21/24, 16:42 EDT

## 2024-10-21 NOTE — TELEPHONE ENCOUNTER
"Caller: Chelsi Ferguson \"Chelsi Milian\"    Relationship: Self    Best call back number: [phone]     Requested Prescriptions:   Requested Prescriptions     Pending Prescriptions Disp Refills    latanoprost (XALATAN) 0.005 % ophthalmic solution       Si drop Every Night.        Pharmacy where request should be sent: [facility]/PHARMACY #[id number] - [address] [phone] [facility] [phone]      Last office visit with prescribing clinician: 2024   Last telemedicine visit with prescribing clinician: Visit date not found   Next office visit with prescribing clinician: 3/12/2025     Additional details provided by patient: 3 MONTH SUPPLY PLEASE    Does the patient have less than a 3 day supply:  [x] Yes  [] No    Would you like a call back once the refill request has been completed: [] Yes [x] No    If the office needs to give you a call back, can they leave a voicemail: [] Yes [x] No    Melissa Parsons Rep   10/21/24 16:00 EDT   "

## 2024-12-04 DIAGNOSIS — I10 ESSENTIAL HYPERTENSION: Chronic | ICD-10-CM

## 2024-12-04 DIAGNOSIS — E78.00 PURE HYPERCHOLESTEROLEMIA: Chronic | ICD-10-CM

## 2024-12-04 RX ORDER — ROSUVASTATIN CALCIUM 20 MG/1
20 TABLET, COATED ORAL
Qty: 90 TABLET | Refills: 0 | Status: SHIPPED | OUTPATIENT
Start: 2024-12-04

## 2024-12-04 RX ORDER — FAMOTIDINE 40 MG/1
TABLET, FILM COATED ORAL
Qty: 180 TABLET | Refills: 3 | Status: SHIPPED | OUTPATIENT
Start: 2024-12-04

## 2024-12-04 RX ORDER — METOPROLOL SUCCINATE 25 MG/1
25 TABLET, EXTENDED RELEASE ORAL EVERY MORNING
Qty: 90 TABLET | Refills: 3 | Status: SHIPPED | OUTPATIENT
Start: 2024-12-04

## 2024-12-04 NOTE — TELEPHONE ENCOUNTER
"    Caller: Chelsi Ferguson \"Chelsi Milian\"    Relationship: Self    Best call back number: [phone]    Requested Prescriptions:   Requested Prescriptions     Pending Prescriptions Disp Refills    rosuvastatin (CRESTOR) 20 MG tablet 90 tablet 0     Sig: Take 1 tablet by mouth every night at bedtime.    famotidine (PEPCID) 40 MG tablet 180 tablet 3     Sig: Take 1 tablet by mouth two times daily    metoprolol succinate XL (TOPROL-XL) 25 MG 24 hr tablet 90 tablet 3     Sig: Take 1 tablet by mouth Every Morning.        Pharmacy where request should be sent: [facility] [address] [place] [address] [phone] [facility] [phone]      Last office visit with prescribing clinician: 9/19/2024   Last telemedicine visit with prescribing clinician: Visit date not found   Next office visit with prescribing clinician: 3/12/2025     Additional details provided by patient: THE PATIENT HAS A WEEK LEFT     Does the patient have less than a 3 day supply:  [] Yes  [x] No    Would you like a call back once the refill request has been completed: [] Yes [x] No    If the office needs to give you a call back, can they leave a voicemail: [] Yes [x] No    Melissa Caro Rep   12/04/24 11:50 EST         "

## 2024-12-04 NOTE — TELEPHONE ENCOUNTER
Rx Refill Note  Requested Prescriptions     Pending Prescriptions Disp Refills    rosuvastatin (CRESTOR) 20 MG tablet 90 tablet 0     Sig: Take 1 tablet by mouth every night at bedtime.    famotidine (PEPCID) 40 MG tablet 180 tablet 3     Sig: Take 1 tablet by mouth two times daily    metoprolol succinate XL (TOPROL-XL) 25 MG 24 hr tablet 90 tablet 3     Sig: Take 1 tablet by mouth Every Morning.      Last office visit with prescribing clinician: 9/19/2024   Last telemedicine visit with prescribing clinician: Visit date not found   Next office visit with prescribing clinician: 3/12/2025     Gabby Mcdowell MA  12/04/24, 12:04 EST

## 2024-12-30 DIAGNOSIS — N95.1 MENOPAUSAL SYMPTOM: ICD-10-CM

## 2024-12-30 RX ORDER — ESTRADIOL 10 UG/1
1 TABLET VAGINAL 2 TIMES WEEKLY
Qty: 24 TABLET | Refills: 0 | Status: SHIPPED | OUTPATIENT
Start: 2024-12-30

## 2024-12-30 NOTE — TELEPHONE ENCOUNTER
Rx Refill Note  Requested Prescriptions     Pending Prescriptions Disp Refills    Yuvafem 10 MCG tablet vaginal tablet [Pharmacy Med Name: YUVAFEM 10 MCG VAGINAL INSERT] 24 tablet 0     Sig: INSERT 1 TABLET VAGINALLY TWICE A WEEK      Last office visit with prescribing clinician: 9/19/2024     Next office visit with prescribing clinician: 3/12/2025       Harriet Seals  12/30/24, 09:22 EST

## 2025-01-15 ENCOUNTER — TELEPHONE (OUTPATIENT)
Dept: INTERNAL MEDICINE | Facility: CLINIC | Age: 85
End: 2025-01-15
Payer: MEDICARE

## 2025-01-15 NOTE — TELEPHONE ENCOUNTER
Called and lvm for patient to return call, office number given.     RELAY:   Dr. Yarbrough recommends until she is evaluated tomorrow and can get further instructions.   Clear liquids for 12 to 24 hours-, broth, Jell-O, clear juices.  Avoid milk products for a week.  Add starches such as rice, noodles, bananas, toast, potatoes, applesauce.  .  After the clear liquids.  Then gradually increase diet

## 2025-01-15 NOTE — TELEPHONE ENCOUNTER
Clear liquids for 12 to 24 hours-, broth, Jell-O, clear juices.  Avoid milk products for a week.  Add starches such as rice, noodles, bananas, toast, potatoes, applesauce.  .  After the clear liquids.  Then gradually increase diet

## 2025-01-15 NOTE — TELEPHONE ENCOUNTER
"    Name: Chelsi Ferguson \"Chelsi Milian\"    Relationship: Self        HUB PROVIDED THE RELAY MESSAGE FROM THE OFFICE   PATIENT VOICED UNDERSTANDING AND HAS NO FURTHER QUESTIONS AT THIS TIME    ADDITIONAL INFORMATION: PATIENT DID NOT WANT TO DO A VIRTUAL APPOINTMENT SHE STATED THAT SHE COULD NOT COME IN UNTIL FRIDAY SCHEDULED HER AN APPOINTMENT FOR 8:15 ON 01/17/2025    "

## 2025-01-15 NOTE — TELEPHONE ENCOUNTER
Patient informed of Dr. Yarbrough's recommendations. She verbalized understanding and had no further questions.

## 2025-01-15 NOTE — TELEPHONE ENCOUNTER
"  Caller: Chelsi Ferguson \"Chelsi Milian\"    Relationship: Self    Best call back number: [phone]     What is the best time to reach you: ANYTIME     Who are you requesting to speak with (clinical staff, provider,  specific staff member): CLINICAL STAFF     What was the call regarding: PATIENT THINKS THAT SHE HAS THE NOROVIRUS. SHE SAYS THAT HER SYMPTOMS STARTED 3 DAYS AFTER SINTIA AND INCLUDES COUGHING, FATIGUE. SHE SAYS THAT HER FAMILY HAS HAD IT BUT THEY ARE NOW OVER IT. SHE SAYS THAT SHE DOES NOT HAVE A WAY TO THE OFFICE AT THIS TIME, NOR DOES SHE WANT TO EXPOSE THIS TO ANYONE ELSE. SHE WOULD LIKE TO KNOW SOME ADVICE ON WHAT TO DO.     Is it okay if the provider responds through [facility]: YES    "

## 2025-01-17 ENCOUNTER — HOSPITAL ENCOUNTER (OUTPATIENT)
Dept: GENERAL RADIOLOGY | Facility: HOSPITAL | Age: 85
Discharge: HOME | End: 2025-01-17
Payer: MEDICARE

## 2025-01-17 ENCOUNTER — OFFICE VISIT (OUTPATIENT)
Dept: INTERNAL MEDICINE | Facility: CLINIC | Age: 85
End: 2025-01-17
Payer: MEDICARE

## 2025-01-17 VITALS
BODY MASS INDEX: 24.15 KG/M2 | DIASTOLIC BLOOD PRESSURE: 70 MMHG | WEIGHT: 123 LBS | OXYGEN SATURATION: 94 % | HEIGHT: 60 IN | HEART RATE: 99 BPM | TEMPERATURE: 97.3 F | SYSTOLIC BLOOD PRESSURE: 126 MMHG

## 2025-01-17 DIAGNOSIS — H61.23 BILATERAL IMPACTED CERUMEN: ICD-10-CM

## 2025-01-17 DIAGNOSIS — R09.81 NASAL CONGESTION: ICD-10-CM

## 2025-01-17 DIAGNOSIS — R09.89 RHONCHI AT LEFT LUNG BASE: ICD-10-CM

## 2025-01-17 DIAGNOSIS — R09.89 CHEST CONGESTION: ICD-10-CM

## 2025-01-17 DIAGNOSIS — R05.9 COUGH, UNSPECIFIED TYPE: Primary | ICD-10-CM

## 2025-01-17 DIAGNOSIS — R05.9 COUGH, UNSPECIFIED TYPE: ICD-10-CM

## 2025-01-17 LAB
EXPIRATION DATE: NORMAL
FLUAV AG UPPER RESP QL IA.RAPID: NOT DETECTED
FLUBV AG UPPER RESP QL IA.RAPID: NOT DETECTED
INTERNAL CONTROL: NORMAL
Lab: NORMAL
SARS-COV-2 AG UPPER RESP QL IA.RAPID: NOT DETECTED

## 2025-01-17 PROCEDURE — 71046 X-RAY EXAM CHEST 2 VIEWS: CPT

## 2025-01-17 RX ORDER — BENZONATATE 100 MG/1
100 CAPSULE ORAL 3 TIMES DAILY PRN
Qty: 30 CAPSULE | Refills: 0 | Status: SHIPPED | OUTPATIENT
Start: 2025-01-17

## 2025-01-17 RX ORDER — DOXYCYCLINE 100 MG/1
100 CAPSULE ORAL 2 TIMES DAILY
Qty: 20 CAPSULE | Refills: 0 | Status: SHIPPED | OUTPATIENT
Start: 2025-01-17

## 2025-01-17 NOTE — PROGRESS NOTES
"Subjective   Chelsi Ferguson is a 84 y.o. female.     Chief Complaint   Patient presents with    Cough     Cough with chest and nasal congestion.  3 weeks.  Was getting better than worse over last week. Has history of pneumonia.       Visit Vitals  /70 (BP Location: Left arm, Patient Position: Sitting, Cuff Size: Small Adult)   Pulse 99   Temp 97.3 °F (36.3 °C)   Ht 151.9 cm (59.8\")   Wt 55.8 kg (123 lb)   LMP  (LMP Unknown) Comment: Last Mammogram   SpO2 94%   BMI 24.18 kg/m²       Wt Readings from Last 3 Encounters:   01/17/25 55.8 kg (123 lb)   09/19/24 58.5 kg (129 lb)   06/10/24 59.9 kg (132 lb)         Cough  This is a new problem. The current episode started 1 to 4 weeks ago. The problem has been unchanged. The problem occurs constantly. The cough is Productive of yellow sputum. Associated symptoms include chest pain (with inspiration), chills (lastnight), nasal congestion, postnasal drip, rhinorrhea and shortness of breath. Pertinent negatives include no ear congestion, ear pain, fever, headaches, heartburn, hemoptysis, myalgias, rash, sore throat, sweats, weight loss or wheezing. Exacerbated by: talking. She has tried nothing for the symptoms. Her past medical history is significant for COPD.      Pt has been sick for 3 weeks and was getting better and then getting worse. 3 family members have been ill with this.  Pt has stopped driving.    Pt has impacted cerumen with decreased hearing and would like to see her ENT.     Pt here with son Mitesh who has her permission to be here.   The following portions of the patient's history were reviewed and updated as appropriate: allergies, current medications, past family history, past medical history, past social history, past surgical history, and problem list.    Past Medical History:   Diagnosis Date    Ankle pain     Arthritis     Cancer     Skin    Cataract     removed    Chest discomfort     Cholecystitis 01/15/2018    Clotting disorder     may be result " of aspirin    COPD (chronic obstructive pulmonary disease) recent diagnoses after xray    Coronary artery disease     Diverticulosis     Double vision 04/25/2022    Uses glasses with prism    Edema     Elevated cholesterol     Elevated liver enzymes 01/15/2018    Gallstones     GERD (gastroesophageal reflux disease)     Glaucoma     pt is currently off of meds and Dr Tanner does not think that she is glaucoma    H/O complete eye exam 06/2013    H/O mammogram 11/16/2015    H/O non-ST elevation myocardial infarction (NSTEMI) 01/2005    Hammer toe     Heart attack 01/2005    History of blood transfusion 01/2005    History of cardiovascular stress test 12/02/2015    normal    HTN (hypertension)     Hyperlipidemia     Hypokalemia 01/15/2018    Kidney infection 1971    Low back pain     Menopausal symptoms     Nausea with vomiting 12/20/2016    Onychia and paronychia of finger     Osteopenia     Other elevated white blood cell count     Pap smear for cervical cancer screening 2010    [facility]    Pneumonia     Rheumatoid factor positive 12/05/2022    Sepsis 01/15/2018    Tremor     essential tremor    UTI (urinary tract infection) 01/15/2018    Viral warts     Visual impairment 2019    Wears glasses       Past Surgical History:   Procedure Laterality Date    ADENOIDECTOMY  1952    BLADDER REPAIR  2002    BLADDER REPAIR  2003    BUNIONECTOMY Right 2010    CARDIAC CATHETERIZATION  stent placed 2005    CHOLECYSTECTOMY      CHOLECYSTECTOMY WITH INTRAOPERATIVE CHOLANGIOGRAM N/A 03/07/2018    Procedure: CHOLECYSTECTOMY LAPAROSCOPIC INTRAOPERATIVE CHOLANGIOGRAM;  Surgeon: Harinder Mason MD;  Location: [facility];  Service:     COLONOSCOPY  2008, 4/2014    [facility]    CORONARY STENT PLACEMENT Left 01/2005    drug eluting stent 1/2005 LAD    DILATATION AND CURETTAGE  1971    DILATATION AND CURETTAGE  1978    EYE CAPSULOTOMY WITH LASER Bilateral 2018    EYE SURGERY Bilateral 05/2016    Cataract    FRONTALIS SUSPENSION  2010    HAMMER  TOE REPAIR Right 2010    HEAD & NECK SKIN LESION EXCISIONAL BIOPSY  2018    benign scalp cyst    HYSTERECTOMY  1978    JOINT REPLACEMENT  2005    LAPAROTOMY OOPHERECTOMY Bilateral 2002    NOSE SURGERY  2017    repair 3 fractured areas. Dr Dunne    OTHER SURGICAL HISTORY      rectocele repair    OTHER SURGICAL HISTORY      eyelid surgery    REPLACEMENT TOTAL KNEE BILATERAL Bilateral     SKIN BIOPSY      SUBTOTAL HYSTERECTOMY  1978    TONSILLECTOMY  195    TOTAL KNEE ARTHROPLASTY  2005    UMBILICAL HERNIA REPAIR  2019    UMBILICAL HERNIA REPAIR N/A 2023    Procedure: UMBILICAL HERNIA REPAIR WITH MESH LAPAROSCOPIC;  Surgeon: Harinder Mason MD;  Location: [facility];  Service: General;  Laterality: N/A;    UPPER GASTROINTESTINAL ENDOSCOPY  2022    Dr Frank    UTERINE FIBROID SURGERY      emergency removal of fibroid from birth canal      Family History   Problem Relation Age of Onset    Stroke Mother     Hypertension Mother         My Mother  of brain hemmorage/stroke    Heart failure Father         congestive    Cancer Father         Neck glands removed    Vision loss Father         Had operation    Heart disease Father          of Congestive Heart Failure at 91    Breast cancer Sister         60's    Cancer Sister         Breast    Breast cancer Daughter 40    Cancer Daughter         Breast    Thyroid disease Daughter         on meds    Breast cancer Other         NIECE 60's    Breast cancer Other         NIECE 60's    Vision loss Paternal Aunt         blind-glaucoma    Vision loss Paternal Uncle         blind-glaucoma    Vision loss Maternal Aunt         all 9 aunts paternal aunts/Uncles/ cousins/ sister/brother/ and niece on meds for Glaucoma or had operation    Early death Daughter         Celiac Sprue reaction    Ovarian cancer Neg Hx       Social History     Socioeconomic History    Marital status:    Tobacco Use    Smoking status: Never     Passive  exposure: Past    Smokeless tobacco: Never    Tobacco comments:     My father, brother in law, friends, and  smoked so had second hand exposure   Vaping Use    Vaping status: Never Used    Passive vaping exposure: Yes   Substance and Sexual Activity    Alcohol use: No    Drug use: No    Sexual activity: Not Currently     Partners: Male     Birth control/protection: Hysterectomy      Allergies   Allergen Reactions    Dexlansoprazole Hives and Rash    Indocin [Indomethacin] Hallucinations    Statins Hives    Keflex [Cephalexin] GI Intolerance     Upset stomach, may have had some blood in stool but cannot remember the reason why she was put on it. Tolerates penicillins without problem.    Adhesive Tape Other (See Comments)     Skin irritation with bandaids, surgical bandages that stay on skin for extended periods of time    Aleve [Naproxen] Hives    Celecoxib Diarrhea    Ibuprofen GI Intolerance     Heartburn.    Lipitor [Atorvastatin] Hives    Prevacid [Lansoprazole] Nausea And Vomiting    Prilosec [Omeprazole] Nausea And Vomiting    Zocor [Simvastatin] Hives       Review of Systems   Constitutional:  Positive for chills (lastnight). Negative for fever and weight loss.   HENT:  Positive for postnasal drip and rhinorrhea. Negative for ear pain and sore throat.    Respiratory:  Positive for cough and shortness of breath. Negative for hemoptysis and wheezing.    Cardiovascular:  Positive for chest pain (with inspiration).   Gastrointestinal:  Negative for heartburn.   Musculoskeletal:  Negative for myalgias.   Skin:  Negative for rash.   Neurological:  Negative for headaches.       Objective   Physical Exam  Vitals and nursing note reviewed.   Constitutional:       Appearance: She is well-developed.      Comments: Using walker   HENT:      Head: Normocephalic.      Right Ear: External ear normal. There is impacted cerumen.      Left Ear: External ear normal. There is impacted cerumen.      Nose: Nose normal.    Eyes:      General: Lids are normal.      Conjunctiva/sclera: Conjunctivae normal.      Pupils: Pupils are equal, round, and reactive to light.   Neck:      Thyroid: No thyroid mass or thyromegaly.      Vascular: No carotid bruit.      Trachea: Trachea normal.   Cardiovascular:      Rate and Rhythm: Normal rate and regular rhythm.      Heart sounds: No murmur heard.  Pulmonary:      Effort: Pulmonary effort is normal. No respiratory distress.      Breath sounds: Examination of the left-lower field reveals rhonchi. Rhonchi present. No decreased breath sounds, wheezing or rales.   Chest:      Chest wall: No tenderness.   Abdominal:      General: Bowel sounds are normal.      Palpations: Abdomen is soft.      Tenderness: There is no abdominal tenderness.   Musculoskeletal:         General: Normal range of motion.      Cervical back: Normal range of motion and neck supple.   Skin:     General: Skin is warm and dry.   Neurological:      Mental Status: She is alert and oriented to person, place, and time.   Psychiatric:         Behavior: Behavior normal.         Assessment & Plan   Problems Addressed this Visit    None  Visit Diagnoses       Cough, unspecified type    -  Primary    Relevant Medications    doxycycline (MONODOX) 100 MG capsule    benzonatate (Tessalon Perles) 100 MG capsule    Other Relevant Orders    POCT SARS-CoV-2 Antigen BLANCA + Flu (Completed)    XR Chest PA & Lateral    Chest congestion        Relevant Medications    benzonatate (Tessalon Perles) 100 MG capsule    Other Relevant Orders    POCT SARS-CoV-2 Antigen BLANCA + Flu (Completed)    Nasal congestion        Relevant Orders    POCT SARS-CoV-2 Antigen BLANCA + Flu (Completed)    Rhonchi at left lung base        Relevant Orders    XR Chest PA & Lateral          Diagnoses         Codes Comments    Cough, unspecified type    -  Primary ICD-10-CM: R05.9  ICD-9-CM: 786.2     Chest congestion     ICD-10-CM: R09.89  ICD-9-CM: 786.9     Nasal congestion      ICD-10-CM: R09.81  ICD-9-CM: 478.19     Rhonchi at left lung base     ICD-10-CM: R09.89  ICD-9-CM: 786.7             F/u visit if not better in the next week           Current Outpatient Medications:     acetaminophen (TYLENOL) 650 MG 8 hr tablet, Take 2 tablets by mouth 2 (two) times a day., Disp: , Rfl:     aspirin 81 MG EC tablet, Take 1 tablet by mouth Every Night., Disp: , Rfl:     BIOTIN 5000 PO, Take 5,000 mcg by mouth Daily., Disp: , Rfl:     Camphor-Menthol-Methyl Sal (Salonpas) 3.1-6-10 % patch, Apply  topically., Disp: , Rfl:     Cholecalciferol (Vitamin D3) 50 MCG (2000 UT) capsule, Take 1 capsule by mouth Daily., Disp: , Rfl:     coenzyme Q10 100 MG capsule, Take 1 capsule by mouth Daily., Disp: , Rfl:     Denosumab (PROLIA SC), Inject  under the skin into the appropriate area as directed., Disp: , Rfl:     docusate sodium (Stool Softener) 100 MG capsule, Take 1 capsule by mouth 2 Times a Day. (Patient taking differently: Take 1 capsule by mouth 2 (Two) Times a Day As Needed for Constipation.), Disp: 30 capsule, Rfl: 1    famotidine (PEPCID) 40 MG tablet, Take 1 tablet by mouth two times daily, Disp: 180 tablet, Rfl: 3    latanoprost (XALATAN) 0.005 % ophthalmic solution, 1 drop Every Night., Disp: , Rfl:     metoprolol succinate XL (TOPROL-XL) 25 MG 24 hr tablet, Take 1 tablet by mouth Every Morning., Disp: 90 tablet, Rfl: 3    multivitamin with minerals tablet tablet, Take 1 tablet by mouth Daily., Disp: , Rfl:     nitroglycerin (NITROSTAT) 0.4 MG SL tablet, Place 1 tablet under the tongue Every 5 (Five) Minutes As Needed for Chest Pain., Disp: 75 tablet, Rfl: 3    rosuvastatin (CRESTOR) 20 MG tablet, Take 1 tablet by mouth every night at bedtime., Disp: 90 tablet, Rfl: 0    Yuvafem 10 MCG tablet vaginal tablet, INSERT 1 TABLET VAGINALLY TWICE A WEEK, Disp: 24 tablet, Rfl: 0    benzonatate (Tessalon Perles) 100 MG capsule, Take 1 capsule by mouth 3 (Three) Times a Day As Needed for Cough., Disp: 30  capsule, Rfl: 0    doxycycline (MONODOX) 100 MG capsule, Take 1 capsule by mouth 2 (Two) Times a Day., Disp: 20 capsule, Rfl: 0    Return in about 8 weeks (around 3/12/2025), or if symptoms worsen or fail to improve, for Recheck.    Recent Results (from the past week)   POCT SARS-CoV-2 Antigen BLANCA + Flu    Collection Time: 01/17/25  9:38 AM    Specimen: Swab   Result Value Ref Range    SARS Antigen Not Detected Not Detected, Presumptive Negative    Influenza A Antigen BLANCA Not Detected Not Detected    Influenza B Antigen BLANCA Not Detected Not Detected    Internal Control Passed Passed    Lot Number [id number]     Expiration Date 2/13/2026

## 2025-01-20 ENCOUNTER — TELEPHONE (OUTPATIENT)
Dept: INTERNAL MEDICINE | Facility: CLINIC | Age: 85
End: 2025-01-20
Payer: MEDICARE

## 2025-01-20 DIAGNOSIS — J18.9 PNEUMONIA OF RIGHT LOWER LOBE DUE TO INFECTIOUS ORGANISM: Primary | ICD-10-CM

## 2025-01-20 NOTE — TELEPHONE ENCOUNTER
HUB TO RELAY.  LEFT MESSAGE FOR PT TO CALL FOR RESULTS.  PLEASE GIVE PT RESULTS.    ----- Message from Reina Yarbrough sent at 1/20/2025 12:47 PM EST -----  Please call the patient regarding her abnormal result.  Your chest x-ray was consistent with pneumonia.  Please finish your antibiotics and recheck a chest x-ray.  Orders will be in the computer.  Please see us or ER sooner if you are not improving.

## 2025-01-20 NOTE — TELEPHONE ENCOUNTER
----- Message from Reina Yarbrough sent at 1/20/2025 12:47 PM EST -----  Please call the patient regarding her abnormal result.  Your chest x-ray was consistent with pneumonia.  Please finish your antibiotics and recheck a chest x-ray.  Orders will be in the computer.  Please see us or ER sooner if you are not improving.

## 2025-01-27 ENCOUNTER — HOSPITAL ENCOUNTER (OUTPATIENT)
Dept: GENERAL RADIOLOGY | Facility: HOSPITAL | Age: 85
Discharge: HOME | End: 2025-01-27
Admitting: FAMILY MEDICINE
Payer: MEDICARE

## 2025-01-27 DIAGNOSIS — J18.9 PNEUMONIA OF RIGHT LOWER LOBE DUE TO INFECTIOUS ORGANISM: ICD-10-CM

## 2025-01-27 PROCEDURE — 71046 X-RAY EXAM CHEST 2 VIEWS: CPT

## 2025-01-29 ENCOUNTER — TELEPHONE (OUTPATIENT)
Dept: INTERNAL MEDICINE | Facility: CLINIC | Age: 85
End: 2025-01-29
Payer: MEDICARE

## 2025-01-29 DIAGNOSIS — Z87.01 H/O: PNEUMONIA: Primary | ICD-10-CM

## 2025-01-29 DIAGNOSIS — R93.89 ABNORMAL CHEST XRAY: ICD-10-CM

## 2025-01-29 NOTE — TELEPHONE ENCOUNTER
----- Message from Reina Yarbrough sent at 1/29/2025  8:11 AM EST -----  Please call the patient regarding her abnormal result. Your chest x-ray shows improvement of the opacities (pneumonia).  There is still some faint opacity in the right mid lung field.  Please repeat a chest x-ray in about a month.

## 2025-01-29 NOTE — TELEPHONE ENCOUNTER
Name: FergusonDeena mckeon    Relationship: Emergency Contact    Best Callback Number: [phone]     HUB PROVIDED THE RELAY MESSAGE FROM THE OFFICE   PATIENT VOICED UNDERSTANDING AND HAS NO FURTHER QUESTIONS AT THIS TIME

## 2025-01-29 NOTE — TELEPHONE ENCOUNTER
HUB to relay:   Please call the patient regarding her abnormal result. Your chest x-ray shows improvement of the opacities (pneumonia).  There is still some faint opacity in the right mid lung field.  Please repeat a chest x-ray in about a month.     I spoke with patient and gave her the results.  She wanted Deena to have the results also.  I have left a message for Deena to call the office back.

## 2025-02-26 DIAGNOSIS — E78.00 PURE HYPERCHOLESTEROLEMIA: Chronic | ICD-10-CM

## 2025-02-26 RX ORDER — ROSUVASTATIN CALCIUM 20 MG/1
20 TABLET, COATED ORAL
Qty: 30 TABLET | Refills: 0 | Status: SHIPPED | OUTPATIENT
Start: 2025-02-26

## 2025-03-04 ENCOUNTER — OFFICE VISIT (OUTPATIENT)
Dept: GASTROENTEROLOGY | Facility: CLINIC | Age: 85
End: 2025-03-04
Payer: MEDICARE

## 2025-03-04 VITALS — WEIGHT: 125 LBS | HEIGHT: 60 IN | BODY MASS INDEX: 24.54 KG/M2

## 2025-03-04 DIAGNOSIS — K59.00 CONSTIPATION, UNSPECIFIED CONSTIPATION TYPE: ICD-10-CM

## 2025-03-04 DIAGNOSIS — K21.00 GASTROESOPHAGEAL REFLUX DISEASE WITH ESOPHAGITIS WITHOUT HEMORRHAGE: Primary | ICD-10-CM

## 2025-03-04 PROCEDURE — 99213 OFFICE O/P EST LOW 20 MIN: CPT | Performed by: INTERNAL MEDICINE

## 2025-03-04 PROCEDURE — 1160F RVW MEDS BY RX/DR IN RCRD: CPT | Performed by: INTERNAL MEDICINE

## 2025-03-04 PROCEDURE — 1159F MED LIST DOCD IN RCRD: CPT | Performed by: INTERNAL MEDICINE

## 2025-03-04 NOTE — LETTER
March 4, 2025     Reina MAI MD  [address]  [address]  [facility] [address]    Patient: Chelsi Ferguson   YOB: 1940   Date of Visit: 3/4/2025     Dear Reina MAI MD:       Thank you for referring Chelsi Ferguson to me for evaluation. Below are the relevant portions of my assessment and plan of care.    If you have questions, please do not hesitate to call me. I look forward to following Chelsi along with you.         Sincerely,        Jorge Frank MD        CC: No Recipients    Jorge Frank MD  03/04/25 1426  Sign when Signing Visit  PCP:  Reina Yarbrough MD     No referring provider defined for this encounter.    Chief Complaint   Patient presents with   • Follow-up     1yr follow up- reflux        HPI   Patient is an 84-year-old female known to me with history of gastroesophageal reflux.  She has been intolerant to proton pump inhibitors.  She is on Pepcid 40 mg twice a day and seems to be fairly well-controlled.  If she has breakthrough she takes some Tums.  In the past she has done some Gaviscon but has not taken that more recently.  She has no warning signs.  She has a slight increase in constipation and is wondering if she can take a stool softener.  She did have an umbilical hernia in the past and that is been repaired.    Allergies   Allergen Reactions   • Dexlansoprazole Hives and Rash   • Indocin [Indomethacin] Hallucinations   • Statins Hives   • Keflex [Cephalexin] GI Intolerance     Upset stomach, may have had some blood in stool but cannot remember the reason why she was put on it. Tolerates penicillins without problem.   • Adhesive Tape Other (See Comments)     Skin irritation with bandaids, surgical bandages that stay on skin for extended periods of time   • Aleve [Naproxen] Hives   • Celecoxib Diarrhea   • Ibuprofen GI Intolerance     Heartburn.   • Lipitor [Atorvastatin] Hives   • Prevacid [Lansoprazole] Nausea And Vomiting   • Prilosec [Omeprazole] Nausea  And Vomiting   • Zocor [Simvastatin] Hives          Current Outpatient Medications:   •  acetaminophen (TYLENOL) 650 MG 8 hr tablet, Take 2 tablets by mouth 2 (two) times a day., Disp: , Rfl:   •  aspirin 81 MG EC tablet, Take 1 tablet by mouth Every Night., Disp: , Rfl:   •  benzonatate (Tessalon Perles) 100 MG capsule, Take 1 capsule by mouth 3 (Three) Times a Day As Needed for Cough., Disp: 30 capsule, Rfl: 0  •  BIOTIN 5000 PO, Take 5,000 mcg by mouth Daily., Disp: , Rfl:   •  Camphor-Menthol-Methyl Sal (Salonpas) 3.1-6-10 % patch, Apply  topically., Disp: , Rfl:   •  Cholecalciferol (Vitamin D3) 50 MCG (2000 UT) capsule, Take 1 capsule by mouth Daily., Disp: , Rfl:   •  coenzyme Q10 100 MG capsule, Take 1 capsule by mouth Daily., Disp: , Rfl:   •  Denosumab (PROLIA SC), Inject  under the skin into the appropriate area as directed., Disp: , Rfl:   •  docusate sodium (Stool Softener) 100 MG capsule, Take 1 capsule by mouth 2 Times a Day. (Patient taking differently: Take 1 capsule by mouth 2 (Two) Times a Day As Needed for Constipation.), Disp: 30 capsule, Rfl: 1  •  doxycycline (MONODOX) 100 MG capsule, Take 1 capsule by mouth 2 (Two) Times a Day., Disp: 20 capsule, Rfl: 0  •  famotidine (PEPCID) 40 MG tablet, Take 1 tablet by mouth two times daily, Disp: 180 tablet, Rfl: 3  •  latanoprost (XALATAN) 0.005 % ophthalmic solution, 1 drop Every Night., Disp: , Rfl:   •  metoprolol succinate XL (TOPROL-XL) 25 MG 24 hr tablet, Take 1 tablet by mouth Every Morning., Disp: 90 tablet, Rfl: 3  •  multivitamin with minerals tablet tablet, Take 1 tablet by mouth Daily., Disp: , Rfl:   •  nitroglycerin (NITROSTAT) 0.4 MG SL tablet, Place 1 tablet under the tongue Every 5 (Five) Minutes As Needed for Chest Pain., Disp: 75 tablet, Rfl: 3  •  rosuvastatin (CRESTOR) 20 MG tablet, TAKE 1 TABLET BY MOUTH EVERY  NIGHT AT BEDTIME, Disp: 30 tablet, Rfl: 0  •  Yuvafem 10 MCG tablet vaginal tablet, INSERT 1 TABLET VAGINALLY TWICE A  WEEK, Disp: 24 tablet, Rfl: 0     Past Medical History:   Diagnosis Date   • Ankle pain    • Arthritis    • Cancer     Skin   • Cataract     removed   • Chest discomfort    • Cholecystitis 01/15/2018   • Clotting disorder     may be result of aspirin   • COPD (chronic obstructive pulmonary disease) recent diagnoses after xray   • Coronary artery disease    • Diverticulosis    • Double vision 04/25/2022    Uses glasses with prism   • Edema    • Elevated cholesterol    • Elevated liver enzymes 01/15/2018   • Gallstones    • GERD (gastroesophageal reflux disease)    • Glaucoma     pt is currently off of meds and Dr Tanner does not think that she is glaucoma   • H/O complete eye exam 06/2013   • H/O mammogram 11/16/2015   • H/O non-ST elevation myocardial infarction (NSTEMI) 01/2005   • Hammer toe    • Heart attack 01/2005   • History of blood transfusion 01/2005   • History of cardiovascular stress test 12/02/2015    normal   • HTN (hypertension)    • Hyperlipidemia    • Hypokalemia 01/15/2018   • Kidney infection 1971   • Low back pain    • Menopausal symptoms    • Nausea with vomiting 12/20/2016   • Onychia and paronychia of finger    • Osteopenia    • Other elevated white blood cell count    • Pap smear for cervical cancer screening 2010 [facility]   • Pneumonia    • Rheumatoid factor positive 12/05/2022   • Sepsis 01/15/2018   • Tremor     essential tremor   • UTI (urinary tract infection) 01/15/2018   • Viral warts    • Visual impairment 2019   • Wears glasses        Past Surgical History:   Procedure Laterality Date   • ADENOIDECTOMY  1952   • BLADDER REPAIR  2002   • BLADDER REPAIR  2003   • BUNIONECTOMY Right 2010   • CARDIAC CATHETERIZATION  stent placed 2005   • CHOLECYSTECTOMY     • CHOLECYSTECTOMY WITH INTRAOPERATIVE CHOLANGIOGRAM N/A 03/07/2018    Procedure: CHOLECYSTECTOMY LAPAROSCOPIC INTRAOPERATIVE CHOLANGIOGRAM;  Surgeon: Harinder Mason MD;  Location: [facility];  Service:    • COLONOSCOPY  2008, 4/2014     [facility]   • CORONARY STENT PLACEMENT Left 2005    drug eluting stent 2005 LAD   • DILATATION AND CURETTAGE     • DILATATION AND CURETTAGE     • EYE CAPSULOTOMY WITH LASER Bilateral    • EYE SURGERY Bilateral 2016    Cataract   • FRONTALIS SUSPENSION     • HAMMER TOE REPAIR Right 2010   • HEAD & NECK SKIN LESION EXCISIONAL BIOPSY  2018    benign scalp cyst   • HYSTERECTOMY     • JOINT REPLACEMENT     • LAPAROTOMY OOPHERECTOMY Bilateral    • NOSE SURGERY  2017    repair 3 fractured areas. Dr Dunne   • OTHER SURGICAL HISTORY      rectocele repair   • OTHER SURGICAL HISTORY      eyelid surgery   • REPLACEMENT TOTAL KNEE BILATERAL Bilateral    • SKIN BIOPSY     • SUBTOTAL HYSTERECTOMY     • TONSILLECTOMY     • TOTAL KNEE ARTHROPLASTY  2005   • UMBILICAL HERNIA REPAIR     • UMBILICAL HERNIA REPAIR N/A 2023    Procedure: UMBILICAL HERNIA REPAIR WITH MESH LAPAROSCOPIC;  Surgeon: Harinder Mason MD;  Location: [facility];  Service: General;  Laterality: N/A;   • UPPER GASTROINTESTINAL ENDOSCOPY  2022    Dr Frank   • UTERINE FIBROID SURGERY      emergency removal of fibroid from birth canal        Social History     Socioeconomic History   • Marital status:    Tobacco Use   • Smoking status: Never     Passive exposure: Past   • Smokeless tobacco: Never   • Tobacco comments:     My father, brother in law, friends, and  smoked so had second hand exposure   Vaping Use   • Vaping status: Never Used   • Passive vaping exposure: Yes   Substance and Sexual Activity   • Alcohol use: No   • Drug use: No   • Sexual activity: Not Currently     Partners: Male     Birth control/protection: Hysterectomy        Family History   Problem Relation Age of Onset   • Stroke Mother    • Hypertension Mother         My Mother  of brain hemmorage/stroke   • Heart failure Father         congestive   • Cancer Father         Neck glands  removed   • Vision loss Father         Had operation   • Heart disease Father          of Congestive Heart Failure at 91   • Breast cancer Sister         60's   • Cancer Sister         Breast   • Breast cancer Daughter 40   • Cancer Daughter         Breast   • Thyroid disease Daughter         on meds   • Breast cancer Other         NIECE 60's   • Breast cancer Other         NIECE 60's   • Vision loss Paternal Aunt         blind-glaucoma   • Vision loss Paternal Uncle         blind-glaucoma   • Vision loss Maternal Aunt         all 9 aunts paternal aunts/Uncles/ cousins/ sister/brother/ and niece on meds for Glaucoma or had operation   • Early death Daughter         Celiac Sprue reaction   • Ovarian cancer Neg Hx         Review of Systems     There were no vitals filed for this visit.     Physical Exam  Constitutional:       General: She is not in acute distress.     Appearance: Normal appearance. She is not ill-appearing.   Neurological:      Mental Status: She is alert.          Diagnoses and all orders for this visit:    1. Gastroesophageal reflux disease with esophagitis without hemorrhage (Primary): She seems to be doing fairly well.  We are going to keep her on the Pepcid 40 mg twice a day.  She can use some Gaviscon or if she can use Tums.  We talked about the safety profile of the H2 blockers.    2. Constipation, unspecified constipation type: We talked about the merits of a colonoscopy at her age.  She is not having that much trouble.  We talked about trying some Colace.  If that is ineffective she could use some MiraLAX.  If she has significant troubles she is going to call us and we will schedule a colonoscopy.  She would rather have a conservative approach at this point which I think is very reasonable.  She will call us with worsening.         Jorge Frank MD

## 2025-03-04 NOTE — PROGRESS NOTES
PCP:  Reina Yarbrough MD     No referring provider defined for this encounter.    Chief Complaint   Patient presents with    Follow-up     1yr follow up- reflux        HPI   Patient is an 84-year-old female known to me with history of gastroesophageal reflux.  She has been intolerant to proton pump inhibitors.  She is on Pepcid 40 mg twice a day and seems to be fairly well-controlled.  If she has breakthrough she takes some Tums.  In the past she has done some Gaviscon but has not taken that more recently.  She has no warning signs.  She has a slight increase in constipation and is wondering if she can take a stool softener.  She did have an umbilical hernia in the past and that is been repaired.    Allergies   Allergen Reactions    Dexlansoprazole Hives and Rash    Indocin [Indomethacin] Hallucinations    Statins Hives    Keflex [Cephalexin] GI Intolerance     Upset stomach, may have had some blood in stool but cannot remember the reason why she was put on it. Tolerates penicillins without problem.    Adhesive Tape Other (See Comments)     Skin irritation with bandaids, surgical bandages that stay on skin for extended periods of time    Aleve [Naproxen] Hives    Celecoxib Diarrhea    Ibuprofen GI Intolerance     Heartburn.    Lipitor [Atorvastatin] Hives    Prevacid [Lansoprazole] Nausea And Vomiting    Prilosec [Omeprazole] Nausea And Vomiting    Zocor [Simvastatin] Hives          Current Outpatient Medications:     acetaminophen (TYLENOL) 650 MG 8 hr tablet, Take 2 tablets by mouth 2 (two) times a day., Disp: , Rfl:     aspirin 81 MG EC tablet, Take 1 tablet by mouth Every Night., Disp: , Rfl:     benzonatate (Tessalon Perles) 100 MG capsule, Take 1 capsule by mouth 3 (Three) Times a Day As Needed for Cough., Disp: 30 capsule, Rfl: 0    BIOTIN 5000 PO, Take 5,000 mcg by mouth Daily., Disp: , Rfl:     Camphor-Menthol-Methyl Sal (Salonpas) 3.1-6-10 % patch, Apply  topically., Disp: , Rfl:     Cholecalciferol  (Vitamin D3) 50 MCG (2000 UT) capsule, Take 1 capsule by mouth Daily., Disp: , Rfl:     coenzyme Q10 100 MG capsule, Take 1 capsule by mouth Daily., Disp: , Rfl:     Denosumab (PROLIA SC), Inject  under the skin into the appropriate area as directed., Disp: , Rfl:     docusate sodium (Stool Softener) 100 MG capsule, Take 1 capsule by mouth 2 Times a Day. (Patient taking differently: Take 1 capsule by mouth 2 (Two) Times a Day As Needed for Constipation.), Disp: 30 capsule, Rfl: 1    doxycycline (MONODOX) 100 MG capsule, Take 1 capsule by mouth 2 (Two) Times a Day., Disp: 20 capsule, Rfl: 0    famotidine (PEPCID) 40 MG tablet, Take 1 tablet by mouth two times daily, Disp: 180 tablet, Rfl: 3    latanoprost (XALATAN) 0.005 % ophthalmic solution, 1 drop Every Night., Disp: , Rfl:     metoprolol succinate XL (TOPROL-XL) 25 MG 24 hr tablet, Take 1 tablet by mouth Every Morning., Disp: 90 tablet, Rfl: 3    multivitamin with minerals tablet tablet, Take 1 tablet by mouth Daily., Disp: , Rfl:     nitroglycerin (NITROSTAT) 0.4 MG SL tablet, Place 1 tablet under the tongue Every 5 (Five) Minutes As Needed for Chest Pain., Disp: 75 tablet, Rfl: 3    rosuvastatin (CRESTOR) 20 MG tablet, TAKE 1 TABLET BY MOUTH EVERY  NIGHT AT BEDTIME, Disp: 30 tablet, Rfl: 0    Yuvafem 10 MCG tablet vaginal tablet, INSERT 1 TABLET VAGINALLY TWICE A WEEK, Disp: 24 tablet, Rfl: 0     Past Medical History:   Diagnosis Date    Ankle pain     Arthritis     Cancer     Skin    Cataract     removed    Chest discomfort     Cholecystitis 01/15/2018    Clotting disorder     may be result of aspirin    COPD (chronic obstructive pulmonary disease) recent diagnoses after xray    Coronary artery disease     Diverticulosis     Double vision 04/25/2022    Uses glasses with prism    Edema     Elevated cholesterol     Elevated liver enzymes 01/15/2018    Gallstones     GERD (gastroesophageal reflux disease)     Glaucoma     pt is currently off of meds and Dr Tanner  does not think that she is glaucoma    H/O complete eye exam 06/2013    H/O mammogram 11/16/2015    H/O non-ST elevation myocardial infarction (NSTEMI) 01/2005    Hammer toe     Heart attack 01/2005    History of blood transfusion 01/2005    History of cardiovascular stress test 12/02/2015    normal    HTN (hypertension)     Hyperlipidemia     Hypokalemia 01/15/2018    Kidney infection 1971    Low back pain     Menopausal symptoms     Nausea with vomiting 12/20/2016    Onychia and paronychia of finger     Osteopenia     Other elevated white blood cell count     Pap smear for cervical cancer screening 2010    Le[facility]    Pneumonia     Rheumatoid factor positive 12/05/2022    Sepsis 01/15/2018    Tremor     essential tremor    UTI (urinary tract infection) 01/15/2018    Viral warts     Visual impairment 2019    Wears glasses        Past Surgical History:   Procedure Laterality Date    ADENOIDECTOMY  1952    BLADDER REPAIR  2002    BLADDER REPAIR  2003    BUNIONECTOMY Right 2010    CARDIAC CATHETERIZATION  stent placed 2005    CHOLECYSTECTOMY      CHOLECYSTECTOMY WITH INTRAOPERATIVE CHOLANGIOGRAM N/A 03/07/2018    Procedure: CHOLECYSTECTOMY LAPAROSCOPIC INTRAOPERATIVE CHOLANGIOGRAM;  Surgeon: Harinder Mason MD;  Location: [facility];  Service:     COLONOSCOPY  2008, 4/2014    [facility]    CORONARY STENT PLACEMENT Left 01/2005    drug eluting stent 1/2005 LAD    DILATATION AND CURETTAGE  1971    DILATATION AND CURETTAGE  1978    EYE CAPSULOTOMY WITH LASER Bilateral 2018    EYE SURGERY Bilateral 05/2016    Cataract    FRONTALIS SUSPENSION  2010    HAMMER TOE REPAIR Right 11/03/2010    HEAD & NECK SKIN LESION EXCISIONAL BIOPSY  08/20/2018    benign scalp cyst    HYSTERECTOMY  1978    JOINT REPLACEMENT  2005    LAPAROTOMY OOPHERECTOMY Bilateral 2002    NOSE SURGERY  03/2017    repair 3 fractured areas. Dr Dunne    OTHER SURGICAL HISTORY  2005    rectocele repair    OTHER SURGICAL HISTORY  2010    eyelid surgery     REPLACEMENT TOTAL KNEE BILATERAL Bilateral     SKIN BIOPSY      SUBTOTAL HYSTERECTOMY      TONSILLECTOMY      TOTAL KNEE ARTHROPLASTY  2005    UMBILICAL HERNIA REPAIR  2019    UMBILICAL HERNIA REPAIR N/A 2023    Procedure: UMBILICAL HERNIA REPAIR WITH MESH LAPAROSCOPIC;  Surgeon: Harinder Mason MD;  Location: [facility];  Service: General;  Laterality: N/A;    UPPER GASTROINTESTINAL ENDOSCOPY  2022    Dr Frank    UTERINE FIBROID SURGERY      emergency removal of fibroid from birth canal        Social History     Socioeconomic History    Marital status:    Tobacco Use    Smoking status: Never     Passive exposure: Past    Smokeless tobacco: Never    Tobacco comments:     My father, brother in law, friends, and  smoked so had second hand exposure   Vaping Use    Vaping status: Never Used    Passive vaping exposure: Yes   Substance and Sexual Activity    Alcohol use: No    Drug use: No    Sexual activity: Not Currently     Partners: Male     Birth control/protection: Hysterectomy        Family History   Problem Relation Age of Onset    Stroke Mother     Hypertension Mother         My Mother  of brain hemmorage/stroke    Heart failure Father         congestive    Cancer Father         Neck glands removed    Vision loss Father         Had operation    Heart disease Father          of Congestive Heart Failure at 91    Breast cancer Sister         60's    Cancer Sister         Breast    Breast cancer Daughter 40    Cancer Daughter         Breast    Thyroid disease Daughter         on meds    Breast cancer Other         NIECE 60's    Breast cancer Other         NIECE 60's    Vision loss Paternal Aunt         blind-glaucoma    Vision loss Paternal Uncle         blind-glaucoma    Vision loss Maternal Aunt         all 9 aunts paternal aunts/Uncles/ cousins/ sister/brother/ and niece on meds for Glaucoma or had operation    Early death Daughter         Celiac Sprue reaction     Ovarian cancer Neg Hx         Review of Systems     There were no vitals filed for this visit.     Physical Exam  Constitutional:       General: She is not in acute distress.     Appearance: Normal appearance. She is not ill-appearing.   Neurological:      Mental Status: She is alert.          Diagnoses and all orders for this visit:    1. Gastroesophageal reflux disease with esophagitis without hemorrhage (Primary): She seems to be doing fairly well.  We are going to keep her on the Pepcid 40 mg twice a day.  She can use some Gaviscon or if she can use Tums.  We talked about the safety profile of the H2 blockers.    2. Constipation, unspecified constipation type: We talked about the merits of a colonoscopy at her age.  She is not having that much trouble.  We talked about trying some Colace.  If that is ineffective she could use some MiraLAX.  If she has significant troubles she is going to call us and we will schedule a colonoscopy.  She would rather have a conservative approach at this point which I think is very reasonable.  She will call us with worsening.         Jorge Frank MD

## 2025-03-04 NOTE — PROGRESS NOTES
[facility] Cardiology    Patient ID: Chelsi Ferguson is a 84 y.o. female.  : 1940   Contact: [phone]    Encounter date: 2025    PCP: Reina Yarbrough MD      Chief complaint:   Chief Complaint   Patient presents with   • Coronary Artery Disease       Problem List:  Coronary artery disease  S/p NSTEMI with TATO (cypher sirolimus eluting stent) to LAD, 2005- data deficit.   Nuclear stress test 2015: no ischemia.  Nuclear stress test, 2020: No evidence of inducible ischemia by scintigraphic criteria. Low risk study.  Nuclear stress test, 2022; EF 71%. No evidence of ischemia. Low risk study.    Stress MPS, 2023: EF 75%. Aortic calcification. No evidence of ischemia.  Hyperlipidemia  Hypertension  GERD  Esophageal ulcerations by EGD  Osteoarthritis  Essential tremor    Allergies   Allergen Reactions   • Dexlansoprazole Hives and Rash   • Indocin [Indomethacin] Hallucinations   • Statins Hives   • Keflex [Cephalexin] GI Intolerance     Upset stomach, may have had some blood in stool but cannot remember the reason why she was put on it. Tolerates penicillins without problem.   • Adhesive Tape Other (See Comments)     Skin irritation with bandaids, surgical bandages that stay on skin for extended periods of time   • Aleve [Naproxen] Hives   • Celecoxib Diarrhea   • Ibuprofen GI Intolerance     Heartburn.   • Lipitor [Atorvastatin] Hives   • Prevacid [Lansoprazole] Nausea And Vomiting   • Prilosec [Omeprazole] Nausea And Vomiting   • Zocor [Simvastatin] Hives       Current Medications:    Current Outpatient Medications:   •  aspirin 81 MG EC tablet, Take 1 tablet by mouth Every Night., Disp: , Rfl:   •  BIOTIN 5000 PO, Take 5,000 mcg by mouth Daily., Disp: , Rfl:   •  Camphor-Menthol-Methyl Sal (Salonpas) 3.1-6-10 % patch, Apply  topically., Disp: , Rfl:   •  Cholecalciferol (Vitamin D3) 50 MCG (2000) capsule, Take 1 capsule by mouth Daily., Disp: ,  Rfl:   •  coenzyme Q10 100 MG capsule, Take 1 capsule by mouth Daily., Disp: , Rfl:   •  Denosumab (PROLIA SC), Inject  under the skin into the appropriate area as directed., Disp: , Rfl:   •  docusate sodium (Stool Softener) 100 MG capsule, Take 1 capsule by mouth 2 Times a Day. (Patient taking differently: Take 1 capsule by mouth 2 (Two) Times a Day As Needed for Constipation.), Disp: 30 capsule, Rfl: 1  •  famotidine (PEPCID) 40 MG tablet, Take 1 tablet by mouth two times daily, Disp: 180 tablet, Rfl: 3  •  latanoprost (XALATAN) 0.005 % ophthalmic solution, 1 drop Every Night., Disp: , Rfl:   •  metoprolol succinate XL (TOPROL-XL) 25 MG 24 hr tablet, Take 1 tablet by mouth Every Morning., Disp: 90 tablet, Rfl: 3  •  nitroglycerin (NITROSTAT) 0.4 MG SL tablet, Place 1 tablet under the tongue Every 5 (Five) Minutes As Needed for Chest Pain., Disp: 75 tablet, Rfl: 3  •  rosuvastatin (CRESTOR) 20 MG tablet, TAKE 1 TABLET BY MOUTH EVERY  NIGHT AT BEDTIME, Disp: 30 tablet, Rfl: 0  •  Yuvafem 10 MCG tablet vaginal tablet, INSERT 1 TABLET VAGINALLY TWICE A WEEK, Disp: 24 tablet, Rfl: 0  •  acetaminophen (TYLENOL) 650 MG 8 hr tablet, Take 2 tablets by mouth 2 (two) times a day. (Patient not taking: Reported on 3/5/2025), Disp: , Rfl:   •  benzonatate (Tessalon Perles) 100 MG capsule, Take 1 capsule by mouth 3 (Three) Times a Day As Needed for Cough. (Patient not taking: Reported on 3/5/2025), Disp: 30 capsule, Rfl: 0  •  doxycycline (MONODOX) 100 MG capsule, Take 1 capsule by mouth 2 (Two) Times a Day. (Patient not taking: Reported on 3/5/2025), Disp: 20 capsule, Rfl: 0  •  multivitamin with minerals tablet tablet, Take 1 tablet by mouth Daily. (Patient not taking: Reported on 3/5/2025), Disp: , Rfl:     HPI    Chelsi Ferguson is a 84 y.o. female who presents today for a follow up of coronary artery disease and cardiac risk factors. Since last visit, Daphney has been short of breath and has had some atypical chest pains.   "She had a respiratory virus in January and apparently had pneumonia shortly thereafter and was on doxycycline for a while.  She describes her chest pain as a scratching kind of feeling and lasting less than 5 minutes.  Nothing aggravates or relieves it.  It goes away on its own.  She had no symptoms prior to her stent placement.  She states that she had an MI while she was in the hospital after having both knees replaced.  She does note ongoing shortness of breath but believes that that is more related to her respiratory illness and her subsequent pneumonia.  We did review her labs and her LDL is 74.  She is willing to increase Crestor to 40 mg daily.      The following portions of the patient's history were reviewed and updated as appropriate: allergies, current medications and problem list.    Pertinent positives as listed in the HPI.  All other systems reviewed are negative.         Vitals:    03/05/25 1058   BP: 120/76   BP Location: Left arm   Patient Position: Sitting   Pulse: 65   SpO2: 97%   Weight: 55.3 kg (122 lb)   Height: 149.9 cm (59\")       Physical Exam:  General: Alert and oriented.  Neck: Jugular venous pressure is within normal limits. Carotids have normal upstrokes without bruits.   Cardiovascular: Heart has a nondisplaced focal PMI. Regular rate and rhythm. No murmur, gallop or rub.  Lungs: Clear, no rales or wheezes. Equal expansion is noted.   Extremities: Show no edema.  Skin: Warm and dry.  Neurologic: Nonfocal.     Diagnostic Data (reviewed with patient):  Lab Results   Component Value Date    GLUCOSE 91 10/04/2024    BUN 13 10/04/2024    CREATININE 0.70 10/04/2024    EGFR 84.9 10/04/2024    BCR 18.6 10/04/2024     10/04/2024    K 4.3 10/04/2024     10/04/2024    CO2 24.6 10/04/2024    CALCIUM 10.4 10/04/2024    ALBUMIN 4.3 10/04/2024    ALKPHOS 61 10/04/2024    AST 17 10/04/2024    ALT 7 10/04/2024     Lab Results   Component Value Date    CHOL 139 10/04/2024    TRIG 84 " 10/04/2024    HDL 49 10/04/2024    LDL 74 10/04/2024      Lab Results   Component Value Date    WBC 9.95 10/04/2024    RBC 4.09 10/04/2024    HGB 13.0 10/04/2024    HCT 40.1 10/04/2024    MCV 98.0 (H) 10/04/2024     10/04/2024      Lab Results   Component Value Date    TSH 1.190 10/04/2024        Advance Care Planning   ACP discussion was held with the patient during this visit. Patient has an advance directive in EMR which is still valid.          ECG 12 Lead    Date/Time: 3/5/2025 11:28 AM  Performed by: Oralia Greene MD    Authorized by: Oralia Greene MD  Comparison: compared with previous ECG from 6/7/2023  Similar to previous ECG  Rhythm: sinus rhythm  BPM: 65        Assessment:    ICD-10-CM ICD-9-CM   1. Coronary artery disease involving native coronary artery of native heart without angina pectoris  I25.10 414.01   2. Essential hypertension  I10 401.9   3. Pure hypercholesterolemia  E78.00 272.0         Plan:  Cardiolite Lexiscan to evaluate her chest pain and shortness of breath.  Continue on aspirin 81 mg nightly for antiplatelet therapy.   Continue on metoprolol 25 mg daily for rate control and hypertension.   Continue on nitroglycerin 0.4 mg PRN for chest pain.  Increase rosuvastatin to 40 mg daily for hypercholesterolemia with a goal LDL of less than 55.  FLP and LFTs 3 months  Continue all other current medications.  F/up in 12 months, sooner if needed.      Oralia Greene MD, FACC

## 2025-03-05 ENCOUNTER — HOSPITAL ENCOUNTER (OUTPATIENT)
Dept: GENERAL RADIOLOGY | Facility: HOSPITAL | Age: 85
Discharge: HOME | End: 2025-03-05
Admitting: FAMILY MEDICINE
Payer: MEDICARE

## 2025-03-05 ENCOUNTER — TELEPHONE (OUTPATIENT)
Dept: CARDIOLOGY | Facility: CLINIC | Age: 85
End: 2025-03-05

## 2025-03-05 ENCOUNTER — OFFICE VISIT (OUTPATIENT)
Dept: CARDIOLOGY | Facility: CLINIC | Age: 85
End: 2025-03-05
Payer: MEDICARE

## 2025-03-05 VITALS
SYSTOLIC BLOOD PRESSURE: 120 MMHG | WEIGHT: 122 LBS | HEIGHT: 59 IN | DIASTOLIC BLOOD PRESSURE: 76 MMHG | BODY MASS INDEX: 24.6 KG/M2 | HEART RATE: 65 BPM | OXYGEN SATURATION: 97 %

## 2025-03-05 DIAGNOSIS — Z91.89 CHRONIC CHEST PAIN WITH LOW TO MODERATE RISK FOR CAD: ICD-10-CM

## 2025-03-05 DIAGNOSIS — I25.10 CORONARY ARTERY DISEASE INVOLVING NATIVE CORONARY ARTERY OF NATIVE HEART WITHOUT ANGINA PECTORIS: Primary | ICD-10-CM

## 2025-03-05 DIAGNOSIS — R06.09 DYSPNEA ON EXERTION: ICD-10-CM

## 2025-03-05 DIAGNOSIS — R93.89 ABNORMAL CHEST XRAY: ICD-10-CM

## 2025-03-05 DIAGNOSIS — I10 ESSENTIAL HYPERTENSION: ICD-10-CM

## 2025-03-05 DIAGNOSIS — Z87.01 H/O: PNEUMONIA: ICD-10-CM

## 2025-03-05 DIAGNOSIS — R07.9 CHRONIC CHEST PAIN WITH LOW TO MODERATE RISK FOR CAD: ICD-10-CM

## 2025-03-05 DIAGNOSIS — G89.29 CHRONIC CHEST PAIN WITH LOW TO MODERATE RISK FOR CAD: ICD-10-CM

## 2025-03-05 DIAGNOSIS — E78.00 PURE HYPERCHOLESTEROLEMIA: ICD-10-CM

## 2025-03-05 PROCEDURE — 3078F DIAST BP <80 MM HG: CPT | Performed by: INTERNAL MEDICINE

## 2025-03-05 PROCEDURE — 99214 OFFICE O/P EST MOD 30 MIN: CPT | Performed by: INTERNAL MEDICINE

## 2025-03-05 PROCEDURE — 1159F MED LIST DOCD IN RCRD: CPT | Performed by: INTERNAL MEDICINE

## 2025-03-05 PROCEDURE — 3074F SYST BP LT 130 MM HG: CPT | Performed by: INTERNAL MEDICINE

## 2025-03-05 PROCEDURE — 1160F RVW MEDS BY RX/DR IN RCRD: CPT | Performed by: INTERNAL MEDICINE

## 2025-03-05 PROCEDURE — 71046 X-RAY EXAM CHEST 2 VIEWS: CPT

## 2025-03-05 PROCEDURE — 93000 ELECTROCARDIOGRAM COMPLETE: CPT | Performed by: INTERNAL MEDICINE

## 2025-03-05 RX ORDER — ROSUVASTATIN CALCIUM 40 MG/1
40 TABLET, COATED ORAL DAILY
Qty: 90 TABLET | Refills: 3 | Status: SHIPPED | OUTPATIENT
Start: 2025-03-05

## 2025-03-05 NOTE — TELEPHONE ENCOUNTER
Per [facility] - please call daughter with information about dose change, lab work and stress test.  Spoke with patients Deena WHEELER.  She is instructed about rosuvastatin dose change, repeat blood work and stress test.  She requests scheduling call her to arrange this test.  All questions answered and she verbalizes understanding.

## 2025-03-12 ENCOUNTER — OFFICE VISIT (OUTPATIENT)
Dept: INTERNAL MEDICINE | Facility: CLINIC | Age: 85
End: 2025-03-12
Payer: MEDICARE

## 2025-03-12 VITALS
HEART RATE: 66 BPM | DIASTOLIC BLOOD PRESSURE: 70 MMHG | BODY MASS INDEX: 24.8 KG/M2 | OXYGEN SATURATION: 98 % | WEIGHT: 123 LBS | TEMPERATURE: 97.3 F | SYSTOLIC BLOOD PRESSURE: 128 MMHG | HEIGHT: 59 IN

## 2025-03-12 DIAGNOSIS — I10 PRIMARY HYPERTENSION: Primary | Chronic | ICD-10-CM

## 2025-03-12 DIAGNOSIS — Z23 NEED FOR VACCINATION FOR PNEUMOCOCCUS: ICD-10-CM

## 2025-03-12 DIAGNOSIS — E78.00 PURE HYPERCHOLESTEROLEMIA: Chronic | ICD-10-CM

## 2025-03-12 RX ORDER — CIPROFLOXACIN AND DEXAMETHASONE 3; 1 MG/ML; MG/ML
SUSPENSION/ DROPS AURICULAR (OTIC)
COMMUNITY
Start: 2025-01-30

## 2025-03-12 NOTE — PROGRESS NOTES
"Subjective   Chelsi Ferguson is a 84 y.o. female.         Chief Complaint   Patient presents with    Hypertension    Hyperlipidemia       Visit Vitals  /70 (BP Location: Left arm, Patient Position: Sitting, Cuff Size: Adult)   Pulse 66   Temp 97.3 °F (36.3 °C)   Ht 149.9 cm (59\")   Wt 55.8 kg (123 lb)   LMP  (LMP Unknown) Comment: Last Mammogram   SpO2 98%   BMI 24.84 kg/m²         Hypertension  This is a chronic problem. The current episode started more than 1 year ago. The problem is unchanged. The problem is controlled. Associated symptoms include shortness of breath. Pertinent negatives include no anxiety, blurred vision, chest pain, headaches, malaise/fatigue, neck pain, orthopnea, palpitations, peripheral edema, PND or sweats. There are no associated agents to hypertension. Risk factors for coronary artery disease include dyslipidemia and post-menopausal state. Past treatments include beta blockers. Current antihypertension treatment includes beta blockers. The current treatment provides significant improvement. There are no compliance problems.  There is no history of angina, kidney disease, CAD/MI, CVA, heart failure, left ventricular hypertrophy, PVD or retinopathy. There is no history of chronic renal disease, sleep apnea or a thyroid problem.   Hyperlipidemia  This is a chronic problem. The current episode started more than 1 year ago. The problem is controlled. Recent lipid tests were reviewed and are normal. She has no history of chronic renal disease, diabetes, hypothyroidism, liver disease, obesity or nephrotic syndrome. Associated symptoms include shortness of breath. Pertinent negatives include no chest pain, focal sensory loss, focal weakness, leg pain or myalgias. Current antihyperlipidemic treatment includes statins. The current treatment provides significant improvement of lipids. There are no compliance problems.  Risk factors for coronary artery disease include dyslipidemia, hypertension " and post-menopausal.      Pt here with her son Mitesh who has her permission to be here.  Pt had wax removed recently from ears and has finished dexamethasone and cipro drops.     Pt had recent pneumonia that has improved and will give vaccine booster.  The following portions of the patient's history were reviewed and updated as appropriate: allergies, current medications, past family history, past medical history, past social history, past surgical history, and problem list.    Past Medical History:   Diagnosis Date    Allergic     Ankle pain     Arthritis     Cancer     Skin    Cataract     removed    Chest discomfort     Cholecystitis 01/15/2018    Clotting disorder     may be result of aspirin    COPD (chronic obstructive pulmonary disease) recent diagnoses after xray    Coronary artery disease     Difficulty walking     Diverticulosis     Double vision 04/25/2022    Uses glasses with prism    Edema     Elevated cholesterol     Elevated liver enzymes 01/15/2018    Gallstones     GERD (gastroesophageal reflux disease)     Glaucoma     pt is currently off of meds and Dr Tanner does not think that she is glaucoma    H/O complete eye exam 06/2013    H/O mammogram 11/16/2015    H/O non-ST elevation myocardial infarction (NSTEMI) 01/2005    Hammer toe     Heart attack 01/2005    History of blood transfusion 01/2005    History of cardiovascular stress test 12/02/2015    normal    HTN (hypertension)     Hyperlipidemia     Hypokalemia 01/15/2018    Kidney infection 1971    Low back pain     Menopausal symptoms     Nausea with vomiting 12/20/2016    Onychia and paronychia of finger     Osteopenia     Other elevated white blood cell count     Pap smear for cervical cancer screening 2010    [facility]    Peripheral neuropathy had for several yrs.    Pneumonia     Rheumatoid factor positive 12/05/2022    Sepsis 01/15/2018    Tremor     essential tremor    UTI (urinary tract infection) 01/15/2018    Viral warts     Visual impairment  2019    Wears glasses       Past Surgical History:   Procedure Laterality Date    ADENOIDECTOMY  89    BLADDER REPAIR  2002    BLADDER REPAIR  2003    BUNIONECTOMY Right 2010    CARDIAC CATHETERIZATION  stent placed     CHOLECYSTECTOMY      CHOLECYSTECTOMY WITH INTRAOPERATIVE CHOLANGIOGRAM N/A 2018    Procedure: CHOLECYSTECTOMY LAPAROSCOPIC INTRAOPERATIVE CHOLANGIOGRAM;  Surgeon: Harinder Mason MD;  Location:  [facility];  Service:     COLONOSCOPY  , 2014    [facility]    CORONARY STENT PLACEMENT Left 2005    drug eluting stent 2005 LAD    DILATATION AND CURETTAGE  1971    DILATATION AND CURETTAGE  1978    EYE CAPSULOTOMY WITH LASER Bilateral 2018    EYE SURGERY Bilateral 2016    Cataract    FRONTALIS SUSPENSION  2010    HAMMER TOE REPAIR Right 2010    HEAD & NECK SKIN LESION EXCISIONAL BIOPSY  2018    benign scalp cyst    HYSTERECTOMY  1978    JOINT REPLACEMENT  2005    LAPAROTOMY OOPHERECTOMY Bilateral     NOSE SURGERY  2017    repair 3 fractured areas. Dr Dunne    OTHER SURGICAL HISTORY      rectocele repair    OTHER SURGICAL HISTORY      eyelid surgery    REPLACEMENT TOTAL KNEE BILATERAL Bilateral     SKIN BIOPSY      SUBTOTAL HYSTERECTOMY  1978    TONSILLECTOMY  89    TOTAL KNEE ARTHROPLASTY  2005    UMBILICAL HERNIA REPAIR  2019    UMBILICAL HERNIA REPAIR N/A 2023    Procedure: UMBILICAL HERNIA REPAIR WITH MESH LAPAROSCOPIC;  Surgeon: Harinder Mason MD;  Location:  [facility];  Service: General;  Laterality: N/A;    UPPER GASTROINTESTINAL ENDOSCOPY  2022    Dr Frank    UTERINE FIBROID SURGERY      emergency removal of fibroid from birth canal      Family History   Problem Relation Age of Onset    Stroke Mother     Hypertension Mother         My Mother  of brain hemmorage/stroke    Migraines Mother             Heart failure Father         congestive    Cancer Father         Neck glands removed    Vision loss Father          Had operation    Heart disease Father          of Congestive Heart Failure at 91    Breast cancer Sister         60's    Cancer Sister         Breast    Migraines Sister             Breast cancer Daughter 40    Cancer Daughter         Breast    Thyroid disease Daughter         on meds    Breast cancer Other         NIECE 60's    Breast cancer Other         NIECE 60's    Vision loss Paternal Aunt         blind-glaucoma    Vision loss Paternal Uncle         blind-glaucoma    Vision loss Maternal Aunt         all 9 aunts paternal aunts/Uncles/ cousins/ sister/brother/ and niece on meds for Glaucoma or had operation    Early death Daughter         Celiac Sprue reaction    Ovarian cancer Neg Hx       Social History     Socioeconomic History    Marital status:    Tobacco Use    Smoking status: Never     Passive exposure: Past    Smokeless tobacco: Never    Tobacco comments:     My father, brother in law, friends, and  smoked so had second hand exposure   Vaping Use    Vaping status: Never Used    Passive vaping exposure: Yes   Substance and Sexual Activity    Alcohol use: No    Drug use: No    Sexual activity: Not Currently     Partners: Male     Birth control/protection: Hysterectomy      Allergies   Allergen Reactions    Dexlansoprazole Hives and Rash    Indocin [Indomethacin] Hallucinations    Statins Hives    Keflex [Cephalexin] GI Intolerance     Upset stomach, may have had some blood in stool but cannot remember the reason why she was put on it. Tolerates penicillins without problem.    Adhesive Tape Other (See Comments)     Skin irritation with bandaids, surgical bandages that stay on skin for extended periods of time    Aleve [Naproxen] Hives    Celecoxib Diarrhea    Ibuprofen GI Intolerance     Heartburn.    Lipitor [Atorvastatin] Hives    Prevacid [Lansoprazole] Nausea And Vomiting    Prilosec [Omeprazole] Nausea And Vomiting    Zocor [Simvastatin] Hives       Review of Systems    Constitutional:  Negative for malaise/fatigue.   Eyes:  Negative for blurred vision.   Respiratory:  Positive for shortness of breath.    Cardiovascular:  Negative for chest pain, palpitations, orthopnea and PND.   Musculoskeletal:  Negative for myalgias and neck pain.   Neurological:  Negative for focal weakness and headaches.       Objective   Physical Exam  Vitals and nursing note reviewed.   Constitutional:       Appearance: She is well-developed.   HENT:      Head: Normocephalic.      Right Ear: Tympanic membrane, ear canal and external ear normal.      Left Ear: Tympanic membrane, ear canal and external ear normal.      Ears:      Comments: Partial obstruction of canal on the right from cerumen     Nose: Nose normal.   Eyes:      General: Lids are normal.      Conjunctiva/sclera: Conjunctivae normal.      Pupils: Pupils are equal, round, and reactive to light.   Neck:      Thyroid: No thyroid mass or thyromegaly.      Vascular: No carotid bruit.      Trachea: Trachea normal.   Cardiovascular:      Rate and Rhythm: Normal rate and regular rhythm.      Heart sounds: No murmur heard.  Pulmonary:      Effort: Pulmonary effort is normal. No respiratory distress.      Breath sounds: Wheezing (scattered inspiratory) present. No decreased breath sounds, rhonchi or rales.   Chest:      Chest wall: No tenderness.   Abdominal:      General: Bowel sounds are normal.      Palpations: Abdomen is soft.      Tenderness: There is no abdominal tenderness.   Musculoskeletal:         General: Normal range of motion.      Cervical back: Normal range of motion and neck supple.   Skin:     General: Skin is warm and dry.   Neurological:      Mental Status: She is alert and oriented to person, place, and time.      Motor: Tremor (fine tremor) present.   Psychiatric:         Behavior: Behavior normal.         Assessment & Plan   Problems Addressed this Visit          Cardiac and Vasculature    Hyperlipidemia (Chronic)    HTN  (hypertension) - Primary (Chronic)     Other Visit Diagnoses         Need for vaccination for pneumococcus        Relevant Orders    Pneumococcal Conjugate Vaccine 20-Valent (PCV20) (Completed)          Diagnoses         Codes Comments      Primary hypertension    -  Primary ICD-10-CM: I10  ICD-9-CM: 401.9       Need for vaccination for pneumococcus     ICD-10-CM: Z23  ICD-9-CM: V03.82       Pure hypercholesterolemia     ICD-10-CM: E78.00  ICD-9-CM: 272.0           Deep breathing and cough,   Pt has enough metoprolol for HTN and crestor for hyperlipidemia.  Lab is in the computer for lipid and Liver enzymes             Current Outpatient Medications:     aspirin 81 MG EC tablet, Take 1 tablet by mouth Every Night., Disp: , Rfl:     BIOTIN 5000 PO, Take 5,000 mcg by mouth Daily., Disp: , Rfl:     Camphor-Menthol-Methyl Sal (Salonpas) 3.1-6-10 % patch, Apply  topically., Disp: , Rfl:     Cholecalciferol (Vitamin D3) 50 MCG (2000 UT) capsule, Take 1 capsule by mouth Daily., Disp: , Rfl:     ciprofloxacin-dexAMETHasone (CIPRODEX) 0.3-0.1 % otic suspension, Administer  into both ears., Disp: , Rfl:     coenzyme Q10 100 MG capsule, Take 1 capsule by mouth Daily., Disp: , Rfl:     Denosumab (PROLIA SC), Inject  under the skin into the appropriate area as directed., Disp: , Rfl:     docusate sodium (Stool Softener) 100 MG capsule, Take 1 capsule by mouth 2 Times a Day. (Patient taking differently: Take 1 capsule by mouth 2 (Two) Times a Day As Needed for Constipation.), Disp: 30 capsule, Rfl: 1    famotidine (PEPCID) 40 MG tablet, Take 1 tablet by mouth two times daily, Disp: 180 tablet, Rfl: 3    latanoprost (XALATAN) 0.005 % ophthalmic solution, 1 drop Every Night., Disp: , Rfl:     metoprolol succinate XL (TOPROL-XL) 25 MG 24 hr tablet, Take 1 tablet by mouth Every Morning., Disp: 90 tablet, Rfl: 3    nitroglycerin (NITROSTAT) 0.4 MG SL tablet, Place 1 tablet under the tongue Every 5 (Five) Minutes As Needed for Chest  Pain., Disp: 75 tablet, Rfl: 3    rosuvastatin (CRESTOR) 40 MG tablet, Take 1 tablet by mouth Daily., Disp: 90 tablet, Rfl: 3    Yuvafem 10 MCG tablet vaginal tablet, INSERT 1 TABLET VAGINALLY TWICE A WEEK, Disp: 24 tablet, Rfl: 0    multivitamin with minerals (CENTRUM SILVER 50+WOMEN PO), , Disp: , Rfl:     multivitamin with minerals tablet tablet, Take 1 tablet by mouth Daily. (Patient not taking: Reported on 3/12/2025), Disp: , Rfl:     Return in about 3 months (around 6/12/2025), or if symptoms worsen or fail to improve, for Recheck.

## 2025-03-18 ENCOUNTER — HOSPITAL ENCOUNTER (OUTPATIENT)
Dept: CARDIOLOGY | Facility: HOSPITAL | Age: 85
Discharge: HOME | End: 2025-03-18
Payer: MEDICARE

## 2025-03-18 VITALS — HEART RATE: 70 BPM | OXYGEN SATURATION: 97 % | SYSTOLIC BLOOD PRESSURE: 166 MMHG | DIASTOLIC BLOOD PRESSURE: 86 MMHG

## 2025-03-18 DIAGNOSIS — I25.10 CORONARY ARTERY DISEASE INVOLVING NATIVE CORONARY ARTERY OF NATIVE HEART WITHOUT ANGINA PECTORIS: ICD-10-CM

## 2025-03-18 DIAGNOSIS — R06.09 DYSPNEA ON EXERTION: ICD-10-CM

## 2025-03-18 DIAGNOSIS — Z91.89 CHRONIC CHEST PAIN WITH LOW TO MODERATE RISK FOR CAD: ICD-10-CM

## 2025-03-18 DIAGNOSIS — G89.29 CHRONIC CHEST PAIN WITH LOW TO MODERATE RISK FOR CAD: ICD-10-CM

## 2025-03-18 DIAGNOSIS — R07.9 CHRONIC CHEST PAIN WITH LOW TO MODERATE RISK FOR CAD: ICD-10-CM

## 2025-03-18 PROCEDURE — A9500 TC99M SESTAMIBI: HCPCS | Performed by: INTERNAL MEDICINE

## 2025-03-18 PROCEDURE — 93017 CV STRESS TEST TRACING ONLY: CPT

## 2025-03-18 PROCEDURE — 34310000005 TECHNETIUM SESTAMIBI: Performed by: INTERNAL MEDICINE

## 2025-03-18 PROCEDURE — 78452 HT MUSCLE IMAGE SPECT MULT: CPT

## 2025-03-18 PROCEDURE — 25010000002 REGADENOSON 0.4 MG/5ML SOLUTION: Performed by: INTERNAL MEDICINE

## 2025-03-18 RX ORDER — CAFFEINE CITRATE 20 MG/ML
60 SOLUTION INTRAVENOUS
Status: COMPLETED | OUTPATIENT
Start: 2025-03-18 | End: 2025-03-18

## 2025-03-18 RX ORDER — REGADENOSON 0.08 MG/ML
0.4 INJECTION, SOLUTION INTRAVENOUS ONCE
Status: COMPLETED | OUTPATIENT
Start: 2025-03-18 | End: 2025-03-18

## 2025-03-18 RX ADMIN — TECHNETIUM TC 99M SESTAMIBI 1 DOSE: 1 INJECTION INTRAVENOUS at 09:40

## 2025-03-18 RX ADMIN — TECHNETIUM TC 99M SESTAMIBI 1 DOSE: 1 INJECTION INTRAVENOUS at 08:10

## 2025-03-18 RX ADMIN — CAFFEINE CITRATE 60 MG: 20 INJECTION, SOLUTION INTRAVENOUS at 10:06

## 2025-03-18 RX ADMIN — REGADENOSON 0.4 MG: 0.08 INJECTION, SOLUTION INTRAVENOUS at 09:54

## 2025-03-19 LAB
BH CV REST NUCLEAR ISOTOPE DOSE: 9.9 MCI
BH CV STRESS BP STAGE 2: NORMAL
BH CV STRESS BP STAGE 4: NORMAL
BH CV STRESS COMMENTS STAGE 1: NORMAL
BH CV STRESS DOSE REGADENOSON STAGE 1: 0.4
BH CV STRESS DURATION MIN STAGE 1: 1
BH CV STRESS DURATION MIN STAGE 2: 1
BH CV STRESS DURATION MIN STAGE 3: 1
BH CV STRESS DURATION MIN STAGE 4: 1
BH CV STRESS DURATION SEC STAGE 1: 0
BH CV STRESS DURATION SEC STAGE 2: 0
BH CV STRESS DURATION SEC STAGE 3: 0
BH CV STRESS DURATION SEC STAGE 4: 0
BH CV STRESS HR STAGE 1: 83
BH CV STRESS HR STAGE 2: 100
BH CV STRESS HR STAGE 3: 100
BH CV STRESS HR STAGE 4: 98
BH CV STRESS NUCLEAR ISOTOPE DOSE: 32.1 MCI
BH CV STRESS O2 STAGE 1: 98
BH CV STRESS O2 STAGE 2: 100
BH CV STRESS O2 STAGE 3: 99
BH CV STRESS O2 STAGE 4: 99
BH CV STRESS PROTOCOL 1: NORMAL
BH CV STRESS RECOVERY BP: NORMAL MMHG
BH CV STRESS RECOVERY HR: 81 BPM
BH CV STRESS RECOVERY O2: 98 %
BH CV STRESS STAGE 1: 1
BH CV STRESS STAGE 2: 2
BH CV STRESS STAGE 3: 3
BH CV STRESS STAGE 4: 4
MAXIMAL PREDICTED HEART RATE: 135 BPM
PERCENT MAX PREDICTED HR: 75.56 %
SPECT HRT GATED+EF W RNC IV: 75 %
STRESS BASELINE BP: NORMAL MMHG
STRESS BASELINE HR: 70 BPM
STRESS O2 SAT REST: 97 %
STRESS PERCENT HR: 89 %
STRESS POST ESTIMATED WORKLOAD: 1 METS
STRESS POST EXERCISE DUR MIN: 4 MIN
STRESS POST EXERCISE DUR SEC: 0 SEC
STRESS POST O2 SAT PEAK: 98 %
STRESS POST PEAK BP: NORMAL MMHG
STRESS POST PEAK HR: 102 BPM
STRESS TARGET HR: 115 BPM

## 2025-03-28 ENCOUNTER — APPOINTMENT (OUTPATIENT)
Dept: GENERAL RADIOLOGY | Facility: HOSPITAL | Age: 85
End: 2025-03-28
Payer: MEDICARE

## 2025-03-28 ENCOUNTER — HOSPITAL ENCOUNTER (EMERGENCY)
Facility: HOSPITAL | Age: 85
Discharge: HOME | End: 2025-03-28
Attending: EMERGENCY MEDICINE
Payer: MEDICARE

## 2025-03-28 VITALS
TEMPERATURE: 97.6 F | WEIGHT: 121 LBS | DIASTOLIC BLOOD PRESSURE: 83 MMHG | BODY MASS INDEX: 24.39 KG/M2 | HEIGHT: 59 IN | OXYGEN SATURATION: 99 % | HEART RATE: 85 BPM | RESPIRATION RATE: 22 BRPM | SYSTOLIC BLOOD PRESSURE: 163 MMHG

## 2025-03-28 DIAGNOSIS — S42.292A CLOSED FRACTURE OF ANATOMICAL NECK OF LEFT HUMERUS, INITIAL ENCOUNTER: Primary | ICD-10-CM
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
## 2025-03-28 DIAGNOSIS — S73.102A HIP SPRAIN, LEFT, INITIAL ENCOUNTER: ICD-10-CM

## 2025-03-28 PROCEDURE — 73502 X-RAY EXAM HIP UNI 2-3 VIEWS: CPT

## 2025-03-28 PROCEDURE — 73060 X-RAY EXAM OF HUMERUS: CPT

## 2025-03-28 PROCEDURE — 99283 EMERGENCY DEPT VISIT LOW MDM: CPT

## 2025-03-28 RX ORDER — HYDROCODONE BITARTRATE AND ACETAMINOPHEN 5; 325 MG/1; MG/1
1 TABLET ORAL ONCE
Refills: 0 | Status: COMPLETED | OUTPATIENT
Start: 2025-03-28 | End: 2025-03-28

## 2025-03-28 RX ORDER — HYDROCODONE BITARTRATE AND ACETAMINOPHEN 5; 325 MG/1; MG/1
1 TABLET ORAL EVERY 8 HOURS PRN
Qty: 9 TABLET | Refills: 0 | Status: SHIPPED | OUTPATIENT
Start: 2025-03-28 | End: 2025-03-31

## 2025-03-28 RX ADMIN — HYDROCODONE BITARTRATE AND ACETAMINOPHEN 1 TABLET: 5; 325 TABLET ORAL at 06:26

## 2025-03-28 NOTE — ED PROVIDER NOTES
Subjective   History of Present Illness  This is a 84-year-old female with past medical history of COPD presenting to the emergency department after an accidental fall.  The patient tripped when she was getting up out of bed.  She landed on her left side.  Is complaining some left hip pain as well as some left mid arm pain.  Is worse when she tries to move it.  Patient did not sustain any head trauma.  No loss of consciousness.  She denies any headache or change in vision.  No focal weakness.  No chest pain or shortness of breath.  No abdominal pain or vomiting.    History provided by:  Patient and EMS personnel   used: No        Review of Systems   Constitutional: Negative.    Respiratory: Negative.     Musculoskeletal:  Positive for arthralgias and myalgias.   Neurological: Negative.        Past Medical History:   Diagnosis Date    Allergic     Ankle pain     Arthritis     Cancer     Skin    Cataract     removed    Chest discomfort     Cholecystitis 01/15/2018    Clotting disorder     may be result of aspirin    COPD (chronic obstructive pulmonary disease) recent diagnoses after xray    Coronary artery disease     Difficulty walking     Diverticulosis     Double vision 04/25/2022    Uses glasses with prism    Edema     Elevated cholesterol     Elevated liver enzymes 01/15/2018    Gallstones     GERD (gastroesophageal reflux disease)     Glaucoma     pt is currently off of meds and Dr Tanner does not think that she is glaucoma    H/O complete eye exam 06/2013    H/O mammogram 11/16/2015    H/O non-ST elevation myocardial infarction (NSTEMI) 01/2005    Hammer toe     Heart attack 01/2005    History of blood transfusion 01/2005    History of cardiovascular stress test 12/02/2015    normal    HTN (hypertension)     Hyperlipidemia     Hypokalemia 01/15/2018    Kidney infection 1971    Low back pain     Menopausal symptoms     Nausea with vomiting 12/20/2016    Onychia and paronychia of finger      Osteopenia     Other elevated white blood cell count     Pap smear for cervical cancer screening 2010    Leandro    Peripheral neuropathy had for several yrs.    Pneumonia     Rheumatoid factor positive 12/05/2022    Sepsis 01/15/2018    Tremor     essential tremor    UTI (urinary tract infection) 01/15/2018    Viral warts     Visual impairment 2019    Wears glasses        Allergies   Allergen Reactions    Dexlansoprazole Hives and Rash    Indocin [Indomethacin] Hallucinations    Statins Hives    Keflex [Cephalexin] GI Intolerance     Upset stomach, may have had some blood in stool but cannot remember the reason why she was put on it. Tolerates penicillins without problem.    Adhesive Tape Other (See Comments)     Skin irritation with bandaids, surgical bandages that stay on skin for extended periods of time    Aleve [Naproxen] Hives    Celecoxib Diarrhea    Ibuprofen GI Intolerance     Heartburn.    Lipitor [Atorvastatin] Hives    Prevacid [Lansoprazole] Nausea And Vomiting    Prilosec [Omeprazole] Nausea And Vomiting    Zocor [Simvastatin] Hives       Past Surgical History:   Procedure Laterality Date    ADENOIDECTOMY  1952    BLADDER REPAIR  2002    BLADDER REPAIR  2003    BUNIONECTOMY Right 2010    CARDIAC CATHETERIZATION  stent placed 2005    CHOLECYSTECTOMY      CHOLECYSTECTOMY WITH INTRAOPERATIVE CHOLANGIOGRAM N/A 03/07/2018    Procedure: CHOLECYSTECTOMY LAPAROSCOPIC INTRAOPERATIVE CHOLANGIOGRAM;  Surgeon: Harinder Mason MD;  Location: [facility];  Service:     COLONOSCOPY  2008, 4/2014    [facility]    CORONARY STENT PLACEMENT Left 01/2005    drug eluting stent 1/2005 LAD    DILATATION AND CURETTAGE  1971    DILATATION AND CURETTAGE  1978    EYE CAPSULOTOMY WITH LASER Bilateral 2018    EYE SURGERY Bilateral 05/2016    Cataract    FRONTALIS SUSPENSION  2010    HAMMER TOE REPAIR Right 11/03/2010    HEAD & NECK SKIN LESION EXCISIONAL BIOPSY  08/20/2018    benign scalp cyst    HYSTERECTOMY  1978    JOINT  REPLACEMENT  2005    LAPAROTOMY OOPHERECTOMY Bilateral 2002    NOSE SURGERY  2017    repair 3 fractured areas. Dr Dunne    OTHER SURGICAL HISTORY      rectocele repair    OTHER SURGICAL HISTORY  2010    eyelid surgery    REPLACEMENT TOTAL KNEE BILATERAL Bilateral     SKIN BIOPSY      SUBTOTAL HYSTERECTOMY      TONSILLECTOMY  89    TOTAL KNEE ARTHROPLASTY  2005    UMBILICAL HERNIA REPAIR  2019    UMBILICAL HERNIA REPAIR N/A 2023    Procedure: UMBILICAL HERNIA REPAIR WITH MESH LAPAROSCOPIC;  Surgeon: Harinder Mason MD;  Location: [facility];  Service: General;  Laterality: N/A;    UPPER GASTROINTESTINAL ENDOSCOPY  2022    Dr Frank    UTERINE FIBROID SURGERY      emergency removal of fibroid from birth canal       Family History   Problem Relation Age of Onset    Stroke Mother     Hypertension Mother         My Mother  of brain hemmorage/stroke    Migraines Mother             Heart failure Father         congestive    Cancer Father         Neck glands removed    Vision loss Father         Had operation    Heart disease Father          of Congestive Heart Failure at 91    Breast cancer Sister         60's    Cancer Sister         Breast    Migraines Sister             Breast cancer Daughter 40    Cancer Daughter         Breast    Thyroid disease Daughter         on meds    Breast cancer Other         NIECE 60's    Breast cancer Other         NIECE 60's    Vision loss Paternal Aunt         blind-glaucoma    Vision loss Paternal Uncle         blind-glaucoma    Vision loss Maternal Aunt         all 9 aunts paternal aunts/Uncles/ cousins/ sister/brother/ and niece on meds for Glaucoma or had operation    Early death Daughter         Celiac Sprue reaction    Ovarian cancer Neg Hx        Social History     Socioeconomic History    Marital status:    Tobacco Use    Smoking status: Never     Passive exposure: Past    Smokeless tobacco: Never    Tobacco comments:      My father, brother in law, friends, and  smoked so had second hand exposure   Vaping Use    Vaping status: Never Used    Passive vaping exposure: Yes   Substance and Sexual Activity    Alcohol use: No    Drug use: No    Sexual activity: Not Currently     Partners: Male     Birth control/protection: Hysterectomy           Objective   Physical Exam  Vitals and nursing note reviewed.   Constitutional:       General: She is not in acute distress.     Appearance: She is not ill-appearing or toxic-appearing.   HENT:      Head: Normocephalic and atraumatic.      Right Ear: Tympanic membrane normal.      Left Ear: Tympanic membrane normal.   Cardiovascular:      Rate and Rhythm: Normal rate.      Pulses: Normal pulses.   Pulmonary:      Effort: Pulmonary effort is normal. No respiratory distress.   Musculoskeletal:      Comments: Patient is some tenderness to palpation to the mid humerus on the left.  Range of motion of the shoulder and elbow as well as the wrist appear to be intact.  There is no obvious deformity.  The patient's pelvis is stable.  Minimal tenderness to palpation of the left hip.  Compartments soft.  Neurovascularly intact   Neurological:      General: No focal deficit present.      Mental Status: She is alert.         Procedures           ED Course  ED Course as of 03/28/25 0606   Fri Mar 28, 2025   0535 BP: 172/89 [JK]   0535 Noninvasive MAP (mmHg): 106 [JK]   0535 Temp: 97.6 °F (36.4 °C) [JK]   0535 Temp src: Oral [JK]   0535 Heart Rate: 91 [JK]   0535 Resp: 22 [JK]   0535 SpO2: 97 % [JK]   0535 Device (Oxygen Therapy): room air  Interpretation:  Patient's repeat vitals, telemetry tracing, and pulse oximetry tracing were directly viewed and interpreted by myself.   O2 sat 97% on room air, interpreted as normal.  Telemetry rhythm strip revealed a rate of 90 bpm, interpreted as normal sinus rhythm [JK]   0555 XR Humerus Left [JK]   0555 XR Hip With or Without Pelvis 2 - 3 View  Left  Interpretation:  Imaging was directly visualized by myself, per my interpretations, x-ray left hip and pelvis did not show any acute fracture.  X-ray left humerus showed a left humeral head neck fracture [JK]   0600 Patient has evidence of a humeral fracture in the left side.  Repeat neurovascular exam does not show any abnormality.  Patient was placed in a sling immobilizer.  She will be discharged with a short course analgesics.  She needs to follow-up with orthopedics in 24 hours.  Given strict return precautions.  Verbalized understanding. [JK]   0600 I had a discussion with the patient/family regarding diagnosis, diagnostic results, treatment plan, and medications. The patient/family indicated understanding of these instructions. I spent adequate time at the bedside prior to discharge necessary to discuss the aftercare instructions, giving patient education, providing explanations of the results of our evaluations/findings, and my decision making to assure that the patient/family understand the plan of care. Time was allotted to answer questions at that time and throughout the ED course. Patient is required to maintain timely follow up, as discussed. I also discussed the potential for the development of an acute emergent condition requiring further evaluation, return to the ER, admission, or even surgical intervention.  I encouraged the patient to return to the emergency department immediately for any concerns, worsening symptoms, new complaints, or if symptoms persist and they are unable to seek follow-up in a timely fashion. The patient/family expressed understanding and agreement with this plan    Shared decision making:   After full review of the patient's clinical presentation, review of any work-up including but not limited to laboratory studies and radiology obtained, I had a discussion with the patient.  Treatment options were discussed as well as the risks, benefits and consequences.  I discussed  all findings with the patient and family members if available.  During the discussion, treatment goals were understood by all as well as any misconceptions which were addressed with the patient.  Ample time was given for any questions they may have had.  They are in agreement with the treatment plan as well as final disposition. [JK]      ED Course User Index  [JK] Moshe Evans MD                                               [facility] reviewed by Moshe Evans MD       Medical Decision Making  This is a 84-year-old female with a history of COPD presenting to the emergency department with some left arm pain.  Patient had an accidental fall.  She tripped while she was walking at home.  Landed left side.  She is complaining of mainly some pain in her left arm.  No concern for fracture versus contusion.  Is no evidence of neurovascular compromise at this time.  Patient provided analgesics.  Imaging obtained.      Differential diagnosis: Left arm/hip sprain, strain, contusion, fracture, dislocation, hematoma    Amount and/or Complexity of Data Reviewed  Independent Historian: EMS  External Data Reviewed: labs, radiology, ECG and notes.     Details: External laboratories, imaging as well as notes were reviewed personally by myself.  All relevant studies were used to guide decision making.     Date of previous record: 3/12/2025    Source of note: Primary physician    Summary:  Patient was seen and evaluated for routine visit.  I did review basic laboratory studies on file as well as a previous chest x-ray and EKG.  Records reviewed    Radiology: ordered and independent interpretation performed. Decision-making details documented in ED Course.    Risk  Prescription drug management.        Final diagnoses:   Closed fracture of anatomical neck of left humerus, initial encounter   Hip sprain, left, initial encounter   Accidental fall, initial encounter       ED Disposition  ED Disposition       ED Disposition    Discharge    Condition   Stable    Comment   --               Reginald Diana MD  [address] [facility]  [address]  [place] [address]  [phone]    Call in 1 day           Medication List        New Prescriptions      HYDROcodone-acetaminophen 5-325 MG per tablet  Commonly known as: NORCO  Take 1 tablet by mouth Every 8 (Eight) Hours As Needed for Moderate Pain for up to 3 days.            Changed      Stool Softener 100 MG capsule  Generic drug: docusate sodium  Take 1 capsule by mouth 2 Times a Day.  What changed:   when to take this  reasons to take this               Where to Get Your Medications        These medications were sent to [facility]/pharmacy #[id number] - [place], [place] - [address] - [phone]  - [phone]   [address] [place] [id number]      Phone: [phone]   HYDROcodone-acetaminophen 5-325 MG per tablet            Moshe Evans MD  03/28/25 0606

## 2025-04-01 DIAGNOSIS — N95.1 MENOPAUSAL SYMPTOM: ICD-10-CM

## 2025-04-01 RX ORDER — ESTRADIOL 10 UG/1
1 TABLET VAGINAL 2 TIMES WEEKLY
Qty: 24 TABLET | Refills: 0 | Status: SHIPPED | OUTPATIENT
Start: 2025-04-03

## 2025-04-01 NOTE — TELEPHONE ENCOUNTER
Rx Refill Note  Requested Prescriptions     Pending Prescriptions Disp Refills    Yuvafem 10 MCG tablet vaginal tablet [Pharmacy Med Name: YUVAFEM 10 MCG VAGINAL INSERT] 24 tablet 0     Sig: INSERT 1 TABLET VAGINALLY TWICE A WEEK      Last office visit with prescribing clinician: 3/12/2025     Next office visit with prescribing clinician: 6/13/2025     Harriet Seals  04/01/25, 08:47 EDT

## 2025-04-09 ENCOUNTER — PATIENT MESSAGE (OUTPATIENT)
Dept: INTERNAL MEDICINE | Facility: CLINIC | Age: 85
End: 2025-04-09
Payer: MEDICARE

## 2025-04-09 DIAGNOSIS — R39.9 UTI SYMPTOMS: Primary | ICD-10-CM

## 2025-04-10 DIAGNOSIS — R39.9 UTI SYMPTOMS: ICD-10-CM

## 2025-04-10 LAB
BILIRUB BLD-MCNC: NEGATIVE MG/DL
CLARITY, POC: CLEAR
COLOR UR: YELLOW
EXPIRATION DATE: ABNORMAL
GLUCOSE UR STRIP-MCNC: NEGATIVE MG/DL
KETONES UR QL: NEGATIVE
LEUKOCYTE EST, POC: ABNORMAL
Lab: ABNORMAL
NITRITE UR-MCNC: NEGATIVE MG/ML
PH UR: 7.5 [PH] (ref 5–8)
PROT UR STRIP-MCNC: ABNORMAL MG/DL
RBC # UR STRIP: NEGATIVE /UL
SP GR UR: 1.01 (ref 1–1.03)
UROBILINOGEN UR QL: NORMAL

## 2025-04-10 PROCEDURE — 87086 URINE CULTURE/COLONY COUNT: CPT | Performed by: FAMILY MEDICINE

## 2025-04-11 ENCOUNTER — RESULTS FOLLOW-UP (OUTPATIENT)
Dept: INTERNAL MEDICINE | Facility: CLINIC | Age: 85
End: 2025-04-11
Payer: MEDICARE

## 2025-04-11 LAB — BACTERIA SPEC AEROBE CULT: NORMAL

## 2025-04-15 ENCOUNTER — PATIENT MESSAGE (OUTPATIENT)
Dept: INTERNAL MEDICINE | Facility: CLINIC | Age: 85
End: 2025-04-15
Payer: MEDICARE

## 2025-04-15 ENCOUNTER — TELEPHONE (OUTPATIENT)
Dept: INTERNAL MEDICINE | Facility: CLINIC | Age: 85
End: 2025-04-15

## 2025-04-15 DIAGNOSIS — L30.4 INTERTRIGO: Primary | ICD-10-CM

## 2025-04-15 NOTE — TELEPHONE ENCOUNTER
Broke her shoulder three weeks ago, hasn't been able to sleep in her bed and has been sleeping in the chair, sitting very upright. 3-4 days ago she started complaining of a sore spot on her bottom. Hasn't been able to move around a lot, ankles and feet are swollen. She does note that her feet aren't being elevated.     Red spot on her bottom, hasn't broken through the skin, [facility] looked at spot today. Raw area according to the home health nurse, they recommended some barrier between the sore and her clothing.     Also has red spot on her hip from the fall, bruising on the hip as well.     [facility] has ordered something for her to sit on to help with relief of symptoms.     Asked Deena to send the picture from the home health aid to our office via [employer] for further recommendations.     Informed her that Dr. Yarbrough was out today as well.

## 2025-04-15 NOTE — TELEPHONE ENCOUNTER
Caller: Deena Ferguson    Relationship: Emergency Contact    Best call back number: [phone]     What medication are you requesting: SOMETHING FOR SYMPTOMS    What are your current symptoms: BRIGHT RED SORE ON BUTTOCKS     How long have you been experiencing symptoms: 3 OR 4 DAYS      Have you had these symptoms before:    [] Yes  [x] No    Have you been treated for these symptoms before:   [] Yes  [x] No    If a prescription is needed, what is your preferred pharmacy and phone number: [facility]/PHARMACY #[id number] - [address] [phone] [facility] [phone]      Additional notes:

## 2025-04-16 RX ORDER — KETOCONAZOLE 20 MG/G
1 CREAM TOPICAL DAILY
Qty: 30 G | Refills: 0 | Status: SHIPPED | OUTPATIENT
Start: 2025-04-16

## 2025-05-05 ENCOUNTER — APPOINTMENT (OUTPATIENT)
Dept: GENERAL RADIOLOGY | Facility: HOSPITAL | Age: 85
End: 2025-05-05
Payer: MEDICARE

## 2025-05-05 ENCOUNTER — HOSPITAL ENCOUNTER (EMERGENCY)
Facility: HOSPITAL | Age: 85
Discharge: HOME | End: 2025-05-05
Attending: EMERGENCY MEDICINE | Admitting: EMERGENCY MEDICINE
Payer: MEDICARE

## 2025-05-05 VITALS
BODY MASS INDEX: 25.8 KG/M2 | SYSTOLIC BLOOD PRESSURE: 137 MMHG | RESPIRATION RATE: 20 BRPM | HEIGHT: 59 IN | TEMPERATURE: 97.7 F | OXYGEN SATURATION: 96 % | HEART RATE: 62 BPM | WEIGHT: 128 LBS | DIASTOLIC BLOOD PRESSURE: 52 MMHG

## 2025-05-05 DIAGNOSIS — R07.9 CHEST PAIN, UNSPECIFIED TYPE: Primary | ICD-10-CM

## 2025-05-05 LAB
ALBUMIN SERPL-MCNC: 4.1 G/DL (ref 3.5–5.2)
ALBUMIN/GLOB SERPL: 1.6 G/DL
ALP SERPL-CCNC: 79 U/L (ref 39–117)
ALT SERPL W P-5'-P-CCNC: 9 U/L (ref 1–33)
ANION GAP SERPL CALCULATED.3IONS-SCNC: 12 MMOL/L (ref 5–15)
AST SERPL-CCNC: 20 U/L (ref 1–32)
BASOPHILS # BLD AUTO: 0.04 10*3/MM3 (ref 0–0.2)
BASOPHILS NFR BLD AUTO: 0.5 % (ref 0–1.5)
BILIRUB SERPL-MCNC: 0.4 MG/DL (ref 0–1.2)
BUN SERPL-MCNC: 13 MG/DL (ref 8–23)
BUN/CREAT SERPL: 20 (ref 7–25)
CALCIUM SPEC-SCNC: 9.8 MG/DL (ref 8.6–10.5)
CHLORIDE SERPL-SCNC: 103 MMOL/L (ref 98–107)
CO2 SERPL-SCNC: 26 MMOL/L (ref 22–29)
CREAT SERPL-MCNC: 0.65 MG/DL (ref 0.57–1)
DEPRECATED RDW RBC AUTO: 50.2 FL (ref 37–54)
EGFRCR SERPLBLD CKD-EPI 2021: 86.4 ML/MIN/1.73
EOSINOPHIL # BLD AUTO: 0.13 10*3/MM3 (ref 0–0.4)
EOSINOPHIL NFR BLD AUTO: 1.7 % (ref 0.3–6.2)
ERYTHROCYTE [DISTWIDTH] IN BLOOD BY AUTOMATED COUNT: 14.1 % (ref 12.3–15.4)
GEN 5 1HR TROPONIN T REFLEX: 11 NG/L
GLOBULIN UR ELPH-MCNC: 2.5 GM/DL
GLUCOSE SERPL-MCNC: 91 MG/DL (ref 65–99)
HCT VFR BLD AUTO: 39.5 % (ref 34–46.6)
HGB BLD-MCNC: 12.6 G/DL (ref 12–15.9)
HOLD SPECIMEN: NORMAL
IMM GRANULOCYTES # BLD AUTO: 0.01 10*3/MM3 (ref 0–0.05)
IMM GRANULOCYTES NFR BLD AUTO: 0.1 % (ref 0–0.5)
LIPASE SERPL-CCNC: 18 U/L (ref 13–60)
LYMPHOCYTES # BLD AUTO: 1.93 10*3/MM3 (ref 0.7–3.1)
LYMPHOCYTES NFR BLD AUTO: 25.3 % (ref 19.6–45.3)
MCH RBC QN AUTO: 30.9 PG (ref 26.6–33)
MCHC RBC AUTO-ENTMCNC: 31.9 G/DL (ref 31.5–35.7)
MCV RBC AUTO: 96.8 FL (ref 79–97)
MONOCYTES # BLD AUTO: 0.9 10*3/MM3 (ref 0.1–0.9)
MONOCYTES NFR BLD AUTO: 11.8 % (ref 5–12)
NEUTROPHILS NFR BLD AUTO: 4.61 10*3/MM3 (ref 1.7–7)
NEUTROPHILS NFR BLD AUTO: 60.6 % (ref 42.7–76)
NRBC BLD AUTO-RTO: 0 /100 WBC (ref 0–0.2)
NT-PROBNP SERPL-MCNC: 339.3 PG/ML (ref 0–1800)
PLATELET # BLD AUTO: 279 10*3/MM3 (ref 140–450)
PMV BLD AUTO: 9.2 FL (ref 6–12)
POTASSIUM SERPL-SCNC: 4.4 MMOL/L (ref 3.5–5.2)
PROT SERPL-MCNC: 6.6 G/DL (ref 6–8.5)
RBC # BLD AUTO: 4.08 10*6/MM3 (ref 3.77–5.28)
SODIUM SERPL-SCNC: 141 MMOL/L (ref 136–145)
TROPONIN T NUMERIC DELTA: 0 NG/L
TROPONIN T SERPL HS-MCNC: 11 NG/L
WBC NRBC COR # BLD AUTO: 7.62 10*3/MM3 (ref 3.4–10.8)
WHOLE BLOOD HOLD COAG: NORMAL
WHOLE BLOOD HOLD SPECIMEN: NORMAL

## 2025-05-05 PROCEDURE — 80053 COMPREHEN METABOLIC PANEL: CPT | Performed by: EMERGENCY MEDICINE

## 2025-05-05 PROCEDURE — 83690 ASSAY OF LIPASE: CPT | Performed by: EMERGENCY MEDICINE

## 2025-05-05 PROCEDURE — 71045 X-RAY EXAM CHEST 1 VIEW: CPT

## 2025-05-05 PROCEDURE — 93005 ELECTROCARDIOGRAM TRACING: CPT | Performed by: EMERGENCY MEDICINE

## 2025-05-05 PROCEDURE — 93005 ELECTROCARDIOGRAM TRACING: CPT

## 2025-05-05 PROCEDURE — 36415 COLL VENOUS BLD VENIPUNCTURE: CPT

## 2025-05-05 PROCEDURE — 99284 EMERGENCY DEPT VISIT MOD MDM: CPT

## 2025-05-05 PROCEDURE — 84484 ASSAY OF TROPONIN QUANT: CPT | Performed by: EMERGENCY MEDICINE

## 2025-05-05 PROCEDURE — 83880 ASSAY OF NATRIURETIC PEPTIDE: CPT | Performed by: EMERGENCY MEDICINE

## 2025-05-05 PROCEDURE — 85025 COMPLETE CBC W/AUTO DIFF WBC: CPT | Performed by: EMERGENCY MEDICINE

## 2025-05-05 RX ORDER — SODIUM CHLORIDE 0.9 % (FLUSH) 0.9 %
10 SYRINGE (ML) INJECTION AS NEEDED
Status: DISCONTINUED | OUTPATIENT
Start: 2025-05-05 | End: 2025-05-06 | Stop reason: HOSPADM

## 2025-05-05 NOTE — ED PROVIDER NOTES
Subjective   History of Present Illness  84-year-old female presents for evaluation of chest pain.  The patient reports that she was told was a very minor activity as she was sitting at a table roughly 1 to 2 hours prior to arrival when she began to have central pressure in her chest.  She denies any radiation into the neck shoulders arms or back.  She does report some mild nausea.  She did take a single sublingual nitroglycerin at home and the symptoms have improved.  She has a previous history of coronary disease with a previous stent placed in 2005.  She denies eating anything with this episode.  She however does have a previous history of esophageal related irritation/spasm in the past.  She is followed by Dr. Greene cardiology.  She reports having a stress test within the last month that was nonrevealing.  She denies fever, bodies, or chills.  No abdominal pain.  No change in bowel or urinary function.  No other acute complaints.      Review of Systems   Constitutional:  Negative for chills, fatigue and fever.   HENT:  Negative for congestion, ear pain, postnasal drip, sinus pressure and sore throat.    Eyes:  Negative for pain, redness and visual disturbance.   Respiratory:  Negative for cough, chest tightness and shortness of breath.    Cardiovascular:  Positive for chest pain. Negative for palpitations and leg swelling.   Gastrointestinal:  Positive for nausea. Negative for abdominal pain, anal bleeding, blood in stool, diarrhea and vomiting.   Endocrine: Negative for polydipsia and polyuria.   Genitourinary:  Negative for difficulty urinating, dysuria, frequency and urgency.   Musculoskeletal:  Negative for arthralgias, back pain and neck pain.   Skin:  Negative for pallor and rash.   Allergic/Immunologic: Negative for environmental allergies and immunocompromised state.   Neurological:  Negative for dizziness, weakness and headaches.   Hematological:  Negative for adenopathy.    Psychiatric/Behavioral:  Negative for confusion, self-injury and suicidal ideas. The patient is not nervous/anxious.    All other systems reviewed and are negative.      Past Medical History:   Diagnosis Date    Allergic     Ankle pain     Arthritis     Cancer     Skin    Cataract     removed    Chest discomfort     Cholecystitis 01/15/2018    Clotting disorder     may be result of aspirin    COPD (chronic obstructive pulmonary disease) recent diagnoses after xray    Coronary artery disease     Difficulty walking     Diverticulosis     Double vision 04/25/2022    Uses glasses with prism    Edema     Elevated cholesterol     Elevated liver enzymes 01/15/2018    Gallstones     GERD (gastroesophageal reflux disease)     Glaucoma     pt is currently off of meds and Dr Tanner does not think that she is glaucoma    H/O complete eye exam 06/2013    H/O mammogram 11/16/2015    H/O non-ST elevation myocardial infarction (NSTEMI) 01/2005    Hammer toe     Heart attack 01/2005    History of blood transfusion 01/2005    History of cardiovascular stress test 12/02/2015    normal    HTN (hypertension)     Hyperlipidemia     Hypokalemia 01/15/2018    Kidney infection 1971    Low back pain     Menopausal symptoms     Nausea with vomiting 12/20/2016    Onychia and paronychia of finger     Osteopenia     Other elevated white blood cell count     Pap smear for cervical cancer screening 2010    Leandro    Peripheral neuropathy had for several yrs.    Pneumonia     Rheumatoid factor positive 12/05/2022    Sepsis 01/15/2018    Tremor     essential tremor    UTI (urinary tract infection) 01/15/2018    Viral warts     Visual impairment 2019    Wears glasses        Allergies   Allergen Reactions    Dexlansoprazole Hives and Rash    Indocin [Indomethacin] Hallucinations    Statins Hives    Keflex [Cephalexin] GI Intolerance     Upset stomach, may have had some blood in stool but cannot remember the reason why she was put on it. Tolerates  penicillins without problem.    Adhesive Tape Other (See Comments)     Skin irritation with bandaids, surgical bandages that stay on skin for extended periods of time    Aleve [Naproxen] Hives    Celecoxib Diarrhea    Ibuprofen GI Intolerance     Heartburn.    Lipitor [Atorvastatin] Hives    Prevacid [Lansoprazole] Nausea And Vomiting    Prilosec [Omeprazole] Nausea And Vomiting    Zocor [Simvastatin] Hives       Past Surgical History:   Procedure Laterality Date    ADENOIDECTOMY  1952    BLADDER REPAIR  2002    BLADDER REPAIR  2003    BUNIONECTOMY Right 2010    CARDIAC CATHETERIZATION  stent placed 2005    CHOLECYSTECTOMY      CHOLECYSTECTOMY WITH INTRAOPERATIVE CHOLANGIOGRAM N/A 03/07/2018    Procedure: CHOLECYSTECTOMY LAPAROSCOPIC INTRAOPERATIVE CHOLANGIOGRAM;  Surgeon: Harinder Mason MD;  Location:  [facility];  Service:     COLONOSCOPY  2008, 4/2014    [facility]    CORONARY STENT PLACEMENT Left 01/2005    drug eluting stent 1/2005 LAD    DILATATION AND CURETTAGE  1971    DILATATION AND CURETTAGE  1978    EYE CAPSULOTOMY WITH LASER Bilateral 2018    EYE SURGERY Bilateral 05/2016    Cataract    FRONTALIS SUSPENSION  2010    HAMMER TOE REPAIR Right 11/03/2010    HEAD & NECK SKIN LESION EXCISIONAL BIOPSY  08/20/2018    benign scalp cyst    HYSTERECTOMY  1978    JOINT REPLACEMENT  2005    LAPAROTOMY OOPHERECTOMY Bilateral 2002    NOSE SURGERY  03/2017    repair 3 fractured areas. Dr Dunne    OTHER SURGICAL HISTORY  2005    rectocele repair    OTHER SURGICAL HISTORY  2010    eyelid surgery    REPLACEMENT TOTAL KNEE BILATERAL Bilateral     SKIN BIOPSY      SUBTOTAL HYSTERECTOMY  1978    TONSILLECTOMY  1952    TOTAL KNEE ARTHROPLASTY  01/2005    UMBILICAL HERNIA REPAIR  2019    UMBILICAL HERNIA REPAIR N/A 6/6/2023    Procedure: UMBILICAL HERNIA REPAIR WITH MESH LAPAROSCOPIC;  Surgeon: Harinder Mason MD;  Location:  [facility];  Service: General;  Laterality: N/A;    UPPER GASTROINTESTINAL ENDOSCOPY  11/17/2022     Dr Frank    UTERINE FIBROID SURGERY      emergency removal of fibroid from birth canal       Family History   Problem Relation Age of Onset    Stroke Mother     Hypertension Mother         My Mother  of brain hemmorage/stroke    Migraines Mother             Heart failure Father         congestive    Cancer Father         Neck glands removed    Vision loss Father         Had operation    Heart disease Father          of Congestive Heart Failure at 91    Breast cancer Sister         60's    Cancer Sister         Breast    Migraines Sister             Breast cancer Daughter 40    Cancer Daughter         Breast    Thyroid disease Daughter         on meds    Breast cancer Other         NIECE 60's    Breast cancer Other         NIECE 60's    Vision loss Paternal Aunt         blind-glaucoma    Vision loss Paternal Uncle         blind-glaucoma    Vision loss Maternal Aunt         all 9 aunts paternal aunts/Uncles/ cousins/ sister/brother/ and niece on meds for Glaucoma or had operation    Early death Daughter         Celiac Sprue reaction    Ovarian cancer Neg Hx        Social History     Socioeconomic History    Marital status:    Tobacco Use    Smoking status: Never     Passive exposure: Past    Smokeless tobacco: Never    Tobacco comments:     My father, brother in law, friends, and  smoked so had second hand exposure   Vaping Use    Vaping status: Never Used    Passive vaping exposure: Yes   Substance and Sexual Activity    Alcohol use: No    Drug use: No    Sexual activity: Not Currently     Partners: Male     Birth control/protection: Hysterectomy           Objective   Physical Exam  Vitals and nursing note reviewed.   Constitutional:       General: She is not in acute distress.     Appearance: Normal appearance. She is well-developed. She is not toxic-appearing or diaphoretic.   HENT:      Head: Normocephalic and atraumatic.      Right Ear: External ear normal.      Left Ear:  External ear normal.      Nose: Nose normal.   Eyes:      General: Lids are normal.      Pupils: Pupils are equal, round, and reactive to light.   Neck:      Trachea: No tracheal deviation.   Cardiovascular:      Rate and Rhythm: Normal rate and regular rhythm.      Pulses: No decreased pulses.      Heart sounds: Normal heart sounds. No murmur heard.     No friction rub. No gallop.   Pulmonary:      Effort: Pulmonary effort is normal. No respiratory distress.      Breath sounds: Normal breath sounds. No decreased breath sounds, wheezing, rhonchi or rales.   Abdominal:      General: Bowel sounds are normal.      Palpations: Abdomen is soft.      Tenderness: There is no abdominal tenderness. There is no guarding or rebound.   Musculoskeletal:         General: No deformity. Normal range of motion.      Cervical back: Normal range of motion and neck supple.   Lymphadenopathy:      Cervical: No cervical adenopathy.   Skin:     General: Skin is warm and dry.      Findings: No rash.   Neurological:      Mental Status: She is alert and oriented to person, place, and time.      Cranial Nerves: No cranial nerve deficit.      Sensory: No sensory deficit.   Psychiatric:         Speech: Speech normal.         Behavior: Behavior normal.         Thought Content: Thought content normal.         Judgment: Judgment normal.         Procedures           ED Course  ED Course as of 05/06/25 1502   Mon May 05, 2025   1825 EKG performed 5/5/2025 at 1822 interpreted by me shows sinus rhythm, rate 69, normal QTc, normal QRS, no acute ischemic changes. [NS]      ED Course User Index  [NS] Aniceto Nava MD                  HEART Score: 3   Shared Decision Making  I discussed the findings with the patient/patient representative who is in agreement with the treatment plan and the final disposition.  Risks and benefits of discharge and/or observation/admission were discussed: Yes                                      Medical Decision  Making  Differential includes acute coronary syndrome, chest wall pain, esophageal spasm, other unspecified etiology.    Troponin x 2 is normal.  Labs show normal BNP, normal white count, normal kidney function electrolytes, normal lipase.    EKG interpreted by me is negative for acute ischemic changes.    Chest x-ray interpreted by me shows normal heart size and clear lungs.    The patient had a recent stress test that was nonrevealing.    She has remained chest pain free throughout the ER course.    I discussed admission to the hospital for further cardiology evaluation.  However she states that she has a follow-up in 3 days and desires to go home.    She will be advised to minimize any strenuous activity and return immediately to the ER with any further concern.    Problems Addressed:  Chest pain, unspecified type: complicated acute illness or injury with systemic symptoms    Amount and/or Complexity of Data Reviewed  External Data Reviewed: labs, radiology and ECG.  Labs: ordered. Decision-making details documented in ED Course.  Radiology: ordered and independent interpretation performed. Decision-making details documented in ED Course.  ECG/medicine tests: ordered and independent interpretation performed. Decision-making details documented in ED Course.    Risk  Prescription drug management.        Final diagnoses:   Chest pain, unspecified type       ED Disposition  ED Disposition       ED Disposition   Discharge    Condition   Stable    Comment   --               [facility]  [address]  [facility] [address] [phone]  Schedule an appointment as soon as possible for a visit       Reina Yarbrough MD  [address] [facility]  [address]  [facility] [address]  [phone]    In 1 week           Medication List        Changed      Stool Softener 100 MG capsule  Generic drug: docusate sodium  Take 1 capsule by mouth 2 Times a Day.  What changed:   when to take  this  reasons to take this                 Aniceto Nava MD  05/06/25 3806

## 2025-05-05 NOTE — Clinical Note
[facility] EMERGENCY DEPARTMENT  [address]  [facility] [address]  Phone: [phone]    Chelsi Ferguson was seen and treated in our emergency department on 5/5/2025.  She may return to work on 05/08/2025.         Thank you for choosing [facility].    Aniceto Nava MD

## 2025-05-06 ENCOUNTER — TELEPHONE (OUTPATIENT)
Dept: CARDIOLOGY | Facility: HOSPITAL | Age: 85
End: 2025-05-06

## 2025-05-06 ENCOUNTER — TRANSCRIBE ORDERS (OUTPATIENT)
Dept: ADMINISTRATIVE | Facility: HOSPITAL | Age: 85
End: 2025-05-06
Payer: MEDICARE

## 2025-05-06 DIAGNOSIS — Z12.31 ENCOUNTER FOR SCREENING MAMMOGRAM FOR MALIGNANT NEOPLASM OF BREAST: Primary | ICD-10-CM

## 2025-05-06 NOTE — TELEPHONE ENCOUNTER
PTS DAUGHTER IN LAW RETURNED CALL AND WAS UNABLE TO WARM TRANSFER TO OFFICE. PLEASE CALL LOIS PHONE NUMBER [phone]

## 2025-05-07 ENCOUNTER — TELEPHONE (OUTPATIENT)
Dept: CARDIOLOGY | Facility: HOSPITAL | Age: 85
End: 2025-05-07
Payer: MEDICARE

## 2025-05-07 NOTE — TELEPHONE ENCOUNTER
Patient Returned Call. Patient Was Referred From  ER To [facility] Clinic For CO 05/05/2025. No Same Day Appointment Available. Schedule 05/09/2025.

## 2025-05-08 NOTE — PROGRESS NOTES
Chief Complaint  Chest Pain    Subjective      History of Present Illness {  Problem List  Visit  Diagnosis   Encounters  Notes  Medications  Labs  Result Review Imaging  Media :23}     Chelsi Ferguson, 84 y.o. female with past medical history significant for coronary artery disease, hypertension, hyperlipidemia, GERD, and osteoarthritis, who presents to [facility] for Chest Pain  Patient presented to [facility] ED on 5/5/2025 with a chief complaint of chest discomfort.  Patient stated she had been sitting on the table when she began to have pressure in the center of her chest.  She also had associated nausea as well.  Patient did take a sublingual nitroglycerin with improvement in her symptoms.  Twelve-lead EKG showed normal sinus rhythm, rate 69, no acute ST segment changes.  Chest x-ray showed no acute cardiopulmonary abnormality.  High-sensitivity troponin x 2 noted to be unremarkable.  Patient was discharged with instructions to follow-up with heart and valve for further evaluation of chest discomfort.    Patient is followed closely with cardiology  (Dr. Greene).  She was last seen on 3/5/2025 also with atypical chest pain.  On 3/18, patient underwent nuclear medicine stress test which showed no evidence of ischemia.    Since time of evaluation in the ED, patient endorses only 1 additional episode of discomfort which she describes as different than her recent chest pain spell.  Most recent episode was described as a sharp nonexertional episode of chest discomfort.  Patient has chronic dyspnea on exertion which she states has not been worse recently.  She denies any syncope, near syncope, or palpitations.  Patient does endorse some lower extremity edema which she notices after being sedentary.    Of note, patient has been under significant amounts of stress recently as she is planning to move into assisted living in the next few weeks.      Objective    "  Vital Signs:   Vitals:    05/09/25 1057   BP: 142/63   BP Location: Right arm   Patient Position: Sitting   Cuff Size: Adult   Pulse: 61   Resp: 16   SpO2: 96%   Weight: 56.8 kg (125 lb 3.2 oz)   Height: 149.9 cm (59\")     Body mass index is 25.29 kg/m².  Physical Exam  Vitals and nursing note reviewed.   Constitutional:       Appearance: Normal appearance.   HENT:      Head: Normocephalic.   Eyes:      Pupils: Pupils are equal, round, and reactive to light.   Cardiovascular:      Rate and Rhythm: Normal rate and regular rhythm.      Pulses: Normal pulses.      Heart sounds: Normal heart sounds. No murmur heard.  Pulmonary:      Effort: Pulmonary effort is normal.      Breath sounds: Normal breath sounds.   Abdominal:      General: Bowel sounds are normal.      Palpations: Abdomen is soft.   Musculoskeletal:         General: Normal range of motion.      Cervical back: Normal range of motion.      Right lower leg: No edema.      Left lower leg: No edema.   Skin:     General: Skin is warm and dry.      Capillary Refill: Capillary refill takes less than 2 seconds.   Neurological:      Mental Status: She is alert and oriented to person, place, and time.   Psychiatric:         Mood and Affect: Mood normal.         Thought Content: Thought content normal.                Data Reviewed:{ Labs  Result Review  Imaging  Med Tab  Media :23}     Lab Results   Component Value Date    WBC 7.62 05/05/2025    HGB 12.6 05/05/2025    HCT 39.5 05/05/2025    MCV 96.8 05/05/2025     05/05/2025      Lab Results   Component Value Date    GLUCOSE 91 05/05/2025    BUN 13 05/05/2025    CREATININE 0.65 05/05/2025     05/05/2025    K 4.4 05/05/2025     05/05/2025    CALCIUM 9.8 05/05/2025    PROTEINTOT 6.6 05/05/2025    ALBUMIN 4.1 05/05/2025    ALT 9 05/05/2025    AST 20 05/05/2025    ALKPHOS 79 05/05/2025    BILITOT 0.4 05/05/2025    GLOB 2.5 05/05/2025    AGRATIO 1.6 05/05/2025    BCR 20.0 05/05/2025    ANIONGAP 12.0 " 05/05/2025    EGFR 86.4 05/05/2025      Lab Results   Component Value Date    CKTOTAL 75 01/07/2020    TROPONINT 11 05/05/2025      Lab Results   Component Value Date    CHOL 139 10/04/2024    CHLPL 146 12/01/2022    TRIG 84 10/04/2024    HDL 49 10/04/2024    LDL 74 10/04/2024    XR Chest 1 View (05/05/2025 19:29)   ECG 12 Lead Chest Pain (05/05/2025 18:22)  ECG 12 Lead Chest Pain (05/05/2025 19:16)  Stress Test With Myocardial Perfusion One Day (03/18/2025 10:29)    Assessment & Plan   Assessment and Plan {CC Problem List  Visit Diagnosis  ROS  Review (Popup)  Health Maintenance  Quality  BestPractice  Medications  SmartSets  SnapShot Encounters  Media :23}     1. Other chest pain  - Recent episodes of chest pain described as atypical in nature.  Patient has been having atypical chest pain at least for 2 months at this point.  She was seen in March by Dr. Greene and subsequently underwent a nuclear medicine stress test which revealed no ischemic changes  - We have reviewed and discussed most recent ED evaluation including EKG, chest x-ray, and laboratory results  - We have also reviewed and discussed most recent stress testing as well  - Discussed with patient possible noncardiac causes of symptoms including GI etiology, musculoskeletal strain/pain, worsening stress/anxiety, or pulmonary etiology  - Patient is aware of recommendation to continue to monitor for any worsening of symptoms, and to present to the ER with severe episodes of chest pain  - Patient with recent nuclear medicine stress test.  Will defer additional cardiac testing at this time.    2. Coronary artery disease involving native coronary artery of native heart without angina pectoris  -Patient with NSTEMI and TATO to LAD in 2005  - Recommend to continue aspirin, statin, and beta-blocker  - Continue to recommend close follow-up with cardiology as scheduled    3. Primary hypertension  -Blood pressure near adequately controlled  during today's evaluation.  As mentioned, patient has been under a significant amount of stress recently.  Would allow for some permissive hypertension in elderly patient who utilizes a wheelchair and is at high risk for falls  - Recommend to continue current regimen at this time    4. Pure hypercholesterolemia  -Recent adjustment to rosuvastatin made per cardiology physician.  Discussed with patient importance of medication compliance.      Patient may be seen by heart and valve as needed, or if symptoms change or worsen.  Otherwise, recommend close continued follow-up with primary care provider.    Follow Up {Instructions Charge Capture  Follow-up Communications :23}     Return if symptoms worsen or fail to improve.    Patient was given instructions and counseling regarding her condition or for health maintenance advice. Please see specific information pulled into the AVS if appropriate. Patient was instructed to call the [facility] with any questions, concerns, or worsening symptoms.    Dictated Utilizing Dragon Dictation   Please note that portions of this note were completed with a voice recognition program. Part of this note may be an electronic transcription/translation of spoken language to printed text using the Dragon Dictation System.

## 2025-05-09 ENCOUNTER — OFFICE VISIT (OUTPATIENT)
Dept: CARDIOLOGY | Facility: HOSPITAL | Age: 85
End: 2025-05-09
Payer: MEDICARE

## 2025-05-09 VITALS
HEIGHT: 59 IN | DIASTOLIC BLOOD PRESSURE: 63 MMHG | BODY MASS INDEX: 25.24 KG/M2 | HEART RATE: 61 BPM | OXYGEN SATURATION: 96 % | SYSTOLIC BLOOD PRESSURE: 142 MMHG | WEIGHT: 125.2 LBS | RESPIRATION RATE: 16 BRPM

## 2025-05-09 DIAGNOSIS — I25.10 CORONARY ARTERY DISEASE INVOLVING NATIVE CORONARY ARTERY OF NATIVE HEART WITHOUT ANGINA PECTORIS: ICD-10-CM

## 2025-05-09 DIAGNOSIS — I10 PRIMARY HYPERTENSION: Chronic | ICD-10-CM

## 2025-05-09 DIAGNOSIS — R07.89 OTHER CHEST PAIN: Primary | ICD-10-CM

## 2025-05-09 DIAGNOSIS — E78.00 PURE HYPERCHOLESTEROLEMIA: Chronic | ICD-10-CM

## 2025-05-11 LAB
QT INTERVAL: 398 MS
QT INTERVAL: 428 MS
QTC INTERVAL: 426 MS
QTC INTERVAL: 441 MS

## 2025-05-28 ENCOUNTER — TELEPHONE (OUTPATIENT)
Dept: INTERNAL MEDICINE | Facility: CLINIC | Age: 85
End: 2025-05-28
Payer: MEDICARE

## 2025-05-28 NOTE — TELEPHONE ENCOUNTER
Caller: WILLIAMS    Relationship to patient: [facility]    Best call back number: [phone]     Patient is needing: SENT FAXES IN OCTOBER, FEBRUARY FOR PLAN OF CARE. YESTERDAY THEY RE FAXED THE DOCUMENTATION NEEDING THIS SIGNED. PLEASE BACK DATE PLAN OF CARE.  FAX: [phone]

## 2025-06-06 ENCOUNTER — HOSPITAL ENCOUNTER (OUTPATIENT)
Dept: MAMMOGRAPHY | Facility: HOSPITAL | Age: 85
Discharge: HOME | End: 2025-06-06
Admitting: FAMILY MEDICINE
Payer: MEDICARE

## 2025-06-06 DIAGNOSIS — Z12.31 ENCOUNTER FOR SCREENING MAMMOGRAM FOR MALIGNANT NEOPLASM OF BREAST: ICD-10-CM

## 2025-06-06 PROCEDURE — 77063 BREAST TOMOSYNTHESIS BI: CPT

## 2025-06-06 PROCEDURE — 77067 SCR MAMMO BI INCL CAD: CPT

## 2025-06-13 ENCOUNTER — OFFICE VISIT (OUTPATIENT)
Dept: INTERNAL MEDICINE | Facility: CLINIC | Age: 85
End: 2025-06-13
Payer: MEDICARE

## 2025-06-13 VITALS
SYSTOLIC BLOOD PRESSURE: 114 MMHG | WEIGHT: 125.6 LBS | DIASTOLIC BLOOD PRESSURE: 58 MMHG | RESPIRATION RATE: 14 BRPM | HEART RATE: 68 BPM | BODY MASS INDEX: 25.32 KG/M2 | HEIGHT: 59 IN | TEMPERATURE: 97.3 F | OXYGEN SATURATION: 97 %

## 2025-06-13 DIAGNOSIS — I10 PRIMARY HYPERTENSION: Chronic | ICD-10-CM

## 2025-06-13 DIAGNOSIS — E78.00 PURE HYPERCHOLESTEROLEMIA: Primary | Chronic | ICD-10-CM

## 2025-06-13 RX ORDER — NITROGLYCERIN 0.4 MG/1
0.4 TABLET SUBLINGUAL
Qty: 75 TABLET | Refills: 3 | Status: SHIPPED | OUTPATIENT
Start: 2025-06-13

## 2025-06-13 NOTE — PROGRESS NOTES
"Subjective   Chelsi Ferguson is a 84 y.o. female.         Chief Complaint   Patient presents with    Hypertension    Hyperlipidemia       Visit Vitals  /58 (BP Location: Right arm, Patient Position: Sitting, Cuff Size: Adult)   Pulse 68   Temp 97.3 °F (36.3 °C) (Infrared)   Resp 14   Ht 149.9 cm (59\")   Wt 57 kg (125 lb 9.6 oz)   LMP  (LMP Unknown) Comment: Last Mammogram   SpO2 97%   BMI 25.37 kg/m²         Hypertension  Chronicity:  Chronic  Onset:  More than 1 year ago  Progression since onset:  Unchanged  Condition status:  Controlled  Associated symptoms: no anxiety, no blurred vision, no chest pain, no headaches, no malaise/fatigue, no neck pain, no orthopnea, no palpitations, no peripheral edema, no PND, no shortness of breath, no sweats, no dyspnea with exertion and no dizziness    Agents associated with hypertension:  No associated agents  CAD risks:  Dyslipidemia and post-menopausal state  Current therapy:  Beta blockers  Improvement on treatment:  Significant  Compliance problems:  No compliance problems  End-organ damage: angina and CAD/MI    End-organ damage: no kidney disease, no CVA, no heart failure, no left ventricular hypertrophy, no PVD and no retinopathy    Identifiable causes: no chronic renal disease, no sleep apnea and no thyroid problem    Hyperlipidemia  This is a chronic problem. The current episode started more than 1 year ago. The problem is controlled. Recent lipid tests were reviewed and are normal. She has no history of chronic renal disease, diabetes, hypothyroidism, liver disease, obesity or nephrotic syndrome. Factors aggravating her hyperlipidemia include beta blockers. Pertinent negatives include no chest pain, focal sensory loss, focal weakness, leg pain, myalgias or shortness of breath. Current antihyperlipidemic treatment includes statins. The current treatment provides significant improvement of lipids. There are no compliance problems.  Risk factors for coronary artery " disease include dyslipidemia, hypertension, post-menopausal and family history.     Check up/follow up.  Pertinent negative symptoms include no chest pain, no headaches, no myalgias and no neck pain.   Coronary Artery Disease  Presents for follow-up visit. Symptoms include leg swelling. Pertinent negatives include no chest pain, chest pressure, chest tightness, dizziness, muscle weakness, palpitations, shortness of breath or weight gain. Risk factors include hyperlipidemia. Risk factors do not include obesity. The symptoms have been stable. Compliance with diet is good. Compliance with exercise is good. Compliance with medications is good.      Pt has moved to [facility] and is staying active. She is here with her daughter Halina who has her permission to be here.  Pt is walking more that when she was at home. Pt is getting PT and OT and speech therapy.     The following portions of the patient's history were reviewed and updated as appropriate: allergies, current medications, past family history, past medical history, past social history, past surgical history, and problem list.    Past Medical History:   Diagnosis Date    Allergic     Ankle pain     Arthritis     Cancer     Skin    Cataract     removed    Chest discomfort     Cholecystitis 01/15/2018    Closed fracture of anatomical neck of left humerus 03/28/2025    Clotting disorder     may be result of aspirin    COPD (chronic obstructive pulmonary disease) recent diagnoses after xray    Coronary artery disease     Difficulty walking     Diverticulosis     Double vision 04/25/2022    Uses glasses with prism    Edema     Elevated cholesterol     Elevated liver enzymes 01/15/2018    Gallstones     GERD (gastroesophageal reflux disease)     Glaucoma     pt is currently off of meds and Dr Tanner does not think that she is glaucoma    H/O complete eye exam 06/2013    H/O mammogram 11/16/2015    H/O non-ST elevation myocardial infarction (NSTEMI) 01/2005    Hammer  toe     Heart attack 01/2005    History of blood transfusion 01/2005    History of cardiovascular stress test 12/02/2015    normal    HTN (hypertension)     Hyperlipidemia     Hypokalemia 01/15/2018    Kidney infection 1971    Low back pain     Menopausal symptoms     Nausea with vomiting 12/20/2016    Onychia and paronychia of finger     Osteopenia     Other elevated white blood cell count     Pap smear for cervical cancer screening 2010    Leandro    Peripheral neuropathy had for several yrs.    Pneumonia     Rheumatoid factor positive 12/05/2022    Sepsis 01/15/2018    Tremor     essential tremor    UTI (urinary tract infection) 01/15/2018    Viral warts     Visual impairment 2019    Wears glasses       Past Surgical History:   Procedure Laterality Date    ADENOIDECTOMY  1952    BLADDER REPAIR  2002    BLADDER REPAIR  2003    BUNIONECTOMY Right 2010    CARDIAC CATHETERIZATION  stent placed 2005    CHOLECYSTECTOMY      CHOLECYSTECTOMY WITH INTRAOPERATIVE CHOLANGIOGRAM N/A 03/07/2018    Procedure: CHOLECYSTECTOMY LAPAROSCOPIC INTRAOPERATIVE CHOLANGIOGRAM;  Surgeon: Harinder Mason MD;  Location: [facility];  Service:     COLONOSCOPY  2008, 4/2014    [facility]    CORONARY STENT PLACEMENT Left 01/2005    drug eluting stent 1/2005 LAD    DILATATION AND CURETTAGE  1971    DILATATION AND CURETTAGE  1978    EYE CAPSULOTOMY WITH LASER Bilateral 2018    EYE SURGERY Bilateral 05/2016    Cataract    FRONTALIS SUSPENSION  2010    HAMMER TOE REPAIR Right 11/03/2010    HEAD & NECK SKIN LESION EXCISIONAL BIOPSY  08/20/2018    benign scalp cyst    HYSTERECTOMY  1978    JOINT REPLACEMENT  2005    LAPAROTOMY OOPHERECTOMY Bilateral 2002    NOSE SURGERY  03/2017    repair 3 fractured areas. Dr Dunne    OTHER SURGICAL HISTORY  2005    rectocele repair    OTHER SURGICAL HISTORY  2010    eyelid surgery    REPLACEMENT TOTAL KNEE BILATERAL Bilateral     SKIN BIOPSY      SUBTOTAL HYSTERECTOMY  1978    TONSILLECTOMY  1952    TOTAL KNEE  ARTHROPLASTY  2005    UMBILICAL HERNIA REPAIR  2019    UMBILICAL HERNIA REPAIR N/A 2023    Procedure: UMBILICAL HERNIA REPAIR WITH MESH LAPAROSCOPIC;  Surgeon: Harinder Mason MD;  Location: [facility];  Service: General;  Laterality: N/A;    UPPER GASTROINTESTINAL ENDOSCOPY  2022    Dr Frank    UTERINE FIBROID SURGERY      emergency removal of fibroid from birth canal      Family History   Problem Relation Age of Onset    Stroke Mother     Hypertension Mother         My Mother  of brain hemmorage/stroke    Migraines Mother             Heart failure Father         congestive    Cancer Father         Neck glands removed    Vision loss Father         Had operation    Heart disease Father          of Congestive Heart Failure at 91    Breast cancer Sister         60's    Cancer Sister         Breast    Migraines Sister             Breast cancer Daughter 40    Cancer Daughter         Breast    Thyroid disease Daughter         on meds    Breast cancer Other         NIECE 60's    Breast cancer Other         NIECE 60's    Vision loss Paternal Aunt         blind-glaucoma    Vision loss Paternal Uncle         blind-glaucoma    Vision loss Maternal Aunt         all 9 aunts paternal aunts/Uncles/ cousins/ sister/brother/ and niece on meds for Glaucoma or had operation    Early death Daughter         Celiac Sprue reaction    Ovarian cancer Neg Hx       Social History     Socioeconomic History    Marital status:    Tobacco Use    Smoking status: Never     Passive exposure: Past    Smokeless tobacco: Never    Tobacco comments:     My father, brother in law, friends, and  smoked so had second hand exposure   Vaping Use    Vaping status: Never Used    Passive vaping exposure: Yes   Substance and Sexual Activity    Alcohol use: No    Drug use: No    Sexual activity: Not Currently     Partners: Male     Birth control/protection: Hysterectomy      Allergies   Allergen Reactions     Dexlansoprazole Hives and Rash    Indocin [Indomethacin] Hallucinations    Statins Hives    Keflex [Cephalexin] GI Intolerance     Upset stomach, may have had some blood in stool but cannot remember the reason why she was put on it. Tolerates penicillins without problem.    Adhesive Tape Other (See Comments)     Skin irritation with bandaids, surgical bandages that stay on skin for extended periods of time    Aleve [Naproxen] Hives    Celecoxib Diarrhea    Ibuprofen GI Intolerance     Heartburn.    Lipitor [Atorvastatin] Hives    Prevacid [Lansoprazole] Nausea And Vomiting    Prilosec [Omeprazole] Nausea And Vomiting    Zocor [Simvastatin] Hives       Review of Systems   Constitutional:  Negative for malaise/fatigue and weight gain.   Eyes:  Negative for blurred vision.   Respiratory:  Negative for chest tightness and shortness of breath.    Cardiovascular:  Positive for leg swelling. Negative for chest pain, palpitations, orthopnea and PND.   Musculoskeletal:  Negative for myalgias, muscle weakness and neck pain.   Neurological:  Negative for dizziness, focal weakness and headaches.       Objective   Physical Exam  Vitals and nursing note reviewed.   Constitutional:       Appearance: She is well-developed.      Comments: Patient using a walker.   HENT:      Head: Normocephalic.      Right Ear: External ear normal.      Left Ear: External ear normal.      Nose: Nose normal.   Eyes:      General: Lids are normal.      Conjunctiva/sclera: Conjunctivae normal.      Pupils: Pupils are equal, round, and reactive to light.   Neck:      Thyroid: No thyroid mass or thyromegaly.      Vascular: No carotid bruit.      Trachea: Trachea normal.   Cardiovascular:      Rate and Rhythm: Normal rate and regular rhythm.      Heart sounds: No murmur heard.  Pulmonary:      Effort: Pulmonary effort is normal. No respiratory distress.      Breath sounds: Normal breath sounds. No decreased breath sounds, wheezing, rhonchi or rales.    Chest:      Chest wall: No tenderness.   Abdominal:      General: Bowel sounds are normal.      Palpations: Abdomen is soft.      Tenderness: There is no abdominal tenderness.   Musculoskeletal:         General: Normal range of motion.      Cervical back: Normal range of motion and neck supple.   Skin:     General: Skin is warm and dry.   Neurological:      Mental Status: She is alert and oriented to person, place, and time.      Motor: Tremor (intention) present.   Psychiatric:         Behavior: Behavior normal.         Assessment & Plan   Problems Addressed this Visit          Cardiac and Vasculature    Hyperlipidemia - Primary (Chronic)    HTN (hypertension) (Chronic)     Diagnoses         Codes Comments      Pure hypercholesterolemia    -  Primary ICD-10-CM: E78.00  ICD-9-CM: 272.0       Primary hypertension     ICD-10-CM: I10  ICD-9-CM: 401.9           Patient has enough medication for her hypertension and hyperlipidemia.  Patient had labs last month at hospital which were reviewed.  Questions from patient and her daughter were answered.           Current Outpatient Medications:     aspirin 81 MG EC tablet, Take 1 tablet by mouth Every Night., Disp: , Rfl:     BIOTIN 5000 PO, Take 5,000 mcg by mouth Daily., Disp: , Rfl:     Cholecalciferol (Vitamin D3) 50 MCG (2000 UT) capsule, Take 1 capsule by mouth Daily., Disp: , Rfl:     coenzyme Q10 100 MG capsule, Take 1 capsule by mouth Daily., Disp: , Rfl:     docusate sodium (Stool Softener) 100 MG capsule, Take 1 capsule by mouth 2 Times a Day. (Patient taking differently: Take 1 capsule by mouth 2 (Two) Times a Day As Needed for Constipation.), Disp: 30 capsule, Rfl: 1    famotidine (PEPCID) 40 MG tablet, Take 1 tablet by mouth two times daily, Disp: 180 tablet, Rfl: 3    metoprolol succinate XL (TOPROL-XL) 25 MG 24 hr tablet, Take 1 tablet by mouth Every Morning., Disp: 90 tablet, Rfl: 3    nitroglycerin (NITROSTAT) 0.4 MG SL tablet, Place 1 tablet under the  tongue Every 5 (Five) Minutes As Needed for Chest Pain., Disp: 75 tablet, Rfl: 3    rosuvastatin (CRESTOR) 40 MG tablet, Take 1 tablet by mouth Daily., Disp: 90 tablet, Rfl: 3    Camphor-Menthol-Methyl Sal (Salonpas) 3.1-6-10 % patch, Apply  topically. (Patient not taking: Reported on 6/13/2025), Disp: , Rfl:     Denosumab (PROLIA SC), Inject  under the skin into the appropriate area as directed., Disp: , Rfl:     latanoprost (XALATAN) 0.005 % ophthalmic solution, 1 drop Every Night. (Patient not taking: Reported on 6/13/2025), Disp: , Rfl:     Yuvafem 10 MCG tablet vaginal tablet, INSERT 1 TABLET VAGINALLY TWICE A WEEK (Patient not taking: Reported on 6/13/2025), Disp: 24 tablet, Rfl: 0    Return in about 3 months (around 9/13/2025), or if symptoms worsen or fail to improve, for Recheck.    I spent 35 minutes caring for Chelsi on this date of service. This time includes time spent by me in the following activities: preparing for the visit, reviewing tests, performing a medically appropriate examination and/or evaluation, counseling and educating the patient/family/caregiver, documenting information in the medical record, and obtaining a separately obtained history

## 2025-07-07 ENCOUNTER — OFFICE VISIT (OUTPATIENT)
Dept: INTERNAL MEDICINE | Facility: CLINIC | Age: 85
End: 2025-07-07
Payer: MEDICARE

## 2025-07-07 VITALS
BODY MASS INDEX: 25.56 KG/M2 | HEIGHT: 59 IN | DIASTOLIC BLOOD PRESSURE: 62 MMHG | TEMPERATURE: 97.4 F | OXYGEN SATURATION: 96 % | SYSTOLIC BLOOD PRESSURE: 104 MMHG | HEART RATE: 84 BPM | WEIGHT: 126.8 LBS

## 2025-07-07 DIAGNOSIS — R53.83 OTHER FATIGUE: Primary | ICD-10-CM

## 2025-07-07 DIAGNOSIS — R05.9 COUGH, UNSPECIFIED TYPE: ICD-10-CM

## 2025-07-07 PROCEDURE — 87428 SARSCOV & INF VIR A&B AG IA: CPT | Performed by: STUDENT IN AN ORGANIZED HEALTH CARE EDUCATION/TRAINING PROGRAM

## 2025-07-07 PROCEDURE — 1126F AMNT PAIN NOTED NONE PRSNT: CPT | Performed by: STUDENT IN AN ORGANIZED HEALTH CARE EDUCATION/TRAINING PROGRAM

## 2025-07-07 PROCEDURE — 99213 OFFICE O/P EST LOW 20 MIN: CPT | Performed by: STUDENT IN AN ORGANIZED HEALTH CARE EDUCATION/TRAINING PROGRAM

## 2025-07-07 PROCEDURE — 3078F DIAST BP <80 MM HG: CPT | Performed by: STUDENT IN AN ORGANIZED HEALTH CARE EDUCATION/TRAINING PROGRAM

## 2025-07-07 PROCEDURE — 3074F SYST BP LT 130 MM HG: CPT | Performed by: STUDENT IN AN ORGANIZED HEALTH CARE EDUCATION/TRAINING PROGRAM

## 2025-07-07 NOTE — PROGRESS NOTES
Office Note     Name: Chelsi Ferguson    : 1940     MRN: [id number]     Chief Complaint  Fatigue (Had low grade fever last Monday. Had chest x ray on Thursday )    Subjective     History of Present Illness:  Chelsi Ferguson is a 84 y.o. female who presents today for cough and feeling unwell.     Tired and weak since last Monday.   Thursday, she has a chest XR. It showed a small opacity in the right mid lung field that 'may represent a developing infiltrate'.     She feels marginally better starting yesterday afternoon.   She still has an intermittent cough. She is coughing up a little yellow phlegm.   She is still feeling fatigued and tired.   No fevers since last Monday.   Lives at [facility].   Appetite is good.   Has constant dysuria. No new urinary symptoms.   Had loose bowels for a few days when all this started.     Feels short of breath but O2 levels are good. At her facility her O2 dropped to 87% early last week but it has stayed in the mid-90s since then.   Mucinex was given last week. It hasn't helped too much.       No sick contacts.   Review of Systems:   Review of Systems    Past Medical History:   Past Medical History:   Diagnosis Date    Allergic     Ankle pain     Arthritis     Cancer     Skin    Cataract     removed    Chest discomfort     Cholecystitis 01/15/2018    Closed fracture of anatomical neck of left humerus 2025    Clotting disorder     may be result of aspirin    COPD (chronic obstructive pulmonary disease) recent diagnoses after xray    Coronary artery disease     Difficulty walking     Diverticulosis     Double vision 2022    Uses glasses with prism    Edema     Elevated cholesterol     Elevated liver enzymes 01/15/2018    Gallstones     GERD (gastroesophageal reflux disease)     Glaucoma     pt is currently off of meds and Dr Tanner does not think that she is glaucoma    H/O complete eye exam 2013    H/O mammogram 2015    H/O non-ST elevation myocardial  infarction (NSTEMI) 01/2005    Hammer toe     Heart attack 01/2005    History of blood transfusion 01/2005    History of cardiovascular stress test 12/02/2015    normal    HTN (hypertension)     Hyperlipidemia     Hypokalemia 01/15/2018    Kidney infection 1971    Low back pain     Menopausal symptoms     Nausea with vomiting 12/20/2016    Onychia and paronychia of finger     Osteopenia     Other elevated white blood cell count     Pap smear for cervical cancer screening 2010    Leandro    Peripheral neuropathy had for several yrs.    Pneumonia     Rheumatoid factor positive 12/05/2022    Sepsis 01/15/2018    Tremor     essential tremor    UTI (urinary tract infection) 01/15/2018    Viral warts     Visual impairment 2019    Wears glasses        Past Surgical History:   Past Surgical History:   Procedure Laterality Date    ADENOIDECTOMY  1952    BLADDER REPAIR  2002    BLADDER REPAIR  2003    BUNIONECTOMY Right 2010    CARDIAC CATHETERIZATION  stent placed 2005    CHOLECYSTECTOMY      CHOLECYSTECTOMY WITH INTRAOPERATIVE CHOLANGIOGRAM N/A 03/07/2018    Procedure: CHOLECYSTECTOMY LAPAROSCOPIC INTRAOPERATIVE CHOLANGIOGRAM;  Surgeon: Harinder Mason MD;  Location: [facility];  Service:     COLONOSCOPY  2008, 4/2014    [facility]    CORONARY STENT PLACEMENT Left 01/2005    drug eluting stent 1/2005 LAD    DILATATION AND CURETTAGE  1971    DILATATION AND CURETTAGE  1978    EYE CAPSULOTOMY WITH LASER Bilateral 2018    EYE SURGERY Bilateral 05/2016    Cataract    FRONTALIS SUSPENSION  2010    HAMMER TOE REPAIR Right 11/03/2010    HEAD & NECK SKIN LESION EXCISIONAL BIOPSY  08/20/2018    benign scalp cyst    HYSTERECTOMY  1978    JOINT REPLACEMENT  2005    LAPAROTOMY OOPHERECTOMY Bilateral 2002    NOSE SURGERY  03/2017    repair 3 fractured areas. Dr Dunne    OTHER SURGICAL HISTORY  2005    rectocele repair    OTHER SURGICAL HISTORY  2010    eyelid surgery    REPLACEMENT TOTAL KNEE BILATERAL Bilateral     SKIN BIOPSY       SUBTOTAL HYSTERECTOMY      TONSILLECTOMY      TOTAL KNEE ARTHROPLASTY  2005    UMBILICAL HERNIA REPAIR  2019    UMBILICAL HERNIA REPAIR N/A 2023    Procedure: UMBILICAL HERNIA REPAIR WITH MESH LAPAROSCOPIC;  Surgeon: Harinder Mason MD;  Location: [facility];  Service: General;  Laterality: N/A;    UPPER GASTROINTESTINAL ENDOSCOPY  2022    Dr Frank    UTERINE FIBROID SURGERY      emergency removal of fibroid from birth canal       Family History:   Family History   Problem Relation Age of Onset    Stroke Mother     Hypertension Mother         My Mother  of brain hemmorage/stroke    Migraines Mother             Heart failure Father         congestive    Cancer Father         Neck glands removed    Vision loss Father         Had operation    Heart disease Father          of Congestive Heart Failure at 91    Breast cancer Sister         60's    Cancer Sister         Breast    Migraines Sister             Breast cancer Daughter 40    Cancer Daughter         Breast    Thyroid disease Daughter         on meds    Breast cancer Other         NIECE 60's    Breast cancer Other         NIECE 60's    Vision loss Paternal Aunt         blind-glaucoma    Vision loss Paternal Uncle         blind-glaucoma    Vision loss Maternal Aunt         all 9 aunts paternal aunts/Uncles/ cousins/ sister/brother/ and niece on meds for Glaucoma or had operation    Early death Daughter         Celiac Sprue reaction    Ovarian cancer Neg Hx        Social History:   Social History     Socioeconomic History    Marital status:    Tobacco Use    Smoking status: Never     Passive exposure: Past    Smokeless tobacco: Never    Tobacco comments:     My father, brother in law, friends, and  smoked so had second hand exposure   Vaping Use    Vaping status: Never Used    Passive vaping exposure: Yes   Substance and Sexual Activity    Alcohol use: No    Drug use: No    Sexual activity: Not  Currently     Partners: Male     Birth control/protection: Hysterectomy       Immunizations:   Immunization History   Administered Date(s) Administered    COVID-19 (MODERNA) 1st,2nd,3rd Dose Monovalent 01/22/2021, 02/19/2021, 11/01/2021, 11/08/2023    COVID-19 (MODERNA) BIVALENT 12+YRS 08/04/2023    COVID-19 (MODERNA) Monovalent Original Booster 05/02/2022, 09/22/2022    COVID-19 (PFIZER) 12YRS+ (COMIRNATY) 09/19/2024    FLUAD TRI 65YR+ 10/09/2019    Fluad Quad 65+ 09/23/2020    Fluzone High-Dose 65+YRS 10/19/2016, 10/01/2017, 10/18/2017, 10/18/2018, 10/09/2019, 10/04/2024    Fluzone High-Dose 65+yrs 10/05/2021, 10/10/2022, 10/16/2023    Hepatitis A 09/09/2018, 10/19/2018, 04/23/2019    Influenza, Unspecified 09/01/2010, 08/23/2012, 09/25/2013, 10/10/2014, 10/01/2015, 10/18/2017    Pneumococcal Conjugate 13-Valent (PCV13) 05/11/2015    Pneumococcal Conjugate 20-Valent (PCV20) 03/12/2025    Pneumococcal Polysaccharide (PPSV23) 05/01/2010, 10/19/2016    RSV Vaccine, Unspecified 10/02/2023    Shingrix 04/13/2021, 07/21/2021    Tdap 10/01/2000, 02/08/2012, 03/04/2022    Zostavax 05/01/2007        Medications:     Current Outpatient Medications:     aspirin 81 MG EC tablet, Take 1 tablet by mouth Every Night., Disp: , Rfl:     BIOTIN 5000 PO, Take 5,000 mcg by mouth Daily., Disp: , Rfl:     Cholecalciferol (Vitamin D3) 50 MCG (2000 UT) capsule, Take 1 capsule by mouth Daily., Disp: , Rfl:     coenzyme Q10 100 MG capsule, Take 1 capsule by mouth Daily., Disp: , Rfl:     Denosumab (PROLIA SC), Inject  under the skin into the appropriate area as directed., Disp: , Rfl:     docusate sodium (Stool Softener) 100 MG capsule, Take 1 capsule by mouth 2 Times a Day. (Patient taking differently: Take 1 capsule by mouth 2 (Two) Times a Day As Needed for Constipation.), Disp: 30 capsule, Rfl: 1    famotidine (PEPCID) 40 MG tablet, Take 1 tablet by mouth two times daily, Disp: 180 tablet, Rfl: 3    metoprolol succinate XL (TOPROL-XL)  "25 MG 24 hr tablet, Take 1 tablet by mouth Every Morning., Disp: 90 tablet, Rfl: 3    nitroglycerin (NITROSTAT) 0.4 MG SL tablet, Place 1 tablet under the tongue Every 5 (Five) Minutes As Needed for Chest Pain., Disp: 75 tablet, Rfl: 3    rosuvastatin (CRESTOR) 40 MG tablet, Take 1 tablet by mouth Daily., Disp: 90 tablet, Rfl: 3    Camphor-Menthol-Methyl Sal (Salonpas) 3.1-6-10 % patch, Apply  topically. (Patient not taking: Reported on 7/7/2025), Disp: , Rfl:     latanoprost (XALATAN) 0.005 % ophthalmic solution, 1 drop Every Night. (Patient not taking: Reported on 6/13/2025), Disp: , Rfl:     Yuvafem 10 MCG tablet vaginal tablet, INSERT 1 TABLET VAGINALLY TWICE A WEEK (Patient not taking: Reported on 5/9/2025), Disp: 24 tablet, Rfl: 0    Allergies:   Allergies   Allergen Reactions    Dexlansoprazole Hives and Rash    Indocin [Indomethacin] Hallucinations    Statins Hives    Keflex [Cephalexin] GI Intolerance     Upset stomach, may have had some blood in stool but cannot remember the reason why she was put on it. Tolerates penicillins without problem.    Adhesive Tape Other (See Comments)     Skin irritation with bandaids, surgical bandages that stay on skin for extended periods of time    Aleve [Naproxen] Hives    Celecoxib Diarrhea    Ibuprofen GI Intolerance     Heartburn.    Lipitor [Atorvastatin] Hives    Prevacid [Lansoprazole] Nausea And Vomiting    Prilosec [Omeprazole] Nausea And Vomiting    Zocor [Simvastatin] Hives       Objective     Vital Signs  /62   Pulse 84   Temp 97.4 °F (36.3 °C) (Infrared)   Ht 149.9 cm (59.02\")   Wt 57.5 kg (126 lb 12.8 oz)   SpO2 96%   BMI 25.60 kg/m²   Estimated body mass index is 25.6 kg/m² as calculated from the following:    Height as of this encounter: 149.9 cm (59.02\").    Weight as of this encounter: 57.5 kg (126 lb 12.8 oz).           Physical Exam  Vitals reviewed.   Constitutional:       Appearance: Normal appearance.   HENT:      Head: Normocephalic and " atraumatic.      Nose: Nose normal.   Eyes:      Conjunctiva/sclera: Conjunctivae normal.      Pupils: Pupils are equal, round, and reactive to light.   Cardiovascular:      Rate and Rhythm: Normal rate and regular rhythm.      Pulses: Normal pulses.      Heart sounds: Normal heart sounds.   Pulmonary:      Effort: Pulmonary effort is normal. No respiratory distress.      Breath sounds: Normal breath sounds. No stridor. No wheezing, rhonchi or rales.   Abdominal:      General: Abdomen is flat.   Neurological:      General: No focal deficit present.      Mental Status: She is alert and oriented to person, place, and time.   Psychiatric:         Mood and Affect: Mood normal.         Behavior: Behavior normal.         Thought Content: Thought content normal.          Procedures     Results:  Recent Results (from the past 24 hours)   POCT SARS-CoV-2 Antigen BLANCA + Flu    Collection Time: 07/07/25  3:43 PM    Specimen: Swab   Result Value Ref Range    SARS Antigen Not Detected Not Detected, Presumptive Negative    Influenza A Antigen BLANCA Not Detected Not Detected    Influenza B Antigen BLANCA Not Detected Not Detected    Internal Control Passed Passed    Lot Number [id number]     Expiration Date 02/07/2026         Assessment and Plan     Assessment/Plan:  Diagnoses and all orders for this visit:    1. Other fatigue (Primary)  -     POCT SARS-CoV-2 Antigen BLANCA + Flu    2. Cough, unspecified type      Ms. Ferguson presents today with her son.  She feels a bit better than last week. Symptoms started with a cough, sputum production and a low-grade fever.  Though she is feeling better from those symptoms, she is still feeling tired and rundown.  Her cognition is normal.  Her appetite is normal.  Her vital signs are all reassuring.  Her lung exam today is normal.  I have reviewed the results of the chest x-ray done at an outside facility and it shows a possible small opacity in the right midlung field.  This is similar with recent  chest x-rays that have been obtained in the [facility], [place], operated by [facility]. Given reassuring exam and improvement in symptoms, continue with supportive care. Advised that if cough or shortness of breath worsens in the next 48-72 hours, return to clinic for repeat chest XR and re-evaluation. COVID test negative.     Follow Up  No follow-ups on file.    Flaquita Williamson MD   [facility]

## 2025-08-07 DIAGNOSIS — I10 ESSENTIAL HYPERTENSION: Chronic | ICD-10-CM

## 2025-08-08 RX ORDER — METOPROLOL SUCCINATE 25 MG/1
25 TABLET, EXTENDED RELEASE ORAL EVERY MORNING
Qty: 90 TABLET | Refills: 1 | OUTPATIENT
Start: 2025-08-08

## (undated) DEVICE — CANNULA,OXY,ADULT,SUPERSOFT,W/7'TUB,UC: Brand: MEDLINE

## (undated) DEVICE — MEDI-VAC NON-CONDUCTIVE SUCTION TUBING: Brand: CARDINAL HEALTH

## (undated) DEVICE — COVER,LIGHT HANDLE,FLX,1/PK: Brand: MEDLINE INDUSTRIES, INC.

## (undated) DEVICE — VISUALIZATION SYSTEM: Brand: CLEARIFY

## (undated) DEVICE — GOWN,PREVENTION PLUS,XXLARGE,STERILE: Brand: MEDLINE

## (undated) DEVICE — Device

## (undated) DEVICE — ENDOPATH XCEL BLADELESS TROCARS WITH STABILITY SLEEVES: Brand: ENDOPATH XCEL

## (undated) DEVICE — APPL CHLORAPREP TINTED 26ML TEAL

## (undated) DEVICE — ST EXT IV TBG W SECUR LK 20IN

## (undated) DEVICE — ENDOPATH XCEL UNIVERSAL TROCAR STABLILITY SLEEVES: Brand: ENDOPATH XCEL

## (undated) DEVICE — SUT SILK 3/0 SH 30IN K832H

## (undated) DEVICE — GLV SURG SENSICARE MICRO PF LF 8 STRL

## (undated) DEVICE — PATIENT RETURN ELECTRODE, SINGLE-USE, CONTACT QUALITY MONITORING, ADULT, WITH 9FT CORD, FOR PATIENTS WEIGING OVER 33LBS. (15KG): Brand: MEGADYNE

## (undated) DEVICE — [HIGH FLOW HEATED INSUFFLATOR TUBING,  DO NOT USE IF PACKAGE IS DAMAGED]

## (undated) DEVICE — SYR LUERLOK 30CC

## (undated) DEVICE — BNDR ABD PREMIUM/UNIV 10IN 27TO48IN

## (undated) DEVICE — ANTIBACTERIAL UNDYED BRAIDED (POLYGLACTIN 910), SYNTHETIC ABSORBABLE SUTURE: Brand: COATED VICRYL

## (undated) DEVICE — LAPAROSCOPIC SMOKE FILTRATION SYSTEM: Brand: PALL LAPAROSHIELD® PLUS LAPAROSCOPIC SMOKE FILTRATION SYSTEM

## (undated) DEVICE — SYR LUERLOK 20CC

## (undated) DEVICE — SUT VIC 0 UR5 27IN VCP376H

## (undated) DEVICE — ENCORE® LATEX MICRO SIZE 7.5, STERILE LATEX POWDER-FREE SURGICAL GLOVE: Brand: ENCORE

## (undated) DEVICE — GLV SURG SENSICARE PI MIC PF SZ7 LF STRL

## (undated) DEVICE — SUT SILK 2/0 TIES 18IN A185H

## (undated) DEVICE — SUT VIC 0 UR6 27IN VCP603H

## (undated) DEVICE — GOWN,NON-REINFORCED,SIRUS,SET IN SLV,XL: Brand: MEDLINE

## (undated) DEVICE — GLV SURG DERMASSURE GRN LF PF 7.0

## (undated) DEVICE — [HIGH FLOW INSUFFLATOR,  DO NOT USE IF PACKAGE IS DAMAGED,  KEEP DRY,  KEEP AWAY FROM SUNLIGHT,  PROTECT FROM HEAT AND RADIOACTIVE SOURCES.]: Brand: PNEUMOSURE

## (undated) DEVICE — GLV SURG SENSICARE W/ALOE PF LF 7 STRL

## (undated) DEVICE — GLV SURG SENSICARE PI MIC PF SZ7.5 LF STRL

## (undated) DEVICE — ENCORE® LATEX MICRO SIZE 7, STERILE LATEX POWDER-FREE SURGICAL GLOVE: Brand: ENCORE

## (undated) DEVICE — LUER-LOK 360°: Brand: CONNECTA, LUER-LOK

## (undated) DEVICE — ENDOPATH XCEL BLUNT TIP TROCARS WITH SMOOTH SLEEVES: Brand: ENDOPATH XCEL

## (undated) DEVICE — GLV SURG SENSICARE MICRO PF LF 7 STRL

## (undated) DEVICE — AIRWY 90MM NO9

## (undated) DEVICE — MEDI-VAC YANKAUER SUCTION HANDLE W/BULBOUS TIP: Brand: CARDINAL HEALTH

## (undated) DEVICE — SNAP KOVER: Brand: UNBRANDED

## (undated) DEVICE — GLV SURG SENSICARE W/ALOE PF LF 6.5 STRL

## (undated) DEVICE — GLV SURG SENSICARE MICRO PF LF 7.5 STRL

## (undated) DEVICE — ENDOCUT SCISSOR TIP, DISPOSABLE: Brand: RENEW

## (undated) DEVICE — PDS II VLT 0 107CM AG ST3: Brand: ENDOLOOP

## (undated) DEVICE — 3M™ IOBAN™ 2 ANTIMICROBIAL INCISE DRAPE 6651EZ: Brand: IOBAN™ 2

## (undated) DEVICE — ENDOPOUCH RETRIEVER SPECIMEN RETRIEVAL BAGS: Brand: ENDOPOUCH RETRIEVER

## (undated) DEVICE — ENCORE® LATEX MICRO SIZE 6.5, STERILE LATEX POWDER-FREE SURGICAL GLOVE: Brand: ENCORE

## (undated) DEVICE — PK LAP LASR CHOLE 10

## (undated) DEVICE — LAPAROVUE VISIBILITY SYSTEM LAPAROSCOPIC SOLUTIONS: Brand: LAPAROVUE

## (undated) DEVICE — GLV SURG SENSICARE W/ALOE PF LF 7.5 STRL

## (undated) DEVICE — GLV SURG SENSICARE MICRO PF LF 6.5 STRL